# Patient Record
Sex: MALE | Race: WHITE | Employment: OTHER | URBAN - NONMETROPOLITAN AREA
[De-identification: names, ages, dates, MRNs, and addresses within clinical notes are randomized per-mention and may not be internally consistent; named-entity substitution may affect disease eponyms.]

---

## 2017-01-18 ENCOUNTER — OFFICE VISIT (OUTPATIENT)
Dept: PRIMARY CARE CLINIC | Age: 62
End: 2017-01-18
Payer: MEDICAID

## 2017-01-18 VITALS
WEIGHT: 277.8 LBS | OXYGEN SATURATION: 98 % | HEIGHT: 72 IN | TEMPERATURE: 98.1 F | HEART RATE: 82 BPM | BODY MASS INDEX: 37.63 KG/M2 | RESPIRATION RATE: 18 BRPM | DIASTOLIC BLOOD PRESSURE: 78 MMHG | SYSTOLIC BLOOD PRESSURE: 122 MMHG

## 2017-01-18 DIAGNOSIS — E11.9 ENCOUNTER FOR DIABETIC FOOT EXAM (HCC): ICD-10-CM

## 2017-01-18 DIAGNOSIS — I10 ESSENTIAL HYPERTENSION: Primary | ICD-10-CM

## 2017-01-18 DIAGNOSIS — E11.9 DIABETES MELLITUS WITHOUT COMPLICATION (HCC): ICD-10-CM

## 2017-01-18 DIAGNOSIS — Z23 NEED FOR PROPHYLACTIC VACCINATION AGAINST DIPHTHERIA-TETANUS-PERTUSSIS (DTP): ICD-10-CM

## 2017-01-18 PROCEDURE — 99214 OFFICE O/P EST MOD 30 MIN: CPT | Performed by: NURSE PRACTITIONER

## 2017-01-18 ASSESSMENT — ENCOUNTER SYMPTOMS
WHEEZING: 0
APNEA: 0
SHORTNESS OF BREATH: 0

## 2017-01-29 RX ORDER — GABAPENTIN 100 MG/1
CAPSULE ORAL
Qty: 90 CAPSULE | Refills: 0 | Status: SHIPPED | OUTPATIENT
Start: 2017-01-29 | End: 2017-05-16

## 2017-02-09 RX ORDER — LISINOPRIL 40 MG/1
40 TABLET ORAL 2 TIMES DAILY
Qty: 30 TABLET | Refills: 5 | Status: SHIPPED | OUTPATIENT
Start: 2017-02-09 | End: 2017-02-10 | Stop reason: SDUPTHER

## 2017-02-10 ENCOUNTER — OFFICE VISIT (OUTPATIENT)
Dept: URGENT CARE | Age: 62
End: 2017-02-10
Payer: COMMERCIAL

## 2017-02-10 VITALS
OXYGEN SATURATION: 93 % | WEIGHT: 275 LBS | TEMPERATURE: 98.7 F | BODY MASS INDEX: 37.3 KG/M2 | HEART RATE: 100 BPM | SYSTOLIC BLOOD PRESSURE: 120 MMHG | DIASTOLIC BLOOD PRESSURE: 80 MMHG | RESPIRATION RATE: 18 BRPM

## 2017-02-10 DIAGNOSIS — T14.8XXA MUSCLE STRAIN: ICD-10-CM

## 2017-02-10 DIAGNOSIS — J06.9 URI WITH COUGH AND CONGESTION: Primary | ICD-10-CM

## 2017-02-10 DIAGNOSIS — Z76.0 MEDICATION REFILL: Primary | ICD-10-CM

## 2017-02-10 PROCEDURE — 99213 OFFICE O/P EST LOW 20 MIN: CPT | Performed by: NURSE PRACTITIONER

## 2017-02-10 RX ORDER — METHYLPREDNISOLONE 4 MG/1
TABLET ORAL
Qty: 1 KIT | Refills: 0 | Status: SHIPPED | OUTPATIENT
Start: 2017-02-10 | End: 2017-02-16

## 2017-02-10 RX ORDER — NAPROXEN 500 MG/1
500 TABLET ORAL 2 TIMES DAILY WITH MEALS
Qty: 60 TABLET | Refills: 3 | Status: SHIPPED | OUTPATIENT
Start: 2017-02-10 | End: 2017-08-02 | Stop reason: SDUPTHER

## 2017-02-10 RX ORDER — DEXTROMETHORPHAN HYDROBROMIDE AND PROMETHAZINE HYDROCHLORIDE 15; 6.25 MG/5ML; MG/5ML
5 SYRUP ORAL NIGHTLY PRN
Qty: 118 ML | Refills: 0 | Status: SHIPPED | OUTPATIENT
Start: 2017-02-10 | End: 2017-02-17

## 2017-02-10 RX ORDER — CYCLOBENZAPRINE HCL 5 MG
5 TABLET ORAL EVERY 8 HOURS PRN
Qty: 20 TABLET | Refills: 0 | Status: SHIPPED | OUTPATIENT
Start: 2017-02-10 | End: 2017-02-20

## 2017-02-10 RX ORDER — LISINOPRIL 40 MG/1
40 TABLET ORAL 2 TIMES DAILY
Qty: 180 TABLET | Refills: 3 | Status: SHIPPED | OUTPATIENT
Start: 2017-02-10 | End: 2017-02-22 | Stop reason: ALTCHOICE

## 2017-02-10 RX ORDER — ESOMEPRAZOLE MAGNESIUM 40 MG/1
40 CAPSULE, DELAYED RELEASE ORAL DAILY
Qty: 90 CAPSULE | Refills: 3 | Status: SHIPPED | OUTPATIENT
Start: 2017-02-10 | End: 2017-05-24 | Stop reason: CLARIF

## 2017-02-10 RX ORDER — AMOXICILLIN AND CLAVULANATE POTASSIUM 875; 125 MG/1; MG/1
1 TABLET, FILM COATED ORAL 2 TIMES DAILY
Qty: 20 TABLET | Refills: 0 | Status: SHIPPED | OUTPATIENT
Start: 2017-02-10 | End: 2017-02-20

## 2017-02-10 ASSESSMENT — ENCOUNTER SYMPTOMS
SORE THROAT: 0
COUGH: 1

## 2017-02-20 ENCOUNTER — TELEPHONE (OUTPATIENT)
Dept: PRIMARY CARE CLINIC | Age: 62
End: 2017-02-20

## 2017-02-20 ENCOUNTER — HOSPITAL ENCOUNTER (EMERGENCY)
Facility: HOSPITAL | Age: 62
Discharge: HOME | End: 2017-02-20
Admitting: FAMILY MEDICINE

## 2017-02-20 ENCOUNTER — APPOINTMENT (OUTPATIENT)
Dept: GENERAL RADIOLOGY | Facility: HOSPITAL | Age: 62
End: 2017-02-20

## 2017-02-20 ENCOUNTER — APPOINTMENT (OUTPATIENT)
Dept: CT IMAGING | Facility: HOSPITAL | Age: 62
End: 2017-02-20

## 2017-02-20 VITALS
RESPIRATION RATE: 16 BRPM | OXYGEN SATURATION: 95 % | BODY MASS INDEX: 35.89 KG/M2 | HEART RATE: 71 BPM | WEIGHT: 265 LBS | DIASTOLIC BLOOD PRESSURE: 84 MMHG | SYSTOLIC BLOOD PRESSURE: 163 MMHG | TEMPERATURE: 98.7 F | HEIGHT: 72 IN

## 2017-02-20 DIAGNOSIS — R07.9 CHEST PAIN, UNSPECIFIED TYPE: Primary | ICD-10-CM

## 2017-02-20 DIAGNOSIS — J20.9 ACUTE BRONCHITIS, UNSPECIFIED ORGANISM: ICD-10-CM

## 2017-02-20 LAB
ALBUMIN SERPL-MCNC: 3.8 G/DL (ref 3.5–5)
ALBUMIN/GLOB SERPL: 1.3 G/DL (ref 1.1–2.5)
ALP SERPL-CCNC: 72 U/L (ref 24–120)
ALT SERPL W P-5'-P-CCNC: 37 U/L (ref 0–54)
ANION GAP SERPL CALCULATED.3IONS-SCNC: 11 MMOL/L (ref 4–13)
APTT PPP: 27.5 SECONDS (ref 24.1–34.8)
ARTERIAL PATENCY WRIST A: ABNORMAL
AST SERPL-CCNC: 24 U/L (ref 7–45)
ATMOSPHERIC PRESS: ABNORMAL MMHG
BASE EXCESS BLDA CALC-SCNC: 0.3 MMOL/L (ref -2–2)
BASOPHILS # BLD AUTO: 0.03 10*3/MM3 (ref 0–0.2)
BASOPHILS NFR BLD AUTO: 0.3 % (ref 0–2)
BDY SITE: ABNORMAL
BILIRUB SERPL-MCNC: 0.5 MG/DL (ref 0.1–1)
BUN BLD-MCNC: 23 MG/DL (ref 5–21)
BUN/CREAT SERPL: 16.9 (ref 7–25)
CALCIUM SPEC-SCNC: 10 MG/DL (ref 8.4–10.4)
CHLORIDE SERPL-SCNC: 101 MMOL/L (ref 98–110)
CO2 SERPL-SCNC: 28 MMOL/L (ref 24–31)
COHGB MFR BLD: 1.5 % (ref 0–5.1)
CREAT BLD-MCNC: 1.36 MG/DL (ref 0.5–1.4)
DEPRECATED RDW RBC AUTO: 39.1 FL (ref 40–54)
EOSINOPHIL # BLD AUTO: 0.18 10*3/MM3 (ref 0–0.7)
EOSINOPHIL NFR BLD AUTO: 1.8 % (ref 0–4)
ERYTHROCYTE [DISTWIDTH] IN BLOOD BY AUTOMATED COUNT: 13.1 % (ref 12–15)
FLUAV AG NPH QL: NEGATIVE
FLUBV AG NPH QL IA: NEGATIVE
GFR SERPL CREATININE-BSD FRML MDRD: 53 ML/MIN/1.73
GLOBULIN UR ELPH-MCNC: 3 GM/DL
GLUCOSE BLD-MCNC: 176 MG/DL (ref 70–100)
HCO3 BLDA-SCNC: 24.7 MMOL/L (ref 22–26)
HCT VFR BLD AUTO: 38.8 % (ref 40–52)
HGB BLD-MCNC: 12.8 G/DL (ref 14–18)
HGB BLDA-MCNC: 13 G/DL (ref 14–18)
HOLD SPECIMEN: NORMAL
HOLD SPECIMEN: NORMAL
IMM GRANULOCYTES # BLD: 0.07 10*3/MM3 (ref 0–0.03)
IMM GRANULOCYTES NFR BLD: 0.7 % (ref 0–5)
INR PPP: 0.88 (ref 0.91–1.09)
LYMPHOCYTES # BLD AUTO: 2.57 10*3/MM3 (ref 0.72–4.86)
LYMPHOCYTES NFR BLD AUTO: 25.8 % (ref 15–45)
MCH RBC QN AUTO: 27.4 PG (ref 28–32)
MCHC RBC AUTO-ENTMCNC: 33 G/DL (ref 33–36)
MCV RBC AUTO: 83.1 FL (ref 82–95)
METHGB BLD QL: 0.1 % (ref 0.4–1.5)
MODALITY: ABNORMAL
MONOCYTES # BLD AUTO: 0.83 10*3/MM3 (ref 0.19–1.3)
MONOCYTES NFR BLD AUTO: 8.3 % (ref 4–12)
NEUTROPHILS # BLD AUTO: 6.27 10*3/MM3 (ref 1.87–8.4)
NEUTROPHILS NFR BLD AUTO: 63.1 % (ref 39–78)
OXYHGB MFR BLDV: 93.1 % (ref 94–97)
PCO2 BLDA: 39 MM HG (ref 35–45)
PH BLDA: 7.42 PH UNITS (ref 7.35–7.45)
PLATELET # BLD AUTO: 206 10*3/MM3 (ref 130–400)
PMV BLD AUTO: 12.8 FL (ref 6–12)
PO2 BLDA: 71.4 MM HG (ref 80–100)
POTASSIUM BLD-SCNC: 4.6 MMOL/L (ref 3.5–5.3)
POTASSIUM BLDA-SCNC: 4.83 MMOL/L (ref 3.5–5)
PROT SERPL-MCNC: 6.8 G/DL (ref 6.3–8.7)
PROTHROMBIN TIME: 12.2 SECONDS (ref 11.9–14.6)
RBC # BLD AUTO: 4.67 10*6/MM3 (ref 4.8–5.9)
SODIUM BLD-SCNC: 140 MMOL/L (ref 135–145)
SODIUM BLDA-SCNC: 135.2 MMOL/L (ref 135–145)
TROPONIN I SERPL-MCNC: 0 NG/ML (ref 0–0.07)
TROPONIN I SERPL-MCNC: 0 NG/ML (ref 0–0.07)
WBC NRBC COR # BLD: 9.95 10*3/MM3 (ref 4.8–10.8)
WHOLE BLOOD HOLD SPECIMEN: NORMAL
WHOLE BLOOD HOLD SPECIMEN: NORMAL

## 2017-02-20 PROCEDURE — 93005 ELECTROCARDIOGRAM TRACING: CPT

## 2017-02-20 PROCEDURE — 93010 ELECTROCARDIOGRAM REPORT: CPT | Performed by: INTERNAL MEDICINE

## 2017-02-20 PROCEDURE — 82805 BLOOD GASES W/O2 SATURATION: CPT

## 2017-02-20 PROCEDURE — 83050 HGB METHEMOGLOBIN QUAN: CPT

## 2017-02-20 PROCEDURE — 85025 COMPLETE CBC W/AUTO DIFF WBC: CPT | Performed by: NURSE PRACTITIONER

## 2017-02-20 PROCEDURE — 82375 ASSAY CARBOXYHB QUANT: CPT

## 2017-02-20 PROCEDURE — 94640 AIRWAY INHALATION TREATMENT: CPT

## 2017-02-20 PROCEDURE — 84484 ASSAY OF TROPONIN QUANT: CPT

## 2017-02-20 PROCEDURE — 71275 CT ANGIOGRAPHY CHEST: CPT

## 2017-02-20 PROCEDURE — 99283 EMERGENCY DEPT VISIT LOW MDM: CPT

## 2017-02-20 PROCEDURE — 85610 PROTHROMBIN TIME: CPT | Performed by: NURSE PRACTITIONER

## 2017-02-20 PROCEDURE — 85730 THROMBOPLASTIN TIME PARTIAL: CPT | Performed by: NURSE PRACTITIONER

## 2017-02-20 PROCEDURE — 93005 ELECTROCARDIOGRAM TRACING: CPT | Performed by: NURSE PRACTITIONER

## 2017-02-20 PROCEDURE — 36600 WITHDRAWAL OF ARTERIAL BLOOD: CPT

## 2017-02-20 PROCEDURE — 96365 THER/PROPH/DIAG IV INF INIT: CPT

## 2017-02-20 PROCEDURE — 71010 HC CHEST PA OR AP: CPT

## 2017-02-20 PROCEDURE — 80053 COMPREHEN METABOLIC PANEL: CPT | Performed by: NURSE PRACTITIONER

## 2017-02-20 PROCEDURE — 87804 INFLUENZA ASSAY W/OPTIC: CPT | Performed by: NURSE PRACTITIONER

## 2017-02-20 PROCEDURE — 36415 COLL VENOUS BLD VENIPUNCTURE: CPT

## 2017-02-20 PROCEDURE — 96375 TX/PRO/DX INJ NEW DRUG ADDON: CPT

## 2017-02-20 PROCEDURE — 25010000002 CEFTRIAXONE: Performed by: NURSE PRACTITIONER

## 2017-02-20 PROCEDURE — 25010000002 KETOROLAC TROMETHAMINE PER 15 MG: Performed by: NURSE PRACTITIONER

## 2017-02-20 PROCEDURE — 0 IOPAMIDOL PER 1 ML: Performed by: NURSE PRACTITIONER

## 2017-02-20 RX ORDER — IPRATROPIUM BROMIDE AND ALBUTEROL SULFATE 2.5; .5 MG/3ML; MG/3ML
3 SOLUTION RESPIRATORY (INHALATION) ONCE
Status: COMPLETED | OUTPATIENT
Start: 2017-02-20 | End: 2017-02-20

## 2017-02-20 RX ORDER — SILDENAFIL CITRATE 20 MG/1
20 TABLET ORAL DAILY PRN
Status: ON HOLD | COMMUNITY
End: 2018-04-24 | Stop reason: ALTCHOICE

## 2017-02-20 RX ORDER — METOPROLOL TARTRATE 50 MG/1
50 TABLET, FILM COATED ORAL 2 TIMES DAILY
Qty: 30 TABLET | Refills: 0 | Status: SHIPPED | OUTPATIENT
Start: 2017-02-20

## 2017-02-20 RX ORDER — ASPIRIN 81 MG/1
81 TABLET ORAL DAILY
Status: ON HOLD | COMMUNITY
End: 2018-04-24

## 2017-02-20 RX ORDER — KETOROLAC TROMETHAMINE 15 MG/ML
15 INJECTION, SOLUTION INTRAMUSCULAR; INTRAVENOUS ONCE
Status: COMPLETED | OUTPATIENT
Start: 2017-02-20 | End: 2017-02-20

## 2017-02-20 RX ORDER — PANTOPRAZOLE SODIUM 40 MG/1
40 TABLET, DELAYED RELEASE ORAL EVERY MORNING
Status: ON HOLD | COMMUNITY
End: 2018-04-24 | Stop reason: ALTCHOICE

## 2017-02-20 RX ORDER — GABAPENTIN 100 MG/1
100 CAPSULE ORAL 3 TIMES DAILY
Status: ON HOLD | COMMUNITY
End: 2018-04-24

## 2017-02-20 RX ORDER — LEVALBUTEROL INHALATION SOLUTION 1.25 MG/3ML
1 SOLUTION RESPIRATORY (INHALATION) EVERY 4 HOURS PRN
Qty: 30 AMPULE | Refills: 0 | Status: ON HOLD | OUTPATIENT
Start: 2017-02-20 | End: 2018-04-24

## 2017-02-20 RX ORDER — FLUTICASONE PROPIONATE 50 MCG
1 SPRAY, SUSPENSION (ML) NASAL 2 TIMES DAILY PRN
COMMUNITY

## 2017-02-20 RX ORDER — MONTELUKAST SODIUM 10 MG/1
10 TABLET ORAL NIGHTLY PRN
COMMUNITY
End: 2021-01-26

## 2017-02-20 RX ORDER — RANITIDINE HCL 75 MG
75 TABLET ORAL DAILY PRN
COMMUNITY
End: 2021-02-03

## 2017-02-20 RX ORDER — HYDROCHLOROTHIAZIDE 25 MG/1
12.5 TABLET ORAL NIGHTLY
COMMUNITY
End: 2021-06-02

## 2017-02-20 RX ORDER — METHYLPREDNISOLONE 4 MG/1
TABLET ORAL
Qty: 21 TABLET | Refills: 0 | Status: ON HOLD | OUTPATIENT
Start: 2017-02-20 | End: 2018-04-24

## 2017-02-20 RX ORDER — INSULIN GLARGINE 100 [IU]/ML
50 INJECTION, SOLUTION SUBCUTANEOUS EVERY 12 HOURS
COMMUNITY
End: 2019-11-21

## 2017-02-20 RX ORDER — DOXYCYCLINE HYCLATE 100 MG/1
100 CAPSULE ORAL 2 TIMES DAILY
Status: ON HOLD | COMMUNITY
End: 2018-04-24

## 2017-02-20 RX ORDER — LISINOPRIL 40 MG/1
40 TABLET ORAL 2 TIMES DAILY
Status: ON HOLD | COMMUNITY
End: 2018-04-24 | Stop reason: ALTCHOICE

## 2017-02-20 RX ADMIN — KETOROLAC TROMETHAMINE 15 MG: 15 INJECTION, SOLUTION INTRAMUSCULAR; INTRAVENOUS at 13:32

## 2017-02-20 RX ADMIN — IOPAMIDOL 150 ML: 755 INJECTION, SOLUTION INTRAVENOUS at 17:19

## 2017-02-20 RX ADMIN — CEFTRIAXONE 1 G: 1 INJECTION, POWDER, FOR SOLUTION INTRAMUSCULAR; INTRAVENOUS at 14:16

## 2017-02-20 RX ADMIN — IPRATROPIUM BROMIDE AND ALBUTEROL SULFATE 3 ML: .5; 3 SOLUTION RESPIRATORY (INHALATION) at 14:21

## 2017-02-22 ENCOUNTER — TELEPHONE (OUTPATIENT)
Dept: PRIMARY CARE CLINIC | Age: 62
End: 2017-02-22

## 2017-02-22 RX ORDER — METOPROLOL TARTRATE 50 MG/1
50 TABLET, FILM COATED ORAL 2 TIMES DAILY
Qty: 60 TABLET | Refills: 3 | Status: SHIPPED | OUTPATIENT
Start: 2017-02-22 | End: 2017-08-02 | Stop reason: SDUPTHER

## 2017-03-02 ENCOUNTER — OFFICE VISIT (OUTPATIENT)
Dept: PRIMARY CARE CLINIC | Age: 62
End: 2017-03-02
Payer: COMMERCIAL

## 2017-03-02 VITALS
TEMPERATURE: 96.8 F | HEIGHT: 72 IN | BODY MASS INDEX: 36.41 KG/M2 | WEIGHT: 268.8 LBS | SYSTOLIC BLOOD PRESSURE: 128 MMHG | DIASTOLIC BLOOD PRESSURE: 72 MMHG | HEART RATE: 79 BPM | OXYGEN SATURATION: 98 % | RESPIRATION RATE: 18 BRPM

## 2017-03-02 DIAGNOSIS — Z76.0 MEDICATION REFILL: ICD-10-CM

## 2017-03-02 DIAGNOSIS — J40 SINOBRONCHITIS: ICD-10-CM

## 2017-03-02 DIAGNOSIS — J32.9 SINOBRONCHITIS: ICD-10-CM

## 2017-03-02 DIAGNOSIS — R05.9 COUGH: Primary | ICD-10-CM

## 2017-03-02 PROCEDURE — 99213 OFFICE O/P EST LOW 20 MIN: CPT | Performed by: NURSE PRACTITIONER

## 2017-03-02 RX ORDER — DEXTROMETHORPHAN HYDROBROMIDE AND PROMETHAZINE HYDROCHLORIDE 15; 6.25 MG/5ML; MG/5ML
5 SYRUP ORAL 4 TIMES DAILY PRN
Qty: 1 BOTTLE | Refills: 0 | Status: SHIPPED | OUTPATIENT
Start: 2017-03-02 | End: 2017-03-09

## 2017-03-15 ENCOUNTER — TELEPHONE (OUTPATIENT)
Dept: PRIMARY CARE CLINIC | Age: 62
End: 2017-03-15

## 2017-03-15 DIAGNOSIS — E11.9 TYPE 2 DIABETES MELLITUS WITHOUT COMPLICATION, WITH LONG-TERM CURRENT USE OF INSULIN (HCC): Primary | ICD-10-CM

## 2017-03-15 DIAGNOSIS — Z79.4 TYPE 2 DIABETES MELLITUS WITHOUT COMPLICATION, WITH LONG-TERM CURRENT USE OF INSULIN (HCC): Primary | ICD-10-CM

## 2017-03-16 ENCOUNTER — TELEPHONE (OUTPATIENT)
Dept: PRIMARY CARE CLINIC | Age: 62
End: 2017-03-16

## 2017-03-16 RX ORDER — ESOMEPRAZOLE MAGNESIUM 40 MG/1
40 CAPSULE, DELAYED RELEASE ORAL DAILY
Qty: 30 CAPSULE | Refills: 1 | Status: SHIPPED | OUTPATIENT
Start: 2017-03-16 | End: 2017-04-20

## 2017-03-30 RX ORDER — LANSOPRAZOLE 30 MG/1
30 CAPSULE, DELAYED RELEASE ORAL DAILY
Qty: 30 CAPSULE | Refills: 3 | Status: SHIPPED | OUTPATIENT
Start: 2017-03-30 | End: 2017-08-02 | Stop reason: SDUPTHER

## 2017-04-17 DIAGNOSIS — E11.9 DIABETES MELLITUS WITHOUT COMPLICATION (HCC): ICD-10-CM

## 2017-04-17 LAB
CREATININE URINE: 149.1 MG/DL (ref 4.2–622)
HBA1C MFR BLD: 10.1 %
MICROALBUMIN UR-MCNC: 65.2 MG/DL (ref 0–19)
MICROALBUMIN/CREAT UR-RTO: 437.3 MG/G

## 2017-04-19 ENCOUNTER — OFFICE VISIT (OUTPATIENT)
Dept: PRIMARY CARE CLINIC | Age: 62
End: 2017-04-19
Payer: COMMERCIAL

## 2017-04-19 VITALS
TEMPERATURE: 97.2 F | RESPIRATION RATE: 17 BRPM | BODY MASS INDEX: 36.44 KG/M2 | OXYGEN SATURATION: 96 % | DIASTOLIC BLOOD PRESSURE: 78 MMHG | WEIGHT: 269 LBS | HEART RATE: 70 BPM | SYSTOLIC BLOOD PRESSURE: 128 MMHG | HEIGHT: 72 IN

## 2017-04-19 DIAGNOSIS — I15.9 SECONDARY HYPERTENSION: ICD-10-CM

## 2017-04-19 DIAGNOSIS — K21.9 GASTROESOPHAGEAL REFLUX DISEASE, ESOPHAGITIS PRESENCE NOT SPECIFIED: ICD-10-CM

## 2017-04-19 DIAGNOSIS — E11.9 DIABETES MELLITUS WITH HEMOGLOBIN A1C GOAL OF 7.0%-8.0% (HCC): Primary | Chronic | ICD-10-CM

## 2017-04-19 DIAGNOSIS — Z23 NEED FOR PROPHYLACTIC VACCINATION AGAINST DIPHTHERIA-TETANUS-PERTUSSIS (DTP): ICD-10-CM

## 2017-04-19 DIAGNOSIS — Z76.0 MEDICATION REFILL: ICD-10-CM

## 2017-04-19 PROCEDURE — 99214 OFFICE O/P EST MOD 30 MIN: CPT | Performed by: NURSE PRACTITIONER

## 2017-04-19 RX ORDER — IRBESARTAN AND HYDROCHLOROTHIAZIDE 150; 12.5 MG/1; MG/1
1 TABLET, FILM COATED ORAL DAILY
Qty: 30 TABLET | Refills: 3 | Status: SHIPPED | OUTPATIENT
Start: 2017-04-19 | End: 2017-08-02 | Stop reason: SDUPTHER

## 2017-04-19 RX ORDER — FLUTICASONE PROPIONATE 50 MCG
1 SPRAY, SUSPENSION (ML) NASAL DAILY
Qty: 1 BOTTLE | Refills: 3 | Status: SHIPPED | OUTPATIENT
Start: 2017-04-19 | End: 2017-08-02 | Stop reason: SDUPTHER

## 2017-04-19 RX ORDER — CYCLOBENZAPRINE HCL 5 MG
5 TABLET ORAL 2 TIMES DAILY PRN
Qty: 60 TABLET | Refills: 5 | Status: SHIPPED | OUTPATIENT
Start: 2017-04-19 | End: 2017-05-19

## 2017-04-20 ASSESSMENT — ENCOUNTER SYMPTOMS
EYE PAIN: 0
VOICE CHANGE: 0
TROUBLE SWALLOWING: 0
EYE REDNESS: 0
PHOTOPHOBIA: 0
RHINORRHEA: 0
COUGH: 0
WHEEZING: 0
EYE ITCHING: 0
ABDOMINAL PAIN: 0
SINUS PRESSURE: 0
ABDOMINAL DISTENTION: 0
EYE DISCHARGE: 0
CHEST TIGHTNESS: 0
SHORTNESS OF BREATH: 0
DIARRHEA: 0
VOMITING: 0
CHOKING: 0
STRIDOR: 0
BACK PAIN: 0
SORE THROAT: 0
COLOR CHANGE: 0
CONSTIPATION: 0
APNEA: 0

## 2017-05-24 ENCOUNTER — OFFICE VISIT (OUTPATIENT)
Dept: PRIMARY CARE CLINIC | Age: 62
End: 2017-05-24
Payer: COMMERCIAL

## 2017-05-24 VITALS
HEART RATE: 84 BPM | OXYGEN SATURATION: 97 % | BODY MASS INDEX: 37.76 KG/M2 | TEMPERATURE: 97.2 F | HEIGHT: 72 IN | DIASTOLIC BLOOD PRESSURE: 80 MMHG | WEIGHT: 278.8 LBS | SYSTOLIC BLOOD PRESSURE: 140 MMHG | RESPIRATION RATE: 17 BRPM

## 2017-05-24 DIAGNOSIS — E11.01 DIABETES MELLITUS TYPE 2 WITH HYPEROSMOLAR COMA, UNCONTROLLED, UNSPECIFIED LONG TERM INSULIN USE STATUS: ICD-10-CM

## 2017-05-24 DIAGNOSIS — I10 UNCONTROLLED HYPERTENSION: ICD-10-CM

## 2017-05-24 DIAGNOSIS — L98.9 BACK SKIN LESION: Primary | ICD-10-CM

## 2017-05-24 PROCEDURE — 99214 OFFICE O/P EST MOD 30 MIN: CPT | Performed by: NURSE PRACTITIONER

## 2017-05-24 RX ORDER — TRIAMCINOLONE ACETONIDE 0.25 MG/G
CREAM TOPICAL
Qty: 1 TUBE | Refills: 2 | Status: SHIPPED | OUTPATIENT
Start: 2017-05-24 | End: 2017-06-06 | Stop reason: SDUPTHER

## 2017-05-24 ASSESSMENT — ENCOUNTER SYMPTOMS
WHEEZING: 0
SHORTNESS OF BREATH: 0
APNEA: 0

## 2017-06-06 ENCOUNTER — TELEPHONE (OUTPATIENT)
Dept: PRIMARY CARE CLINIC | Age: 62
End: 2017-06-06

## 2017-06-06 DIAGNOSIS — E11.01 DIABETES MELLITUS TYPE 2 WITH HYPEROSMOLAR COMA, UNCONTROLLED, UNSPECIFIED LONG TERM INSULIN USE STATUS: ICD-10-CM

## 2017-06-06 RX ORDER — TRIAMCINOLONE ACETONIDE 0.25 MG/G
CREAM TOPICAL
Qty: 1 TUBE | Refills: 2 | Status: SHIPPED | OUTPATIENT
Start: 2017-06-06 | End: 2018-03-21 | Stop reason: SDUPTHER

## 2017-07-08 DIAGNOSIS — E11.9 DIABETES MELLITUS WITH HEMOGLOBIN A1C GOAL OF 7.0%-8.0% (HCC): Chronic | ICD-10-CM

## 2017-07-10 RX ORDER — LINAGLIPTIN 5 MG/1
TABLET, FILM COATED ORAL
Qty: 30 TABLET | Refills: 0 | Status: SHIPPED | OUTPATIENT
Start: 2017-07-10 | End: 2017-08-02 | Stop reason: SDUPTHER

## 2017-07-10 RX ORDER — INSULIN GLARGINE 100 [IU]/ML
INJECTION, SOLUTION SUBCUTANEOUS
Qty: 5 PEN | Refills: 0 | Status: SHIPPED | OUTPATIENT
Start: 2017-07-10 | End: 2017-08-02 | Stop reason: SDUPTHER

## 2017-08-02 ENCOUNTER — OFFICE VISIT (OUTPATIENT)
Dept: PRIMARY CARE CLINIC | Age: 62
End: 2017-08-02
Payer: COMMERCIAL

## 2017-08-02 VITALS
BODY MASS INDEX: 37.03 KG/M2 | OXYGEN SATURATION: 96 % | DIASTOLIC BLOOD PRESSURE: 90 MMHG | HEIGHT: 72 IN | SYSTOLIC BLOOD PRESSURE: 148 MMHG | HEART RATE: 85 BPM | WEIGHT: 273.4 LBS | RESPIRATION RATE: 17 BRPM | TEMPERATURE: 97.6 F

## 2017-08-02 DIAGNOSIS — I15.9 SECONDARY HYPERTENSION: ICD-10-CM

## 2017-08-02 DIAGNOSIS — Z76.0 MEDICATION REFILL: ICD-10-CM

## 2017-08-02 DIAGNOSIS — I10 ESSENTIAL HYPERTENSION: Primary | ICD-10-CM

## 2017-08-02 DIAGNOSIS — H91.93 HEARING DEFICIT, BILATERAL: ICD-10-CM

## 2017-08-02 DIAGNOSIS — E11.9 TYPE 2 DIABETES MELLITUS NOT AT GOAL (HCC): ICD-10-CM

## 2017-08-02 DIAGNOSIS — E11.42 DIABETIC POLYNEUROPATHY ASSOCIATED WITH TYPE 2 DIABETES MELLITUS (HCC): ICD-10-CM

## 2017-08-02 DIAGNOSIS — T14.8XXA MUSCLE STRAIN: ICD-10-CM

## 2017-08-02 DIAGNOSIS — T14.8XXA ABRASION OF SKIN: ICD-10-CM

## 2017-08-02 DIAGNOSIS — E78.2 MIXED HYPERLIPIDEMIA: ICD-10-CM

## 2017-08-02 DIAGNOSIS — E11.9 DIABETES MELLITUS WITH HEMOGLOBIN A1C GOAL OF 7.0%-8.0% (HCC): Chronic | ICD-10-CM

## 2017-08-02 PROCEDURE — 99214 OFFICE O/P EST MOD 30 MIN: CPT | Performed by: NURSE PRACTITIONER

## 2017-08-02 RX ORDER — FLUTICASONE PROPIONATE 50 MCG
1 SPRAY, SUSPENSION (ML) NASAL DAILY
Qty: 1 BOTTLE | Refills: 3 | Status: SHIPPED | OUTPATIENT
Start: 2017-08-02 | End: 2017-12-27 | Stop reason: SDUPTHER

## 2017-08-02 RX ORDER — IRBESARTAN AND HYDROCHLOROTHIAZIDE 150; 12.5 MG/1; MG/1
1 TABLET, FILM COATED ORAL DAILY
Qty: 30 TABLET | Refills: 3 | Status: SHIPPED | OUTPATIENT
Start: 2017-08-02 | End: 2017-11-01 | Stop reason: SDUPTHER

## 2017-08-02 RX ORDER — GABAPENTIN 100 MG/1
100 CAPSULE ORAL 3 TIMES DAILY
Qty: 90 CAPSULE | Refills: 0 | Status: SHIPPED | OUTPATIENT
Start: 2017-08-02 | End: 2017-08-02 | Stop reason: SDUPTHER

## 2017-08-02 RX ORDER — NAPROXEN 500 MG/1
500 TABLET ORAL 2 TIMES DAILY WITH MEALS
Qty: 60 TABLET | Refills: 3 | Status: SHIPPED | OUTPATIENT
Start: 2017-08-02 | End: 2017-12-02 | Stop reason: SDUPTHER

## 2017-08-02 RX ORDER — LANSOPRAZOLE 30 MG/1
30 CAPSULE, DELAYED RELEASE ORAL DAILY
Qty: 30 CAPSULE | Refills: 3 | Status: SHIPPED | OUTPATIENT
Start: 2017-08-02 | End: 2017-12-02 | Stop reason: SDUPTHER

## 2017-08-02 RX ORDER — GABAPENTIN 100 MG/1
100 CAPSULE ORAL 3 TIMES DAILY
Qty: 90 CAPSULE | Refills: 0 | Status: SHIPPED | OUTPATIENT
Start: 2017-08-02 | End: 2017-09-07 | Stop reason: SDUPTHER

## 2017-08-02 RX ORDER — MUPIROCIN CALCIUM 20 MG/G
CREAM TOPICAL
Qty: 1 TUBE | Refills: 1 | Status: SHIPPED | OUTPATIENT
Start: 2017-08-02 | End: 2017-10-03 | Stop reason: SDUPTHER

## 2017-08-02 RX ORDER — METOPROLOL TARTRATE 50 MG/1
50 TABLET, FILM COATED ORAL 2 TIMES DAILY
Qty: 60 TABLET | Refills: 3 | Status: SHIPPED | OUTPATIENT
Start: 2017-08-02 | End: 2018-01-31 | Stop reason: SDUPTHER

## 2017-08-02 ASSESSMENT — ENCOUNTER SYMPTOMS
APNEA: 0
BACK PAIN: 0
EYE PAIN: 0
EYE REDNESS: 0
TROUBLE SWALLOWING: 0
COLOR CHANGE: 0
VOICE CHANGE: 0
WHEEZING: 0
EYE DISCHARGE: 0
STRIDOR: 0
SHORTNESS OF BREATH: 0
ABDOMINAL PAIN: 0
PHOTOPHOBIA: 0
SORE THROAT: 0
VOMITING: 0
COUGH: 0
CHEST TIGHTNESS: 0
DIARRHEA: 0
RHINORRHEA: 0
EYE ITCHING: 0
SINUS PRESSURE: 0
ABDOMINAL DISTENTION: 0
CONSTIPATION: 0
CHOKING: 0

## 2017-08-16 ENCOUNTER — PROCEDURE VISIT (OUTPATIENT)
Dept: OTOLARYNGOLOGY | Facility: CLINIC | Age: 62
End: 2017-08-16

## 2017-08-16 ENCOUNTER — OFFICE VISIT (OUTPATIENT)
Dept: PRIMARY CARE CLINIC | Age: 62
End: 2017-08-16
Payer: COMMERCIAL

## 2017-08-16 VITALS
SYSTOLIC BLOOD PRESSURE: 138 MMHG | RESPIRATION RATE: 19 BRPM | HEIGHT: 72 IN | DIASTOLIC BLOOD PRESSURE: 78 MMHG | HEART RATE: 72 BPM | TEMPERATURE: 97.5 F | WEIGHT: 278.8 LBS | OXYGEN SATURATION: 95 % | BODY MASS INDEX: 37.76 KG/M2

## 2017-08-16 DIAGNOSIS — IMO0001 ASYMMETRICAL HEARING LOSS, LEFT: Primary | ICD-10-CM

## 2017-08-16 DIAGNOSIS — Z76.0 MEDICATION REFILL: ICD-10-CM

## 2017-08-16 DIAGNOSIS — I10 RESISTANT HYPERTENSION: Primary | ICD-10-CM

## 2017-08-16 DIAGNOSIS — R60.9 DEPENDENT EDEMA: ICD-10-CM

## 2017-08-16 PROCEDURE — 99213 OFFICE O/P EST LOW 20 MIN: CPT | Performed by: NURSE PRACTITIONER

## 2017-08-16 PROCEDURE — 92550 TYMPANOMETRY & REFLEX THRESH: CPT | Performed by: AUDIOLOGIST

## 2017-08-16 PROCEDURE — 92553 AUDIOMETRY AIR & BONE: CPT | Performed by: AUDIOLOGIST

## 2017-08-16 RX ORDER — HYDROCHLOROTHIAZIDE 12.5 MG/1
12.5 CAPSULE, GELATIN COATED ORAL DAILY
Qty: 30 CAPSULE | Refills: 3 | Status: SHIPPED | OUTPATIENT
Start: 2017-08-16 | End: 2017-12-02 | Stop reason: SDUPTHER

## 2017-08-16 RX ORDER — LORATADINE 10 MG/1
10 TABLET ORAL DAILY
Qty: 30 TABLET | Refills: 5 | Status: SHIPPED | OUTPATIENT
Start: 2017-08-16 | End: 2018-01-31 | Stop reason: SDUPTHER

## 2017-08-16 ASSESSMENT — ENCOUNTER SYMPTOMS
SHORTNESS OF BREATH: 0
WHEEZING: 0
APNEA: 0

## 2017-08-16 NOTE — PROGRESS NOTES
CASE HISTORY DETAILS   Mr. Sims presented to the clinic this date for a hearing evaluation due to decreased hearing. He reported a gradual decline in hearing. He has history of noise exposure. He denied history of other ear problems.     SUMMARY   RIGHT  · Otoscopy revealed clear EAC/Unremarkable TM.  · Mild high frequency sensorineural hearing loss.  · Immitance measures are consistent with normal Type A tympanogram.    LEFT  · Otoscopy revealed clear EAC/Unremarkable TM.  · Severe high frequency sensorineural hearing loss.  · Immitance measures are consistent with normal Type A tympanogram.    Hearing aids and medical clearance were discussed. It is felt asymmetry is likely due to history of noise exposure and medical clearance is not needed to continue with aid fitting, however, it was recommend Mr. Sims follow up with ENT for further assessment.     RECOMMENDATIONS   Results of today's evaluation were discussed with Mr. Sims and the following recommendations were made:  1. ENT evaluation today as scheduled.  2. Proceed with hearing aid fitting pending insurance approval.    AUDIOGRAM AND IMMITANCE       Day Heck, CCC-A  Audiologist

## 2017-09-07 DIAGNOSIS — Z76.0 MEDICATION REFILL: Primary | ICD-10-CM

## 2017-09-07 RX ORDER — GABAPENTIN 100 MG/1
100 CAPSULE ORAL 3 TIMES DAILY
Qty: 90 CAPSULE | Refills: 0 | Status: SHIPPED | OUTPATIENT
Start: 2017-09-07 | End: 2017-11-01 | Stop reason: SDUPTHER

## 2017-10-03 ENCOUNTER — TELEPHONE (OUTPATIENT)
Dept: PRIMARY CARE CLINIC | Age: 62
End: 2017-10-03

## 2017-10-30 DIAGNOSIS — I15.9 SECONDARY HYPERTENSION: ICD-10-CM

## 2017-11-01 DIAGNOSIS — Z76.0 MEDICATION REFILL: ICD-10-CM

## 2017-11-01 DIAGNOSIS — I15.9 SECONDARY HYPERTENSION: ICD-10-CM

## 2017-11-01 RX ORDER — IRBESARTAN AND HYDROCHLOROTHIAZIDE 150; 12.5 MG/1; MG/1
TABLET, FILM COATED ORAL
Qty: 30 TABLET | Refills: 3 | Status: SHIPPED | OUTPATIENT
Start: 2017-11-01 | End: 2017-11-02 | Stop reason: SDUPTHER

## 2017-11-01 RX ORDER — GABAPENTIN 100 MG/1
100 CAPSULE ORAL 3 TIMES DAILY
Qty: 90 CAPSULE | Refills: 0 | OUTPATIENT
Start: 2017-11-01 | End: 2017-11-02 | Stop reason: SDUPTHER

## 2017-11-01 RX ORDER — IRBESARTAN AND HYDROCHLOROTHIAZIDE 150; 12.5 MG/1; MG/1
1 TABLET, FILM COATED ORAL DAILY
Qty: 30 TABLET | Refills: 3 | Status: SHIPPED | OUTPATIENT
Start: 2017-11-01 | End: 2018-01-31 | Stop reason: SDUPTHER

## 2017-11-02 ENCOUNTER — OFFICE VISIT (OUTPATIENT)
Dept: PRIMARY CARE CLINIC | Age: 62
End: 2017-11-02
Payer: COMMERCIAL

## 2017-11-02 VITALS
RESPIRATION RATE: 18 BRPM | OXYGEN SATURATION: 97 % | HEIGHT: 72 IN | TEMPERATURE: 97.2 F | WEIGHT: 286.2 LBS | HEART RATE: 71 BPM | BODY MASS INDEX: 38.76 KG/M2 | DIASTOLIC BLOOD PRESSURE: 82 MMHG | SYSTOLIC BLOOD PRESSURE: 146 MMHG

## 2017-11-02 DIAGNOSIS — Z23 NEED FOR INFLUENZA VACCINATION: ICD-10-CM

## 2017-11-02 DIAGNOSIS — E11.9 TYPE 2 DIABETES MELLITUS NOT AT GOAL (HCC): ICD-10-CM

## 2017-11-02 DIAGNOSIS — I87.2 VENOUS INSUFFICIENCY OF BOTH LOWER EXTREMITIES: ICD-10-CM

## 2017-11-02 DIAGNOSIS — E78.5 HYPERLIPIDEMIA, UNSPECIFIED HYPERLIPIDEMIA TYPE: ICD-10-CM

## 2017-11-02 DIAGNOSIS — I10 ESSENTIAL HYPERTENSION: ICD-10-CM

## 2017-11-02 DIAGNOSIS — I10 RESISTANT HYPERTENSION: ICD-10-CM

## 2017-11-02 DIAGNOSIS — E78.2 MIXED HYPERLIPIDEMIA: ICD-10-CM

## 2017-11-02 DIAGNOSIS — R60.9 DEPENDENT EDEMA: ICD-10-CM

## 2017-11-02 DIAGNOSIS — Z76.0 MEDICATION REFILL: ICD-10-CM

## 2017-11-02 DIAGNOSIS — Z79.4 TYPE 2 DIABETES MELLITUS WITH OTHER CIRCULATORY COMPLICATION, WITH LONG-TERM CURRENT USE OF INSULIN (HCC): Primary | ICD-10-CM

## 2017-11-02 DIAGNOSIS — E11.9 ENCOUNTER FOR DIABETIC FOOT EXAM (HCC): ICD-10-CM

## 2017-11-02 DIAGNOSIS — E11.59 TYPE 2 DIABETES MELLITUS WITH OTHER CIRCULATORY COMPLICATION, WITH LONG-TERM CURRENT USE OF INSULIN (HCC): Primary | ICD-10-CM

## 2017-11-02 LAB
ALBUMIN SERPL-MCNC: 4.1 G/DL (ref 3.5–5.2)
ALP BLD-CCNC: 75 U/L (ref 40–130)
ALT SERPL-CCNC: 31 U/L (ref 5–41)
ANION GAP SERPL CALCULATED.3IONS-SCNC: 16 MMOL/L (ref 7–19)
AST SERPL-CCNC: 21 U/L (ref 5–40)
BILIRUB SERPL-MCNC: 0.3 MG/DL (ref 0.2–1.2)
BUN BLDV-MCNC: 27 MG/DL (ref 8–23)
CALCIUM SERPL-MCNC: 10 MG/DL (ref 8.8–10.2)
CHLORIDE BLD-SCNC: 96 MMOL/L (ref 98–111)
CHOLESTEROL, TOTAL: 192 MG/DL (ref 160–199)
CO2: 24 MMOL/L (ref 22–29)
CREAT SERPL-MCNC: 1.5 MG/DL (ref 0.5–1.2)
GFR NON-AFRICAN AMERICAN: 47
GLUCOSE BLD-MCNC: 270 MG/DL (ref 74–109)
HBA1C MFR BLD: 10.8 %
HCT VFR BLD CALC: 41.4 % (ref 42–52)
HDLC SERPL-MCNC: 25 MG/DL (ref 55–121)
HEMOGLOBIN: 13.3 G/DL (ref 14–18)
LDL CHOLESTEROL CALCULATED: ABNORMAL MG/DL
LDL CHOLESTEROL DIRECT: 104 MG/DL
MCH RBC QN AUTO: 27.5 PG (ref 27–31)
MCHC RBC AUTO-ENTMCNC: 32.1 G/DL (ref 33–37)
MCV RBC AUTO: 85.7 FL (ref 80–94)
PDW BLD-RTO: 13.2 % (ref 11.5–14.5)
PLATELET # BLD: 197 K/UL (ref 130–400)
PMV BLD AUTO: 13 FL (ref 9.4–12.4)
POTASSIUM SERPL-SCNC: 5.4 MMOL/L (ref 3.5–5)
RBC # BLD: 4.83 M/UL (ref 4.7–6.1)
SODIUM BLD-SCNC: 136 MMOL/L (ref 136–145)
TOTAL PROTEIN: 7 G/DL (ref 6.6–8.7)
TRIGL SERPL-MCNC: 421 MG/DL (ref 0–149)
WBC # BLD: 8.3 K/UL (ref 4.8–10.8)

## 2017-11-02 PROCEDURE — 99214 OFFICE O/P EST MOD 30 MIN: CPT | Performed by: NURSE PRACTITIONER

## 2017-11-02 PROCEDURE — 90471 IMMUNIZATION ADMIN: CPT | Performed by: NURSE PRACTITIONER

## 2017-11-02 PROCEDURE — 90688 IIV4 VACCINE SPLT 0.5 ML IM: CPT | Performed by: NURSE PRACTITIONER

## 2017-11-02 RX ORDER — GABAPENTIN 100 MG/1
100 CAPSULE ORAL 3 TIMES DAILY
Qty: 90 CAPSULE | Refills: 0 | Status: SHIPPED | OUTPATIENT
Start: 2017-11-02 | End: 2018-01-02 | Stop reason: SDUPTHER

## 2017-11-02 RX ORDER — HYDROCHLOROTHIAZIDE 12.5 MG/1
25 CAPSULE, GELATIN COATED ORAL DAILY
Qty: 30 CAPSULE | Refills: 3 | Status: CANCELLED | OUTPATIENT
Start: 2017-11-02

## 2017-11-02 ASSESSMENT — ENCOUNTER SYMPTOMS
RESPIRATORY NEGATIVE: 1
GASTROINTESTINAL NEGATIVE: 1
ALLERGIC/IMMUNOLOGIC NEGATIVE: 1
EYES NEGATIVE: 1

## 2017-11-02 NOTE — PROGRESS NOTES
sensation at 2,3,6,7,8,9 Normal sensation at 2,3,6,7,8,9  Diminished sensation at 5,10  Diminished sensation at  4,5,1  No sensation at 1,4    No sensation at 0             Assessment:      1. Type 2 diabetes mellitus with other circulatory complication, with long-term current use of insulin ([facility]     2. Need for influenza vaccination  INFLUENZA, QUADV, 3 YRS AND OLDER, IM, MDV, 0.5ML (FLUZONE QUADV)   3. Resistant hypertension     4. Dependent edema     5. Essential hypertension     6. Encounter for diabetic foot exam ([facility]     7. Hyperlipidemia, unspecified hyperlipidemia type             Plan:      Dayan Hawthorne received counseling on the following healthy behaviors: nutrition, exercise and medication adherence. Follow up in 3 months  Continue Current Medications  Call with any problems or concerns  Dietary Education given  Report any sudden change in weight or soa  Routine lab work as outlined below  Monitor blood glucose daily fasting and periodically postprandial  30 minutes of low impact aerobic exercise daily  Wear compression stockings as previously prescribed  Orders as below  Dayan Hawthorne was seen today for 3 month follow-up and hypertension. Diagnoses and all orders for this visit:    Type 2 diabetes mellitus with other circulatory complication, with long-term current use of insulin (HCC)  -     metFORMIN (GLUCOPHAGE) 1000 MG tablet; TAKE ONE TABLET BY MOUTH TWICE DAILY WITH MEALS    Need for influenza vaccination  -     INFLUENZA, QUADV, 3 YRS AND OLDER, IM, MDV, 0.5ML (FLUZONE QUADV)    Resistant hypertension    Dependent edema    Essential hypertension    Encounter for diabetic foot exam ([facility]    Hyperlipidemia, unspecified hyperlipidemia type    Medication refill  -     gabapentin (NEURONTIN) 100 MG capsule;  Take 1 capsule by mouth 3 times daily    Venous insufficiency of both lower extremities  -     [facility]- MIMI López    Other orders  -     Cancel: hydrochlorothiazide (MICROZIDE) 12.5 MG capsule;  Take 2 capsules by mouth daily

## 2017-11-02 NOTE — PROGRESS NOTES
After obtaining consent, and per orders of Mayito LE, injection of Influenza given in Left deltoid by Raúl Becker. Patient instructed to remain in clinic for 20 minutes afterwards, and to report any adverse reaction to me immediately.

## 2017-11-04 LAB
CREATININE URINE: 142.1 MG/DL (ref 4.2–622)
MICROALBUMIN UR-MCNC: 18.2 MG/DL (ref 0–19)
MICROALBUMIN/CREAT UR-RTO: 128.1 MG/G

## 2017-11-05 DIAGNOSIS — R79.89 ELEVATED SERUM CREATININE: Primary | ICD-10-CM

## 2017-11-07 ENCOUNTER — TELEPHONE (OUTPATIENT)
Dept: PRIMARY CARE CLINIC | Age: 62
End: 2017-11-07

## 2017-11-07 NOTE — TELEPHONE ENCOUNTER
This [facility] called and gave the patients their lab results. Patient stated understanding.

## 2017-11-21 ENCOUNTER — OFFICE VISIT (OUTPATIENT)
Dept: VASCULAR SURGERY | Age: 62
End: 2017-11-21
Payer: COMMERCIAL

## 2017-11-21 VITALS
DIASTOLIC BLOOD PRESSURE: 77 MMHG | SYSTOLIC BLOOD PRESSURE: 141 MMHG | TEMPERATURE: 97 F | RESPIRATION RATE: 18 BRPM | HEART RATE: 67 BPM

## 2017-11-21 DIAGNOSIS — M79.89 LEG SWELLING: Primary | ICD-10-CM

## 2017-11-21 PROCEDURE — 99203 OFFICE O/P NEW LOW 30 MIN: CPT | Performed by: PHYSICIAN ASSISTANT

## 2017-12-02 DIAGNOSIS — R60.9 DEPENDENT EDEMA: ICD-10-CM

## 2017-12-02 DIAGNOSIS — I10 RESISTANT HYPERTENSION: ICD-10-CM

## 2017-12-02 DIAGNOSIS — T14.8XXA MUSCLE STRAIN: ICD-10-CM

## 2017-12-03 RX ORDER — LANSOPRAZOLE 30 MG/1
CAPSULE, DELAYED RELEASE ORAL
Qty: 30 CAPSULE | Refills: 3 | Status: SHIPPED | OUTPATIENT
Start: 2017-12-03 | End: 2018-01-31 | Stop reason: SDUPTHER

## 2017-12-03 RX ORDER — HYDROCHLOROTHIAZIDE 12.5 MG/1
CAPSULE, GELATIN COATED ORAL
Qty: 30 CAPSULE | Refills: 3 | Status: SHIPPED | OUTPATIENT
Start: 2017-12-03 | End: 2018-01-31 | Stop reason: SDUPTHER

## 2017-12-03 RX ORDER — NAPROXEN 500 MG/1
TABLET ORAL
Qty: 60 TABLET | Refills: 3 | Status: SHIPPED | OUTPATIENT
Start: 2017-12-03 | End: 2018-01-31 | Stop reason: CLARIF

## 2017-12-05 ENCOUNTER — HOSPITAL ENCOUNTER (OUTPATIENT)
Dept: ULTRASOUND IMAGING | Age: 62
Discharge: HOME | End: 2017-12-05
Payer: COMMERCIAL

## 2017-12-05 DIAGNOSIS — N18.30 CHRONIC KIDNEY DISEASE, STAGE III (MODERATE) (HCC): ICD-10-CM

## 2017-12-05 PROCEDURE — 76770 US EXAM ABDO BACK WALL COMP: CPT

## 2017-12-27 DIAGNOSIS — Z76.0 MEDICATION REFILL: ICD-10-CM

## 2017-12-28 RX ORDER — LANSOPRAZOLE 30 MG/1
CAPSULE, DELAYED RELEASE ORAL
Qty: 30 CAPSULE | Refills: 3 | Status: SHIPPED | OUTPATIENT
Start: 2017-12-28 | End: 2018-01-31 | Stop reason: SDUPTHER

## 2017-12-28 RX ORDER — FLUTICASONE PROPIONATE 50 MCG
SPRAY, SUSPENSION (ML) NASAL
Qty: 1 BOTTLE | Refills: 1 | Status: SHIPPED | OUTPATIENT
Start: 2017-12-28 | End: 2018-01-31 | Stop reason: SDUPTHER

## 2018-01-02 DIAGNOSIS — Z76.0 MEDICATION REFILL: ICD-10-CM

## 2018-01-02 RX ORDER — GABAPENTIN 100 MG/1
CAPSULE ORAL
Qty: 90 CAPSULE | Refills: 0 | Status: SHIPPED | OUTPATIENT
Start: 2018-01-02 | End: 2018-01-31 | Stop reason: SDUPTHER

## 2018-01-30 ENCOUNTER — TELEPHONE (OUTPATIENT)
Dept: PRIMARY CARE CLINIC | Age: 63
End: 2018-01-30

## 2018-01-31 ENCOUNTER — OFFICE VISIT (OUTPATIENT)
Dept: PRIMARY CARE CLINIC | Age: 63
End: 2018-01-31
Payer: COMMERCIAL

## 2018-01-31 VITALS
BODY MASS INDEX: 37.9 KG/M2 | WEIGHT: 279.8 LBS | RESPIRATION RATE: 18 BRPM | HEIGHT: 72 IN | DIASTOLIC BLOOD PRESSURE: 73 MMHG | SYSTOLIC BLOOD PRESSURE: 130 MMHG | HEART RATE: 68 BPM | OXYGEN SATURATION: 95 % | TEMPERATURE: 97 F

## 2018-01-31 DIAGNOSIS — I10 RESISTANT HYPERTENSION: ICD-10-CM

## 2018-01-31 DIAGNOSIS — I15.9 SECONDARY HYPERTENSION: ICD-10-CM

## 2018-01-31 DIAGNOSIS — Z79.4 TYPE 2 DIABETES MELLITUS WITH OTHER CIRCULATORY COMPLICATION, WITH LONG-TERM CURRENT USE OF INSULIN (HCC): ICD-10-CM

## 2018-01-31 DIAGNOSIS — E11.9 DIABETES MELLITUS WITH HEMOGLOBIN A1C GOAL OF 7.0%-8.0% (HCC): Chronic | ICD-10-CM

## 2018-01-31 DIAGNOSIS — E11.40 TYPE 2 DIABETES MELLITUS WITH DIABETIC NEUROPATHY, UNSPECIFIED LONG TERM INSULIN USE STATUS: Primary | ICD-10-CM

## 2018-01-31 DIAGNOSIS — R60.9 DEPENDENT EDEMA: ICD-10-CM

## 2018-01-31 DIAGNOSIS — E11.59 TYPE 2 DIABETES MELLITUS WITH OTHER CIRCULATORY COMPLICATION, WITH LONG-TERM CURRENT USE OF INSULIN (HCC): ICD-10-CM

## 2018-01-31 DIAGNOSIS — Z76.0 MEDICATION REFILL: ICD-10-CM

## 2018-01-31 LAB — HBA1C MFR BLD: 10.4 %

## 2018-01-31 PROCEDURE — 99214 OFFICE O/P EST MOD 30 MIN: CPT | Performed by: NURSE PRACTITIONER

## 2018-01-31 PROCEDURE — 83036 HEMOGLOBIN GLYCOSYLATED A1C: CPT | Performed by: NURSE PRACTITIONER

## 2018-01-31 RX ORDER — LANSOPRAZOLE 30 MG/1
CAPSULE, DELAYED RELEASE ORAL
Qty: 30 CAPSULE | Refills: 3 | Status: SHIPPED | OUTPATIENT
Start: 2018-01-31 | End: 2018-08-02 | Stop reason: SDUPTHER

## 2018-01-31 RX ORDER — GABAPENTIN 300 MG/1
300 CAPSULE ORAL 3 TIMES DAILY
Qty: 90 CAPSULE | Refills: 2 | Status: SHIPPED | OUTPATIENT
Start: 2018-01-31 | End: 2018-01-31 | Stop reason: SDUPTHER

## 2018-01-31 RX ORDER — IRBESARTAN AND HYDROCHLOROTHIAZIDE 150; 12.5 MG/1; MG/1
1 TABLET, FILM COATED ORAL DAILY
Qty: 30 TABLET | Refills: 3 | Status: ON HOLD | OUTPATIENT
Start: 2018-01-31 | End: 2018-05-15 | Stop reason: HOSPADM

## 2018-01-31 RX ORDER — LORATADINE 10 MG/1
10 TABLET ORAL DAILY
Qty: 30 TABLET | Refills: 5 | Status: SHIPPED | OUTPATIENT
Start: 2018-01-31 | End: 2018-08-30 | Stop reason: SDUPTHER

## 2018-01-31 RX ORDER — LANSOPRAZOLE 30 MG/1
CAPSULE, DELAYED RELEASE ORAL
Qty: 30 CAPSULE | Refills: 3 | Status: SHIPPED | OUTPATIENT
Start: 2018-01-31 | End: 2018-05-11

## 2018-01-31 RX ORDER — HYDROCHLOROTHIAZIDE 12.5 MG/1
CAPSULE, GELATIN COATED ORAL
Qty: 30 CAPSULE | Refills: 3 | Status: SHIPPED | OUTPATIENT
Start: 2018-01-31 | End: 2018-05-11

## 2018-01-31 RX ORDER — FLUTICASONE PROPIONATE 50 MCG
SPRAY, SUSPENSION (ML) NASAL
Qty: 1 BOTTLE | Refills: 1 | Status: SHIPPED | OUTPATIENT
Start: 2018-01-31 | End: 2018-05-11

## 2018-01-31 RX ORDER — METOPROLOL TARTRATE 50 MG/1
50 TABLET, FILM COATED ORAL 2 TIMES DAILY
Qty: 60 TABLET | Refills: 3 | Status: ON HOLD | OUTPATIENT
Start: 2018-01-31 | End: 2018-05-15 | Stop reason: HOSPADM

## 2018-01-31 RX ORDER — GABAPENTIN 300 MG/1
300 CAPSULE ORAL 3 TIMES DAILY
Qty: 90 CAPSULE | Refills: 2 | Status: SHIPPED | OUTPATIENT
Start: 2018-01-31 | End: 2018-07-11 | Stop reason: SDUPTHER

## 2018-01-31 RX ORDER — CHOLECALCIFEROL (VITAMIN D3) 1250 MCG
50000 CAPSULE ORAL
Status: ON HOLD | COMMUNITY
End: 2019-06-03

## 2018-01-31 NOTE — PROGRESS NOTES
agents associated with hypertension: none. Patient's triglycerides were significantly elevated last time I do believe this is due to his glycemic control.          Lab Results   Component Value Date    LABA1C 10.4 01/31/2018    LABA1C 10.8 (H) 11/02/2017    LABA1C 10.1 (H) 04/17/2017     Lab Results   Component Value Date    LABMICR 18.20 11/02/2017    CREATININE 1.5 (H) 11/02/2017     Lab Results   Component Value Date    ALT 31 11/02/2017    AST 21 11/02/2017     Lab Results   Component Value Date    CHOL 192 11/02/2017    TRIG 421 (H) 11/02/2017    HDL 25 (L) 11/02/2017    LDLCALC - (AA) 11/02/2017    LDLDIRECT 104 11/02/2017        Hemoglobin A1C (%)   Date Value   01/31/2018 10.4   11/02/2017 10.8 (H)   04/17/2017 10.1 (H)             ( goal A1C is < 7)   Microalbumin, Random Urine (mg/dL)   Date Value   11/02/2017 18.20     LDL Calculated (mg/dL)   Date Value   11/02/2017 - (AA)   09/26/2016 131       (goal LDL is <100)   AST (U/L)   Date Value   11/02/2017 21     ALT (U/L)   Date Value   11/02/2017 31     BUN (mg/dL)   Date Value   11/02/2017 27 (H)     BP Readings from Last 3 Encounters:   01/31/18 130/73   11/21/17 (!) 141/77   11/02/17 (!) 146/82          (goal 120/80)    Past Medical History:   Diagnosis Date    Bilateral leg edema     Chronic kidney disease     Stage 4    Diabetes mellitus, type 2 (HCC)     GERD (gastroesophageal reflux disease)     HTN (hypertension)     Seasonal allergies       Past Surgical History:   Procedure Laterality Date    CARPAL TUNNEL RELEASE      COLONOSCOPY  2015    COLONOSCOPY N/A 10/11/2016    Dr Lopez-diverticulosis, Sessile serrated AP (-) high grade dysplasia x 1, tubular AP (-) dysplasia x 2, HP x 2--3 yr recall    PRE-MALIGNANT / Gail [address] UPPER GASTROINTESTINAL ENDOSCOPY  9/20/2016    Dr Lopez-w/dilation over wire, [address] [place]-distal esophageal narrowing, Inna (-)    UPPER GASTROINTESTINAL ENDOSCOPY  9/20/2016     Normal rate, regular rhythm, normal heart sounds and intact distal pulses. Pulmonary/Chest: Effort normal and breath sounds normal.   Abdominal: Soft. Bowel sounds are normal.   Musculoskeletal: He exhibits tenderness. Lower legs 1 plus edema   Neurological: He is alert and oriented to person, place, and time. Skin: Skin is warm and dry. Psychiatric: He has a normal mood and affect. His behavior is normal. Judgment and thought content normal.   Nursing note and vitals reviewed. /73   Pulse 68   Temp 97 °F (36.1 °C) (Temporal)   Resp 18   Ht 6' (1.829 m)   Wt 279 lb 12.8 oz (126.9 kg)   SpO2 95%   BMI 37.95 kg/m²   Monofilament Exam Reveals:  Pulses: normal  Edema:1 plus bilateral  Skin Lesions: Callus 1,4,9  Right Foot:    Left Foot:  Normal sensation at all but 9  Normal sensation at all but9   Diminished sensation at 9             Diminished sensation at 9   No sensation at NA   No sensation at NA        Assessment:      1. Type 2 diabetes mellitus with diabetic neuropathy, unspecified long term insulin use status ([facility])  POCT glycosylated hemoglobin (Hb A1C)    Diabetic Shoe    Diabetic Shoe    CBC    Comprehensive Metabolic Panel    Lipid Panel   2. Medication refill  gabapentin (NEURONTIN) 300 MG capsule    fluticasone (FLONASE) 50 MCG/ACT nasal spray    lansoprazole (PREVACID) 30 MG delayed release capsule    loratadine (CLARITIN) 10 MG tablet    DISCONTINUED: gabapentin (NEURONTIN) 300 MG capsule   3. Resistant hypertension  hydrochlorothiazide (MICROZIDE) 12.5 MG capsule    metoprolol tartrate (LOPRESSOR) 50 MG tablet   4. Dependent edema  hydrochlorothiazide (MICROZIDE) 12.5 MG capsule   5. Type 2 diabetes mellitus with other circulatory complication, with long-term current use of insulin ([facility])  metFORMIN (GLUCOPHAGE) 1000 MG tablet   6. Secondary hypertension  irbesartan-hydrochlorothiazide (AVALIDE) 150-12.5 MG per tablet   7.  Diabetes mellitus with hemoglobin A1c goal of 7.0%-8.0% supplies to promote better adherence    lansoprazole (PREVACID) 30 MG delayed release capsule     Sig: TAKE ONE CAPSULE BY MOUTH ONCE DAILY     Dispense:  30 capsule     Refill:  3     Please consider 90 day supplies to promote better adherence    hydrochlorothiazide (MICROZIDE) 12.5 MG capsule     Sig: TAKE ONE CAPSULE BY MOUTH ONCE DAILY     Dispense:  30 capsule     Refill:  3     Please consider 90 day supplies to promote better adherence    insulin glargine (LANTUS SOLOSTAR) 100 UNIT/ML injection pen     Sig: INJECT 55 UNITS SUB-Q TWICE DAILY     Dispense:  12 pen     Refill:  5    metFORMIN (GLUCOPHAGE) 1000 MG tablet     Sig: TAKE ONE TABLET BY MOUTH TWICE DAILY WITH MEALS     Dispense:  60 tablet     Refill:  3    irbesartan-hydrochlorothiazide (AVALIDE) 150-12.5 MG per tablet     Sig: Take 1 tablet by mouth daily     Dispense:  30 tablet     Refill:  3    loratadine (CLARITIN) 10 MG tablet     Sig: Take 1 tablet by mouth daily     Dispense:  30 tablet     Refill:  5    linagliptin (TRADJENTA) 5 MG tablet     Sig: TAKE ONE TABLET BY MOUTH ONCE DAILY     Dispense:  30 tablet     Refill:  5    metoprolol tartrate (LOPRESSOR) 50 MG tablet     Sig: Take 1 tablet by mouth 2 times daily     Dispense:  60 tablet     Refill:  3       Patient given educational materials - see patient instructions. Discussed use, benefit, and side effects of prescribed medications. All patient questions answered. Pt voiced understanding. Reviewed health maintenance. Instructed to continue current medications, diet and exercise. Patient agreed with treatment plan. Follow up as directed. Heart-Healthy Diet   Sodium, Fat, and Cholesterol Controlled Diet       What Is a Heart Healthy Diet? A heart-healthy diet is one that limits sodium , certain types of fat , and cholesterol .  This type of diet is recommended for:   · People with any form of cardiovascular disease (eg, coronary heart disease , peripheral vascular disease , previous heart attack , previous stroke )   · People with risk factors for cardiovascular disease, such as high blood pressure , high cholesterol , or diabetes   · Anyone who wants to lower their risk of developing cardiovascular disease   Sodium    Sodium is a mineral found in many foods. In general, most people consume much more sodium than they need. Diets high in sodium can increase blood pressure and lead to edema (water retention). On a heart-healthy diet, you should consume no more than 2,300 mg (milligrams) of sodium per dayabout the amount in one teaspoon of table salt. The foods highest in sodium include table salt (about 50% sodium), processed foods, convenience foods, and preserved foods. Cholesterol    Cholesterol is a fat-like, waxy substance in your blood. Our bodies make some cholesterol. It is also found in animal products, with the highest amounts in fatty meat, egg yolks, whole milk, cheese, shellfish, and organ meats. On a heart-healthy diet, you should limit your cholesterol intake to less than 200 mg per day. It is normal and important to have some cholesterol in your bloodstream. But too much cholesterol can cause plaque to build up within your arteries, which can eventually lead to a heart attack or stroke. The two types of cholesterol that are most commonly referred to are:   · Low-density lipoprotein (LDL) cholesterol  Also known as bad cholesterol, this is the cholesterol that tends to build up along your arteries. Bad cholesterol levels are increased by eating fats that are saturated or hydrogenated. Optimal level of this cholesterol is less than 100. Over 130 starts to get risky for heart disease. · High-density lipoprotein (HDL) cholesterol  Also known as good cholesterol, this type of cholesterol actually carries cholesterol away from your arteries and may, therefore, help lower your risk of having a heart attack.  You want this level to be high (ideally greater than cold cuts, hot dogs, sausages, sardines, and anchovies) Poultry skins Breaded and/or fried fish or meats Canned peas, beans, and lentils Salted nuts   Fats and Oils   Olive oil and canola oil Low-sodium, low-fat salad dressings and mayonnaise   Butter, margarine, coconut and palm oils, urbina fat   Snacks, Sweets, and Condiments   Low-sodium or unsalted versions of broths, soups, soy sauce, and condiments Pepper, herbs, and spices; vinegar, lemon, or lime juice Low-fat frozen desserts (yogurt, sherbet, fruit bars) Sugar, cocoa powder, honey, syrup, jam, and preserves Low-fat, trans-fat free cookies, cakes, and pies Cain and animal crackers, fig bars, suraj snaps   High-fat desserts Broth, soups, gravies, and sauces, made from instant mixes or other high-sodium ingredients Salted snack foods Canned olives Meat tenderizers, seasoning salt, and most flavored vinegars   Beverages   Low-sodium carbonated beverages Tea and coffee in moderation Soy milk   Commercially softened water   Suggestions   · Make whole grains, fruits, and vegetables the base of your diet. · Choose heart-healthy fats such as canola, olive, and flaxseed oil, and foods high in heart-healthy fats, such as nuts, seeds, soybeans, tofu, and fish. · Eat fish at least twice per week; the fish highest in omega-3 fatty acids and lowest in mercury include salmon, herring, mackerel, sardines, and canned chunk light tuna. If you eat fish less than twice per week or have high triglycerides, talk to your doctor about taking fish oil supplements. · Read food labels. ¨ For products low in fat and cholesterol, look for fat free, low-fat, cholesterol free, saturated fat free, and trans fat freeAlso scan the Nutrition Facts Label, which lists saturated fat, trans fat, and cholesterol amounts.    ¨ For products low in sodium, look for sodium free, very low sodium, low sodium, no added salt, and unsalted   · Skip the salt when cooking or at the table; if

## 2018-02-01 ASSESSMENT — ENCOUNTER SYMPTOMS
APNEA: 0
SHORTNESS OF BREATH: 0
WHEEZING: 0
ALLERGIC/IMMUNOLOGIC NEGATIVE: 1

## 2018-03-07 ENCOUNTER — TELEPHONE (OUTPATIENT)
Dept: PRIMARY CARE CLINIC | Age: 63
End: 2018-03-07

## 2018-03-07 NOTE — TELEPHONE ENCOUNTER
Earnie Ormond from [employer] called and Ivonne Brandon is not able to sign diabetic shoe orders.  Verbal approval given to sign as

## 2018-03-21 DIAGNOSIS — E11.01 DIABETES MELLITUS TYPE 2 WITH HYPEROSMOLAR COMA, UNCONTROLLED, UNSPECIFIED LONG TERM INSULIN USE STATUS: ICD-10-CM

## 2018-03-21 RX ORDER — TRIAMCINOLONE ACETONIDE 0.25 MG/G
CREAM TOPICAL
Qty: 1 TUBE | Refills: 2 | Status: SHIPPED | OUTPATIENT
Start: 2018-03-21 | End: 2018-05-11

## 2018-04-23 ENCOUNTER — APPOINTMENT (OUTPATIENT)
Dept: GENERAL RADIOLOGY | Facility: HOSPITAL | Age: 63
End: 2018-04-23

## 2018-04-23 ENCOUNTER — APPOINTMENT (OUTPATIENT)
Dept: NUCLEAR MEDICINE | Facility: HOSPITAL | Age: 63
End: 2018-04-23

## 2018-04-23 ENCOUNTER — HOSPITAL ENCOUNTER (INPATIENT)
Facility: HOSPITAL | Age: 63
LOS: 3 days | Discharge: HOME | End: 2018-04-26
Attending: EMERGENCY MEDICINE | Admitting: INTERNAL MEDICINE

## 2018-04-23 DIAGNOSIS — N17.9 AKI (ACUTE KIDNEY INJURY) (HCC): ICD-10-CM

## 2018-04-23 DIAGNOSIS — J40 BRONCHITIS: ICD-10-CM

## 2018-04-23 DIAGNOSIS — R09.02 HYPOXIA: Primary | ICD-10-CM

## 2018-04-23 LAB
ALBUMIN SERPL-MCNC: 3.5 G/DL (ref 3.5–5)
ALBUMIN/GLOB SERPL: 1.2 G/DL (ref 1.1–2.5)
ALP SERPL-CCNC: 75 U/L (ref 24–120)
ALT SERPL W P-5'-P-CCNC: 30 U/L (ref 0–54)
ANION GAP SERPL CALCULATED.3IONS-SCNC: 11 MMOL/L (ref 4–13)
APTT PPP: 33.4 SECONDS (ref 24.1–34.8)
ARTERIAL PATENCY WRIST A: POSITIVE
AST SERPL-CCNC: 28 U/L (ref 7–45)
ATMOSPHERIC PRESS: 748 MMHG
BACTERIA UR QL AUTO: ABNORMAL /HPF
BASE EXCESS BLDA CALC-SCNC: -1.8 MMOL/L (ref 0–2)
BASOPHILS # BLD AUTO: 0.09 10*3/MM3 (ref 0–0.2)
BASOPHILS NFR BLD AUTO: 0.6 % (ref 0–2)
BDY SITE: ABNORMAL
BILIRUB SERPL-MCNC: 0.6 MG/DL (ref 0.1–1)
BILIRUB UR QL STRIP: NEGATIVE
BODY TEMPERATURE: 37 C
BUN BLD-MCNC: 38 MG/DL (ref 5–21)
BUN/CREAT SERPL: 21.5 (ref 7–25)
CALCIUM SPEC-SCNC: 9.3 MG/DL (ref 8.4–10.4)
CHLORIDE SERPL-SCNC: 98 MMOL/L (ref 98–110)
CLARITY UR: CLEAR
CO2 SERPL-SCNC: 25 MMOL/L (ref 24–31)
COLOR UR: YELLOW
CREAT BLD-MCNC: 1.77 MG/DL (ref 0.5–1.4)
D DIMER PPP FEU-MCNC: 1.22 MG/L (FEU) (ref 0–0.5)
D-LACTATE SERPL-SCNC: 1.5 MMOL/L (ref 0.5–2)
DEPRECATED RDW RBC AUTO: 38 FL (ref 40–54)
EOSINOPHIL # BLD AUTO: 0.17 10*3/MM3 (ref 0–0.7)
EOSINOPHIL NFR BLD AUTO: 1.2 % (ref 0–4)
ERYTHROCYTE [DISTWIDTH] IN BLOOD BY AUTOMATED COUNT: 13.1 % (ref 12–15)
FLUAV AG NPH QL: NEGATIVE
FLUBV AG NPH QL IA: NEGATIVE
GFR SERPL CREATININE-BSD FRML MDRD: 39 ML/MIN/1.73
GLOBULIN UR ELPH-MCNC: 3 GM/DL
GLUCOSE BLD-MCNC: 247 MG/DL (ref 70–100)
GLUCOSE UR STRIP-MCNC: ABNORMAL MG/DL
HCO3 BLDA-SCNC: 22.6 MMOL/L (ref 20–26)
HCT VFR BLD AUTO: 36.2 % (ref 40–52)
HGB BLD-MCNC: 11.9 G/DL (ref 14–18)
HGB UR QL STRIP.AUTO: NEGATIVE
HYALINE CASTS UR QL AUTO: ABNORMAL /LPF
IMM GRANULOCYTES # BLD: 0.09 10*3/MM3 (ref 0–0.03)
IMM GRANULOCYTES NFR BLD: 0.6 % (ref 0–5)
INR PPP: 0.94 (ref 0.91–1.09)
KETONES UR QL STRIP: NEGATIVE
L PNEUMO1 AG UR QL IA: NEGATIVE
LEUKOCYTE ESTERASE UR QL STRIP.AUTO: ABNORMAL
LIPASE SERPL-CCNC: 52 U/L (ref 23–203)
LYMPHOCYTES # BLD AUTO: 2.44 10*3/MM3 (ref 0.72–4.86)
LYMPHOCYTES NFR BLD AUTO: 17.1 % (ref 15–45)
Lab: ABNORMAL
MCH RBC QN AUTO: 26.5 PG (ref 28–32)
MCHC RBC AUTO-ENTMCNC: 32.9 G/DL (ref 33–36)
MCV RBC AUTO: 80.6 FL (ref 82–95)
MODALITY: ABNORMAL
MONOCYTES # BLD AUTO: 0.93 10*3/MM3 (ref 0.19–1.3)
MONOCYTES NFR BLD AUTO: 6.5 % (ref 4–12)
NEUTROPHILS # BLD AUTO: 10.54 10*3/MM3 (ref 1.87–8.4)
NEUTROPHILS NFR BLD AUTO: 74 % (ref 39–78)
NITRITE UR QL STRIP: NEGATIVE
NRBC BLD MANUAL-RTO: 0 /100 WBC (ref 0–0)
NT-PROBNP SERPL-MCNC: 585 PG/ML (ref 0–900)
PCO2 BLDA: 36.4 MM HG (ref 35–45)
PH BLDA: 7.4 PH UNITS (ref 7.35–7.45)
PH UR STRIP.AUTO: <=5 [PH] (ref 5–8)
PLATELET # BLD AUTO: 212 10*3/MM3 (ref 130–400)
PMV BLD AUTO: 12.3 FL (ref 6–12)
PO2 BLDA: 63.4 MM HG (ref 83–108)
POTASSIUM BLD-SCNC: 4 MMOL/L (ref 3.5–5.3)
PROT SERPL-MCNC: 6.5 G/DL (ref 6.3–8.7)
PROT UR QL STRIP: NEGATIVE
PROTHROMBIN TIME: 12.8 SECONDS (ref 11.9–14.6)
RBC # BLD AUTO: 4.49 10*6/MM3 (ref 4.8–5.9)
RBC # UR: ABNORMAL /HPF
REF LAB TEST METHOD: ABNORMAL
SAO2 % BLDCOA: 93.7 % (ref 94–99)
SODIUM BLD-SCNC: 134 MMOL/L (ref 135–145)
SP GR UR STRIP: 1.02 (ref 1–1.03)
SQUAMOUS #/AREA URNS HPF: ABNORMAL /HPF
TROPONIN I SERPL-MCNC: 0.02 NG/ML (ref 0–0.03)
UROBILINOGEN UR QL STRIP: ABNORMAL
VENTILATOR MODE: ABNORMAL
WBC NRBC COR # BLD: 14.26 10*3/MM3 (ref 4.8–10.8)
WBC UR QL AUTO: ABNORMAL /HPF

## 2018-04-23 PROCEDURE — 36600 WITHDRAWAL OF ARTERIAL BLOOD: CPT

## 2018-04-23 PROCEDURE — 94799 UNLISTED PULMONARY SVC/PX: CPT

## 2018-04-23 PROCEDURE — 87804 INFLUENZA ASSAY W/OPTIC: CPT | Performed by: NURSE PRACTITIONER

## 2018-04-23 PROCEDURE — 85610 PROTHROMBIN TIME: CPT | Performed by: NURSE PRACTITIONER

## 2018-04-23 PROCEDURE — 83605 ASSAY OF LACTIC ACID: CPT | Performed by: NURSE PRACTITIONER

## 2018-04-23 PROCEDURE — 0 XENON XE 133: Performed by: NURSE PRACTITIONER

## 2018-04-23 PROCEDURE — 99284 EMERGENCY DEPT VISIT MOD MDM: CPT

## 2018-04-23 PROCEDURE — 87040 BLOOD CULTURE FOR BACTERIA: CPT | Performed by: NURSE PRACTITIONER

## 2018-04-23 PROCEDURE — 85025 COMPLETE CBC W/AUTO DIFF WBC: CPT | Performed by: NURSE PRACTITIONER

## 2018-04-23 PROCEDURE — 84484 ASSAY OF TROPONIN QUANT: CPT | Performed by: NURSE PRACTITIONER

## 2018-04-23 PROCEDURE — 85730 THROMBOPLASTIN TIME PARTIAL: CPT | Performed by: NURSE PRACTITIONER

## 2018-04-23 PROCEDURE — A9540 TC99M MAA: HCPCS | Performed by: NURSE PRACTITIONER

## 2018-04-23 PROCEDURE — 94640 AIRWAY INHALATION TREATMENT: CPT

## 2018-04-23 PROCEDURE — 81001 URINALYSIS AUTO W/SCOPE: CPT | Performed by: NURSE PRACTITIONER

## 2018-04-23 PROCEDURE — A9558 XE133 XENON 10MCI: HCPCS | Performed by: NURSE PRACTITIONER

## 2018-04-23 PROCEDURE — 85379 FIBRIN DEGRADATION QUANT: CPT | Performed by: NURSE PRACTITIONER

## 2018-04-23 PROCEDURE — 80053 COMPREHEN METABOLIC PANEL: CPT | Performed by: NURSE PRACTITIONER

## 2018-04-23 PROCEDURE — 82803 BLOOD GASES ANY COMBINATION: CPT

## 2018-04-23 PROCEDURE — 78582 LUNG VENTILAT&PERFUS IMAGING: CPT

## 2018-04-23 PROCEDURE — 93010 ELECTROCARDIOGRAM REPORT: CPT | Performed by: INTERNAL MEDICINE

## 2018-04-23 PROCEDURE — 87899 AGENT NOS ASSAY W/OPTIC: CPT | Performed by: NURSE PRACTITIONER

## 2018-04-23 PROCEDURE — 83690 ASSAY OF LIPASE: CPT | Performed by: NURSE PRACTITIONER

## 2018-04-23 PROCEDURE — 0 TECHNETIUM ALBUMIN AGGREGATED: Performed by: NURSE PRACTITIONER

## 2018-04-23 PROCEDURE — 83880 ASSAY OF NATRIURETIC PEPTIDE: CPT | Performed by: NURSE PRACTITIONER

## 2018-04-23 PROCEDURE — 25010000002 CEFTRIAXONE PER 250 MG: Performed by: NURSE PRACTITIONER

## 2018-04-23 PROCEDURE — 71045 X-RAY EXAM CHEST 1 VIEW: CPT

## 2018-04-23 PROCEDURE — 93005 ELECTROCARDIOGRAM TRACING: CPT | Performed by: NURSE PRACTITIONER

## 2018-04-23 RX ORDER — IRBESARTAN AND HYDROCHLOROTHIAZIDE 150; 12.5 MG/1; MG/1
1 TABLET, FILM COATED ORAL DAILY
COMMUNITY
End: 2019-11-21

## 2018-04-23 RX ORDER — LANSOPRAZOLE 30 MG/1
30 CAPSULE, DELAYED RELEASE ORAL NIGHTLY
COMMUNITY
End: 2021-02-03

## 2018-04-23 RX ORDER — ALBUTEROL SULFATE 2.5 MG/3ML
2.5 SOLUTION RESPIRATORY (INHALATION) ONCE
Status: COMPLETED | OUTPATIENT
Start: 2018-04-23 | End: 2018-04-23

## 2018-04-23 RX ORDER — SODIUM CHLORIDE 0.9 % (FLUSH) 0.9 %
10 SYRINGE (ML) INJECTION AS NEEDED
Status: DISCONTINUED | OUTPATIENT
Start: 2018-04-23 | End: 2018-04-26 | Stop reason: HOSPADM

## 2018-04-23 RX ORDER — LORATADINE 10 MG/1
10 TABLET ORAL DAILY
Status: ON HOLD | COMMUNITY
End: 2018-04-24 | Stop reason: ALTCHOICE

## 2018-04-23 RX ADMIN — ALBUTEROL SULFATE 2.5 MG: 2.5 SOLUTION RESPIRATORY (INHALATION) at 22:17

## 2018-04-23 RX ADMIN — ALBUTEROL SULFATE 2.5 MG: 2.5 SOLUTION RESPIRATORY (INHALATION) at 17:20

## 2018-04-23 RX ADMIN — SODIUM CHLORIDE 500 ML: 9 INJECTION, SOLUTION INTRAVENOUS at 17:11

## 2018-04-23 RX ADMIN — CEFTRIAXONE SODIUM 1 G: 1 INJECTION, POWDER, FOR SOLUTION INTRAMUSCULAR; INTRAVENOUS at 20:35

## 2018-04-23 RX ADMIN — SODIUM CHLORIDE 500 ML: 0.9 INJECTION, SOLUTION INTRAVENOUS at 19:06

## 2018-04-23 RX ADMIN — XENON XE-133 13 MILLICURIE: 10 GAS RESPIRATORY (INHALATION) at 19:35

## 2018-04-23 RX ADMIN — Medication 1 DOSE: at 19:38

## 2018-04-24 ENCOUNTER — APPOINTMENT (OUTPATIENT)
Dept: GENERAL RADIOLOGY | Facility: HOSPITAL | Age: 63
End: 2018-04-24

## 2018-04-24 ENCOUNTER — APPOINTMENT (OUTPATIENT)
Dept: ULTRASOUND IMAGING | Facility: HOSPITAL | Age: 63
End: 2018-04-24

## 2018-04-24 LAB
ALBUMIN SERPL-MCNC: 3.1 G/DL (ref 3.5–5)
ALBUMIN/GLOB SERPL: 1.1 G/DL (ref 1.1–2.5)
ALP SERPL-CCNC: 68 U/L (ref 24–120)
ALT SERPL W P-5'-P-CCNC: 28 U/L (ref 0–54)
ANION GAP SERPL CALCULATED.3IONS-SCNC: 9 MMOL/L (ref 4–13)
AST SERPL-CCNC: 19 U/L (ref 7–45)
BASOPHILS # BLD AUTO: 0.06 10*3/MM3 (ref 0–0.2)
BASOPHILS NFR BLD AUTO: 0.4 % (ref 0–2)
BILIRUB SERPL-MCNC: 0.5 MG/DL (ref 0.1–1)
BILIRUB UR QL STRIP: NEGATIVE
BUN BLD-MCNC: 31 MG/DL (ref 5–21)
BUN/CREAT SERPL: 20.8 (ref 7–25)
CALCIUM SPEC-SCNC: 8.8 MG/DL (ref 8.4–10.4)
CHLORIDE SERPL-SCNC: 101 MMOL/L (ref 98–110)
CLARITY UR: CLEAR
CO2 SERPL-SCNC: 24 MMOL/L (ref 24–31)
COLOR UR: YELLOW
CREAT BLD-MCNC: 1.49 MG/DL (ref 0.5–1.4)
CREAT UR-MCNC: 88 MG/DL
D-LACTATE SERPL-SCNC: 1.2 MMOL/L (ref 0.5–2)
DEPRECATED RDW RBC AUTO: 37.8 FL (ref 40–54)
EOSINOPHIL # BLD AUTO: 0.04 10*3/MM3 (ref 0–0.7)
EOSINOPHIL NFR BLD AUTO: 0.3 % (ref 0–4)
ERYTHROCYTE [DISTWIDTH] IN BLOOD BY AUTOMATED COUNT: 13.2 % (ref 12–15)
GFR SERPL CREATININE-BSD FRML MDRD: 47 ML/MIN/1.73
GLOBULIN UR ELPH-MCNC: 2.7 GM/DL
GLUCOSE BLD-MCNC: 254 MG/DL (ref 70–100)
GLUCOSE BLDC GLUCOMTR-MCNC: 263 MG/DL (ref 70–130)
GLUCOSE BLDC GLUCOMTR-MCNC: 308 MG/DL (ref 70–130)
GLUCOSE UR STRIP-MCNC: ABNORMAL MG/DL
HCT VFR BLD AUTO: 32.3 % (ref 40–52)
HGB BLD-MCNC: 10.6 G/DL (ref 14–18)
HGB UR QL STRIP.AUTO: NEGATIVE
IMM GRANULOCYTES # BLD: 0.12 10*3/MM3 (ref 0–0.03)
IMM GRANULOCYTES NFR BLD: 0.9 % (ref 0–5)
KETONES UR QL STRIP: NEGATIVE
LEUKOCYTE ESTERASE UR QL STRIP.AUTO: NEGATIVE
LYMPHOCYTES # BLD AUTO: 1.67 10*3/MM3 (ref 0.72–4.86)
LYMPHOCYTES NFR BLD AUTO: 12.4 % (ref 15–45)
MAGNESIUM SERPL-MCNC: 1.7 MG/DL (ref 1.4–2.2)
MCH RBC QN AUTO: 26.4 PG (ref 28–32)
MCHC RBC AUTO-ENTMCNC: 32.8 G/DL (ref 33–36)
MCV RBC AUTO: 80.3 FL (ref 82–95)
MONOCYTES # BLD AUTO: 0.8 10*3/MM3 (ref 0.19–1.3)
MONOCYTES NFR BLD AUTO: 5.9 % (ref 4–12)
NEUTROPHILS # BLD AUTO: 10.81 10*3/MM3 (ref 1.87–8.4)
NEUTROPHILS NFR BLD AUTO: 80.1 % (ref 39–78)
NITRITE UR QL STRIP: NEGATIVE
NRBC BLD MANUAL-RTO: 0 /100 WBC (ref 0–0)
OSMOLALITY SERPL: 292 MOSM/KG (ref 289–308)
OSMOLALITY UR: 431 MOSM/KG (ref 601–850)
PH UR STRIP.AUTO: <=5 [PH] (ref 5–8)
PHOSPHATE SERPL-MCNC: 2.9 MG/DL (ref 2.5–4.5)
PLATELET # BLD AUTO: 206 10*3/MM3 (ref 130–400)
PMV BLD AUTO: 12.8 FL (ref 6–12)
POTASSIUM BLD-SCNC: 4.4 MMOL/L (ref 3.5–5.3)
PROT SERPL-MCNC: 5.8 G/DL (ref 6.3–8.7)
PROT UR QL STRIP: NEGATIVE
RBC # BLD AUTO: 4.02 10*6/MM3 (ref 4.8–5.9)
SODIUM BLD-SCNC: 134 MMOL/L (ref 135–145)
SODIUM UR-SCNC: 57 MMOL/L (ref 30–90)
SP GR UR STRIP: 1.01 (ref 1–1.03)
UROBILINOGEN UR QL STRIP: ABNORMAL
WBC NRBC COR # BLD: 13.5 10*3/MM3 (ref 4.8–10.8)

## 2018-04-24 PROCEDURE — 83605 ASSAY OF LACTIC ACID: CPT | Performed by: FAMILY MEDICINE

## 2018-04-24 PROCEDURE — 63710000001 INSULIN LISPRO (HUMAN) PER 5 UNITS: Performed by: INTERNAL MEDICINE

## 2018-04-24 PROCEDURE — 63710000001 INSULIN DETEMIR PER 5 UNITS: Performed by: FAMILY MEDICINE

## 2018-04-24 PROCEDURE — 76775 US EXAM ABDO BACK WALL LIM: CPT

## 2018-04-24 PROCEDURE — 85025 COMPLETE CBC W/AUTO DIFF WBC: CPT | Performed by: FAMILY MEDICINE

## 2018-04-24 PROCEDURE — 82962 GLUCOSE BLOOD TEST: CPT

## 2018-04-24 PROCEDURE — 94640 AIRWAY INHALATION TREATMENT: CPT

## 2018-04-24 PROCEDURE — 83930 ASSAY OF BLOOD OSMOLALITY: CPT | Performed by: FAMILY MEDICINE

## 2018-04-24 PROCEDURE — 84300 ASSAY OF URINE SODIUM: CPT | Performed by: FAMILY MEDICINE

## 2018-04-24 PROCEDURE — 94799 UNLISTED PULMONARY SVC/PX: CPT

## 2018-04-24 PROCEDURE — 82570 ASSAY OF URINE CREATININE: CPT | Performed by: FAMILY MEDICINE

## 2018-04-24 PROCEDURE — 84100 ASSAY OF PHOSPHORUS: CPT | Performed by: FAMILY MEDICINE

## 2018-04-24 PROCEDURE — 81003 URINALYSIS AUTO W/O SCOPE: CPT | Performed by: FAMILY MEDICINE

## 2018-04-24 PROCEDURE — 25010000002 HEPARIN (PORCINE) PER 1000 UNITS: Performed by: FAMILY MEDICINE

## 2018-04-24 PROCEDURE — 83935 ASSAY OF URINE OSMOLALITY: CPT | Performed by: FAMILY MEDICINE

## 2018-04-24 PROCEDURE — 80053 COMPREHEN METABOLIC PANEL: CPT | Performed by: FAMILY MEDICINE

## 2018-04-24 PROCEDURE — 25010000002 CEFTRIAXONE PER 250 MG: Performed by: FAMILY MEDICINE

## 2018-04-24 PROCEDURE — 71045 X-RAY EXAM CHEST 1 VIEW: CPT

## 2018-04-24 PROCEDURE — 87205 SMEAR GRAM STAIN: CPT | Performed by: FAMILY MEDICINE

## 2018-04-24 PROCEDURE — 63710000001 INSULIN DETEMIR PER 5 UNITS: Performed by: NURSE PRACTITIONER

## 2018-04-24 PROCEDURE — 83735 ASSAY OF MAGNESIUM: CPT | Performed by: FAMILY MEDICINE

## 2018-04-24 RX ORDER — GABAPENTIN 100 MG/1
100 CAPSULE ORAL EVERY 12 HOURS SCHEDULED
Status: DISCONTINUED | OUTPATIENT
Start: 2018-04-24 | End: 2018-04-24

## 2018-04-24 RX ORDER — GABAPENTIN 300 MG/1
300 CAPSULE ORAL DAILY
Status: DISCONTINUED | OUTPATIENT
Start: 2018-04-25 | End: 2018-04-24 | Stop reason: SDUPTHER

## 2018-04-24 RX ORDER — FAMOTIDINE 20 MG/1
20 TABLET, FILM COATED ORAL DAILY
Status: DISCONTINUED | OUTPATIENT
Start: 2018-04-24 | End: 2018-04-24 | Stop reason: SDUPTHER

## 2018-04-24 RX ORDER — GABAPENTIN 100 MG/1
100 CAPSULE ORAL NIGHTLY
COMMUNITY
End: 2021-01-26

## 2018-04-24 RX ORDER — HEPARIN SODIUM 5000 [USP'U]/ML
5000 INJECTION, SOLUTION INTRAVENOUS; SUBCUTANEOUS EVERY 12 HOURS SCHEDULED
Status: DISCONTINUED | OUTPATIENT
Start: 2018-04-24 | End: 2018-04-26 | Stop reason: HOSPADM

## 2018-04-24 RX ORDER — MONTELUKAST SODIUM 10 MG/1
10 TABLET ORAL NIGHTLY PRN
Status: DISCONTINUED | OUTPATIENT
Start: 2018-04-24 | End: 2018-04-25

## 2018-04-24 RX ORDER — GABAPENTIN 300 MG/1
300 CAPSULE ORAL 3 TIMES DAILY
Status: DISCONTINUED | OUTPATIENT
Start: 2018-04-24 | End: 2018-04-26 | Stop reason: HOSPADM

## 2018-04-24 RX ORDER — PROMETHAZINE HYDROCHLORIDE AND CODEINE PHOSPHATE 6.25; 1 MG/5ML; MG/5ML
5 SYRUP ORAL EVERY 6 HOURS PRN
Status: DISCONTINUED | OUTPATIENT
Start: 2018-04-24 | End: 2018-04-26 | Stop reason: HOSPADM

## 2018-04-24 RX ORDER — CODEINE PHOSPHATE AND GUAIFENESIN 10; 100 MG/5ML; MG/5ML
5 SOLUTION ORAL EVERY 6 HOURS PRN
Status: DISCONTINUED | OUTPATIENT
Start: 2018-04-24 | End: 2018-04-24 | Stop reason: SDUPTHER

## 2018-04-24 RX ORDER — PANTOPRAZOLE SODIUM 40 MG/1
40 TABLET, DELAYED RELEASE ORAL
Status: DISCONTINUED | OUTPATIENT
Start: 2018-04-24 | End: 2018-04-26 | Stop reason: HOSPADM

## 2018-04-24 RX ORDER — ONDANSETRON 2 MG/ML
4 INJECTION INTRAMUSCULAR; INTRAVENOUS EVERY 6 HOURS PRN
Status: DISCONTINUED | OUTPATIENT
Start: 2018-04-24 | End: 2018-04-26 | Stop reason: HOSPADM

## 2018-04-24 RX ORDER — GABAPENTIN 300 MG/1
300 CAPSULE ORAL NIGHTLY
Status: DISCONTINUED | OUTPATIENT
Start: 2018-04-24 | End: 2018-04-24 | Stop reason: SDUPTHER

## 2018-04-24 RX ORDER — IPRATROPIUM BROMIDE AND ALBUTEROL SULFATE 2.5; .5 MG/3ML; MG/3ML
3 SOLUTION RESPIRATORY (INHALATION)
Status: DISCONTINUED | OUTPATIENT
Start: 2018-04-24 | End: 2018-04-26 | Stop reason: HOSPADM

## 2018-04-24 RX ORDER — GUAIFENESIN 600 MG/1
1200 TABLET, EXTENDED RELEASE ORAL 2 TIMES DAILY
Status: DISCONTINUED | OUTPATIENT
Start: 2018-04-24 | End: 2018-04-26 | Stop reason: HOSPADM

## 2018-04-24 RX ORDER — SODIUM CHLORIDE 9 MG/ML
100 INJECTION, SOLUTION INTRAVENOUS CONTINUOUS
Status: DISCONTINUED | OUTPATIENT
Start: 2018-04-24 | End: 2018-04-25

## 2018-04-24 RX ORDER — GABAPENTIN 100 MG/1
100 CAPSULE ORAL NIGHTLY
Status: DISCONTINUED | OUTPATIENT
Start: 2018-04-24 | End: 2018-04-26 | Stop reason: HOSPADM

## 2018-04-24 RX ORDER — CETIRIZINE HYDROCHLORIDE 10 MG/1
10 TABLET ORAL DAILY
Status: DISCONTINUED | OUTPATIENT
Start: 2018-04-24 | End: 2018-04-24

## 2018-04-24 RX ORDER — KETOROLAC TROMETHAMINE 30 MG/ML
30 INJECTION, SOLUTION INTRAMUSCULAR; INTRAVENOUS EVERY 6 HOURS PRN
Status: DISCONTINUED | OUTPATIENT
Start: 2018-04-24 | End: 2018-04-26 | Stop reason: HOSPADM

## 2018-04-24 RX ORDER — GABAPENTIN 300 MG/1
300 CAPSULE ORAL 3 TIMES DAILY
COMMUNITY

## 2018-04-24 RX ORDER — SODIUM CHLORIDE 0.9 % (FLUSH) 0.9 %
1-10 SYRINGE (ML) INJECTION AS NEEDED
Status: DISCONTINUED | OUTPATIENT
Start: 2018-04-24 | End: 2018-04-26 | Stop reason: HOSPADM

## 2018-04-24 RX ORDER — BISACODYL 5 MG/1
10 TABLET, DELAYED RELEASE ORAL DAILY PRN
Status: DISCONTINUED | OUTPATIENT
Start: 2018-04-24 | End: 2018-04-26 | Stop reason: HOSPADM

## 2018-04-24 RX ORDER — HYDROCHLOROTHIAZIDE 25 MG/1
12.5 TABLET ORAL NIGHTLY
Status: DISCONTINUED | OUTPATIENT
Start: 2018-04-24 | End: 2018-04-24

## 2018-04-24 RX ORDER — FLUTICASONE PROPIONATE 50 MCG
1 SPRAY, SUSPENSION (ML) NASAL 2 TIMES DAILY PRN
Status: DISCONTINUED | OUTPATIENT
Start: 2018-04-24 | End: 2018-04-25

## 2018-04-24 RX ORDER — METOPROLOL TARTRATE 50 MG/1
50 TABLET, FILM COATED ORAL EVERY 12 HOURS SCHEDULED
Status: DISCONTINUED | OUTPATIENT
Start: 2018-04-24 | End: 2018-04-26 | Stop reason: HOSPADM

## 2018-04-24 RX ADMIN — METOPROLOL TARTRATE 50 MG: 50 TABLET ORAL at 02:01

## 2018-04-24 RX ADMIN — INSULIN DETEMIR 50 UNITS: 100 INJECTION, SOLUTION SUBCUTANEOUS at 02:01

## 2018-04-24 RX ADMIN — PANTOPRAZOLE SODIUM 40 MG: 40 TABLET, DELAYED RELEASE ORAL at 12:28

## 2018-04-24 RX ADMIN — SODIUM CHLORIDE 100 ML/HR: 9 INJECTION, SOLUTION INTRAVENOUS at 17:21

## 2018-04-24 RX ADMIN — CEFTRIAXONE SODIUM 1 G: 1 INJECTION, POWDER, FOR SOLUTION INTRAMUSCULAR; INTRAVENOUS at 21:11

## 2018-04-24 RX ADMIN — IPRATROPIUM BROMIDE AND ALBUTEROL SULFATE 3 ML: 2.5; .5 SOLUTION RESPIRATORY (INHALATION) at 14:18

## 2018-04-24 RX ADMIN — GABAPENTIN 300 MG: 300 CAPSULE ORAL at 21:11

## 2018-04-24 RX ADMIN — CETIRIZINE HYDROCHLORIDE 10 MG: 10 TABLET, FILM COATED ORAL at 08:59

## 2018-04-24 RX ADMIN — HEPARIN SODIUM 5000 UNITS: 5000 INJECTION INTRAVENOUS; SUBCUTANEOUS at 02:26

## 2018-04-24 RX ADMIN — HYDROCHLOROTHIAZIDE 12.5 MG: 25 TABLET ORAL at 02:26

## 2018-04-24 RX ADMIN — GABAPENTIN 100 MG: 100 CAPSULE ORAL at 08:59

## 2018-04-24 RX ADMIN — SODIUM CHLORIDE 100 ML/HR: 9 INJECTION, SOLUTION INTRAVENOUS at 02:01

## 2018-04-24 RX ADMIN — HEPARIN SODIUM 5000 UNITS: 5000 INJECTION INTRAVENOUS; SUBCUTANEOUS at 21:11

## 2018-04-24 RX ADMIN — GUAIFENESIN 1200 MG: 600 TABLET, EXTENDED RELEASE ORAL at 12:28

## 2018-04-24 RX ADMIN — IPRATROPIUM BROMIDE AND ALBUTEROL SULFATE 3 ML: 2.5; .5 SOLUTION RESPIRATORY (INHALATION) at 21:20

## 2018-04-24 RX ADMIN — METOPROLOL TARTRATE 50 MG: 50 TABLET ORAL at 08:59

## 2018-04-24 RX ADMIN — SODIUM CHLORIDE 100 ML/HR: 9 INJECTION, SOLUTION INTRAVENOUS at 12:23

## 2018-04-24 RX ADMIN — INSULIN LISPRO 5 UNITS: 100 INJECTION, SOLUTION INTRAVENOUS; SUBCUTANEOUS at 21:11

## 2018-04-24 RX ADMIN — GABAPENTIN 100 MG: 100 CAPSULE ORAL at 02:01

## 2018-04-24 RX ADMIN — GUAIFENESIN 1200 MG: 600 TABLET, EXTENDED RELEASE ORAL at 21:11

## 2018-04-24 RX ADMIN — INSULIN DETEMIR 50 UNITS: 100 INJECTION, SOLUTION SUBCUTANEOUS at 21:11

## 2018-04-24 RX ADMIN — METOPROLOL TARTRATE 50 MG: 50 TABLET ORAL at 21:11

## 2018-04-24 RX ADMIN — INSULIN LISPRO 4 UNITS: 100 INJECTION, SOLUTION INTRAVENOUS; SUBCUTANEOUS at 17:21

## 2018-04-25 LAB
ANION GAP SERPL CALCULATED.3IONS-SCNC: 9 MMOL/L (ref 4–13)
BASOPHILS # BLD AUTO: 0.07 10*3/MM3 (ref 0–0.2)
BASOPHILS NFR BLD AUTO: 0.8 % (ref 0–2)
BUN BLD-MCNC: 25 MG/DL (ref 5–21)
BUN/CREAT SERPL: 19.1 (ref 7–25)
CALCIUM SPEC-SCNC: 9 MG/DL (ref 8.4–10.4)
CHLORIDE SERPL-SCNC: 104 MMOL/L (ref 98–110)
CO2 SERPL-SCNC: 25 MMOL/L (ref 24–31)
CREAT BLD-MCNC: 1.31 MG/DL (ref 0.5–1.4)
DEPRECATED RDW RBC AUTO: 39.1 FL (ref 40–54)
EOSINOPHIL # BLD AUTO: 0.25 10*3/MM3 (ref 0–0.7)
EOSINOPHIL NFR BLD AUTO: 2.8 % (ref 0–4)
EOSINOPHIL SPEC QL WRIGHT STN: NORMAL %
ERYTHROCYTE [DISTWIDTH] IN BLOOD BY AUTOMATED COUNT: 13.2 % (ref 12–15)
GFR SERPL CREATININE-BSD FRML MDRD: 55 ML/MIN/1.73
GLUCOSE BLD-MCNC: 287 MG/DL (ref 70–100)
GLUCOSE BLDC GLUCOMTR-MCNC: 248 MG/DL (ref 70–130)
GLUCOSE BLDC GLUCOMTR-MCNC: 361 MG/DL (ref 70–130)
GLUCOSE BLDC GLUCOMTR-MCNC: 385 MG/DL (ref 70–130)
GLUCOSE BLDC GLUCOMTR-MCNC: 406 MG/DL (ref 70–130)
GLUCOSE BLDC GLUCOMTR-MCNC: 411 MG/DL (ref 70–130)
GLUCOSE BLDC GLUCOMTR-MCNC: 423 MG/DL (ref 70–130)
GLUCOSE BLDC GLUCOMTR-MCNC: 434 MG/DL (ref 70–130)
HCT VFR BLD AUTO: 31.3 % (ref 40–52)
HGB BLD-MCNC: 10.1 G/DL (ref 14–18)
IMM GRANULOCYTES # BLD: 0.1 10*3/MM3 (ref 0–0.03)
IMM GRANULOCYTES NFR BLD: 1.1 % (ref 0–5)
LYMPHOCYTES # BLD AUTO: 2.12 10*3/MM3 (ref 0.72–4.86)
LYMPHOCYTES NFR BLD AUTO: 24 % (ref 15–45)
MCH RBC QN AUTO: 26.2 PG (ref 28–32)
MCHC RBC AUTO-ENTMCNC: 32.3 G/DL (ref 33–36)
MCV RBC AUTO: 81.3 FL (ref 82–95)
MONOCYTES # BLD AUTO: 0.63 10*3/MM3 (ref 0.19–1.3)
MONOCYTES NFR BLD AUTO: 7.1 % (ref 4–12)
NEUTROPHILS # BLD AUTO: 5.67 10*3/MM3 (ref 1.87–8.4)
NEUTROPHILS NFR BLD AUTO: 64.2 % (ref 39–78)
NRBC BLD MANUAL-RTO: 0 /100 WBC (ref 0–0)
PLATELET # BLD AUTO: 194 10*3/MM3 (ref 130–400)
PMV BLD AUTO: 12.4 FL (ref 6–12)
POTASSIUM BLD-SCNC: 4.2 MMOL/L (ref 3.5–5.3)
RBC # BLD AUTO: 3.85 10*6/MM3 (ref 4.8–5.9)
SODIUM BLD-SCNC: 138 MMOL/L (ref 135–145)
WBC NRBC COR # BLD: 8.84 10*3/MM3 (ref 4.8–10.8)

## 2018-04-25 PROCEDURE — 63710000001 INSULIN REGULAR HUMAN PER 5 UNITS: Performed by: INTERNAL MEDICINE

## 2018-04-25 PROCEDURE — 25010000002 METHYLPREDNISOLONE PER 40 MG: Performed by: INTERNAL MEDICINE

## 2018-04-25 PROCEDURE — 85025 COMPLETE CBC W/AUTO DIFF WBC: CPT | Performed by: NURSE PRACTITIONER

## 2018-04-25 PROCEDURE — 63710000001 INSULIN DETEMIR PER 5 UNITS: Performed by: NURSE PRACTITIONER

## 2018-04-25 PROCEDURE — 25010000002 HEPARIN (PORCINE) PER 1000 UNITS: Performed by: FAMILY MEDICINE

## 2018-04-25 PROCEDURE — 25010000002 METHYLPREDNISOLONE PER 40 MG: Performed by: NURSE PRACTITIONER

## 2018-04-25 PROCEDURE — 82962 GLUCOSE BLOOD TEST: CPT

## 2018-04-25 PROCEDURE — 80048 BASIC METABOLIC PNL TOTAL CA: CPT | Performed by: NURSE PRACTITIONER

## 2018-04-25 PROCEDURE — 63710000001 INSULIN LISPRO (HUMAN) PER 5 UNITS: Performed by: INTERNAL MEDICINE

## 2018-04-25 PROCEDURE — 94799 UNLISTED PULMONARY SVC/PX: CPT

## 2018-04-25 PROCEDURE — 25010000002 CEFTRIAXONE PER 250 MG: Performed by: INTERNAL MEDICINE

## 2018-04-25 RX ORDER — DEXTROSE MONOHYDRATE 25 G/50ML
25 INJECTION, SOLUTION INTRAVENOUS
Status: DISCONTINUED | OUTPATIENT
Start: 2018-04-25 | End: 2018-04-26 | Stop reason: HOSPADM

## 2018-04-25 RX ORDER — METHYLPREDNISOLONE SODIUM SUCCINATE 40 MG/ML
40 INJECTION, POWDER, LYOPHILIZED, FOR SOLUTION INTRAMUSCULAR; INTRAVENOUS EVERY 12 HOURS
Status: DISCONTINUED | OUTPATIENT
Start: 2018-04-25 | End: 2018-04-26 | Stop reason: HOSPADM

## 2018-04-25 RX ORDER — METHYLPREDNISOLONE SODIUM SUCCINATE 40 MG/ML
40 INJECTION, POWDER, LYOPHILIZED, FOR SOLUTION INTRAMUSCULAR; INTRAVENOUS EVERY 8 HOURS
Status: DISCONTINUED | OUTPATIENT
Start: 2018-04-25 | End: 2018-04-25

## 2018-04-25 RX ORDER — FLUTICASONE PROPIONATE 50 MCG
1 SPRAY, SUSPENSION (ML) NASAL DAILY
Status: DISCONTINUED | OUTPATIENT
Start: 2018-04-25 | End: 2018-04-26 | Stop reason: HOSPADM

## 2018-04-25 RX ORDER — NICOTINE POLACRILEX 4 MG
15 LOZENGE BUCCAL
Status: DISCONTINUED | OUTPATIENT
Start: 2018-04-25 | End: 2018-04-26 | Stop reason: HOSPADM

## 2018-04-25 RX ORDER — MONTELUKAST SODIUM 10 MG/1
10 TABLET ORAL NIGHTLY
Status: DISCONTINUED | OUTPATIENT
Start: 2018-04-25 | End: 2018-04-26 | Stop reason: HOSPADM

## 2018-04-25 RX ORDER — PSEUDOEPHEDRINE HCL 120 MG/1
120 TABLET, FILM COATED, EXTENDED RELEASE ORAL EVERY 12 HOURS SCHEDULED
Status: DISCONTINUED | OUTPATIENT
Start: 2018-04-25 | End: 2018-04-26 | Stop reason: HOSPADM

## 2018-04-25 RX ADMIN — HEPARIN SODIUM 5000 UNITS: 5000 INJECTION INTRAVENOUS; SUBCUTANEOUS at 20:54

## 2018-04-25 RX ADMIN — GABAPENTIN 300 MG: 300 CAPSULE ORAL at 17:18

## 2018-04-25 RX ADMIN — METOPROLOL TARTRATE 50 MG: 50 TABLET ORAL at 08:54

## 2018-04-25 RX ADMIN — IPRATROPIUM BROMIDE AND ALBUTEROL SULFATE 3 ML: 2.5; .5 SOLUTION RESPIRATORY (INHALATION) at 06:13

## 2018-04-25 RX ADMIN — GABAPENTIN 300 MG: 300 CAPSULE ORAL at 13:30

## 2018-04-25 RX ADMIN — CEFTRIAXONE SODIUM 1 G: 1 INJECTION, POWDER, FOR SOLUTION INTRAMUSCULAR; INTRAVENOUS at 20:52

## 2018-04-25 RX ADMIN — INSULIN DETEMIR 50 UNITS: 100 INJECTION, SOLUTION SUBCUTANEOUS at 08:55

## 2018-04-25 RX ADMIN — INSULIN HUMAN 15 UNITS: 100 INJECTION, SOLUTION PARENTERAL at 18:13

## 2018-04-25 RX ADMIN — INSULIN LISPRO 3 UNITS: 100 INJECTION, SOLUTION INTRAVENOUS; SUBCUTANEOUS at 08:55

## 2018-04-25 RX ADMIN — INSULIN LISPRO 6 UNITS: 100 INJECTION, SOLUTION INTRAVENOUS; SUBCUTANEOUS at 13:28

## 2018-04-25 RX ADMIN — INSULIN LISPRO 7 UNITS: 100 INJECTION, SOLUTION INTRAVENOUS; SUBCUTANEOUS at 17:18

## 2018-04-25 RX ADMIN — METOPROLOL TARTRATE 50 MG: 50 TABLET ORAL at 20:53

## 2018-04-25 RX ADMIN — GUAIFENESIN 1200 MG: 600 TABLET, EXTENDED RELEASE ORAL at 08:55

## 2018-04-25 RX ADMIN — IPRATROPIUM BROMIDE AND ALBUTEROL SULFATE 3 ML: 2.5; .5 SOLUTION RESPIRATORY (INHALATION) at 14:06

## 2018-04-25 RX ADMIN — GABAPENTIN 300 MG: 300 CAPSULE ORAL at 06:21

## 2018-04-25 RX ADMIN — PANTOPRAZOLE SODIUM 40 MG: 40 TABLET, DELAYED RELEASE ORAL at 06:21

## 2018-04-25 RX ADMIN — SODIUM CHLORIDE 100 ML/HR: 9 INJECTION, SOLUTION INTRAVENOUS at 03:24

## 2018-04-25 RX ADMIN — GUAIFENESIN 1200 MG: 600 TABLET, EXTENDED RELEASE ORAL at 20:53

## 2018-04-25 RX ADMIN — MONTELUKAST SODIUM 10 MG: 10 TABLET, FILM COATED ORAL at 20:53

## 2018-04-25 RX ADMIN — LINAGLIPTIN 5 MG: 5 TABLET, FILM COATED ORAL at 08:55

## 2018-04-25 RX ADMIN — METHYLPREDNISOLONE SODIUM SUCCINATE 40 MG: 40 INJECTION, POWDER, FOR SOLUTION INTRAMUSCULAR; INTRAVENOUS at 10:21

## 2018-04-25 RX ADMIN — IPRATROPIUM BROMIDE AND ALBUTEROL SULFATE 3 ML: 2.5; .5 SOLUTION RESPIRATORY (INHALATION) at 10:09

## 2018-04-25 RX ADMIN — PSEUDOEPHEDRINE HCL 120 MG: 120 TABLET, FILM COATED, EXTENDED RELEASE ORAL at 10:21

## 2018-04-25 RX ADMIN — HEPARIN SODIUM 5000 UNITS: 5000 INJECTION INTRAVENOUS; SUBCUTANEOUS at 08:55

## 2018-04-25 RX ADMIN — INSULIN DETEMIR 55 UNITS: 100 INJECTION, SOLUTION SUBCUTANEOUS at 20:54

## 2018-04-25 RX ADMIN — IPRATROPIUM BROMIDE AND ALBUTEROL SULFATE 3 ML: 2.5; .5 SOLUTION RESPIRATORY (INHALATION) at 19:56

## 2018-04-25 RX ADMIN — METHYLPREDNISOLONE SODIUM SUCCINATE 40 MG: 40 INJECTION, POWDER, FOR SOLUTION INTRAMUSCULAR; INTRAVENOUS at 20:57

## 2018-04-25 RX ADMIN — GABAPENTIN 100 MG: 100 CAPSULE ORAL at 20:53

## 2018-04-25 RX ADMIN — PSEUDOEPHEDRINE HCL 120 MG: 120 TABLET, FILM COATED, EXTENDED RELEASE ORAL at 20:53

## 2018-04-25 RX ADMIN — INSULIN LISPRO 14 UNITS: 100 INJECTION, SOLUTION INTRAVENOUS; SUBCUTANEOUS at 20:54

## 2018-04-26 VITALS
OXYGEN SATURATION: 94 % | BODY MASS INDEX: 37.28 KG/M2 | HEIGHT: 72 IN | DIASTOLIC BLOOD PRESSURE: 74 MMHG | SYSTOLIC BLOOD PRESSURE: 154 MMHG | WEIGHT: 275.25 LBS | HEART RATE: 78 BPM | TEMPERATURE: 97.5 F | RESPIRATION RATE: 18 BRPM

## 2018-04-26 LAB
ANION GAP SERPL CALCULATED.3IONS-SCNC: 10 MMOL/L (ref 4–13)
BASOPHILS # BLD AUTO: 0.03 10*3/MM3 (ref 0–0.2)
BASOPHILS NFR BLD AUTO: 0.2 % (ref 0–2)
BUN BLD-MCNC: 23 MG/DL (ref 5–21)
BUN/CREAT SERPL: 19 (ref 7–25)
CALCIUM SPEC-SCNC: 10 MG/DL (ref 8.4–10.4)
CHLORIDE SERPL-SCNC: 102 MMOL/L (ref 98–110)
CO2 SERPL-SCNC: 26 MMOL/L (ref 24–31)
CREAT BLD-MCNC: 1.21 MG/DL (ref 0.5–1.4)
DEPRECATED RDW RBC AUTO: 37.7 FL (ref 40–54)
EOSINOPHIL # BLD AUTO: 0 10*3/MM3 (ref 0–0.7)
EOSINOPHIL NFR BLD AUTO: 0 % (ref 0–4)
ERYTHROCYTE [DISTWIDTH] IN BLOOD BY AUTOMATED COUNT: 13.1 % (ref 12–15)
GFR SERPL CREATININE-BSD FRML MDRD: 60 ML/MIN/1.73
GLUCOSE BLD-MCNC: 318 MG/DL (ref 70–100)
GLUCOSE BLDC GLUCOMTR-MCNC: 350 MG/DL (ref 70–130)
GLUCOSE BLDC GLUCOMTR-MCNC: 387 MG/DL (ref 70–130)
HBA1C MFR BLD: 10.5 %
HCT VFR BLD AUTO: 35.7 % (ref 40–52)
HGB BLD-MCNC: 11.8 G/DL (ref 14–18)
IMM GRANULOCYTES # BLD: 0.34 10*3/MM3 (ref 0–0.03)
IMM GRANULOCYTES NFR BLD: 2.5 % (ref 0–5)
LYMPHOCYTES # BLD AUTO: 1.2 10*3/MM3 (ref 0.72–4.86)
LYMPHOCYTES NFR BLD AUTO: 8.9 % (ref 15–45)
MCH RBC QN AUTO: 26.6 PG (ref 28–32)
MCHC RBC AUTO-ENTMCNC: 33.1 G/DL (ref 33–36)
MCV RBC AUTO: 80.4 FL (ref 82–95)
MONOCYTES # BLD AUTO: 0.41 10*3/MM3 (ref 0.19–1.3)
MONOCYTES NFR BLD AUTO: 3 % (ref 4–12)
NEUTROPHILS # BLD AUTO: 11.55 10*3/MM3 (ref 1.87–8.4)
NEUTROPHILS NFR BLD AUTO: 85.4 % (ref 39–78)
NRBC BLD MANUAL-RTO: 0 /100 WBC (ref 0–0)
PLATELET # BLD AUTO: 259 10*3/MM3 (ref 130–400)
PMV BLD AUTO: 12.4 FL (ref 6–12)
POTASSIUM BLD-SCNC: 4.9 MMOL/L (ref 3.5–5.3)
RBC # BLD AUTO: 4.44 10*6/MM3 (ref 4.8–5.9)
SODIUM BLD-SCNC: 138 MMOL/L (ref 135–145)
WBC NRBC COR # BLD: 13.53 10*3/MM3 (ref 4.8–10.8)

## 2018-04-26 PROCEDURE — 94799 UNLISTED PULMONARY SVC/PX: CPT

## 2018-04-26 PROCEDURE — 63710000001 INSULIN DETEMIR PER 5 UNITS: Performed by: NURSE PRACTITIONER

## 2018-04-26 PROCEDURE — 85025 COMPLETE CBC W/AUTO DIFF WBC: CPT | Performed by: NURSE PRACTITIONER

## 2018-04-26 PROCEDURE — 80048 BASIC METABOLIC PNL TOTAL CA: CPT | Performed by: NURSE PRACTITIONER

## 2018-04-26 PROCEDURE — 25010000002 METHYLPREDNISOLONE PER 40 MG: Performed by: INTERNAL MEDICINE

## 2018-04-26 PROCEDURE — 63710000001 INSULIN LISPRO (HUMAN) PER 5 UNITS: Performed by: INTERNAL MEDICINE

## 2018-04-26 PROCEDURE — 83036 HEMOGLOBIN GLYCOSYLATED A1C: CPT | Performed by: NURSE PRACTITIONER

## 2018-04-26 PROCEDURE — 25010000002 HEPARIN (PORCINE) PER 1000 UNITS: Performed by: FAMILY MEDICINE

## 2018-04-26 PROCEDURE — 82962 GLUCOSE BLOOD TEST: CPT

## 2018-04-26 RX ORDER — PREDNISONE 10 MG/1
TABLET ORAL
Qty: 10 TABLET | Refills: 0 | Status: SHIPPED | OUTPATIENT
Start: 2018-04-26 | End: 2019-11-21

## 2018-04-26 RX ORDER — CEFDINIR 300 MG/1
300 CAPSULE ORAL EVERY 12 HOURS SCHEDULED
Qty: 8 CAPSULE | Refills: 0 | Status: SHIPPED | OUTPATIENT
Start: 2018-04-26 | End: 2018-04-30

## 2018-04-26 RX ORDER — GUAIFENESIN 600 MG/1
1200 TABLET, EXTENDED RELEASE ORAL 2 TIMES DAILY
Qty: 20 TABLET | Refills: 0 | Status: SHIPPED | OUTPATIENT
Start: 2018-04-26 | End: 2021-03-11

## 2018-04-26 RX ORDER — CEFDINIR 300 MG/1
300 CAPSULE ORAL EVERY 12 HOURS SCHEDULED
Status: DISCONTINUED | OUTPATIENT
Start: 2018-04-26 | End: 2018-04-26 | Stop reason: HOSPADM

## 2018-04-26 RX ORDER — VALSARTAN AND HYDROCHLOROTHIAZIDE 160; 12.5 MG/1; MG/1
1 TABLET, FILM COATED ORAL
Status: DISCONTINUED | OUTPATIENT
Start: 2018-04-26 | End: 2018-04-26 | Stop reason: HOSPADM

## 2018-04-26 RX ADMIN — HEPARIN SODIUM 5000 UNITS: 5000 INJECTION INTRAVENOUS; SUBCUTANEOUS at 08:35

## 2018-04-26 RX ADMIN — INSULIN DETEMIR 55 UNITS: 100 INJECTION, SOLUTION SUBCUTANEOUS at 08:36

## 2018-04-26 RX ADMIN — VALSARTAN AND HYDROCHLOROTHIAZIDE 1 TABLET: 160; 12.5 TABLET, FILM COATED ORAL at 13:19

## 2018-04-26 RX ADMIN — IPRATROPIUM BROMIDE AND ALBUTEROL SULFATE 3 ML: 2.5; .5 SOLUTION RESPIRATORY (INHALATION) at 10:24

## 2018-04-26 RX ADMIN — LINAGLIPTIN 5 MG: 5 TABLET, FILM COATED ORAL at 10:15

## 2018-04-26 RX ADMIN — GABAPENTIN 300 MG: 300 CAPSULE ORAL at 05:55

## 2018-04-26 RX ADMIN — CEFDINIR 300 MG: 300 CAPSULE ORAL at 13:19

## 2018-04-26 RX ADMIN — GUAIFENESIN 1200 MG: 600 TABLET, EXTENDED RELEASE ORAL at 08:35

## 2018-04-26 RX ADMIN — GABAPENTIN 300 MG: 300 CAPSULE ORAL at 13:18

## 2018-04-26 RX ADMIN — INSULIN LISPRO 12 UNITS: 100 INJECTION, SOLUTION INTRAVENOUS; SUBCUTANEOUS at 08:36

## 2018-04-26 RX ADMIN — PANTOPRAZOLE SODIUM 40 MG: 40 TABLET, DELAYED RELEASE ORAL at 05:55

## 2018-04-26 RX ADMIN — METOPROLOL TARTRATE 50 MG: 50 TABLET ORAL at 08:35

## 2018-04-26 RX ADMIN — INSULIN LISPRO 12 UNITS: 100 INJECTION, SOLUTION INTRAVENOUS; SUBCUTANEOUS at 13:18

## 2018-04-26 RX ADMIN — FLUTICASONE PROPIONATE 1 SPRAY: 50 SPRAY, METERED NASAL at 09:33

## 2018-04-26 RX ADMIN — IPRATROPIUM BROMIDE AND ALBUTEROL SULFATE 3 ML: 2.5; .5 SOLUTION RESPIRATORY (INHALATION) at 06:50

## 2018-04-26 RX ADMIN — PSEUDOEPHEDRINE HCL 120 MG: 120 TABLET, FILM COATED, EXTENDED RELEASE ORAL at 08:36

## 2018-04-26 RX ADMIN — METHYLPREDNISOLONE SODIUM SUCCINATE 40 MG: 40 INJECTION, POWDER, FOR SOLUTION INTRAMUSCULAR; INTRAVENOUS at 08:35

## 2018-04-28 LAB
BACTERIA SPEC AEROBE CULT: NORMAL
BACTERIA SPEC AEROBE CULT: NORMAL

## 2018-04-30 ENCOUNTER — OFFICE VISIT (OUTPATIENT)
Dept: PRIMARY CARE CLINIC | Age: 63
End: 2018-04-30
Payer: COMMERCIAL

## 2018-04-30 VITALS
DIASTOLIC BLOOD PRESSURE: 70 MMHG | HEIGHT: 72 IN | WEIGHT: 280 LBS | BODY MASS INDEX: 37.93 KG/M2 | HEART RATE: 80 BPM | TEMPERATURE: 97.7 F | SYSTOLIC BLOOD PRESSURE: 134 MMHG | OXYGEN SATURATION: 95 %

## 2018-04-30 DIAGNOSIS — J40 SINOBRONCHITIS: Primary | ICD-10-CM

## 2018-04-30 DIAGNOSIS — Z09 HOSPITAL DISCHARGE FOLLOW-UP: ICD-10-CM

## 2018-04-30 DIAGNOSIS — G47.33 OSA (OBSTRUCTIVE SLEEP APNEA): ICD-10-CM

## 2018-04-30 DIAGNOSIS — R09.02 HYPOXIA: ICD-10-CM

## 2018-04-30 DIAGNOSIS — J32.9 SINOBRONCHITIS: Primary | ICD-10-CM

## 2018-04-30 PROCEDURE — 99214 OFFICE O/P EST MOD 30 MIN: CPT | Performed by: NURSE PRACTITIONER

## 2018-04-30 PROCEDURE — 1111F DSCHRG MED/CURRENT MED MERGE: CPT | Performed by: NURSE PRACTITIONER

## 2018-04-30 RX ORDER — AZITHROMYCIN 250 MG/1
TABLET, FILM COATED ORAL
Qty: 1 PACKET | Refills: 0 | Status: SHIPPED | OUTPATIENT
Start: 2018-04-30 | End: 2018-05-07 | Stop reason: ALTCHOICE

## 2018-04-30 RX ORDER — IPRATROPIUM BROMIDE AND ALBUTEROL SULFATE 2.5; .5 MG/3ML; MG/3ML
1 SOLUTION RESPIRATORY (INHALATION) EVERY 6 HOURS
Qty: 360 ML | Refills: 1 | Status: SHIPPED | OUTPATIENT
Start: 2018-04-30 | End: 2019-03-20 | Stop reason: SDUPTHER

## 2018-04-30 RX ORDER — GUAIFENESIN AND CODEINE PHOSPHATE 100; 10 MG/5ML; MG/5ML
5 SOLUTION ORAL 2 TIMES DAILY PRN
Qty: 1 BOTTLE | Refills: 1 | Status: SHIPPED | OUTPATIENT
Start: 2018-04-30 | End: 2018-05-07

## 2018-04-30 RX ORDER — ALBUTEROL SULFATE 90 UG/1
2 AEROSOL, METERED RESPIRATORY (INHALATION) EVERY 6 HOURS PRN
Qty: 1 INHALER | Refills: 3 | Status: SHIPPED | OUTPATIENT
Start: 2018-04-30 | End: 2022-04-26 | Stop reason: SDUPTHER

## 2018-04-30 ASSESSMENT — PATIENT HEALTH QUESTIONNAIRE - PHQ9
SUM OF ALL RESPONSES TO PHQ QUESTIONS 1-9: 2
SUM OF ALL RESPONSES TO PHQ9 QUESTIONS 1 & 2: 2
2. FEELING DOWN, DEPRESSED OR HOPELESS: 1
1. LITTLE INTEREST OR PLEASURE IN DOING THINGS: 1

## 2018-05-01 ASSESSMENT — ENCOUNTER SYMPTOMS
SHORTNESS OF BREATH: 0
COLOR CHANGE: 0
COUGH: 1
CONSTIPATION: 0
APNEA: 0
SINUS PRESSURE: 1
CHEST TIGHTNESS: 1
WHEEZING: 1
VOMITING: 0
STRIDOR: 0
NAUSEA: 1
ABDOMINAL DISTENTION: 0
VOICE CHANGE: 1
DIARRHEA: 0
CHOKING: 0
BACK PAIN: 0
ABDOMINAL PAIN: 0

## 2018-05-07 ENCOUNTER — HOSPITAL ENCOUNTER (OUTPATIENT)
Dept: GENERAL RADIOLOGY | Age: 63
Discharge: HOME | End: 2018-05-07
Payer: COMMERCIAL

## 2018-05-07 ENCOUNTER — OFFICE VISIT (OUTPATIENT)
Dept: PRIMARY CARE CLINIC | Age: 63
End: 2018-05-07
Payer: COMMERCIAL

## 2018-05-07 VITALS
HEIGHT: 72 IN | HEART RATE: 76 BPM | OXYGEN SATURATION: 96 % | WEIGHT: 270 LBS | SYSTOLIC BLOOD PRESSURE: 130 MMHG | BODY MASS INDEX: 36.57 KG/M2 | TEMPERATURE: 97.3 F | DIASTOLIC BLOOD PRESSURE: 74 MMHG

## 2018-05-07 DIAGNOSIS — G57.93 NEUROPATHIC PAIN OF BOTH LEGS: ICD-10-CM

## 2018-05-07 DIAGNOSIS — E11.40 TYPE 2 DIABETES MELLITUS WITH DIABETIC NEUROPATHY, UNSPECIFIED LONG TERM INSULIN USE STATUS: ICD-10-CM

## 2018-05-07 DIAGNOSIS — J32.9 CHRONIC SINUSITIS WITH RECURRENT BRONCHITIS: ICD-10-CM

## 2018-05-07 DIAGNOSIS — E11.9 ENCOUNTER FOR DIABETIC FOOT EXAM (HCC): Primary | ICD-10-CM

## 2018-05-07 DIAGNOSIS — J40 CHRONIC SINUSITIS WITH RECURRENT BRONCHITIS: ICD-10-CM

## 2018-05-07 DIAGNOSIS — R06.2 WHEEZES: ICD-10-CM

## 2018-05-07 DIAGNOSIS — E11.9 DIABETES MELLITUS WITH HEMOGLOBIN A1C GOAL OF 7.0%-8.0% (HCC): Chronic | ICD-10-CM

## 2018-05-07 LAB
ALBUMIN SERPL-MCNC: 3.7 G/DL (ref 3.5–5.2)
ALP BLD-CCNC: 82 U/L (ref 40–130)
ALT SERPL-CCNC: 24 U/L (ref 5–41)
ANION GAP SERPL CALCULATED.3IONS-SCNC: 13 MMOL/L (ref 7–19)
AST SERPL-CCNC: 13 U/L (ref 5–40)
BILIRUB SERPL-MCNC: 0.3 MG/DL (ref 0.2–1.2)
BUN BLDV-MCNC: 25 MG/DL (ref 8–23)
CALCIUM SERPL-MCNC: 10 MG/DL (ref 8.8–10.2)
CHLORIDE BLD-SCNC: 102 MMOL/L (ref 98–111)
CHOLESTEROL, TOTAL: 163 MG/DL (ref 160–199)
CO2: 25 MMOL/L (ref 22–29)
CREAT SERPL-MCNC: 1.4 MG/DL (ref 0.5–1.2)
GFR NON-AFRICAN AMERICAN: 51
GLUCOSE BLD-MCNC: 241 MG/DL (ref 74–109)
HCT VFR BLD CALC: 38.2 % (ref 42–52)
HDLC SERPL-MCNC: 25 MG/DL (ref 55–121)
HEMOGLOBIN: 12.1 G/DL (ref 14–18)
LDL CHOLESTEROL CALCULATED: 77 MG/DL
MCH RBC QN AUTO: 27.1 PG (ref 27–31)
MCHC RBC AUTO-ENTMCNC: 31.7 G/DL (ref 33–37)
MCV RBC AUTO: 85.7 FL (ref 80–94)
PDW BLD-RTO: 13.4 % (ref 11.5–14.5)
PLATELET # BLD: 228 K/UL (ref 130–400)
PMV BLD AUTO: 12.1 FL (ref 9.4–12.4)
POTASSIUM SERPL-SCNC: 5.2 MMOL/L (ref 3.5–5)
RBC # BLD: 4.46 M/UL (ref 4.7–6.1)
SODIUM BLD-SCNC: 140 MMOL/L (ref 136–145)
TOTAL PROTEIN: 6.6 G/DL (ref 6.6–8.7)
TRIGL SERPL-MCNC: 305 MG/DL (ref 0–149)
WBC # BLD: 9.5 K/UL (ref 4.8–10.8)

## 2018-05-07 PROCEDURE — 71046 X-RAY EXAM CHEST 2 VIEWS: CPT

## 2018-05-07 PROCEDURE — 99214 OFFICE O/P EST MOD 30 MIN: CPT | Performed by: NURSE PRACTITIONER

## 2018-05-08 ASSESSMENT — ENCOUNTER SYMPTOMS
COLOR CHANGE: 0
CHOKING: 0
WHEEZING: 0
APNEA: 0
RHINORRHEA: 1
STRIDOR: 0
CONSTIPATION: 0
ABDOMINAL DISTENTION: 0
BACK PAIN: 0
ABDOMINAL PAIN: 0
CHEST TIGHTNESS: 0
VOMITING: 0
COUGH: 0
SHORTNESS OF BREATH: 1
DIARRHEA: 0

## 2018-05-10 ENCOUNTER — TELEPHONE (OUTPATIENT)
Dept: PRIMARY CARE CLINIC | Age: 63
End: 2018-05-10

## 2018-05-11 ENCOUNTER — APPOINTMENT (OUTPATIENT)
Dept: GENERAL RADIOLOGY | Age: 63
End: 2018-05-11
Payer: COMMERCIAL

## 2018-05-11 ENCOUNTER — OFFICE VISIT (OUTPATIENT)
Dept: PRIMARY CARE CLINIC | Age: 63
End: 2018-05-11
Payer: COMMERCIAL

## 2018-05-11 ENCOUNTER — HOSPITAL ENCOUNTER (OUTPATIENT)
Age: 63
Setting detail: OBSERVATION
Discharge: HOME | End: 2018-05-15
Attending: EMERGENCY MEDICINE | Admitting: INTERNAL MEDICINE
Payer: COMMERCIAL

## 2018-05-11 ENCOUNTER — APPOINTMENT (OUTPATIENT)
Dept: CT IMAGING | Age: 63
End: 2018-05-11
Payer: COMMERCIAL

## 2018-05-11 VITALS
DIASTOLIC BLOOD PRESSURE: 76 MMHG | TEMPERATURE: 97.4 F | HEART RATE: 84 BPM | SYSTOLIC BLOOD PRESSURE: 128 MMHG | OXYGEN SATURATION: 93 % | HEIGHT: 72 IN | WEIGHT: 274 LBS | BODY MASS INDEX: 37.11 KG/M2

## 2018-05-11 DIAGNOSIS — R73.9 HYPERGLYCEMIA: ICD-10-CM

## 2018-05-11 DIAGNOSIS — R61 DIAPHORESIS: ICD-10-CM

## 2018-05-11 DIAGNOSIS — Z79.4 TYPE 2 DIABETES MELLITUS WITH OTHER CIRCULATORY COMPLICATION, WITH LONG-TERM CURRENT USE OF INSULIN (HCC): ICD-10-CM

## 2018-05-11 DIAGNOSIS — N18.4 ACUTE RENAL FAILURE SUPERIMPOSED ON STAGE 4 CHRONIC KIDNEY DISEASE, UNSPECIFIED ACUTE RENAL FAILURE TYPE (HCC): ICD-10-CM

## 2018-05-11 DIAGNOSIS — E87.5 HYPERKALEMIA: ICD-10-CM

## 2018-05-11 DIAGNOSIS — N18.9 CHRONIC RENAL FAILURE, UNSPECIFIED CKD STAGE: ICD-10-CM

## 2018-05-11 DIAGNOSIS — E11.59 TYPE 2 DIABETES MELLITUS WITH OTHER CIRCULATORY COMPLICATION, WITH LONG-TERM CURRENT USE OF INSULIN (HCC): ICD-10-CM

## 2018-05-11 DIAGNOSIS — R93.89 ABNORMAL CT SCAN: ICD-10-CM

## 2018-05-11 DIAGNOSIS — N17.9 ACUTE RENAL FAILURE SUPERIMPOSED ON STAGE 4 CHRONIC KIDNEY DISEASE, UNSPECIFIED ACUTE RENAL FAILURE TYPE (HCC): ICD-10-CM

## 2018-05-11 DIAGNOSIS — R06.00 DYSPNEA, UNSPECIFIED TYPE: Primary | ICD-10-CM

## 2018-05-11 DIAGNOSIS — R06.09 DYSPNEA ON EXERTION: Primary | ICD-10-CM

## 2018-05-11 DIAGNOSIS — R07.89 CHEST WALL PAIN: ICD-10-CM

## 2018-05-11 LAB
ALBUMIN SERPL-MCNC: 4 G/DL (ref 3.5–5.2)
ALP BLD-CCNC: 88 U/L (ref 40–130)
ALT SERPL-CCNC: 20 U/L (ref 5–41)
ANION GAP SERPL CALCULATED.3IONS-SCNC: 11 MMOL/L (ref 7–19)
AST SERPL-CCNC: 20 U/L (ref 5–40)
BASE EXCESS ARTERIAL: -0.4 MMOL/L (ref -2–2)
BASOPHILS ABSOLUTE: 0.1 K/UL (ref 0–0.2)
BASOPHILS RELATIVE PERCENT: 1.1 % (ref 0–1)
BILIRUB SERPL-MCNC: <0.2 MG/DL (ref 0.2–1.2)
BUN BLDV-MCNC: 19 MG/DL (ref 8–23)
CALCIUM SERPL-MCNC: 10.2 MG/DL (ref 8.8–10.2)
CARBOXYHEMOGLOBIN ARTERIAL: 2 % (ref 0–5)
CHLORIDE BLD-SCNC: 94 MMOL/L (ref 98–111)
CO2: 25 MMOL/L (ref 22–29)
CREAT SERPL-MCNC: 1.5 MG/DL (ref 0.5–1.2)
D DIMER: 1.05 UG/ML FEU (ref 0–0.48)
EOSINOPHILS ABSOLUTE: 0.2 K/UL (ref 0–0.6)
EOSINOPHILS RELATIVE PERCENT: 3 % (ref 0–5)
GFR NON-AFRICAN AMERICAN: 47
GLUCOSE BLD-MCNC: 345 MG/DL (ref 74–109)
HCO3 ARTERIAL: 24.8 MMOL/L (ref 22–26)
HCT VFR BLD CALC: 38.2 % (ref 42–52)
HEMOGLOBIN, ART, EXTENDED: 10.5 G/DL (ref 14–18)
HEMOGLOBIN: 12.2 G/DL (ref 14–18)
LACTIC ACID: 2.8 MMOL/L (ref 0.5–1.9)
LACTIC ACID: 3.1 MMOL/L (ref 0.5–1.9)
LYMPHOCYTES ABSOLUTE: 1.5 K/UL (ref 1.1–4.5)
LYMPHOCYTES RELATIVE PERCENT: 20.1 % (ref 20–40)
MCH RBC QN AUTO: 27.1 PG (ref 27–31)
MCHC RBC AUTO-ENTMCNC: 31.9 G/DL (ref 33–37)
MCV RBC AUTO: 84.9 FL (ref 80–94)
METHEMOGLOBIN ARTERIAL: 1 %
MONOCYTES ABSOLUTE: 0.7 K/UL (ref 0–0.9)
MONOCYTES RELATIVE PERCENT: 9.3 % (ref 0–10)
NEUTROPHILS ABSOLUTE: 4.9 K/UL (ref 1.5–7.5)
NEUTROPHILS RELATIVE PERCENT: 66 % (ref 50–65)
O2 CONTENT ARTERIAL: 13.6 ML/DL
O2 SAT, ARTERIAL: 92.1 %
O2 THERAPY: ABNORMAL
PCO2 ARTERIAL: 42 MMHG (ref 35–45)
PDW BLD-RTO: 13.7 % (ref 11.5–14.5)
PERFORMED ON: NORMAL
PERFORMED ON: NORMAL
PH ARTERIAL: 7.38 (ref 7.35–7.45)
PLATELET # BLD: 211 K/UL (ref 130–400)
PMV BLD AUTO: 11.8 FL (ref 9.4–12.4)
PO2 ARTERIAL: 57 MMHG (ref 80–100)
POC TROPONIN I: 0 NG/ML (ref 0–0.08)
POC TROPONIN I: 0 NG/ML (ref 0–0.08)
POTASSIUM SERPL-SCNC: 5 MMOL/L (ref 3.5–5)
POTASSIUM, WHOLE BLOOD: 4.4
PRO-BNP: 299 PG/ML (ref 0–900)
RAPID INFLUENZA  B AGN: NEGATIVE
RAPID INFLUENZA A AGN: NEGATIVE
RBC # BLD: 4.5 M/UL (ref 4.7–6.1)
SODIUM BLD-SCNC: 130 MMOL/L (ref 136–145)
TOTAL PROTEIN: 6.7 G/DL (ref 6.6–8.7)
TROPONIN: 0.01 NG/ML (ref 0–0.03)
TSH SERPL DL<=0.05 MIU/L-ACNC: 1.89 UIU/ML (ref 0.27–4.2)
WBC # BLD: 7.4 K/UL (ref 4.8–10.8)

## 2018-05-11 PROCEDURE — 6370000000 HC RX 637 (ALT 250 FOR IP): Performed by: NURSE PRACTITIONER

## 2018-05-11 PROCEDURE — 84132 ASSAY OF SERUM POTASSIUM: CPT

## 2018-05-11 PROCEDURE — 93005 ELECTROCARDIOGRAM TRACING: CPT

## 2018-05-11 PROCEDURE — 71045 X-RAY EXAM CHEST 1 VIEW: CPT

## 2018-05-11 PROCEDURE — 71275 CT ANGIOGRAPHY CHEST: CPT

## 2018-05-11 PROCEDURE — G0378 HOSPITAL OBSERVATION PER HR: HCPCS

## 2018-05-11 PROCEDURE — 6360000004 HC RX CONTRAST MEDICATION: Performed by: EMERGENCY MEDICINE

## 2018-05-11 PROCEDURE — 87804 INFLUENZA ASSAY W/OPTIC: CPT

## 2018-05-11 PROCEDURE — 83880 ASSAY OF NATRIURETIC PEPTIDE: CPT

## 2018-05-11 PROCEDURE — 36600 WITHDRAWAL OF ARTERIAL BLOOD: CPT

## 2018-05-11 PROCEDURE — 84484 ASSAY OF TROPONIN QUANT: CPT

## 2018-05-11 PROCEDURE — 87040 BLOOD CULTURE FOR BACTERIA: CPT

## 2018-05-11 PROCEDURE — 99285 EMERGENCY DEPT VISIT HI MDM: CPT

## 2018-05-11 PROCEDURE — 83605 ASSAY OF LACTIC ACID: CPT

## 2018-05-11 PROCEDURE — 36415 COLL VENOUS BLD VENIPUNCTURE: CPT

## 2018-05-11 PROCEDURE — 99285 EMERGENCY DEPT VISIT HI MDM: CPT | Performed by: EMERGENCY MEDICINE

## 2018-05-11 PROCEDURE — 99220 PR INITIAL OBSERVATION CARE/DAY 70 MINUTES: CPT | Performed by: INTERNAL MEDICINE

## 2018-05-11 PROCEDURE — 82803 BLOOD GASES ANY COMBINATION: CPT

## 2018-05-11 PROCEDURE — 94640 AIRWAY INHALATION TREATMENT: CPT

## 2018-05-11 PROCEDURE — 99214 OFFICE O/P EST MOD 30 MIN: CPT | Performed by: NURSE PRACTITIONER

## 2018-05-11 PROCEDURE — 2580000003 HC RX 258: Performed by: INTERNAL MEDICINE

## 2018-05-11 PROCEDURE — 85025 COMPLETE CBC W/AUTO DIFF WBC: CPT

## 2018-05-11 PROCEDURE — 2580000003 HC RX 258: Performed by: NURSE PRACTITIONER

## 2018-05-11 PROCEDURE — 80053 COMPREHEN METABOLIC PANEL: CPT

## 2018-05-11 PROCEDURE — 84443 ASSAY THYROID STIM HORMONE: CPT

## 2018-05-11 PROCEDURE — 6370000000 HC RX 637 (ALT 250 FOR IP): Performed by: EMERGENCY MEDICINE

## 2018-05-11 PROCEDURE — 85379 FIBRIN DEGRADATION QUANT: CPT

## 2018-05-11 RX ORDER — CETIRIZINE HYDROCHLORIDE 10 MG/1
5 TABLET ORAL DAILY
Status: DISCONTINUED | OUTPATIENT
Start: 2018-05-12 | End: 2018-05-11

## 2018-05-11 RX ORDER — ASPIRIN 325 MG
325 TABLET ORAL ONCE
Status: COMPLETED | OUTPATIENT
Start: 2018-05-11 | End: 2018-05-11

## 2018-05-11 RX ORDER — MULTIVITAMIN
1 CAPSULE ORAL DAILY
Status: ON HOLD | COMMUNITY
End: 2019-06-03

## 2018-05-11 RX ORDER — DEXTROSE MONOHYDRATE 50 MG/ML
100 INJECTION, SOLUTION INTRAVENOUS PRN
Status: DISCONTINUED | OUTPATIENT
Start: 2018-05-11 | End: 2018-05-15 | Stop reason: HOSPADM

## 2018-05-11 RX ORDER — HYDROCHLOROTHIAZIDE 12.5 MG/1
12.5 CAPSULE, GELATIN COATED ORAL NIGHTLY
COMMUNITY
End: 2018-05-23 | Stop reason: ALTCHOICE

## 2018-05-11 RX ORDER — IRBESARTAN AND HYDROCHLOROTHIAZIDE 150; 12.5 MG/1; MG/1
1 TABLET, FILM COATED ORAL DAILY
Status: DISCONTINUED | OUTPATIENT
Start: 2018-05-12 | End: 2018-05-13

## 2018-05-11 RX ORDER — GABAPENTIN 300 MG/1
300 CAPSULE ORAL 3 TIMES DAILY
Status: DISCONTINUED | OUTPATIENT
Start: 2018-05-11 | End: 2018-05-11

## 2018-05-11 RX ORDER — IPRATROPIUM BROMIDE AND ALBUTEROL SULFATE 2.5; .5 MG/3ML; MG/3ML
1 SOLUTION RESPIRATORY (INHALATION) EVERY 6 HOURS
Status: DISCONTINUED | OUTPATIENT
Start: 2018-05-12 | End: 2018-05-15 | Stop reason: HOSPADM

## 2018-05-11 RX ORDER — ONDANSETRON 2 MG/ML
4 INJECTION INTRAMUSCULAR; INTRAVENOUS EVERY 6 HOURS PRN
Status: DISCONTINUED | OUTPATIENT
Start: 2018-05-11 | End: 2018-05-14

## 2018-05-11 RX ORDER — ASPIRIN 325 MG
325 TABLET ORAL ONCE
Status: DISCONTINUED | OUTPATIENT
Start: 2018-05-11 | End: 2018-05-14

## 2018-05-11 RX ORDER — METOPROLOL TARTRATE 50 MG/1
50 TABLET, FILM COATED ORAL 2 TIMES DAILY
Status: DISCONTINUED | OUTPATIENT
Start: 2018-05-11 | End: 2018-05-11

## 2018-05-11 RX ORDER — PANTOPRAZOLE SODIUM 20 MG/1
20 TABLET, DELAYED RELEASE ORAL
Status: DISCONTINUED | OUTPATIENT
Start: 2018-05-12 | End: 2018-05-11

## 2018-05-11 RX ORDER — NICOTINE POLACRILEX 4 MG
15 LOZENGE BUCCAL PRN
Status: DISCONTINUED | OUTPATIENT
Start: 2018-05-11 | End: 2018-05-15 | Stop reason: HOSPADM

## 2018-05-11 RX ORDER — ALBUTEROL SULFATE 90 UG/1
2 AEROSOL, METERED RESPIRATORY (INHALATION) EVERY 6 HOURS PRN
Status: DISCONTINUED | OUTPATIENT
Start: 2018-05-11 | End: 2018-05-11

## 2018-05-11 RX ORDER — SODIUM CHLORIDE 0.9 % (FLUSH) 0.9 %
10 SYRINGE (ML) INJECTION EVERY 12 HOURS SCHEDULED
Status: DISCONTINUED | OUTPATIENT
Start: 2018-05-11 | End: 2018-05-15 | Stop reason: HOSPADM

## 2018-05-11 RX ORDER — IRBESARTAN AND HYDROCHLOROTHIAZIDE 150; 12.5 MG/1; MG/1
1 TABLET, FILM COATED ORAL DAILY
Status: DISCONTINUED | OUTPATIENT
Start: 2018-05-12 | End: 2018-05-11 | Stop reason: CLARIF

## 2018-05-11 RX ORDER — SODIUM CHLORIDE 0.9 % (FLUSH) 0.9 %
10 SYRINGE (ML) INJECTION PRN
Status: DISCONTINUED | OUTPATIENT
Start: 2018-05-11 | End: 2018-05-15 | Stop reason: HOSPADM

## 2018-05-11 RX ORDER — ALBUTEROL SULFATE 90 UG/1
2 AEROSOL, METERED RESPIRATORY (INHALATION) EVERY 6 HOURS PRN
Status: DISCONTINUED | OUTPATIENT
Start: 2018-05-11 | End: 2018-05-15 | Stop reason: HOSPADM

## 2018-05-11 RX ORDER — LANSOPRAZOLE 30 MG/1
30 CAPSULE, DELAYED RELEASE ORAL NIGHTLY
Status: DISCONTINUED | OUTPATIENT
Start: 2018-05-11 | End: 2018-05-15 | Stop reason: HOSPADM

## 2018-05-11 RX ORDER — DEXTROSE MONOHYDRATE 25 G/50ML
12.5 INJECTION, SOLUTION INTRAVENOUS PRN
Status: DISCONTINUED | OUTPATIENT
Start: 2018-05-11 | End: 2018-05-15 | Stop reason: HOSPADM

## 2018-05-11 RX ORDER — ASPIRIN 81 MG/1
81 TABLET, CHEWABLE ORAL DAILY
Status: DISCONTINUED | OUTPATIENT
Start: 2018-05-12 | End: 2018-05-14

## 2018-05-11 RX ORDER — IPRATROPIUM BROMIDE AND ALBUTEROL SULFATE 2.5; .5 MG/3ML; MG/3ML
1 SOLUTION RESPIRATORY (INHALATION) ONCE
Status: COMPLETED | OUTPATIENT
Start: 2018-05-11 | End: 2018-05-11

## 2018-05-11 RX ORDER — GABAPENTIN 300 MG/1
300 CAPSULE ORAL 3 TIMES DAILY
Status: DISCONTINUED | OUTPATIENT
Start: 2018-05-12 | End: 2018-05-15 | Stop reason: HOSPADM

## 2018-05-11 RX ORDER — LOSARTAN POTASSIUM 50 MG/1
50 TABLET ORAL DAILY
Status: DISCONTINUED | OUTPATIENT
Start: 2018-05-12 | End: 2018-05-11

## 2018-05-11 RX ORDER — HYDROCHLOROTHIAZIDE 12.5 MG/1
12.5 CAPSULE, GELATIN COATED ORAL DAILY
Status: DISCONTINUED | OUTPATIENT
Start: 2018-05-12 | End: 2018-05-11

## 2018-05-11 RX ORDER — ERGOCALCIFEROL 1.25 MG/1
50000 CAPSULE ORAL
Status: DISCONTINUED | OUTPATIENT
Start: 2018-05-12 | End: 2018-05-15 | Stop reason: HOSPADM

## 2018-05-11 RX ORDER — LORATADINE 10 MG/1
10 TABLET ORAL DAILY
Status: DISCONTINUED | OUTPATIENT
Start: 2018-05-12 | End: 2018-05-15 | Stop reason: HOSPADM

## 2018-05-11 RX ORDER — 0.9 % SODIUM CHLORIDE 0.9 %
500 INTRAVENOUS SOLUTION INTRAVENOUS ONCE
Status: COMPLETED | OUTPATIENT
Start: 2018-05-11 | End: 2018-05-12

## 2018-05-11 RX ORDER — 0.9 % SODIUM CHLORIDE 0.9 %
500 INTRAVENOUS SOLUTION INTRAVENOUS ONCE
Status: COMPLETED | OUTPATIENT
Start: 2018-05-11 | End: 2018-05-11

## 2018-05-11 RX ORDER — METOPROLOL TARTRATE 50 MG/1
50 TABLET, FILM COATED ORAL 2 TIMES DAILY
Status: DISCONTINUED | OUTPATIENT
Start: 2018-05-12 | End: 2018-05-15 | Stop reason: HOSPADM

## 2018-05-11 RX ADMIN — Medication 10 ML: at 23:07

## 2018-05-11 RX ADMIN — SODIUM CHLORIDE 500 ML: 9 INJECTION, SOLUTION INTRAVENOUS at 23:07

## 2018-05-11 RX ADMIN — ASPIRIN 325 MG ORAL TABLET 325 MG: 325 PILL ORAL at 20:57

## 2018-05-11 RX ADMIN — IOPAMIDOL 90 ML: 755 INJECTION, SOLUTION INTRAVENOUS at 19:00

## 2018-05-11 RX ADMIN — IPRATROPIUM BROMIDE AND ALBUTEROL SULFATE 1 AMPULE: .5; 3 SOLUTION RESPIRATORY (INHALATION) at 17:49

## 2018-05-11 RX ADMIN — SODIUM CHLORIDE 500 ML: 9 INJECTION, SOLUTION INTRAVENOUS at 18:44

## 2018-05-11 ASSESSMENT — ENCOUNTER SYMPTOMS
SPUTUM PRODUCTION: 1
DIARRHEA: 0
CHEST TIGHTNESS: 0
COUGH: 1
WHEEZING: 1
STRIDOR: 0
ABDOMINAL DISTENTION: 0
APNEA: 0
CHOKING: 0
VOMITING: 0
BACK PAIN: 0
SHORTNESS OF BREATH: 1
COLOR CHANGE: 0
CONSTIPATION: 0
ABDOMINAL PAIN: 0

## 2018-05-12 ENCOUNTER — APPOINTMENT (OUTPATIENT)
Dept: NUCLEAR MEDICINE | Age: 63
End: 2018-05-12
Payer: COMMERCIAL

## 2018-05-12 LAB
BASOPHILS ABSOLUTE: 0.1 K/UL (ref 0–0.2)
BASOPHILS RELATIVE PERCENT: 1.5 % (ref 0–1)
CHOLESTEROL, TOTAL: 142 MG/DL (ref 160–199)
EOSINOPHILS ABSOLUTE: 0.3 K/UL (ref 0–0.6)
EOSINOPHILS RELATIVE PERCENT: 4.7 % (ref 0–5)
GLUCOSE BLD-MCNC: 283 MG/DL (ref 70–99)
HCT VFR BLD CALC: 34.1 % (ref 42–52)
HDLC SERPL-MCNC: 20 MG/DL (ref 55–121)
HEMOGLOBIN: 10.8 G/DL (ref 14–18)
INR BLD: 1.03 (ref 0.88–1.18)
LDL CHOLESTEROL CALCULATED: 61 MG/DL
LV EF: 58 %
LVEF MODALITY: NORMAL
LYMPHOCYTES ABSOLUTE: 1.5 K/UL (ref 1.1–4.5)
LYMPHOCYTES RELATIVE PERCENT: 27.8 % (ref 20–40)
MCH RBC QN AUTO: 26.9 PG (ref 27–31)
MCHC RBC AUTO-ENTMCNC: 31.7 G/DL (ref 33–37)
MCV RBC AUTO: 85 FL (ref 80–94)
MONOCYTES ABSOLUTE: 0.6 K/UL (ref 0–0.9)
MONOCYTES RELATIVE PERCENT: 11.8 % (ref 0–10)
NEUTROPHILS ABSOLUTE: 2.9 K/UL (ref 1.5–7.5)
NEUTROPHILS RELATIVE PERCENT: 53.6 % (ref 50–65)
PDW BLD-RTO: 13.5 % (ref 11.5–14.5)
PERFORMED ON: ABNORMAL
PLATELET # BLD: 189 K/UL (ref 130–400)
PMV BLD AUTO: 12.1 FL (ref 9.4–12.4)
PROTHROMBIN TIME: 13.4 SEC (ref 12–14.6)
RBC # BLD: 4.01 M/UL (ref 4.7–6.1)
TRIGL SERPL-MCNC: 307 MG/DL (ref 0–149)
TROPONIN: 0.02 NG/ML (ref 0–0.03)
TROPONIN: 0.03 NG/ML (ref 0–0.03)
WBC # BLD: 5.4 K/UL (ref 4.8–10.8)

## 2018-05-12 PROCEDURE — 96372 THER/PROPH/DIAG INJ SC/IM: CPT

## 2018-05-12 PROCEDURE — 3430000000 HC RX DIAGNOSTIC RADIOPHARMACEUTICAL: Performed by: INTERNAL MEDICINE

## 2018-05-12 PROCEDURE — 85610 PROTHROMBIN TIME: CPT

## 2018-05-12 PROCEDURE — 6370000000 HC RX 637 (ALT 250 FOR IP): Performed by: INTERNAL MEDICINE

## 2018-05-12 PROCEDURE — 82948 REAGENT STRIP/BLOOD GLUCOSE: CPT

## 2018-05-12 PROCEDURE — 93017 CV STRESS TEST TRACING ONLY: CPT

## 2018-05-12 PROCEDURE — 6360000002 HC RX W HCPCS: Performed by: INTERNAL MEDICINE

## 2018-05-12 PROCEDURE — 80061 LIPID PANEL: CPT

## 2018-05-12 PROCEDURE — 93005 ELECTROCARDIOGRAM TRACING: CPT

## 2018-05-12 PROCEDURE — 2580000003 HC RX 258: Performed by: INTERNAL MEDICINE

## 2018-05-12 PROCEDURE — G0378 HOSPITAL OBSERVATION PER HR: HCPCS

## 2018-05-12 PROCEDURE — 84484 ASSAY OF TROPONIN QUANT: CPT

## 2018-05-12 PROCEDURE — 85025 COMPLETE CBC W/AUTO DIFF WBC: CPT

## 2018-05-12 PROCEDURE — A9500 TC99M SESTAMIBI: HCPCS | Performed by: INTERNAL MEDICINE

## 2018-05-12 PROCEDURE — 78452 HT MUSCLE IMAGE SPECT MULT: CPT

## 2018-05-12 PROCEDURE — 94640 AIRWAY INHALATION TREATMENT: CPT

## 2018-05-12 PROCEDURE — 99226 PR SBSQ OBSERVATION CARE/DAY 35 MINUTES: CPT | Performed by: INTERNAL MEDICINE

## 2018-05-12 PROCEDURE — 93306 TTE W/DOPPLER COMPLETE: CPT

## 2018-05-12 PROCEDURE — 36415 COLL VENOUS BLD VENIPUNCTURE: CPT

## 2018-05-12 RX ADMIN — LANSOPRAZOLE 30 MG: 30 CAPSULE, DELAYED RELEASE ORAL at 00:06

## 2018-05-12 RX ADMIN — METOPROLOL TARTRATE 50 MG: 50 TABLET, FILM COATED ORAL at 20:26

## 2018-05-12 RX ADMIN — Medication 10 ML: at 08:23

## 2018-05-12 RX ADMIN — GABAPENTIN 300 MG: 300 CAPSULE ORAL at 20:26

## 2018-05-12 RX ADMIN — INSULIN LISPRO 4 UNITS: 100 INJECTION, SOLUTION INTRAVENOUS; SUBCUTANEOUS at 15:23

## 2018-05-12 RX ADMIN — GABAPENTIN 300 MG: 300 CAPSULE ORAL at 15:22

## 2018-05-12 RX ADMIN — IRBESARTAN AND HYDROCHLOROTHIAZIDE 1 TABLET: 150; 12.5 TABLET, FILM COATED ORAL at 08:24

## 2018-05-12 RX ADMIN — IPRATROPIUM BROMIDE AND ALBUTEROL SULFATE 3 ML: .5; 3 SOLUTION RESPIRATORY (INHALATION) at 18:27

## 2018-05-12 RX ADMIN — ENOXAPARIN SODIUM 40 MG: 40 INJECTION SUBCUTANEOUS at 08:19

## 2018-05-12 RX ADMIN — INSULIN LISPRO 5 UNITS: 100 INJECTION, SOLUTION INTRAVENOUS; SUBCUTANEOUS at 20:27

## 2018-05-12 RX ADMIN — TETRAKIS(2-METHOXYISOBUTYLISOCYANIDE)COPPER(I) TETRAFLUOROBORATE 10 MILLICURIE: 1 INJECTION, POWDER, LYOPHILIZED, FOR SOLUTION INTRAVENOUS at 14:40

## 2018-05-12 RX ADMIN — LORATADINE 10 MG: 10 TABLET ORAL at 08:24

## 2018-05-12 RX ADMIN — Medication 10 ML: at 20:26

## 2018-05-12 RX ADMIN — TETRAKIS(2-METHOXYISOBUTYLISOCYANIDE)COPPER(I) TETRAFLUOROBORATE 30 MILLICURIE: 1 INJECTION, POWDER, LYOPHILIZED, FOR SOLUTION INTRAVENOUS at 14:40

## 2018-05-12 RX ADMIN — ASPIRIN 81 MG CHEWABLE TABLET 81 MG: 81 TABLET CHEWABLE at 08:18

## 2018-05-12 RX ADMIN — INSULIN LISPRO 10 UNITS: 100 INJECTION, SOLUTION INTRAVENOUS; SUBCUTANEOUS at 15:22

## 2018-05-12 RX ADMIN — LANSOPRAZOLE 30 MG: 30 CAPSULE, DELAYED RELEASE ORAL at 20:26

## 2018-05-12 RX ADMIN — REGADENOSON 0.4 MG: 0.08 INJECTION, SOLUTION INTRAVENOUS at 13:13

## 2018-05-12 RX ADMIN — GABAPENTIN 300 MG: 300 CAPSULE ORAL at 08:20

## 2018-05-12 RX ADMIN — IPRATROPIUM BROMIDE AND ALBUTEROL SULFATE 3 ML: .5; 3 SOLUTION RESPIRATORY (INHALATION) at 22:13

## 2018-05-12 RX ADMIN — IPRATROPIUM BROMIDE AND ALBUTEROL SULFATE 3 ML: .5; 3 SOLUTION RESPIRATORY (INHALATION) at 07:17

## 2018-05-12 ASSESSMENT — ENCOUNTER SYMPTOMS
WHEEZING: 1
RHINORRHEA: 1
SINUS PAIN: 1
SHORTNESS OF BREATH: 1
COUGH: 1

## 2018-05-13 LAB
ANION GAP SERPL CALCULATED.3IONS-SCNC: 14 MMOL/L (ref 7–19)
BUN BLDV-MCNC: 19 MG/DL (ref 8–23)
CALCIUM SERPL-MCNC: 9.4 MG/DL (ref 8.8–10.2)
CHLORIDE BLD-SCNC: 99 MMOL/L (ref 98–111)
CO2: 25 MMOL/L (ref 22–29)
CREAT SERPL-MCNC: 1.5 MG/DL (ref 0.5–1.2)
GFR NON-AFRICAN AMERICAN: 47
GLUCOSE BLD-MCNC: 314 MG/DL (ref 74–109)
GLUCOSE BLD-MCNC: 353 MG/DL (ref 70–99)
GLUCOSE BLD-MCNC: 359 MG/DL (ref 70–99)
GLUCOSE BLD-MCNC: 374 MG/DL (ref 70–99)
GLUCOSE BLD-MCNC: 410 MG/DL (ref 70–99)
HBA1C MFR BLD: 10.6 %
LACTIC ACID: 1.9 MMOL/L (ref 0.5–1.9)
PERFORMED ON: ABNORMAL
POTASSIUM SERPL-SCNC: 4.8 MMOL/L (ref 3.5–5)
PRO-BNP: 160 PG/ML (ref 0–900)
SODIUM BLD-SCNC: 138 MMOL/L (ref 136–145)

## 2018-05-13 PROCEDURE — 2580000003 HC RX 258: Performed by: INTERNAL MEDICINE

## 2018-05-13 PROCEDURE — 99226 PR SBSQ OBSERVATION CARE/DAY 35 MINUTES: CPT | Performed by: INTERNAL MEDICINE

## 2018-05-13 PROCEDURE — 83036 HEMOGLOBIN GLYCOSYLATED A1C: CPT

## 2018-05-13 PROCEDURE — 36415 COLL VENOUS BLD VENIPUNCTURE: CPT

## 2018-05-13 PROCEDURE — 80048 BASIC METABOLIC PNL TOTAL CA: CPT

## 2018-05-13 PROCEDURE — 6370000000 HC RX 637 (ALT 250 FOR IP): Performed by: INTERNAL MEDICINE

## 2018-05-13 PROCEDURE — 96372 THER/PROPH/DIAG INJ SC/IM: CPT

## 2018-05-13 PROCEDURE — G0378 HOSPITAL OBSERVATION PER HR: HCPCS

## 2018-05-13 PROCEDURE — 83880 ASSAY OF NATRIURETIC PEPTIDE: CPT

## 2018-05-13 PROCEDURE — 83605 ASSAY OF LACTIC ACID: CPT

## 2018-05-13 PROCEDURE — 6360000002 HC RX W HCPCS: Performed by: INTERNAL MEDICINE

## 2018-05-13 PROCEDURE — 94640 AIRWAY INHALATION TREATMENT: CPT

## 2018-05-13 PROCEDURE — 82948 REAGENT STRIP/BLOOD GLUCOSE: CPT

## 2018-05-13 RX ORDER — IRBESARTAN AND HYDROCHLOROTHIAZIDE 150; 12.5 MG/1; MG/1
1 TABLET, FILM COATED ORAL 2 TIMES DAILY
Status: DISCONTINUED | OUTPATIENT
Start: 2018-05-13 | End: 2018-05-15 | Stop reason: HOSPADM

## 2018-05-13 RX ADMIN — Medication 10 ML: at 08:57

## 2018-05-13 RX ADMIN — GABAPENTIN 300 MG: 300 CAPSULE ORAL at 08:05

## 2018-05-13 RX ADMIN — INSULIN LISPRO 10 UNITS: 100 INJECTION, SOLUTION INTRAVENOUS; SUBCUTANEOUS at 11:41

## 2018-05-13 RX ADMIN — IRBESARTAN AND HYDROCHLOROTHIAZIDE 1 TABLET: 150; 12.5 TABLET, FILM COATED ORAL at 20:16

## 2018-05-13 RX ADMIN — GABAPENTIN 300 MG: 300 CAPSULE ORAL at 20:14

## 2018-05-13 RX ADMIN — INSULIN LISPRO 5 UNITS: 100 INJECTION, SOLUTION INTRAVENOUS; SUBCUTANEOUS at 11:42

## 2018-05-13 RX ADMIN — Medication 10 ML: at 20:14

## 2018-05-13 RX ADMIN — IPRATROPIUM BROMIDE AND ALBUTEROL SULFATE 3 ML: .5; 3 SOLUTION RESPIRATORY (INHALATION) at 15:15

## 2018-05-13 RX ADMIN — ASPIRIN 81 MG CHEWABLE TABLET 81 MG: 81 TABLET CHEWABLE at 08:07

## 2018-05-13 RX ADMIN — ENOXAPARIN SODIUM 40 MG: 40 INJECTION SUBCUTANEOUS at 08:08

## 2018-05-13 RX ADMIN — ALBUTEROL SULFATE 2 PUFF: 90 AEROSOL, METERED RESPIRATORY (INHALATION) at 20:15

## 2018-05-13 RX ADMIN — LANSOPRAZOLE 30 MG: 30 CAPSULE, DELAYED RELEASE ORAL at 20:16

## 2018-05-13 RX ADMIN — INSULIN LISPRO 10 UNITS: 100 INJECTION, SOLUTION INTRAVENOUS; SUBCUTANEOUS at 08:56

## 2018-05-13 RX ADMIN — METOPROLOL TARTRATE 50 MG: 50 TABLET, FILM COATED ORAL at 20:16

## 2018-05-13 RX ADMIN — IPRATROPIUM BROMIDE AND ALBUTEROL SULFATE 3 ML: .5; 3 SOLUTION RESPIRATORY (INHALATION) at 19:14

## 2018-05-13 RX ADMIN — IRBESARTAN AND HYDROCHLOROTHIAZIDE 1 TABLET: 150; 12.5 TABLET, FILM COATED ORAL at 08:07

## 2018-05-13 RX ADMIN — INSULIN LISPRO 10 UNITS: 100 INJECTION, SOLUTION INTRAVENOUS; SUBCUTANEOUS at 08:53

## 2018-05-13 RX ADMIN — INSULIN LISPRO 10 UNITS: 100 INJECTION, SOLUTION INTRAVENOUS; SUBCUTANEOUS at 17:08

## 2018-05-13 RX ADMIN — IPRATROPIUM BROMIDE AND ALBUTEROL SULFATE 3 ML: .5; 3 SOLUTION RESPIRATORY (INHALATION) at 07:14

## 2018-05-13 RX ADMIN — LORATADINE 10 MG: 10 TABLET ORAL at 08:09

## 2018-05-13 RX ADMIN — METOPROLOL TARTRATE 50 MG: 50 TABLET, FILM COATED ORAL at 08:06

## 2018-05-13 RX ADMIN — INSULIN LISPRO 6 UNITS: 100 INJECTION, SOLUTION INTRAVENOUS; SUBCUTANEOUS at 20:24

## 2018-05-13 RX ADMIN — GABAPENTIN 300 MG: 300 CAPSULE ORAL at 15:07

## 2018-05-13 RX ADMIN — IPRATROPIUM BROMIDE AND ALBUTEROL SULFATE 3 ML: .5; 3 SOLUTION RESPIRATORY (INHALATION) at 22:33

## 2018-05-13 RX ADMIN — IPRATROPIUM BROMIDE AND ALBUTEROL SULFATE 3 ML: .5; 3 SOLUTION RESPIRATORY (INHALATION) at 11:04

## 2018-05-14 ENCOUNTER — TELEPHONE (OUTPATIENT)
Dept: PRIMARY CARE CLINIC | Age: 63
End: 2018-05-14

## 2018-05-14 LAB
BASE EXCESS ARTERIAL: -4 (ref -3–3)
BASE EXCESS VENOUS: 2
CALCIUM IONIZED: 1.13 MMOL/L (ref 1.1–1.3)
CALCIUM IONIZED: 1.36 MMOL/L (ref 1.1–1.3)
CHOLESTEROL, TOTAL: 152 MG/DL (ref 160–199)
CO2: 21 MEQ/L (ref 21–32)
CO2: 29 MEQ/L (ref 21–32)
EKG P AXIS: 23 DEGREES
EKG P AXIS: 37 DEGREES
EKG P-R INTERVAL: 164 MS
EKG P-R INTERVAL: 166 MS
EKG Q-T INTERVAL: 366 MS
EKG Q-T INTERVAL: 368 MS
EKG QRS DURATION: 96 MS
EKG QRS DURATION: 96 MS
EKG QTC CALCULATION (BAZETT): 401 MS
EKG QTC CALCULATION (BAZETT): 404 MS
EKG T AXIS: 60 DEGREES
EKG T AXIS: 66 DEGREES
GFR NON-AFRICAN AMERICAN: 51
GFR NON-AFRICAN AMERICAN: >60
GLUCOSE BLD-MCNC: 223 MG/DL (ref 70–99)
GLUCOSE BLD-MCNC: 268 MG/DL (ref 70–99)
GLUCOSE BLD-MCNC: 284 MG/DL (ref 70–99)
GLUCOSE BLD-MCNC: 359 MG/DL (ref 70–99)
GLUCOSE BLD-MCNC: 377 MG/DL (ref 70–99)
HDLC SERPL-MCNC: 21 MG/DL (ref 55–121)
HEMOGLOBIN: 12 GM/DL (ref 12–18)
HEMOGLOBIN: 9.6 GM/DL (ref 12–18)
LDL CHOLESTEROL CALCULATED: 77 MG/DL
O2 SAT, ARTERIAL: 98 % (ref 93–100)
O2 SAT, VEN: 67 %
PCO2 ARTERIAL: 37 MM HG (ref 35–48)
PCO2, VEN: 56.1 MM HG (ref 40–50)
PERFORMED ON: ABNORMAL
PH ARTERIAL: 7.36 (ref 7.3–7.5)
PH VENOUS: 7.32
PO2 ARTERIAL: 106 MM HG (ref 83–108)
PO2, VEN: 38.3 MM HG
POC ANION GAP: 12
POC ANION GAP: 8
POC CHLORIDE: 103 MEQ/L (ref 99–110)
POC CHLORIDE: 112 MEQ/L (ref 99–110)
POC CREATININE: 0.9 MG/DL (ref 0.3–1.3)
POC CREATININE: 1.4 MG/DL (ref 0.3–1.3)
POC HEMATOCRIT: 28 % (ref 37–52)
POC HEMATOCRIT: 35 % (ref 37–52)
POC POTASSIUM: 3.4 MEQ/L (ref 3.5–5.1)
POC POTASSIUM: 4.5 MEQ/L (ref 3.5–5.1)
POC SAMPLE TYPE: ABNORMAL
POC SAMPLE TYPE: ABNORMAL
POC SODIUM: 140 MEQ/L (ref 136–145)
POC SODIUM: 145 MEQ/L (ref 136–145)
TCO2 ARTERIAL: 22 MMOL/L
TCO2 CALC VENOUS: 31 MMOL/L
TRIGL SERPL-MCNC: 271 MG/DL (ref 0–149)
TROPONIN: 0.02 NG/ML (ref 0–0.03)

## 2018-05-14 PROCEDURE — 84132 ASSAY OF SERUM POTASSIUM: CPT

## 2018-05-14 PROCEDURE — 82374 ASSAY BLOOD CARBON DIOXIDE: CPT

## 2018-05-14 PROCEDURE — 96372 THER/PROPH/DIAG INJ SC/IM: CPT

## 2018-05-14 PROCEDURE — G0378 HOSPITAL OBSERVATION PER HR: HCPCS

## 2018-05-14 PROCEDURE — 6360000002 HC RX W HCPCS: Performed by: INTERNAL MEDICINE

## 2018-05-14 PROCEDURE — 80061 LIPID PANEL: CPT

## 2018-05-14 PROCEDURE — 82330 ASSAY OF CALCIUM: CPT

## 2018-05-14 PROCEDURE — 82800 BLOOD PH: CPT

## 2018-05-14 PROCEDURE — 93460 R&L HRT ART/VENTRICLE ANGIO: CPT | Performed by: INTERNAL MEDICINE

## 2018-05-14 PROCEDURE — 94729 DIFFUSING CAPACITY: CPT

## 2018-05-14 PROCEDURE — 94640 AIRWAY INHALATION TREATMENT: CPT

## 2018-05-14 PROCEDURE — 6370000000 HC RX 637 (ALT 250 FOR IP): Performed by: INTERNAL MEDICINE

## 2018-05-14 PROCEDURE — C1887 CATHETER, GUIDING: HCPCS

## 2018-05-14 PROCEDURE — 82565 ASSAY OF CREATININE: CPT

## 2018-05-14 PROCEDURE — 94060 EVALUATION OF WHEEZING: CPT

## 2018-05-14 PROCEDURE — 82435 ASSAY OF BLOOD CHLORIDE: CPT

## 2018-05-14 PROCEDURE — 6370000000 HC RX 637 (ALT 250 FOR IP)

## 2018-05-14 PROCEDURE — 99226 PR SBSQ OBSERVATION CARE/DAY 35 MINUTES: CPT | Performed by: INTERNAL MEDICINE

## 2018-05-14 PROCEDURE — C1894 INTRO/SHEATH, NON-LASER: HCPCS

## 2018-05-14 PROCEDURE — C1874 STENT, COATED/COV W/DEL SYS: HCPCS

## 2018-05-14 PROCEDURE — 82948 REAGENT STRIP/BLOOD GLUCOSE: CPT

## 2018-05-14 PROCEDURE — 2720000000 HC MISC SURG SUPPLY STERILE $0-50

## 2018-05-14 PROCEDURE — 36415 COLL VENOUS BLD VENIPUNCTURE: CPT

## 2018-05-14 PROCEDURE — 6360000002 HC RX W HCPCS

## 2018-05-14 PROCEDURE — 2580000003 HC RX 258: Performed by: INTERNAL MEDICINE

## 2018-05-14 PROCEDURE — 85014 HEMATOCRIT: CPT

## 2018-05-14 PROCEDURE — 84295 ASSAY OF SERUM SODIUM: CPT

## 2018-05-14 PROCEDURE — 92928 PRQ TCAT PLMT NTRAC ST 1 LES: CPT | Performed by: INTERNAL MEDICINE

## 2018-05-14 PROCEDURE — 2709999900 HC NON-CHARGEABLE SUPPLY

## 2018-05-14 PROCEDURE — 2720000001 HC MISC SURG SUPPLY STERILE $51-500

## 2018-05-14 PROCEDURE — 94727 GAS DIL/WSHOT DETER LNG VOL: CPT

## 2018-05-14 PROCEDURE — C1769 GUIDE WIRE: HCPCS

## 2018-05-14 PROCEDURE — 82810 BLOOD GASES O2 SAT ONLY: CPT

## 2018-05-14 PROCEDURE — 84484 ASSAY OF TROPONIN QUANT: CPT

## 2018-05-14 PROCEDURE — C1760 CLOSURE DEV, VASC: HCPCS

## 2018-05-14 RX ORDER — ACETAMINOPHEN 325 MG/1
650 TABLET ORAL EVERY 4 HOURS PRN
Status: DISCONTINUED | OUTPATIENT
Start: 2018-05-14 | End: 2018-05-15 | Stop reason: HOSPADM

## 2018-05-14 RX ORDER — SODIUM CHLORIDE 0.9 % (FLUSH) 0.9 %
10 SYRINGE (ML) INJECTION EVERY 12 HOURS SCHEDULED
Status: DISCONTINUED | OUTPATIENT
Start: 2018-05-14 | End: 2018-05-15 | Stop reason: HOSPADM

## 2018-05-14 RX ORDER — PRASUGREL 10 MG/1
10 TABLET, FILM COATED ORAL DAILY
Status: DISCONTINUED | OUTPATIENT
Start: 2018-05-15 | End: 2018-05-15 | Stop reason: HOSPADM

## 2018-05-14 RX ORDER — SODIUM CHLORIDE 0.9 % (FLUSH) 0.9 %
10 SYRINGE (ML) INJECTION PRN
Status: DISCONTINUED | OUTPATIENT
Start: 2018-05-14 | End: 2018-05-15 | Stop reason: HOSPADM

## 2018-05-14 RX ORDER — ALBUTEROL SULFATE 2.5 MG/3ML
2.5 SOLUTION RESPIRATORY (INHALATION) EVERY 6 HOURS PRN
Status: DISCONTINUED | OUTPATIENT
Start: 2018-05-14 | End: 2018-05-15 | Stop reason: HOSPADM

## 2018-05-14 RX ORDER — ONDANSETRON 2 MG/ML
4 INJECTION INTRAMUSCULAR; INTRAVENOUS EVERY 6 HOURS PRN
Status: DISCONTINUED | OUTPATIENT
Start: 2018-05-14 | End: 2018-05-15 | Stop reason: HOSPADM

## 2018-05-14 RX ORDER — ASPIRIN 81 MG/1
81 TABLET, CHEWABLE ORAL DAILY
Status: DISCONTINUED | OUTPATIENT
Start: 2018-05-15 | End: 2018-05-15 | Stop reason: HOSPADM

## 2018-05-14 RX ADMIN — IRBESARTAN AND HYDROCHLOROTHIAZIDE 1 TABLET: 150; 12.5 TABLET, FILM COATED ORAL at 08:45

## 2018-05-14 RX ADMIN — ASPIRIN 325 MG: 325 TABLET, DELAYED RELEASE ORAL at 08:44

## 2018-05-14 RX ADMIN — METOPROLOL TARTRATE 50 MG: 50 TABLET, FILM COATED ORAL at 08:47

## 2018-05-14 RX ADMIN — METOPROLOL TARTRATE 50 MG: 50 TABLET, FILM COATED ORAL at 21:25

## 2018-05-14 RX ADMIN — GABAPENTIN 300 MG: 300 CAPSULE ORAL at 21:25

## 2018-05-14 RX ADMIN — INSULIN LISPRO 10 UNITS: 100 INJECTION, SOLUTION INTRAVENOUS; SUBCUTANEOUS at 13:06

## 2018-05-14 RX ADMIN — ALBUTEROL SULFATE 2.5 MG: 2.5 SOLUTION RESPIRATORY (INHALATION) at 10:58

## 2018-05-14 RX ADMIN — INSULIN LISPRO 10 UNITS: 100 INJECTION, SOLUTION INTRAVENOUS; SUBCUTANEOUS at 08:55

## 2018-05-14 RX ADMIN — Medication 10 ML: at 08:48

## 2018-05-14 RX ADMIN — INSULIN LISPRO 3 UNITS: 100 INJECTION, SOLUTION INTRAVENOUS; SUBCUTANEOUS at 21:26

## 2018-05-14 RX ADMIN — LORATADINE 10 MG: 10 TABLET ORAL at 08:46

## 2018-05-14 RX ADMIN — IPRATROPIUM BROMIDE AND ALBUTEROL SULFATE 3 ML: .5; 3 SOLUTION RESPIRATORY (INHALATION) at 19:36

## 2018-05-14 RX ADMIN — GABAPENTIN 300 MG: 300 CAPSULE ORAL at 08:44

## 2018-05-14 RX ADMIN — IRBESARTAN AND HYDROCHLOROTHIAZIDE 1 TABLET: 150; 12.5 TABLET, FILM COATED ORAL at 21:24

## 2018-05-14 RX ADMIN — IPRATROPIUM BROMIDE AND ALBUTEROL SULFATE 3 ML: .5; 3 SOLUTION RESPIRATORY (INHALATION) at 22:36

## 2018-05-14 RX ADMIN — LANSOPRAZOLE 30 MG: 30 CAPSULE, DELAYED RELEASE ORAL at 21:25

## 2018-05-14 RX ADMIN — INSULIN LISPRO 10 UNITS: 100 INJECTION, SOLUTION INTRAVENOUS; SUBCUTANEOUS at 18:44

## 2018-05-14 RX ADMIN — INSULIN LISPRO 6 UNITS: 100 INJECTION, SOLUTION INTRAVENOUS; SUBCUTANEOUS at 18:42

## 2018-05-14 ASSESSMENT — PAIN SCALES - GENERAL
PAINLEVEL_OUTOF10: 0

## 2018-05-15 VITALS
HEIGHT: 72 IN | SYSTOLIC BLOOD PRESSURE: 140 MMHG | DIASTOLIC BLOOD PRESSURE: 71 MMHG | TEMPERATURE: 98.2 F | BODY MASS INDEX: 36.41 KG/M2 | RESPIRATION RATE: 17 BRPM | HEART RATE: 73 BPM | WEIGHT: 268.8 LBS | OXYGEN SATURATION: 95 %

## 2018-05-15 PROBLEM — I25.10 CAD (CORONARY ARTERY DISEASE): Status: ACTIVE | Noted: 2018-05-15

## 2018-05-15 LAB
GLUCOSE BLD-MCNC: 427 MG/DL (ref 70–99)
GLUCOSE BLD-MCNC: 442 MG/DL (ref 70–99)
HCT VFR BLD CALC: 35.4 % (ref 42–52)
HEMOGLOBIN: 11 G/DL (ref 14–18)
MCH RBC QN AUTO: 26.5 PG (ref 27–31)
MCHC RBC AUTO-ENTMCNC: 31.1 G/DL (ref 33–37)
MCV RBC AUTO: 85.3 FL (ref 80–94)
PDW BLD-RTO: 13.8 % (ref 11.5–14.5)
PERFORMED ON: ABNORMAL
PERFORMED ON: ABNORMAL
PLATELET # BLD: 194 K/UL (ref 130–400)
PMV BLD AUTO: 11.7 FL (ref 9.4–12.4)
RBC # BLD: 4.15 M/UL (ref 4.7–6.1)
TROPONIN: 0.03 NG/ML (ref 0–0.03)
TROPONIN: 0.03 NG/ML (ref 0–0.03)
WBC # BLD: 7.5 K/UL (ref 4.8–10.8)

## 2018-05-15 PROCEDURE — 92928 PRQ TCAT PLMT NTRAC ST 1 LES: CPT | Performed by: INTERNAL MEDICINE

## 2018-05-15 PROCEDURE — 85027 COMPLETE CBC AUTOMATED: CPT

## 2018-05-15 PROCEDURE — 96372 THER/PROPH/DIAG INJ SC/IM: CPT

## 2018-05-15 PROCEDURE — 2580000003 HC RX 258: Performed by: INTERNAL MEDICINE

## 2018-05-15 PROCEDURE — 94640 AIRWAY INHALATION TREATMENT: CPT

## 2018-05-15 PROCEDURE — G0378 HOSPITAL OBSERVATION PER HR: HCPCS

## 2018-05-15 PROCEDURE — 84484 ASSAY OF TROPONIN QUANT: CPT

## 2018-05-15 PROCEDURE — 36415 COLL VENOUS BLD VENIPUNCTURE: CPT

## 2018-05-15 PROCEDURE — 6360000002 HC RX W HCPCS: Performed by: INTERNAL MEDICINE

## 2018-05-15 PROCEDURE — 6370000000 HC RX 637 (ALT 250 FOR IP): Performed by: INTERNAL MEDICINE

## 2018-05-15 PROCEDURE — 93460 R&L HRT ART/VENTRICLE ANGIO: CPT | Performed by: INTERNAL MEDICINE

## 2018-05-15 PROCEDURE — 82948 REAGENT STRIP/BLOOD GLUCOSE: CPT

## 2018-05-15 PROCEDURE — 93005 ELECTROCARDIOGRAM TRACING: CPT

## 2018-05-15 PROCEDURE — 99024 POSTOP FOLLOW-UP VISIT: CPT | Performed by: INTERNAL MEDICINE

## 2018-05-15 RX ORDER — IRBESARTAN AND HYDROCHLOROTHIAZIDE 150; 12.5 MG/1; MG/1
1 TABLET, FILM COATED ORAL 2 TIMES DAILY
Qty: 30 TABLET | Refills: 3 | Status: SHIPPED | OUTPATIENT
Start: 2018-05-15 | End: 2018-05-23

## 2018-05-15 RX ORDER — METOPROLOL TARTRATE 50 MG/1
50 TABLET, FILM COATED ORAL 2 TIMES DAILY
Qty: 60 TABLET | Refills: 3 | Status: SHIPPED | OUTPATIENT
Start: 2018-05-15 | End: 2018-09-21 | Stop reason: SDUPTHER

## 2018-05-15 RX ORDER — ASPIRIN 81 MG/1
81 TABLET, CHEWABLE ORAL DAILY
Qty: 30 TABLET | Refills: 3 | Status: SHIPPED | OUTPATIENT
Start: 2018-05-16 | End: 2018-07-31 | Stop reason: SDUPTHER

## 2018-05-15 RX ORDER — PRASUGREL 10 MG/1
10 TABLET, FILM COATED ORAL DAILY
Qty: 30 TABLET | Refills: 3 | Status: SHIPPED | OUTPATIENT
Start: 2018-05-16 | End: 2018-08-28 | Stop reason: SDUPTHER

## 2018-05-15 RX ADMIN — PRASUGREL 10 MG: 10 TABLET, FILM COATED ORAL at 08:26

## 2018-05-15 RX ADMIN — INSULIN LISPRO 12 UNITS: 100 INJECTION, SOLUTION INTRAVENOUS; SUBCUTANEOUS at 08:33

## 2018-05-15 RX ADMIN — IRBESARTAN AND HYDROCHLOROTHIAZIDE 1 TABLET: 150; 12.5 TABLET, FILM COATED ORAL at 08:27

## 2018-05-15 RX ADMIN — IPRATROPIUM BROMIDE AND ALBUTEROL SULFATE 3 ML: .5; 3 SOLUTION RESPIRATORY (INHALATION) at 07:16

## 2018-05-15 RX ADMIN — ACETAMINOPHEN 650 MG: 325 TABLET ORAL at 08:32

## 2018-05-15 RX ADMIN — GABAPENTIN 300 MG: 300 CAPSULE ORAL at 08:26

## 2018-05-15 RX ADMIN — INSULIN LISPRO 10 UNITS: 100 INJECTION, SOLUTION INTRAVENOUS; SUBCUTANEOUS at 08:33

## 2018-05-15 RX ADMIN — ACETAMINOPHEN 650 MG: 325 TABLET ORAL at 12:05

## 2018-05-15 RX ADMIN — METOPROLOL TARTRATE 50 MG: 50 TABLET, FILM COATED ORAL at 08:27

## 2018-05-15 RX ADMIN — INSULIN LISPRO 10 UNITS: 100 INJECTION, SOLUTION INTRAVENOUS; SUBCUTANEOUS at 12:07

## 2018-05-15 RX ADMIN — ACETAMINOPHEN 650 MG: 325 TABLET ORAL at 02:56

## 2018-05-15 RX ADMIN — INSULIN LISPRO 12 UNITS: 100 INJECTION, SOLUTION INTRAVENOUS; SUBCUTANEOUS at 12:08

## 2018-05-15 RX ADMIN — Medication 10 ML: at 08:37

## 2018-05-15 RX ADMIN — GABAPENTIN 300 MG: 300 CAPSULE ORAL at 14:47

## 2018-05-15 RX ADMIN — ASPIRIN 81 MG 81 MG: 81 TABLET ORAL at 08:25

## 2018-05-15 RX ADMIN — LORATADINE 10 MG: 10 TABLET ORAL at 08:27

## 2018-05-15 RX ADMIN — ENOXAPARIN SODIUM 40 MG: 40 INJECTION SUBCUTANEOUS at 08:36

## 2018-05-15 ASSESSMENT — PAIN DESCRIPTION - PAIN TYPE: TYPE: ACUTE PAIN

## 2018-05-15 ASSESSMENT — PAIN SCALES - GENERAL
PAINLEVEL_OUTOF10: 5
PAINLEVEL_OUTOF10: 0
PAINLEVEL_OUTOF10: 4
PAINLEVEL_OUTOF10: 0
PAINLEVEL_OUTOF10: 0
PAINLEVEL_OUTOF10: 4

## 2018-05-15 ASSESSMENT — PAIN DESCRIPTION - ORIENTATION: ORIENTATION: RIGHT

## 2018-05-15 ASSESSMENT — PAIN DESCRIPTION - LOCATION: LOCATION: GROIN

## 2018-05-16 ENCOUNTER — TELEPHONE (OUTPATIENT)
Dept: CARDIOLOGY | Age: 63
End: 2018-05-16

## 2018-05-16 LAB
BLOOD CULTURE, ROUTINE: NORMAL
CULTURE, BLOOD 2: NORMAL
EKG P AXIS: 34 DEGREES
EKG P-R INTERVAL: 170 MS
EKG Q-T INTERVAL: 376 MS
EKG QRS DURATION: 96 MS
EKG QTC CALCULATION (BAZETT): 395 MS
EKG T AXIS: 62 DEGREES
LV EF: 44 %
LV EF: 55 %
LVEF MODALITY: NORMAL
LVEF MODALITY: NORMAL

## 2018-05-17 RX ORDER — PROMETHAZINE HYDROCHLORIDE 6.25 MG/5ML
6.25 SYRUP ORAL
Qty: 240 ML | Refills: 0 | Status: SHIPPED | OUTPATIENT
Start: 2018-05-17 | End: 2018-05-24

## 2018-05-23 ENCOUNTER — OFFICE VISIT (OUTPATIENT)
Dept: PRIMARY CARE CLINIC | Age: 63
End: 2018-05-23
Payer: COMMERCIAL

## 2018-05-23 ENCOUNTER — TELEPHONE (OUTPATIENT)
Dept: PRIMARY CARE CLINIC | Age: 63
End: 2018-05-23

## 2018-05-23 VITALS
HEIGHT: 72 IN | SYSTOLIC BLOOD PRESSURE: 124 MMHG | WEIGHT: 273 LBS | TEMPERATURE: 97.2 F | DIASTOLIC BLOOD PRESSURE: 70 MMHG | HEART RATE: 74 BPM | OXYGEN SATURATION: 98 % | BODY MASS INDEX: 36.98 KG/M2

## 2018-05-23 DIAGNOSIS — Z09 HOSPITAL DISCHARGE FOLLOW-UP: ICD-10-CM

## 2018-05-23 DIAGNOSIS — R05.3 CHRONIC COUGH: Primary | ICD-10-CM

## 2018-05-23 PROCEDURE — 1111F DSCHRG MED/CURRENT MED MERGE: CPT | Performed by: NURSE PRACTITIONER

## 2018-05-23 PROCEDURE — 99495 TRANSJ CARE MGMT MOD F2F 14D: CPT | Performed by: NURSE PRACTITIONER

## 2018-05-23 RX ORDER — CLARITHROMYCIN 500 MG/1
500 TABLET, COATED ORAL 2 TIMES DAILY
Qty: 20 TABLET | Refills: 0 | Status: SHIPPED | OUTPATIENT
Start: 2018-05-23 | End: 2018-06-02

## 2018-05-23 RX ORDER — VALSARTAN AND HYDROCHLOROTHIAZIDE 80; 12.5 MG/1; MG/1
1 TABLET, FILM COATED ORAL 2 TIMES DAILY
Qty: 60 TABLET | Refills: 3 | Status: SHIPPED | OUTPATIENT
Start: 2018-05-23 | End: 2018-05-31 | Stop reason: ALTCHOICE

## 2018-05-23 RX ORDER — FLUTICASONE PROPIONATE 50 MCG
2 SPRAY, SUSPENSION (ML) NASAL DAILY
Qty: 1 BOTTLE | Refills: 3 | Status: SHIPPED | OUTPATIENT
Start: 2018-05-23 | End: 2018-10-30 | Stop reason: SDUPTHER

## 2018-05-23 ASSESSMENT — ENCOUNTER SYMPTOMS
CHOKING: 0
DIARRHEA: 0
RHINORRHEA: 1
COLOR CHANGE: 0
STRIDOR: 0
COUGH: 1
SINUS PAIN: 0
SINUS PRESSURE: 1
APNEA: 0
CHEST TIGHTNESS: 0
ABDOMINAL PAIN: 0
CONSTIPATION: 0
BACK PAIN: 0
ABDOMINAL DISTENTION: 0
WHEEZING: 0
SORE THROAT: 0
VOMITING: 0
SHORTNESS OF BREATH: 1

## 2018-05-31 ENCOUNTER — OFFICE VISIT (OUTPATIENT)
Dept: CARDIOLOGY | Age: 63
End: 2018-05-31
Payer: COMMERCIAL

## 2018-05-31 VITALS
SYSTOLIC BLOOD PRESSURE: 114 MMHG | HEIGHT: 72 IN | DIASTOLIC BLOOD PRESSURE: 68 MMHG | WEIGHT: 277 LBS | BODY MASS INDEX: 37.52 KG/M2 | HEART RATE: 72 BPM

## 2018-05-31 DIAGNOSIS — Z95.5 HISTORY OF PLACEMENT OF STENT IN LAD CORONARY ARTERY: ICD-10-CM

## 2018-05-31 DIAGNOSIS — I25.10 CORONARY ARTERY DISEASE INVOLVING NATIVE CORONARY ARTERY OF NATIVE HEART WITHOUT ANGINA PECTORIS: Primary | ICD-10-CM

## 2018-05-31 DIAGNOSIS — E78.1 HYPERTRIGLYCERIDEMIA: ICD-10-CM

## 2018-05-31 DIAGNOSIS — I10 ESSENTIAL HYPERTENSION: ICD-10-CM

## 2018-05-31 DIAGNOSIS — I71.40 ABDOMINAL ANEURYSM: ICD-10-CM

## 2018-05-31 PROCEDURE — 99214 OFFICE O/P EST MOD 30 MIN: CPT | Performed by: NURSE PRACTITIONER

## 2018-05-31 RX ORDER — IRBESARTAN AND HYDROCHLOROTHIAZIDE 150; 12.5 MG/1; MG/1
1 TABLET, FILM COATED ORAL 2 TIMES DAILY
COMMUNITY
End: 2018-09-19

## 2018-06-01 ENCOUNTER — TELEPHONE (OUTPATIENT)
Dept: CARDIOLOGY | Age: 63
End: 2018-06-01

## 2018-06-04 ENCOUNTER — TELEPHONE (OUTPATIENT)
Dept: CARDIOTHORACIC SURGERY | Age: 63
End: 2018-06-04

## 2018-06-08 DIAGNOSIS — E11.01 DIABETES MELLITUS TYPE 2 WITH HYPEROSMOLAR COMA, UNCONTROLLED, UNSPECIFIED LONG TERM INSULIN USE STATUS: ICD-10-CM

## 2018-06-08 DIAGNOSIS — B36.9 DERMATITIS FUNGAL: Primary | ICD-10-CM

## 2018-06-08 RX ORDER — BLOOD PRESSURE TEST KIT
1 KIT MISCELLANEOUS
Qty: 100 EACH | Refills: 5 | Status: SHIPPED | OUTPATIENT
Start: 2018-06-08

## 2018-06-08 RX ORDER — TRIAMCINOLONE ACETONIDE 0.25 MG/G
CREAM TOPICAL
Qty: 1 TUBE | Refills: 0 | Status: SHIPPED | OUTPATIENT
Start: 2018-06-08 | End: 2018-08-30 | Stop reason: SDUPTHER

## 2018-06-14 ENCOUNTER — HOSPITAL ENCOUNTER (OUTPATIENT)
Dept: CARDIAC REHAB | Age: 63
Setting detail: THERAPIES SERIES
Discharge: HOME | End: 2018-06-14
Payer: COMMERCIAL

## 2018-06-15 ENCOUNTER — HOSPITAL ENCOUNTER (OUTPATIENT)
Dept: CARDIAC REHAB | Age: 63
Setting detail: THERAPIES SERIES
Discharge: HOME | End: 2018-06-15
Payer: COMMERCIAL

## 2018-06-15 PROCEDURE — 93798 PHYS/QHP OP CAR RHAB W/ECG: CPT

## 2018-06-18 ENCOUNTER — HOSPITAL ENCOUNTER (OUTPATIENT)
Dept: CARDIAC REHAB | Age: 63
Setting detail: THERAPIES SERIES
Discharge: HOME | End: 2018-06-18
Payer: COMMERCIAL

## 2018-06-18 ENCOUNTER — OFFICE VISIT (OUTPATIENT)
Dept: PRIMARY CARE CLINIC | Age: 63
End: 2018-06-18
Payer: COMMERCIAL

## 2018-06-18 VITALS
BODY MASS INDEX: 38.25 KG/M2 | HEIGHT: 72 IN | HEART RATE: 61 BPM | TEMPERATURE: 97.6 F | WEIGHT: 282.4 LBS | DIASTOLIC BLOOD PRESSURE: 68 MMHG | OXYGEN SATURATION: 99 % | SYSTOLIC BLOOD PRESSURE: 136 MMHG

## 2018-06-18 DIAGNOSIS — I10 ESSENTIAL HYPERTENSION: Chronic | ICD-10-CM

## 2018-06-18 DIAGNOSIS — R05.9 COUGH: Chronic | ICD-10-CM

## 2018-06-18 DIAGNOSIS — Z11.4 ENCOUNTER FOR SCREENING FOR HIV: Primary | ICD-10-CM

## 2018-06-18 PROCEDURE — 99213 OFFICE O/P EST LOW 20 MIN: CPT | Performed by: NURSE PRACTITIONER

## 2018-06-18 PROCEDURE — 93798 PHYS/QHP OP CAR RHAB W/ECG: CPT

## 2018-06-18 RX ORDER — GUAIFENESIN 400 MG/1
400 TABLET ORAL 4 TIMES DAILY PRN
Qty: 56 TABLET | Refills: 0 | Status: SHIPPED | OUTPATIENT
Start: 2018-06-18 | End: 2021-01-20

## 2018-06-19 ENCOUNTER — TELEPHONE (OUTPATIENT)
Dept: CARDIOLOGY | Age: 63
End: 2018-06-19

## 2018-06-19 ASSESSMENT — ENCOUNTER SYMPTOMS
WHEEZING: 0
CONSTIPATION: 0
CHOKING: 0
CHEST TIGHTNESS: 0
COUGH: 0
ABDOMINAL PAIN: 0
APNEA: 0
BACK PAIN: 0
STRIDOR: 0
VOMITING: 0
SHORTNESS OF BREATH: 0
DIARRHEA: 0
COLOR CHANGE: 0
ABDOMINAL DISTENTION: 0

## 2018-06-20 ENCOUNTER — HOSPITAL ENCOUNTER (OUTPATIENT)
Dept: CARDIAC REHAB | Age: 63
Setting detail: THERAPIES SERIES
Discharge: HOME | End: 2018-06-20
Payer: COMMERCIAL

## 2018-06-20 ENCOUNTER — TELEPHONE (OUTPATIENT)
Dept: CARDIOLOGY | Age: 63
End: 2018-06-20

## 2018-06-20 PROCEDURE — 93798 PHYS/QHP OP CAR RHAB W/ECG: CPT

## 2018-06-20 NOTE — TELEPHONE ENCOUNTER
Spoke w pt on moving do np demanded that he wanted to see dr Jaleesa Fraser pt has 2 month follow up and I got him in on 1- pt requested to also be put on wait list for next available appt should I move him up sooner or leave appt as is?    kb

## 2018-06-22 ENCOUNTER — HOSPITAL ENCOUNTER (OUTPATIENT)
Dept: CARDIAC REHAB | Age: 63
Setting detail: THERAPIES SERIES
Discharge: HOME | End: 2018-06-22
Payer: COMMERCIAL

## 2018-06-22 PROCEDURE — 93798 PHYS/QHP OP CAR RHAB W/ECG: CPT

## 2018-07-10 ENCOUNTER — TELEPHONE (OUTPATIENT)
Dept: CARDIOLOGY | Age: 63
End: 2018-07-10

## 2018-07-10 NOTE — TELEPHONE ENCOUNTER
Left msg on pt phone in regards to appt ramu Dhaliwal on 9- needing to be rescheduled w NP due to provider being out of [facility]  If pt does not call back today on 7- will call pt back on 7-    al

## 2018-07-11 DIAGNOSIS — G62.9 NEUROPATHY: ICD-10-CM

## 2018-07-11 RX ORDER — GABAPENTIN 300 MG/1
300 CAPSULE ORAL 3 TIMES DAILY
Qty: 90 CAPSULE | Refills: 0 | OUTPATIENT
Start: 2018-07-11 | End: 2018-08-02 | Stop reason: SDUPTHER

## 2018-07-11 NOTE — TELEPHONE ENCOUNTER
Patient is returning a call to [facility] about this appt. Patient will only see Dr Brittnee Horton.

## 2018-07-18 ENCOUNTER — HOSPITAL ENCOUNTER (OUTPATIENT)
Dept: CARDIAC REHAB | Age: 63
Setting detail: THERAPIES SERIES
End: 2018-07-18
Payer: COMMERCIAL

## 2018-07-20 ENCOUNTER — HOSPITAL ENCOUNTER (OUTPATIENT)
Dept: CARDIAC REHAB | Age: 63
Setting detail: THERAPIES SERIES
Discharge: HOME | End: 2018-07-20
Payer: COMMERCIAL

## 2018-07-20 ENCOUNTER — APPOINTMENT (OUTPATIENT)
Dept: CARDIAC REHAB | Age: 63
End: 2018-07-20
Payer: COMMERCIAL

## 2018-07-20 PROCEDURE — 93798 PHYS/QHP OP CAR RHAB W/ECG: CPT

## 2018-07-23 ENCOUNTER — HOSPITAL ENCOUNTER (OUTPATIENT)
Dept: CARDIAC REHAB | Age: 63
Setting detail: THERAPIES SERIES
Discharge: HOME | End: 2018-07-23
Payer: COMMERCIAL

## 2018-07-23 ENCOUNTER — APPOINTMENT (OUTPATIENT)
Dept: CARDIAC REHAB | Age: 63
End: 2018-07-23
Payer: COMMERCIAL

## 2018-07-23 PROCEDURE — 93798 PHYS/QHP OP CAR RHAB W/ECG: CPT

## 2018-07-25 ENCOUNTER — APPOINTMENT (OUTPATIENT)
Dept: CARDIAC REHAB | Age: 63
End: 2018-07-25
Payer: COMMERCIAL

## 2018-07-25 ENCOUNTER — HOSPITAL ENCOUNTER (OUTPATIENT)
Dept: CARDIAC REHAB | Age: 63
Setting detail: THERAPIES SERIES
Discharge: HOME | End: 2018-07-25
Payer: COMMERCIAL

## 2018-07-25 PROCEDURE — 93798 PHYS/QHP OP CAR RHAB W/ECG: CPT

## 2018-07-27 ENCOUNTER — APPOINTMENT (OUTPATIENT)
Dept: CARDIAC REHAB | Age: 63
End: 2018-07-27
Payer: COMMERCIAL

## 2018-07-27 ENCOUNTER — HOSPITAL ENCOUNTER (OUTPATIENT)
Dept: CARDIAC REHAB | Age: 63
Setting detail: THERAPIES SERIES
Discharge: HOME | End: 2018-07-27
Payer: COMMERCIAL

## 2018-07-27 PROCEDURE — 93798 PHYS/QHP OP CAR RHAB W/ECG: CPT

## 2018-07-30 ENCOUNTER — APPOINTMENT (OUTPATIENT)
Dept: CARDIAC REHAB | Age: 63
End: 2018-07-30
Payer: COMMERCIAL

## 2018-07-30 ENCOUNTER — HOSPITAL ENCOUNTER (OUTPATIENT)
Dept: CARDIAC REHAB | Age: 63
Setting detail: THERAPIES SERIES
Discharge: HOME | End: 2018-07-30
Payer: COMMERCIAL

## 2018-07-30 PROCEDURE — 93798 PHYS/QHP OP CAR RHAB W/ECG: CPT

## 2018-08-01 ENCOUNTER — HOSPITAL ENCOUNTER (OUTPATIENT)
Dept: CARDIAC REHAB | Age: 63
Setting detail: THERAPIES SERIES
Discharge: HOME | End: 2018-08-01
Payer: COMMERCIAL

## 2018-08-01 ENCOUNTER — APPOINTMENT (OUTPATIENT)
Dept: CARDIAC REHAB | Age: 63
End: 2018-08-01
Payer: COMMERCIAL

## 2018-08-01 PROCEDURE — 93798 PHYS/QHP OP CAR RHAB W/ECG: CPT

## 2018-08-01 RX ORDER — IRBESARTAN AND HYDROCHLOROTHIAZIDE 150; 12.5 MG/1; MG/1
TABLET, FILM COATED ORAL
Qty: 180 TABLET | Refills: 3 | Status: SHIPPED | OUTPATIENT
Start: 2018-08-01 | End: 2018-11-26 | Stop reason: SDUPTHER

## 2018-08-01 RX ORDER — ASPIRIN 81 MG/1
TABLET, CHEWABLE ORAL
Qty: 90 TABLET | Refills: 3 | Status: SHIPPED | OUTPATIENT
Start: 2018-08-01 | End: 2018-08-28 | Stop reason: SDUPTHER

## 2018-08-02 DIAGNOSIS — K21.9 CHRONIC GERD: Primary | ICD-10-CM

## 2018-08-02 DIAGNOSIS — R05.9 COUGH: Chronic | ICD-10-CM

## 2018-08-02 DIAGNOSIS — G62.9 NEUROPATHY: ICD-10-CM

## 2018-08-02 DIAGNOSIS — B36.9 DERMATITIS FUNGAL: ICD-10-CM

## 2018-08-02 DIAGNOSIS — Z76.0 MEDICATION REFILL: ICD-10-CM

## 2018-08-02 RX ORDER — LANSOPRAZOLE 30 MG/1
CAPSULE, DELAYED RELEASE ORAL
Qty: 30 CAPSULE | Refills: 3 | OUTPATIENT
Start: 2018-08-02 | End: 2018-08-30 | Stop reason: SDUPTHER

## 2018-08-02 RX ORDER — GABAPENTIN 300 MG/1
300 CAPSULE ORAL 3 TIMES DAILY
Qty: 90 CAPSULE | Refills: 0 | OUTPATIENT
Start: 2018-08-11 | End: 2018-09-28 | Stop reason: SDUPTHER

## 2018-08-02 RX ORDER — BENZONATATE 200 MG/1
200 CAPSULE ORAL 2 TIMES DAILY PRN
Qty: 60 CAPSULE | Refills: 0 | OUTPATIENT
Start: 2018-08-02 | End: 2018-09-01

## 2018-08-03 ENCOUNTER — APPOINTMENT (OUTPATIENT)
Dept: CARDIAC REHAB | Age: 63
End: 2018-08-03
Payer: COMMERCIAL

## 2018-08-03 ENCOUNTER — HOSPITAL ENCOUNTER (OUTPATIENT)
Dept: CARDIAC REHAB | Age: 63
Setting detail: THERAPIES SERIES
Discharge: HOME | End: 2018-08-03
Payer: COMMERCIAL

## 2018-08-03 PROCEDURE — 93798 PHYS/QHP OP CAR RHAB W/ECG: CPT

## 2018-08-06 ENCOUNTER — HOSPITAL ENCOUNTER (OUTPATIENT)
Dept: CARDIAC REHAB | Age: 63
Setting detail: THERAPIES SERIES
Discharge: HOME | End: 2018-08-06
Payer: COMMERCIAL

## 2018-08-06 ENCOUNTER — APPOINTMENT (OUTPATIENT)
Dept: CARDIAC REHAB | Age: 63
End: 2018-08-06
Payer: COMMERCIAL

## 2018-08-06 PROCEDURE — 93798 PHYS/QHP OP CAR RHAB W/ECG: CPT

## 2018-08-08 ENCOUNTER — APPOINTMENT (OUTPATIENT)
Dept: CARDIAC REHAB | Age: 63
End: 2018-08-08
Payer: COMMERCIAL

## 2018-08-08 ENCOUNTER — HOSPITAL ENCOUNTER (OUTPATIENT)
Dept: CARDIAC REHAB | Age: 63
Setting detail: THERAPIES SERIES
Discharge: HOME | End: 2018-08-08
Payer: COMMERCIAL

## 2018-08-08 PROCEDURE — 93798 PHYS/QHP OP CAR RHAB W/ECG: CPT

## 2018-08-10 ENCOUNTER — HOSPITAL ENCOUNTER (OUTPATIENT)
Dept: CARDIAC REHAB | Age: 63
Setting detail: THERAPIES SERIES
Discharge: HOME | End: 2018-08-10
Payer: COMMERCIAL

## 2018-08-10 ENCOUNTER — APPOINTMENT (OUTPATIENT)
Dept: CARDIAC REHAB | Age: 63
End: 2018-08-10
Payer: COMMERCIAL

## 2018-08-10 PROCEDURE — 93798 PHYS/QHP OP CAR RHAB W/ECG: CPT

## 2018-08-13 ENCOUNTER — APPOINTMENT (OUTPATIENT)
Dept: CARDIAC REHAB | Age: 63
End: 2018-08-13
Payer: COMMERCIAL

## 2018-08-13 ENCOUNTER — HOSPITAL ENCOUNTER (OUTPATIENT)
Dept: CARDIAC REHAB | Age: 63
Setting detail: THERAPIES SERIES
Discharge: HOME | End: 2018-08-13
Payer: COMMERCIAL

## 2018-08-13 PROCEDURE — 93798 PHYS/QHP OP CAR RHAB W/ECG: CPT

## 2018-08-15 ENCOUNTER — HOSPITAL ENCOUNTER (OUTPATIENT)
Dept: CARDIAC REHAB | Age: 63
Setting detail: THERAPIES SERIES
Discharge: HOME | End: 2018-08-15
Payer: COMMERCIAL

## 2018-08-15 ENCOUNTER — APPOINTMENT (OUTPATIENT)
Dept: CARDIAC REHAB | Age: 63
End: 2018-08-15
Payer: COMMERCIAL

## 2018-08-15 PROCEDURE — 93798 PHYS/QHP OP CAR RHAB W/ECG: CPT

## 2018-08-17 ENCOUNTER — HOSPITAL ENCOUNTER (OUTPATIENT)
Dept: CARDIAC REHAB | Age: 63
Setting detail: THERAPIES SERIES
Discharge: HOME | End: 2018-08-17
Payer: COMMERCIAL

## 2018-08-17 ENCOUNTER — APPOINTMENT (OUTPATIENT)
Dept: CARDIAC REHAB | Age: 63
End: 2018-08-17
Payer: COMMERCIAL

## 2018-08-17 PROCEDURE — 93798 PHYS/QHP OP CAR RHAB W/ECG: CPT

## 2018-08-20 ENCOUNTER — HOSPITAL ENCOUNTER (OUTPATIENT)
Dept: CARDIAC REHAB | Age: 63
Setting detail: THERAPIES SERIES
Discharge: HOME | End: 2018-08-20
Payer: COMMERCIAL

## 2018-08-20 ENCOUNTER — APPOINTMENT (OUTPATIENT)
Dept: CARDIAC REHAB | Age: 63
End: 2018-08-20
Payer: COMMERCIAL

## 2018-08-20 PROCEDURE — 93798 PHYS/QHP OP CAR RHAB W/ECG: CPT

## 2018-08-22 ENCOUNTER — APPOINTMENT (OUTPATIENT)
Dept: CARDIAC REHAB | Age: 63
End: 2018-08-22
Payer: COMMERCIAL

## 2018-08-22 ENCOUNTER — HOSPITAL ENCOUNTER (OUTPATIENT)
Dept: CARDIAC REHAB | Age: 63
Setting detail: THERAPIES SERIES
Discharge: HOME | End: 2018-08-22
Payer: COMMERCIAL

## 2018-08-22 PROCEDURE — 93798 PHYS/QHP OP CAR RHAB W/ECG: CPT

## 2018-08-24 ENCOUNTER — HOSPITAL ENCOUNTER (OUTPATIENT)
Dept: CARDIAC REHAB | Age: 63
Setting detail: THERAPIES SERIES
Discharge: HOME | End: 2018-08-24
Payer: COMMERCIAL

## 2018-08-24 ENCOUNTER — APPOINTMENT (OUTPATIENT)
Dept: CARDIAC REHAB | Age: 63
End: 2018-08-24
Payer: COMMERCIAL

## 2018-08-24 PROCEDURE — 93798 PHYS/QHP OP CAR RHAB W/ECG: CPT

## 2018-08-27 ENCOUNTER — APPOINTMENT (OUTPATIENT)
Dept: CARDIAC REHAB | Age: 63
End: 2018-08-27
Payer: COMMERCIAL

## 2018-08-27 ENCOUNTER — HOSPITAL ENCOUNTER (OUTPATIENT)
Dept: CARDIAC REHAB | Age: 63
Setting detail: THERAPIES SERIES
Discharge: HOME | End: 2018-08-27
Payer: COMMERCIAL

## 2018-08-27 PROCEDURE — 93798 PHYS/QHP OP CAR RHAB W/ECG: CPT

## 2018-08-28 RX ORDER — ASPIRIN 81 MG/1
TABLET, CHEWABLE ORAL
Qty: 90 TABLET | Refills: 3 | Status: SHIPPED | OUTPATIENT
Start: 2018-08-28 | End: 2019-09-03

## 2018-08-28 RX ORDER — PRASUGREL 10 MG/1
TABLET, FILM COATED ORAL
Qty: 90 TABLET | Refills: 3 | Status: SHIPPED | OUTPATIENT
Start: 2018-08-28 | End: 2019-01-15 | Stop reason: SDUPTHER

## 2018-08-29 ENCOUNTER — HOSPITAL ENCOUNTER (OUTPATIENT)
Dept: CARDIAC REHAB | Age: 63
Setting detail: THERAPIES SERIES
Discharge: HOME | End: 2018-08-29
Payer: COMMERCIAL

## 2018-08-29 ENCOUNTER — APPOINTMENT (OUTPATIENT)
Dept: CARDIAC REHAB | Age: 63
End: 2018-08-29
Payer: COMMERCIAL

## 2018-08-29 PROCEDURE — 93798 PHYS/QHP OP CAR RHAB W/ECG: CPT

## 2018-08-30 DIAGNOSIS — Z76.0 MEDICATION REFILL: ICD-10-CM

## 2018-08-30 DIAGNOSIS — Z79.4 TYPE 2 DIABETES MELLITUS WITH OTHER CIRCULATORY COMPLICATION, WITH LONG-TERM CURRENT USE OF INSULIN (HCC): ICD-10-CM

## 2018-08-30 DIAGNOSIS — K21.9 CHRONIC GERD: ICD-10-CM

## 2018-08-30 DIAGNOSIS — E11.59 TYPE 2 DIABETES MELLITUS WITH OTHER CIRCULATORY COMPLICATION, WITH LONG-TERM CURRENT USE OF INSULIN (HCC): ICD-10-CM

## 2018-08-30 DIAGNOSIS — R73.9 HYPERGLYCEMIA: ICD-10-CM

## 2018-08-30 DIAGNOSIS — B36.9 DERMATITIS FUNGAL: ICD-10-CM

## 2018-08-30 RX ORDER — LANSOPRAZOLE 30 MG/1
CAPSULE, DELAYED RELEASE ORAL
Qty: 30 CAPSULE | Refills: 3 | Status: SHIPPED | OUTPATIENT
Start: 2018-08-30 | End: 2019-03-20 | Stop reason: SDUPTHER

## 2018-08-30 RX ORDER — TRIAMCINOLONE ACETONIDE 0.25 MG/G
CREAM TOPICAL
Qty: 1 TUBE | Refills: 0 | Status: SHIPPED | OUTPATIENT
Start: 2018-08-30 | End: 2021-08-10

## 2018-08-30 RX ORDER — LORATADINE 10 MG/1
10 TABLET ORAL DAILY
Qty: 30 TABLET | Refills: 5 | Status: SHIPPED | OUTPATIENT
Start: 2018-08-30 | End: 2019-03-20 | Stop reason: SDUPTHER

## 2018-08-31 ENCOUNTER — HOSPITAL ENCOUNTER (OUTPATIENT)
Dept: CARDIAC REHAB | Age: 63
Setting detail: THERAPIES SERIES
Discharge: HOME | End: 2018-08-31
Payer: COMMERCIAL

## 2018-08-31 ENCOUNTER — APPOINTMENT (OUTPATIENT)
Dept: CARDIAC REHAB | Age: 63
End: 2018-08-31
Payer: COMMERCIAL

## 2018-08-31 PROCEDURE — 93798 PHYS/QHP OP CAR RHAB W/ECG: CPT

## 2018-09-03 ENCOUNTER — APPOINTMENT (OUTPATIENT)
Dept: CARDIAC REHAB | Age: 63
End: 2018-09-03
Payer: COMMERCIAL

## 2018-09-05 ENCOUNTER — APPOINTMENT (OUTPATIENT)
Dept: CARDIAC REHAB | Age: 63
End: 2018-09-05
Payer: COMMERCIAL

## 2018-09-07 ENCOUNTER — APPOINTMENT (OUTPATIENT)
Dept: CARDIAC REHAB | Age: 63
End: 2018-09-07
Payer: COMMERCIAL

## 2018-09-07 ENCOUNTER — HOSPITAL ENCOUNTER (OUTPATIENT)
Dept: CARDIAC REHAB | Age: 63
Setting detail: THERAPIES SERIES
Discharge: HOME | End: 2018-09-07
Payer: COMMERCIAL

## 2018-09-07 PROCEDURE — 93798 PHYS/QHP OP CAR RHAB W/ECG: CPT

## 2018-09-10 ENCOUNTER — APPOINTMENT (OUTPATIENT)
Dept: CARDIAC REHAB | Age: 63
End: 2018-09-10
Payer: COMMERCIAL

## 2018-09-10 ENCOUNTER — HOSPITAL ENCOUNTER (OUTPATIENT)
Dept: CARDIAC REHAB | Age: 63
Setting detail: THERAPIES SERIES
Discharge: HOME | End: 2018-09-10
Payer: COMMERCIAL

## 2018-09-10 PROCEDURE — 93798 PHYS/QHP OP CAR RHAB W/ECG: CPT

## 2018-09-12 ENCOUNTER — APPOINTMENT (OUTPATIENT)
Dept: CARDIAC REHAB | Age: 63
End: 2018-09-12
Payer: COMMERCIAL

## 2018-09-12 ENCOUNTER — HOSPITAL ENCOUNTER (OUTPATIENT)
Dept: CARDIAC REHAB | Age: 63
Setting detail: THERAPIES SERIES
Discharge: HOME | End: 2018-09-12
Payer: COMMERCIAL

## 2018-09-12 PROCEDURE — 93798 PHYS/QHP OP CAR RHAB W/ECG: CPT

## 2018-09-14 ENCOUNTER — APPOINTMENT (OUTPATIENT)
Dept: CARDIAC REHAB | Age: 63
End: 2018-09-14
Payer: COMMERCIAL

## 2018-09-14 ENCOUNTER — HOSPITAL ENCOUNTER (OUTPATIENT)
Dept: CARDIAC REHAB | Age: 63
Setting detail: THERAPIES SERIES
Discharge: HOME | End: 2018-09-14
Payer: COMMERCIAL

## 2018-09-14 PROCEDURE — 93798 PHYS/QHP OP CAR RHAB W/ECG: CPT

## 2018-09-17 ENCOUNTER — HOSPITAL ENCOUNTER (OUTPATIENT)
Dept: CARDIAC REHAB | Age: 63
Setting detail: THERAPIES SERIES
Discharge: HOME | End: 2018-09-17
Payer: COMMERCIAL

## 2018-09-17 DIAGNOSIS — E11.9 TYPE 2 DIABETES MELLITUS WITHOUT COMPLICATION, WITHOUT LONG-TERM CURRENT USE OF INSULIN (HCC): Primary | ICD-10-CM

## 2018-09-17 DIAGNOSIS — I10 ESSENTIAL HYPERTENSION: ICD-10-CM

## 2018-09-17 DIAGNOSIS — E87.5 HIGH POTASSIUM: ICD-10-CM

## 2018-09-17 DIAGNOSIS — E87.5 HIGH POTASSIUM: Primary | ICD-10-CM

## 2018-09-17 PROCEDURE — 93798 PHYS/QHP OP CAR RHAB W/ECG: CPT

## 2018-09-17 NOTE — PROGRESS NOTES
Patient called,request potassium level be drawn related to recent level was high at dialysis provider order placed

## 2018-09-18 DIAGNOSIS — I10 ESSENTIAL HYPERTENSION: ICD-10-CM

## 2018-09-18 DIAGNOSIS — E11.9 TYPE 2 DIABETES MELLITUS WITHOUT COMPLICATION, WITHOUT LONG-TERM CURRENT USE OF INSULIN (HCC): ICD-10-CM

## 2018-09-18 DIAGNOSIS — Z11.4 ENCOUNTER FOR SCREENING FOR HIV: ICD-10-CM

## 2018-09-18 LAB
ALBUMIN SERPL-MCNC: 4.1 G/DL (ref 3.5–5.2)
ALP BLD-CCNC: 80 U/L (ref 40–130)
ALT SERPL-CCNC: 17 U/L (ref 5–41)
ANION GAP SERPL CALCULATED.3IONS-SCNC: 15 MMOL/L (ref 7–19)
AST SERPL-CCNC: 16 U/L (ref 5–40)
BASOPHILS ABSOLUTE: 0.1 K/UL (ref 0–0.2)
BASOPHILS RELATIVE PERCENT: 1.1 % (ref 0–1)
BILIRUB SERPL-MCNC: 0.3 MG/DL (ref 0.2–1.2)
BUN BLDV-MCNC: 30 MG/DL (ref 8–23)
CALCIUM SERPL-MCNC: 10.8 MG/DL (ref 8.8–10.2)
CHLORIDE BLD-SCNC: 104 MMOL/L (ref 98–111)
CHOLESTEROL, FASTING: 185 MG/DL (ref 160–199)
CO2: 23 MMOL/L (ref 22–29)
CREAT SERPL-MCNC: 1.4 MG/DL (ref 0.5–1.2)
EOSINOPHILS ABSOLUTE: 0.2 K/UL (ref 0–0.6)
EOSINOPHILS RELATIVE PERCENT: 2.7 % (ref 0–5)
GFR NON-AFRICAN AMERICAN: 51
GLUCOSE FASTING: 112 MG/DL (ref 74–109)
HBA1C MFR BLD: 9.1 % (ref 4–6)
HCT VFR BLD CALC: 41.4 % (ref 42–52)
HDLC SERPL-MCNC: 24 MG/DL (ref 55–121)
HEMOGLOBIN: 13.1 G/DL (ref 14–18)
LDL CHOLESTEROL CALCULATED: 119 MG/DL
LYMPHOCYTES ABSOLUTE: 2.2 K/UL (ref 1.1–4.5)
LYMPHOCYTES RELATIVE PERCENT: 31.7 % (ref 20–40)
MCH RBC QN AUTO: 26.7 PG (ref 27–31)
MCHC RBC AUTO-ENTMCNC: 31.6 G/DL (ref 33–37)
MCV RBC AUTO: 84.5 FL (ref 80–94)
MONOCYTES ABSOLUTE: 0.6 K/UL (ref 0–0.9)
MONOCYTES RELATIVE PERCENT: 8.3 % (ref 0–10)
NEUTROPHILS ABSOLUTE: 3.9 K/UL (ref 1.5–7.5)
NEUTROPHILS RELATIVE PERCENT: 55.8 % (ref 50–65)
PDW BLD-RTO: 13.2 % (ref 11.5–14.5)
PLATELET # BLD: 216 K/UL (ref 130–400)
PMV BLD AUTO: 12.3 FL (ref 9.4–12.4)
POTASSIUM SERPL-SCNC: 5.5 MMOL/L (ref 3.5–5)
RAPID HIV 1&2: NORMAL
RBC # BLD: 4.9 M/UL (ref 4.7–6.1)
SODIUM BLD-SCNC: 142 MMOL/L (ref 136–145)
TOTAL PROTEIN: 6.7 G/DL (ref 6.6–8.7)
TRIGLYCERIDE, FASTING: 208 MG/DL (ref 0–149)
WBC # BLD: 7 K/UL (ref 4.8–10.8)

## 2018-09-19 ENCOUNTER — HOSPITAL ENCOUNTER (OUTPATIENT)
Dept: CARDIAC REHAB | Age: 63
Setting detail: THERAPIES SERIES
Discharge: HOME | End: 2018-09-19
Payer: COMMERCIAL

## 2018-09-19 ENCOUNTER — OFFICE VISIT (OUTPATIENT)
Dept: PRIMARY CARE CLINIC | Age: 63
End: 2018-09-19
Payer: COMMERCIAL

## 2018-09-19 VITALS
HEIGHT: 72 IN | SYSTOLIC BLOOD PRESSURE: 136 MMHG | TEMPERATURE: 97.4 F | DIASTOLIC BLOOD PRESSURE: 68 MMHG | WEIGHT: 270 LBS | OXYGEN SATURATION: 99 % | BODY MASS INDEX: 36.57 KG/M2 | HEART RATE: 66 BPM

## 2018-09-19 DIAGNOSIS — E11.22 CKD STAGE 3 DUE TO TYPE 2 DIABETES MELLITUS (HCC): Chronic | ICD-10-CM

## 2018-09-19 DIAGNOSIS — Z23 NEED FOR PNEUMOCOCCAL VACCINATION: ICD-10-CM

## 2018-09-19 DIAGNOSIS — N18.30 CKD STAGE 3 DUE TO TYPE 2 DIABETES MELLITUS (HCC): Chronic | ICD-10-CM

## 2018-09-19 DIAGNOSIS — I10 ESSENTIAL HYPERTENSION: Chronic | ICD-10-CM

## 2018-09-19 DIAGNOSIS — Z23 NEED FOR ZOSTER VACCINATION: ICD-10-CM

## 2018-09-19 DIAGNOSIS — Z23 NEED FOR INFLUENZA VACCINATION: ICD-10-CM

## 2018-09-19 PROBLEM — I71.40 ABDOMINAL ANEURYSM (HCC): Status: RESOLVED | Noted: 2018-05-31 | Resolved: 2018-09-19

## 2018-09-19 PROCEDURE — 90662 IIV NO PRSV INCREASED AG IM: CPT | Performed by: NURSE PRACTITIONER

## 2018-09-19 PROCEDURE — 90670 PCV13 VACCINE IM: CPT | Performed by: NURSE PRACTITIONER

## 2018-09-19 PROCEDURE — 90472 IMMUNIZATION ADMIN EACH ADD: CPT | Performed by: NURSE PRACTITIONER

## 2018-09-19 PROCEDURE — 99214 OFFICE O/P EST MOD 30 MIN: CPT | Performed by: NURSE PRACTITIONER

## 2018-09-19 PROCEDURE — 90471 IMMUNIZATION ADMIN: CPT | Performed by: NURSE PRACTITIONER

## 2018-09-19 PROCEDURE — 93798 PHYS/QHP OP CAR RHAB W/ECG: CPT

## 2018-09-19 ASSESSMENT — ENCOUNTER SYMPTOMS
CONSTIPATION: 0
SHORTNESS OF BREATH: 0
DIARRHEA: 0
VOMITING: 0
ABDOMINAL PAIN: 0
BACK PAIN: 0
APNEA: 0
ABDOMINAL DISTENTION: 0
CHOKING: 0
STRIDOR: 0
COLOR CHANGE: 0
CHEST TIGHTNESS: 0
COUGH: 0
WHEEZING: 0

## 2018-09-19 NOTE — PROGRESS NOTES
2018     Lisa Turk (:  1955) is a 57 y.o. male, here for evaluation of the following medical concerns:  Chief Complaint   Patient presents with    Diabetes     A1C 9.1 yesterday   Nishi WHITT   Mr. Odette Gaitan is a pleasant cooperative 77-year-old male patient. He has been participating in cardiac rehab and has been focusing on diet and exercise. She has chronic kidney disease stage III related to diabetes hypertension. Slightly elevated potassium GFR and creatinine are improving and he is being followed by [facility]. Patient reports that he's been doing well overall. Treatment Adherence:   Medication compliance:  compliant all of the time  Diet compliance:  compliant all of the time  Weight trend: stable  Current exercise: walks 5 time(s) per week  Barriers: none    Diabetes Mellitus Type 2: Current symptoms/problems include none. Home blood sugar records: fasting range: 160-200  Any episodes of hypoglycemia? no  Eye exam current (within one year): yes  Tobacco history: He  reports that he has never smoked. He has never used smokeless tobacco.   Daily Aspirin? Yes    Hypertension:  Home blood pressure monitoring: No.  He is adherent to a low sodium diet. Patient denies chest pain, lightheadedness, dry cough and fatigue. Antihypertensive medication side effects: no medication side effects noted. Use of agents associated with hypertension: none.      Lab Results   Component Value Date    LABA1C 9.1 (H) 2018    LABA1C 10.6 (H) 2018    LABA1C 10.4 2018     Lab Results   Component Value Date    LABMICR 18.20 2017    CREATININE 1.4 (H) 2018     Lab Results   Component Value Date    ALT 17 2018    AST 16 2018     Lab Results   Component Value Date    CHOL 152 (L) 2018    TRIG 271 (H) 2018    HDL 24 (L) 2018    LDLCALC 119 2018    LDLDIRECT 104 2017          Review of Systems   Constitutional: Negative for 19542 units CAPS Take 50,000 Units by mouth every 30 days  Historical Provider, MD        Social History   Substance Use Topics    Smoking status: Never Smoker    Smokeless tobacco: Never Used    Alcohol use No        Vitals:    09/19/18 0952   BP: 136/68   Pulse: 66   Temp: 97.4 °F (36.3 °C)   SpO2: 99%   Weight: 270 lb (122.5 kg)   Height: 6' (1.829 m)     Estimated body mass index is 36.62 kg/m² as calculated from the following:    Height as of this encounter: 6' (1.829 m). Weight as of this encounter: 270 lb (122.5 kg). Physical Exam   Constitutional: He is oriented to person, place, and time. He appears well-developed and well-nourished. HENT:   Head: Normocephalic and atraumatic. Eyes: Pupils are equal, round, and reactive to light. Conjunctivae and EOM are normal.   Neck: Normal range of motion. Neck supple. No JVD present. No thyromegaly present. Cardiovascular: Normal rate, regular rhythm, normal heart sounds and intact distal pulses. Pulmonary/Chest: Effort normal and breath sounds normal.   Abdominal: Soft. Bowel sounds are normal.   Musculoskeletal: Normal range of motion. Lymphadenopathy:     He has no cervical adenopathy. Neurological: He is alert and oriented to person, place, and time. He has normal reflexes. Skin: Skin is warm and dry. Psychiatric: He has a normal mood and affect. His behavior is normal. Thought content normal.   Nursing note and vitals reviewed. ASSESSMENT/PLAN:    Chandrika Beckford was seen today for diabetes and discuss labs. Diagnoses and all orders for this visit:    IDDM (insulin dependent diabetes mellitus) ([facility]  Comments:  A1c has improved from 10.6-9.1 patient is adhering to a diabetic diet and is exercising this. In cardiac rehab.     Essential hypertension  Comments:  Patient's blood pressure is well controlled he is adherent to a low sodium diabetic and low potassium diet    CKD stage 3 due to type 2 diabetes mellitus ([facility]  Comments:  Patient's

## 2018-09-21 ENCOUNTER — HOSPITAL ENCOUNTER (OUTPATIENT)
Dept: CARDIAC REHAB | Age: 63
Setting detail: THERAPIES SERIES
Discharge: HOME | End: 2018-09-21
Payer: COMMERCIAL

## 2018-09-21 PROCEDURE — 93798 PHYS/QHP OP CAR RHAB W/ECG: CPT

## 2018-09-21 RX ORDER — METOPROLOL TARTRATE 50 MG/1
TABLET, FILM COATED ORAL
Qty: 180 TABLET | Refills: 3 | Status: SHIPPED | OUTPATIENT
Start: 2018-09-21 | End: 2019-03-20 | Stop reason: SDUPTHER

## 2018-09-24 ENCOUNTER — HOSPITAL ENCOUNTER (OUTPATIENT)
Dept: CARDIAC REHAB | Age: 63
Setting detail: THERAPIES SERIES
Discharge: HOME | End: 2018-09-24
Payer: COMMERCIAL

## 2018-09-24 PROCEDURE — 93798 PHYS/QHP OP CAR RHAB W/ECG: CPT

## 2018-09-25 ENCOUNTER — TELEPHONE (OUTPATIENT)
Dept: PRIMARY CARE CLINIC | Age: 63
End: 2018-09-25

## 2018-09-25 NOTE — TELEPHONE ENCOUNTER
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  Mr. Evelyne Urena also needs the following scripts sent to [facility].    blood glucose test strips (ACCU-CHEK DUNIA) strip,  Lancets 30G MISC,  mupirocin (BACTROBAN) 2 % ointment  insulin glargine (LANTUS SOLOSTAR) 100 UNIT/ML injection pen  gabapentin (NEURONTIN) 300 MG capsule [[id number]]

## 2018-09-26 ENCOUNTER — TELEPHONE (OUTPATIENT)
Dept: PRIMARY CARE CLINIC | Age: 63
End: 2018-09-26

## 2018-09-26 ENCOUNTER — HOSPITAL ENCOUNTER (OUTPATIENT)
Dept: CARDIAC REHAB | Age: 63
Setting detail: THERAPIES SERIES
Discharge: HOME | End: 2018-09-26
Payer: COMMERCIAL

## 2018-09-26 PROCEDURE — 93798 PHYS/QHP OP CAR RHAB W/ECG: CPT

## 2018-09-26 NOTE — TELEPHONE ENCOUNTER
----- Message from MIMI Bull sent at 9/18/2018  9:21 PM CDT -----  Please notify patient of abnormal results. Will discuss at follow up!

## 2018-09-28 ENCOUNTER — HOSPITAL ENCOUNTER (OUTPATIENT)
Dept: CARDIAC REHAB | Age: 63
Setting detail: THERAPIES SERIES
Discharge: HOME | End: 2018-09-28
Payer: COMMERCIAL

## 2018-09-28 DIAGNOSIS — B36.9 DERMATITIS FUNGAL: ICD-10-CM

## 2018-09-28 DIAGNOSIS — G62.9 NEUROPATHY: ICD-10-CM

## 2018-09-28 PROCEDURE — 93798 PHYS/QHP OP CAR RHAB W/ECG: CPT

## 2018-09-28 RX ORDER — GABAPENTIN 300 MG/1
300 CAPSULE ORAL 3 TIMES DAILY
Qty: 90 CAPSULE | Refills: 0 | Status: SHIPPED | OUTPATIENT
Start: 2018-09-28 | End: 2018-10-30 | Stop reason: SDUPTHER

## 2018-10-01 ENCOUNTER — HOSPITAL ENCOUNTER (OUTPATIENT)
Dept: CARDIAC REHAB | Age: 63
Setting detail: THERAPIES SERIES
Discharge: HOME | End: 2018-10-01
Payer: COMMERCIAL

## 2018-10-01 PROCEDURE — 93798 PHYS/QHP OP CAR RHAB W/ECG: CPT

## 2018-10-03 ENCOUNTER — HOSPITAL ENCOUNTER (OUTPATIENT)
Dept: CARDIAC REHAB | Age: 63
Setting detail: THERAPIES SERIES
Discharge: HOME | End: 2018-10-03
Payer: COMMERCIAL

## 2018-10-03 PROCEDURE — 93798 PHYS/QHP OP CAR RHAB W/ECG: CPT

## 2018-10-05 ENCOUNTER — APPOINTMENT (OUTPATIENT)
Dept: CARDIAC REHAB | Age: 63
End: 2018-10-05
Payer: COMMERCIAL

## 2018-10-08 ENCOUNTER — APPOINTMENT (OUTPATIENT)
Dept: CARDIAC REHAB | Age: 63
End: 2018-10-08
Payer: COMMERCIAL

## 2018-10-10 ENCOUNTER — APPOINTMENT (OUTPATIENT)
Dept: CARDIAC REHAB | Age: 63
End: 2018-10-10
Payer: COMMERCIAL

## 2018-11-08 ENCOUNTER — TELEPHONE (OUTPATIENT)
Dept: CARDIOLOGY | Age: 63
End: 2018-11-08

## 2018-11-08 NOTE — TELEPHONE ENCOUNTER
Lft msg on pt phone in regards to provider being out for procedures.  Pt can be scheduled w [facility] at [facility]

## 2018-11-12 ENCOUNTER — TELEPHONE (OUTPATIENT)
Dept: CARDIOLOGY | Age: 63
End: 2018-11-12

## 2018-11-26 RX ORDER — HYDROCHLOROTHIAZIDE 12.5 MG/1
12.5 CAPSULE, GELATIN COATED ORAL EVERY MORNING
Qty: 90 CAPSULE | Refills: 3 | Status: SHIPPED | OUTPATIENT
Start: 2018-11-26 | End: 2018-11-27 | Stop reason: ALTCHOICE

## 2018-11-26 RX ORDER — IRBESARTAN 150 MG/1
150 TABLET ORAL DAILY
Qty: 90 TABLET | Refills: 3 | Status: SHIPPED | OUTPATIENT
Start: 2018-11-26 | End: 2018-11-27 | Stop reason: ALTCHOICE

## 2018-11-26 RX ORDER — IRBESARTAN AND HYDROCHLOROTHIAZIDE 150; 12.5 MG/1; MG/1
TABLET, FILM COATED ORAL
Qty: 180 TABLET | Refills: 3 | Status: SHIPPED | OUTPATIENT
Start: 2018-11-26 | End: 2018-12-19

## 2018-11-27 ENCOUNTER — OFFICE VISIT (OUTPATIENT)
Dept: CARDIOLOGY | Age: 63
End: 2018-11-27
Payer: MEDICARE

## 2018-11-27 ENCOUNTER — TELEPHONE (OUTPATIENT)
Dept: PRIMARY CARE CLINIC | Age: 63
End: 2018-11-27

## 2018-11-27 VITALS
HEIGHT: 72 IN | WEIGHT: 282 LBS | SYSTOLIC BLOOD PRESSURE: 118 MMHG | BODY MASS INDEX: 38.19 KG/M2 | DIASTOLIC BLOOD PRESSURE: 64 MMHG | HEART RATE: 72 BPM

## 2018-11-27 DIAGNOSIS — G62.9 NEUROPATHY: ICD-10-CM

## 2018-11-27 DIAGNOSIS — R10.31 GROIN PAIN, RIGHT: Primary | ICD-10-CM

## 2018-11-27 DIAGNOSIS — I10 ESSENTIAL HYPERTENSION: ICD-10-CM

## 2018-11-27 DIAGNOSIS — I25.10 CORONARY ARTERY DISEASE INVOLVING NATIVE CORONARY ARTERY OF NATIVE HEART WITHOUT ANGINA PECTORIS: ICD-10-CM

## 2018-11-27 PROCEDURE — 99213 OFFICE O/P EST LOW 20 MIN: CPT | Performed by: INTERNAL MEDICINE

## 2018-11-27 RX ORDER — GABAPENTIN 300 MG/1
CAPSULE ORAL
Qty: 270 CAPSULE | Refills: 1 | OUTPATIENT
Start: 2018-11-27 | End: 2019-06-12 | Stop reason: SDUPTHER

## 2018-11-28 ENCOUNTER — TELEPHONE (OUTPATIENT)
Dept: CARDIOLOGY | Age: 63
End: 2018-11-28

## 2018-11-29 ENCOUNTER — HOSPITAL ENCOUNTER (OUTPATIENT)
Dept: VASCULAR LAB | Age: 63
Discharge: HOME | End: 2018-11-29
Payer: MEDICARE

## 2018-11-29 DIAGNOSIS — I10 ESSENTIAL HYPERTENSION: ICD-10-CM

## 2018-11-29 DIAGNOSIS — I25.10 CORONARY ARTERY DISEASE INVOLVING NATIVE CORONARY ARTERY OF NATIVE HEART WITHOUT ANGINA PECTORIS: ICD-10-CM

## 2018-11-29 DIAGNOSIS — R10.31 GROIN PAIN, RIGHT: ICD-10-CM

## 2018-11-29 PROCEDURE — 93926 LOWER EXTREMITY STUDY: CPT

## 2018-11-29 PROCEDURE — 93922 UPR/L XTREMITY ART 2 LEVELS: CPT

## 2018-12-04 ENCOUNTER — TELEPHONE (OUTPATIENT)
Dept: PRIMARY CARE CLINIC | Age: 63
End: 2018-12-04

## 2018-12-11 ENCOUNTER — OFFICE VISIT (OUTPATIENT)
Dept: CARDIOLOGY | Age: 63
End: 2018-12-11
Payer: MEDICARE

## 2018-12-11 VITALS
BODY MASS INDEX: 38.6 KG/M2 | HEART RATE: 74 BPM | SYSTOLIC BLOOD PRESSURE: 132 MMHG | WEIGHT: 285 LBS | HEIGHT: 72 IN | DIASTOLIC BLOOD PRESSURE: 80 MMHG

## 2018-12-11 DIAGNOSIS — R10.31 GROIN PAIN, RIGHT: ICD-10-CM

## 2018-12-11 DIAGNOSIS — I10 ESSENTIAL HYPERTENSION: Primary | ICD-10-CM

## 2018-12-11 DIAGNOSIS — I25.10 CORONARY ARTERY DISEASE INVOLVING NATIVE CORONARY ARTERY OF NATIVE HEART WITHOUT ANGINA PECTORIS: ICD-10-CM

## 2018-12-11 PROCEDURE — 1101F PT FALLS ASSESS-DOCD LE1/YR: CPT | Performed by: INTERNAL MEDICINE

## 2018-12-11 PROCEDURE — 1036F TOBACCO NON-USER: CPT | Performed by: INTERNAL MEDICINE

## 2018-12-11 PROCEDURE — G8482 FLU IMMUNIZE ORDER/ADMIN: HCPCS | Performed by: INTERNAL MEDICINE

## 2018-12-11 PROCEDURE — 1123F ACP DISCUSS/DSCN MKR DOCD: CPT | Performed by: INTERNAL MEDICINE

## 2018-12-11 PROCEDURE — G8427 DOCREV CUR MEDS BY ELIG CLIN: HCPCS | Performed by: INTERNAL MEDICINE

## 2018-12-11 PROCEDURE — 99212 OFFICE O/P EST SF 10 MIN: CPT | Performed by: INTERNAL MEDICINE

## 2018-12-11 PROCEDURE — 3017F COLORECTAL CA SCREEN DOC REV: CPT | Performed by: INTERNAL MEDICINE

## 2018-12-11 PROCEDURE — G8417 CALC BMI ABV UP PARAM F/U: HCPCS | Performed by: INTERNAL MEDICINE

## 2018-12-11 PROCEDURE — G8598 ASA/ANTIPLAT THER USED: HCPCS | Performed by: INTERNAL MEDICINE

## 2018-12-11 PROCEDURE — 4040F PNEUMOC VAC/ADMIN/RCVD: CPT | Performed by: INTERNAL MEDICINE

## 2018-12-17 DIAGNOSIS — R77.9 ELEVATED BLOOD PROTEIN: ICD-10-CM

## 2018-12-17 DIAGNOSIS — E83.52 SERUM CALCIUM ELEVATED: ICD-10-CM

## 2018-12-17 DIAGNOSIS — I10 ESSENTIAL HYPERTENSION: ICD-10-CM

## 2018-12-17 DIAGNOSIS — D58.2 ABNORMAL HEMOGLOBIN (HCC): ICD-10-CM

## 2018-12-18 DIAGNOSIS — R77.9 ELEVATED BLOOD PROTEIN: ICD-10-CM

## 2018-12-18 DIAGNOSIS — E83.52 SERUM CALCIUM ELEVATED: ICD-10-CM

## 2018-12-18 DIAGNOSIS — I10 ESSENTIAL HYPERTENSION: ICD-10-CM

## 2018-12-18 LAB
ALBUMIN SERPL-MCNC: 4 G/DL (ref 3.5–5.2)
ALP BLD-CCNC: 86 U/L (ref 40–130)
ALT SERPL-CCNC: 16 U/L (ref 5–41)
ANION GAP SERPL CALCULATED.3IONS-SCNC: 14 MMOL/L (ref 7–19)
AST SERPL-CCNC: 12 U/L (ref 5–40)
BASOPHILS ABSOLUTE: 0.1 K/UL (ref 0–0.2)
BASOPHILS RELATIVE PERCENT: 1 % (ref 0–1)
BILIRUB SERPL-MCNC: <0.2 MG/DL (ref 0.2–1.2)
BUN BLDV-MCNC: 29 MG/DL (ref 8–23)
CALCIUM SERPL-MCNC: 10.5 MG/DL (ref 8.8–10.2)
CHLORIDE BLD-SCNC: 104 MMOL/L (ref 98–111)
CO2: 26 MMOL/L (ref 22–29)
CREAT SERPL-MCNC: 1.5 MG/DL (ref 0.5–1.2)
EOSINOPHILS ABSOLUTE: 0.2 K/UL (ref 0–0.6)
EOSINOPHILS RELATIVE PERCENT: 2.1 % (ref 0–5)
GFR NON-AFRICAN AMERICAN: 47
GLUCOSE FASTING: 152 MG/DL (ref 74–109)
HBA1C MFR BLD: 8.8 % (ref 4–6)
HCT VFR BLD CALC: 42 % (ref 42–52)
HEMOGLOBIN: 12.9 G/DL (ref 14–18)
LYMPHOCYTES ABSOLUTE: 3 K/UL (ref 1.1–4.5)
LYMPHOCYTES RELATIVE PERCENT: 33.3 % (ref 20–40)
MCH RBC QN AUTO: 26.8 PG (ref 27–31)
MCHC RBC AUTO-ENTMCNC: 30.7 G/DL (ref 33–37)
MCV RBC AUTO: 87.3 FL (ref 80–94)
MONOCYTES ABSOLUTE: 0.7 K/UL (ref 0–0.9)
MONOCYTES RELATIVE PERCENT: 7.9 % (ref 0–10)
NEUTROPHILS ABSOLUTE: 4.9 K/UL (ref 1.5–7.5)
NEUTROPHILS RELATIVE PERCENT: 55.4 % (ref 50–65)
PDW BLD-RTO: 13.6 % (ref 11.5–14.5)
PLATELET # BLD: 209 K/UL (ref 130–400)
PMV BLD AUTO: 12.4 FL (ref 9.4–12.4)
POTASSIUM SERPL-SCNC: 5.5 MMOL/L (ref 3.5–5)
RBC # BLD: 4.81 M/UL (ref 4.7–6.1)
SODIUM BLD-SCNC: 144 MMOL/L (ref 136–145)
TOTAL PROTEIN: 7 G/DL (ref 6.6–8.7)
WBC # BLD: 8.9 K/UL (ref 4.8–10.8)

## 2018-12-19 ENCOUNTER — OFFICE VISIT (OUTPATIENT)
Dept: PRIMARY CARE CLINIC | Age: 63
End: 2018-12-19
Payer: MEDICARE

## 2018-12-19 VITALS
BODY MASS INDEX: 38.74 KG/M2 | HEIGHT: 72 IN | DIASTOLIC BLOOD PRESSURE: 72 MMHG | OXYGEN SATURATION: 98 % | TEMPERATURE: 97.2 F | HEART RATE: 82 BPM | WEIGHT: 286 LBS | SYSTOLIC BLOOD PRESSURE: 132 MMHG

## 2018-12-19 DIAGNOSIS — E78.1 HYPERTRIGLYCERIDEMIA: ICD-10-CM

## 2018-12-19 DIAGNOSIS — E11.9 DM TYPE 2, NOT AT GOAL (HCC): ICD-10-CM

## 2018-12-19 DIAGNOSIS — I10 ESSENTIAL HYPERTENSION: ICD-10-CM

## 2018-12-19 PROCEDURE — 1036F TOBACCO NON-USER: CPT | Performed by: NURSE PRACTITIONER

## 2018-12-19 PROCEDURE — 3045F PR MOST RECENT HEMOGLOBIN A1C LEVEL 7.0-9.0%: CPT | Performed by: NURSE PRACTITIONER

## 2018-12-19 PROCEDURE — G8427 DOCREV CUR MEDS BY ELIG CLIN: HCPCS | Performed by: NURSE PRACTITIONER

## 2018-12-19 PROCEDURE — 2022F DILAT RTA XM EVC RTNOPTHY: CPT | Performed by: NURSE PRACTITIONER

## 2018-12-19 PROCEDURE — G8482 FLU IMMUNIZE ORDER/ADMIN: HCPCS | Performed by: NURSE PRACTITIONER

## 2018-12-19 PROCEDURE — G8598 ASA/ANTIPLAT THER USED: HCPCS | Performed by: NURSE PRACTITIONER

## 2018-12-19 PROCEDURE — 1101F PT FALLS ASSESS-DOCD LE1/YR: CPT | Performed by: NURSE PRACTITIONER

## 2018-12-19 PROCEDURE — 3017F COLORECTAL CA SCREEN DOC REV: CPT | Performed by: NURSE PRACTITIONER

## 2018-12-19 PROCEDURE — 99213 OFFICE O/P EST LOW 20 MIN: CPT | Performed by: NURSE PRACTITIONER

## 2018-12-19 PROCEDURE — G8417 CALC BMI ABV UP PARAM F/U: HCPCS | Performed by: NURSE PRACTITIONER

## 2018-12-19 PROCEDURE — 4040F PNEUMOC VAC/ADMIN/RCVD: CPT | Performed by: NURSE PRACTITIONER

## 2018-12-19 PROCEDURE — 1123F ACP DISCUSS/DSCN MKR DOCD: CPT | Performed by: NURSE PRACTITIONER

## 2018-12-19 RX ORDER — HYDROCHLOROTHIAZIDE 12.5 MG/1
12.5 CAPSULE, GELATIN COATED ORAL EVERY MORNING
Qty: 90 CAPSULE | Refills: 5 | Status: SHIPPED | OUTPATIENT
Start: 2018-12-19 | End: 2018-12-19 | Stop reason: SDUPTHER

## 2018-12-19 RX ORDER — IRBESARTAN 150 MG/1
150 TABLET ORAL DAILY
Qty: 90 TABLET | Refills: 1 | Status: SHIPPED | OUTPATIENT
Start: 2018-12-19 | End: 2018-12-19 | Stop reason: SDUPTHER

## 2018-12-19 RX ORDER — HYDROCHLOROTHIAZIDE 12.5 MG/1
12.5 CAPSULE, GELATIN COATED ORAL 2 TIMES DAILY
Qty: 180 CAPSULE | Refills: 5 | Status: SHIPPED | OUTPATIENT
Start: 2018-12-19 | End: 2019-01-15 | Stop reason: SDUPTHER

## 2018-12-19 RX ORDER — IRBESARTAN 150 MG/1
150 TABLET ORAL DAILY
Qty: 90 TABLET | Refills: 3 | Status: SHIPPED | OUTPATIENT
Start: 2018-12-19 | End: 2019-01-15 | Stop reason: SDUPTHER

## 2018-12-19 ASSESSMENT — ENCOUNTER SYMPTOMS
ABDOMINAL PAIN: 0
APNEA: 0
DIARRHEA: 0
STRIDOR: 0
COLOR CHANGE: 0
CHOKING: 0
ABDOMINAL DISTENTION: 0
VOMITING: 0
WHEEZING: 0
SHORTNESS OF BREATH: 0
CHEST TIGHTNESS: 0
COUGH: 0
BACK PAIN: 0
CONSTIPATION: 0

## 2018-12-19 NOTE — PROGRESS NOTES
Value Date    LABMICR 18.20 11/02/2017    CREATININE 1.5 (H) 12/18/2018     Lab Results   Component Value Date    ALT 16 12/18/2018    AST 12 12/18/2018     Lab Results   Component Value Date    CHOL 152 (L) 05/14/2018    TRIG 271 (H) 05/14/2018    HDL 24 (L) 09/18/2018    LDLCALC 119 09/18/2018    LDLDIRECT 104 11/02/2017          Review of Systems   Constitutional: Positive for fatigue. Negative for chills, fever and unexpected weight change. HENT: Positive for congestion. He has had some congestion in one antibiotics and later symptoms worsen or persist   Eyes: Negative for visual disturbance. Respiratory: Negative for apnea, cough, choking, chest tightness, shortness of breath, wheezing and stridor. Cardiovascular: Negative for chest pain, palpitations and leg swelling. Gastrointestinal: Negative for abdominal distention, abdominal pain, constipation, diarrhea and vomiting. Endocrine: Negative for cold intolerance, heat intolerance, polydipsia, polyphagia and polyuria. Genitourinary: Negative for difficulty urinating, discharge, dysuria, penile pain, penile swelling and scrotal swelling. Musculoskeletal: Negative for back pain, joint swelling, neck pain and neck stiffness. Neuropathic arthritis   Skin: Negative for color change, pallor and rash. Allergic/Immunologic: Negative for environmental allergies and food allergies. Neurological: Negative for dizziness, tremors, facial asymmetry, speech difficulty, weakness and headaches. Psychiatric/Behavioral: Negative for agitation, behavioral problems, self-injury and suicidal ideas. Prior to Visit Medications    Medication Sig Taking?  Authorizing Provider   hydrochlorothiazide (MICROZIDE) 12.5 MG capsule Take 1 capsule by mouth 2 times daily Yes MIMI Beach   irbesartan (AVAPRO) 150 MG tablet Take 1 tablet by mouth daily Yes MIMI Beach   gabapentin (NEURONTIN) 300 MG capsule TAKE 1 CAPSULE BY MOUTH THREE

## 2018-12-21 ENCOUNTER — TELEPHONE (OUTPATIENT)
Dept: PRIMARY CARE CLINIC | Age: 63
End: 2018-12-21

## 2018-12-21 LAB
ALBUMIN SERPL-MCNC: 4.6 G/DL (ref 3.75–5.01)
ALPHA-1-GLOBULIN: 0.33 G/DL (ref 0.19–0.46)
ALPHA-2-GLOBULIN: 1.08 G/DL (ref 0.48–1.05)
BETA GLOBULIN: 0.86 G/DL (ref 0.48–1.1)
GAMMA GLOBULIN: 0.93 G/DL (ref 0.62–1.51)
PROTEIN ELECTROPHORESIS, SERUM: ABNORMAL
SPE/IFE INTERPRETATION: ABNORMAL
TOTAL PROTEIN: 7.8 G/DL (ref 6–8.3)

## 2018-12-22 ENCOUNTER — APPOINTMENT (OUTPATIENT)
Dept: CT IMAGING | Age: 63
End: 2018-12-22
Payer: MEDICARE

## 2018-12-22 ENCOUNTER — HOSPITAL ENCOUNTER (EMERGENCY)
Age: 63
Discharge: HOME | End: 2018-12-22
Payer: MEDICARE

## 2018-12-22 VITALS
BODY MASS INDEX: 38.65 KG/M2 | TEMPERATURE: 97.8 F | SYSTOLIC BLOOD PRESSURE: 123 MMHG | HEART RATE: 68 BPM | RESPIRATION RATE: 16 BRPM | WEIGHT: 285 LBS | OXYGEN SATURATION: 99 % | DIASTOLIC BLOOD PRESSURE: 76 MMHG

## 2018-12-22 DIAGNOSIS — R42 DIZZINESS: Primary | ICD-10-CM

## 2018-12-22 LAB
ALBUMIN SERPL-MCNC: 4.2 G/DL (ref 3.5–5.2)
ALP BLD-CCNC: 89 U/L (ref 40–130)
ALT SERPL-CCNC: 17 U/L (ref 5–41)
ANION GAP SERPL CALCULATED.3IONS-SCNC: 12 MMOL/L (ref 7–19)
AST SERPL-CCNC: 14 U/L (ref 5–40)
BASOPHILS ABSOLUTE: 0.1 K/UL (ref 0–0.2)
BASOPHILS RELATIVE PERCENT: 1.2 % (ref 0–1)
BILIRUB SERPL-MCNC: 0.3 MG/DL (ref 0.2–1.2)
BUN BLDV-MCNC: 22 MG/DL (ref 8–23)
CALCIUM SERPL-MCNC: 9.8 MG/DL (ref 8.8–10.2)
CHLORIDE BLD-SCNC: 100 MMOL/L (ref 98–111)
CO2: 26 MMOL/L (ref 22–29)
CREAT SERPL-MCNC: 1.4 MG/DL (ref 0.5–1.2)
EOSINOPHILS ABSOLUTE: 0.2 K/UL (ref 0–0.6)
EOSINOPHILS RELATIVE PERCENT: 2.4 % (ref 0–5)
GFR NON-AFRICAN AMERICAN: 51
GLUCOSE BLD-MCNC: 165 MG/DL (ref 74–109)
HCT VFR BLD CALC: 41.9 % (ref 42–52)
HEMOGLOBIN: 13.1 G/DL (ref 14–18)
LYMPHOCYTES ABSOLUTE: 2.5 K/UL (ref 1.1–4.5)
LYMPHOCYTES RELATIVE PERCENT: 27.7 % (ref 20–40)
MCH RBC QN AUTO: 26.8 PG (ref 27–31)
MCHC RBC AUTO-ENTMCNC: 31.3 G/DL (ref 33–37)
MCV RBC AUTO: 85.7 FL (ref 80–94)
MONOCYTES ABSOLUTE: 0.7 K/UL (ref 0–0.9)
MONOCYTES RELATIVE PERCENT: 7.4 % (ref 0–10)
NEUTROPHILS ABSOLUTE: 5.4 K/UL (ref 1.5–7.5)
NEUTROPHILS RELATIVE PERCENT: 61 % (ref 50–65)
PDW BLD-RTO: 13.6 % (ref 11.5–14.5)
PLATELET # BLD: 210 K/UL (ref 130–400)
PMV BLD AUTO: 11.7 FL (ref 9.4–12.4)
POTASSIUM SERPL-SCNC: 4.9 MMOL/L (ref 3.5–5)
RBC # BLD: 4.89 M/UL (ref 4.7–6.1)
SODIUM BLD-SCNC: 138 MMOL/L (ref 136–145)
TOTAL PROTEIN: 7.3 G/DL (ref 6.6–8.7)
TROPONIN: 0.01 NG/ML (ref 0–0.03)
WBC # BLD: 8.9 K/UL (ref 4.8–10.8)

## 2018-12-22 PROCEDURE — 84484 ASSAY OF TROPONIN QUANT: CPT

## 2018-12-22 PROCEDURE — 80053 COMPREHEN METABOLIC PANEL: CPT

## 2018-12-22 PROCEDURE — 2580000003 HC RX 258: Performed by: NURSE PRACTITIONER

## 2018-12-22 PROCEDURE — 6360000002 HC RX W HCPCS: Performed by: NURSE PRACTITIONER

## 2018-12-22 PROCEDURE — 96374 THER/PROPH/DIAG INJ IV PUSH: CPT

## 2018-12-22 PROCEDURE — 70450 CT HEAD/BRAIN W/O DYE: CPT

## 2018-12-22 PROCEDURE — 85025 COMPLETE CBC W/AUTO DIFF WBC: CPT

## 2018-12-22 PROCEDURE — 93005 ELECTROCARDIOGRAM TRACING: CPT

## 2018-12-22 PROCEDURE — 36415 COLL VENOUS BLD VENIPUNCTURE: CPT

## 2018-12-22 PROCEDURE — 99285 EMERGENCY DEPT VISIT HI MDM: CPT | Performed by: NURSE PRACTITIONER

## 2018-12-22 PROCEDURE — 99284 EMERGENCY DEPT VISIT MOD MDM: CPT

## 2018-12-22 RX ORDER — 0.9 % SODIUM CHLORIDE 0.9 %
1000 INTRAVENOUS SOLUTION INTRAVENOUS ONCE
Status: COMPLETED | OUTPATIENT
Start: 2018-12-22 | End: 2018-12-22

## 2018-12-22 RX ADMIN — SODIUM CHLORIDE 1000 ML: 9 INJECTION, SOLUTION INTRAVENOUS at 11:08

## 2018-12-22 RX ADMIN — PROCHLORPERAZINE EDISYLATE 5 MG: 5 INJECTION INTRAMUSCULAR; INTRAVENOUS at 11:15

## 2018-12-22 ASSESSMENT — PAIN SCALES - GENERAL: PAINLEVEL_OUTOF10: 4

## 2018-12-22 ASSESSMENT — ENCOUNTER SYMPTOMS
VOMITING: 0
RESPIRATORY NEGATIVE: 1

## 2018-12-22 ASSESSMENT — PAIN SCALES - WONG BAKER: WONGBAKER_NUMERICALRESPONSE: 0

## 2018-12-22 ASSESSMENT — PAIN DESCRIPTION - PAIN TYPE: TYPE: ACUTE PAIN

## 2018-12-22 NOTE — ED PROVIDER NOTES
[facility]  eMERGENCY dEPARTMENT eNCOUnter      Pt Name: Estee Livingston  MRN: [id number]  [place] 1955  Date of evaluation: 12/22/2018  Provider: Sheri Fu, [address]       Chief Complaint   Patient presents with    Dizziness         HISTORY OF PRESENT ILLNESS   (Location/Symptom, Timing/Onset,Context/Setting, Quality, Duration, Modifying Factors, Severity)  Note limiting factors. Chuy Gutierrez a 70 y.o. male who presents to the emergency department for evaluation of dizziness. Pt with history of peripheral neuropathy, diabetes and CAD with dizziness yesterday improved somewhat today associated with elevated blood pressure at home. He has had no double vision, loss of vision or blind spots. He tells me that he had \"flickering\" of vision yesterday. He has had generalized headache and describes dizziness as imbalance. He does not endorse hearing change, lateralizing weakness or changes in speech. He has had no chest pain or shortness of breath. He denies recent changed to medication regimen. HPI    Nursing Notes were reviewed. REVIEW OF SYSTEMS    (2-9 systems for level 4, 10 or more for level 5)     Review of Systems   Constitutional: Negative. Respiratory: Negative. Cardiovascular: Negative. Gastrointestinal: Negative for vomiting. Neurological: Positive for dizziness, light-headedness and headaches. Negative for weakness and numbness. A complete review of systems was performed and is negative except as noted above in the HPI.        PAST MEDICAL HISTORY     Past Medical History:   Diagnosis Date    Aneurysm ([address]     abdominal    Bilateral leg edema     CAD (coronary artery disease)     Chronic kidney disease     Stage 4    Diabetes mellitus, type 2 (HCC)     Fatty liver     GERD (gastroesophageal reflux disease)     HTN (hypertension)     Seasonal allergies          SURGICAL HISTORY       Past Surgical History:   Procedure Laterality Date    CARPAL bilateral upper/lower extremities   Neurological: He is alert and oriented to person, place, and time. No cranial nerve deficit. Coordination normal.   Normal finger to nose testing bilaterally  Normal romberg test   Skin: Skin is warm and dry. Vitals reviewed. DIAGNOSTIC RESULTS     EKG: All EKG's are interpreted by the Emergency Department Physician who either signs or Co-signs this chart in the absence of acardiologist.        RADIOLOGY:   Non-plain film images such as CT, Ultrasound andMRI are read by the radiologist. Plain radiographic images are visualized and preliminarily interpreted by the emergency physician with the below findings:        Interpretation per the Radiologist below, if available at the time of this note:    CT Head WO Contrast   Final Result   Impression:    1. No CT evidence of an acute intracranial process. 2. Paranasal sinus disease. Signed by Dr Jania Velazquez on 12/22/2018 11:30 AM            ED BEDSIDE ULTRASOUND:   Performed by ED Physician - none    LABS:  Labs Reviewed   CBC WITH AUTO DIFFERENTIAL - Abnormal; Notable for the following:        Result Value    Hemoglobin 13.1 (*)     Hematocrit 41.9 (*)     MCH 26.8 (*)     MCHC 31.3 (*)     Basophils % 1.2 (*)     All other components within normal limits   COMPREHENSIVE METABOLIC PANEL - Abnormal; Notable for the following:     Glucose 165 (*)     CREATININE 1.4 (*)     GFR Non- 51 (*)     All other components within normal limits   TROPONIN       All other labs were within normal range or not returned as of this dictation. RE-ASSESSMENT     Pt is ambulatory and in no distress at discharge.        EMERGENCY DEPARTMENT COURSE and DIFFERENTIALDIAGNOSIS/MDM:   Vitals:    Vitals:    12/22/18 1109 12/22/18 1111 12/22/18 1159 12/22/18 1241   BP: (!) 146/74  126/76 123/76   Pulse: 64  65 68   Resp:   20 16   Temp:   97.8 °F (36.6 °C) 97.8 °F (36.6 °C)   TempSrc:   Oral Oral   SpO2:  97% 99% 99%

## 2018-12-25 LAB
EKG P AXIS: 27 DEGREES
EKG P-R INTERVAL: 170 MS
EKG Q-T INTERVAL: 392 MS
EKG QRS DURATION: 100 MS
EKG QTC CALCULATION (BAZETT): 400 MS
EKG T AXIS: 72 DEGREES

## 2019-01-02 ENCOUNTER — TELEPHONE (OUTPATIENT)
Dept: CARDIOLOGY | Age: 64
End: 2019-01-02

## 2019-01-03 ENCOUNTER — TELEPHONE (OUTPATIENT)
Dept: PRIMARY CARE CLINIC | Age: 64
End: 2019-01-03

## 2019-01-03 DIAGNOSIS — R73.9 HYPERGLYCEMIA: ICD-10-CM

## 2019-01-04 RX ORDER — INSULIN LISPRO 100 [IU]/ML
INJECTION, SOLUTION INTRAVENOUS; SUBCUTANEOUS
Qty: 5 PEN | Refills: 3 | Status: SHIPPED | OUTPATIENT
Start: 2019-01-04 | End: 2019-03-20 | Stop reason: SDUPTHER

## 2019-01-15 ENCOUNTER — OFFICE VISIT (OUTPATIENT)
Dept: CARDIOLOGY | Age: 64
End: 2019-01-15
Payer: MEDICARE

## 2019-01-15 VITALS
SYSTOLIC BLOOD PRESSURE: 136 MMHG | BODY MASS INDEX: 38.87 KG/M2 | HEART RATE: 68 BPM | DIASTOLIC BLOOD PRESSURE: 66 MMHG | WEIGHT: 287 LBS | HEIGHT: 72 IN

## 2019-01-15 DIAGNOSIS — I10 ESSENTIAL HYPERTENSION: ICD-10-CM

## 2019-01-15 DIAGNOSIS — I25.10 CORONARY ARTERY DISEASE INVOLVING NATIVE CORONARY ARTERY OF NATIVE HEART WITHOUT ANGINA PECTORIS: Primary | ICD-10-CM

## 2019-01-15 PROCEDURE — G8482 FLU IMMUNIZE ORDER/ADMIN: HCPCS | Performed by: INTERNAL MEDICINE

## 2019-01-15 PROCEDURE — 1036F TOBACCO NON-USER: CPT | Performed by: INTERNAL MEDICINE

## 2019-01-15 PROCEDURE — G8598 ASA/ANTIPLAT THER USED: HCPCS | Performed by: INTERNAL MEDICINE

## 2019-01-15 PROCEDURE — 1101F PT FALLS ASSESS-DOCD LE1/YR: CPT | Performed by: INTERNAL MEDICINE

## 2019-01-15 PROCEDURE — 4040F PNEUMOC VAC/ADMIN/RCVD: CPT | Performed by: INTERNAL MEDICINE

## 2019-01-15 PROCEDURE — G8417 CALC BMI ABV UP PARAM F/U: HCPCS | Performed by: INTERNAL MEDICINE

## 2019-01-15 PROCEDURE — 3017F COLORECTAL CA SCREEN DOC REV: CPT | Performed by: INTERNAL MEDICINE

## 2019-01-15 PROCEDURE — 99212 OFFICE O/P EST SF 10 MIN: CPT | Performed by: INTERNAL MEDICINE

## 2019-01-15 PROCEDURE — 1123F ACP DISCUSS/DSCN MKR DOCD: CPT | Performed by: INTERNAL MEDICINE

## 2019-01-15 PROCEDURE — G8427 DOCREV CUR MEDS BY ELIG CLIN: HCPCS | Performed by: INTERNAL MEDICINE

## 2019-01-15 RX ORDER — IRBESARTAN 150 MG/1
150 TABLET ORAL DAILY
Qty: 90 TABLET | Refills: 3 | Status: SHIPPED | OUTPATIENT
Start: 2019-01-15 | End: 2019-03-20 | Stop reason: SDUPTHER

## 2019-01-15 RX ORDER — PRASUGREL 10 MG/1
TABLET, FILM COATED ORAL
Qty: 90 TABLET | Refills: 3 | Status: SHIPPED | OUTPATIENT
Start: 2019-01-15 | End: 2019-03-20 | Stop reason: SDUPTHER

## 2019-01-15 RX ORDER — HYDROCHLOROTHIAZIDE 12.5 MG/1
12.5 CAPSULE, GELATIN COATED ORAL 2 TIMES DAILY
Qty: 180 CAPSULE | Refills: 3 | Status: SHIPPED | OUTPATIENT
Start: 2019-01-15 | End: 2019-03-20 | Stop reason: SDUPTHER

## 2019-03-20 ENCOUNTER — OFFICE VISIT (OUTPATIENT)
Dept: PRIMARY CARE CLINIC | Age: 64
End: 2019-03-20
Payer: MEDICARE

## 2019-03-20 VITALS — WEIGHT: 289 LBS | TEMPERATURE: 98 F | HEIGHT: 71 IN | BODY MASS INDEX: 40.46 KG/M2

## 2019-03-20 DIAGNOSIS — K21.9 CHRONIC GERD: ICD-10-CM

## 2019-03-20 DIAGNOSIS — Z76.0 MEDICATION REFILL: ICD-10-CM

## 2019-03-20 DIAGNOSIS — R73.9 HYPERGLYCEMIA: ICD-10-CM

## 2019-03-20 DIAGNOSIS — E11.59 TYPE 2 DIABETES MELLITUS WITH OTHER CIRCULATORY COMPLICATION, WITH LONG-TERM CURRENT USE OF INSULIN (HCC): ICD-10-CM

## 2019-03-20 DIAGNOSIS — I10 ESSENTIAL HYPERTENSION: ICD-10-CM

## 2019-03-20 DIAGNOSIS — J32.9 SINOBRONCHITIS: ICD-10-CM

## 2019-03-20 DIAGNOSIS — J40 SINOBRONCHITIS: ICD-10-CM

## 2019-03-20 DIAGNOSIS — Z79.4 TYPE 2 DIABETES MELLITUS WITH OTHER CIRCULATORY COMPLICATION, WITH LONG-TERM CURRENT USE OF INSULIN (HCC): ICD-10-CM

## 2019-03-20 LAB — HBA1C MFR BLD: 8.9 %

## 2019-03-20 PROCEDURE — 3017F COLORECTAL CA SCREEN DOC REV: CPT | Performed by: NURSE PRACTITIONER

## 2019-03-20 PROCEDURE — 99214 OFFICE O/P EST MOD 30 MIN: CPT | Performed by: NURSE PRACTITIONER

## 2019-03-20 PROCEDURE — G8482 FLU IMMUNIZE ORDER/ADMIN: HCPCS | Performed by: NURSE PRACTITIONER

## 2019-03-20 PROCEDURE — 1101F PT FALLS ASSESS-DOCD LE1/YR: CPT | Performed by: NURSE PRACTITIONER

## 2019-03-20 PROCEDURE — G8598 ASA/ANTIPLAT THER USED: HCPCS | Performed by: NURSE PRACTITIONER

## 2019-03-20 PROCEDURE — 83036 HEMOGLOBIN GLYCOSYLATED A1C: CPT | Performed by: NURSE PRACTITIONER

## 2019-03-20 PROCEDURE — 1036F TOBACCO NON-USER: CPT | Performed by: NURSE PRACTITIONER

## 2019-03-20 PROCEDURE — 1123F ACP DISCUSS/DSCN MKR DOCD: CPT | Performed by: NURSE PRACTITIONER

## 2019-03-20 PROCEDURE — G8417 CALC BMI ABV UP PARAM F/U: HCPCS | Performed by: NURSE PRACTITIONER

## 2019-03-20 PROCEDURE — 4040F PNEUMOC VAC/ADMIN/RCVD: CPT | Performed by: NURSE PRACTITIONER

## 2019-03-20 PROCEDURE — 3045F PR MOST RECENT HEMOGLOBIN A1C LEVEL 7.0-9.0%: CPT | Performed by: NURSE PRACTITIONER

## 2019-03-20 PROCEDURE — G8427 DOCREV CUR MEDS BY ELIG CLIN: HCPCS | Performed by: NURSE PRACTITIONER

## 2019-03-20 PROCEDURE — 2022F DILAT RTA XM EVC RTNOPTHY: CPT | Performed by: NURSE PRACTITIONER

## 2019-03-20 RX ORDER — IRBESARTAN 150 MG/1
150 TABLET ORAL DAILY
Qty: 90 TABLET | Refills: 3 | Status: SHIPPED | OUTPATIENT
Start: 2019-03-20 | End: 2019-06-12 | Stop reason: SDUPTHER

## 2019-03-20 RX ORDER — METOPROLOL TARTRATE 50 MG/1
TABLET, FILM COATED ORAL
Qty: 180 TABLET | Refills: 3 | Status: SHIPPED | OUTPATIENT
Start: 2019-03-20 | End: 2019-06-12 | Stop reason: SDUPTHER

## 2019-03-20 RX ORDER — HYDROCHLOROTHIAZIDE 12.5 MG/1
12.5 CAPSULE, GELATIN COATED ORAL 2 TIMES DAILY
Qty: 180 CAPSULE | Refills: 3 | Status: SHIPPED | OUTPATIENT
Start: 2019-03-20 | End: 2019-06-12 | Stop reason: SDUPTHER

## 2019-03-20 RX ORDER — LANSOPRAZOLE 30 MG/1
CAPSULE, DELAYED RELEASE ORAL
Qty: 90 CAPSULE | Refills: 3 | Status: SHIPPED | OUTPATIENT
Start: 2019-03-20 | End: 2019-06-12 | Stop reason: SDUPTHER

## 2019-03-20 RX ORDER — LORATADINE 10 MG/1
10 TABLET ORAL DAILY
Qty: 90 TABLET | Refills: 3 | Status: SHIPPED | OUTPATIENT
Start: 2019-03-20 | End: 2019-06-12 | Stop reason: SDUPTHER

## 2019-03-20 RX ORDER — DEXTROMETHORPHAN HYDROBROMIDE AND PROMETHAZINE HYDROCHLORIDE 15; 6.25 MG/5ML; MG/5ML
5 SYRUP ORAL 4 TIMES DAILY PRN
Qty: 120 ML | Refills: 2 | Status: SHIPPED | OUTPATIENT
Start: 2019-03-20 | End: 2019-03-27

## 2019-03-20 RX ORDER — PRASUGREL 10 MG/1
TABLET, FILM COATED ORAL
Qty: 90 TABLET | Refills: 3 | Status: SHIPPED | OUTPATIENT
Start: 2019-03-20 | End: 2019-06-12 | Stop reason: SDUPTHER

## 2019-03-20 RX ORDER — IPRATROPIUM BROMIDE AND ALBUTEROL SULFATE 2.5; .5 MG/3ML; MG/3ML
1 SOLUTION RESPIRATORY (INHALATION) EVERY 6 HOURS
Qty: 360 ML | Refills: 1 | Status: SHIPPED | OUTPATIENT
Start: 2019-03-20 | End: 2021-07-12

## 2019-03-20 ASSESSMENT — ENCOUNTER SYMPTOMS
CONSTIPATION: 0
STRIDOR: 0
CHEST TIGHTNESS: 0
VOMITING: 0
DIARRHEA: 0
WHEEZING: 0
RHINORRHEA: 1
SINUS PRESSURE: 1
APNEA: 0
ABDOMINAL PAIN: 0
COUGH: 1
BACK PAIN: 0
SHORTNESS OF BREATH: 0
ABDOMINAL DISTENTION: 0
SINUS PAIN: 1
CHOKING: 0
COLOR CHANGE: 0

## 2019-03-20 ASSESSMENT — PATIENT HEALTH QUESTIONNAIRE - PHQ9
2. FEELING DOWN, DEPRESSED OR HOPELESS: 0
SUM OF ALL RESPONSES TO PHQ QUESTIONS 1-9: 0
1. LITTLE INTEREST OR PLEASURE IN DOING THINGS: 0
SUM OF ALL RESPONSES TO PHQ QUESTIONS 1-9: 0
SUM OF ALL RESPONSES TO PHQ9 QUESTIONS 1 & 2: 0

## 2019-03-29 DIAGNOSIS — E11.59 TYPE 2 DIABETES MELLITUS WITH OTHER CIRCULATORY COMPLICATION, WITH LONG-TERM CURRENT USE OF INSULIN (HCC): Primary | ICD-10-CM

## 2019-03-29 DIAGNOSIS — Z79.4 TYPE 2 DIABETES MELLITUS WITH OTHER CIRCULATORY COMPLICATION, WITH LONG-TERM CURRENT USE OF INSULIN (HCC): Primary | ICD-10-CM

## 2019-04-25 DIAGNOSIS — B36.9 DERMATITIS FUNGAL: ICD-10-CM

## 2019-06-03 ENCOUNTER — APPOINTMENT (OUTPATIENT)
Dept: CT IMAGING | Age: 64
DRG: 247 | End: 2019-06-03
Payer: MEDICARE

## 2019-06-03 ENCOUNTER — HOSPITAL ENCOUNTER (INPATIENT)
Age: 64
LOS: 2 days | Discharge: HOME | DRG: 247 | End: 2019-06-07
Attending: EMERGENCY MEDICINE | Admitting: INTERNAL MEDICINE
Payer: MEDICARE

## 2019-06-03 ENCOUNTER — APPOINTMENT (OUTPATIENT)
Dept: GENERAL RADIOLOGY | Age: 64
DRG: 247 | End: 2019-06-03
Payer: MEDICARE

## 2019-06-03 ENCOUNTER — TELEPHONE (OUTPATIENT)
Dept: CARDIOLOGY | Age: 64
End: 2019-06-03

## 2019-06-03 DIAGNOSIS — R93.89 ABNORMAL CT SCAN: Primary | ICD-10-CM

## 2019-06-03 DIAGNOSIS — I21.4 NSTEMI (NON-ST ELEVATED MYOCARDIAL INFARCTION) (HCC): ICD-10-CM

## 2019-06-03 PROBLEM — I24.9 ACS (ACUTE CORONARY SYNDROME) (HCC): Status: ACTIVE | Noted: 2019-06-03

## 2019-06-03 LAB
ALBUMIN SERPL-MCNC: 4 G/DL (ref 3.5–5.2)
ALP BLD-CCNC: 77 U/L (ref 40–130)
ALT SERPL-CCNC: 19 U/L (ref 5–41)
ANION GAP SERPL CALCULATED.3IONS-SCNC: 11 MMOL/L (ref 7–19)
AST SERPL-CCNC: 13 U/L (ref 5–40)
BACTERIA: NEGATIVE /HPF
BASOPHILS ABSOLUTE: 0.1 K/UL (ref 0–0.2)
BASOPHILS RELATIVE PERCENT: 1 % (ref 0–1)
BILIRUB SERPL-MCNC: <0.2 MG/DL (ref 0.2–1.2)
BILIRUBIN URINE: NEGATIVE
BLOOD, URINE: NEGATIVE
BUN BLDV-MCNC: 25 MG/DL (ref 8–23)
CALCIUM SERPL-MCNC: 9.5 MG/DL (ref 8.8–10.2)
CHLORIDE BLD-SCNC: 101 MMOL/L (ref 98–111)
CLARITY: CLEAR
CO2: 26 MMOL/L (ref 22–29)
COLOR: YELLOW
CREAT SERPL-MCNC: 1.4 MG/DL (ref 0.5–1.2)
D DIMER: 0.87 UG/ML FEU (ref 0–0.48)
EOSINOPHILS ABSOLUTE: 0.2 K/UL (ref 0–0.6)
EOSINOPHILS RELATIVE PERCENT: 2.4 % (ref 0–5)
EPITHELIAL CELLS, UA: 0 /HPF (ref 0–5)
GFR NON-AFRICAN AMERICAN: 51
GLUCOSE BLD-MCNC: 213 MG/DL (ref 70–99)
GLUCOSE BLD-MCNC: 270 MG/DL (ref 74–109)
GLUCOSE BLD-MCNC: 285 MG/DL (ref 70–99)
GLUCOSE URINE: >=1000 MG/DL
HCT VFR BLD CALC: 37.1 % (ref 42–52)
HEMOGLOBIN: 11.5 G/DL (ref 14–18)
HYALINE CASTS: 0 /HPF (ref 0–8)
KETONES, URINE: NEGATIVE MG/DL
LEUKOCYTE ESTERASE, URINE: NEGATIVE
LYMPHOCYTES ABSOLUTE: 1.6 K/UL (ref 1.1–4.5)
LYMPHOCYTES RELATIVE PERCENT: 21.8 % (ref 20–40)
MCH RBC QN AUTO: 26.8 PG (ref 27–31)
MCHC RBC AUTO-ENTMCNC: 31 G/DL (ref 33–37)
MCV RBC AUTO: 86.5 FL (ref 80–94)
MONOCYTES ABSOLUTE: 0.5 K/UL (ref 0–0.9)
MONOCYTES RELATIVE PERCENT: 7.3 % (ref 0–10)
NEUTROPHILS ABSOLUTE: 4.9 K/UL (ref 1.5–7.5)
NEUTROPHILS RELATIVE PERCENT: 67.1 % (ref 50–65)
NITRITE, URINE: NEGATIVE
PDW BLD-RTO: 13.6 % (ref 11.5–14.5)
PERFORMED ON: ABNORMAL
PERFORMED ON: ABNORMAL
PH UA: 5.5 (ref 5–8)
PLATELET # BLD: 165 K/UL (ref 130–400)
PMV BLD AUTO: 12.7 FL (ref 9.4–12.4)
POTASSIUM SERPL-SCNC: 4.9 MMOL/L (ref 3.5–5)
PRO-BNP: 1581 PG/ML (ref 0–900)
PROTEIN UA: 30 MG/DL
RBC # BLD: 4.29 M/UL (ref 4.7–6.1)
RBC UA: 1 /HPF (ref 0–4)
SODIUM BLD-SCNC: 138 MMOL/L (ref 136–145)
SPECIFIC GRAVITY UA: 1.02 (ref 1–1.03)
TOTAL PROTEIN: 6.2 G/DL (ref 6.6–8.7)
TROPONIN: 0.1 NG/ML (ref 0–0.03)
TROPONIN: 0.11 NG/ML (ref 0–0.03)
URINE REFLEX TO CULTURE: ABNORMAL
UROBILINOGEN, URINE: 0.2 E.U./DL
WBC # BLD: 7.4 K/UL (ref 4.8–10.8)
WBC UA: 0 /HPF (ref 0–5)

## 2019-06-03 PROCEDURE — 6360000002 HC RX W HCPCS: Performed by: INTERNAL MEDICINE

## 2019-06-03 PROCEDURE — 85025 COMPLETE CBC W/AUTO DIFF WBC: CPT

## 2019-06-03 PROCEDURE — G0378 HOSPITAL OBSERVATION PER HR: HCPCS

## 2019-06-03 PROCEDURE — 6370000000 HC RX 637 (ALT 250 FOR IP): Performed by: NURSE PRACTITIONER

## 2019-06-03 PROCEDURE — 96374 THER/PROPH/DIAG INJ IV PUSH: CPT

## 2019-06-03 PROCEDURE — 84484 ASSAY OF TROPONIN QUANT: CPT

## 2019-06-03 PROCEDURE — 6370000000 HC RX 637 (ALT 250 FOR IP): Performed by: INTERNAL MEDICINE

## 2019-06-03 PROCEDURE — 83880 ASSAY OF NATRIURETIC PEPTIDE: CPT

## 2019-06-03 PROCEDURE — 96372 THER/PROPH/DIAG INJ SC/IM: CPT

## 2019-06-03 PROCEDURE — 6360000004 HC RX CONTRAST MEDICATION: Performed by: EMERGENCY MEDICINE

## 2019-06-03 PROCEDURE — 80053 COMPREHEN METABOLIC PANEL: CPT

## 2019-06-03 PROCEDURE — 99220 PR INITIAL OBSERVATION CARE/DAY 70 MINUTES: CPT | Performed by: INTERNAL MEDICINE

## 2019-06-03 PROCEDURE — 81001 URINALYSIS AUTO W/SCOPE: CPT

## 2019-06-03 PROCEDURE — 36415 COLL VENOUS BLD VENIPUNCTURE: CPT

## 2019-06-03 PROCEDURE — 93005 ELECTROCARDIOGRAM TRACING: CPT

## 2019-06-03 PROCEDURE — 99285 EMERGENCY DEPT VISIT HI MDM: CPT

## 2019-06-03 PROCEDURE — 6360000002 HC RX W HCPCS: Performed by: NURSE PRACTITIONER

## 2019-06-03 PROCEDURE — 85379 FIBRIN DEGRADATION QUANT: CPT

## 2019-06-03 PROCEDURE — 71275 CT ANGIOGRAPHY CHEST: CPT

## 2019-06-03 PROCEDURE — 94640 AIRWAY INHALATION TREATMENT: CPT

## 2019-06-03 PROCEDURE — 99285 EMERGENCY DEPT VISIT HI MDM: CPT | Performed by: EMERGENCY MEDICINE

## 2019-06-03 PROCEDURE — 82948 REAGENT STRIP/BLOOD GLUCOSE: CPT

## 2019-06-03 PROCEDURE — 71046 X-RAY EXAM CHEST 2 VIEWS: CPT

## 2019-06-03 RX ORDER — IRBESARTAN 150 MG/1
150 TABLET ORAL NIGHTLY
Status: DISCONTINUED | OUTPATIENT
Start: 2019-06-03 | End: 2019-06-07 | Stop reason: HOSPADM

## 2019-06-03 RX ORDER — METOPROLOL TARTRATE 50 MG/1
50 TABLET, FILM COATED ORAL 2 TIMES DAILY
Status: DISCONTINUED | OUTPATIENT
Start: 2019-06-03 | End: 2019-06-03 | Stop reason: SDUPTHER

## 2019-06-03 RX ORDER — BLOOD PRESSURE TEST KIT
1 KIT MISCELLANEOUS
Status: DISCONTINUED | OUTPATIENT
Start: 2019-06-03 | End: 2019-06-03 | Stop reason: RX

## 2019-06-03 RX ORDER — IPRATROPIUM BROMIDE AND ALBUTEROL SULFATE 2.5; .5 MG/3ML; MG/3ML
1 SOLUTION RESPIRATORY (INHALATION) ONCE
Status: COMPLETED | OUTPATIENT
Start: 2019-06-03 | End: 2019-06-03

## 2019-06-03 RX ORDER — HYDROCHLOROTHIAZIDE 12.5 MG/1
12.5 CAPSULE, GELATIN COATED ORAL 2 TIMES DAILY
Status: DISCONTINUED | OUTPATIENT
Start: 2019-06-03 | End: 2019-06-05

## 2019-06-03 RX ORDER — LOSARTAN POTASSIUM 50 MG/1
50 TABLET ORAL DAILY
Status: DISCONTINUED | OUTPATIENT
Start: 2019-06-04 | End: 2019-06-03 | Stop reason: SDUPTHER

## 2019-06-03 RX ORDER — IRBESARTAN 150 MG/1
150 TABLET ORAL DAILY
Status: DISCONTINUED | OUTPATIENT
Start: 2019-06-03 | End: 2019-06-03 | Stop reason: CLARIF

## 2019-06-03 RX ORDER — METOPROLOL TARTRATE 50 MG/1
50 TABLET, FILM COATED ORAL 2 TIMES DAILY
Status: DISCONTINUED | OUTPATIENT
Start: 2019-06-03 | End: 2019-06-07 | Stop reason: HOSPADM

## 2019-06-03 RX ORDER — IPRATROPIUM BROMIDE AND ALBUTEROL SULFATE 2.5; .5 MG/3ML; MG/3ML
1 SOLUTION RESPIRATORY (INHALATION) EVERY 6 HOURS
Status: DISCONTINUED | OUTPATIENT
Start: 2019-06-03 | End: 2019-06-07 | Stop reason: HOSPADM

## 2019-06-03 RX ORDER — ALBUTEROL SULFATE 90 UG/1
2 AEROSOL, METERED RESPIRATORY (INHALATION) EVERY 6 HOURS PRN
Status: DISCONTINUED | OUTPATIENT
Start: 2019-06-03 | End: 2019-06-07 | Stop reason: HOSPADM

## 2019-06-03 RX ORDER — PRASUGREL 10 MG/1
10 TABLET, FILM COATED ORAL DAILY
Status: DISCONTINUED | OUTPATIENT
Start: 2019-06-04 | End: 2019-06-03 | Stop reason: SDUPTHER

## 2019-06-03 RX ORDER — HYDROCHLOROTHIAZIDE 12.5 MG/1
12.5 CAPSULE, GELATIN COATED ORAL 2 TIMES DAILY
Status: DISCONTINUED | OUTPATIENT
Start: 2019-06-03 | End: 2019-06-03 | Stop reason: SDUPTHER

## 2019-06-03 RX ORDER — FUROSEMIDE 10 MG/ML
20 INJECTION INTRAMUSCULAR; INTRAVENOUS ONCE
Status: COMPLETED | OUTPATIENT
Start: 2019-06-03 | End: 2019-06-03

## 2019-06-03 RX ORDER — ASPIRIN 81 MG/1
81 TABLET, CHEWABLE ORAL DAILY
Status: DISCONTINUED | OUTPATIENT
Start: 2019-06-04 | End: 2019-06-03 | Stop reason: SDUPTHER

## 2019-06-03 RX ORDER — ASPIRIN 81 MG/1
81 TABLET, CHEWABLE ORAL DAILY
Status: DISCONTINUED | OUTPATIENT
Start: 2019-06-03 | End: 2019-06-03 | Stop reason: SDUPTHER

## 2019-06-03 RX ORDER — ONDANSETRON 2 MG/ML
4 INJECTION INTRAMUSCULAR; INTRAVENOUS EVERY 6 HOURS PRN
Status: DISCONTINUED | OUTPATIENT
Start: 2019-06-03 | End: 2019-06-07 | Stop reason: HOSPADM

## 2019-06-03 RX ORDER — ASPIRIN 325 MG
325 TABLET ORAL ONCE
Status: COMPLETED | OUTPATIENT
Start: 2019-06-03 | End: 2019-06-03

## 2019-06-03 RX ORDER — NITROGLYCERIN 0.4 MG/1
0.4 TABLET SUBLINGUAL EVERY 5 MIN PRN
Status: DISCONTINUED | OUTPATIENT
Start: 2019-06-03 | End: 2019-06-07 | Stop reason: HOSPADM

## 2019-06-03 RX ORDER — PRASUGREL 10 MG/1
10 TABLET, FILM COATED ORAL DAILY
Status: DISCONTINUED | OUTPATIENT
Start: 2019-06-04 | End: 2019-06-07 | Stop reason: HOSPADM

## 2019-06-03 RX ORDER — ASPIRIN 81 MG/1
81 TABLET, CHEWABLE ORAL DAILY
Status: DISCONTINUED | OUTPATIENT
Start: 2019-06-04 | End: 2019-06-07 | Stop reason: HOSPADM

## 2019-06-03 RX ORDER — MULTIVITAMIN WITH FOLIC ACID 400 MCG
1 TABLET ORAL DAILY
Status: DISCONTINUED | OUTPATIENT
Start: 2019-06-03 | End: 2019-06-07 | Stop reason: HOSPADM

## 2019-06-03 RX ORDER — ERGOCALCIFEROL 1.25 MG/1
50000 CAPSULE ORAL
Status: DISCONTINUED | OUTPATIENT
Start: 2019-06-10 | End: 2019-06-06

## 2019-06-03 RX ORDER — GUAIFENESIN 200 MG/1
400 TABLET ORAL 4 TIMES DAILY PRN
Status: DISCONTINUED | OUTPATIENT
Start: 2019-06-03 | End: 2019-06-07 | Stop reason: HOSPADM

## 2019-06-03 RX ADMIN — NITROGLYCERIN 0.4 MG: 0.4 TABLET, ORALLY DISINTEGRATING SUBLINGUAL at 10:16

## 2019-06-03 RX ADMIN — IOPAMIDOL 90 ML: 755 INJECTION, SOLUTION INTRAVENOUS at 11:59

## 2019-06-03 RX ADMIN — IPRATROPIUM BROMIDE AND ALBUTEROL SULFATE 3 ML: .5; 3 SOLUTION RESPIRATORY (INHALATION) at 18:55

## 2019-06-03 RX ADMIN — FUROSEMIDE 20 MG: 10 INJECTION, SOLUTION INTRAMUSCULAR; INTRAVENOUS at 13:45

## 2019-06-03 RX ADMIN — IPRATROPIUM BROMIDE AND ALBUTEROL SULFATE 3 ML: .5; 3 SOLUTION RESPIRATORY (INHALATION) at 22:42

## 2019-06-03 RX ADMIN — ASPIRIN 325 MG: 325 TABLET, COATED ORAL at 10:16

## 2019-06-03 RX ADMIN — IRBESARTAN 150 MG: 150 TABLET ORAL at 21:15

## 2019-06-03 RX ADMIN — IPRATROPIUM BROMIDE AND ALBUTEROL SULFATE 1 AMPULE: .5; 3 SOLUTION RESPIRATORY (INHALATION) at 10:08

## 2019-06-03 RX ADMIN — METOPROLOL TARTRATE 50 MG: 50 TABLET, FILM COATED ORAL at 21:15

## 2019-06-03 RX ADMIN — HYDROCHLOROTHIAZIDE 12.5 MG: 12.5 CAPSULE, GELATIN COATED ORAL at 21:15

## 2019-06-03 RX ADMIN — ENOXAPARIN SODIUM 40 MG: 40 INJECTION SUBCUTANEOUS at 19:49

## 2019-06-03 RX ADMIN — NITROGLYCERIN 0.4 MG: 0.4 TABLET, ORALLY DISINTEGRATING SUBLINGUAL at 21:20

## 2019-06-03 ASSESSMENT — ENCOUNTER SYMPTOMS
SHORTNESS OF BREATH: 1
COUGH: 0
WHEEZING: 0
ABDOMINAL DISTENTION: 0

## 2019-06-03 ASSESSMENT — PAIN SCALES - GENERAL
PAINLEVEL_OUTOF10: 2
PAINLEVEL_OUTOF10: 0

## 2019-06-03 NOTE — ED PROVIDER NOTES
Attending Supervisory Note/Shared Visit   I have personally performed a face to face diagnostic evaluation on this patient. I have reviewed the mid-levels findings and agree. Briefly, patient 69-year-old male presents complaining of chest discomfort and shortness of breath. Symptoms present for the past several days. Chest discomfort has been intermittent and in the right lower chest and occasionally left lower chest. Said comfort in the right chest continuous for the past few hours today. Denies any exacerbating or alleviating factors related with the discomfort. Feels like both of his legs have been a little swollen. Also short of breath when he lays flat. No history of DVT or PE. Does have a history of coronary disease. Cardiologist is Dr. Berny Toscano. On exam patient in no distress and resting comfortably. Lungs grossly clear with some scant rales in the bases. Has mild edema in bilateral lower extremities. Abdomen soft and nontender. No chest wall tenderness. Discomfort in his chest improved after aspirin and nitro. Patient's case discussed with Dr. Berny Toscano. He'll be coming to the ER to see the patient with plans for admission. Patient resting comfortably and in no distress and was updated about the plan. FINAL IMPRESSION      1. Abnormal CT scan    2.  NSTEMI (non-ST elevated myocardial infarction) ([facility] [address] [facility]          Sandra Olivas MD  Attending Emergency Physician      Sandra Olivas MD  06/03/19 4419

## 2019-06-03 NOTE — ED PROVIDER NOTES
[facility]  eMERGENCY dEPARTMENT eNCOUnter      Pt Name: Yue Pike  MRN: [id number]  [place] 1955  Date of evaluation: 6/3/2019  Provider: Deedee Blackman, [address]       Chief Complaint   Patient presents with    Shortness of Breath     PT PRESENTS TO ed WITH C/O SHORTNESS OF BREATH GETTING WORSE OVER PAST 3 NIGHTS. STATES RIGHT RIB PAIN. HISTORY OF PRESENT ILLNESS   (Location/Symptom, Timing/Onset,Context/Setting, Quality, Duration, Modifying Factors, Severity)  Note limiting factors. Sandi Rios a 70 y.o. male who presents to the emergency department for evaluation of shortness of breath. Pt tells me that he has had increased shortness of breath over past 3 days associated with intermittent episodes of chest pain. He tells me that shortness of breath was initially improved with neb treatment at home yesterday. He relates that he used neb treatment today without improvement in symptoms. He has had continuous right sided chest pain this morning over past 2-3 hours. He gives history of CAD with previous stent placement last year. He has history of Grade I diastolic dysmfunction noted on echo 5/12/18. He tells me that he continues with diuretic therapy. He relates that he has had increased generalized weakness and swelling of bilateral lower extremities over past few weeks. He has no history of pe/dvt. He does not endorse unilateral lower extremity pain or swelling. [facility]    Nursing Notes were reviewed. REVIEW OF SYSTEMS    (2-9 systems for level 4, 10 or more for level 5)     Review of Systems   Constitutional: Negative. Respiratory: Positive for shortness of breath. Negative for cough and wheezing. Cardiovascular: Positive for chest pain. Gastrointestinal: Negative for abdominal distention. All other systems reviewed and are negative. A complete review of systems was performed and is negative except as noted above in the HPI.        PAST MEDICAL HISTORY DAILY FOR 30 DAYS. GUAIFENESIN 400 MG TABLET    Take 1 tablet by mouth 4 times daily as needed for Cough    HYDROCHLOROTHIAZIDE (MICROZIDE) 12.5 MG CAPSULE    Take 1 capsule by mouth 2 times daily    INSULIN GLARGINE (LANTUS SOLOSTAR) 100 UNIT/ML INJECTION PEN    INJECT 55 UNITS SUB-Q TWICE DAILY    INSULIN LISPRO (HUMALOG KWIKPEN) 100 UNIT/ML PEN    Inject 10 Units into the skin 3 times daily (before meals) 150-199=2 units,200-249=4 units,250-299=6 units,300-349=8 units,350-400=10 units    INSULIN LISPRO (HUMALOG KWIKPEN) 100 UNIT/ML PEN    INJECT 10 UNITS SUB-Q 3 TIMES A DAY (BEFORE MEALS) 150-199=2 UNITS, 200-249=4 UNITS, 250-299=6 UNITS, 300-349=8 UNITS, 350-400=10 UNITS    INSULIN PEN NEEDLE ([employer] PEN NEEDLES) 31G X 6 MM MISC    Dx: E11.9    IPRATROPIUM-ALBUTEROL (DUONEB) 0.5-2.5 (3) MG/3ML SOLN NEBULIZER SOLUTION    Inhale 3 mLs into the lungs every 6 hours    IRBESARTAN (AVAPRO) 150 MG TABLET    Take 1 tablet by mouth daily    LANCETS 30G MISC    Use to check blood sugar 8 times daily. E11.9    LANSOPRAZOLE (PREVACID) 30 MG DELAYED RELEASE CAPSULE    TAKE ONE CAPSULE BY MOUTH ONCE DAILY    LORATADINE (CLARITIN) 10 MG TABLET    Take 1 tablet by mouth daily    METFORMIN (GLUCOPHAGE) 1000 MG TABLET    TAKE ONE TABLET BY MOUTH TWICE DAILY WITH MEALS    METOPROLOL TARTRATE (LOPRESSOR) 50 MG TABLET    TAKE 1 TABLET BY MOUTH TWICE DAILY    MULTIPLE VITAMIN (MULTIVITAMIN) CAPSULE    Take 1 capsule by mouth daily    MUPIROCIN (BACTROBAN) 2 % OINTMENT    APPLY TO AFFECTED AREA 3 TIMES A DAY    PRASUGREL (EFFIENT) 10 MG TABS    TAKE 1 TABLET BY MOUTH ONCE DAILY    TRIAMCINOLONE (KENALOG) 0.025 % CREAM    Apply topically 2 times daily.        ALLERGIES     Tape Lawerance Bur tape]    FAMILY HISTORY       Family History   Problem Relation Age of Onset    Colon Cancer Mother     Prostate Cancer Father     Other Father         HX of blood clot    Diabetes Sister     Cancer Sister     Diabetes Brother     Esophageal other components within normal limits   MICROSCOPIC URINALYSIS       All other labs were within normal range or not returned as of this dictation. RE-ASSESSMENT     Discussed with Dr. Sanchez Oseguera who will admit patient. EMERGENCY DEPARTMENT COURSE and DIFFERENTIALDIAGNOSIS/MDM:   Vitals:    Vitals:    06/03/19 1200 06/03/19 1300 06/03/19 1400 06/03/19 1600   BP: 128/74 126/74 128/76 136/74   Pulse:       Resp: 17 18 18 17   Temp:    98.2 °F (36.8 °C)   TempSrc:       SpO2:       Weight:       Height:           MDM      CONSULTS:  IP CONSULT TO CARDIOLOGY    PROCEDURES:  Unless otherwise notedbelow, none     Procedures    FINAL IMPRESSION     1. Abnormal CT scan    2.  NSTEMI (non-ST elevated myocardial infarction) [facility])          DISPOSITION/PLAN   DISPOSITION        PATIENT REFERRED TO:  Maria T Kelly, [address] [place], [place] [id number] [phone]            DISCHARGE MEDICATIONS:       Current Discharge Medication List          (Pleasenote that portions of this note were completed with a voice recognition program.  Efforts were made to edit the dictations but occasionally words are mis-transcribed.)              Katy Romero, APRN  06/03/19 7825

## 2019-06-03 NOTE — H&P
[id number] [facility]      History and Physical           I personally saw the patient and rounded with:  Demetrio Randall, on  6/3/19      The observations documented in this note, including the assessment and plan are mine              Date of Admission:  6/3/2019  9:26 AM    Date of Initially Being Seen / Consultation:  6/3/19    Cardiologist:  Bravo Mckeon MD     Cardiology Attending: [facility] Attending: MAURICE     PCP:  MIMI Montesinos    Reason for Consultation or Admission / Chief Complaint:  Chest discomfort    SUBJECTIVE AND HISTORY OF PRESENT ILLNESS:    Source of the history:  Patient, family, previous inpatient and outpatient records in [address] [place] [address] Amarilis is a 70 y.o. male who presents to [facility] ED # 6 with symptoms / signs / problem or diagnosis of chest discomfort. The symptoms began about 4 months ago. The chest discomfort began gradually and have accelerated and lasted several minutes. It was worse with activity. There was partial relief with rest.  The chest discomfort was described as pressure, fullness and tightness. It was not crushing. It was it located in the central chest with radiation to the back, throat and left shoulder. It was described as mild and now getting worse. There were  associated manifestations such as nausea, vomiting, diaphoresis, presyncope, syncope, dizzyness, weakness, cold sweats, or shortness of air. He also have swelling in his feet. When being examined this , he has had no symptoms of exertional chest discomfort, unusual or change in shortness of air, presyncope or syncope.         Family present:  Yes: wife      CARDIAC RISK PROFILE:    Risk Factor Yes / No / Unknown       Gender Male   Cigarette Use No   Family History of Cardiovascular Disease Yes: diabetes   Diabetes Mellitus yes   Hypercholesteremia no   Hypertension yes          Cardiac Specific Problems:    Specialty Problems        Cardiology Problems    Essential hypertension        CAD (coronary artery disease)                PRIOR CARDIAC PROBLEM LIST  (IF APPLICABLE):    9/07/6479  Echo  Normal LVFX  5/12/2018  lexiscan negative for myocardial ischemia, EF 44  %   5/14/2018  Hospital visit AUC indication 19, AUC score 7  5/14/2018  Cath  70% osteal LAD, 3.0 x 12 AGA, apical hypokinesis    6/3/19  ACS MARTINA RISK Score 4 (angina, + troponin, CAD>50, diabetes, hypertension, ASA), AUC indication 3, AUC score 8       Past Medical History:    Past Medical History:   Diagnosis Date    Aneurysm ([address]     abdominal    Bilateral leg edema     CAD (coronary artery disease)     Chronic kidney disease     Stage 4    Diabetes mellitus, type 2 (HCC)     Fatty liver     GERD (gastroesophageal reflux disease)     HTN (hypertension)     Seasonal allergies          Past Surgical History:    Past Surgical History:   Procedure Laterality Date    CARPAL TUNNEL RELEASE      COLONOSCOPY  2015    COLONOSCOPY N/A 10/11/2016    Dr Lopez-diverticulosis, Sessile serrated AP (-) high grade dysplasia x 1, tubular AP (-) dysplasia x 2, HP x 2--3 yr recall    EYE SURGERY      Right eye    PRE-MALIGNANT / Romayne Mings Dr. Juliaette Pyles UPPER GASTROINTESTINAL ENDOSCOPY  9/20/2016    Dr Cyr/dilation over wire, [address] [place]-distal esophageal narrowing, Inna (-)    UPPER GASTROINTESTINAL ENDOSCOPY  9/20/2016    Dr Cyr/dilation over wire, [address] [place]-distal esophageal narrowing, Inna (-)         Home Medications:   Prior to Admission medications    Medication Sig Start Date End Date Taking?  Authorizing Provider   mupirocin (BACTROBAN) 2 % ointment APPLY TO AFFECTED AREA 3 TIMES A DAY 4/25/19  Yes MIMI Beach   lansoprazole (PREVACID) 30 MG delayed release capsule TAKE ONE CAPSULE BY MOUTH ONCE DAILY 3/20/19  Yes MIMI Beach   metFORMIN (GLUCOPHAGE) 1000 MG tablet TAKE ONE TABLET BY MOUTH TWICE DAILY WITH MEALS 3/20/19  Yes MIMI Beach   metoprolol tartrate (LOPRESSOR) 50 MG tablet TAKE 1 TABLET BY MOUTH TWICE DAILY 3/20/19  Yes MIMI Beach   prasugrel (EFFIENT) 10 MG TABS TAKE 1 TABLET BY MOUTH ONCE DAILY 3/20/19  Yes MIMI Beach   ipratropium-albuterol (DUONEB) 0.5-2.5 (3) MG/3ML SOLN nebulizer solution Inhale 3 mLs into the lungs every 6 hours 3/20/19  Yes MIMI Beach   loratadine (CLARITIN) 10 MG tablet Take 1 tablet by mouth daily 3/20/19  Yes MIMI Beach   hydrochlorothiazide (MICROZIDE) 12.5 MG capsule Take 1 capsule by mouth 2 times daily 3/20/19 6/18/19 Yes MIMI Beach   irbesartan (AVAPRO) 150 MG tablet Take 1 tablet by mouth daily 3/20/19  Yes MIMI Beach   Lancets 30G MISC Use to check blood sugar 8 times daily. E11.9 3/20/19  Yes MIMI Beach   Insulin Pen Needle ([employer] PEN NEEDLES) 31G X 6 MM MISC Dx: E11.9 3/20/19  Yes MIMI Beach   insulin glargine (LANTUS SOLOSTAR) 100 UNIT/ML injection pen INJECT 55 UNITS SUB-Q TWICE DAILY 3/20/19  Yes MIMI Beach   insulin lispro (HUMALOG KWIKPEN) 100 UNIT/ML pen INJECT 10 UNITS SUB-Q 3 TIMES A DAY (BEFORE MEALS) 150-199=2 UNITS, 200-249=4 UNITS, 250-299=6 UNITS, 300-349=8 UNITS, 350-400=10 UNITS 3/20/19  Yes MIMI Beach   gabapentin (NEURONTIN) 300 MG capsule TAKE 1 CAPSULE BY MOUTH THREE TIMES DAILY FOR 30 DAYS. 11/27/18 6/3/19 Yes MMII Beach   blood glucose test strips (ACCU-CHEK DUNIA) strip Patient is to test 8 times daily. Dx: E11.9  Please provide patient with Accu check Dunia Plus test strips per his Insurance request 11/27/18  Yes MIMI Beach   insulin lispro (HUMALOG KWIKPEN) 100 UNIT/ML pen Inject 10 Units into the skin 3 times daily (before meals) 150-199=2 units,200-249=4 units,250-299=6 units,300-349=8 units,350-400=10 units 8/30/18  Yes MIMI Beach   triamcinolone (KENALOG) 0.025 % cream Apply topically 2 times daily.  8/30/18  Yes MIMI Beach   EQ ASPIRIN LOW DOSE 81 MG chewable tablet CHEW AND SWALLOW 1 TABLET BY MOUTH ONCE DAILY 8/28/18  Yes MIMI Scott   guaiFENesin 400 MG tablet Take 1 tablet by mouth 4 times daily as needed for Cough 6/18/18  Yes MIMI Beach   Alcohol Swabs PADS 1 box by Does not apply route 8 times daily 6/8/18  Yes MIMI Beach   Blood Glucose Monitoring Suppl (ACURA BLOOD GLUCOSE METER) w/Device KIT Please provide patient with Accu check Bharati Meter per his Insurance request. Dx: E11.9 5/23/18  Yes MIMI Beach   Multiple Vitamin (MULTIVITAMIN) capsule Take 1 capsule by mouth daily   Yes Historical Provider, MD   albuterol sulfate HFA (VENTOLIN HFA) 108 (90 Base) MCG/ACT inhaler Inhale 2 puffs into the lungs every 6 hours as needed for Wheezing 4/30/18  Yes Shelby Alondra, DO   Cholecalciferol (VITAMIN D3) 37193 units CAPS Take 50,000 Units by mouth every 30 days   Yes Historical Provider, MD   fluticasone (FLONASE) 50 MCG/ACT nasal spray USE 2 SPRAY(S) IN EACH NOSTRIL ONCE DAILY 10/31/18 3/20/19  MIMI Hastings        Facility Administered Medications:      Allergies:  Tape Shoaib Cronin tape]     Social History:       Social History     Socioeconomic History    Marital status:      Spouse name: Not on file    Number of children: Not on file    Years of education: Not on file    Highest education level: Not on file   Occupational History    Not on file   Social Needs    Financial resource strain: Not on file    Food insecurity:     Worry: Not on file     Inability: Not on file    Transportation needs:     Medical: Not on file     Non-medical: Not on file   Tobacco Use    Smoking status: Never Smoker    Smokeless tobacco: Never Used   Substance and Sexual Activity    Alcohol use: No    Drug use: No    Sexual activity: Not on file   Lifestyle    Physical activity:     Days per week: Not on file     Minutes per session: Not on file    Stress: Not on file   Relationships    Social connections:     Talks on phone: Not on file     Gets together: Not on file     Attends Hindu service: Not on file     Active member of club or organization: Not on file     Attends meetings of clubs or organizations: Not on file     Relationship status: Not on file    Intimate partner violence:     Fear of current or ex partner: Not on file     Emotionally abused: Not on file     Physically abused: Not on file     Forced sexual activity: Not on file   Other Topics Concern    Not on file   Social History Narrative    Not on file       Family History:     Family History   Problem Relation Age of Onset    Colon Cancer Mother     Prostate Cancer Father     Other Father         HX of blood clot    Diabetes Sister     Cancer Sister     Diabetes Brother     Esophageal Cancer Neg Hx     Liver Cancer Neg Hx     Liver Disease Neg Hx     Stomach Cancer Neg Hx     Rectal Cancer Neg Hx          REVIEW OF SYSTEMS:     Except as noted in the HPI, all other systems are negative        PHYSICAL EXAMINATION:     /76   Pulse 75   Temp 98.2 °F (36.8 °C) (Oral)   Resp 18   Ht 6' (1.829 m)   Wt 284 lb (128.8 kg)   SpO2 96%   BMI 38.52 kg/m²     GENERAL - well developed and well nourished, in mild amount of generalized distress; is an active participant in this examination  HEENT -  PERRLA, Hearing appears normal, conjunctiva and lids are normal, ears and nose appear normal  NECK - no thyromegaly, no JVD, trachea is in the midline  CARDIOVASCULAR - PMI is in the left mid line clavicular position, Normal S1 and S2 with a grade 1/6 systolic murmur. No S3 or S4    PULMONARY - No respiratory distress. scattered wheezes and rales.   Breath sounds in both  lung fields are Normal  ABDOMEN  - soft, non tender, no rebound, no hepatomegaly or splenomegaly  MUSCULOSKELETAL  - Prone/Supine, digitals and nails are without clubbing or cyanosis  EXTREMITIES - 1+ edema  NEUROLOGIC - cranial nerves, II-XII, are normal  SKIN - turgor is normal, no rash  PSYCHIATRIC - normal mood and affect, alert and orientated x 3, judgement and insight appear appropriate      LABORATORY EVALUATION & TESTING:    I have personally reviewed and interpreted the results of the following diagnostic testing      EKG and or Telemetry:  which was personally reviewed me:  Sinus rhythm,   Pulse Readings from Last 1 Encounters:   06/03/19 75    bpm,  without Acute changes    Troponin:  positive myocardial necrosis (  0.1); the creatinine is abnormal 1.4    CBC:   Recent Labs     06/03/19  1000   WBC 7.4   HGB 11.5*   HCT 37.1*   MCV 86.5        BMP:   Recent Labs     06/03/19  1000      K 4.9      CO2 26   BUN 25*   CREATININE 1.4*     Cardiac Enzymes:   Recent Labs     06/03/19  1000 06/03/19  1155   TROPONINI 0.11* 0.10*     PT/INR: No results for input(s): PROTIME, INR in the last 72 hours. APTT: No results for input(s): APTT in the last 72 hours.   Liver Profile:  Lab Results   Component Value Date    AST 13 06/03/2019    ALT 19 06/03/2019    BILITOT <0.2 06/03/2019    ALKPHOS 77 06/03/2019     Lab Results   Component Value Date    CHOL 152 05/14/2018    HDL 24 09/18/2018    TRIG 271 05/14/2018     TSH:  Lab Results   Component Value Date    TSH 1.890 05/11/2018     UA:   Lab Results   Component Value Date    COLORU YELLOW 06/03/2019    PHUR 5.5 06/03/2019    WBCUA 0 06/03/2019    RBCUA 1 06/03/2019    BACTERIA NEGATIVE 06/03/2019    CLARITYU Clear 06/03/2019    SPECGRAV 1.021 06/03/2019    LEUKOCYTESUR Negative 06/03/2019    UROBILINOGEN 0.2 06/03/2019    BILIRUBINUR Negative 06/03/2019    BLOODU Negative 06/03/2019    GLUCOSEU >=1000 06/03/2019             ALL THE CARDIOLOGY PROBLEMS ARE LISTED ABOVE; HOWEVER, THE FOLLOWING SPECIFIC CARDIAC PROBLEMS WERE ADDRESSED AND TREATED DURING THE HOSPITAL VISIT TODAY: questions were invited and answered. Patient and / or family shows understanding of admission information and agrees to follow. 2. Continue present medications except for changes as noted above    3. Continue to monitor rhythm    4. Further orders per clinical course. Date of the Proposed Procedure:  tomorrow      Proposed Procedure:  Selective left heart and coronary arteriography with possible percutaneous coronary interventon, (femoral approach), 06/04/19      :  MEDARDO Avila MD    Indications:  6/3/19  ACS MARTINA RISK Score 4 (angina, + troponin, CAD>50, diabetes, hypertension, ASA), AUC indication 3, AUC score 8     American Society of Anesthesiologists (ASA) Classification:  III    Plan of Sedation:  Moderate Sedation    Mallampati Classification:  II    I have examined this patient on 06/03/19  in ED in the presence of [address] and find no interval changes since the original History and Physical / Consult as noted written by myself, 06/03/19     With the constellation of symptoms and these findings, I recommend cardiac catheterization and possibility of percutaneous coronary intervention. I discussed with him  in detail  the risks, benefits and alternatives to this procedure. The risks mentioned to him include but are not limited to:  vascular complications in ~ 3%, stroke <1%, renal dysfunction <5%, myocardial infarction <1%, coronary dissection <1%, need for emergency open heart surgery <1, and death <1% . He appeared to understand, had no questions, and agreed to proceed with this plan. Additionally, there are risks associated with moderate sedation which includes transient drop in blood pressure and need for assisted ventilation. This procedure is scheduled for 06/04/19 .     Ari Ayala MD

## 2019-06-04 LAB
CHOLESTEROL, TOTAL: 150 MG/DL (ref 160–199)
CHOLESTEROL, TOTAL: 153 MG/DL (ref 160–199)
EKG P AXIS: 38 DEGREES
EKG P AXIS: 48 DEGREES
EKG P-R INTERVAL: 162 MS
EKG P-R INTERVAL: 164 MS
EKG Q-T INTERVAL: 386 MS
EKG Q-T INTERVAL: 422 MS
EKG QRS DURATION: 110 MS
EKG QRS DURATION: 110 MS
EKG QTC CALCULATION (BAZETT): 403 MS
EKG QTC CALCULATION (BAZETT): 434 MS
EKG T AXIS: 116 DEGREES
EKG T AXIS: 117 DEGREES
GLUCOSE BLD-MCNC: 154 MG/DL (ref 70–99)
GLUCOSE BLD-MCNC: 159 MG/DL (ref 70–99)
GLUCOSE BLD-MCNC: 292 MG/DL (ref 70–99)
GLUCOSE BLD-MCNC: 328 MG/DL (ref 70–99)
HCT VFR BLD CALC: 37.2 % (ref 42–52)
HDLC SERPL-MCNC: 20 MG/DL (ref 55–121)
HDLC SERPL-MCNC: 22 MG/DL (ref 55–121)
HEMOGLOBIN: 11.4 G/DL (ref 14–18)
LDL CHOLESTEROL CALCULATED: 65 MG/DL
LDL CHOLESTEROL CALCULATED: 81 MG/DL
LV EF: 44 %
LVEF MODALITY: NORMAL
MCH RBC QN AUTO: 27.2 PG (ref 27–31)
MCHC RBC AUTO-ENTMCNC: 30.6 G/DL (ref 33–37)
MCV RBC AUTO: 88.8 FL (ref 80–94)
PDW BLD-RTO: 13.6 % (ref 11.5–14.5)
PERFORMED ON: ABNORMAL
PLATELET # BLD: 152 K/UL (ref 130–400)
PMV BLD AUTO: 13.4 FL (ref 9.4–12.4)
RBC # BLD: 4.19 M/UL (ref 4.7–6.1)
TRIGL SERPL-MCNC: 234 MG/DL (ref 0–149)
TRIGL SERPL-MCNC: 339 MG/DL (ref 0–149)
TROPONIN: 0.11 NG/ML (ref 0–0.03)
TROPONIN: 0.13 NG/ML (ref 0–0.03)
WBC # BLD: 8.5 K/UL (ref 4.8–10.8)

## 2019-06-04 PROCEDURE — 85027 COMPLETE CBC AUTOMATED: CPT

## 2019-06-04 PROCEDURE — 36415 COLL VENOUS BLD VENIPUNCTURE: CPT

## 2019-06-04 PROCEDURE — 84484 ASSAY OF TROPONIN QUANT: CPT

## 2019-06-04 PROCEDURE — C1769 GUIDE WIRE: HCPCS

## 2019-06-04 PROCEDURE — 6360000002 HC RX W HCPCS: Performed by: INTERNAL MEDICINE

## 2019-06-04 PROCEDURE — 92928 PRQ TCAT PLMT NTRAC ST 1 LES: CPT | Performed by: INTERNAL MEDICINE

## 2019-06-04 PROCEDURE — 2709999900 HC NON-CHARGEABLE SUPPLY

## 2019-06-04 PROCEDURE — 96375 TX/PRO/DX INJ NEW DRUG ADDON: CPT

## 2019-06-04 PROCEDURE — 99024 POSTOP FOLLOW-UP VISIT: CPT | Performed by: INTERNAL MEDICINE

## 2019-06-04 PROCEDURE — 027034Z DILATION OF CORONARY ARTERY, ONE ARTERY WITH DRUG-ELUTING INTRALUMINAL DEVICE, PERCUTANEOUS APPROACH: ICD-10-PCS | Performed by: INTERNAL MEDICINE

## 2019-06-04 PROCEDURE — C1887 CATHETER, GUIDING: HCPCS

## 2019-06-04 PROCEDURE — 93458 L HRT ARTERY/VENTRICLE ANGIO: CPT | Performed by: INTERNAL MEDICINE

## 2019-06-04 PROCEDURE — 80061 LIPID PANEL: CPT

## 2019-06-04 PROCEDURE — 2580000003 HC RX 258: Performed by: INTERNAL MEDICINE

## 2019-06-04 PROCEDURE — 99152 MOD SED SAME PHYS/QHP 5/>YRS: CPT | Performed by: INTERNAL MEDICINE

## 2019-06-04 PROCEDURE — 82948 REAGENT STRIP/BLOOD GLUCOSE: CPT

## 2019-06-04 PROCEDURE — 4A023N7 MEASUREMENT OF CARDIAC SAMPLING AND PRESSURE, LEFT HEART, PERCUTANEOUS APPROACH: ICD-10-PCS | Performed by: INTERNAL MEDICINE

## 2019-06-04 PROCEDURE — C1894 INTRO/SHEATH, NON-LASER: HCPCS

## 2019-06-04 PROCEDURE — 6370000000 HC RX 637 (ALT 250 FOR IP): Performed by: NURSE PRACTITIONER

## 2019-06-04 PROCEDURE — 94640 AIRWAY INHALATION TREATMENT: CPT

## 2019-06-04 PROCEDURE — 6360000002 HC RX W HCPCS

## 2019-06-04 PROCEDURE — 99153 MOD SED SAME PHYS/QHP EA: CPT | Performed by: INTERNAL MEDICINE

## 2019-06-04 PROCEDURE — B2111ZZ FLUOROSCOPY OF MULTIPLE CORONARY ARTERIES USING LOW OSMOLAR CONTRAST: ICD-10-PCS | Performed by: INTERNAL MEDICINE

## 2019-06-04 PROCEDURE — 6370000000 HC RX 637 (ALT 250 FOR IP): Performed by: INTERNAL MEDICINE

## 2019-06-04 PROCEDURE — C1760 CLOSURE DEV, VASC: HCPCS

## 2019-06-04 PROCEDURE — 6360000004 HC RX CONTRAST MEDICATION: Performed by: INTERNAL MEDICINE

## 2019-06-04 PROCEDURE — C1874 STENT, COATED/COV W/DEL SYS: HCPCS

## 2019-06-04 PROCEDURE — G0378 HOSPITAL OBSERVATION PER HR: HCPCS

## 2019-06-04 PROCEDURE — 6370000000 HC RX 637 (ALT 250 FOR IP)

## 2019-06-04 PROCEDURE — B2151ZZ FLUOROSCOPY OF LEFT HEART USING LOW OSMOLAR CONTRAST: ICD-10-PCS | Performed by: INTERNAL MEDICINE

## 2019-06-04 PROCEDURE — C1725 CATH, TRANSLUMIN NON-LASER: HCPCS

## 2019-06-04 RX ORDER — SODIUM CHLORIDE 0.9 % (FLUSH) 0.9 %
10 SYRINGE (ML) INJECTION PRN
Status: DISCONTINUED | OUTPATIENT
Start: 2019-06-04 | End: 2019-06-05

## 2019-06-04 RX ORDER — ONDANSETRON 2 MG/ML
4 INJECTION INTRAMUSCULAR; INTRAVENOUS EVERY 6 HOURS PRN
Status: DISCONTINUED | OUTPATIENT
Start: 2019-06-04 | End: 2019-06-07 | Stop reason: HOSPADM

## 2019-06-04 RX ORDER — PRASUGREL 10 MG/1
10 TABLET, FILM COATED ORAL DAILY
Status: DISCONTINUED | OUTPATIENT
Start: 2019-06-05 | End: 2019-06-04 | Stop reason: SDUPTHER

## 2019-06-04 RX ORDER — FUROSEMIDE 40 MG/1
40 TABLET ORAL 2 TIMES DAILY
Status: DISCONTINUED | OUTPATIENT
Start: 2019-06-04 | End: 2019-06-05

## 2019-06-04 RX ORDER — SODIUM CHLORIDE 0.9 % (FLUSH) 0.9 %
10 SYRINGE (ML) INJECTION EVERY 12 HOURS SCHEDULED
Status: DISCONTINUED | OUTPATIENT
Start: 2019-06-04 | End: 2019-06-05

## 2019-06-04 RX ORDER — ACETAMINOPHEN 325 MG/1
650 TABLET ORAL EVERY 4 HOURS PRN
Status: DISCONTINUED | OUTPATIENT
Start: 2019-06-04 | End: 2019-06-07 | Stop reason: HOSPADM

## 2019-06-04 RX ORDER — ASPIRIN 81 MG/1
81 TABLET, CHEWABLE ORAL DAILY
Status: DISCONTINUED | OUTPATIENT
Start: 2019-06-05 | End: 2019-06-04 | Stop reason: SDUPTHER

## 2019-06-04 RX ADMIN — METOPROLOL TARTRATE 50 MG: 50 TABLET, FILM COATED ORAL at 08:35

## 2019-06-04 RX ADMIN — PRASUGREL 10 MG: 10 TABLET, FILM COATED ORAL at 08:35

## 2019-06-04 RX ADMIN — Medication 10 ML: at 22:10

## 2019-06-04 RX ADMIN — IPRATROPIUM BROMIDE AND ALBUTEROL SULFATE 3 ML: .5; 3 SOLUTION RESPIRATORY (INHALATION) at 22:27

## 2019-06-04 RX ADMIN — INSULIN LISPRO 10 UNITS: 100 INJECTION, SOLUTION INTRAVENOUS; SUBCUTANEOUS at 08:36

## 2019-06-04 RX ADMIN — BIVALIRUDIN 1 MG/KG/HR: 250 INJECTION, POWDER, LYOPHILIZED, FOR SOLUTION INTRAVENOUS at 20:46

## 2019-06-04 RX ADMIN — ASPIRIN 81 MG: 81 TABLET, CHEWABLE ORAL at 08:35

## 2019-06-04 RX ADMIN — METOPROLOL TARTRATE 50 MG: 50 TABLET, FILM COATED ORAL at 20:45

## 2019-06-04 RX ADMIN — HYDROCHLOROTHIAZIDE 12.5 MG: 12.5 CAPSULE, GELATIN COATED ORAL at 08:35

## 2019-06-04 RX ADMIN — ONDANSETRON 4 MG: 2 INJECTION INTRAMUSCULAR; INTRAVENOUS at 22:10

## 2019-06-04 RX ADMIN — IOPAMIDOL 186 ML: 612 INJECTION, SOLUTION INTRAVENOUS at 19:14

## 2019-06-04 RX ADMIN — IRBESARTAN 150 MG: 150 TABLET ORAL at 20:45

## 2019-06-04 RX ADMIN — IPRATROPIUM BROMIDE AND ALBUTEROL SULFATE 3 ML: .5; 3 SOLUTION RESPIRATORY (INHALATION) at 07:03

## 2019-06-04 RX ADMIN — IPRATROPIUM BROMIDE AND ALBUTEROL SULFATE 3 ML: .5; 3 SOLUTION RESPIRATORY (INHALATION) at 10:28

## 2019-06-04 RX ADMIN — NITROGLYCERIN 0.4 MG: 0.4 TABLET, ORALLY DISINTEGRATING SUBLINGUAL at 06:24

## 2019-06-04 RX ADMIN — FUROSEMIDE 40 MG: 40 TABLET ORAL at 20:45

## 2019-06-04 RX ADMIN — HYDROCHLOROTHIAZIDE 12.5 MG: 12.5 CAPSULE, GELATIN COATED ORAL at 20:45

## 2019-06-04 ASSESSMENT — PAIN SCALES - GENERAL: PAINLEVEL_OUTOF10: 0

## 2019-06-04 NOTE — PROGRESS NOTES
Zenia Castañeda arrived to room # [id number] . Presented with: ACS SOB Abd Pain  Mental Status: Patient is oriented, alert, coherent, logical, thought processes intact and able to concentrate and follow conversation. Vitals:    06/03/19 1835   BP: (!) 144/85   Pulse: 78   Resp: 16   Temp: 97.1 °F (36.2 °C)   SpO2: 96%     Patient safety contract and falls prevention contract reviewed with patient Yes. Oriented Patient to room. Call light within reach. Yes.   Needs, issues or concerns expressed at this time: no.      Electronically signed by Andrew Aquino RN on 6/3/2019 at 7:30 PM

## 2019-06-04 NOTE — PROGRESS NOTES
[address]    Daily HOSPITAL Progress Note                            Date:  6/4/19  Patient: Lisa Turk  Admission:  6/3/2019  9:26 AM  Admit DX: ACS (acute coronary syndrome) ([facility] [I24.9]  Age:  70 y. o., 1955        Date of Admission 6/3/2019  9:26 AM   Hospital Length of Stay  LOS: 0 days            I personally saw the patient and rounded with:  Sophie Glover RN on 6/4/19      The observations documented in this note, including the assessment and plan are mine         Reason for initial evaluation or the patient's initial complaint    Chest discomfort      SUBJECTIVE:      Chief Complaint / Reason for the Visit   Follow up of:  Chest discomfort and CAD and systemic arterial hypertension    Family present and in room during examination:  Yes:       Specialty Problems        Cardiology Problems    ACS (acute coronary syndrome) ([facility]        Essential hypertension        CAD (coronary artery disease)              Current Status Today According to the patient:  \"ok\"    Subjective:  Mr. Lisa Turk is generally feeling unchanged. For cath today    Mr. Lisa Turk has the following cardiac complaints / symptoms today:    1. Chest discomfort, none today    2. CAD, for cath today, + ACS, + troponin (albeit the creatinine is 1.4)    3.  Hypertension    The blood pressure for the lastr 36 hours has been:  Systolic (51OXZ), LHN:301 , Min:111 , XVL:651    Diastolic (87PMK), TOX:64, Min:49, Max:87        Lisa Turk is a 70 y.o. male with the following history as recorded in [facility]:    Patient Active Problem List    Diagnosis Date Noted    ACS (acute coronary syndrome) ([facility] 06/03/2019     Priority: High    Hypertriglyceridemia 05/31/2018     Priority: Low    Change in bowel habits 10/11/2016     Priority: Low    History of colon polyps 10/11/2016     Priority: Low    Family history of prostate cancer in father 10/05/2016     Priority: Low    Pharyngoesophageal dysphagia 09/20/2016     Priority: Low    IDDM (insulin dependent diabetes mellitus) ([facility] 06/27/2016     Priority: Low    Essential hypertension 06/27/2016     Priority: Low    Gastroesophageal reflux disease without esophagitis 06/27/2016     Priority: Low    CAD (coronary artery disease) 05/15/2018    History of placement of stent in LAD coronary artery     Dyspnea 05/11/2018     Current Facility-Administered Medications   Medication Dose Route Frequency Provider Last Rate Last Dose    nitroGLYCERIN (NITROSTAT) SL tablet 0.4 mg  0.4 mg Sublingual Q5 Min PRN MIMI Wilcox   0.4 mg at 06/04/19 0624    [START ON 6/10/2019] vitamin D (ERGOCALCIFEROL) capsule 50,000 Units  50,000 Units Oral Q30 Days Que Wilson MD        albuterol sulfate  (90 Base) MCG/ACT inhaler 2 puff  2 puff Inhalation Q6H PRN Que Wilson MD        multivitamin 1 tablet  1 tablet Oral Daily Que Wilson MD        guaiFENesin tablet 400 mg  400 mg Oral 4x Daily PRN Que Wilson MD        insulin lispro (HUMALOG) injection vial 10 Units  10 Units Subcutaneous TID AC Que Wilson MD   10 Units at 06/04/19 0836    ipratropium-albuterol (DUONEB) nebulizer solution 3 mL  1 vial Inhalation Q6H Que Wilson MD   3 mL at 06/04/19 1028    magnesium hydroxide (MILK OF MAGNESIA) 400 MG/5ML suspension 30 mL  30 mL Oral Daily PRN Que Wilson MD        ondansetron [facility]) injection 4 mg  4 mg Intravenous Q6H PRN Que Wilson MD        enoxaparin (LOVENOX) injection 40 mg  40 mg Subcutaneous Daily Que Wilson MD   40 mg at 06/03/19 1949    hydrochlorothiazide (MICROZIDE) capsule 12.5 mg  12.5 mg Oral BID Que Wilson MD   12.5 mg at 06/04/19 0835    aspirin chewable tablet 81 mg  81 mg Oral Daily Que Wilson MD   81 mg at 06/04/19 0835    metoprolol tartrate (LOPRESSOR) tablet 50 mg  50 mg Oral BID Que Wilson MD   50 mg at 06/04/19 0835    prasugrel (EFFIENT) tablet 10 mg  10 mg Oral Daily Que Wilson MD 10 mg at 06/04/19 0436    irbesartan (AVAPRO) tablet 150 mg  150 mg Oral Nightly Sung Dunn MD   150 mg at 06/03/19 2115     Allergies: Tape Helena Jolly tape]  Past Medical History:   Diagnosis Date    Aneurysm ([facility]     abdominal    Bilateral leg edema     CAD (coronary artery disease)     Chronic kidney disease     Stage 4    Diabetes mellitus, type 2 (HCC)     Fatty liver     GERD (gastroesophageal reflux disease)     HTN (hypertension)     Seasonal allergies      Past Surgical History:   Procedure Laterality Date    CARPAL TUNNEL RELEASE      COLONOSCOPY  2015    COLONOSCOPY N/A 10/11/2016    Dr Lopez-diverticulosis, Sessile serrated AP (-) high grade dysplasia x 1, tubular AP (-) dysplasia x 2, HP x 2--3 yr recall    EYE SURGERY      Right eye    PRE-MALIGNANT / Carmen Canary      Dr. Josefina Chung UPPER GASTROINTESTINAL ENDOSCOPY  9/20/2016    Dr Lopez-w/dilation over wire, 46 [place]-distal esophageal narrowing, Inna (-)    UPPER GASTROINTESTINAL ENDOSCOPY  9/20/2016    Dr Lopez-w/dilation over wire, 46 [place]-distal esophageal narrowing, Inna (-)     Family History   Problem Relation Age of Onset    Colon Cancer Mother     Prostate Cancer Father     Other Father         HX of blood clot    Diabetes Sister     Cancer Sister     Diabetes Brother     Esophageal Cancer Neg Hx     Liver Cancer Neg Hx     Liver Disease Neg Hx     Stomach Cancer Neg Hx     Rectal Cancer Neg Hx      Social History     Tobacco Use    Smoking status: Never Smoker    Smokeless tobacco: Never Used   Substance Use Topics    Alcohol use: No          Review of Systems:    General:      Complaint / Symptom Yes / No / Description if Yes       Fatigue Yes:  chronic   Weight gain NA   Insomnia NA       Respiratory:        Complaint / Symptom Yes / No / Description if Yes       Cough No   Horseness NA       Cardiovascular:    Complaint / Symptom Yes / No / Description if Yes       Chest Pain No    Nonetheless, He has the following risk factors for the presence of coronary artery disease:     Risk Factor Yes / No / Unknown         Gender Male   Cigarette Use No   Family History of Cardiovascular Disease Yes: diabetes   Diabetes Mellitus yes   Hypercholesteremia no   Hypertension yes          He also has the following cardiac history:         Specialty Problems                Cardiology Problems     Essential hypertension           CAD (coronary artery disease)              Yes: cardiac catheterization Continue current medications:      Yes:                  2. Coronary artery disease Prior evaluation    Review and summation of old records:     2018  Echo  Normal LVFX  2018  lexiscan negative for myocardial ischemia, EF 44  %   2018  Hospital visit AUC indication 19, AUC score 7  2018  Cath  70% osteal LAD, 3.0 x 12 AGA, apical hypokinesis    6/3/19  ACS MARTINA RISK Score 4 (angina, + troponin, CAD>50, diabetes, hypertension, ASA), AUC indication 3, AUC score 8        Yes: as per the cardiac cath Continue current medications:     Yes:                  3. Systemic arterial hypertension Prior evaluation The blood pressure for the lastr 36 hours has been:  Systolic (99VEU), SRT:280 , Min:111 , DF    Diastolic (34BKT), NTU:38, Min:49, Max:87       No Continue current medications:        yes            DISCUSSION AND PLAN:     1.         I had a detailed discussion with the patient and / or family regarding the historical points, physical examination findings, and any diagnostic results supporting the admission diagnosis. The patient was educated on care and need for admission. questions were invited and answered. Patient and / or family shows understanding of admission information and agrees to follow.     2. Continue present medications except for changes as noted above     3.          Continue to monitor rhythm     4.         Further orders per clinical course.              Date of the Proposed Procedure:  tomorrow       Proposed Procedure:  Selective left heart and coronary arteriography with possible percutaneous coronary interventon, (femoral approach), 06/04/19       :  MEDARDO Tanner MD     Indications:  6/3/19  ACS MARTINA RISK Score 4 (angina, + troponin, CAD>50, diabetes, hypertension, ASA), AUC indication 3, AUC score 8      American Society of Anesthesiologists (ASA) Classification:  III     Plan of Sedation:  Moderate Sedation     Mallampati Classification:  II     I have examined this patient on 06/03/19  in ED in the presence of Filipe REED and find no interval changes since the original History and Physical / Consult as noted written by myself, 06/03/19      With the constellation of symptoms and these findings, I recommend cardiac catheterization and possibility of percutaneous coronary intervention.     I discussed with him  in detail  the risks, benefits and alternatives to this procedure. The risks mentioned to him include but are not limited to:  vascular complications in ~ 3%, stroke <1%, renal dysfunction <5%, myocardial infarction <1%, coronary dissection <1%, need for emergency open heart surgery <1, and death <1% .   He appeared to understand, had no questions, and agreed to proceed with this plan.     Additionally, there are risks associated with moderate sedation which includes transient drop in blood pressure and need for assisted ventilation.     This procedure is scheduled for 06/04/19 .     Nicky Michel MD

## 2019-06-04 NOTE — PROGRESS NOTES
4 Eyes Skin Assessment    Trang Pisano is being assessed upon: Admission    I agree that I, Nick Borden, along with *** (either 2 RN's or 1 LPN and 1 RN) have performed a thorough Head to Toe Skin Assessment on the patient. ALL assessment sites listed below have been assessed. Areas assessed by both nurses:     [x]   Head, Face, and Ears   [x]   Shoulders, Back, and Chest  [x]   Arms, Elbows, and Hands   [x]   Coccyx, Sacrum, and Ischium  [x]   Legs, Feet, and Heels    Does the Patient have Skin Breakdown? No    Talha Prevention initiated: NA  Wound Care Orders initiated: NA    [facility] nurse consulted for Pressure Injury (Stage 3,4, Unstageable, DTI, NWPT, and Complex wounds) and New or Established Ostomies: NA        Primary Nurse eSignature:  Nick Borden RN on 6/3/2019 at 7:29 PM      Co-Signer eSignature: Electronically signed by Nick Borden RN on 6/3/19 at 7:29 PM

## 2019-06-05 PROBLEM — I21.4 NSTEMI (NON-ST ELEVATED MYOCARDIAL INFARCTION) (HCC): Status: ACTIVE | Noted: 2019-06-05

## 2019-06-05 LAB
ANION GAP SERPL CALCULATED.3IONS-SCNC: 9 MMOL/L (ref 7–19)
BUN BLDV-MCNC: 24 MG/DL (ref 8–23)
CALCIUM SERPL-MCNC: 10.4 MG/DL (ref 8.8–10.2)
CHLORIDE BLD-SCNC: 99 MMOL/L (ref 98–111)
CO2: 32 MMOL/L (ref 22–29)
CREAT SERPL-MCNC: 1.6 MG/DL (ref 0.5–1.2)
GFR NON-AFRICAN AMERICAN: 44
GLUCOSE BLD-MCNC: 217 MG/DL (ref 70–99)
GLUCOSE BLD-MCNC: 227 MG/DL (ref 74–109)
GLUCOSE BLD-MCNC: 242 MG/DL (ref 70–99)
GLUCOSE BLD-MCNC: 252 MG/DL (ref 70–99)
GLUCOSE BLD-MCNC: 311 MG/DL (ref 70–99)
HCT VFR BLD CALC: 35.9 % (ref 42–52)
HEMOGLOBIN: 11.3 G/DL (ref 14–18)
MCH RBC QN AUTO: 27.4 PG (ref 27–31)
MCHC RBC AUTO-ENTMCNC: 31.5 G/DL (ref 33–37)
MCV RBC AUTO: 87.1 FL (ref 80–94)
PDW BLD-RTO: 13.7 % (ref 11.5–14.5)
PERFORMED ON: ABNORMAL
PLATELET # BLD: 140 K/UL (ref 130–400)
PMV BLD AUTO: 13.5 FL (ref 9.4–12.4)
POTASSIUM REFLEX MAGNESIUM: 4.6 MMOL/L (ref 3.5–5)
RBC # BLD: 4.12 M/UL (ref 4.7–6.1)
SODIUM BLD-SCNC: 140 MMOL/L (ref 136–145)
TROPONIN: 0.17 NG/ML (ref 0–0.03)
TROPONIN: 0.2 NG/ML (ref 0–0.03)
WBC # BLD: 8.4 K/UL (ref 4.8–10.8)

## 2019-06-05 PROCEDURE — 93005 ELECTROCARDIOGRAM TRACING: CPT

## 2019-06-05 PROCEDURE — 85027 COMPLETE CBC AUTOMATED: CPT

## 2019-06-05 PROCEDURE — 80048 BASIC METABOLIC PNL TOTAL CA: CPT

## 2019-06-05 PROCEDURE — 94640 AIRWAY INHALATION TREATMENT: CPT

## 2019-06-05 PROCEDURE — 82948 REAGENT STRIP/BLOOD GLUCOSE: CPT

## 2019-06-05 PROCEDURE — 2140000000 HC CCU INTERMEDIATE R&B

## 2019-06-05 PROCEDURE — 84484 ASSAY OF TROPONIN QUANT: CPT

## 2019-06-05 PROCEDURE — 2580000003 HC RX 258: Performed by: INTERNAL MEDICINE

## 2019-06-05 PROCEDURE — 99233 SBSQ HOSP IP/OBS HIGH 50: CPT | Performed by: INTERNAL MEDICINE

## 2019-06-05 PROCEDURE — 6370000000 HC RX 637 (ALT 250 FOR IP): Performed by: INTERNAL MEDICINE

## 2019-06-05 PROCEDURE — 36415 COLL VENOUS BLD VENIPUNCTURE: CPT

## 2019-06-05 RX ORDER — HYDROCHLOROTHIAZIDE 12.5 MG/1
12.5 CAPSULE, GELATIN COATED ORAL DAILY
Status: DISCONTINUED | OUTPATIENT
Start: 2019-06-06 | End: 2019-06-07 | Stop reason: HOSPADM

## 2019-06-05 RX ORDER — LANSOPRAZOLE 30 MG/1
30 CAPSULE, DELAYED RELEASE ORAL
Status: DISCONTINUED | OUTPATIENT
Start: 2019-06-06 | End: 2019-06-07 | Stop reason: HOSPADM

## 2019-06-05 RX ORDER — LISINOPRIL 10 MG/1
10 TABLET ORAL DAILY
Status: DISCONTINUED | OUTPATIENT
Start: 2019-06-05 | End: 2019-06-05

## 2019-06-05 RX ORDER — FUROSEMIDE 40 MG/1
40 TABLET ORAL DAILY
Status: DISCONTINUED | OUTPATIENT
Start: 2019-06-06 | End: 2019-06-07 | Stop reason: HOSPADM

## 2019-06-05 RX ORDER — GABAPENTIN 300 MG/1
300 CAPSULE ORAL 3 TIMES DAILY
Status: DISCONTINUED | OUTPATIENT
Start: 2019-06-05 | End: 2019-06-07 | Stop reason: HOSPADM

## 2019-06-05 RX ORDER — SODIUM CHLORIDE 9 MG/ML
INJECTION, SOLUTION INTRAVENOUS CONTINUOUS
Status: DISCONTINUED | OUTPATIENT
Start: 2019-06-05 | End: 2019-06-07

## 2019-06-05 RX ORDER — INSULIN GLARGINE 100 [IU]/ML
55 INJECTION, SOLUTION SUBCUTANEOUS 2 TIMES DAILY
Status: DISCONTINUED | OUTPATIENT
Start: 2019-06-05 | End: 2019-06-07 | Stop reason: HOSPADM

## 2019-06-05 RX ORDER — ATORVASTATIN CALCIUM 40 MG/1
80 TABLET, FILM COATED ORAL NIGHTLY
Status: DISCONTINUED | OUTPATIENT
Start: 2019-06-05 | End: 2019-06-07 | Stop reason: HOSPADM

## 2019-06-05 RX ORDER — LORATADINE 10 MG/1
10 TABLET ORAL DAILY
Status: DISCONTINUED | OUTPATIENT
Start: 2019-06-05 | End: 2019-06-07 | Stop reason: HOSPADM

## 2019-06-05 RX ADMIN — INSULIN LISPRO 10 UNITS: 100 INJECTION, SOLUTION INTRAVENOUS; SUBCUTANEOUS at 08:41

## 2019-06-05 RX ADMIN — SODIUM CHLORIDE: 9 INJECTION, SOLUTION INTRAVENOUS at 13:52

## 2019-06-05 RX ADMIN — ATORVASTATIN CALCIUM 80 MG: 40 TABLET, FILM COATED ORAL at 20:15

## 2019-06-05 RX ADMIN — GABAPENTIN 300 MG: 300 CAPSULE ORAL at 20:20

## 2019-06-05 RX ADMIN — METOPROLOL TARTRATE 50 MG: 50 TABLET, FILM COATED ORAL at 20:20

## 2019-06-05 RX ADMIN — INSULIN LISPRO 3 UNITS: 100 INJECTION, SOLUTION INTRAVENOUS; SUBCUTANEOUS at 20:30

## 2019-06-05 RX ADMIN — METOPROLOL TARTRATE 50 MG: 50 TABLET, FILM COATED ORAL at 08:40

## 2019-06-05 RX ADMIN — Medication 55 UNITS: at 20:30

## 2019-06-05 RX ADMIN — IPRATROPIUM BROMIDE AND ALBUTEROL SULFATE 3 ML: .5; 3 SOLUTION RESPIRATORY (INHALATION) at 22:38

## 2019-06-05 RX ADMIN — LORATADINE 10 MG: 10 TABLET ORAL at 10:22

## 2019-06-05 RX ADMIN — THERA TABS 1 TABLET: TAB at 08:40

## 2019-06-05 RX ADMIN — GABAPENTIN 300 MG: 300 CAPSULE ORAL at 10:22

## 2019-06-05 RX ADMIN — LANSOPRAZOLE 30 MG: 30 CAPSULE, DELAYED RELEASE ORAL at 20:21

## 2019-06-05 RX ADMIN — FUROSEMIDE 40 MG: 40 TABLET ORAL at 08:40

## 2019-06-05 RX ADMIN — IPRATROPIUM BROMIDE AND ALBUTEROL SULFATE 3 ML: .5; 3 SOLUTION RESPIRATORY (INHALATION) at 06:47

## 2019-06-05 RX ADMIN — IRBESARTAN 150 MG: 150 TABLET ORAL at 20:19

## 2019-06-05 RX ADMIN — HYDROCHLOROTHIAZIDE 12.5 MG: 12.5 CAPSULE, GELATIN COATED ORAL at 08:40

## 2019-06-05 RX ADMIN — INSULIN LISPRO 9 UNITS: 100 INJECTION, SOLUTION INTRAVENOUS; SUBCUTANEOUS at 17:31

## 2019-06-05 RX ADMIN — PRASUGREL 10 MG: 10 TABLET, FILM COATED ORAL at 08:40

## 2019-06-05 RX ADMIN — IPRATROPIUM BROMIDE AND ALBUTEROL SULFATE 3 ML: .5; 3 SOLUTION RESPIRATORY (INHALATION) at 10:46

## 2019-06-05 RX ADMIN — Medication 55 UNITS: at 14:15

## 2019-06-05 RX ADMIN — IPRATROPIUM BROMIDE AND ALBUTEROL SULFATE 3 ML: .5; 3 SOLUTION RESPIRATORY (INHALATION) at 18:44

## 2019-06-05 RX ADMIN — INSULIN LISPRO 12 UNITS: 100 INJECTION, SOLUTION INTRAVENOUS; SUBCUTANEOUS at 13:52

## 2019-06-05 RX ADMIN — ASPIRIN 81 MG: 81 TABLET, CHEWABLE ORAL at 08:40

## 2019-06-05 ASSESSMENT — PAIN SCALES - GENERAL
PAINLEVEL_OUTOF10: 0

## 2019-06-05 NOTE — PROCEDURES
Cardiac Risk Profile -       Risk Factor Yes / No / Unknown         Gender Male   Cigarette Use No   Family History of Cardiovascular Disease Yes: diabetes   Diabetes Mellitus yes   Hypercholesteremia no   Hypertension yes       Prior Cardiac History -    5/12/2018  Echo  Normal LVFX  5/12/2018  lexiscan negative for myocardial ischemia, EF 44  %   5/14/2018  Hospital visit AUC indication 19, AUC score 7  5/14/2018  Cath  70% osteal LAD, 3.0 x 12 AGA, apical hypokinesis    6/3/19  ACS MARTINA RISK Score 4 (angina, + troponin, CAD>50, diabetes, hypertension, ASA), AUC indication 3, AUC score 8   6/4/19  Cath  Patent stent in the osteal LAD, 99% mid LCX, 3.5 x 18 AGA, 40% mid RCA, anterior lateral hypokinesis EF 45%    Indications for Cardiac Catheterization -  6/3/19  ACS MARTINA RISK Score 4 (angina, + troponin, CAD>50, diabetes, hypertension, ASA), AUC indication 3, AUC score 8 , Diagnostic Catheterization Appropriate Use Criteria Description, [facility] 2012;[id number]    Conscious Sedation Protocol Used During this Procedure -        Anesthesia: Moderate   Sedation: 2 mg Midazolam (Versed)  100 mcg Fentanyl   Start time: 18:32   Stop time: 19:02   ASA Class: III   Estimated blood loss < 5 ml            After obtaining informed written consent, the patient was brought to the catheterization laboratory where the right groin was prepared in the usual sterile fashion. The patient was monitored continuously with ECG, pulse oximetry, blood pressure monitoring and direct observation. Additionally, please review the \"Mel Hemodynamic Procedure Report\", which is generated electronically through the [employer]. This report includes additional details regarding this procedure including, but not limited to:     1. Patient Data,   2. Admission,   3. Procedure,   4. Hemodynamics,   5. Vital Signs,   6. Medications, including conscious sedation medications given during the procedure (as noted above),   7. Procedure Log,   8.  Device Usage,   9. Signature Audit [place], and,   10. Signatures. It should be noted, that I sign the \"Signatures\" line electronically through the \"HPF Def Portals\" tab on my computer. 2% lidocaine was injected above the common femoral artery. Utilizing ultrasound guidance, and the Seldinger technique, the common femoral artery was cannulated and bright red pulsatile blood returned. Using fluoroscopy guidance, the J-tip wire was placed in the common femoral artery. A 6-Fr sheath was inserted. Utilizing 6-Cam[place]onian Imani catheters, selective coronary arteriography was performed followed by left ventriculography. At this stage utilizing a 6FR FL5 with 2 SH, the mid circumflex was injected. The lesion in the mid circumflex was crossed with a 0.014\" intermediate guide wire. The lesion was then crossed with a 2.5 x 15 mm balloon and thereafter, a 3.5 x 18 mm AGA was placed. A satisfactory angiography result was obtained. At this stage, the equipment was removed. A right femoral arteriogram was performed in the event that a vascular access closure device was to be deployed. A 6-Cameroonian Mynx vascular access closure device was inserted. Hemostasis was achieved. Sterile dressing was applied. He was taken to his room in stable and satisfactory condition. The results of the procedure were explained to the family in the cardiac catheterization laboratories consult / waiting room        Complications:  none      Results:    Left Heart Pressures:      Site Pressure mmHg        Left ventricular pressure 152/22 mmHg   Left ventricular end-diastolic pressure (LVEDP) 37 mmHg   Aortic pressure 149/81 mmHg   Mean aortic pressure 111 mmHg       Coronary Arteries:    Left Main Coronary Artery:  A large vessel which arises from the left sinus of Valsalva. It divides into the left anterior descending coronary artery and the left circumflex. There is mild diffuse disease throughout the entire length of the vessel.      Left Anterior Coronary Artery:  The LAD is a moderate sized vessel with several diagonal branches. There is mild diffuse disease throughout the entire length of the vessel. The stent in the osteal LAD is patent. Left Circumflex Coronary Artery:  The LCx is a moderate sized vessel with several marginal branches. There is a 99% stenosis in the mid portion of this vessel. Right Coronary Artery:  The RCA is a moderate sized dominant vessel which arises from the right sinus of Valsalva. There is a 40% stenosis in the mid portion of this vessel. Left Ventriculogram:  The left ventriculogram is obtained in the right oblique projection. There is anterior lateral extending into the apex severe hypokinesis. All other regional wall segments move appropriately. The estimated visual ejection fraction is 40%. There is no mitral regurgitation or pull back gradient seen across the aortic valve. Percutaneous Coronary Intervention:    The initial 99% stenosis in the mid circumflex was reduced to 0%. MARTINA-3 flow was maintained throughout. Summary:      1. Successful femoral artery ultrasound  2. Successful femoral artery arterogram  3. Supervision of the administration of moderate conscious sedation  4. Single vessel coronary artery disease involving the mid circumflex  5. Left ventricular function is severely impaired in the anterior lateral segment with a visually estimate ejection of 40%. 6.  99% lesion in the mid circumflex  7. Successful percutaneous coronary intervention utilizing a single drug eluding stent to the mid circumflex. 8.  Patent stent in the osteal LAD  9.  40% lesion in the mid RCA      RECOMMENDATIONS:    1. Risk Factor Modification  2. On-going Medical Therapy  3. Dual antiplatelet therapy for 12 months.       Electronically signed by Anoop Gatica MD on 6/4/19 at 7:11 PM

## 2019-06-06 ENCOUNTER — APPOINTMENT (OUTPATIENT)
Dept: ULTRASOUND IMAGING | Age: 64
DRG: 247 | End: 2019-06-06
Payer: MEDICARE

## 2019-06-06 LAB
ANION GAP SERPL CALCULATED.3IONS-SCNC: 10 MMOL/L (ref 7–19)
BACTERIA: ABNORMAL /HPF
BILIRUBIN URINE: NEGATIVE
BLOOD, URINE: ABNORMAL
BUN BLDV-MCNC: 28 MG/DL (ref 8–23)
CALCIUM SERPL-MCNC: 9.9 MG/DL (ref 8.8–10.2)
CHLORIDE BLD-SCNC: 98 MMOL/L (ref 98–111)
CLARITY: CLEAR
CO2: 29 MMOL/L (ref 22–29)
COLOR: YELLOW
CREAT SERPL-MCNC: 1.7 MG/DL (ref 0.5–1.2)
CREATININE URINE: 47 MG/DL (ref 4.2–622)
EKG P AXIS: 40 DEGREES
EKG P-R INTERVAL: 166 MS
EKG Q-T INTERVAL: 394 MS
EKG QRS DURATION: 114 MS
EKG QTC CALCULATION (BAZETT): 423 MS
EKG T AXIS: 98 DEGREES
EOSINOPHIL,URINE: NORMAL
FERRITIN: 71.2 NG/ML (ref 30–400)
GFR NON-AFRICAN AMERICAN: 41
GLUCOSE BLD-MCNC: 223 MG/DL (ref 70–99)
GLUCOSE BLD-MCNC: 235 MG/DL (ref 74–109)
GLUCOSE BLD-MCNC: 245 MG/DL (ref 70–99)
GLUCOSE BLD-MCNC: 250 MG/DL (ref 70–99)
GLUCOSE BLD-MCNC: 358 MG/DL (ref 70–99)
GLUCOSE URINE: 100 MG/DL
IRON SATURATION: 13 % (ref 14–50)
IRON: 39 UG/DL (ref 59–158)
KETONES, URINE: NEGATIVE MG/DL
LEUKOCYTE ESTERASE, URINE: NEGATIVE
MAGNESIUM: 1.9 MG/DL (ref 1.6–2.4)
NITRITE, URINE: NEGATIVE
PARATHYROID HORMONE INTACT: 164.1 PG/ML (ref 15–65)
PERFORMED ON: ABNORMAL
PH UA: 5 (ref 5–8)
PHOSPHORUS: 3.8 MG/DL (ref 2.5–4.5)
POTASSIUM REFLEX MAGNESIUM: 4.7 MMOL/L (ref 3.5–5)
PROTEIN PROTEIN: 9 MG/DL (ref 15–45)
PROTEIN UA: NEGATIVE MG/DL
RBC UA: ABNORMAL /HPF (ref 0–2)
SODIUM BLD-SCNC: 137 MMOL/L (ref 136–145)
SODIUM URINE: 82 MMOL/L
SPECIFIC GRAVITY UA: 1.01 (ref 1–1.03)
TOTAL IRON BINDING CAPACITY: 294 UG/DL (ref 250–400)
TROPONIN: 0.17 NG/ML (ref 0–0.03)
UREA NITROGEN, UR: 284 MG/DL
URIC ACID, SERUM: 9.1 MG/DL (ref 3.4–7)
UROBILINOGEN, URINE: 0.2 E.U./DL
VITAMIN D 25-HYDROXY: 22.7 NG/ML

## 2019-06-06 PROCEDURE — 84300 ASSAY OF URINE SODIUM: CPT

## 2019-06-06 PROCEDURE — 83550 IRON BINDING TEST: CPT

## 2019-06-06 PROCEDURE — 76770 US EXAM ABDO BACK WALL COMP: CPT

## 2019-06-06 PROCEDURE — 84484 ASSAY OF TROPONIN QUANT: CPT

## 2019-06-06 PROCEDURE — 81001 URINALYSIS AUTO W/SCOPE: CPT

## 2019-06-06 PROCEDURE — 82306 VITAMIN D 25 HYDROXY: CPT

## 2019-06-06 PROCEDURE — 99233 SBSQ HOSP IP/OBS HIGH 50: CPT | Performed by: INTERNAL MEDICINE

## 2019-06-06 PROCEDURE — 82948 REAGENT STRIP/BLOOD GLUCOSE: CPT

## 2019-06-06 PROCEDURE — 82570 ASSAY OF URINE CREATININE: CPT

## 2019-06-06 PROCEDURE — 82728 ASSAY OF FERRITIN: CPT

## 2019-06-06 PROCEDURE — 83970 ASSAY OF PARATHORMONE: CPT

## 2019-06-06 PROCEDURE — 6370000000 HC RX 637 (ALT 250 FOR IP): Performed by: INTERNAL MEDICINE

## 2019-06-06 PROCEDURE — 84540 ASSAY OF URINE/UREA-N: CPT

## 2019-06-06 PROCEDURE — 2580000003 HC RX 258: Performed by: INTERNAL MEDICINE

## 2019-06-06 PROCEDURE — 87205 SMEAR GRAM STAIN: CPT

## 2019-06-06 PROCEDURE — 36415 COLL VENOUS BLD VENIPUNCTURE: CPT

## 2019-06-06 PROCEDURE — 2140000000 HC CCU INTERMEDIATE R&B

## 2019-06-06 PROCEDURE — 84100 ASSAY OF PHOSPHORUS: CPT

## 2019-06-06 PROCEDURE — 84550 ASSAY OF BLOOD/URIC ACID: CPT

## 2019-06-06 PROCEDURE — 83540 ASSAY OF IRON: CPT

## 2019-06-06 PROCEDURE — 83735 ASSAY OF MAGNESIUM: CPT

## 2019-06-06 PROCEDURE — 80048 BASIC METABOLIC PNL TOTAL CA: CPT

## 2019-06-06 PROCEDURE — 94640 AIRWAY INHALATION TREATMENT: CPT

## 2019-06-06 PROCEDURE — 84156 ASSAY OF PROTEIN URINE: CPT

## 2019-06-06 RX ORDER — ERGOCALCIFEROL 1.25 MG/1
50000 CAPSULE ORAL WEEKLY
Status: DISCONTINUED | OUTPATIENT
Start: 2019-06-06 | End: 2019-06-07 | Stop reason: HOSPADM

## 2019-06-06 RX ORDER — FERROUS SULFATE 325(65) MG
324 TABLET ORAL
Status: DISCONTINUED | OUTPATIENT
Start: 2019-06-07 | End: 2019-06-07 | Stop reason: HOSPADM

## 2019-06-06 RX ADMIN — ASPIRIN 81 MG: 81 TABLET, CHEWABLE ORAL at 08:22

## 2019-06-06 RX ADMIN — FUROSEMIDE 40 MG: 40 TABLET ORAL at 08:22

## 2019-06-06 RX ADMIN — PRASUGREL 10 MG: 10 TABLET, FILM COATED ORAL at 08:23

## 2019-06-06 RX ADMIN — INSULIN LISPRO 15 UNITS: 100 INJECTION, SOLUTION INTRAVENOUS; SUBCUTANEOUS at 12:34

## 2019-06-06 RX ADMIN — Medication 55 UNITS: at 19:23

## 2019-06-06 RX ADMIN — IPRATROPIUM BROMIDE AND ALBUTEROL SULFATE 3 ML: .5; 3 SOLUTION RESPIRATORY (INHALATION) at 22:21

## 2019-06-06 RX ADMIN — THERA TABS 1 TABLET: TAB at 08:22

## 2019-06-06 RX ADMIN — IPRATROPIUM BROMIDE AND ALBUTEROL SULFATE 3 ML: .5; 3 SOLUTION RESPIRATORY (INHALATION) at 07:02

## 2019-06-06 RX ADMIN — GABAPENTIN 300 MG: 300 CAPSULE ORAL at 08:22

## 2019-06-06 RX ADMIN — METOPROLOL TARTRATE 50 MG: 50 TABLET, FILM COATED ORAL at 19:21

## 2019-06-06 RX ADMIN — GABAPENTIN 300 MG: 300 CAPSULE ORAL at 14:03

## 2019-06-06 RX ADMIN — METOPROLOL TARTRATE 50 MG: 50 TABLET, FILM COATED ORAL at 08:22

## 2019-06-06 RX ADMIN — ERGOCALCIFEROL 50000 UNITS: 1.25 CAPSULE ORAL at 14:27

## 2019-06-06 RX ADMIN — LORATADINE 10 MG: 10 TABLET ORAL at 08:22

## 2019-06-06 RX ADMIN — IPRATROPIUM BROMIDE AND ALBUTEROL SULFATE 3 ML: .5; 3 SOLUTION RESPIRATORY (INHALATION) at 18:30

## 2019-06-06 RX ADMIN — HYDROCHLOROTHIAZIDE 12.5 MG: 12.5 CAPSULE, GELATIN COATED ORAL at 08:22

## 2019-06-06 RX ADMIN — LANSOPRAZOLE 30 MG: 30 CAPSULE, DELAYED RELEASE ORAL at 06:06

## 2019-06-06 RX ADMIN — IPRATROPIUM BROMIDE AND ALBUTEROL SULFATE 3 ML: .5; 3 SOLUTION RESPIRATORY (INHALATION) at 10:44

## 2019-06-06 RX ADMIN — GABAPENTIN 300 MG: 300 CAPSULE ORAL at 19:19

## 2019-06-06 RX ADMIN — INSULIN LISPRO 9 UNITS: 100 INJECTION, SOLUTION INTRAVENOUS; SUBCUTANEOUS at 08:25

## 2019-06-06 RX ADMIN — INSULIN LISPRO 3 UNITS: 100 INJECTION, SOLUTION INTRAVENOUS; SUBCUTANEOUS at 19:30

## 2019-06-06 RX ADMIN — ATORVASTATIN CALCIUM 80 MG: 40 TABLET, FILM COATED ORAL at 19:20

## 2019-06-06 RX ADMIN — SODIUM CHLORIDE: 9 INJECTION, SOLUTION INTRAVENOUS at 01:44

## 2019-06-06 RX ADMIN — Medication 55 UNITS: at 08:25

## 2019-06-06 RX ADMIN — INSULIN LISPRO 6 UNITS: 100 INJECTION, SOLUTION INTRAVENOUS; SUBCUTANEOUS at 17:16

## 2019-06-06 RX ADMIN — IRBESARTAN 150 MG: 150 TABLET ORAL at 19:18

## 2019-06-06 ASSESSMENT — PAIN SCALES - GENERAL
PAINLEVEL_OUTOF10: 0

## 2019-06-06 NOTE — PLAN OF CARE
Problem: Falls - Risk of:  Goal: Will remain free from falls  Description  Will remain free from falls  Outcome: Ongoing  Goal: Absence of physical injury  Description  Absence of physical injury  Outcome: Ongoing     Problem: SAFETY  Goal: Free from accidental physical injury  Outcome: Ongoing  Goal: Free from intentional harm  Outcome: Ongoing     Problem: DAILY CARE  Goal: Daily care needs are met  Outcome: Ongoing     Problem: PAIN  Goal: Patient's pain/discomfort is manageable  Outcome: Ongoing     Problem: SKIN INTEGRITY  Goal: Skin integrity is maintained or improved  Outcome: Ongoing     Problem: KNOWLEDGE DEFICIT  Goal: Patient/S.O. demonstrates understanding of disease process, treatment plan, medications, and discharge instructions. Outcome: Ongoing     Problem: DISCHARGE BARRIERS  Goal: Patient's continuum of care needs are met  Outcome: Ongoing     Problem:  Activity:  Goal: Risk for activity intolerance will decrease  Description  Risk for activity intolerance will decrease  Outcome: Ongoing  Goal: Will verbalize the importance of balancing activity with adequate rest periods  Description  Will verbalize the importance of balancing activity with adequate rest periods  Outcome: Ongoing     Problem: Cardiac:  Goal: Ability to maintain an adequate cardiac output will improve  Description  Ability to maintain an adequate cardiac output will improve  Outcome: Ongoing  Goal: Hemodynamic stability will improve  Description  Hemodynamic stability will improve  Outcome: Ongoing  Goal: Diagnostic test results will improve  Description  Diagnostic test results will improve  Outcome: Ongoing

## 2019-06-06 NOTE — PROGRESS NOTES
10/11/2016     Priority: Low    History of colon polyps 10/11/2016     Priority: Low    Family history of prostate cancer in father 10/05/2016     Priority: Low    Pharyngoesophageal dysphagia 09/20/2016     Priority: Low    IDDM (insulin dependent diabetes mellitus) ([facility] [address] 06/27/2016     Priority: Low    Essential hypertension 06/27/2016     Priority: Low    Gastroesophageal reflux disease without esophagitis 06/27/2016     Priority: Low    CAD (coronary artery disease) 05/15/2018    History of placement of stent in LAD coronary artery     Dyspnea 05/11/2018     Current Facility-Administered Medications   Medication Dose Route Frequency Provider Last Rate Last Dose    gabapentin (NEURONTIN) capsule 300 mg  300 mg Oral TID Sridhar Hinkle MD   300 mg at 06/05/19 2020    loratadine (CLARITIN) tablet 10 mg  10 mg Oral Daily Sridhar Hinkle MD   10 mg at 06/05/19 1022    [START ON 6/6/2019] lansoprazole (PREVACID) delayed release capsule 30 mg  30 mg Oral QAM AC Sridhar Hinkle MD   30 mg at 06/05/19 2021    atorvastatin (LIPITOR) tablet 80 mg  80 mg Oral Nightly Sridhar Hinkle MD   80 mg at 06/05/19 2015    [START ON 6/6/2019] furosemide (LASIX) tablet 40 mg  40 mg Oral Daily Sridhar Hinkle MD        [START ON 6/6/2019] hydrochlorothiazide (MICROZIDE) capsule 12.5 mg  12.5 mg Oral Daily Sridhar Hinkle MD        0.9 % sodium chloride infusion   Intravenous Continuous Sridhar Hinkle  mL/hr at 06/05/19 1352      insulin glargine (LANTUS) injection vial 55 Units  55 Units Subcutaneous BID Sridhar Hinkle MD   55 Units at 06/05/19 2030    insulin lispro (HUMALOG) injection vial 0-18 Units  0-18 Units Subcutaneous TID WC Sridhar Hinkle MD   9 Units at 06/05/19 1731    insulin lispro (HUMALOG) injection vial 0-9 Units  0-9 Units Subcutaneous Nightly Sridhar Hinkle MD   3 Units at 06/05/19 2030    acetaminophen (TYLENOL) tablet 650 mg  650 mg Oral Q4H PRN Sridhar Hinkle MD        magnesium hydroxide Dr Lopez-diverticulosis, Sessile serrated AP (-) high grade dysplasia x 1, tubular AP (-) dysplasia x 2, HP x 2--3 yr recall    EYE SURGERY      Right eye    PRE-MALIGNANT / Carmen Canary      Dr. Josefina Chung UPPER GASTROINTESTINAL ENDOSCOPY  9/20/2016    Dr Lopez-w/dilation over wire, 46 Maced[place]-distal esophageal narrowing, Inna (-)    UPPER GASTROINTESTINAL ENDOSCOPY  9/20/2016    Dr Lopez-w/dilation over wire, 46 Macedonian-distal esophageal narrowing, Inna (-)     Family History   Problem Relation Age of Onset    Colon Cancer Mother     Prostate Cancer Father     Other Father         HX of blood clot    Diabetes Sister     Cancer Sister     Diabetes Brother     Esophageal Cancer Neg Hx     Liver Cancer Neg Hx     Liver Disease Neg Hx     Stomach Cancer Neg Hx     Rectal Cancer Neg Hx      Social History     Tobacco Use    Smoking status: Never Smoker    Smokeless tobacco: Never Used   Substance Use Topics    Alcohol use: No          Review of Systems:    General:      Complaint / Symptom Yes / No / Description if Yes       Fatigue Yes:  chronic   Weight gain NA   Insomnia NA       Respiratory:        Complaint / Symptom Yes / No / Description if Yes       Cough No   Horseness NA       Cardiovascular:    Complaint / Symptom Yes / No / Description if Yes       Chest Pain No   Shortness of Air / Orthopnea Yes: chronic and stable   Presyncope / Syncope No   Palpitations No         Objective:    /74   Pulse 85   Temp 97.2 °F (36.2 °C) (Temporal)   Resp 16   Ht 6' (1.829 m)   Wt 285 lb (129.3 kg)   SpO2 92%   BMI 38.65 kg/m² ,     Intake/Output Summary (Last 24 hours) at 6/5/2019 2308  Last data filed at 6/5/2019 2033  Gross per 24 hour   Intake 1614.2 ml   Output 250 ml   Net 1364.2 ml       GENERAL - well developed and well nourished, is an active participant in this examination  HEENT -  PERRLA, Hearing appears normal, conjunctiva and lids are normal, ears and nose appear normal  NECK - no thyromegaly, no JVD, trachea is in the midline  CARDIOVASCULAR - PMI is in the left mid line clavicular position, Normal S1 and S2 with a grade 1/6 systolic murmur. No S3 or S4    PULMONARY - No respiratory distress. scattered wheezes and rales. Breath sounds in both  lung fields are Decreased  ABDOMEN  - soft, non tender, no rebound, no hepatomegaly or splenomegaly  MUSCULOSKELETAL  - Prone/Supine, digitals and nails are without clubbing or cyanosis  EXTREMITIES - trace edema  NEUROLOGIC - cranial nerves, II-XII, are normal  SKIN - turgor is normal, no rash  PSYCHIATRIC - normal mood and affect, alert and orientated x 3, judgement and insight appear appropriate      ASSESSMENT:    ALL THE CARDIOLOGY PROBLEMS ARE LISTED ABOVE; HOWEVER, THE FOLLOWING SPECIFIC CARDIAC PROBLEMS / CONDITIONS WERE ADDRESSED AND TREATED DURING THE OFFICE VISIT TODAY:                                                                                            MEDICAL DECISION MAKING                   Cardiac Specific Problem / Diagnosis   Discussion and Data Reviewed Diagnostic Procedures Ordered Management Options Selected                 1. Presenting problem / symptom     Chest discomfort of ? etiology  are worsening    The chest discomfort is was exertional and does  appear to represent myocardial ischemia.       Nonetheless, He has the following risk factors for the presence of coronary artery disease:     Risk Factor Yes / No / Unknown         Gender Male   Cigarette Use No   Family History of Cardiovascular Disease Yes: diabetes   Diabetes Mellitus yes   Hypercholesteremia no   Hypertension yes         He also has the following cardiac history:                    Specialty Problems                    Cardiology Problems     Essential hypertension           CAD (coronary artery disease)                  Yes: cardiac catheterization Continue current medications:      Yes:                  2.  Coronary artery disease Prior evaluation    Review and summation of old records:     5/12/2018  Echo  Normal LVFX  5/12/2018  lexiscan negative for myocardial ischemia, EF 44  %   5/14/2018  Hospital visit AUC indication 19, AUC score 7  5/14/2018  Cath  70% osteal LAD, 3.0 x 12 AGA, apical hypokinesis    6/3/19  ACS MARTINA RISK Score 4 (angina, + troponin, CAD>50, diabetes, hypertension, ASA), AUC indication 3, AUC score 8     6/4/19  Cath  Patent stent in the osteal LAD, 99% mid LCX, 3.5 x 18 AGA, 40% mid RCA, anterior lateral hypokinesis EF 45%       Yes: as per the cardiac cath Continue current medications:     Yes:                  3. Systemic arterial hypertension Prior evaluation The blood pressure for the lastr 36 hours has been:  Systolic (11QPT), ZCE:713 , Min:111 , TID:506    Diastolic (88XOL), UQA:23, Min:65, Max:94          No Continue current medications:        yes             4. IVFs and ck creatinine tomorrow      CONSIDERATIONS, THOUGHTS, AND PLANS:    1. Continue present medications except for changes as noted above  2. Continue to monitor rhythm  3. Further orders per clinical course. 4. As noted           Discussed with patient and spouse and nursing.     Electronically signed by Anoop Gatica MD on 6/5/19        [facility] [place]

## 2019-06-06 NOTE — CONSULTS
Nephrology ([facility]) Consult Note      Patient:  Trev Carbone  YOB: 1955  Date of Service: 6/6/2019  MRN: [id number]   Acct: [de-identified]   Primary Care Physician: MIMI Henderson  Advance Directive: Full Code  Admit Date: 6/3/2019       Hospital Day: 1  Referring Provider: Cari Kwan MD    Patient independently seen and examined, Chart, Consults, Notes, Operative notes, Labs, Cardiology, and Radiology studies reviewed as able. Subjective:  Trev Carbone is a 70 y.o. male  whom we were consulted for chronic kidney disease stage III. He follows with Dr. Andrea Srivastava in the office. Was admitted on this Deepa 3 with complaints of chest pain. As part of his evaluation he had a contrasted CT on Deepa 3 and underwent cardiac catheterization and stenting June 4. For his labs on June 5 and 6th his creatinine has increased slightly from previous baseline. He denies chest pain currently, shortness of air, nausea or vomiting, dysuria, hematuria, changes in urine habits, hemoptysis, rash. They have many questions and some anxiety about his renal issues.     Allergies:  Tape Soni Skill tape]    Medicines:  Current Facility-Administered Medications   Medication Dose Route Frequency Provider Last Rate Last Dose    gabapentin (NEURONTIN) capsule 300 mg  300 mg Oral TID Cari Kwan MD   300 mg at 06/06/19 0188    loratadine (CLARITIN) tablet 10 mg  10 mg Oral Daily Cari Kwan MD   10 mg at 06/06/19 9626    lansoprazole (PREVACID) delayed release capsule 30 mg  30 mg Oral QAM AC Cari Kwan MD   30 mg at 06/06/19 0606    atorvastatin (LIPITOR) tablet 80 mg  80 mg Oral Nightly Cari Kwan MD   80 mg at 06/05/19 2015    furosemide (LASIX) tablet 40 mg  40 mg Oral Daily Cari Kwan MD   40 mg at 06/06/19 8460    hydrochlorothiazide (MICROZIDE) capsule 12.5 mg  12.5 mg Oral Daily Cari Kwan MD   12.5 mg at 06/06/19 4025    0.9 % sodium chloride infusion   Intravenous Continuous Mike Cheney  mL/hr at 06/06/19 0144      insulin glargine (LANTUS) injection vial 55 Units  55 Units Subcutaneous BID Mike Cheney MD   55 Units at 06/06/19 0825    insulin lispro (HUMALOG) injection vial 0-18 Units  0-18 Units Subcutaneous TID WC Mike Cheney MD   15 Units at 06/06/19 1234    insulin lispro (HUMALOG) injection vial 0-9 Units  0-9 Units Subcutaneous Nightly Mike Cheney MD   3 Units at 06/05/19 2030    acetaminophen (TYLENOL) tablet 650 mg  650 mg Oral Q4H PRN Mike Cheney MD        magnesium hydroxide (MILK OF MAGNESIA) 400 MG/5ML suspension 30 mL  30 mL Oral Daily PRN Mike Cheney MD        ondansetron [facility]) injection 4 mg  4 mg Intravenous Q6H PRN Mike Cheney MD        nitroGLYCERIN (NITROSTAT) SL tablet 0.4 mg  0.4 mg Sublingual Q5 Min PRN MIMI Chacko   0.4 mg at 06/04/19 0624    [START ON 6/10/2019] vitamin D (ERGOCALCIFEROL) capsule 50,000 Units  50,000 Units Oral Q30 Days Mike Cheney MD        albuterol sulfate  (90 Base) MCG/ACT inhaler 2 puff  2 puff Inhalation Q6H PRN Mike Cheney MD        multivitamin 1 tablet  1 tablet Oral Daily Mike Cheney MD   1 tablet at 06/06/19 1448    guaiFENesin tablet 400 mg  400 mg Oral 4x Daily PRN Mike Cheney MD        ipratropium-albuterol (DUONEB) nebulizer solution 3 mL  1 vial Inhalation Q6H Mike Cheney MD   3 mL at 06/06/19 1044    magnesium hydroxide (MILK OF MAGNESIA) 400 MG/5ML suspension 30 mL  30 mL Oral Daily PRN Mike Cheney MD        ondansetron [facility]) injection 4 mg  4 mg Intravenous Q6H PRN Mike Cheney MD   4 mg at 06/04/19 2210    aspirin chewable tablet 81 mg  81 mg Oral Daily Mike Cheney MD   81 mg at 06/06/19 9583    metoprolol tartrate (LOPRESSOR) tablet 50 mg  50 mg Oral BID Mike Cheney MD   50 mg at 06/06/19 6087    prasugrel (EFFIENT) tablet 10 mg  10 mg Oral Daily Mike Cheney MD   10 mg at 06/06/19 0823    irbesartan (AVAPRO) tablet 150 mg  150 mg Oral Nightly Michelle Rosado MD   150 mg at 06/05/19 2019       Past Medical History:  Past Medical History:   Diagnosis Date    Aneurysm ([address]     abdominal    Bilateral leg edema     CAD (coronary artery disease)     Chronic kidney disease     Stage 4    Diabetes mellitus, type 2 (HCC)     Fatty liver     GERD (gastroesophageal reflux disease)     HTN (hypertension)     Seasonal allergies        Past Surgical History:  Past Surgical History:   Procedure Laterality Date    CARPAL TUNNEL RELEASE      COLONOSCOPY  2015    COLONOSCOPY N/A 10/11/2016    Dr Lopez-diverticulosis, Sessile serrated AP (-) high grade dysplasia x 1, tubular AP (-) dysplasia x 2, HP x 2--3 yr recall    EYE SURGERY      Right eye    PRE-MALIGNANT / Radha Dominguez UPPER GASTROINTESTINAL ENDOSCOPY  9/20/2016    Dr Lopez-w/dilation over wire, 46 Romanian-distal esophageal narrowing, Inna (-)    UPPER GASTROINTESTINAL ENDOSCOPY  9/20/2016    Dr Lopez-ramu/dilation over wire, 46 Romanian-distal esophageal narrowing, Inna (-)       Family History  Family History   Problem Relation Age of Onset    Colon Cancer Mother     Prostate Cancer Father     Other Father         HX of blood clot    Diabetes Sister     Cancer Sister     Diabetes Brother     Esophageal Cancer Neg Hx     Liver Cancer Neg Hx     Liver Disease Neg Hx     Stomach Cancer Neg Hx     Rectal Cancer Neg Hx        Social History  Social History     Socioeconomic History    Marital status:      Spouse name: Not on file    Number of children: Not on file    Years of education: Not on file    Highest education level: Not on file   Occupational History    Not on file   Social Needs    Financial resource strain: Not on file    Food insecurity:     Worry: Not on file     Inability: Not on file    Transportation needs:     Medical: Not on file     Non-medical: Not on file   Tobacco Use    Smoking status: Never Smoker    1980.4 ml     General: awake/alert   Chest:  clear to auscultation bilaterally without respiratory distress  CVS: regular rate and rhythm  Abdominal: soft, nontender, normal bowel sounds  Extremities: no cyanosis, ble edema  Skin: warm and dry without rash      Labs:  BMP:   Recent Labs     06/05/19  0821 06/06/19  0654 06/06/19  0957    137  --    K 4.6 4.7  --    CL 99 98  --    CO2 32* 29  --    PHOS  --   --  3.8   BUN 24* 28*  --    CREATININE 1.6* 1.7*  --    CALCIUM 10.4* 9.9  --      CBC:   Recent Labs     06/04/19  0527 06/05/19  0112   WBC 8.5 8.4   HGB 11.4* 11.3*   HCT 37.2* 35.9*   MCV 88.8 87.1    140     LIVER PROFILE: No results for input(s): AST, ALT, LIPASE, BILIDIR, BILITOT, ALKPHOS in the last 72 hours. Invalid input(s): AMYLASE,  ALB  PT/INR: No results for input(s): PROTIME, INR in the last 72 hours. APTT: No results for input(s): APTT in the last 72 hours. BNP:  No results for input(s): BNP in the last 72 hours. Ionized Calcium:No results for input(s): IONCA in the last 72 hours. Magnesium:  Recent Labs     06/06/19  0957   MG 1.9     Phosphorus:  Recent Labs     06/06/19  0957   PHOS 3.8     HgbA1C: No results for input(s): LABA1C in the last 72 hours. Hepatic: No results for input(s): ALKPHOS, ALT, AST, PROT, BILITOT, BILIDIR, LABALBU in the last 72 hours. Lactic Acid: No results for input(s): LACTA in the last 72 hours. Troponin: No results for input(s): CKTOTAL, CKMB, TROPONINT in the last 72 hours. ABGs: No results for input(s): PH, PCO2, PO2, HCO3, O2SAT in the last 72 hours. CRP:  No results for input(s): CRP in the last 72 hours. Sed Rate:  No results for input(s): SEDRATE in the last 72 hours. Cultures:   No results for input(s): CULTURE in the last 72 hours. No results for input(s): Edward BARRY in the last 72 hours. No results for input(s): CXSURG in the last 72 hours.     Radiology reports as per the Radiologist  Radiology: Xr Chest Standard (2 Vw)    Result Date: 6/3/2019  EXAMINATION: XR CHEST (2 VW) 6/3/2019 10:09 AM HISTORY: Shortness of breath COMPARISON: 5/11/2018 FINDINGS: The heart appears normal in size. Aorta is mildly tortuous. There is mild diffuse bronchial wall thickening. Right perihilar and infrahilar opacities are noted, new from the previous exam. There is no appreciable pneumothorax. There is trace pleural fluid suspected bilaterally. There is mild flattening of the hemidiaphragms. Diffuse bronchial wall thickening and interstitial prominence with more focal opacities in the right lung base. Findings may reflect a developing infectious process in the right lung base. Alternatively, pulmonary vascular congestion with pulmonary edema could have this appearance. Correlate clinically. Signed by Dr Rocio Gómez on 6/3/2019 10:10 AM    [facility] Pulmonary W Contrast    Result Date: 6/3/2019  EXAMINATION: CTA PULMONARY W CONTRAST 6/3/2019 1:27 PM HISTORY: Chest pain, shortness of breath Comparison: Chest x-ray 6/3/2019, CTA chest 5/11/2018 Technique: Sequential imaging was performed from the thoracic inlet through the upper abdomen after the administration of IV contrast. Sagittal and coronal reformations were made from the original source data and reviewed. Additionally, 3-D volume rendered images of the vessels were made per CTA protocol. Automated exposure control was utilized for radiation dose reduction. Radiation dose:  mGy-cm Findings: The visualized thyroid gland is grossly normal in appearance. The trachea and main bronchi appear widely patent and in normal anatomic position. The esophagus is grossly normal in appearance. There is bilateral gynecomastia. There is no appreciable axillary lymphadenopathy. Multiple mildly enlarged mediastinal lymph nodes are seen which are partially calcified, compatible with granulomatous exposure. The heart appears mildly enlarged with dilation of the left ventricle.  There is no pericardial

## 2019-06-07 VITALS
HEART RATE: 85 BPM | SYSTOLIC BLOOD PRESSURE: 128 MMHG | WEIGHT: 292.2 LBS | OXYGEN SATURATION: 97 % | DIASTOLIC BLOOD PRESSURE: 75 MMHG | BODY MASS INDEX: 39.58 KG/M2 | HEIGHT: 72 IN | TEMPERATURE: 97.3 F | RESPIRATION RATE: 20 BRPM

## 2019-06-07 LAB
ANION GAP SERPL CALCULATED.3IONS-SCNC: 9 MMOL/L (ref 7–19)
BUN BLDV-MCNC: 28 MG/DL (ref 8–23)
CALCIUM SERPL-MCNC: 9.4 MG/DL (ref 8.8–10.2)
CHLORIDE BLD-SCNC: 101 MMOL/L (ref 98–111)
CO2: 26 MMOL/L (ref 22–29)
CREAT SERPL-MCNC: 1.7 MG/DL (ref 0.5–1.2)
GFR NON-AFRICAN AMERICAN: 41
GLUCOSE BLD-MCNC: 222 MG/DL (ref 70–99)
GLUCOSE BLD-MCNC: 257 MG/DL (ref 74–109)
GLUCOSE BLD-MCNC: 258 MG/DL (ref 70–99)
PERFORMED ON: ABNORMAL
PERFORMED ON: ABNORMAL
POTASSIUM SERPL-SCNC: 4.2 MMOL/L (ref 3.5–5)
SODIUM BLD-SCNC: 136 MMOL/L (ref 136–145)

## 2019-06-07 PROCEDURE — 94640 AIRWAY INHALATION TREATMENT: CPT

## 2019-06-07 PROCEDURE — 6370000000 HC RX 637 (ALT 250 FOR IP): Performed by: INTERNAL MEDICINE

## 2019-06-07 PROCEDURE — 36415 COLL VENOUS BLD VENIPUNCTURE: CPT

## 2019-06-07 PROCEDURE — 82948 REAGENT STRIP/BLOOD GLUCOSE: CPT

## 2019-06-07 PROCEDURE — 99238 HOSP IP/OBS DSCHRG MGMT 30/<: CPT | Performed by: INTERNAL MEDICINE

## 2019-06-07 PROCEDURE — 80048 BASIC METABOLIC PNL TOTAL CA: CPT

## 2019-06-07 RX ORDER — FERROUS SULFATE 325(65) MG
324 TABLET ORAL
Qty: 30 TABLET | Refills: 3 | Status: SHIPPED | OUTPATIENT
Start: 2019-06-08 | End: 2019-06-12 | Stop reason: SDUPTHER

## 2019-06-07 RX ORDER — NITROGLYCERIN 0.4 MG/1
TABLET SUBLINGUAL
Qty: 25 TABLET | Refills: 3 | Status: SHIPPED | OUTPATIENT
Start: 2019-06-07 | End: 2021-07-12 | Stop reason: SDUPTHER

## 2019-06-07 RX ORDER — MULTIVITAMIN WITH FOLIC ACID 400 MCG
1 TABLET ORAL DAILY
Qty: 30 TABLET | Refills: 0 | Status: SHIPPED | OUTPATIENT
Start: 2019-06-08 | End: 2019-12-30

## 2019-06-07 RX ORDER — ATORVASTATIN CALCIUM 80 MG/1
80 TABLET, FILM COATED ORAL NIGHTLY
Qty: 30 TABLET | Refills: 3 | Status: SHIPPED | OUTPATIENT
Start: 2019-06-07 | End: 2019-12-30 | Stop reason: ALTCHOICE

## 2019-06-07 RX ORDER — FUROSEMIDE 40 MG/1
40 TABLET ORAL DAILY
Qty: 60 TABLET | Refills: 3 | Status: SHIPPED | OUTPATIENT
Start: 2019-06-08 | End: 2019-06-12 | Stop reason: SDUPTHER

## 2019-06-07 RX ADMIN — HYDROCHLOROTHIAZIDE 12.5 MG: 12.5 CAPSULE, GELATIN COATED ORAL at 07:44

## 2019-06-07 RX ADMIN — LANSOPRAZOLE 30 MG: 30 CAPSULE, DELAYED RELEASE ORAL at 07:45

## 2019-06-07 RX ADMIN — GABAPENTIN 300 MG: 300 CAPSULE ORAL at 07:46

## 2019-06-07 RX ADMIN — THERA TABS 1 TABLET: TAB at 07:42

## 2019-06-07 RX ADMIN — INSULIN LISPRO 9 UNITS: 100 INJECTION, SOLUTION INTRAVENOUS; SUBCUTANEOUS at 07:56

## 2019-06-07 RX ADMIN — INSULIN LISPRO 6 UNITS: 100 INJECTION, SOLUTION INTRAVENOUS; SUBCUTANEOUS at 11:59

## 2019-06-07 RX ADMIN — FERROUS SULFATE TAB 325 MG (65 MG ELEMENTAL FE) 324 MG: 325 (65 FE) TAB at 07:42

## 2019-06-07 RX ADMIN — FUROSEMIDE 40 MG: 40 TABLET ORAL at 07:42

## 2019-06-07 RX ADMIN — IPRATROPIUM BROMIDE AND ALBUTEROL SULFATE 3 ML: .5; 3 SOLUTION RESPIRATORY (INHALATION) at 06:37

## 2019-06-07 RX ADMIN — LORATADINE 10 MG: 10 TABLET ORAL at 07:45

## 2019-06-07 RX ADMIN — ASPIRIN 81 MG: 81 TABLET, CHEWABLE ORAL at 07:44

## 2019-06-07 RX ADMIN — Medication 55 UNITS: at 07:51

## 2019-06-07 RX ADMIN — METOPROLOL TARTRATE 50 MG: 50 TABLET, FILM COATED ORAL at 07:46

## 2019-06-07 RX ADMIN — PRASUGREL 10 MG: 10 TABLET, FILM COATED ORAL at 07:48

## 2019-06-07 RX ADMIN — IPRATROPIUM BROMIDE AND ALBUTEROL SULFATE 3 ML: .5; 3 SOLUTION RESPIRATORY (INHALATION) at 10:58

## 2019-06-07 NOTE — PROGRESS NOTES
Nephrology ([address] [facility]) Consult Note      Patient:  Leonardo Whyte  YOB: 1955  Date of Service: 6/7/2019  MRN: [id number]   Acct: [de-identified]   Primary Care Physician: MIMI Mendez  Advance Directive: Full Code  Admit Date: 6/3/2019       Hospital Day: 2  Referring Provider: Elmo Barnes MD    Patient independently seen and examined, Chart, Consults, Notes, Operative notes, Labs, Cardiology, and Radiology studies reviewed as able. Subjective:  Leonardo Whyte is a 70 y.o. male  whom we were consulted for chronic kidney disease stage III. He follows with Dr. Karen Lepe in the office. Was admitted on this Deepa 3 with complaints of chest pain. As part of his evaluation he had a contrasted CT on Deepa 3 and underwent cardiac catheterization and stenting June 4. For his labs on June 5 and 6th his creatinine has increased slightly from previous baseline. He denies chest pain currently, shortness of air, nausea or vomiting, dysuria, hematuria, changes in urine habits, hemoptysis, rash. They have many questions and some anxiety about his renal issues. Today, no overnight events. Denies chest pain, shortness of air, nausea or vomiting, dysuria, hematuria.     Allergies:  Tape Bernard Prime tape]    Medicines:  Current Facility-Administered Medications   Medication Dose Route Frequency Provider Last Rate Last Dose    vitamin D (ERGOCALCIFEROL) capsule 50,000 Units  50,000 Units Oral Weekly Kristina Carter MD   50,000 Units at 06/06/19 1427    ferrous sulfate tablet 324 mg  324 mg Oral Daily with breakfast Kristina Carter MD   324 mg at 06/07/19 3436    gabapentin (NEURONTIN) capsule 300 mg  300 mg Oral TID Elmo Barnes MD   300 mg at 06/07/19 0746    loratadine (CLARITIN) tablet 10 mg  10 mg Oral Daily Elmo Barnes MD   10 mg at 06/07/19 0757    lansoprazole (PREVACID) delayed release capsule 30 mg  30 mg Oral QAM AC Elmo Barnes MD   30 mg at 06/07/19 0782    atorvastatin (LIPITOR) tablet 80 mg  80 mg Oral Nightly Nicky Michel MD   80 mg at 06/06/19 1920    furosemide (LASIX) tablet 40 mg  40 mg Oral Daily Nicky Michel MD   40 mg at 06/07/19 0664    hydrochlorothiazide (MICROZIDE) capsule 12.5 mg  12.5 mg Oral Daily Nicky Michel MD   12.5 mg at 06/07/19 0744    0.9 % sodium chloride infusion   Intravenous Continuous Nicky Michel  mL/hr at 06/06/19 0144      insulin glargine (LANTUS) injection vial 55 Units  55 Units Subcutaneous BID Nicky Michel MD   55 Units at 06/07/19 0751    insulin lispro (HUMALOG) injection vial 0-18 Units  0-18 Units Subcutaneous TID WC Nicky Michel MD   6 Units at 06/07/19 1159    insulin lispro (HUMALOG) injection vial 0-9 Units  0-9 Units Subcutaneous Nightly Nicky Michel MD   3 Units at 06/06/19 1930    acetaminophen (TYLENOL) tablet 650 mg  650 mg Oral Q4H PRN Nicky Michel MD        magnesium hydroxide (MILK OF MAGNESIA) 400 MG/5ML suspension 30 mL  30 mL Oral Daily PRN [facility]) injection 4 mg  4 mg Intravenous Q6H PRN Nicky Michel MD        nitroGLYCERIN (NITROSTAT) SL tablet 0.4 mg  0.4 mg Sublingual Q5 Min PRN Era Douglass, APRN   0.4 mg at 06/04/19 0624    albuterol sulfate  (90 Base) MCG/ACT inhaler 2 puff  2 puff Inhalation Q6H PRN Nicky Michel MD        multivitamin 1 tablet  1 tablet Oral Daily Nicky Michel MD   1 tablet at 06/07/19 9923    guaiFENesin tablet 400 mg  400 mg Oral 4x Daily PRN Nicky Michel MD        ipratropium-albuterol (DUONEB) nebulizer solution 3 mL  1 vial Inhalation Q6H Nicky Michel MD   3 mL at 06/07/19 1058    magnesium hydroxide (MILK OF MAGNESIA) 400 MG/5ML suspension 30 mL  30 mL Oral Daily PRN MD Patrick Juarez [facility]) injection 4 mg  4 mg Intravenous Q6H PRN Nicky Michel MD   4 mg at 06/04/19 2210    aspirin chewable tablet 81 mg  81 mg Oral Daily Nicky Michel MD   81 mg at 06/07/19 6326    Financial resource strain: Not on file    Food insecurity:     Worry: Not on file     Inability: Not on file    Transportation needs:     Medical: Not on file     Non-medical: Not on file   Tobacco Use    Smoking status: Never Smoker    Smokeless tobacco: Never Used   Substance and Sexual Activity    Alcohol use: No    Drug use: No    Sexual activity: Not on file   Lifestyle    Physical activity:     Days per week: Not on file     Minutes per session: Not on file    Stress: Not on file   Relationships    Social connections:     Talks on phone: Not on file     Gets together: Not on file     Attends Synagogue service: Not on file     Active member of club or organization: Not on file     Attends meetings of clubs or organizations: Not on file     Relationship status: Not on file    Intimate partner violence:     Fear of current or ex partner: Not on file     Emotionally abused: Not on file     Physically abused: Not on file     Forced sexual activity: Not on file   Other Topics Concern    Not on file   Social History Narrative    Not on file         Review of Systems:  History obtained from chart review and the patient  General ROS: No fever or chills  Respiratory ROS: No cough, shortness of breath, wheezing  Cardiovascular ROS: No chest pain or palpitations  Gastrointestinal ROS: No abdominal pain or melena  Genito-Urinary ROS: No dysuria or hematuria  Musculoskeletal ROS: No joint pain or swelling   14 point ROS reviewed with the patient and negative except as noted above and in the HPI unless unable to obtain.     Objective:  Patient Vitals for the past 24 hrs:   BP Temp Temp src Pulse Resp SpO2 Weight   06/07/19 0607 128/75 97.3 °F (36.3 °C) Temporal 85 20 97 % --   06/07/19 0420 -- -- -- -- -- -- 292 lb 3.2 oz (132.5 kg)   06/07/19 0006 135/79 97.9 °F (36.6 °C) Temporal 71 16 94 % --   06/06/19 1828 (!) 149/83 98.3 °F (36.8 °C) Temporal 77 18 -- --   06/06/19 1428 130/70 97 °F (36.1 °C) Temporal 80 16 96 % --       Intake/Output Summary (Last 24 hours) at 6/7/2019 1206  Last data filed at 6/7/2019 0639  Gross per 24 hour   Intake 1040 ml   Output 1700 ml   Net -660 ml     General: awake/alert   Chest:  clear to auscultation bilaterally without respiratory distress  CVS: regular rate and rhythm  Abdominal: soft, nontender, normal bowel sounds  Extremities: no cyanosis, ble edema  Skin: warm and dry without rash      Labs:  BMP:   Recent Labs     06/05/19  0821 06/06/19  0654 06/06/19  0957 06/07/19  0151    137  --  136   K 4.6 4.7  --  4.2   CL 99 98  --  101   CO2 32* 29  --  26   PHOS  --   --  3.8  --    BUN 24* 28*  --  28*   CREATININE 1.6* 1.7*  --  1.7*   CALCIUM 10.4* 9.9  --  9.4     CBC:   Recent Labs     06/05/19  0112   WBC 8.4   HGB 11.3*   HCT 35.9*   MCV 87.1        LIVER PROFILE: No results for input(s): AST, ALT, LIPASE, BILIDIR, BILITOT, ALKPHOS in the last 72 hours. Invalid input(s): AMYLASE,  ALB  PT/INR: No results for input(s): PROTIME, INR in the last 72 hours. APTT: No results for input(s): APTT in the last 72 hours. BNP:  No results for input(s): BNP in the last 72 hours. Ionized Calcium:No results for input(s): IONCA in the last 72 hours. Magnesium:  Recent Labs     06/06/19  0957   MG 1.9     Phosphorus:  Recent Labs     06/06/19  0957   PHOS 3.8     HgbA1C: No results for input(s): LABA1C in the last 72 hours. Hepatic: No results for input(s): ALKPHOS, ALT, AST, PROT, BILITOT, BILIDIR, LABALBU in the last 72 hours. Lactic Acid: No results for input(s): LACTA in the last 72 hours. Troponin: No results for input(s): CKTOTAL, CKMB, TROPONINT in the last 72 hours. ABGs: No results for input(s): PH, PCO2, PO2, HCO3, O2SAT in the last 72 hours. CRP:  No results for input(s): CRP in the last 72 hours. Sed Rate:  No results for input(s): SEDRATE in the last 72 hours. Cultures:   No results for input(s): CULTURE in the last 72 hours.   No results for input(s): BC, Royetta Manner in the last 72 hours. No results for input(s): CXSURG in the last 72 hours. Radiology reports as per the Radiologist  Radiology: Xr Chest Standard (2 Vw)    Result Date: 6/3/2019  EXAMINATION: XR CHEST (2 VW) 6/3/2019 10:09 AM HISTORY: Shortness of breath COMPARISON: 5/11/2018 FINDINGS: The heart appears normal in size. Aorta is mildly tortuous. There is mild diffuse bronchial wall thickening. Right perihilar and infrahilar opacities are noted, new from the previous exam. There is no appreciable pneumothorax. There is trace pleural fluid suspected bilaterally. There is mild flattening of the hemidiaphragms. Diffuse bronchial wall thickening and interstitial prominence with more focal opacities in the right lung base. Findings may reflect a developing infectious process in the right lung base. Alternatively, pulmonary vascular congestion with pulmonary edema could have this appearance. Correlate clinically. Signed by Dr Juan M Garrett on 6/3/2019 10:10 AM    Cta Pulmonary W Contrast    Result Date: 6/3/2019  EXAMINATION: CTA PULMONARY W CONTRAST 6/3/2019 1:27 PM HISTORY: Chest pain, shortness of breath Comparison: Chest x-ray 6/3/2019, CTA chest 5/11/2018 Technique: Sequential imaging was performed from the thoracic inlet through the upper abdomen after the administration of IV contrast. Sagittal and coronal reformations were made from the original source data and reviewed. Additionally, 3-D volume rendered images of the vessels were made per CTA protocol. Automated exposure control was utilized for radiation dose reduction. Radiation dose:  mGy-cm Findings: The visualized thyroid gland is grossly normal in appearance. The trachea and main bronchi appear widely patent and in normal anatomic position. The esophagus is grossly normal in appearance. There is bilateral gynecomastia. There is no appreciable axillary lymphadenopathy.  Multiple mildly enlarged mediastinal lymph nodes are seen which care  Discontinue IV fluids  As creatinine stable, should be able for discharge with close outpatient follow-up next week with Dr. Yohan Koch        Thank you for the consult, we appreciate the opportunity to provide care to your patients. Feel free to contact me if I can be of any further assistance.       Gene Parry MD  06/07/19  12:06 PM

## 2019-06-07 NOTE — DISCHARGE SUMMARY
[facility]    Discharge Summary          I personally saw the patient and rounded with:  [place] Yoder, on  6/7/19      The observations documented in this note, including the assessment and plan are mine              Patient ID: Bell Maki      Patient's PCP: MIMI Akins    Admit Date: 6/3/2019     Discharge Date:  06/07/19     Admitting Physician:  Ari Ayala MD       Discharge Physician: Ari Ayala MD     Discharge Diagnoses:    1. Coronary artery disease    5/12/2018  Echo  Normal LVFX  5/12/2018  lexiscan negative for myocardial ischemia, EF 44  %   5/14/2018  Hospital visit AUC indication 19, AUC score 7  5/14/2018  Cath  70% osteal LAD, 3.0 x 12 AGA, apical hypokinesis    6/3/19  ACS MARTINA RISK Score 4 (angina, + troponin, CAD>50, diabetes, hypertension, ASA), AUC indication 3, AUC score 8   6/4/19  Cath  Patent stent in the osteal LAD, 99% mid LCX, 3.5 x 18 AGA, 40% mid RCA, anterior lateral hypokinesis EF 45%    2. Systemic arterial hypertension  3. Diabetes mellitus      Cardiac Specific Diagnoses:    Specialty Problems        Cardiology Problems    ACS (acute coronary syndrome) ([facility])        Essential hypertension        NSTEMI (non-ST elevated myocardial infarction) ([facility] [address]        CAD (coronary artery disease)                The patient was seen and examined on day of discharge and this discharge summary is in conjunction with any daily progress note from day of discharge. History of Present Illness:     Blel Maki is a 70 y.o. male who presents to [facility] ED # 11 with symptoms / signs / problem or diagnosis of chest discomfort. The symptoms began about 4 months ago. The chest discomfort began gradually and have accelerated and lasted several minutes. It was worse with activity. There was partial relief with rest.  The chest discomfort was described as pressure, fullness and tightness. It was not crushing.   It was it located in the central chest with radiation to the back, throat and left shoulder. It was described as mild and now getting worse. There were  associated manifestations such as nausea, vomiting, diaphoresis, presyncope, syncope, dizzyness, weakness, cold sweats, or shortness of air. He also have swelling in his feet. When being examined this , he has had no symptoms of exertional chest discomfort, unusual or change in shortness of air, presyncope or syncope.           Hospital Course: The patient underwent cardiac catheterization according to the following criteria:  6/3/19  ACS MARTINA RISK Score 4 (angina, + troponin, CAD>50, diabetes, hypertension, ASA), AUC indication 3, AUC score 8 , Diagnostic Catheterization Appropriate Use Criteria Description, [facility] [id number]. Selective left heart and coronary arteriography was preformed, which revealed:    1. Successful femoral artery ultrasound  2. Successful femoral artery arterogram  3. Supervision of the administration of moderate conscious sedation  4. Single vessel coronary artery disease involving the mid circumflex  5. Left ventricular function is severely impaired in the anterior lateral segment with a visually estimate ejection of 40%. 6.  99% lesion in the mid circumflex  7. Successful percutaneous coronary intervention utilizing a single drug eluding stent to the mid circumflex. 8.  Patent stent in the osteal LAD  9.  40% lesion in the mid RCA      There were no apparent complications. Post procedure, the patient's creatinine went up to 1.7. He was seen by nephrology whose efforts are greatly appreciated. The creatinine stabilized at 1.7. The rest of the patient's hospitalization was uneventful. He was discharged home on medical therapy with outpatient follow up. Consults:     IP CONSULT TO CARDIOLOGY  IP CONSULT TO CARDIAC REHAB  IP CONSULT TO NEPHROLOGY      Significant Diagnostic Studies:    1.  Selective left heart and coronary arteriography (femoral approach), 6/4/2019     Significant Therapeutic Endeavors:      1. Percutaneous coronary intervention to the circumflex, 6/4/2019  2. Intensification of medical management, 6/4/2019        Activity: activity as directed per discharge instructions. Diet:  Cardiac diet    Labs: For convenience and continuity at follow-up the following most recent labs are provided:    CBC:    Lab Results   Component Value Date    WBC 8.4 06/05/2019    HGB 11.3 06/05/2019    HCT 35.9 06/05/2019     06/05/2019       Renal:    Lab Results   Component Value Date     06/07/2019    K 4.2 06/07/2019    K 4.7 06/06/2019     06/07/2019    CO2 26 06/07/2019    BUN 28 06/07/2019    CREATININE 1.7 06/07/2019    CALCIUM 9.4 06/07/2019    PHOS 3.8 06/06/2019         Discharge Medications:     Current Discharge Medication List           Details   nitroGLYCERIN (NITROSTAT) 0.4 MG SL tablet up to max of 3 total doses. If no relief after 1 dose, call 911.   Qty: 25 tablet, Refills: 3      atorvastatin (LIPITOR) 80 MG tablet Take 1 tablet by mouth nightly  Qty: 30 tablet, Refills: 3      furosemide (LASIX) 40 MG tablet Take 1 tablet by mouth daily  Qty: 60 tablet, Refills: 3      ferrous sulfate 325 (65 Fe) MG tablet Take 1 tablet by mouth daily (with breakfast)  Qty: 30 tablet, Refills: 3      Multiple Vitamin (MULTIVITAMIN) tablet Take 1 tablet by mouth daily  Qty: 30 tablet, Refills: 0              Details   metFORMIN (GLUCOPHAGE) 1000 MG tablet TAKE ONE TABLET BY MOUTH TWICE DAILY WITH MEALS  Qty: 180 tablet, Refills: 2    Associated Diagnoses: Type 2 diabetes mellitus with other circulatory complication, with long-term current use of insulin (HCC)      metoprolol tartrate (LOPRESSOR) 50 MG tablet TAKE 1 TABLET BY MOUTH TWICE DAILY  Qty: 180 tablet, Refills: 3    Comments: Please consider 90 day supplies to promote better adherence  Associated Diagnoses: Medication refill      prasugrel (EFFIENT) 10 MG promote better adherence      albuterol sulfate HFA (VENTOLIN HFA) 108 (90 Base) MCG/ACT inhaler Inhale 2 puffs into the lungs every 6 hours as needed for Wheezing  Qty: 1 Inhaler, Refills: 3      mupirocin (BACTROBAN) 2 % ointment APPLY TO AFFECTED AREA 3 TIMES A DAY  Qty: 1 Tube, Refills: 0    Comments: Please consider 90 day supplies to promote better adherence  Associated Diagnoses: Dermatitis fungal      lansoprazole (PREVACID) 30 MG delayed release capsule TAKE ONE CAPSULE BY MOUTH ONCE DAILY  Qty: 90 capsule, Refills: 3    Comments: Please consider 90 day supplies to promote better adherence  Associated Diagnoses: Chronic GERD      Lancets 30G MISC Use to check blood sugar 8 times daily. E11.9  Qty: 300 each, Refills: 5    Associated Diagnoses: IDDM (insulin dependent diabetes mellitus) ([facility])      Insulin Pen Needle ([employer] PEN NEEDLES) 31G X 6 MM MISC Dx: E11.9  Qty: 150 each, Refills: 3    Associated Diagnoses: IDDM (insulin dependent diabetes mellitus) ([facility]      blood glucose test strips (ACCU-CHEK DUNIA) strip Patient is to test 8 times daily. Dx: E11.9  Please provide patient with Accu check Dunia Plus test strips per his Insurance request  Qty: 900 each, Refills: 1      fluticasone (FLONASE) 50 MCG/ACT nasal spray USE 2 SPRAY(S) IN EACH NOSTRIL ONCE DAILY  Qty: 1 Bottle, Refills: 3    Comments: Please consider 90 day supplies to promote better adherence      triamcinolone (KENALOG) 0.025 % cream Apply topically 2 times daily.   Qty: 1 Tube, Refills: 0      guaiFENesin 400 MG tablet Take 1 tablet by mouth 4 times daily as needed for Cough  Qty: 56 tablet, Refills: 0    Associated Diagnoses: Cough      Alcohol Swabs PADS 1 box by Does not apply route 8 times daily  Qty: 100 each, Refills: 5      Blood Glucose Monitoring Suppl (ACURA BLOOD GLUCOSE METER) w/Device KIT Please provide patient with Accu check Dunia Meter per his Insurance request. Dx: E11.9  Qty: 1 kit, Refills: 0       !! - Potential duplicate medications found. Please discuss with provider. Condition At Discharge:  Improved    Disposition:      1. Home  2. Follow up with cardiology as arranged  3. Follow up with primary care provider as arranged  4. For patients who underwent percutaneous coronary intervention during this hospitalization, they will be sent home on:  aspirin, an antiplatelet,a beta - blocker (if necessary), ACE-inhibitor or ARB (if needed), and statin if not allergic. [id number] Leanne Enriquez with me to discharge after the bedrest is complete, hemostatis is achieved, the patient is hemodynamically stable and comfortable. Please remind the patient that driving is absolutely prohibited for the next day (24 hours) after this procedure. Additionally, caution should be exercised to avoid heights, swimming, tub baths, open flames, or heavy machinery.       Electronically signed by Trenton Cortez MD on 6/7/19

## 2019-06-07 NOTE — PROGRESS NOTES
[address]    Daily HOSPITAL Progress Note                            Date:  6/6/19  Patient: Shamar Burk  Admission:  6/3/2019  9:26 AM  Admit DX: ACS (acute coronary syndrome) ([facility]) [I24.9]  NSTEMI (non-ST elevated myocardial infarction) ([facility] [I21.4]  Age:  70 y. o., 1955        Date of Admission 6/3/2019  9:26 AM   Hospital Length of Stay  LOS: 1 day            I personally saw the patient and rounded with:  Nadege Humphreys RN on 6/6/19      The observations documented in this note, including the assessment and plan are mine         Reason for initial evaluation or the patient's initial complaint    Chest discomfort      SUBJECTIVE:      Chief Complaint / Reason for the Visit   Follow up of:  Chest discomfort and CAD and systemic arterial hypertension    Family present and in room during examination:  Yes: wife      Specialty Problems        Cardiology Problems    ACS (acute coronary syndrome) ([facility]        Essential hypertension        NSTEMI (non-ST elevated myocardial infarction) ([facility]        CAD (coronary artery disease)              Current Status Today According to the patient:  \"ok\"    Subjective:  Mr. Shamar Burk is generally feeling unchanged. Cr went up to 1.7, renal seeing    Mr. Shamar Burk has the following cardiac complaints / symptoms today:    1. Chest discomfort, none today    2. CAD, s/p stent to the LCX    3.  Hypertension    The blood pressure for the lastr 36 hours has been:  Systolic (27YGP), NRN:099 , Min:102 , WWW:425    Diastolic (88ORM), LMR:67, Min:56, Max:84        Shamar Burk is a 70 y.o. male with the [facility]:    Patient Active Problem List    Diagnosis Date Noted    ACS (acute coronary syndrome) ([facility] 06/03/2019     Priority: High    NSTEMI (non-ST elevated myocardial infarction) ([facility] 06/05/2019     Priority: Low    Hypertriglyceridemia 05/31/2018     Priority: Low    Change in bowel habits 10/11/2016 Laterality Date    CARPAL TUNNEL RELEASE      COLONOSCOPY  2015    COLONOSCOPY N/A 10/11/2016    Dr Lopez-diverticulosis, Sessile serrated AP (-) high grade dysplasia x 1, tubular AP (-) dysplasia x 2, HP x 2--3 yr recall    EYE SURGERY      Right eye    PRE-MALIGNANT / [facility]      Dr. Daron Breaux UPPER GASTROINTESTINAL ENDOSCOPY  9/20/2016    Dr Lopez-w/dilation over wire, 46 Malian-distal esophageal narrowing, Inna (-)    UPPER GASTROINTESTINAL ENDOSCOPY  9/20/2016    Dr Lopez-w/dilation over wire, 46 Malian-distal esophageal narrowing, Inna (-)     Family History   Problem Relation Age of Onset    Colon Cancer Mother     Prostate Cancer Father     Other Father         HX of blood clot    Diabetes Sister     Cancer Sister     Diabetes Brother     Esophageal Cancer Neg Hx     Liver Cancer Neg Hx     Liver Disease Neg Hx     Stomach Cancer Neg Hx     Rectal Cancer Neg Hx      Social History     Tobacco Use    Smoking status: Never Smoker    Smokeless tobacco: Never Used   Substance Use Topics    Alcohol use: No          Review of Systems:    General:      Complaint / Symptom Yes / No / Description if Yes       Fatigue Yes:  chronic   Weight gain NA   Insomnia NA       Respiratory:        Complaint / Symptom Yes / No / Description if Yes       Cough No   Horseness NA       Cardiovascular:    Complaint / Symptom Yes / No / Description if Yes       Chest Pain No   Shortness of Air / Orthopnea Yes: chronic and stable   Presyncope / Syncope No   Palpitations No         Objective:    BP (!) 149/83   Pulse 77   Temp 98.3 °F (36.8 °C) (Temporal)   Resp 18   Ht 6' (1.829 m)   Wt 285 lb (129.3 kg)   SpO2 96%   BMI 38.65 kg/m² ,     Intake/Output Summary (Last 24 hours) at 6/6/2019 2133  Last data filed at 6/6/2019 2020  Gross per 24 hour   Intake 1791.2 ml   Output 945 ml   Net 846.2 ml       GENERAL - well developed and well nourished, is an active participant in this examination  HEENT -  PERRLA, Hearing appears normal, conjunctiva and lids are normal, ears and nose appear normal  NECK - no thyromegaly, no JVD, trachea is in the midline  CARDIOVASCULAR - PMI is in the left mid line clavicular position, Normal S1 and S2 with a grade 1/6 systolic murmur. No S3 or S4    PULMONARY - No respiratory distress. scattered wheezes and rales. Breath sounds in both  lung fields are Decreased  ABDOMEN  - soft, non tender, no rebound, no hepatomegaly or splenomegaly  MUSCULOSKELETAL  - Prone/Supine, digitals and nails are without clubbing or cyanosis  EXTREMITIES - trace edema  NEUROLOGIC - cranial nerves, II-XII, are normal  SKIN - turgor is normal, no rash  PSYCHIATRIC - normal mood and affect, alert and orientated x 3, judgement and insight appear appropriate      ASSESSMENT:    ALL THE CARDIOLOGY PROBLEMS ARE LISTED ABOVE; HOWEVER, THE FOLLOWING SPECIFIC CARDIAC PROBLEMS / CONDITIONS WERE ADDRESSED AND TREATED DURING THE OFFICE VISIT TODAY:                                                                                            MEDICAL DECISION MAKING                   Cardiac Specific Problem / Diagnosis   Discussion and Data Reviewed Diagnostic Procedures Ordered Management Options Selected                 1.  Presenting problem / symptom     Chest discomfort of ? etiology  are worsening    The chest discomfort is was exertional and does  appear to represent myocardial ischemia.       Nonetheless, He has the following risk factors for the presence of coronary artery disease:     Risk Factor Yes / No / Unknown         Gender Male   Cigarette Use No   Family History of Cardiovascular Disease Yes: diabetes   Diabetes Mellitus yes   Hypercholesteremia no   Hypertension yes         He also has the following cardiac history:                          Specialty Problems                    Cardiology Problems     Essential hypertension           CAD (coronary artery disease)

## 2019-06-07 NOTE — PROGRESS NOTES
Pt up watching TV. C/O loss of vision to the L eye since 6/4/19. Pt denies flashing lights or flashers or pain. Exam reveals severely diminished peripheral vision with central vision as \"through a curtain\" with only shadows noted. Unable to distinguish 1.5 inch letters 10 feet away   [facility]qu[facility] Godfrey notified. They had discussed following up with ophthalmology after DC. Jackie Beasley Electronically signed by Evan Raphael RN on 6/7/2019 at 10:46 AM

## 2019-06-10 ENCOUNTER — TELEPHONE (OUTPATIENT)
Dept: INTERNAL MEDICINE | Age: 64
End: 2019-06-10

## 2019-06-10 NOTE — TELEPHONE ENCOUNTER
[facility] Transitions Initial Follow Up Call    Outreach made within 2 business days of discharge: Yes    Patient: Magaly Gregory Patient : 1955   MRN: [id number]  Reason for Admission: Admitted 19 for    Shortness of Breath       PT PRESENTS TO ed WITH C/O SHORTNESS OF BREATH GETTING WORSE OVER PAST 3 NIGHTS. STATES RIGHT RIB PAIN. Discharge Date: 19    Discharge Diagnoses:     1. Coronary artery disease  2. Systemic arterial hypertension  3. Diabetes mellitus     Spoke with: patient     Discharge department/facility: [facility] [place]     TCM Interactive Patient Contact:  Was patient able to fill all prescriptions: Yes  Was patient instructed to bring all medications to the follow-up visit: Yes  Is patient taking all medications as directed in the discharge summary? Yes  Does patient understand their discharge instructions: Yes  Does patient have questions or concerns that need addressed prior to 7-14 day follow up office visit: no    I spoke with Mr. Hermila Ho. He states he is home and doing much better. He states they placed stents in his heart and he feels like he is breathing much better. He denies any chest pain. He states he was started on new medication at discharge and is taking them as directed. He denies any needs at this time.      Scheduled appointment with PCP within 7-14 days    Follow Up  Future Appointments   Date Time Provider Na Qureshi   2019  9:30 AM MIMI Sánchez [facility]   2019  8:15 AM MIMI Muñoz [facility]   2019  9:15 AM Que Wilson MD [facility] [address] [place] [address]

## 2019-06-11 LAB
ANION GAP SERPL CALCULATED.3IONS-SCNC: 12 MMOL/L (ref 7–19)
BUN BLDV-MCNC: 28 MG/DL (ref 8–23)
CALCIUM SERPL-MCNC: 9.9 MG/DL (ref 8.8–10.2)
CHLORIDE BLD-SCNC: 99 MMOL/L (ref 98–111)
CO2: 29 MMOL/L (ref 22–29)
CREAT SERPL-MCNC: 1.9 MG/DL (ref 0.5–1.2)
GFR NON-AFRICAN AMERICAN: 36
GLUCOSE BLD-MCNC: 218 MG/DL (ref 74–109)
POTASSIUM SERPL-SCNC: 5.1 MMOL/L (ref 3.5–5)
SODIUM BLD-SCNC: 140 MMOL/L (ref 136–145)

## 2019-06-12 ENCOUNTER — OFFICE VISIT (OUTPATIENT)
Dept: PRIMARY CARE CLINIC | Age: 64
End: 2019-06-12
Payer: MEDICARE

## 2019-06-12 VITALS
WEIGHT: 290 LBS | HEART RATE: 78 BPM | SYSTOLIC BLOOD PRESSURE: 120 MMHG | OXYGEN SATURATION: 98 % | HEIGHT: 72 IN | DIASTOLIC BLOOD PRESSURE: 70 MMHG | BODY MASS INDEX: 39.28 KG/M2 | TEMPERATURE: 97.1 F

## 2019-06-12 DIAGNOSIS — Z76.0 MEDICATION REFILL: ICD-10-CM

## 2019-06-12 DIAGNOSIS — E11.59 TYPE 2 DIABETES MELLITUS WITH OTHER CIRCULATORY COMPLICATION, WITH LONG-TERM CURRENT USE OF INSULIN (HCC): ICD-10-CM

## 2019-06-12 DIAGNOSIS — I25.10 CORONARY ARTERY DISEASE DUE TO LIPID RICH PLAQUE: ICD-10-CM

## 2019-06-12 DIAGNOSIS — K21.9 CHRONIC GERD: ICD-10-CM

## 2019-06-12 DIAGNOSIS — R73.9 HYPERGLYCEMIA: ICD-10-CM

## 2019-06-12 DIAGNOSIS — Z09 HOSPITAL DISCHARGE FOLLOW-UP: Primary | ICD-10-CM

## 2019-06-12 DIAGNOSIS — N18.30 CHRONIC KIDNEY DISEASE, STAGE III (MODERATE) (HCC): ICD-10-CM

## 2019-06-12 DIAGNOSIS — I25.83 CORONARY ARTERY DISEASE DUE TO LIPID RICH PLAQUE: ICD-10-CM

## 2019-06-12 DIAGNOSIS — I10 ESSENTIAL HYPERTENSION: ICD-10-CM

## 2019-06-12 DIAGNOSIS — G62.9 NEUROPATHY: ICD-10-CM

## 2019-06-12 DIAGNOSIS — Z79.4 TYPE 2 DIABETES MELLITUS WITH OTHER CIRCULATORY COMPLICATION, WITH LONG-TERM CURRENT USE OF INSULIN (HCC): ICD-10-CM

## 2019-06-12 PROCEDURE — 99495 TRANSJ CARE MGMT MOD F2F 14D: CPT | Performed by: NURSE PRACTITIONER

## 2019-06-12 PROCEDURE — 1111F DSCHRG MED/CURRENT MED MERGE: CPT | Performed by: NURSE PRACTITIONER

## 2019-06-12 RX ORDER — ERGOCALCIFEROL 1.25 MG/1
50000 CAPSULE ORAL WEEKLY
Qty: 9 CAPSULE | Refills: 0 | Status: SHIPPED | OUTPATIENT
Start: 2019-06-12 | End: 2019-09-02 | Stop reason: SDUPTHER

## 2019-06-12 RX ORDER — FUROSEMIDE 40 MG/1
40 TABLET ORAL DAILY
Qty: 90 TABLET | Refills: 3 | Status: SHIPPED | OUTPATIENT
Start: 2019-06-12 | End: 2021-07-12 | Stop reason: DRUGHIGH

## 2019-06-12 RX ORDER — IRBESARTAN 150 MG/1
150 TABLET ORAL DAILY
Qty: 90 TABLET | Refills: 3 | Status: SHIPPED | OUTPATIENT
Start: 2019-06-12 | End: 2020-04-14 | Stop reason: SDUPTHER

## 2019-06-12 RX ORDER — GABAPENTIN 300 MG/1
CAPSULE ORAL
Qty: 270 CAPSULE | Refills: 0 | Status: SHIPPED | OUTPATIENT
Start: 2019-06-12 | End: 2019-09-02 | Stop reason: SDUPTHER

## 2019-06-12 RX ORDER — PRASUGREL 10 MG/1
TABLET, FILM COATED ORAL
Qty: 90 TABLET | Refills: 3 | Status: SHIPPED | OUTPATIENT
Start: 2019-06-12 | End: 2020-01-28

## 2019-06-12 RX ORDER — LANSOPRAZOLE 30 MG/1
CAPSULE, DELAYED RELEASE ORAL
Qty: 90 CAPSULE | Refills: 3 | Status: SHIPPED | OUTPATIENT
Start: 2019-06-12 | End: 2020-07-13 | Stop reason: SDUPTHER

## 2019-06-12 RX ORDER — FERROUS SULFATE 325(65) MG
324 TABLET ORAL
Qty: 90 TABLET | Refills: 3 | Status: SHIPPED | OUTPATIENT
Start: 2019-06-12 | End: 2020-12-02

## 2019-06-12 RX ORDER — LORATADINE 10 MG/1
10 TABLET ORAL DAILY
Qty: 90 TABLET | Refills: 3 | Status: SHIPPED | OUTPATIENT
Start: 2019-06-12 | End: 2020-12-02

## 2019-06-12 RX ORDER — METOPROLOL TARTRATE 50 MG/1
TABLET, FILM COATED ORAL
Qty: 180 TABLET | Refills: 3 | Status: SHIPPED | OUTPATIENT
Start: 2019-06-12 | End: 2020-11-20 | Stop reason: SDUPTHER

## 2019-06-12 RX ORDER — HYDROCHLOROTHIAZIDE 12.5 MG/1
12.5 CAPSULE, GELATIN COATED ORAL 2 TIMES DAILY
Qty: 180 CAPSULE | Refills: 3 | Status: SHIPPED | OUTPATIENT
Start: 2019-06-12 | End: 2020-08-05 | Stop reason: SDUPTHER

## 2019-06-13 PROBLEM — N18.30 CHRONIC KIDNEY DISEASE, STAGE III (MODERATE) (HCC): Status: ACTIVE | Noted: 2019-06-13

## 2019-06-13 PROBLEM — Z09 HOSPITAL DISCHARGE FOLLOW-UP: Status: ACTIVE | Noted: 2019-06-13

## 2019-06-27 ENCOUNTER — OFFICE VISIT (OUTPATIENT)
Dept: CARDIOLOGY | Age: 64
End: 2019-06-27
Payer: MEDICARE

## 2019-06-27 VITALS
WEIGHT: 291 LBS | HEART RATE: 68 BPM | HEIGHT: 72 IN | DIASTOLIC BLOOD PRESSURE: 66 MMHG | BODY MASS INDEX: 39.42 KG/M2 | SYSTOLIC BLOOD PRESSURE: 120 MMHG

## 2019-06-27 DIAGNOSIS — E66.09 CLASS 2 OBESITY DUE TO EXCESS CALORIES WITHOUT SERIOUS COMORBIDITY WITH BODY MASS INDEX (BMI) OF 39.0 TO 39.9 IN ADULT: ICD-10-CM

## 2019-06-27 DIAGNOSIS — Z79.4 TYPE 2 DIABETES MELLITUS WITH DIABETIC NEPHROPATHY, WITH LONG-TERM CURRENT USE OF INSULIN (HCC): ICD-10-CM

## 2019-06-27 DIAGNOSIS — I51.9 LEFT VENTRICULAR DYSFUNCTION: ICD-10-CM

## 2019-06-27 DIAGNOSIS — I21.4 NSTEMI (NON-ST ELEVATED MYOCARDIAL INFARCTION) (HCC): ICD-10-CM

## 2019-06-27 DIAGNOSIS — I25.10 CORONARY ARTERY DISEASE INVOLVING NATIVE CORONARY ARTERY OF NATIVE HEART WITHOUT ANGINA PECTORIS: Primary | ICD-10-CM

## 2019-06-27 DIAGNOSIS — E11.21 TYPE 2 DIABETES MELLITUS WITH DIABETIC NEPHROPATHY, WITH LONG-TERM CURRENT USE OF INSULIN (HCC): ICD-10-CM

## 2019-06-27 DIAGNOSIS — I10 ESSENTIAL HYPERTENSION: ICD-10-CM

## 2019-06-27 DIAGNOSIS — E78.2 MIXED HYPERLIPIDEMIA: ICD-10-CM

## 2019-06-27 PROBLEM — E66.812 CLASS 2 OBESITY WITHOUT SERIOUS COMORBIDITY IN ADULT: Status: ACTIVE | Noted: 2019-06-27

## 2019-06-27 PROBLEM — E78.1 HYPERTRIGLYCERIDEMIA: Status: RESOLVED | Noted: 2018-05-31 | Resolved: 2019-06-27

## 2019-06-27 PROBLEM — E66.9 CLASS 2 OBESITY WITHOUT SERIOUS COMORBIDITY IN ADULT: Status: ACTIVE | Noted: 2019-06-27

## 2019-06-27 PROCEDURE — 1123F ACP DISCUSS/DSCN MKR DOCD: CPT | Performed by: CLINICAL NURSE SPECIALIST

## 2019-06-27 PROCEDURE — G8598 ASA/ANTIPLAT THER USED: HCPCS | Performed by: CLINICAL NURSE SPECIALIST

## 2019-06-27 PROCEDURE — 1111F DSCHRG MED/CURRENT MED MERGE: CPT | Performed by: CLINICAL NURSE SPECIALIST

## 2019-06-27 PROCEDURE — G8417 CALC BMI ABV UP PARAM F/U: HCPCS | Performed by: CLINICAL NURSE SPECIALIST

## 2019-06-27 PROCEDURE — 1036F TOBACCO NON-USER: CPT | Performed by: CLINICAL NURSE SPECIALIST

## 2019-06-27 PROCEDURE — 2022F DILAT RTA XM EVC RTNOPTHY: CPT | Performed by: CLINICAL NURSE SPECIALIST

## 2019-06-27 PROCEDURE — 4040F PNEUMOC VAC/ADMIN/RCVD: CPT | Performed by: CLINICAL NURSE SPECIALIST

## 2019-06-27 PROCEDURE — 99214 OFFICE O/P EST MOD 30 MIN: CPT | Performed by: CLINICAL NURSE SPECIALIST

## 2019-06-27 PROCEDURE — 3045F PR MOST RECENT HEMOGLOBIN A1C LEVEL 7.0-9.0%: CPT | Performed by: CLINICAL NURSE SPECIALIST

## 2019-06-27 PROCEDURE — 3017F COLORECTAL CA SCREEN DOC REV: CPT | Performed by: CLINICAL NURSE SPECIALIST

## 2019-06-27 PROCEDURE — G8427 DOCREV CUR MEDS BY ELIG CLIN: HCPCS | Performed by: CLINICAL NURSE SPECIALIST

## 2019-06-27 ASSESSMENT — ENCOUNTER SYMPTOMS
COUGH: 0
NAUSEA: 0
SHORTNESS OF BREATH: 1
EYE REDNESS: 0
VOMITING: 0
CHEST TIGHTNESS: 0
FACIAL SWELLING: 0
WHEEZING: 0
ABDOMINAL PAIN: 0

## 2019-06-27 NOTE — PROGRESS NOTES
[facility], [address] [place] [address], [address] [id number]  Phone: [phone]  Fax: [phone]    OFFICE VISIT:  6/27/2019 [address]: 1955    Reason For Visit:  Tawanna Erazo is a 70 y.o. male who is here for Follow-Up from Hospital (No cardiac symptoms today.); Coronary Artery Disease; and Hypertension    HPI  Patient is here for follow-up for a non-STEMI post heart cath with a single drug-eluting stent to the mid circumflex, EF 40%, hypertension, hyperlipidemia, diabetes, CKD. He denies chest pain. He has some mild dyspnea at times. He denies orthopnea, PND. He has some mild lower extremity edema. MIMI Henderson is PCP.   Trev Carbone has the following history as [facility] in [facility]:    Patient Active Problem List    Diagnosis Date Noted    ACS (acute coronary syndrome) ([facility] 06/03/2019     Priority: High    Mixed hyperlipidemia 06/27/2019    Class 2 obesity without serious comorbidity in adult 06/27/2019    Left ventricular dysfunction 06/27/2019   [facility] discharge follow-up 06/13/2019    Chronic kidney disease, stage III (moderate) ([address] 06/13/2019    NSTEMI (non-ST elevated myocardial infarction) ([address] 06/05/2019    CAD (coronary artery disease) 05/15/2018    History of placement of stent in LAD coronary artery     Dyspnea 05/11/2018    Change in bowel habits 10/11/2016    History of colon polyps 10/11/2016    Family history of prostate cancer in father 10/05/2016    Pharyngoesophageal dysphagia 09/20/2016    Diabetes mellitus ([address] 06/27/2016    Essential hypertension 06/27/2016    Gastroesophageal reflux disease without esophagitis 06/27/2016     Past Medical History:   Diagnosis Date    Aneurysm ([address]     abdominal    Bilateral leg edema     CAD (coronary artery disease)     Chronic kidney disease     Stage 4    Diabetes mellitus, type 2 (HCC)     Fatty liver     GERD (gastroesophageal reflux disease)     HTN (hypertension)     Seasonal allergies      Past Surgical History:   Procedure Laterality Date    CARPAL TUNNEL RELEASE      COLONOSCOPY  2015    COLONOSCOPY N/A 10/11/2016    Dr Lopez-diverticulosis, Sessile serrated AP (-) high grade dysplasia x 1, tubular AP (-) dysplasia x 2, HP x 2--3 yr recall    CORONARY ANGIOPLASTY WITH STENT PLACEMENT  06/04/2019    AGA to circ    CORONARY ANGIOPLASTY WITH STENT PLACEMENT  05/2018    AGA to osteal LAD    EYE SURGERY      Right eye    PRE-MALIGNANT / [address]      Dr. Huggins [facility] ENDOSCOPY  9/20/2016    Dr Lopez-w/dilation over wire, 46 [place]-distal esophageal narrowing, Inna (-)    UPPER GASTROINTESTINAL ENDOSCOPY  9/20/2016    Dr Lopez-w/dilation over wire, 46 [place]-distal esophageal narrowing, Inna (-)     Family History   Problem Relation Age of Onset    Colon Cancer Mother     Prostate Cancer Father     Other Father         HX of blood clot    Diabetes Sister     Cancer Sister     Diabetes Brother     Esophageal Cancer Neg Hx     Liver Cancer Neg Hx     Liver Disease Neg Hx     Stomach Cancer Neg Hx     Rectal Cancer Neg Hx      Social History     Tobacco Use    Smoking status: Never Smoker    Smokeless tobacco: Never Used   Substance Use Topics    Alcohol use: No      Current Outpatient Medications   Medication Sig Dispense Refill    prasugrel (EFFIENT) 10 MG TABS TAKE 1 TABLET BY MOUTH ONCE DAILY 90 tablet 3    metoprolol tartrate (LOPRESSOR) 50 MG tablet TAKE 1 TABLET BY MOUTH TWICE DAILY 180 tablet 3    metFORMIN (GLUCOPHAGE) 1000 MG tablet TAKE ONE TABLET BY MOUTH TWICE DAILY WITH MEALS 180 tablet 3    loratadine (CLARITIN) 10 MG tablet Take 1 tablet by mouth daily 90 tablet 3    lansoprazole (PREVACID) 30 MG delayed release capsule TAKE ONE CAPSULE BY MOUTH ONCE DAILY 90 capsule 3    irbesartan (AVAPRO) 150 MG tablet Take 1 tablet by mouth daily 90 tablet 3    Insulin Pen Needle (Jonothan Maclachlan PEN NEEDLES) 31G X 6 MM MISC Dx: E11.9 150 each 3    insulin lispro (HUMALOG KWIKPEN) 100 UNIT/ML pen Inject 10 Units into the skin 3 times daily (before meals) 150-199=2 units,200-249=4 units,250-299=6 units,300-349=8 units,350-400=10 units 5 pen 3    insulin glargine (LANTUS SOLOSTAR) 100 UNIT/ML injection pen INJECT 55 UNITS SUB-Q TWICE DAILY 40 pen 3    hydrochlorothiazide (MICROZIDE) 12.5 MG capsule Take 1 capsule by mouth 2 times daily 180 capsule 3    furosemide (LASIX) 40 MG tablet Take 1 tablet by mouth daily 90 tablet 3    ferrous sulfate 325 (65 Fe) MG tablet Take 1 tablet by mouth daily (with breakfast) 90 tablet 3    blood glucose test strips (ACCU-CHEK DUNIA) strip Patient is to test 8 times daily. Dx: E11.9  Please provide patient with Accu check Dunia Plus test strips per his Insurance request 900 each 3    gabapentin (NEURONTIN) 300 MG capsule TAKE 1 CAPSULE BY MOUTH THREE TIMES DAILY FOR 30 DAYS 270 capsule 0    vitamin D (ERGOCALCIFEROL) 69364 units CAPS capsule Take 1 capsule by mouth once a week 9 capsule 0    nitroGLYCERIN (NITROSTAT) 0.4 MG SL tablet up to max of 3 total doses. If no relief after 1 dose, call 911. 25 tablet 3    atorvastatin (LIPITOR) 80 MG tablet Take 1 tablet by mouth nightly 30 tablet 3    Multiple Vitamin (MULTIVITAMIN) tablet Take 1 tablet by mouth daily 30 tablet 0    mupirocin (BACTROBAN) 2 % ointment APPLY TO AFFECTED AREA 3 TIMES A DAY 1 Tube 0    ipratropium-albuterol (DUONEB) 0.5-2.5 (3) MG/3ML SOLN nebulizer solution Inhale 3 mLs into the lungs every 6 hours 360 mL 1    Lancets 30G MISC Use to check blood sugar 8 times daily.  E11.9 300 each 5    insulin lispro (HUMALOG KWIKPEN) 100 UNIT/ML pen INJECT 10 UNITS SUB-Q 3 TIMES A DAY (BEFORE MEALS) 150-199=2 UNITS, 200-249=4 UNITS, 250-299=6 UNITS, 300-349=8 UNITS, 350-400=10 UNITS 15 pen 3    fluticasone (FLONASE) 50 MCG/ACT nasal spray USE 2 SPRAY(S) IN EACH NOSTRIL ONCE DAILY 1 Bottle 3    triamcinolone (KENALOG) 0.025 % cream Apply topically 2 times daily. 1 Tube 0    EQ ASPIRIN LOW DOSE 81 MG chewable tablet CHEW AND SWALLOW 1 TABLET BY MOUTH ONCE DAILY 90 tablet 3    guaiFENesin 400 MG tablet Take 1 tablet by mouth 4 times daily as needed for Cough 56 tablet 0    Alcohol Swabs PADS 1 box by Does not apply route 8 times daily 100 each 5    Blood Glucose Monitoring Suppl (ACURA BLOOD GLUCOSE METER) w/Device KIT Please provide patient with Accu check Bharati Meter per his Insurance request. Dx: E11.9 1 kit 0    albuterol sulfate HFA (VENTOLIN HFA) 108 (90 Base) MCG/ACT inhaler Inhale 2 puffs into the lungs every 6 hours as needed for Wheezing 1 Inhaler 3     No current facility-administered medications for this visit. Allergies: Tape [adhesive tape]    Review of Systems  Review of Systems   Constitutional: Negative for activity change, diaphoresis, fatigue, fever and unexpected weight change. HENT: Negative for facial swelling and nosebleeds. Eyes: Negative for redness and visual disturbance. Respiratory: Positive for shortness of breath. Negative for cough, chest tightness and wheezing. Cardiovascular: Negative for chest pain, palpitations and leg swelling. Gastrointestinal: Negative for abdominal pain, nausea and vomiting. Endocrine: Negative for cold intolerance and heat intolerance. Genitourinary: Negative for dysuria and hematuria. Musculoskeletal: Negative for arthralgias and myalgias. Skin: Negative for pallor and rash. Neurological: Negative for dizziness, seizures, syncope, weakness and light-headedness. Hematological: Does not bruise/bleed easily. Psychiatric/Behavioral: Negative for agitation. The patient is not nervous/anxious. Objective  Vital Signs - /66   Pulse 68   Ht 6' (1.829 m)   Wt 291 lb (132 kg)   BMI 39.47 kg/m²   Physical Exam   Constitutional: He is oriented to person, place, and time. He appears well-developed and well-nourished.  No distress. obese   HENT:   Head: Normocephalic and atraumatic. Right Ear: Hearing and external ear normal.   Left Ear: Hearing and external ear normal.   Nose: Nose normal.   Eyes: Pupils are equal, round, and reactive to light. Right eye exhibits no discharge. Left eye exhibits no discharge. Neck: No JVD present. No tracheal deviation present. No thyromegaly present. Cardiovascular: Normal rate, regular rhythm, normal heart sounds and intact distal pulses. Exam reveals no gallop and no friction rub. No murmur heard. No carotid bruit   Pulmonary/Chest: Effort normal and breath sounds normal. No respiratory distress. He has no wheezes. He has no rales. Abdominal: Soft. There is no tenderness. Musculoskeletal: He exhibits no edema. abnormal gait and station   Neurological: He is alert and oriented to person, place, and time. No cranial nerve deficit. Skin: Skin is warm and dry. No rash noted. Psychiatric: He has a normal mood and affect. His behavior is normal. Judgment normal.   Nursing note and vitals reviewed. Data:  Heart cath summary 6/4/19:       1. Successful femoral artery ultrasound  2. Successful femoral artery arterogram  3. Supervision of the administration of moderate conscious sedation  4. Single vessel coronary artery disease involving the mid circumflex  5. Left ventricular function is severely impaired in the anterior lateral segment with a visually estimate ejection of 40%. 6.  99% lesion in the mid circumflex  7. Successful percutaneous coronary intervention utilizing a single drug eluding stent to the mid circumflex.   8.  Patent stent in the osteal LAD  9.  40% lesion in the mid RCA  EF 40%    Lab Results   Component Value Date    CHOL 150 (L) 06/04/2019    TRIG 234 (H) 06/04/2019    HDL 22 (L) 06/04/2019    LDLCALC 81 06/04/2019    LDLDIRECT 104 11/02/2017     Lab Results   Component Value Date    ALT 19 06/03/2019    AST 13 06/03/2019     Assessment: Diagnosis Orders   1. Coronary artery disease involving native coronary artery of native heart without angina pectoris     2. NSTEMI (non-ST elevated myocardial infarction) ([address]     3. Left ventricular dysfunction     4. Essential hypertension     5. Mixed hyperlipidemia     6. Type 2 diabetes mellitus with diabetic nephropathy, with long-term current use of insulin ([facility])     7. Class 2 obesity due to excess calories without serious comorbidity with body mass index (BMI) of 39.0 to 39.9 in adult       Hospital records reviewed    CAD/recent non-STEMI- status post drug-eluting stent to circumflex. Continue aspirin and Effient without interruption for 1 year. Start cardiac rehab    Left ventricular dysfunction-EF 40%. Patient is on guideline directed medical therapy. We discussed monitoring weight, calling for sudden weight gain, and following a low-sodium diet. Patient appears euvolemic today    Hypertension-well-controlled on current regimen    Hyperlipidemia-on statin therapy. On max dose Lipitor. Last LDL 81. We discussed making some dietary changes to get LDL to goal at less than 70    Diabetes-last A1c 9.1 in September 2018. We had a long discussion about the importance of diabetes control and its relationship to heart disease. This is an area he needs to focus on. He has an appointment with his PCP soon and will have her labs repeated at that time. A1c goal less than 7    Obesity- encourage weight loss and regular exercise. He will be starting cardiac rehab    Stable cardiovascular status. No evidence of overt heart failure,angina or dysrhythmia. Plan    Return for Dr. Elise Alvares, as scheduled. Keep diabetes under control to help prevent further heart disease. Keep Hemoglobin A1C less than 7%. Cardiac Rehab  Make some dietary changes to improved cholesterol numbers  Do not hold either your Aspirin or your Effient for any elective procedure for one year following your stent.   Work on [employer] loss and regular exercise    Call with any questionsor concerns  Follow up with Lorenza Bamberger, APRN for non cardiac problems  Report any new problems  Cardiovascular Fitness-Exercise as tolerated. Strive for 15 minutes of exercise most days of the week. Cardiac / HealthyDiet  Continue current medications as directed  Continue plan of treatment  It is always recommended that you bring your medicationsbottles with you to each visit - this is for your safety!        MIIM Coley

## 2019-06-27 NOTE — PATIENT INSTRUCTIONS
Return for Dr. Katerina Corral, as scheduled. Keep diabetes under control to help prevent further heart disease. Keep Hemoglobin A1C less than 7%. Cardiac Rehab  Make some dietary changes to improved cholesterol numbers  Do not hold either your Aspirin or your Effient for any elective procedure for one year following your stent.   Work on weight loss and regular exercise

## 2019-07-13 PROBLEM — Z09 HOSPITAL DISCHARGE FOLLOW-UP: Status: RESOLVED | Noted: 2019-06-13 | Resolved: 2019-07-13

## 2019-07-25 ENCOUNTER — TELEPHONE (OUTPATIENT)
Dept: CARDIOLOGY | Age: 64
End: 2019-07-25

## 2019-09-02 DIAGNOSIS — G62.9 NEUROPATHY: ICD-10-CM

## 2019-09-02 DIAGNOSIS — B36.9 DERMATITIS FUNGAL: ICD-10-CM

## 2019-09-03 RX ORDER — GABAPENTIN 300 MG/1
CAPSULE ORAL
Qty: 270 CAPSULE | Refills: 0 | OUTPATIENT
Start: 2019-09-03 | End: 2020-05-15 | Stop reason: SDUPTHER

## 2019-09-03 RX ORDER — ASPIRIN 81 MG/1
TABLET, CHEWABLE ORAL
Qty: 90 TABLET | Refills: 3 | Status: SHIPPED | OUTPATIENT
Start: 2019-09-03 | End: 2020-09-08

## 2019-09-03 RX ORDER — ERGOCALCIFEROL 1.25 MG/1
50000 CAPSULE ORAL WEEKLY
Qty: 9 CAPSULE | Refills: 0 | Status: SHIPPED | OUTPATIENT
Start: 2019-09-03 | End: 2020-08-11

## 2019-09-03 NOTE — TELEPHONE ENCOUNTER
Tyrese Sanchez called to request a refill on his medication.       Last office visit : 6/12/2019   Next office visit : 9/18/2019     Last UDS: No results found for: [place], LABBENZ, BUPRENUR, COCAIMETSCRU, GABAPENTIN, MDMA, METAMPU, OPIATESCREENURINE, OXTCOSU, PHENCYCLIDINESCREENURINE, PROPOXYPHENE, THCSCREENUR, TRICYUR    Last Gary: unknown  Medication Contract: [id number]   Last Fill: 6-12    Requested Prescriptions     Pending Prescriptions Disp Refills    gabapentin (NEURONTIN) 300 MG capsule [Pharmacy Med Name: GABAPENTIN 300MG    CAP] 270 capsule 0     Sig: TAKE 1 CAPSULE BY MOUTH THREE TIMES DAILY    mupirocin (BACTROBAN) 2 % ointment [Pharmacy Med Name: MUPIROCIN 2%        OIN]  0     Sig: APPLY  OINTMENT TO AFFECTED AREA THREE TIMES DAILY    vitamin D (ERGOCALCIFEROL) 22072 units CAPS capsule [Pharmacy Med Name: VITAMIN D2 (ERGO)50,000 IU CAP] 9 capsule 0     Sig: TAKE 1 CAPSULE BY MOUTH ONCE A WEEK         Rx called to pharmacy  Jose L Nelson LPN

## 2019-09-18 ENCOUNTER — OFFICE VISIT (OUTPATIENT)
Dept: PRIMARY CARE CLINIC | Age: 64
End: 2019-09-18
Payer: MEDICARE

## 2019-09-18 VITALS
DIASTOLIC BLOOD PRESSURE: 62 MMHG | HEART RATE: 70 BPM | BODY MASS INDEX: 38.11 KG/M2 | SYSTOLIC BLOOD PRESSURE: 112 MMHG | TEMPERATURE: 97.9 F | WEIGHT: 281 LBS | OXYGEN SATURATION: 99 %

## 2019-09-18 DIAGNOSIS — I10 ESSENTIAL HYPERTENSION: ICD-10-CM

## 2019-09-18 DIAGNOSIS — E78.2 MIXED HYPERLIPIDEMIA: ICD-10-CM

## 2019-09-18 DIAGNOSIS — Z76.0 MEDICATION REFILL: ICD-10-CM

## 2019-09-18 DIAGNOSIS — Z23 NEED FOR INFLUENZA VACCINATION: ICD-10-CM

## 2019-09-18 LAB — HBA1C MFR BLD: 7.9 %

## 2019-09-18 PROCEDURE — G8417 CALC BMI ABV UP PARAM F/U: HCPCS | Performed by: NURSE PRACTITIONER

## 2019-09-18 PROCEDURE — 3017F COLORECTAL CA SCREEN DOC REV: CPT | Performed by: NURSE PRACTITIONER

## 2019-09-18 PROCEDURE — 90686 IIV4 VACC NO PRSV 0.5 ML IM: CPT | Performed by: NURSE PRACTITIONER

## 2019-09-18 PROCEDURE — G8598 ASA/ANTIPLAT THER USED: HCPCS | Performed by: NURSE PRACTITIONER

## 2019-09-18 PROCEDURE — 1123F ACP DISCUSS/DSCN MKR DOCD: CPT | Performed by: NURSE PRACTITIONER

## 2019-09-18 PROCEDURE — 83036 HEMOGLOBIN GLYCOSYLATED A1C: CPT | Performed by: NURSE PRACTITIONER

## 2019-09-18 PROCEDURE — 1036F TOBACCO NON-USER: CPT | Performed by: NURSE PRACTITIONER

## 2019-09-18 PROCEDURE — G8427 DOCREV CUR MEDS BY ELIG CLIN: HCPCS | Performed by: NURSE PRACTITIONER

## 2019-09-18 PROCEDURE — 99214 OFFICE O/P EST MOD 30 MIN: CPT | Performed by: NURSE PRACTITIONER

## 2019-09-18 PROCEDURE — G0008 ADMIN INFLUENZA VIRUS VAC: HCPCS | Performed by: NURSE PRACTITIONER

## 2019-09-18 PROCEDURE — 3045F PR MOST RECENT HEMOGLOBIN A1C LEVEL 7.0-9.0%: CPT | Performed by: NURSE PRACTITIONER

## 2019-09-18 PROCEDURE — 2022F DILAT RTA XM EVC RTNOPTHY: CPT | Performed by: NURSE PRACTITIONER

## 2019-09-18 PROCEDURE — 4040F PNEUMOC VAC/ADMIN/RCVD: CPT | Performed by: NURSE PRACTITIONER

## 2019-09-18 RX ORDER — ONDANSETRON 4 MG/1
TABLET, FILM COATED ORAL
Qty: 30 TABLET | Refills: 3 | Status: SHIPPED | OUTPATIENT
Start: 2019-09-18 | End: 2019-09-23 | Stop reason: SDUPTHER

## 2019-09-18 RX ORDER — NYSTATIN 100000 [USP'U]/G
POWDER TOPICAL
Qty: 1 BOTTLE | Refills: 1 | Status: SHIPPED | OUTPATIENT
Start: 2019-09-18 | End: 2020-09-22 | Stop reason: SDUPTHER

## 2019-09-18 ASSESSMENT — ENCOUNTER SYMPTOMS
CHOKING: 0
VOMITING: 0
BACK PAIN: 0
COUGH: 0
APNEA: 0
STRIDOR: 0
COLOR CHANGE: 0
DIARRHEA: 0
SHORTNESS OF BREATH: 0
WHEEZING: 0
CHEST TIGHTNESS: 0
ABDOMINAL PAIN: 0
CONSTIPATION: 0
ABDOMINAL DISTENTION: 0

## 2019-09-18 NOTE — PROGRESS NOTES
2019     Jona Fisher (:  1955) is a 70 y.o. male, here for evaluation of the following medical concerns:  Chief Complaint   Patient presents with    Follow-up     DM,HTN,HDL,NEUROPATHY    Diarrhea     x 3 days she has some type of respite is improving. HPI  Treatment Adherence:   Medication compliance:  compliant most of the time  Diet compliance:  compliant most of the time  Weight trend: increasing  Current exercise: walks 5 time(s) per week  Barriers: none    Diabetes Mellitus Type 2: Current symptoms/problems include none. Home blood sugar records: fasting range: 100-160  Any episodes of hypoglycemia? no  Eye exam current (within one year): yes  Tobacco history: He  reports that he has never smoked. He has never used smokeless tobacco.   Daily Aspirin? Yes    Hypertension:  Home blood pressure monitoring: No.  He is adherent to a low sodium diet. Patient denies chest pain, lightheadedness, peripheral edema, dry cough and fatigue. Antihypertensive medication side effects: no medication side effects noted. Use of agents associated with hypertension: none. Lab Results   Component Value Date    LABA1C 7.9 2019    LABA1C 8.9 (A) 2019    LABA1C 8.8 (H) 2018     Lab Results   Component Value Date    LABMICR 18.20 2017    CREATININE 1.9 (H) 2019     Lab Results   Component Value Date    ALT 19 2019    AST 13 2019     Lab Results   Component Value Date    CHOL 150 (L) 2019    TRIG 234 (H) 2019    HDL 22 (L) 2019    LDLCALC 81 2019    LDLDIRECT 104 2017          Review of Systems   Constitutional: Negative for chills, fatigue, fever and unexpected weight change. Eyes: Negative for visual disturbance. Respiratory: Negative for apnea, cough, choking, chest tightness, shortness of breath, wheezing and stridor. Cardiovascular: Negative for chest pain, palpitations and leg swelling.    Gastrointestinal:

## 2019-09-23 ENCOUNTER — TELEPHONE (OUTPATIENT)
Dept: PRIMARY CARE CLINIC | Age: 64
End: 2019-09-23

## 2019-09-23 DIAGNOSIS — Z76.0 MEDICATION REFILL: ICD-10-CM

## 2019-09-23 RX ORDER — ONDANSETRON 4 MG/1
8 TABLET, FILM COATED ORAL EVERY 12 HOURS PRN
Qty: 30 TABLET | Refills: 3 | Status: SHIPPED | OUTPATIENT
Start: 2019-09-23 | End: 2020-12-02

## 2019-09-23 NOTE — TELEPHONE ENCOUNTER
Patient called and [facility] will not fill his zofran related to there are no directions on it.  Reviewed,new rx sent to pharmacy

## 2019-11-21 ENCOUNTER — OFFICE VISIT (OUTPATIENT)
Dept: ENDOCRINOLOGY | Facility: CLINIC | Age: 64
End: 2019-11-21

## 2019-11-21 VITALS
OXYGEN SATURATION: 97 % | DIASTOLIC BLOOD PRESSURE: 78 MMHG | WEIGHT: 286.3 LBS | HEART RATE: 77 BPM | HEIGHT: 72 IN | SYSTOLIC BLOOD PRESSURE: 126 MMHG | BODY MASS INDEX: 38.78 KG/M2

## 2019-11-21 DIAGNOSIS — Z79.4 TYPE 2 DIABETES MELLITUS WITH HYPERGLYCEMIA, WITH LONG-TERM CURRENT USE OF INSULIN (HCC): Primary | ICD-10-CM

## 2019-11-21 DIAGNOSIS — E55.9 VITAMIN D DEFICIENCY: ICD-10-CM

## 2019-11-21 DIAGNOSIS — E11.59 HYPERTENSION ASSOCIATED WITH DIABETES (HCC): ICD-10-CM

## 2019-11-21 DIAGNOSIS — I25.10 CORONARY ARTERY DISEASE INVOLVING NATIVE HEART WITHOUT ANGINA PECTORIS, UNSPECIFIED VESSEL OR LESION TYPE: ICD-10-CM

## 2019-11-21 DIAGNOSIS — E11.42 DIABETIC POLYNEUROPATHY ASSOCIATED WITH TYPE 2 DIABETES MELLITUS (HCC): ICD-10-CM

## 2019-11-21 DIAGNOSIS — I15.2 HYPERTENSION ASSOCIATED WITH DIABETES (HCC): ICD-10-CM

## 2019-11-21 DIAGNOSIS — E78.2 MIXED DIABETIC HYPERLIPIDEMIA ASSOCIATED WITH TYPE 2 DIABETES MELLITUS (HCC): ICD-10-CM

## 2019-11-21 DIAGNOSIS — Z79.4 TYPE 2 DIABETES MELLITUS WITH STAGE 3 CHRONIC KIDNEY DISEASE, WITH LONG-TERM CURRENT USE OF INSULIN (HCC): ICD-10-CM

## 2019-11-21 DIAGNOSIS — E11.22 TYPE 2 DIABETES MELLITUS WITH STAGE 3 CHRONIC KIDNEY DISEASE, WITH LONG-TERM CURRENT USE OF INSULIN (HCC): ICD-10-CM

## 2019-11-21 DIAGNOSIS — N18.30 TYPE 2 DIABETES MELLITUS WITH STAGE 3 CHRONIC KIDNEY DISEASE, WITH LONG-TERM CURRENT USE OF INSULIN (HCC): ICD-10-CM

## 2019-11-21 DIAGNOSIS — E11.69 MIXED DIABETIC HYPERLIPIDEMIA ASSOCIATED WITH TYPE 2 DIABETES MELLITUS (HCC): ICD-10-CM

## 2019-11-21 DIAGNOSIS — E11.65 TYPE 2 DIABETES MELLITUS WITH HYPERGLYCEMIA, WITH LONG-TERM CURRENT USE OF INSULIN (HCC): Primary | ICD-10-CM

## 2019-11-21 PROCEDURE — 99204 OFFICE O/P NEW MOD 45 MIN: CPT | Performed by: INTERNAL MEDICINE

## 2019-11-21 RX ORDER — PRASUGREL 10 MG/1
10 TABLET, FILM COATED ORAL DAILY
COMMUNITY
End: 2022-12-02

## 2019-11-21 RX ORDER — ATORVASTATIN CALCIUM 80 MG/1
80 TABLET, FILM COATED ORAL DAILY
COMMUNITY
End: 2021-02-03

## 2019-11-21 RX ORDER — FUROSEMIDE 40 MG/1
20 TABLET ORAL AS NEEDED
COMMUNITY

## 2019-11-21 RX ORDER — IRBESARTAN 150 MG/1
300 TABLET ORAL NIGHTLY
COMMUNITY

## 2019-11-21 RX ORDER — ASPIRIN 81 MG/1
81 TABLET, CHEWABLE ORAL DAILY
COMMUNITY

## 2019-11-21 RX ORDER — NITROGLYCERIN 80 MG/1
1 PATCH TRANSDERMAL DAILY
COMMUNITY
End: 2021-09-02

## 2019-11-21 RX ORDER — ERGOCALCIFEROL 1.25 MG/1
50000 CAPSULE ORAL WEEKLY
COMMUNITY
End: 2021-09-02

## 2019-11-21 NOTE — PROGRESS NOTES
" Mick Sims is a 63 y.o. male who presents for  evaluation of   Chief Complaint   Patient presents with   • Diabetes       Referring provider     Primary Care Provider    Bimal Lee MD    Duration 10 years    Timing - Diabetes is Constant    Quality -  needs improvement    Severity -  severe    Complications - nephropathy, retinopathy, peripheral neuropathy and cardiovascular disease    Current symptoms/problems  paresthesia of the feet and visual disturbances     Alleviating Factors: Compliance       Side Effects  none    Current diet  in general, a \"healthy\" diet      Current exercise none    Current monitoring regimen: home blood tests - checking 4 x daily   Home blood sugar records:     Hypoglycemia unawareness and nocturnal    Past Medical History:   Diagnosis Date   • Arthritis    • Diabetes mellitus (CMS/HCC)    • Hypertension    • Renal disorder      Family History   Problem Relation Age of Onset   • Cancer Mother    • Diabetes Sister    • Diabetes Brother    • No Known Problems Brother      Social History     Tobacco Use   • Smoking status: Never Smoker   • Smokeless tobacco: Never Used   Substance Use Topics   • Alcohol use: No   • Drug use: Not on file         Current Outpatient Medications:   •  aspirin 81 MG chewable tablet, Chew 81 mg Daily., Disp: , Rfl:   •  atorvastatin (LIPITOR) 80 MG tablet, Take 80 mg by mouth Daily., Disp: , Rfl:   •  fluticasone (FLONASE) 50 MCG/ACT nasal spray, 1 spray into each nostril 2 (Two) Times a Day As Needed for rhinitis or allergies., Disp: , Rfl:   •  furosemide (LASIX) 40 MG tablet, Take 40 mg by mouth 2 (Two) Times a Day., Disp: , Rfl:   •  gabapentin (NEURONTIN) 100 MG capsule, Take 100 mg by mouth Every Night., Disp: , Rfl:   •  gabapentin (NEURONTIN) 300 MG capsule, Take 300 mg by mouth 3 (Three) Times a Day., Disp: , Rfl:   •  guaiFENesin (MUCINEX) 600 MG 12 hr tablet, Take 2 tablets by mouth 2 (Two) Times a Day., Disp: 20 tablet, Rfl: " 0  •  hydrochlorothiazide (HYDRODIURIL) 25 MG tablet, Take 12.5 mg by mouth Every Night. In addition to avalide, Disp: , Rfl:   •  irbesartan-hydrochlorothiazide (AVALIDE) 150-12.5 MG tablet, Take 1 tablet by mouth Daily., Disp: , Rfl:   •  lansoprazole (PREVACID) 30 MG capsule, Take 30 mg by mouth Every Night., Disp: , Rfl:   •  metoprolol tartrate (LOPRESSOR) 50 MG tablet, Take 1 tablet by mouth 2 (Two) Times a Day., Disp: 30 tablet, Rfl: 0  •  montelukast (SINGULAIR) 10 MG tablet, Take 10 mg by mouth At Night As Needed (for allergies)., Disp: , Rfl:   •  Multiple Vitamins-Minerals (CENTRUM SILVER 50+MEN PO), Take 1 tablet by mouth Daily., Disp: , Rfl:   •  nitroglycerin (NITRODUR) 0.4 MG/HR patch, Place 1 patch on the skin as directed by provider Daily., Disp: , Rfl:   •  prasugrel (EFFIENT) 10 MG tablet, Take 10 mg by mouth Daily., Disp: , Rfl:   •  raNITIdine (ZANTAC) 75 MG tablet, Take 75 mg by mouth Daily As Needed for indigestion or heartburn., Disp: , Rfl:   •  Canagliflozin (INVOKANA) 100 MG tablet, 100 mg po daily, Disp: 30 tablet, Rfl: 11  •  Insulin Glargine, 2 Unit Dial, (TOUJEO MAX SOLOSTAR) 300 UNIT/ML solution pen-injector injection, Inject 80 Units under the skin into the appropriate area as directed Daily., Disp: 3 pen, Rfl: 11  •  Insulin Lispro (HUMALOG KWIKPEN) 200 UNIT/ML solution pen-injector, Inject 30 Units under the skin into the appropriate area as directed 3 (Three) Times a Day With Meals., Disp: 5 pen, Rfl: 11  •  metFORMIN (GLUCOPHAGE) 1000 MG tablet, Take 1,000 mg by mouth 2 (Two) Times a Day. Needs pm dose, Disp: , Rfl:   •  Semaglutide,0.25 or 0.5MG/DOS, (OZEMPIC, 0.25 OR 0.5 MG/DOSE,) 2 MG/1.5ML solution pen-injector, Inject 0.5 mg under the skin into the appropriate area as directed 1 (One) Time Per Week. 0.5 mg weekly, Disp: 1 pen, Rfl: 11    Review of Systems    Review of Systems   Constitutional: Negative for activity change, appetite change, chills, diaphoresis, fatigue, fever  "and unexpected weight change.   HENT: Negative for congestion, dental problem, drooling, ear discharge, ear pain, facial swelling, mouth sores, postnasal drip, rhinorrhea, sinus pressure, sore throat, tinnitus, trouble swallowing and voice change.    Eyes: Negative for photophobia, pain, discharge, redness, itching and visual disturbance.   Respiratory: Negative for apnea, cough, choking, chest tightness, shortness of breath, wheezing and stridor.    Cardiovascular: Negative for chest pain, palpitations and leg swelling.   Gastrointestinal: Negative for abdominal distention, abdominal pain, constipation, diarrhea, nausea and vomiting.   Endocrine: Negative for cold intolerance, heat intolerance, polydipsia, polyphagia and polyuria.   Genitourinary: Negative for decreased urine volume, difficulty urinating, dysuria, flank pain, frequency, hematuria and urgency.   Musculoskeletal: Negative for arthralgias, back pain, gait problem, joint swelling, myalgias, neck pain and neck stiffness.   Skin: Negative for color change, pallor, rash and wound.   Allergic/Immunologic: Negative for immunocompromised state.   Neurological: Negative for dizziness, tremors, seizures, syncope, facial asymmetry, speech difficulty, weakness, light-headedness, numbness and headaches.   Hematological: Negative for adenopathy.   Psychiatric/Behavioral: Negative for agitation, behavioral problems, confusion, decreased concentration, dysphoric mood, hallucinations, self-injury, sleep disturbance and suicidal ideas. The patient is not nervous/anxious and is not hyperactive.         Objective:   /78   Pulse 77   Ht 182.9 cm (72\")   Wt 130 kg (286 lb 4.8 oz)   SpO2 97%   BMI 38.83 kg/m²     Physical Exam   Constitutional: He is oriented to person, place, and time. He appears well-developed and well-nourished. He is cooperative.   HENT:   Head: Normocephalic and atraumatic.   Right Ear: External ear normal.   Left Ear: External ear normal. "   Nose: Nose normal.   Mouth/Throat: Oropharynx is clear and moist. No oropharyngeal exudate.   Eyes: Conjunctivae and EOM are normal. Pupils are equal, round, and reactive to light. No scleral icterus. Right eye exhibits normal extraocular motion. Left eye exhibits normal extraocular motion.   Neck: Neck supple. No JVD present. No muscular tenderness present. No tracheal deviation, no edema and no erythema present. No thyromegaly present.   Cardiovascular: Normal rate, regular rhythm, normal heart sounds and intact distal pulses. Exam reveals no gallop and no friction rub.   No murmur heard.  Pulmonary/Chest: Effort normal and breath sounds normal. No stridor. No respiratory distress. He has no decreased breath sounds. He has no wheezes. He has no rhonchi. He has no rales. He exhibits no tenderness.   Abdominal: Soft. Bowel sounds are normal. He exhibits no distension and no mass. There is no hepatomegaly. There is no tenderness. There is no rebound and no guarding. No hernia.   Musculoskeletal: Normal range of motion. He exhibits no edema, tenderness or deformity.   Lymphadenopathy:     He has no cervical adenopathy.   Neurological: He is alert and oriented to person, place, and time. He has normal reflexes. No cranial nerve deficit. He exhibits normal muscle tone. Coordination normal.   Skin: Skin is warm. No rash noted. No erythema. No pallor.   Psychiatric: He has a normal mood and affect. His behavior is normal. Judgment and thought content normal.   Nursing note and vitals reviewed.      Lab Review          Assessment/Plan       ICD-10-CM ICD-9-CM   1. Type 2 diabetes mellitus with hyperglycemia, with long-term current use of insulin (CMS/[facility]) E11.65 250.00    Z79.4 790.29     V58.67   2. Diabetic polyneuropathy associated with type 2 diabetes mellitus (CMS/[facility]) E11.42 250.60     357.2   3. Hypertension associated with diabetes (CMS/[facility]) E11.59 250.80    I10 401.9   4. Mixed diabetic hyperlipidemia associated  with type 2 diabetes mellitus (CMS/[facility]) E11.69 250.80    E78.2 272.2         I reviewed and summarized records from Bimal Lee MD from current year  and I reviewed / ordered labs.   From review of records :    Pt has type 2 diabetes w ckd and cad     Glycemic Management:   Lab Results   Component Value Date    HGBA1C 8.8 (H) 12/18/2018    HGBA1C 9.1 (H) 09/18/2018    HGBA1C 10.6 (H) 05/13/2018     Lab Results   Component Value Date    GLUCOSE 218 (H) 06/11/2019    BUN 28 (H) 06/11/2019    CREATININE 1.9 (H) 06/11/2019    EGFRIFNONA 36 (A) 06/11/2019    BCR 19.0 04/26/2018    K 5.1 (H) 06/11/2019    CO2 29 06/11/2019    CALCIUM 9.9 06/11/2019    ALBUMIN 4 06/03/2019    AST 13 06/03/2019    ALT 19 06/03/2019    ANIONGAP 12 06/11/2019     Lab Results   Component Value Date    WBC 8.4 06/05/2019    HGB 11.3 (L) 06/05/2019    HCT 35.9 (L) 06/05/2019    MCV 87.1 06/05/2019     06/05/2019   Lantus --- 50 units twice daily   Decrease to 50 units once daily     Compare bedtime and waking sugar without eating in between     If you successfully go to bed in the 100s ------   You should wake up within 40 points of the reading that you went to bed with       Example     If you go bed 150 --- you should wake up between 110 to 190     If you drop more than 40 points --- back off 5 more units    If you rise more than 40 points -- increase by 3 units every 3 days     Eventually we will change lantus to Toujeo since it is a better quality of long acting insulin       ========================================    Ozempic    Start with 0.25 mg weekly for 4 weeks then increase to 0.5 mg weekly     Main side effect = 15% nausea     On avg people lose between 10 to 15 lb     This medicine releases your own insulin, it takes about 6 weeks to fully build in your system and once it does it has the potency of about 40 units of insulin.     ============================================    Metformin 1000 mg twice a day ===      ============================================    Add Invokana 100mg  daily , this medicine helps to urinate sugar out   We will do this next appt       ============================================    Humalog     Take 3 units for every 15 grams above 60 grams as follows :     0 to 60 grams - nothing  61-75 grams - 3units  76-90 grams - 6units   grams - 9 untis  Above 105 - 12 units     The goal is to be less than 180 , 2 hours after eating           Approve for  Insulin pump and or Continuous Glucose Sensor     #1  Patient has diabetes mellitus, insulin-dependent.    #2 She performs blood glucose testing at least times daily and blood glucose log was brought to office with variability from .    #3  She is requiring  Basal insulin  and Prandial Insulin for a total of at least  4 injections per day and has been doing this for at least 6 months     #4 Patient tests blood sugars at least 4 times daily and makes frequent self-adjustments and patient is injecting insulin at least 4 times daily. She has been doing this for more than 6 months . She tests frequently due to hypoglycemia and hyperglycemia.     #5 I have personally seen patient within the past 6 months    #6 We plan on seeing her every 2-3 months for continuous adjustment of her diabetes regimen     #7 patient has hypoglycemia with episodes of unawareness.    #8 patient has day-to-day variation in her mealtime which confounds the degree of insulin dosing with multiple daily injections.    #9 patient has completed diabetes education program with us.    #10 she has demonstrated the ability to self monitor her glucose.        #11 Patient is motivated in improving  diabetes control    No orders of the defined types were placed in this encounter.        A copy of my note was sent to Bimal Lee MD    Please see my above opinion and suggestions.

## 2019-11-21 NOTE — PATIENT INSTRUCTIONS
Lantus --- 50 units twice daily   Decrease to 50 units once daily     Compare bedtime and waking sugar without eating in between     If you successfully go to bed in the 100s ------   You should wake up within 40 points of the reading that you went to bed with       Example     If you go bed 150 --- you should wake up between 110 to 190     If you drop more than 40 points --- back off 5 more units    If you rise more than 40 points -- increase by 3 units every 3 days     Eventually we will change lantus to Toujeo since it is a better quality of long acting insulin       ========================================    Ozempic    Start with 0.25 mg weekly for 4 weeks then increase to 0.5 mg weekly     Main side effect = 15% nausea     On avg people lose between 10 to 15 lb     This medicine releases your own insulin, it takes about 6 weeks to fully build in your system and once it does it has the potency of about 40 units of insulin.     ============================================    Metformin 1000 mg twice a day ===     ============================================    Add Invokana 100mg  daily , this medicine helps to urinate sugar out   We will do this next appt       ============================================    Humalog     Take 3 units for every 15 grams above 60 grams as follows :     0 to 60 grams - nothing  61-75 grams - 3units  76-90 grams - 6units   grams - 9 untis  Above 105 - 12 units     The goal is to be less than 180 , 2 hours after eating

## 2019-12-02 ENCOUNTER — TELEPHONE (OUTPATIENT)
Dept: ENDOCRINOLOGY | Facility: CLINIC | Age: 64
End: 2019-12-02

## 2019-12-04 ENCOUNTER — TELEPHONE (OUTPATIENT)
Dept: CARDIOLOGY | Age: 64
End: 2019-12-04

## 2019-12-06 ENCOUNTER — TELEPHONE (OUTPATIENT)
Dept: PRIMARY CARE CLINIC | Age: 64
End: 2019-12-06

## 2019-12-11 DIAGNOSIS — N18.30 CHRONIC KIDNEY DISEASE, STAGE III (MODERATE) (HCC): Primary | ICD-10-CM

## 2019-12-11 DIAGNOSIS — I10 ESSENTIAL HYPERTENSION: ICD-10-CM

## 2019-12-11 DIAGNOSIS — E78.2 MIXED HYPERLIPIDEMIA: ICD-10-CM

## 2019-12-13 DIAGNOSIS — I10 ESSENTIAL HYPERTENSION: ICD-10-CM

## 2019-12-13 DIAGNOSIS — E78.2 MIXED HYPERLIPIDEMIA: ICD-10-CM

## 2019-12-13 DIAGNOSIS — N18.30 CHRONIC KIDNEY DISEASE, STAGE III (MODERATE) (HCC): ICD-10-CM

## 2019-12-13 LAB
ALBUMIN SERPL-MCNC: 4 G/DL (ref 3.5–5.2)
ALP BLD-CCNC: 82 U/L (ref 40–130)
ALT SERPL-CCNC: 15 U/L (ref 5–41)
ANION GAP SERPL CALCULATED.3IONS-SCNC: 15 MMOL/L (ref 7–19)
AST SERPL-CCNC: 12 U/L (ref 5–40)
BASOPHILS ABSOLUTE: 0.1 K/UL (ref 0–0.2)
BASOPHILS RELATIVE PERCENT: 1.4 % (ref 0–1)
BILIRUB SERPL-MCNC: 0.3 MG/DL (ref 0.2–1.2)
BUN BLDV-MCNC: 31 MG/DL (ref 8–23)
CALCIUM SERPL-MCNC: 10 MG/DL (ref 8.8–10.2)
CHLORIDE BLD-SCNC: 101 MMOL/L (ref 98–111)
CHOLESTEROL, TOTAL: 177 MG/DL (ref 160–199)
CO2: 22 MMOL/L (ref 22–29)
CREAT SERPL-MCNC: 1.6 MG/DL (ref 0.5–1.2)
EOSINOPHILS ABSOLUTE: 0.3 K/UL (ref 0–0.6)
EOSINOPHILS RELATIVE PERCENT: 3.4 % (ref 0–5)
GFR NON-AFRICAN AMERICAN: 43
GLUCOSE BLD-MCNC: 199 MG/DL (ref 74–109)
HBA1C MFR BLD: 10.2 % (ref 4–6)
HCT VFR BLD CALC: 42.9 % (ref 42–52)
HDLC SERPL-MCNC: 22 MG/DL (ref 55–121)
HEMOGLOBIN: 13.3 G/DL (ref 14–18)
IMMATURE GRANULOCYTES #: 0 K/UL
LDL CHOLESTEROL CALCULATED: 97 MG/DL
LYMPHOCYTES ABSOLUTE: 2.4 K/UL (ref 1.1–4.5)
LYMPHOCYTES RELATIVE PERCENT: 31.2 % (ref 20–40)
MCH RBC QN AUTO: 27.2 PG (ref 27–31)
MCHC RBC AUTO-ENTMCNC: 31 G/DL (ref 33–37)
MCV RBC AUTO: 87.7 FL (ref 80–94)
MONOCYTES ABSOLUTE: 0.6 K/UL (ref 0–0.9)
MONOCYTES RELATIVE PERCENT: 8 % (ref 0–10)
NEUTROPHILS ABSOLUTE: 4.3 K/UL (ref 1.5–7.5)
NEUTROPHILS RELATIVE PERCENT: 55.7 % (ref 50–65)
PDW BLD-RTO: 13.4 % (ref 11.5–14.5)
PLATELET # BLD: 201 K/UL (ref 130–400)
PMV BLD AUTO: 12.8 FL (ref 9.4–12.4)
POTASSIUM SERPL-SCNC: 4.8 MMOL/L (ref 3.5–5)
RBC # BLD: 4.89 M/UL (ref 4.7–6.1)
SODIUM BLD-SCNC: 138 MMOL/L (ref 136–145)
TOTAL PROTEIN: 6.6 G/DL (ref 6.6–8.7)
TRIGL SERPL-MCNC: 289 MG/DL (ref 0–149)
WBC # BLD: 7.7 K/UL (ref 4.8–10.8)

## 2019-12-16 ENCOUNTER — TELEPHONE (OUTPATIENT)
Dept: FAMILY MEDICINE CLINIC | Facility: CLINIC | Age: 64
End: 2019-12-16

## 2019-12-16 RX ORDER — FLUTICASONE PROPIONATE 50 MCG
SPRAY, SUSPENSION (ML) NASAL
Qty: 1 BOTTLE | Refills: 3 | Status: SHIPPED | OUTPATIENT
Start: 2019-12-16 | End: 2020-05-15 | Stop reason: SDUPTHER

## 2019-12-16 NOTE — TELEPHONE ENCOUNTER
PT STATES HIS RX WENT TO THE WRONG PHARM. HE NEEDS THESE TO GO TO [facility]. THEIR PHONE -[phone] PLEASE RESEND    Canagliflozin (INVOKANA) 100 MG tablet  Insulin Lispro (HUMALOG KWIKPEN) 200 UNIT/ML solution pen-injector  Semaglutide,0.25 or 0.5MG/DOS, (OZEMPIC, 0.25 OR 0.5 MG/DOSE,) 2 MG/1.5ML solution pen-injector

## 2019-12-18 ENCOUNTER — TELEPHONE (OUTPATIENT)
Dept: PRIMARY CARE CLINIC | Age: 64
End: 2019-12-18

## 2019-12-18 ENCOUNTER — OFFICE VISIT (OUTPATIENT)
Dept: PRIMARY CARE CLINIC | Age: 64
End: 2019-12-18
Payer: MEDICARE

## 2019-12-18 VITALS
TEMPERATURE: 97.2 F | SYSTOLIC BLOOD PRESSURE: 118 MMHG | HEIGHT: 72 IN | RESPIRATION RATE: 17 BRPM | OXYGEN SATURATION: 98 % | DIASTOLIC BLOOD PRESSURE: 78 MMHG | BODY MASS INDEX: 37.52 KG/M2 | HEART RATE: 68 BPM | WEIGHT: 277 LBS

## 2019-12-18 DIAGNOSIS — Z11.59 ENCOUNTER FOR HEPATITIS C SCREENING TEST FOR LOW RISK PATIENT: ICD-10-CM

## 2019-12-18 DIAGNOSIS — Z12.11 COLON CANCER SCREENING: ICD-10-CM

## 2019-12-18 DIAGNOSIS — Z23 NEED FOR PNEUMOCOCCAL VACCINE: ICD-10-CM

## 2019-12-18 DIAGNOSIS — E78.2 MIXED HYPERLIPIDEMIA: ICD-10-CM

## 2019-12-18 DIAGNOSIS — B35.1 TOENAIL FUNGUS: ICD-10-CM

## 2019-12-18 DIAGNOSIS — I10 ESSENTIAL HYPERTENSION: ICD-10-CM

## 2019-12-18 DIAGNOSIS — N18.30 CHRONIC KIDNEY DISEASE, STAGE III (MODERATE) (HCC): ICD-10-CM

## 2019-12-18 DIAGNOSIS — E55.9 VITAMIN D DEFICIENCY: ICD-10-CM

## 2019-12-18 DIAGNOSIS — R79.89 LOW VITAMIN D LEVEL: Primary | ICD-10-CM

## 2019-12-18 LAB
HEPATITIS C ANTIBODY INTERPRETATION: NORMAL
VITAMIN D 25-HYDROXY: 21.4 NG/ML

## 2019-12-18 PROCEDURE — G8482 FLU IMMUNIZE ORDER/ADMIN: HCPCS | Performed by: NURSE PRACTITIONER

## 2019-12-18 PROCEDURE — 2022F DILAT RTA XM EVC RTNOPTHY: CPT | Performed by: NURSE PRACTITIONER

## 2019-12-18 PROCEDURE — G8417 CALC BMI ABV UP PARAM F/U: HCPCS | Performed by: NURSE PRACTITIONER

## 2019-12-18 PROCEDURE — 1123F ACP DISCUSS/DSCN MKR DOCD: CPT | Performed by: NURSE PRACTITIONER

## 2019-12-18 PROCEDURE — G8427 DOCREV CUR MEDS BY ELIG CLIN: HCPCS | Performed by: NURSE PRACTITIONER

## 2019-12-18 PROCEDURE — 3046F HEMOGLOBIN A1C LEVEL >9.0%: CPT | Performed by: NURSE PRACTITIONER

## 2019-12-18 PROCEDURE — 90732 PPSV23 VACC 2 YRS+ SUBQ/IM: CPT | Performed by: NURSE PRACTITIONER

## 2019-12-18 PROCEDURE — 1036F TOBACCO NON-USER: CPT | Performed by: NURSE PRACTITIONER

## 2019-12-18 PROCEDURE — 3017F COLORECTAL CA SCREEN DOC REV: CPT | Performed by: NURSE PRACTITIONER

## 2019-12-18 PROCEDURE — G0009 ADMIN PNEUMOCOCCAL VACCINE: HCPCS | Performed by: NURSE PRACTITIONER

## 2019-12-18 PROCEDURE — 99215 OFFICE O/P EST HI 40 MIN: CPT | Performed by: NURSE PRACTITIONER

## 2019-12-18 PROCEDURE — G8598 ASA/ANTIPLAT THER USED: HCPCS | Performed by: NURSE PRACTITIONER

## 2019-12-18 PROCEDURE — 4040F PNEUMOC VAC/ADMIN/RCVD: CPT | Performed by: NURSE PRACTITIONER

## 2019-12-18 RX ORDER — ERGOCALCIFEROL 1.25 MG/1
50000 CAPSULE ORAL WEEKLY
Qty: 12 CAPSULE | Refills: 1 | Status: SHIPPED | OUTPATIENT
Start: 2019-12-18 | End: 2019-12-30

## 2019-12-18 ASSESSMENT — ENCOUNTER SYMPTOMS
GASTROINTESTINAL NEGATIVE: 1
RESPIRATORY NEGATIVE: 1
EYES NEGATIVE: 1
SORE THROAT: 1

## 2019-12-30 ENCOUNTER — OFFICE VISIT (OUTPATIENT)
Dept: GASTROENTEROLOGY | Age: 64
End: 2019-12-30
Payer: MEDICARE

## 2019-12-30 VITALS
DIASTOLIC BLOOD PRESSURE: 60 MMHG | WEIGHT: 270 LBS | SYSTOLIC BLOOD PRESSURE: 105 MMHG | HEIGHT: 72 IN | HEART RATE: 81 BPM | OXYGEN SATURATION: 95 % | BODY MASS INDEX: 36.57 KG/M2

## 2019-12-30 DIAGNOSIS — Z86.010 HISTORY OF ADENOMATOUS POLYP OF COLON: ICD-10-CM

## 2019-12-30 DIAGNOSIS — K21.9 CHRONIC GERD: Primary | ICD-10-CM

## 2019-12-30 DIAGNOSIS — Z80.0 FAMILY HISTORY OF COLON CANCER: ICD-10-CM

## 2019-12-30 DIAGNOSIS — R14.2 BELCHING: ICD-10-CM

## 2019-12-30 PROCEDURE — 1123F ACP DISCUSS/DSCN MKR DOCD: CPT | Performed by: NURSE PRACTITIONER

## 2019-12-30 PROCEDURE — 4040F PNEUMOC VAC/ADMIN/RCVD: CPT | Performed by: NURSE PRACTITIONER

## 2019-12-30 PROCEDURE — 99214 OFFICE O/P EST MOD 30 MIN: CPT | Performed by: NURSE PRACTITIONER

## 2019-12-30 PROCEDURE — G8482 FLU IMMUNIZE ORDER/ADMIN: HCPCS | Performed by: NURSE PRACTITIONER

## 2019-12-30 PROCEDURE — G8427 DOCREV CUR MEDS BY ELIG CLIN: HCPCS | Performed by: NURSE PRACTITIONER

## 2019-12-30 PROCEDURE — 3017F COLORECTAL CA SCREEN DOC REV: CPT | Performed by: NURSE PRACTITIONER

## 2019-12-30 PROCEDURE — G8598 ASA/ANTIPLAT THER USED: HCPCS | Performed by: NURSE PRACTITIONER

## 2019-12-30 PROCEDURE — 1036F TOBACCO NON-USER: CPT | Performed by: NURSE PRACTITIONER

## 2019-12-30 PROCEDURE — G8417 CALC BMI ABV UP PARAM F/U: HCPCS | Performed by: NURSE PRACTITIONER

## 2019-12-30 ASSESSMENT — ENCOUNTER SYMPTOMS
BACK PAIN: 0
CONSTIPATION: 0
RECTAL PAIN: 0
ABDOMINAL PAIN: 0
DIARRHEA: 0
ABDOMINAL DISTENTION: 0
NAUSEA: 0
SHORTNESS OF BREATH: 0
COUGH: 0
BLOOD IN STOOL: 0
CHEST TIGHTNESS: 0
VOMITING: 0
VOICE CHANGE: 0
SORE THROAT: 0

## 2020-01-02 ENCOUNTER — OFFICE VISIT (OUTPATIENT)
Dept: ENDOCRINOLOGY | Facility: CLINIC | Age: 65
End: 2020-01-02

## 2020-01-02 VITALS
WEIGHT: 274.7 LBS | DIASTOLIC BLOOD PRESSURE: 78 MMHG | SYSTOLIC BLOOD PRESSURE: 128 MMHG | HEIGHT: 72 IN | BODY MASS INDEX: 37.21 KG/M2 | OXYGEN SATURATION: 98 % | HEART RATE: 82 BPM

## 2020-01-02 DIAGNOSIS — Z79.4 TYPE 2 DIABETES MELLITUS WITH HYPERGLYCEMIA, WITH LONG-TERM CURRENT USE OF INSULIN (HCC): Primary | ICD-10-CM

## 2020-01-02 DIAGNOSIS — E11.65 TYPE 2 DIABETES MELLITUS WITH HYPERGLYCEMIA, WITH LONG-TERM CURRENT USE OF INSULIN (HCC): Primary | ICD-10-CM

## 2020-01-02 PROCEDURE — 95250 CONT GLUC MNTR PHYS/QHP EQP: CPT | Performed by: NURSE PRACTITIONER

## 2020-01-02 PROCEDURE — 99214 OFFICE O/P EST MOD 30 MIN: CPT | Performed by: NURSE PRACTITIONER

## 2020-01-02 NOTE — PROGRESS NOTES
" Mick Sims is a 64 y.o. male who presents for follow up   Chief Complaint   Patient presents with   • Diabetes       Referring provider     Primary Care Provider    Bimal Lee MD    Duration 10 years    Timing - Diabetes is Constant    Quality -  needs improvement    Severity -  severe    Complications - nephropathy, retinopathy, peripheral neuropathy and cardiovascular disease    Current symptoms/problems  paresthesia of the feet and visual disturbances     Alleviating Factors: Compliance       Side Effects  none    Current diet  in general, a \"healthy\" diet      Current exercise none    Current monitoring regimen: home blood tests - checking 4 x daily     Home blood sugar records:     Hypoglycemia unawareness and nocturnal, none since last visit       Past Medical History:   Diagnosis Date   • Arthritis    • Diabetes mellitus (CMS/HCC)    • Hypertension    • Renal disorder      Family History   Problem Relation Age of Onset   • Cancer Mother    • Diabetes Sister    • Diabetes Brother    • No Known Problems Brother      Social History     Tobacco Use   • Smoking status: Never Smoker   • Smokeless tobacco: Never Used   Substance Use Topics   • Alcohol use: No   • Drug use: Not on file         Current Outpatient Medications:   •  aspirin 81 MG chewable tablet, Chew 81 mg Daily., Disp: , Rfl:   •  atorvastatin (LIPITOR) 80 MG tablet, Take 80 mg by mouth Daily., Disp: , Rfl:   •  Canagliflozin (INVOKANA) 100 MG tablet, 100 mg po daily, Disp: 30 tablet, Rfl: 11  •  fluticasone (FLONASE) 50 MCG/ACT nasal spray, 1 spray into each nostril 2 (Two) Times a Day As Needed for rhinitis or allergies., Disp: , Rfl:   •  furosemide (LASIX) 40 MG tablet, Take 40 mg by mouth 2 (Two) Times a Day., Disp: , Rfl:   •  gabapentin (NEURONTIN) 100 MG capsule, Take 100 mg by mouth Every Night., Disp: , Rfl:   •  gabapentin (NEURONTIN) 300 MG capsule, Take 300 mg by mouth 3 (Three) Times a Day., Disp: , Rfl:   •  " guaiFENesin (MUCINEX) 600 MG 12 hr tablet, Take 2 tablets by mouth 2 (Two) Times a Day., Disp: 20 tablet, Rfl: 0  •  hydrochlorothiazide (HYDRODIURIL) 25 MG tablet, Take 12.5 mg by mouth Every Night. In addition to avalide, Disp: , Rfl:   •  Insulin Glargine, 2 Unit Dial, (TOUJEO MAX SOLOSTAR) 300 UNIT/ML solution pen-injector injection, Inject 80 Units under the skin into the appropriate area as directed Daily., Disp: 3 pen, Rfl: 11  •  Insulin Lispro (HUMALOG KWIKPEN) 200 UNIT/ML solution pen-injector, Inject 30 Units under the skin into the appropriate area as directed 3 (Three) Times a Day With Meals., Disp: 5 pen, Rfl: 11  •  irbesartan (AVAPRO) 150 MG tablet, Take 150 mg by mouth Every Night., Disp: , Rfl:   •  lansoprazole (PREVACID) 30 MG capsule, Take 30 mg by mouth Every Night., Disp: , Rfl:   •  metFORMIN (GLUCOPHAGE) 1000 MG tablet, Take 1,000 mg by mouth 2 (Two) Times a Day. Needs pm dose, Disp: , Rfl:   •  metoprolol tartrate (LOPRESSOR) 50 MG tablet, Take 1 tablet by mouth 2 (Two) Times a Day., Disp: 30 tablet, Rfl: 0  •  montelukast (SINGULAIR) 10 MG tablet, Take 10 mg by mouth At Night As Needed (for allergies)., Disp: , Rfl:   •  Multiple Vitamins-Minerals (CENTRUM SILVER 50+MEN PO), Take 1 tablet by mouth Daily., Disp: , Rfl:   •  nitroglycerin (NITRODUR) 0.4 MG/HR patch, Place 1 patch on the skin as directed by provider Daily., Disp: , Rfl:   •  prasugrel (EFFIENT) 10 MG tablet, Take 10 mg by mouth Daily., Disp: , Rfl:   •  raNITIdine (ZANTAC) 75 MG tablet, Take 75 mg by mouth Daily As Needed for indigestion or heartburn., Disp: , Rfl:   •  Semaglutide,0.25 or 0.5MG/DOS, (OZEMPIC, 0.25 OR 0.5 MG/DOSE,) 2 MG/1.5ML solution pen-injector, Inject 0.5 mg under the skin into the appropriate area as directed 1 (One) Time Per Week. 0.5 mg weekly, Disp: 1 pen, Rfl: 11  •  vitamin D (ERGOCALCIFEROL) 1.25 MG (55531 UT) capsule capsule, Take 50,000 Units by mouth 1 (One) Time Per Week., Disp: , Rfl:  "    Review of Systems    Review of Systems   Constitutional: Negative for activity change, appetite change, chills, diaphoresis, fatigue, fever and unexpected weight change.   HENT: Negative for congestion, dental problem, drooling, ear discharge, ear pain, facial swelling, mouth sores, postnasal drip, rhinorrhea, sinus pressure, sore throat, tinnitus, trouble swallowing and voice change.    Eyes: Negative for photophobia, pain, discharge, redness, itching and visual disturbance.   Respiratory: Negative for apnea, cough, choking, chest tightness, shortness of breath, wheezing and stridor.    Cardiovascular: Negative for chest pain, palpitations and leg swelling.   Gastrointestinal: Negative for abdominal distention, abdominal pain, constipation, diarrhea, nausea and vomiting.   Endocrine: Negative for cold intolerance, heat intolerance, polydipsia, polyphagia and polyuria.   Genitourinary: Negative for decreased urine volume, difficulty urinating, dysuria, flank pain, frequency, hematuria and urgency.   Musculoskeletal: Negative for arthralgias, back pain, gait problem, joint swelling, myalgias, neck pain and neck stiffness.   Skin: Negative for color change, pallor, rash and wound.   Allergic/Immunologic: Negative for immunocompromised state.   Neurological: Negative for dizziness, tremors, seizures, syncope, facial asymmetry, speech difficulty, weakness, light-headedness, numbness and headaches.   Hematological: Negative for adenopathy.   Psychiatric/Behavioral: Negative for agitation, behavioral problems, confusion, decreased concentration, dysphoric mood, hallucinations, self-injury, sleep disturbance and suicidal ideas. The patient is not nervous/anxious and is not hyperactive.         Objective:   /78   Pulse 82   Ht 182.9 cm (72\")   Wt 125 kg (274 lb 11.2 oz)   SpO2 98%   BMI 37.26 kg/m²     Physical Exam   Constitutional: He is oriented to person, place, and time. He appears well-developed and " well-nourished. He is cooperative.   HENT:   Head: Normocephalic and atraumatic.   Right Ear: External ear normal.   Left Ear: External ear normal.   Nose: Nose normal.   Mouth/Throat: Oropharynx is clear and moist. No oropharyngeal exudate.   Eyes: Pupils are equal, round, and reactive to light. Conjunctivae and EOM are normal. No scleral icterus. Right eye exhibits normal extraocular motion. Left eye exhibits normal extraocular motion.   Neck: Neck supple. No JVD present. No muscular tenderness present. No tracheal deviation, no edema and no erythema present. No thyromegaly present.   Cardiovascular: Normal rate, regular rhythm, normal heart sounds and intact distal pulses. Exam reveals no gallop and no friction rub.   No murmur heard.  Pulmonary/Chest: Effort normal and breath sounds normal. No stridor. No respiratory distress. He has no decreased breath sounds. He has no wheezes. He has no rhonchi. He has no rales. He exhibits no tenderness.   Abdominal: Soft. Bowel sounds are normal. He exhibits no distension and no mass. There is no hepatomegaly. There is no tenderness. There is no rebound and no guarding. No hernia.   Musculoskeletal: Normal range of motion. He exhibits no edema, tenderness or deformity.   Lymphadenopathy:     He has no cervical adenopathy.   Neurological: He is alert and oriented to person, place, and time. He has normal reflexes. No cranial nerve deficit. He exhibits normal muscle tone. Coordination normal.   Skin: Skin is warm. No rash noted. No erythema. No pallor.   Psychiatric: He has a normal mood and affect. His behavior is normal. Judgment and thought content normal.   Nursing note and vitals reviewed.      Lab Review          Assessment/Plan       ICD-10-CM ICD-9-CM   1. Type 2 diabetes mellitus with hyperglycemia, with long-term current use of insulin (CMS/[facility]) E11.65 250.00    Z79.4 790.29     V58.67       Pt has type 2 diabetes w ckd and cad     Glycemic Management:   Lab Results    Component Value Date    HGBA1C 10.2 (H) 12/13/2019    HGBA1C 8.8 (H) 12/18/2018    HGBA1C 9.1 (H) 09/18/2018     Lab Results   Component Value Date    GLUCOSE 199 (H) 12/13/2019    BUN 31 (H) 12/13/2019    CREATININE 1.6 (H) 12/13/2019    EGFRIFNONA 43 (A) 12/13/2019    BCR 19.0 04/26/2018    K 4.8 12/13/2019    CO2 22 12/13/2019    CALCIUM 10.0 12/13/2019    ALBUMIN 4 12/13/2019    AST 12 12/13/2019    ALT 15 12/13/2019    ANIONGAP 15 12/13/2019     Lab Results   Component Value Date    WBC 7.7 12/13/2019    HGB 13.3 (L) 12/13/2019    HCT 42.9 12/13/2019    MCV 87.7 12/13/2019     12/13/2019   Lantus --- 50 units twice daily     Decrease to 50 units once daily     Compare bedtime and waking sugar without eating in between     If you successfully go to bed in the 100s ------   You should wake up within 40 points of the reading that you went to bed with       Example     If you go bed 150 --- you should wake up between 110 to 190     If you drop more than 40 points --- back off 5 more units    If you rise more than 40 points -- increase by 3 units every 3 days     Eventually we will change lantus to Toujeo since it is a better quality of long acting insulin     We will switch to Toujeo -45 units --      ========================================    Ozempic    Start with 0.25 mg weekly for 4 weeks then increase to 0.5 mg weekly     Now taking 0.5mg     Main side effect = 15% nausea     On avg people lose between 10 to 15 lb     This medicine releases your own insulin, it takes about 6 weeks to fully build in your system and once it does it has the potency of about 40 units of insulin.     ============================================    Metformin 1000 mg twice a day ===     ============================================    Add Invokana 100mg  daily , this medicine helps to urinate sugar out     ============================================    Humalog     Take 3 units for every 15 grams above 60 grams as follows :      0 to 60 grams - nothing  61-75 grams - 3units  76-90 grams - 6units   grams - 9 untis  Above 105 - 12 units     The goal is to be less than 180 , 2 hours after eating            Return in about 3 months (around 4/2/2020), or if symptoms worsen or fail to improve.        This document has been electronically signed by AME Rolon on January 2, 2020 2:34 PM          A copy of my note was sent to Bimal Lee MD    Please see my above opinion and suggestions.

## 2020-01-02 NOTE — PATIENT INSTRUCTIONS
Compare bedtime and waking sugar without eating in between     If you successfully go to bed in the 100s ------   You should wake up within 40 points of the reading that you went to bed with       Example     If you go bed 150 --- you should wake up between 110 to 190     If you drop more than 40 points --- back off 5 more units    If you rise more than 40 points -- increase by 3 units every 3 days     Eventually we will change lantus to Toujeo since it is a better quality of long acting insulin     We will switch to Toujeo -45 units --      ========================================    Ozempic    Start with 0.25 mg weekly for 4 weeks then increase to 0.5 mg weekly     Now taking 0.5mg     Main side effect = 15% nausea     On avg people lose between 10 to 15 lb     This medicine releases your own insulin, it takes about 6 weeks to fully build in your system     ============================================    Metformin 1000 mg twice a day ===     ============================================    Add Invokana 100mg  daily , this medicine helps to urinate sugar out     ============================================    Humalog     Take 3 units for every 15 grams above 60 grams as follows :     0 to 60 grams - nothing  61-75 grams - 3units  76-90 grams - 6units   grams - 9 untis  Above 105 - 12 units     The goal is to be less than 180 , 2 hours after eating

## 2020-01-28 RX ORDER — PRASUGREL 10 MG/1
TABLET, FILM COATED ORAL
Qty: 90 TABLET | Refills: 3 | Status: SHIPPED | OUTPATIENT
Start: 2020-01-28 | End: 2020-12-29

## 2020-02-11 ENCOUNTER — OFFICE VISIT (OUTPATIENT)
Dept: CARDIOLOGY | Age: 65
End: 2020-02-11
Payer: MEDICARE

## 2020-02-11 VITALS
WEIGHT: 264 LBS | HEIGHT: 72 IN | BODY MASS INDEX: 35.76 KG/M2 | HEART RATE: 81 BPM | DIASTOLIC BLOOD PRESSURE: 72 MMHG | SYSTOLIC BLOOD PRESSURE: 126 MMHG

## 2020-02-11 PROCEDURE — 4040F PNEUMOC VAC/ADMIN/RCVD: CPT | Performed by: INTERNAL MEDICINE

## 2020-02-11 PROCEDURE — 99212 OFFICE O/P EST SF 10 MIN: CPT | Performed by: INTERNAL MEDICINE

## 2020-02-11 PROCEDURE — G8417 CALC BMI ABV UP PARAM F/U: HCPCS | Performed by: INTERNAL MEDICINE

## 2020-02-11 PROCEDURE — 1123F ACP DISCUSS/DSCN MKR DOCD: CPT | Performed by: INTERNAL MEDICINE

## 2020-02-11 PROCEDURE — G8482 FLU IMMUNIZE ORDER/ADMIN: HCPCS | Performed by: INTERNAL MEDICINE

## 2020-02-11 PROCEDURE — G8427 DOCREV CUR MEDS BY ELIG CLIN: HCPCS | Performed by: INTERNAL MEDICINE

## 2020-02-11 PROCEDURE — 1036F TOBACCO NON-USER: CPT | Performed by: INTERNAL MEDICINE

## 2020-02-11 PROCEDURE — 3017F COLORECTAL CA SCREEN DOC REV: CPT | Performed by: INTERNAL MEDICINE

## 2020-02-11 RX ORDER — CANAGLIFLOZIN 100 MG/1
100 TABLET, FILM COATED ORAL DAILY
COMMUNITY
Start: 2019-12-17 | End: 2022-10-18 | Stop reason: SDUPTHER

## 2020-02-11 NOTE — PROGRESS NOTES
educated the patient on this problem / risk factor. I will personally continue and manage prescribed medications and monitor the course of the therapy. No Continue current medications:    {Yes           3. yperlipidemia  show no change   Managed by Hortencia Hallman No Continue current medications:       Yes         Discussed with patient and spouse. Follow Up Visit Scheduled for:  6 month(s)    I greatly appreciate the opportunity to meet Macario Vieyra and your confidence in allowing me to participate in his cardiovascular care. [facility] [address] Jacek Burrows [place] am scribing for and in the presence of MEDARDO Cristina MD,[facility]. Jacek Burrows MA    2/11/20 11:21 AM      IMEDARDO MD, [facility] [place], personally performed the services described in this documentation as scribed by Jacek Burrows in my presence, and it is both accurate and complete. Electronically signed by Thien Medrano.  Kristine Cristina MD, [facility] [place]    2/11/20 11:26 AM

## 2020-02-24 ENCOUNTER — OFFICE VISIT (OUTPATIENT)
Dept: ENDOCRINOLOGY | Facility: CLINIC | Age: 65
End: 2020-02-24

## 2020-02-24 VITALS
HEIGHT: 72 IN | OXYGEN SATURATION: 98 % | WEIGHT: 262.4 LBS | DIASTOLIC BLOOD PRESSURE: 78 MMHG | BODY MASS INDEX: 35.54 KG/M2 | HEART RATE: 84 BPM | SYSTOLIC BLOOD PRESSURE: 136 MMHG

## 2020-02-24 DIAGNOSIS — E11.65 TYPE 2 DIABETES MELLITUS WITH HYPERGLYCEMIA, WITH LONG-TERM CURRENT USE OF INSULIN (HCC): Primary | ICD-10-CM

## 2020-02-24 DIAGNOSIS — E11.69 MIXED DIABETIC HYPERLIPIDEMIA ASSOCIATED WITH TYPE 2 DIABETES MELLITUS (HCC): ICD-10-CM

## 2020-02-24 DIAGNOSIS — I25.10 CORONARY ARTERY DISEASE INVOLVING NATIVE HEART WITHOUT ANGINA PECTORIS, UNSPECIFIED VESSEL OR LESION TYPE: ICD-10-CM

## 2020-02-24 DIAGNOSIS — E78.2 MIXED DIABETIC HYPERLIPIDEMIA ASSOCIATED WITH TYPE 2 DIABETES MELLITUS (HCC): ICD-10-CM

## 2020-02-24 DIAGNOSIS — I15.2 HYPERTENSION ASSOCIATED WITH DIABETES (HCC): ICD-10-CM

## 2020-02-24 DIAGNOSIS — E11.42 DIABETIC POLYNEUROPATHY ASSOCIATED WITH TYPE 2 DIABETES MELLITUS (HCC): ICD-10-CM

## 2020-02-24 DIAGNOSIS — Z79.4 TYPE 2 DIABETES MELLITUS WITH HYPERGLYCEMIA, WITH LONG-TERM CURRENT USE OF INSULIN (HCC): Primary | ICD-10-CM

## 2020-02-24 DIAGNOSIS — E55.9 VITAMIN D DEFICIENCY: ICD-10-CM

## 2020-02-24 DIAGNOSIS — E11.59 HYPERTENSION ASSOCIATED WITH DIABETES (HCC): ICD-10-CM

## 2020-02-24 PROCEDURE — 95249 CONT GLUC MNTR PT PROV EQP: CPT | Performed by: INTERNAL MEDICINE

## 2020-02-24 PROCEDURE — 99214 OFFICE O/P EST MOD 30 MIN: CPT | Performed by: INTERNAL MEDICINE

## 2020-02-24 NOTE — PROGRESS NOTES
" Mick Sims is a 64 y.o. male who presents for follow up   Chief Complaint   Patient presents with   • Diabetes     change Toujeo to  units       Referring provider     Primary Care Provider    Bimal Lee MD    Duration 10 years    Timing - Diabetes is Constant    Quality -  needs improvement    Severity -  severe    Complications - nephropathy, retinopathy, peripheral neuropathy and cardiovascular disease    Current symptoms/problems  paresthesia of the feet and visual disturbances     Alleviating Factors: Compliance       Side Effects  none    Current diet  in general, a \"healthy\" diet      Current exercise none    Current monitoring regimen: home blood tests - checking 4 x daily     Home blood sugar records:     Fercho personally reviewed    From Feb 11 to Feb 24    avg 163    In range 64%    Lows 1%      Hypoglycemia unawareness and nocturnal, none since last visit       Past Medical History:   Diagnosis Date   • Arthritis    • Diabetes mellitus (CMS/HCC)    • Hypertension    • Renal disorder      Family History   Problem Relation Age of Onset   • Cancer Mother    • Diabetes Sister    • Diabetes Brother    • No Known Problems Brother      Social History     Tobacco Use   • Smoking status: Never Smoker   • Smokeless tobacco: Never Used   Substance Use Topics   • Alcohol use: No   • Drug use: Not on file         Current Outpatient Medications:   •  aspirin 81 MG chewable tablet, Chew 81 mg Daily., Disp: , Rfl:   •  atorvastatin (LIPITOR) 80 MG tablet, Take 80 mg by mouth Daily., Disp: , Rfl:   •  Canagliflozin (INVOKANA) 100 MG tablet, 100 mg po daily, Disp: 30 tablet, Rfl: 11  •  fluticasone (FLONASE) 50 MCG/ACT nasal spray, 1 spray into each nostril 2 (Two) Times a Day As Needed for rhinitis or allergies., Disp: , Rfl:   •  furosemide (LASIX) 40 MG tablet, Take 40 mg by mouth 2 (Two) Times a Day., Disp: , Rfl:   •  gabapentin (NEURONTIN) 100 MG capsule, Take 100 mg by mouth Every Night., " Disp: , Rfl:   •  gabapentin (NEURONTIN) 300 MG capsule, Take 300 mg by mouth 3 (Three) Times a Day., Disp: , Rfl:   •  guaiFENesin (MUCINEX) 600 MG 12 hr tablet, Take 2 tablets by mouth 2 (Two) Times a Day., Disp: 20 tablet, Rfl: 0  •  hydrochlorothiazide (HYDRODIURIL) 25 MG tablet, Take 12.5 mg by mouth Every Night. In addition to avalide, Disp: , Rfl:   •  Insulin Glargine, 2 Unit Dial, (TOUJEO MAX SOLOSTAR) 300 UNIT/ML solution pen-injector injection, Inject 80 Units under the skin into the appropriate area as directed Daily., Disp: 3 pen, Rfl: 11  •  Insulin Lispro (HUMALOG KWIKPEN) 200 UNIT/ML solution pen-injector, Inject 30 Units under the skin into the appropriate area as directed 3 (Three) Times a Day With Meals. (Patient taking differently: Inject 40 Units under the skin into the appropriate area as directed 3 (Three) Times a Day With Meals.), Disp: 5 pen, Rfl: 11  •  irbesartan (AVAPRO) 150 MG tablet, Take 150 mg by mouth Every Night., Disp: , Rfl:   •  lansoprazole (PREVACID) 30 MG capsule, Take 30 mg by mouth Every Night., Disp: , Rfl:   •  metFORMIN (GLUCOPHAGE) 1000 MG tablet, Take 1,000 mg by mouth 2 (Two) Times a Day. Needs pm dose, Disp: , Rfl:   •  metoprolol tartrate (LOPRESSOR) 50 MG tablet, Take 1 tablet by mouth 2 (Two) Times a Day., Disp: 30 tablet, Rfl: 0  •  montelukast (SINGULAIR) 10 MG tablet, Take 10 mg by mouth At Night As Needed (for allergies)., Disp: , Rfl:   •  Multiple Vitamins-Minerals (CENTRUM SILVER 50+MEN PO), Take 1 tablet by mouth Daily., Disp: , Rfl:   •  nitroglycerin (NITRODUR) 0.4 MG/HR patch, Place 1 patch on the skin as directed by provider Daily., Disp: , Rfl:   •  prasugrel (EFFIENT) 10 MG tablet, Take 10 mg by mouth Daily., Disp: , Rfl:   •  raNITIdine (ZANTAC) 75 MG tablet, Take 75 mg by mouth Daily As Needed for indigestion or heartburn., Disp: , Rfl:   •  Semaglutide,0.25 or 0.5MG/DOS, (OZEMPIC, 0.25 OR 0.5 MG/DOSE,) 2 MG/1.5ML solution pen-injector, Inject 0.5  mg under the skin into the appropriate area as directed 1 (One) Time Per Week. 0.5 mg weekly, Disp: 1 pen, Rfl: 11  •  vitamin D (ERGOCALCIFEROL) 1.25 MG (94491 UT) capsule capsule, Take 50,000 Units by mouth 1 (One) Time Per Week., Disp: , Rfl:     Review of Systems    Review of Systems   Constitutional: Negative for activity change, appetite change, chills, diaphoresis, fatigue, fever and unexpected weight change.   HENT: Negative for congestion, dental problem, drooling, ear discharge, ear pain, facial swelling, mouth sores, postnasal drip, rhinorrhea, sinus pressure, sore throat, tinnitus, trouble swallowing and voice change.    Eyes: Negative for photophobia, pain, discharge, redness, itching and visual disturbance.   Respiratory: Negative for apnea, cough, choking, chest tightness, shortness of breath, wheezing and stridor.    Cardiovascular: Negative for chest pain, palpitations and leg swelling.   Gastrointestinal: Negative for abdominal distention, abdominal pain, constipation, diarrhea, nausea and vomiting.   Endocrine: Negative for cold intolerance, heat intolerance, polydipsia, polyphagia and polyuria.   Genitourinary: Negative for decreased urine volume, difficulty urinating, dysuria, flank pain, frequency, hematuria and urgency.   Musculoskeletal: Negative for arthralgias, back pain, gait problem, joint swelling, myalgias, neck pain and neck stiffness.   Skin: Negative for color change, pallor, rash and wound.   Allergic/Immunologic: Negative for immunocompromised state.   Neurological: Negative for dizziness, tremors, seizures, syncope, facial asymmetry, speech difficulty, weakness, light-headedness, numbness and headaches.   Hematological: Negative for adenopathy.   Psychiatric/Behavioral: Negative for agitation, behavioral problems, confusion, decreased concentration, dysphoric mood, hallucinations, self-injury, sleep disturbance and suicidal ideas. The patient is not nervous/anxious and is not  "hyperactive.         Objective:   /78   Pulse 84   Ht 182.9 cm (72\")   Wt 119 kg (262 lb 6.4 oz)   SpO2 98%   BMI 35.59 kg/m²     Physical Exam   Constitutional: He is oriented to person, place, and time. He appears well-developed and well-nourished. He is cooperative.   HENT:   Head: Normocephalic and atraumatic.   Right Ear: External ear normal.   Left Ear: External ear normal.   Nose: Nose normal.   Mouth/Throat: Oropharynx is clear and moist. No oropharyngeal exudate.   Eyes: Pupils are equal, round, and reactive to light. Conjunctivae and EOM are normal. No scleral icterus. Right eye exhibits normal extraocular motion. Left eye exhibits normal extraocular motion.   Neck: Neck supple. No JVD present. No muscular tenderness present. No tracheal deviation, no edema and no erythema present. No thyromegaly present.   Cardiovascular: Normal rate, regular rhythm, normal heart sounds and intact distal pulses. Exam reveals no gallop and no friction rub.   No murmur heard.  Pulmonary/Chest: Effort normal and breath sounds normal. No stridor. No respiratory distress. He has no decreased breath sounds. He has no wheezes. He has no rhonchi. He has no rales. He exhibits no tenderness.   Abdominal: Soft. Bowel sounds are normal. He exhibits no distension and no mass. There is no hepatomegaly. There is no tenderness. There is no rebound and no guarding. No hernia.   Musculoskeletal: Normal range of motion. He exhibits no edema, tenderness or deformity.   Lymphadenopathy:     He has no cervical adenopathy.   Neurological: He is alert and oriented to person, place, and time. He has normal reflexes. No cranial nerve deficit. He exhibits normal muscle tone. Coordination normal.   Skin: Skin is warm. No rash noted. No erythema. No pallor.   Psychiatric: He has a normal mood and affect. His behavior is normal. Judgment and thought content normal.   Nursing note and vitals reviewed.      Lab Review          Assessment/Plan "       ICD-10-CM ICD-9-CM   1. Type 2 diabetes mellitus with hyperglycemia, with long-term current use of insulin (CMS/HCC) E11.65 250.00    Z79.4 790.29     V58.67   2. Hypertension associated with diabetes (CMS/HCC) E11.59 250.80    I10 401.9   3. Mixed diabetic hyperlipidemia associated with type 2 diabetes mellitus (CMS/HCC) E11.69 250.80    E78.2 272.2   4. Vitamin D deficiency E55.9 268.9   5. Coronary artery disease involving native heart without angina pectoris, unspecified vessel or lesion type I25.10 414.01   6. Diabetic polyneuropathy associated with type 2 diabetes mellitus (CMS/[facility]) E11.42 250.60     357.2       Pt has type 2 diabetes w ckd and cad     Glycemic Management:   Lab Results   Component Value Date    HGBA1C 10.2 (H) 12/13/2019    HGBA1C 8.8 (H) 12/18/2018    HGBA1C 9.1 (H) 09/18/2018     Lab Results   Component Value Date    GLUCOSE 199 (H) 12/13/2019    BUN 31 (H) 12/13/2019    CREATININE 1.6 (H) 12/13/2019    EGFRIFNONA 43 (A) 12/13/2019    BCR 19.0 04/26/2018    K 4.8 12/13/2019    CO2 22 12/13/2019    CALCIUM 10.0 12/13/2019    ALBUMIN 4 12/13/2019    AST 12 12/13/2019    ALT 15 12/13/2019    ANIONGAP 15 12/13/2019     Lab Results   Component Value Date    WBC 7.7 12/13/2019    HGB 13.3 (L) 12/13/2019    HCT 42.9 12/13/2019    MCV 87.7 12/13/2019     12/13/2019       toujeo 40 -- this seems to be working     ========================================    Ozempic      0.5 mg weekly      ============================================    Metformin 1000 mg twice a day ===     ============================================      Invokana 100mg  daily      ============================================    Humalog     4 units per 15 grams        ======================      bp controlled on irbesartan     Lipids controlled on lipitor    This document has been electronically signed by Juancho Mobley MD on February 24, 2020 11:20 AM          A copy of my note was sent to Jesus  Bimal Ohara MD    Please see my above opinion and suggestions.

## 2020-03-02 ENCOUNTER — TELEPHONE (OUTPATIENT)
Dept: ENDOCRINOLOGY | Facility: CLINIC | Age: 65
End: 2020-03-02

## 2020-03-02 NOTE — TELEPHONE ENCOUNTER
Patient says the pharmacy still has not received a script for shorter needles and is going to  this afternoon, is needing     Thank You

## 2020-03-03 ENCOUNTER — TELEPHONE (OUTPATIENT)
Dept: ENDOCRINOLOGY | Facility: CLINIC | Age: 65
End: 2020-03-03

## 2020-03-03 RX ORDER — PEN NEEDLE, DIABETIC 30 GX3/16"
1 NEEDLE, DISPOSABLE MISCELLANEOUS 4 TIMES DAILY
Qty: 400 EACH | Refills: 3 | Status: SHIPPED | OUTPATIENT
Start: 2020-03-03 | End: 2020-03-09 | Stop reason: SDUPTHER

## 2020-03-09 ENCOUNTER — TELEPHONE (OUTPATIENT)
Dept: ENDOCRINOLOGY | Facility: CLINIC | Age: 65
End: 2020-03-09

## 2020-03-09 RX ORDER — PEN NEEDLE, DIABETIC 30 GX3/16"
1 NEEDLE, DISPOSABLE MISCELLANEOUS 4 TIMES DAILY
Qty: 400 EACH | Refills: 3 | Status: SHIPPED | OUTPATIENT
Start: 2020-03-09 | End: 2020-03-10 | Stop reason: SDUPTHER

## 2020-03-09 NOTE — TELEPHONE ENCOUNTER
VM LEFT THAT PATIENT CAN NOT GET PEN NEEDLES REFILLED BECAUSE OF THE DIRECTIONS?     ALSO STATES THAT HE NEEDS A LETTER OF MEDICAL NECESSITY FOR HIS HUMALOG?     CALL PATIENT [phone]

## 2020-03-10 ENCOUNTER — TELEPHONE (OUTPATIENT)
Dept: ENDOCRINOLOGY | Facility: CLINIC | Age: 65
End: 2020-03-10

## 2020-03-10 RX ORDER — PEN NEEDLE, DIABETIC 30 GX3/16"
1 NEEDLE, DISPOSABLE MISCELLANEOUS 4 TIMES DAILY
Qty: 400 EACH | Refills: 3 | Status: SHIPPED | OUTPATIENT
Start: 2020-03-10 | End: 2020-03-10 | Stop reason: SDUPTHER

## 2020-03-10 RX ORDER — PEN NEEDLE, DIABETIC 30 GX3/16"
1 NEEDLE, DISPOSABLE MISCELLANEOUS 4 TIMES DAILY
Qty: 400 EACH | Refills: 3 | Status: SHIPPED | OUTPATIENT
Start: 2020-03-10

## 2020-03-10 NOTE — TELEPHONE ENCOUNTER
Pt called yesterday and meds were sent to wrong [facility] needs to be [address]     Needs to be humalog kwickpen and the shorter needles both with diagnosis code ( reason why needs Humalog) .    [facility] says cannot be transferred     Thank You

## 2020-03-13 ENCOUNTER — TELEPHONE (OUTPATIENT)
Dept: ENDOCRINOLOGY | Facility: CLINIC | Age: 65
End: 2020-03-13

## 2020-03-13 ENCOUNTER — TELEPHONE (OUTPATIENT)
Dept: PRIMARY CARE CLINIC | Age: 65
End: 2020-03-13

## 2020-03-13 RX ORDER — INSULIN LISPRO 100 [IU]/ML
90 INJECTION, SOLUTION INTRAVENOUS; SUBCUTANEOUS DAILY
Qty: 10 PEN | Refills: 5 | Status: SHIPPED | OUTPATIENT
Start: 2020-03-13 | End: 2020-12-02

## 2020-03-16 ENCOUNTER — TELEPHONE (OUTPATIENT)
Dept: ENDOCRINOLOGY | Facility: CLINIC | Age: 65
End: 2020-03-16

## 2020-03-17 ENCOUNTER — TELEPHONE (OUTPATIENT)
Dept: ENDOCRINOLOGY | Facility: CLINIC | Age: 65
End: 2020-03-17

## 2020-03-17 ENCOUNTER — TELEPHONE (OUTPATIENT)
Dept: PRIMARY CARE CLINIC | Age: 65
End: 2020-03-17

## 2020-03-17 RX ORDER — INSULIN ASPART 100 [IU]/ML
33 INJECTION, SOLUTION INTRAVENOUS; SUBCUTANEOUS
Qty: 5 PEN | Refills: 5 | Status: SHIPPED | OUTPATIENT
Start: 2020-03-17 | End: 2020-05-15

## 2020-03-17 RX ORDER — INSULIN LISPRO 100 [IU]/ML
40 INJECTION, SOLUTION INTRAVENOUS; SUBCUTANEOUS 3 TIMES DAILY
Qty: 42 ML | Refills: 11 | Status: SHIPPED | OUTPATIENT
Start: 2020-03-17 | End: 2021-03-02

## 2020-03-17 NOTE — TELEPHONE ENCOUNTER
Pt is having issues with insurance     Insurance says Novalog in place of humalog and pt wants to know if there is any side effects to change and was told use same as humalog     PCP will call it in if that is an acceptable change     Thank You

## 2020-03-18 ENCOUNTER — TELEPHONE (OUTPATIENT)
Dept: PRIMARY CARE CLINIC | Age: 65
End: 2020-03-18

## 2020-03-18 RX ORDER — INSULIN ASPART 100 [IU]/ML
33 INJECTION, SOLUTION INTRAVENOUS; SUBCUTANEOUS 3 TIMES DAILY
Qty: 5 PEN | Refills: 3 | Status: SHIPPED | OUTPATIENT
Start: 2020-03-18 | End: 2020-05-15 | Stop reason: SDUPTHER

## 2020-03-18 NOTE — TELEPHONE ENCOUNTER
Novolog was sent in for 5 pens monthly when he will need 10 pens monthly. I have called [facility] and gave the pharmacist a verbal to change this months Rx.

## 2020-04-14 RX ORDER — IRBESARTAN 150 MG/1
150 TABLET ORAL DAILY
Qty: 90 TABLET | Refills: 3 | Status: SHIPPED | OUTPATIENT
Start: 2020-04-14 | End: 2020-05-15 | Stop reason: SDUPTHER

## 2020-05-15 ENCOUNTER — VIRTUAL VISIT (OUTPATIENT)
Dept: PRIMARY CARE CLINIC | Age: 65
End: 2020-05-15
Payer: MEDICARE

## 2020-05-15 VITALS
BODY MASS INDEX: 34.04 KG/M2 | DIASTOLIC BLOOD PRESSURE: 70 MMHG | TEMPERATURE: 96.9 F | WEIGHT: 251 LBS | SYSTOLIC BLOOD PRESSURE: 112 MMHG

## 2020-05-15 PROCEDURE — 2022F DILAT RTA XM EVC RTNOPTHY: CPT | Performed by: NURSE PRACTITIONER

## 2020-05-15 PROCEDURE — G8428 CUR MEDS NOT DOCUMENT: HCPCS | Performed by: NURSE PRACTITIONER

## 2020-05-15 PROCEDURE — 3017F COLORECTAL CA SCREEN DOC REV: CPT | Performed by: NURSE PRACTITIONER

## 2020-05-15 PROCEDURE — 99214 OFFICE O/P EST MOD 30 MIN: CPT | Performed by: NURSE PRACTITIONER

## 2020-05-15 PROCEDURE — 4040F PNEUMOC VAC/ADMIN/RCVD: CPT | Performed by: NURSE PRACTITIONER

## 2020-05-15 PROCEDURE — 3046F HEMOGLOBIN A1C LEVEL >9.0%: CPT | Performed by: NURSE PRACTITIONER

## 2020-05-15 PROCEDURE — 1123F ACP DISCUSS/DSCN MKR DOCD: CPT | Performed by: NURSE PRACTITIONER

## 2020-05-15 RX ORDER — GABAPENTIN 300 MG/1
CAPSULE ORAL
Qty: 270 CAPSULE | Refills: 0 | Status: SHIPPED | OUTPATIENT
Start: 2020-05-15 | End: 2020-06-28

## 2020-05-15 RX ORDER — FLUTICASONE PROPIONATE 50 MCG
SPRAY, SUSPENSION (ML) NASAL
Qty: 3 BOTTLE | Refills: 1 | Status: SHIPPED | OUTPATIENT
Start: 2020-05-15 | End: 2020-11-22

## 2020-05-15 RX ORDER — INSULIN ASPART 100 [IU]/ML
33 INJECTION, SOLUTION INTRAVENOUS; SUBCUTANEOUS 3 TIMES DAILY
Qty: 10 PEN | Refills: 1 | Status: SHIPPED | OUTPATIENT
Start: 2020-05-15 | End: 2021-02-10

## 2020-05-15 RX ORDER — IRBESARTAN 150 MG/1
150 TABLET ORAL DAILY
Qty: 90 TABLET | Refills: 3 | Status: SHIPPED | OUTPATIENT
Start: 2020-05-15 | End: 2021-04-14

## 2020-05-15 ASSESSMENT — ENCOUNTER SYMPTOMS
GASTROINTESTINAL NEGATIVE: 1
EYES NEGATIVE: 1
RESPIRATORY NEGATIVE: 1

## 2020-05-18 ENCOUNTER — TELEMEDICINE (OUTPATIENT)
Dept: ENDOCRINOLOGY | Facility: CLINIC | Age: 65
End: 2020-05-18

## 2020-05-18 DIAGNOSIS — E11.59 HYPERTENSION ASSOCIATED WITH DIABETES (HCC): ICD-10-CM

## 2020-05-18 DIAGNOSIS — Z79.4 TYPE 2 DIABETES MELLITUS WITH HYPERGLYCEMIA, WITH LONG-TERM CURRENT USE OF INSULIN (HCC): Primary | ICD-10-CM

## 2020-05-18 DIAGNOSIS — E11.65 TYPE 2 DIABETES MELLITUS WITH HYPERGLYCEMIA, WITH LONG-TERM CURRENT USE OF INSULIN (HCC): Primary | ICD-10-CM

## 2020-05-18 DIAGNOSIS — I25.10 CORONARY ARTERY DISEASE INVOLVING NATIVE HEART WITHOUT ANGINA PECTORIS, UNSPECIFIED VESSEL OR LESION TYPE: ICD-10-CM

## 2020-05-18 DIAGNOSIS — I15.2 HYPERTENSION ASSOCIATED WITH DIABETES (HCC): ICD-10-CM

## 2020-05-18 DIAGNOSIS — E11.69 MIXED DIABETIC HYPERLIPIDEMIA ASSOCIATED WITH TYPE 2 DIABETES MELLITUS (HCC): ICD-10-CM

## 2020-05-18 DIAGNOSIS — E78.2 MIXED DIABETIC HYPERLIPIDEMIA ASSOCIATED WITH TYPE 2 DIABETES MELLITUS (HCC): ICD-10-CM

## 2020-05-18 DIAGNOSIS — E11.42 DIABETIC POLYNEUROPATHY ASSOCIATED WITH TYPE 2 DIABETES MELLITUS (HCC): ICD-10-CM

## 2020-05-18 DIAGNOSIS — E55.9 VITAMIN D DEFICIENCY: ICD-10-CM

## 2020-05-18 PROCEDURE — 99213 OFFICE O/P EST LOW 20 MIN: CPT | Performed by: INTERNAL MEDICINE

## 2020-05-18 NOTE — PROGRESS NOTES
" Mick Sims is a 64 y.o. male who presents for follow up   Chief Complaint   Patient presents with   • Diabetes                                       This was a Telehealth Encounter. Benefits and Disadvantages of a Telehealth Visit were discussed and accepted by patient. .  Patient agreed to receive service through Telehealth visit as patient is being compliant with social distancing recommendations imparted by CDC.     You have chosen to receive care through a telehealth visit.  Do you consent to use a video/audio connection for your medical care today? Yes        Referring provider     Primary Care Provider    Bimal Lee MD    Duration 10 years    Timing - Diabetes is Constant    Quality -  needs improvement - now at goal     Severity -  severe    Complications - nephropathy, retinopathy, peripheral neuropathy and cardiovascular disease    Current symptoms/problems  paresthesia of the feet and visual disturbances     Alleviating Factors: Compliance       Side Effects  none    Current diet  in general, a \"healthy\" diet      Current exercise none    Current monitoring regimen: home blood tests - checking 4 x daily     Home blood sugar records:     Refers avg of 140    Using brenna       Hypoglycemia unawareness and nocturnal, none since last visit       Past Medical History:   Diagnosis Date   • Arthritis    • Diabetes mellitus (CMS/[facility])    • Hypertension    • Renal disorder      Family History   Problem Relation Age of Onset   • Cancer Mother    • Diabetes Sister    • Diabetes Brother    • No Known Problems Brother      Social History     Tobacco Use   • Smoking status: Never Smoker   • Smokeless tobacco: Never Used   Substance Use Topics   • Alcohol use: No   • Drug use: Not on file         Current Outpatient Medications:   •  aspirin 81 MG chewable tablet, Chew 81 mg Daily., Disp: , Rfl:   •  atorvastatin (LIPITOR) 80 MG tablet, Take 80 mg by mouth Daily., Disp: , Rfl:   •  Canagliflozin " (INVOKANA) 100 MG tablet, 100 mg po daily, Disp: 30 tablet, Rfl: 11  •  fluticasone (FLONASE) 50 MCG/ACT nasal spray, 1 spray into each nostril 2 (Two) Times a Day As Needed for rhinitis or allergies., Disp: , Rfl:   •  furosemide (LASIX) 40 MG tablet, Take 40 mg by mouth 2 (Two) Times a Day., Disp: , Rfl:   •  gabapentin (NEURONTIN) 100 MG capsule, Take 100 mg by mouth Every Night., Disp: , Rfl:   •  gabapentin (NEURONTIN) 300 MG capsule, Take 300 mg by mouth 3 (Three) Times a Day., Disp: , Rfl:   •  guaiFENesin (MUCINEX) 600 MG 12 hr tablet, Take 2 tablets by mouth 2 (Two) Times a Day., Disp: 20 tablet, Rfl: 0  •  hydrochlorothiazide (HYDRODIURIL) 25 MG tablet, Take 12.5 mg by mouth Every Night. In addition to avalide, Disp: , Rfl:   •  Insulin Glargine, 2 Unit Dial, (TOUJEO MAX SOLOSTAR) 300 UNIT/ML solution pen-injector injection, Inject 80 Units under the skin into the appropriate area as directed Daily., Disp: 3 pen, Rfl: 11  •  Insulin Lispro, 1 Unit Dial, (HumaLOG KwikPen) 100 UNIT/ML solution pen-injector, Inject 40 Units under the skin into the appropriate area as directed 3 (Three) Times a Day., Disp: 42 mL, Rfl: 11  •  Insulin Pen Needle (PEN NEEDLES) 32G X 4 MM misc, 1 each 4 (Four) Times a Day. Use 4 times per day to inject insulin DX E11.9, Disp: 400 each, Rfl: 3  •  irbesartan (AVAPRO) 150 MG tablet, Take 150 mg by mouth Every Night., Disp: , Rfl:   •  lansoprazole (PREVACID) 30 MG capsule, Take 30 mg by mouth Every Night., Disp: , Rfl:   •  metFORMIN (GLUCOPHAGE) 1000 MG tablet, Take 1,000 mg by mouth 2 (Two) Times a Day. Needs pm dose, Disp: , Rfl:   •  metoprolol tartrate (LOPRESSOR) 50 MG tablet, Take 1 tablet by mouth 2 (Two) Times a Day., Disp: 30 tablet, Rfl: 0  •  montelukast (SINGULAIR) 10 MG tablet, Take 10 mg by mouth At Night As Needed (for allergies)., Disp: , Rfl:   •  Multiple Vitamins-Minerals (CENTRUM SILVER 50+MEN PO), Take 1 tablet by mouth Daily., Disp: , Rfl:   •  nitroglycerin  (NITRODUR) 0.4 MG/HR patch, Place 1 patch on the skin as directed by provider Daily., Disp: , Rfl:   •  prasugrel (EFFIENT) 10 MG tablet, Take 10 mg by mouth Daily., Disp: , Rfl:   •  raNITIdine (ZANTAC) 75 MG tablet, Take 75 mg by mouth Daily As Needed for indigestion or heartburn., Disp: , Rfl:   •  Semaglutide,0.25 or 0.5MG/DOS, (OZEMPIC, 0.25 OR 0.5 MG/DOSE,) 2 MG/1.5ML solution pen-injector, Inject 0.5 mg under the skin into the appropriate area as directed 1 (One) Time Per Week. 0.5 mg weekly, Disp: 1 pen, Rfl: 11  •  vitamin D (ERGOCALCIFEROL) 1.25 MG (44551 UT) capsule capsule, Take 50,000 Units by mouth 1 (One) Time Per Week., Disp: , Rfl:     Review of Systems    Review of Systems   Constitutional: Negative for activity change, appetite change, chills, diaphoresis, fatigue, fever and unexpected weight change.   HENT: Negative for congestion, dental problem, drooling, ear discharge, ear pain, facial swelling, mouth sores, postnasal drip, rhinorrhea, sinus pressure, sore throat, tinnitus, trouble swallowing and voice change.    Eyes: Negative for photophobia, pain, discharge, redness, itching and visual disturbance.   Respiratory: Negative for apnea, cough, choking, chest tightness, shortness of breath, wheezing and stridor.    Cardiovascular: Negative for chest pain, palpitations and leg swelling.   Gastrointestinal: Negative for abdominal distention, abdominal pain, constipation, diarrhea, nausea and vomiting.   Endocrine: Negative for cold intolerance, heat intolerance, polydipsia, polyphagia and polyuria.   Genitourinary: Negative for decreased urine volume, difficulty urinating, dysuria, flank pain, frequency, hematuria and urgency.   Musculoskeletal: Negative for arthralgias, back pain, gait problem, joint swelling, myalgias, neck pain and neck stiffness.   Skin: Negative for color change, pallor, rash and wound.   Allergic/Immunologic: Negative for immunocompromised state.   Neurological: Negative for  dizziness, tremors, seizures, syncope, facial asymmetry, speech difficulty, weakness, light-headedness, numbness and headaches.   Hematological: Negative for adenopathy.   Psychiatric/Behavioral: Negative for agitation, behavioral problems, confusion, decreased concentration, dysphoric mood, hallucinations, self-injury, sleep disturbance and suicidal ideas. The patient is not nervous/anxious and is not hyperactive.         Objective:   There were no vitals taken for this visit.    Physical Exam   Constitutional: He appears well-developed and well-nourished. No distress.   HENT:   Head: Normocephalic.   Right Ear: External ear normal.   Left Ear: External ear normal.   Nose: Nose normal.   Eyes: Conjunctivae and EOM are normal. Right eye exhibits no discharge. Left eye exhibits no discharge. No scleral icterus.   Neck: Neck normal appearance.No JVD present. No tracheal deviation present. No thyromegaly present.   Cardiovascular:   No conjunctival pallor noted.    Pulmonary/Chest: Effort normal. No stridor.  No respiratory distress. He no audible wheeze...He exhibits no tenderness.   Abdominal: Abdomen appears normal. Soft. He exhibits no distension and no visible mass. There is no tenderness. No visible hernia present.   Musculoskeletal: Normal range of motion.         General: No deformity, edema or no effusion.   Lymphadenopathy:     He has no cervical adenopathy.   Neurological: He is alert. No cranial nerve deficit. Coordination normal.   Skin: No rash noted. He is not diaphoretic. No nail bed cyanosis or erythema. No pallor. Nails show no clubbing.   Psychiatric: He mood appears normal. His affect is normal. His behavior is normal. Thought content is normal. He does not express abnormal judgement.       Lab Review          Assessment/Plan       ICD-10-CM ICD-9-CM   1. Type 2 diabetes mellitus with hyperglycemia, with long-term current use of insulin (CMS/[facility]) E11.65 250.00    Z79.4 790.29     V58.67   2.  Hypertension associated with diabetes (CMS/[facility]) E11.59 250.80    I10 401.9   3. Mixed diabetic hyperlipidemia associated with type 2 diabetes mellitus (CMS/[facility]) E11.69 250.80    E78.2 272.2   4. Vitamin D deficiency E55.9 268.9   5. Coronary artery disease involving native heart without angina pectoris, unspecified vessel or lesion type I25.10 414.01   6. Diabetic polyneuropathy associated with type 2 diabetes mellitus (CMS/[facility]) E11.42 250.60     357.2       Pt has type 2 diabetes w ckd and cad     Glycemic Management:   Lab Results   Component Value Date    HGBA1C 10.2 (H) 12/13/2019    HGBA1C 8.8 (H) 12/18/2018    HGBA1C 9.1 (H) 09/18/2018     Lab Results   Component Value Date    GLUCOSE 199 (H) 12/13/2019    BUN 31 (H) 12/13/2019    CREATININE 1.6 (H) 12/13/2019    EGFRIFNONA 43 (A) 12/13/2019    BCR 19.0 04/26/2018    K 4.8 12/13/2019    CO2 22 12/13/2019    CALCIUM 10.0 12/13/2019    ALBUMIN 4 12/13/2019    AST 12 12/13/2019    ALT 15 12/13/2019    ANIONGAP 15 12/13/2019     Lab Results   Component Value Date    WBC 7.7 12/13/2019    HGB 13.3 (L) 12/13/2019    HCT 42.9 12/13/2019    MCV 87.7 12/13/2019     12/13/2019       toujeo 40 -- this seems to be working = now doing 30 -- decrease to 28     ========================================    Ozempic      0.5 mg weekly      ============================================    Metformin 1000 mg twice a day ===     ============================================      Invokana 100mg  daily      ============================================    Humalog     4 units per 15 grams      About 20 w meals     Fercho read by him over the phone    In range 68%    Lows 10%, nocturnal   ======================      bp controlled on irbesartan     Lipids controlled on lipitor    This document has been electronically signed by Juancho Mobley MD on May 18, 2020 13:30          A copy of my note was sent to Bimal Lee MD    Please see my above opinion and  suggestions.     I spent 15 minutes reviewing patient electronic chart , reviewing medications , past history , active problems.   I provided advice regarding management of medical conditions, refilled prescriptions , ordered labs and arranged for future appointment.   Patient was advised to contact us if there were any unanswered questions or ongoing concerns.

## 2020-06-10 ENCOUNTER — TELEPHONE (OUTPATIENT)
Dept: ENDOCRINOLOGY | Facility: CLINIC | Age: 65
End: 2020-06-10

## 2020-06-12 RX ORDER — GABAPENTIN 300 MG/1
CAPSULE ORAL
Qty: 270 CAPSULE | Refills: 0 | OUTPATIENT
Start: 2020-06-12

## 2020-06-14 RX ORDER — GABAPENTIN 300 MG/1
CAPSULE ORAL
Qty: 270 CAPSULE | Refills: 0 | OUTPATIENT
Start: 2020-06-14

## 2020-06-28 NOTE — TELEPHONE ENCOUNTER
Henry Humphrey called to request a refill on his medication. Last office visit : 5-  Next office visit : 8/19/2020     Last UDS: No results found for: Lulu Pall, LABBENZ, BUPRENUR, COCAIMETSCRU, GABAPENTIN, MDMA, METAMPU, OPIATESCREENURINE, OXTCOSU, PHENCYCLIDINESCREENURINE, PROPOXYPHENE, THCSCREENUR, TRICYUR    Last Haylie Lard: none  Medication Contract: 2016   Last Fill: 5-15    Requested Prescriptions     Pending Prescriptions Disp Refills    gabapentin (NEURONTIN) 300 MG capsule [Pharmacy Med Name: Gabapentin 300 MG Oral Capsule] 270 capsule 0     Sig: TAKE 1 CAPSULE BY MOUTH THREE TIMES DAILY         Please approve or refuse this medication.    Charlotta Olszewski, LPN

## 2020-06-30 RX ORDER — GABAPENTIN 300 MG/1
CAPSULE ORAL
Qty: 270 CAPSULE | Refills: 1 | OUTPATIENT
Start: 2020-06-30 | End: 2021-02-09

## 2020-07-13 RX ORDER — LANSOPRAZOLE 30 MG/1
CAPSULE, DELAYED RELEASE ORAL
Qty: 90 CAPSULE | Refills: 3 | Status: SHIPPED | OUTPATIENT
Start: 2020-07-13 | End: 2021-01-20 | Stop reason: ALTCHOICE

## 2020-08-04 ENCOUNTER — TELEPHONE (OUTPATIENT)
Dept: CARDIOLOGY | Age: 65
End: 2020-08-04

## 2020-08-04 NOTE — TELEPHONE ENCOUNTER
Date: 8/12/20 & 8/19/20    Cardiologist: MAURICE    Procedure: cataract    Surgeon: Kevin Sanderson    Last Office Visit: 2/11/20  Reason for office visit and medical concerns addressed at this office visit: cad, dm, htn,hld,     Testing Performed and Date of Service:  6/4/19  Cath  Patent stent in the osteal LAD, 99% mid LCX, 3.5 x 18 KENNA, 40% mid RCA, anterior lateral hypokinesis EF 45%    RCRI = 6.6  METs 4    Current Medications: prevacid, gabapentin, flonase, metformin, irbesartan, insulin, invoakana, effient, zofran, nystatin, aspirin, metoprolol, loratadine, hctz, lasix, iron,     Is the patient currently taking an anticoagulant?  If so, what is the diagnosis the patient has been given to warrant the need for the anticoagulant? effient for kenna on 6/4/19    Additional Notes: requesting cardiac clearance prior to procedure

## 2020-08-05 ENCOUNTER — TELEPHONE (OUTPATIENT)
Dept: ENDOCRINOLOGY | Facility: CLINIC | Age: 65
End: 2020-08-05

## 2020-08-05 ENCOUNTER — TELEPHONE (OUTPATIENT)
Dept: PRIMARY CARE CLINIC | Age: 65
End: 2020-08-05

## 2020-08-05 DIAGNOSIS — E29.1 MALE HYPOGONADISM: Primary | ICD-10-CM

## 2020-08-05 RX ORDER — HYDROCHLOROTHIAZIDE 12.5 MG/1
12.5 CAPSULE, GELATIN COATED ORAL 2 TIMES DAILY
Qty: 180 CAPSULE | Refills: 3 | Status: SHIPPED | OUTPATIENT
Start: 2020-08-05 | End: 2021-08-10

## 2020-08-05 NOTE — TELEPHONE ENCOUNTER
Pt wants testosterone to be added to the list of labs, and he would like to be called, he states that he also has some other questions he would like to ask the nurse. [phone].

## 2020-08-05 NOTE — TELEPHONE ENCOUNTER
Claudetta Junker requests that someone return their call. The best time to reach him is Anytime. He is having surgery on the 19th and would like to move his in office visit to another day. Thank you.

## 2020-08-10 ENCOUNTER — TELEPHONE (OUTPATIENT)
Dept: PRIMARY CARE CLINIC | Age: 65
End: 2020-08-10

## 2020-08-10 NOTE — TELEPHONE ENCOUNTER
Could you please review the surgical clearance in media and let me know if you feel comfortable filling this out with his upcoming eye surgery?     Thank you

## 2020-08-11 ENCOUNTER — OFFICE VISIT (OUTPATIENT)
Dept: CARDIOLOGY | Age: 65
End: 2020-08-11
Payer: MEDICARE

## 2020-08-11 VITALS
BODY MASS INDEX: 35.21 KG/M2 | HEART RATE: 70 BPM | DIASTOLIC BLOOD PRESSURE: 66 MMHG | WEIGHT: 260 LBS | SYSTOLIC BLOOD PRESSURE: 120 MMHG | HEIGHT: 72 IN

## 2020-08-11 PROCEDURE — 4040F PNEUMOC VAC/ADMIN/RCVD: CPT | Performed by: CLINICAL NURSE SPECIALIST

## 2020-08-11 PROCEDURE — 93000 ELECTROCARDIOGRAM COMPLETE: CPT | Performed by: CLINICAL NURSE SPECIALIST

## 2020-08-11 PROCEDURE — 1036F TOBACCO NON-USER: CPT | Performed by: CLINICAL NURSE SPECIALIST

## 2020-08-11 PROCEDURE — 2022F DILAT RTA XM EVC RTNOPTHY: CPT | Performed by: CLINICAL NURSE SPECIALIST

## 2020-08-11 PROCEDURE — 3046F HEMOGLOBIN A1C LEVEL >9.0%: CPT | Performed by: CLINICAL NURSE SPECIALIST

## 2020-08-11 PROCEDURE — G8427 DOCREV CUR MEDS BY ELIG CLIN: HCPCS | Performed by: CLINICAL NURSE SPECIALIST

## 2020-08-11 PROCEDURE — 99214 OFFICE O/P EST MOD 30 MIN: CPT | Performed by: CLINICAL NURSE SPECIALIST

## 2020-08-11 PROCEDURE — 1123F ACP DISCUSS/DSCN MKR DOCD: CPT | Performed by: CLINICAL NURSE SPECIALIST

## 2020-08-11 PROCEDURE — G8417 CALC BMI ABV UP PARAM F/U: HCPCS | Performed by: CLINICAL NURSE SPECIALIST

## 2020-08-11 PROCEDURE — 3017F COLORECTAL CA SCREEN DOC REV: CPT | Performed by: CLINICAL NURSE SPECIALIST

## 2020-08-11 RX ORDER — ROSUVASTATIN CALCIUM 5 MG/1
5 TABLET, COATED ORAL DAILY
Qty: 30 TABLET | Refills: 5 | Status: SHIPPED | OUTPATIENT
Start: 2020-08-11 | End: 2020-12-02

## 2020-08-11 RX ORDER — INSULIN GLARGINE 300 U/ML
30 INJECTION, SOLUTION SUBCUTANEOUS DAILY
COMMUNITY
Start: 2020-03-03

## 2020-08-11 RX ORDER — PANTOPRAZOLE SODIUM 40 MG/1
40 TABLET, DELAYED RELEASE ORAL DAILY
COMMUNITY
Start: 2018-11-01 | End: 2020-12-02 | Stop reason: ALTCHOICE

## 2020-08-11 ASSESSMENT — ENCOUNTER SYMPTOMS
NAUSEA: 0
SHORTNESS OF BREATH: 0
VOMITING: 0
FACIAL SWELLING: 0
ABDOMINAL PAIN: 0
WHEEZING: 0
EYE REDNESS: 0
CHEST TIGHTNESS: 0
COUGH: 0

## 2020-08-11 NOTE — PROGRESS NOTES
[facility], [address] [place] [address], [address] [id number]  Phone: [phone]  Fax: [phone]    OFFICE VISIT:  8/11/2020 [address]: [id number]    Reason For Visit:  Ant Mckinnon is a 75 y.o. male who is here for Coronary Artery Disease (No cardiac sx today. ) and Hypertension    HPI  Patient is here for follow-up for a non-STEMI post heart cath with a single drug-eluting stent to the mid circumflex, EF 40%, hypertension, hyperlipidemia, diabetes, CKD. He denies chest pain. He has some mild dyspnea at times. He denies orthopnea, PND. He has some mild lower extremity edema. He will be having cataract surgery next week. Patient reports statins caused myalgias and he stopped them last year. He is now seeing an endocrinologist for diabetes control and improving his A1c.     MIMI Dominguez is PCP.   Júnior Deninson has the following history [facility]:    Patient Active Problem List    Diagnosis Date Noted    ACS (acute coronary syndrome) ([address] 06/03/2019     Priority: High    Mixed hyperlipidemia 06/27/2019    Class 2 obesity without serious comorbidity in adult 06/27/2019    Left ventricular dysfunction 06/27/2019    Chronic kidney disease, stage III (moderate) ([address] 06/13/2019    NSTEMI (non-ST elevated myocardial infarction) ([address] 06/05/2019    CAD (coronary artery disease) 05/15/2018    History of placement of stent in LAD coronary artery     Dyspnea 05/11/2018    Change in bowel habits 10/11/2016    History of colon polyps 10/11/2016    Family history of prostate cancer in father 10/05/2016    Pharyngoesophageal dysphagia 09/20/2016    DM type 2, uncontrolled, with neuropathy ([address] 06/27/2016    Essential hypertension 06/27/2016    Gastroesophageal reflux disease without esophagitis 06/27/2016     Past Medical History:   Diagnosis Date    Aneurysm ([address]     abdominal    Bilateral leg edema     CAD (coronary artery disease)     Chronic kidney disease     Stage 4    Diabetes mellitus, type 2 (HCC)     Fatty liver     GERD (gastroesophageal reflux disease)     HTN (hypertension)     Seasonal allergies      Past Surgical History:   Procedure Laterality Date    CARPAL TUNNEL RELEASE      COLONOSCOPY  11/04/2015    DR Capone: Apurva Cox, 5yr recall    COLONOSCOPY N/A 10/11/2016    Dr Lopez-diverticulosis, Sessile serrated AP (-) high grade dysplasia x 1, tubular AP (-) dysplasia x 2, HP x 2--3 yr recall    CORONARY ANGIOPLASTY WITH STENT PLACEMENT  06/04/2019    AGA to circ    CORONARY ANGIOPLASTY WITH STENT PLACEMENT  05/2018    AGA to osteal LAD    EYE SURGERY      Right eye    PRE-MALIGNANT / [facility] ENDOSCOPY  9/20/2016    Dr Lopez-w/dilation over wire, 46 French-distal esophageal narrowing, Inna (-)    UPPER GASTROINTESTINAL ENDOSCOPY  9/20/2016    Dr Lopez-w/dilation over wire, 46 French-distal esophageal narrowing, Inna (-)     Family History   Problem Relation Age of Onset    Colon Cancer Mother     Prostate Cancer Father     Other Father         HX of blood clot    Diabetes Sister     Cancer Sister     Diabetes Brother     Esophageal Cancer Neg Hx     Liver Cancer Neg Hx     Liver Disease Neg Hx     Stomach Cancer Neg Hx     Rectal Cancer Neg Hx      Social History     Tobacco Use    Smoking status: Never Smoker    Smokeless tobacco: Never Used   Substance Use Topics    Alcohol use: No      Current Outpatient Medications   Medication Sig Dispense Refill    Insulin Glargine, 1 Unit Dial, (TOUJEO SOLOSTAR) 300 UNIT/ML SOPN by NOT APPLICABLE route      pantoprazole (PROTONIX) 40 MG tablet Take 40 mg by mouth as needed      rosuvastatin (CRESTOR) 5 MG tablet Take 1 tablet by mouth daily 30 tablet 5    hydroCHLOROthiazide (MICROZIDE) 12.5 MG capsule Take 1 capsule by mouth 2 times daily 180 capsule 3    lansoprazole (PREVACID) 30 MG delayed release pallor and rash. Neurological: Negative for dizziness, seizures, syncope, weakness and light-headedness. Hematological: Does not bruise/bleed easily. Psychiatric/Behavioral: Negative for agitation. The patient is not nervous/anxious. Objective  Vital Signs - /66   Pulse 70   Ht 6' (1.829 m)   Wt 260 lb (117.9 kg)   BMI 35.26 kg/m²    Wt Readings from Last 3 Encounters:   08/11/20 260 lb (117.9 kg)   05/15/20 251 lb (113.9 kg)   02/11/20 264 lb (119.7 kg)      Physical Exam  Vitals signs and nursing note reviewed. Constitutional:       General: He is not in acute distress. Appearance: He is well-developed. He is obese. Comments: obese   HENT:      Head: Normocephalic and atraumatic. Right Ear: Hearing and external ear normal.      Left Ear: Hearing and external ear normal.      Nose: Nose normal.   Eyes:      General:         Right eye: No discharge. Left eye: No discharge. Pupils: Pupils are equal, round, and reactive to light. Neck:      Musculoskeletal: Neck supple. No muscular tenderness. Thyroid: No thyromegaly. Vascular: No carotid bruit or JVD. Trachea: No tracheal deviation. Cardiovascular:      Rate and Rhythm: Normal rate and regular rhythm. Heart sounds: Normal heart sounds. No murmur. No friction rub. No gallop. Comments: No carotid bruit  Pulmonary:      Effort: Pulmonary effort is normal. No respiratory distress. Breath sounds: Normal breath sounds. No wheezing or rales. Abdominal:      Palpations: Abdomen is soft. Tenderness: There is no abdominal tenderness. Musculoskeletal:         General: Swelling present. No deformity. Right lower leg: Edema (trace) present. Left lower leg: Edema (trace) present. Comments: abnormal gait and station   Skin:     General: Skin is warm and dry. Findings: No rash. Neurological:      Mental Status: He is alert and oriented to person, place, and time. Cranial Nerves: No cranial nerve deficit. Psychiatric:         Behavior: Behavior normal.         Judgment: Judgment normal.         Data:  Heart cath summary 6/4/19:       1. Successful femoral artery ultrasound  2. Successful femoral artery arterogram  3. Supervision of the administration of moderate conscious sedation  4. Single vessel coronary artery disease involving the mid circumflex  5. Left ventricular function is severely impaired in the anterior lateral segment with a visually estimate ejection of 40%. 6.  99% lesion in the mid circumflex  7. Successful percutaneous coronary intervention utilizing a single drug eluding stent to the mid circumflex. 8.  Patent stent in the osteal LAD  9.  40% lesion in the mid RCA  EF 40%    Lab Results   Component Value Date    CHOL 177 12/13/2019    TRIG 289 (H) 12/13/2019    HDL 22 (L) 12/13/2019    LDLCALC 97 12/13/2019    LDLDIRECT 104 11/02/2017     Lab Results   Component Value Date    ALT 15 12/13/2019    AST 12 12/13/2019     Lab Results   Component Value Date    LABA1C 10.2 (H) 12/13/2019       EKG shows normal sinus rhythm rate 71 with nonspecific T wave unchanged compared to EKG dated 6/3/2019   abnormality    Assessment:     Diagnosis Orders   1. Coronary artery disease involving native coronary artery of native heart without angina pectoris     2. Essential hypertension  EKG 12 lead   3. Mixed hyperlipidemia     4. Left ventricular dysfunction     5. DM type 2, uncontrolled, with neuropathy ([address]     6. Myalgia due to statin     7. Class 2 obesity due to excess calories without serious comorbidity with body mass index (BMI) of 35.0 to 35.9 in adult         CAD- stable without symptoms of angina. Due to his history of repeat CAD, recommend Prasugrel and aspirin lifelong. He may stop Prasugrel temporarily for eye surgery    Left ventricular dysfunction-EF 40% per cath. Patient is on guideline directed medical therapy.   We discussed monitoring

## 2020-08-11 NOTE — PATIENT INSTRUCTIONS
Return in about 6 months (around 2/11/2021) for APRN. Keep diabetes under control to help prevent further heart disease. Keep Hemoglobin A1C less than 7%  .   OK to hold Prasugrel 5 days prior to eye surgery, then resume thereafter  Letter to Dr. Waldemar King 5mg daily  Labs as scheduled next month    Work on weight loss and regular exercise

## 2020-09-04 ENCOUNTER — OFFICE VISIT (OUTPATIENT)
Dept: URGENT CARE | Age: 65
End: 2020-09-04
Payer: MEDICARE

## 2020-09-04 ENCOUNTER — OFFICE VISIT (OUTPATIENT)
Age: 65
End: 2020-09-04

## 2020-09-04 ENCOUNTER — NURSE TRIAGE (OUTPATIENT)
Dept: OTHER | Facility: CLINIC | Age: 65
End: 2020-09-04

## 2020-09-04 VITALS
OXYGEN SATURATION: 93 % | DIASTOLIC BLOOD PRESSURE: 78 MMHG | WEIGHT: 250 LBS | RESPIRATION RATE: 18 BRPM | BODY MASS INDEX: 35 KG/M2 | HEART RATE: 69 BPM | HEIGHT: 71 IN | TEMPERATURE: 97.8 F | SYSTOLIC BLOOD PRESSURE: 130 MMHG

## 2020-09-04 PROBLEM — N17.9 ACUTE RENAL FAILURE SUPERIMPOSED ON STAGE 4 CHRONIC KIDNEY DISEASE (HCC): Status: ACTIVE | Noted: 2020-09-04

## 2020-09-04 PROBLEM — N18.4 ACUTE RENAL FAILURE SUPERIMPOSED ON STAGE 4 CHRONIC KIDNEY DISEASE (HCC): Status: ACTIVE | Noted: 2020-09-04

## 2020-09-04 PROCEDURE — 99213 OFFICE O/P EST LOW 20 MIN: CPT | Performed by: NURSE PRACTITIONER

## 2020-09-04 ASSESSMENT — ENCOUNTER SYMPTOMS
SINUS PRESSURE: 0
EYES NEGATIVE: 1
SORE THROAT: 0
DIARRHEA: 1
VOMITING: 0
ABDOMINAL PAIN: 0
NAUSEA: 0
COUGH: 1
SHORTNESS OF BREATH: 1

## 2020-09-04 ASSESSMENT — VISUAL ACUITY: OU: 1

## 2020-09-04 NOTE — PROGRESS NOTES
[facility] PHYSICIAN SERVICES  [facility] URGENT CARE  [address]  [address] [id number]  Dept: [phone]  Dept Fax: [phone]  Loc: [phone]      Torey Ramirez is c/o of Shortness of Breath; Abdominal Pain; Fever; Fatigue; and Generalized Body Aches        HPI:     Jamie Fabry is here with complaint of productive cough with green sputum, fever up to 101, shortness of breath, muscle pain,fatigue, upset stomach and diarrhea since Wed. He says his cough is worse in the morning and he clears the phlegm out of his throat and it feels better and coffee helps get this coughed up. He says his symptoms have improved. He is taking nothing OTC and is drinking well but not eating much. He says his blood sugar has been up to 200 and his normal is around 160. He denies sick contacts but has been out with his wife for doctor's appt recently. He has use his home nebulizer once but doesn't think it helped much so he has not used it again. Relevant PMH: allergic rhinitis. Smoking history:  He  reports that he has never smoked. He has never used smokeless tobacco.     He has had no known ill contacts. Treatment to date: Acetaminophen.     Recent travel or possible COVID exposure:no    Past Medical History:   Diagnosis Date    Aneurysm ([address]     abdominal    Bilateral leg edema     CAD (coronary artery disease)     Chronic kidney disease     Stage 4    Diabetes mellitus, type 2 (HCC)     Fatty liver     GERD (gastroesophageal reflux disease)     HTN (hypertension)     Seasonal allergies       Current Outpatient Medications   Medication Sig Dispense Refill    Insulin Glargine, 1 Unit Dial, (TOUJEO SOLOSTAR) 300 UNIT/ML SOPN by NOT APPLICABLE route      pantoprazole (PROTONIX) 40 MG tablet Take 40 mg by mouth as needed      rosuvastatin (CRESTOR) 5 MG tablet Take 1 tablet by mouth daily 30 tablet 5    hydroCHLOROthiazide (MICROZIDE) 12.5 MG capsule Take 1 capsule by mouth 2 times daily 180 Out    Hib vaccine  Aged Out    Meningococcal (ACWY) vaccine  Aged Out       Subjective:      Review of Systems   Constitutional: Positive for appetite change, fatigue and fever. Negative for activity change and chills. HENT: Positive for congestion. Negative for ear discharge, sinus pressure and sore throat. Eyes: Negative. Respiratory: Positive for cough and shortness of breath. Cardiovascular: Negative. Negative for chest pain. Gastrointestinal: Positive for diarrhea. Negative for abdominal pain, nausea and vomiting. Musculoskeletal: Positive for arthralgias and myalgias. Skin: Negative for rash. Allergic/Immunologic: Positive for environmental allergies. Neurological: Negative for headaches. Objective:     Physical Exam  Vitals signs and nursing note reviewed. Constitutional:       General: He is awake. He is not in acute distress. Appearance: Normal appearance. He is well-developed and normal weight. He is not ill-appearing. HENT:      Head: Normocephalic. Right Ear: Hearing, tympanic membrane, ear canal and external ear normal.      Left Ear: Hearing, tympanic membrane, ear canal and external ear normal.      Nose: Nose normal.      Right Sinus: No maxillary sinus tenderness or frontal sinus tenderness. Left Sinus: No maxillary sinus tenderness or frontal sinus tenderness. Mouth/Throat:      Lips: Pink. Mouth: Mucous membranes are moist.      Pharynx: Oropharynx is clear. Uvula midline. Tonsils: 0 on the right. 0 on the left. Eyes:      General: Vision grossly intact. Neck:      Musculoskeletal: Neck supple. Trachea: Phonation normal.   Cardiovascular:      Rate and Rhythm: Normal rate and regular rhythm. Heart sounds: Normal heart sounds, S1 normal and S2 normal. No murmur. No friction rub. No gallop. Pulmonary:      Effort: Pulmonary effort is normal. No respiratory distress.       Breath sounds: Normal breath sounds and air entry. No wheezing, rhonchi or rales. Abdominal:      General: Abdomen is protuberant. Bowel sounds are normal.      Palpations: Abdomen is soft. Musculoskeletal:         General: No tenderness or deformity. Lymphadenopathy:      Head:      Right side of head: No tonsillar adenopathy. Left side of head: No tonsillar adenopathy. Skin:     General: Skin is warm and dry. Capillary Refill: Capillary refill takes less than 2 seconds. Neurological:      General: No focal deficit present. Mental Status: He is alert, oriented to person, place, and time and easily aroused. Mental status is at baseline. Psychiatric:         Attention and Perception: Attention normal.         Mood and Affect: Mood normal.         Speech: Speech normal.         Behavior: Behavior normal. Behavior is cooperative. /78   Pulse 69   Temp 97.8 °F (36.6 °C) (Oral)   Resp 18   Ht 5' 11\" (1.803 m)   Wt 250 lb (113.4 kg)   SpO2 93%   BMI 34.87 kg/m²   No results found for this visit on 09/04/20. Assessment/ Plan     ASSESSMENT:    Diagnosis Orders   1. Cough     2. Shortness of breath     3. Fever, unspecified fever cause     4. Myalgia     5. Diarrhea, unspecified type            PLAN:   Plenty of fluids  Rest  OTC Tylenol  as needed  Use Albuterol nebulizer treatments if needed every 4-6 hrs for wheezing or shortness of breath  Stay in quarantine until COVID test results are called to you  If any worsening symptoms, chest pain, worsening shortness of breath go to ER    Based on patient's symptoms today, it is highly suspected that they have COVID 19. Since pt is being tested for COVID pt has been instructed to quarantine from contacts until testing has been resulted. Further instructions will follow, as of now, this is 14 days unless otherwise specified when results are back. If SOB or worsening sx's develop, need to go to ED or return to clinic, pt voiced understanding.      Pt was given printed instructions today on Possible COVID-19 infection with self-quarantine and management of symptoms    Call or return to clinic prn if these symptoms worsen or fail to improve as anticipated. No follow-ups on file. Patient given educational materials - see patient instructions. Discussed use, benefit, and side effects of prescribed medications. All patient questions answered. Pt voiced understanding. Patient agreed with treatment plan.  Follow up as needed      Electronically signed by MIMI Bruno CNP on 9/4/2020 at 2:10 PM

## 2020-09-04 NOTE — PATIENT INSTRUCTIONS
to others? To help avoid spreading the virus to others:  · Cover your mouth with a tissue when you cough or sneeze. Then throw the tissue in the trash. · Use a disinfectant to clean things that you touch often. · Wear a cloth face cover if you have to go to public areas. · Stay home if you are sick or have been exposed to the virus. Don't go to school, work, or public areas. And don't use public transportation, ride-shares, or taxis unless you have no choice. · If you are sick:  ? Leave your home only if you need to get medical care. But call the doctor's office first so they know you're coming. And wear a face cover. ? Wear the face cover whenever you're around other people. It can help stop the spread of the virus when you cough or sneeze. ? Clean and disinfect your home every day. Use household  and disinfectant wipes or sprays. Take special care to clean things that you grab with your hands. These include doorknobs, remote controls, phones, and handles on your refrigerator and microwave. And don't forget countertops, tabletops, bathrooms, and computer keyboards. When to call for help  [id number] anytime you think you may need emergency care. For example, call if:  · You have severe trouble breathing. (You can't talk at all.)  · You have constant chest pain or pressure. · You are severely dizzy or lightheaded. · You are confused or can't think clearly. · Your face and lips have a blue color. · You pass out (lose consciousness) or are very hard to wake up. Call your doctor now if you develop symptoms such as:  · Shortness of breath. · Fever. · Cough. If you need to get care, call ahead to the doctor's office for instructions before you go. Make sure you wear a face cover to prevent exposing other people to the virus. Where can you get the latest information? The following health organizations are tracking and studying this virus. Their websites contain the most up-to-date information.  [place] also learn what to do if you think you may have been exposed to the virus. · U.S. Centers for Disease Control and Prevention (CDC): The CDC provides updated news about the disease and travel advice. The website also tells you how to prevent the spread of infection. www.cdc.gov  · World Health Organization [facility]): WHO offers information about the virus outbreaks. WHO also has travel advice. www.who.int  Current as of: May 8, 2020               Content Version: 12.5  © 2006-2020 [employer]. Care instructions adapted under license by [facility] ([facility]). If you have questions about a medical condition or this instruction, always ask your healthcare professional. [address] any warranty or liability for your use of this information. Patient Education        Coronavirus ([id number]): Care Instructions  Overview  The coronavirus disease (COVID-19) is caused by a virus. Symptoms may include a fever, a cough, and shortness of breath. It mainly spreads person-to-person through droplets from coughing and sneezing. The virus also can spread when people are in close contact with someone who is infected. Most people have mild symptoms and can take care of themselves at home. If their symptoms get worse, they may need care in a hospital. There is no medicine to fight the virus. It's important to not spread the virus to others. If you have COVID-19, wear a face cover anytime you are around other people. You need to isolate yourself while you are sick. Your doctor or local public health official will tell you when you no longer need to be isolated. Leave your home only if you need to get medical care. Follow-up care is a key part of your treatment and safety. Be sure to make and go to all appointments, and call your doctor if you are having problems. It's also a good idea to know your test results and keep a list of the medicines you take. How can you care for yourself at home?   · Get extra rest. It can help you feel better. · Drink plenty of fluids. This helps replace fluids lost from fever. Fluids also help ease a scratchy throat. Water, soup, fruit juice, and hot tea with lemon are good choices. · Take acetaminophen (such as Tylenol) to reduce a fever. It may also help with muscle aches. Read and follow all instructions on the label. · Sponge your body with lukewarm water to help with fever. Don't use cold water or ice. · Use petroleum jelly on sore skin. This can help if the skin around your nose and lips becomes sore from rubbing a lot with tissues. Tips for isolation  · Wear a cloth face cover when you are around other people. It can help stop the spread of the virus when you cough or sneeze. · Limit contact with people in your home. If possible, stay in a separate bedroom and use a separate bathroom. · If you have to leave home, avoid crowds and try to stay at least 6 feet away from other people. · Avoid contact with pets and other animals. · Cover your mouth and nose with a tissue when you cough or sneeze. Then throw it in the trash right away. · Wash your hands often, especially after you cough or sneeze. Use soap and water, and scrub for at least 20 seconds. If soap and water aren't available, use an alcohol-based hand . · Don't share personal household items. These include bedding, towels, cups and glasses, and eating utensils. · [address] in the warmest water allowed for the fabric type, and dry it completely. It's okay to wash other people's laundry with yours. · Clean and disinfect your home every day. Use household  and disinfectant wipes or sprays. Take special care to clean things that you grab with your hands. These include doorknobs, remote controls, phones, and handles on your refrigerator and microwave. And don't forget countertops, tabletops, bathrooms, and computer keyboards. When should you call for help?    [id number] anytime you think you may precautions during the outbreak. If you are pregnant, it's safest to consider yourself at higher risk. It's not known yet whether COVID-19 is dangerous for you or your baby. But pregnancy increases the risk for serious illness from viruses similar to COVID-19. How do you stay safe? · Stay home. ? Stay home as much as you can. This may be the easiest way to avoid exposure, as long as no one else in your household has the virus. ? If there are a lot of COVID-19 cases in your community, do not leave your home except to seek medical care. ? Limit visitors right now. It's especially important to avoid contact with anyone who is sick or who might have been exposed. Remember that people may have been exposed without knowing it or having any symptoms. ? Have enough food, medicines, and other supplies on hand so that you don't have to go out. Try some of these options if you don't have what you need. ? Use delivery and takeout services for groceries and meals. ? Have a healthy family member, friend, or neighbor shop for you. ? Ask your doctor for extra prescription medicine. ? Routinely clean and disinfect high-touch surfaces. These include countertops, faucets, door handles, doorknobs, and phones. ? Do not travel. · Wash your hands often and well. ? Wash your hands often, especially after you cough or sneeze. Use soap and water, and scrub for at least 20 seconds. If soap and water aren't available, use an alcohol-based hand . · Be extra careful if you have to go out. ? Avoid crowds and crowded places. Try to keep 6 feet of space between yourself and others. ? Don't use public transportation, ride-shares, or taxis unless you have no choice. ? Try not to touch things that many other people have touched. Door handles, elevator buttons, shopping cart handles, and handrails on escalators get a lot of touches. ? Carry tissues or paper towels with you.  If you must touch something, you'll be able to protect your hands. ? Don't shake hands with anyone. Try a friendly wave instead. ? Don't touch your face, and wash your hands often. ? Wash your hands again as soon as you get home. · Know when to call your doctor. ? Call a doctor if you have symptoms of COVID-19 (fever, cough, shortness of breath). If you are told to get testing or care and must go out, wear a cloth face cover. Current as of: May 8, 2020               Content Version: 12.5  © 2006-2020 [employer]. Care instructions adapted under license by [facility] ([facility]). If you have questions about a medical condition or this instruction, always ask your healthcare professional. [address] any warranty or liability for your use of this information.

## 2020-09-04 NOTE — TELEPHONE ENCOUNTER
Reason for Disposition   SEVERE or constant chest pain or pressure (Exception: mild central chest pain, present only when coughing)    Answer Assessment - Initial Assessment Questions  1. COVID-19 DIAGNOSIS: \"Who made your Coronavirus (COVID-19) diagnosis? \" \"Was it confirmed by a positive lab test?\" If not diagnosed by a HCP, ask \"Are there lots of cases (community spread) where you live? \" (See public health department website, if unsure)      Not diagnosed  2. ONSET: \"When did the COVID-19 symptoms start? \"       Wednesday  3. WORST SYMPTOM: \"What is your worst symptom? \" (e.g., cough, fever, shortness of breath, muscle aches)      Fevers and chills. Fever . 1 hour ago was 99. Treating fever with Tylenol-mild relief. 4. COUGH: \"Do you have a cough? \" If so, ask: \"How bad is the cough? \"        Coughing to cough up mucous-green, thick  5. FEVER: \"Do you have a fever? \" If so, ask: \"What is your temperature, how was it measured, and when did it start? \"      Yes, measuring orally. 6. RESPIRATORY STATUS: \"Describe your breathing? \" (e.g., shortness of breath, wheezing, unable to speak)       Shortness of breath-all the time. Pt resting x 2 days  7. BETTER-SAME-WORSE: Nicholette Pry you getting better, staying the same or getting worse compared to yesterday? \"  If getting worse, ask, \"In what way? \"      About the same  8. HIGH RISK DISEASE: \"Do you have any chronic medical problems? \" (e.g., asthma, heart or lung disease, weak immune system, etc.)      DM, heart stents, HTN (156/92 currently), renal issues, o2 sat 90%, HR 71  9. PREGNANCY: \"Is there any chance you are pregnant? \" \"When was your last menstrual period? \"      N/A  10. OTHER SYMPTOMS: \"Do you have any other symptoms? \"  (e.g., chills, fatigue, headache, loss of smell or taste, muscle pain, sore throat)        Joint aches, muscle pains.  Chest pains about breast height all the way across his chest.    Protocols used: CORONAVIRUS (COVID-19) DIAGNOSED OR SUSPECTED-ADULT-AH    POD 1 [place]    Caller reports symptoms as documented above. Caller informed of disposition to proceed to ED and pt agreeable. Care advice as documented. Please do not respond to the triage nurse through this encounter. Any subsequent communication should be directly with the patient.

## 2020-09-08 LAB — SARS-COV-2, NAA: DETECTED

## 2020-09-08 RX ORDER — ASPIRIN 81 MG/1
TABLET, CHEWABLE ORAL
Qty: 90 TABLET | Refills: 3 | Status: SHIPPED | OUTPATIENT
Start: 2020-09-08 | End: 2021-07-19 | Stop reason: SDUPTHER

## 2020-09-22 ENCOUNTER — OFFICE VISIT (OUTPATIENT)
Dept: PRIMARY CARE CLINIC | Age: 65
End: 2020-09-22
Payer: MEDICARE

## 2020-09-22 VITALS
DIASTOLIC BLOOD PRESSURE: 82 MMHG | HEIGHT: 71 IN | SYSTOLIC BLOOD PRESSURE: 122 MMHG | TEMPERATURE: 98.2 F | HEART RATE: 70 BPM | RESPIRATION RATE: 18 BRPM | WEIGHT: 254 LBS | OXYGEN SATURATION: 98 % | BODY MASS INDEX: 35.56 KG/M2

## 2020-09-22 LAB — HBA1C MFR BLD: 9.3 %

## 2020-09-22 PROCEDURE — 1123F ACP DISCUSS/DSCN MKR DOCD: CPT | Performed by: NURSE PRACTITIONER

## 2020-09-22 PROCEDURE — 3046F HEMOGLOBIN A1C LEVEL >9.0%: CPT | Performed by: NURSE PRACTITIONER

## 2020-09-22 PROCEDURE — 4040F PNEUMOC VAC/ADMIN/RCVD: CPT | Performed by: NURSE PRACTITIONER

## 2020-09-22 PROCEDURE — 2022F DILAT RTA XM EVC RTNOPTHY: CPT | Performed by: NURSE PRACTITIONER

## 2020-09-22 PROCEDURE — G8427 DOCREV CUR MEDS BY ELIG CLIN: HCPCS | Performed by: NURSE PRACTITIONER

## 2020-09-22 PROCEDURE — G8417 CALC BMI ABV UP PARAM F/U: HCPCS | Performed by: NURSE PRACTITIONER

## 2020-09-22 PROCEDURE — 90694 VACC AIIV4 NO PRSRV 0.5ML IM: CPT | Performed by: NURSE PRACTITIONER

## 2020-09-22 PROCEDURE — 99214 OFFICE O/P EST MOD 30 MIN: CPT | Performed by: NURSE PRACTITIONER

## 2020-09-22 PROCEDURE — G0008 ADMIN INFLUENZA VIRUS VAC: HCPCS | Performed by: NURSE PRACTITIONER

## 2020-09-22 PROCEDURE — 83036 HEMOGLOBIN GLYCOSYLATED A1C: CPT | Performed by: NURSE PRACTITIONER

## 2020-09-22 PROCEDURE — 3017F COLORECTAL CA SCREEN DOC REV: CPT | Performed by: NURSE PRACTITIONER

## 2020-09-22 PROCEDURE — 1036F TOBACCO NON-USER: CPT | Performed by: NURSE PRACTITIONER

## 2020-09-22 RX ORDER — NYSTATIN 100000 [USP'U]/G
POWDER TOPICAL
Qty: 60 G | Refills: 1 | Status: SHIPPED | OUTPATIENT
Start: 2020-09-22 | End: 2021-07-12 | Stop reason: SDUPTHER

## 2020-09-22 RX ORDER — ZOSTER VACCINE RECOMBINANT, ADJUVANTED 50 MCG/0.5
0.5 KIT INTRAMUSCULAR SEE ADMIN INSTRUCTIONS
Qty: 0.5 ML | Refills: 0 | Status: SHIPPED | OUTPATIENT
Start: 2020-09-22 | End: 2020-09-23

## 2020-09-22 ASSESSMENT — PATIENT HEALTH QUESTIONNAIRE - PHQ9
SUM OF ALL RESPONSES TO PHQ QUESTIONS 1-9: 0
1. LITTLE INTEREST OR PLEASURE IN DOING THINGS: 0
2. FEELING DOWN, DEPRESSED OR HOPELESS: 0
SUM OF ALL RESPONSES TO PHQ QUESTIONS 1-9: 0
SUM OF ALL RESPONSES TO PHQ9 QUESTIONS 1 & 2: 0

## 2020-09-22 NOTE — PROGRESS NOTES
[address] [facility] CARE  [address] [address]  [address] [address]  Dept: [phone]  Dept Fax: [phone]  Loc: [phone]    Geovani Horvath is a 75 y.o. male who presents today for his medical conditions/complaints as noted below. Geovani Horvath is c/o of Diabetes (Doing well no concerns )      Chief Complaint   Patient presents with    Diabetes     Doing well no concerns        HPI:     HPI   Patient here for follow up on DM, HTN, and GERD. He has been taking his meds. He did have COVID within the last few weeks. He reports symptoms have improved. He has been monitoring blood sugars and blood pressure as well.      Past Medical History:   Diagnosis Date    Aneurysm ([address]     abdominal    Bilateral leg edema     CAD (coronary artery disease)     Chronic kidney disease     Stage 4    Diabetes mellitus, type 2 (HCC)     Fatty liver     GERD (gastroesophageal reflux disease)     HTN (hypertension)     Seasonal allergies         Past Surgical History:   Procedure Laterality Date    CARPAL TUNNEL RELEASE      COLONOSCOPY  11/04/2015    DR Capone: polys, 5yr recall    COLONOSCOPY N/A 10/11/2016    Dr Lopez-diverticulosis, Sessile serrated AP (-) high grade dysplasia x 1, tubular AP (-) dysplasia x 2, HP x 2--3 yr recall    CORONARY ANGIOPLASTY WITH STENT PLACEMENT  06/04/2019    AGA to circ    CORONARY ANGIOPLASTY WITH STENT PLACEMENT  05/2018    AGA to osteal LAD    EYE SURGERY      Right eye    PRE-MALIGNANT / Magdalena Jointer      Dr. Rena Jauregui UPPER GASTROINTESTINAL ENDOSCOPY  9/20/2016    Dr Lopez-ramu/dilation over wire, [address]-distal esophageal narrowing, Inna (-)    UPPER GASTROINTESTINAL ENDOSCOPY  9/20/2016    Dr Lopez-ramu/dilation over wire, [address] [place]-distal esophageal narrowing, Inna (-)       Social History     Tobacco Use    Smoking status: Never Smoker    Smokeless tobacco: Never Used   Substance Use Topics    Alcohol mouth daily 90 tablet 3    Insulin Pen Needle ([employer] PEN NEEDLES) 31G X 6 MM MISC Dx: E11.9 150 each 3    furosemide (LASIX) 40 MG tablet Take 1 tablet by mouth daily 90 tablet 3    blood glucose test strips (ACCU-CHEK DUNIA) strip Patient is to test 8 times daily. Dx: E11.9  Please provide patient with Accu check Dunia Plus test strips per his Insurance request 900 each 3    nitroGLYCERIN (NITROSTAT) 0.4 MG SL tablet up to max of 3 total doses. If no relief after 1 dose, call 911. 25 tablet 3    ipratropium-albuterol (DUONEB) 0.5-2.5 (3) MG/3ML SOLN nebulizer solution Inhale 3 mLs into the lungs every 6 hours 360 mL 1    Lancets 30G MISC Use to check blood sugar 8 times daily. E11.9 300 each 5    triamcinolone (KENALOG) 0.025 % cream Apply topically 2 times daily. 1 Tube 0    guaiFENesin 400 MG tablet Take 1 tablet by mouth 4 times daily as needed for Cough 56 tablet 0    Alcohol Swabs PADS 1 box by Does not apply route 8 times daily 100 each 5    Blood Glucose Monitoring Suppl (ACURA BLOOD GLUCOSE METER) w/Device KIT Please provide patient with Accu check Dunia Meter per his Insurance request. Dx: E11.9 1 kit 0    albuterol sulfate HFA (VENTOLIN HFA) 108 (90 Base) MCG/ACT inhaler Inhale 2 puffs into the lungs every 6 hours as needed for Wheezing 1 Inhaler 3    gabapentin (NEURONTIN) 300 MG capsule TAKE 1 CAPSULE BY MOUTH THREE TIMES DAILY 270 capsule 1    insulin lispro, 1 Unit Dial, 100 UNIT/ML SOPN Inject 90 Units into the skin daily 10 pen 5    ferrous sulfate 325 (65 Fe) MG tablet Take 1 tablet by mouth daily (with breakfast) 90 tablet 3     No current facility-administered medications for this visit.         Allergies   Allergen Reactions    Lipitor [Atorvastatin Calcium]     Tape Alvena Copier Tape] Hives and Rash       Family History   Problem Relation Age of Onset    Colon Cancer Mother     Prostate Cancer Father     Other Father         HX of blood clot    Diabetes Sister     Cancer Sister     Diabetes Brother     Esophageal Cancer Neg Hx     Liver Cancer Neg Hx     Liver Disease Neg Hx     Stomach Cancer Neg Hx     Rectal Cancer Neg Hx                Subjective:      Review of Systems   Constitutional: Negative. HENT: Negative. Eyes: Negative. Respiratory: Positive for cough (improving). Cardiovascular: Negative. Gastrointestinal: Negative. Endocrine: Negative. Genitourinary: Negative. Musculoskeletal: Negative. Skin: Negative. Neurological: Negative. Hematological: Negative. Psychiatric/Behavioral: Negative. Objective:     Physical Exam  Vitals signs and nursing note reviewed. Constitutional:       Appearance: Normal appearance. HENT:      Head: Normocephalic and atraumatic. Right Ear: Hearing, tympanic membrane, ear canal and external ear normal.      Left Ear: Hearing, tympanic membrane, ear canal and external ear normal.      Nose: Nose normal.      Mouth/Throat:      Lips: Pink. Mouth: Mucous membranes are moist.      Pharynx: Oropharynx is clear. Eyes:      General: Lids are normal.      Extraocular Movements: Extraocular movements intact. Conjunctiva/sclera: Conjunctivae normal.      Pupils: Pupils are equal, round, and reactive to light. Neck:      Musculoskeletal: Full passive range of motion without pain, normal range of motion and neck supple. Thyroid: No thyromegaly. Cardiovascular:      Rate and Rhythm: Normal rate and regular rhythm. Pulses: Normal pulses. Dorsalis pedis pulses are 2+ on the right side and 2+ on the left side. Posterior tibial pulses are 2+ on the right side and 2+ on the left side. Heart sounds: Normal heart sounds. Pulmonary:      Effort: Pulmonary effort is normal.      Breath sounds: Normal breath sounds and air entry. Abdominal:      General: Bowel sounds are normal.      Palpations: Abdomen is soft.    Musculoskeletal:      Thoracic back: He exhibits normal range of motion and no tenderness. Lumbar back: He exhibits normal range of motion and no tenderness. Lymphadenopathy:      Cervical: No cervical adenopathy. Skin:     General: Skin is warm and dry. Capillary Refill: Capillary refill takes 2 to 3 seconds. Neurological:      General: No focal deficit present. Mental Status: He is alert and oriented to person, place, and time. Mental status is at baseline. Coordination: Coordination is intact. Psychiatric:         Mood and Affect: Mood normal.         Speech: Speech normal.         Behavior: Behavior normal.         Thought Content: Thought content normal.         Cognition and Memory: Cognition and memory normal.         Judgment: Judgment normal.         /82   Pulse 70   Temp 98.2 °F (36.8 °C) (Temporal)   Resp 18   Ht 5' 11\" (1.803 m)   Wt 254 lb (115.2 kg)   SpO2 98%   BMI 35.43 kg/m²     Assessment:      Diagnosis Orders   1. DM type 2, uncontrolled, with neuropathy ([facility] [address]  POCT glycosylated hemoglobin (Hb A1C)    External Referral To Podiatry   2. At high risk for falls     3. Toenail fungus  External Referral To Podiatry   4. Acute renal failure superimposed on stage 4 chronic kidney disease, unspecified acute renal failure type ([facility]     5. Medication refill  nystatin (MYCOSTATIN) 672418 UNIT/GM powder   6. Need for influenza vaccination  INFLUENZA, QUADV, ADJUVANTED, 65 YRS =, IM, PF, PREFILL SYR, 0.5ML (FLUAD)   7. History of 2019 novel coronavirus disease (COVID-19)     8. Morbidly obese ([facility] [address]         Results for orders placed or performed in visit on 09/22/20   POCT glycosylated hemoglobin (Hb A1C)   Result Value Ref Range    Hemoglobin A1C 9.3 %       Plan:     Referral to podiatry. We will continue current medication regimen. Refill on meds. Return in about 3 months (around 12/22/2020) for Office Visit, Follow up chronic conditions.     Orders Placed This Encounter   Procedures    Ashlee Vance,

## 2020-09-24 PROBLEM — E66.01 MORBIDLY OBESE (HCC): Status: ACTIVE | Noted: 2019-06-27

## 2020-09-24 ASSESSMENT — ENCOUNTER SYMPTOMS
GASTROINTESTINAL NEGATIVE: 1
EYES NEGATIVE: 1
COUGH: 1

## 2020-09-28 ENCOUNTER — TELEPHONE (OUTPATIENT)
Dept: VASCULAR SURGERY | Facility: CLINIC | Age: 65
End: 2020-09-28

## 2020-10-12 ENCOUNTER — DOCUMENTATION (OUTPATIENT)
Dept: ENDOCRINOLOGY | Facility: CLINIC | Age: 65
End: 2020-10-12

## 2020-10-16 ENCOUNTER — LAB (OUTPATIENT)
Dept: LAB | Facility: HOSPITAL | Age: 65
End: 2020-10-16

## 2020-10-16 ENCOUNTER — TRANSCRIBE ORDERS (OUTPATIENT)
Dept: LAB | Facility: HOSPITAL | Age: 65
End: 2020-10-16

## 2020-10-16 DIAGNOSIS — Z79.4 TYPE 2 DIABETES MELLITUS WITH HYPERGLYCEMIA, WITH LONG-TERM CURRENT USE OF INSULIN (HCC): ICD-10-CM

## 2020-10-16 DIAGNOSIS — E11.65 TYPE 2 DIABETES MELLITUS WITH HYPERGLYCEMIA, WITH LONG-TERM CURRENT USE OF INSULIN (HCC): Primary | ICD-10-CM

## 2020-10-16 DIAGNOSIS — E11.65 TYPE 2 DIABETES MELLITUS WITH HYPERGLYCEMIA, WITH LONG-TERM CURRENT USE OF INSULIN (HCC): ICD-10-CM

## 2020-10-16 DIAGNOSIS — Z79.4 TYPE 2 DIABETES MELLITUS WITH HYPERGLYCEMIA, WITH LONG-TERM CURRENT USE OF INSULIN (HCC): Primary | ICD-10-CM

## 2020-10-16 PROCEDURE — 84146 ASSAY OF PROLACTIN: CPT | Performed by: INTERNAL MEDICINE

## 2020-10-16 PROCEDURE — 83002 ASSAY OF GONADOTROPIN (LH): CPT | Performed by: INTERNAL MEDICINE

## 2020-10-16 PROCEDURE — 84270 ASSAY OF SEX HORMONE GLOBUL: CPT | Performed by: INTERNAL MEDICINE

## 2020-10-16 PROCEDURE — 83036 HEMOGLOBIN GLYCOSYLATED A1C: CPT | Performed by: INTERNAL MEDICINE

## 2020-10-16 PROCEDURE — 85025 COMPLETE CBC W/AUTO DIFF WBC: CPT | Performed by: INTERNAL MEDICINE

## 2020-10-16 PROCEDURE — 80053 COMPREHEN METABOLIC PANEL: CPT | Performed by: INTERNAL MEDICINE

## 2020-10-16 PROCEDURE — 84443 ASSAY THYROID STIM HORMONE: CPT

## 2020-10-16 PROCEDURE — 84439 ASSAY OF FREE THYROXINE: CPT

## 2020-10-16 PROCEDURE — 84403 ASSAY OF TOTAL TESTOSTERONE: CPT | Performed by: INTERNAL MEDICINE

## 2020-10-16 PROCEDURE — 83001 ASSAY OF GONADOTROPIN (FSH): CPT | Performed by: INTERNAL MEDICINE

## 2020-10-16 PROCEDURE — 82306 VITAMIN D 25 HYDROXY: CPT | Performed by: INTERNAL MEDICINE

## 2020-10-16 PROCEDURE — 80061 LIPID PANEL: CPT | Performed by: INTERNAL MEDICINE

## 2020-10-16 PROCEDURE — 82607 VITAMIN B-12: CPT | Performed by: INTERNAL MEDICINE

## 2020-10-16 PROCEDURE — 84402 ASSAY OF FREE TESTOSTERONE: CPT | Performed by: INTERNAL MEDICINE

## 2020-10-16 PROCEDURE — 84410 TESTOSTERONE BIOAVAILABLE: CPT | Performed by: INTERNAL MEDICINE

## 2020-10-17 LAB
25(OH)D3 SERPL-MCNC: 27.2 NG/ML (ref 30–100)
ALBUMIN SERPL-MCNC: 4.3 G/DL (ref 3.5–5.2)
ALBUMIN/GLOB SERPL: 1.4 G/DL
ALP SERPL-CCNC: 80 U/L (ref 39–117)
ALT SERPL W P-5'-P-CCNC: 18 U/L (ref 1–41)
ANION GAP SERPL CALCULATED.3IONS-SCNC: 7.8 MMOL/L (ref 5–15)
AST SERPL-CCNC: 14 U/L (ref 1–40)
BASOPHILS # BLD AUTO: 0.11 10*3/MM3 (ref 0–0.2)
BASOPHILS NFR BLD AUTO: 1.8 % (ref 0–1.5)
BILIRUB SERPL-MCNC: 0.4 MG/DL (ref 0–1.2)
BUN SERPL-MCNC: 24 MG/DL (ref 8–23)
BUN/CREAT SERPL: 15.3 (ref 7–25)
CALCIUM SPEC-SCNC: 10.8 MG/DL (ref 8.6–10.5)
CHLORIDE SERPL-SCNC: 100 MMOL/L (ref 98–107)
CHOLEST SERPL-MCNC: 188 MG/DL (ref 0–200)
CO2 SERPL-SCNC: 29.2 MMOL/L (ref 22–29)
CREAT SERPL-MCNC: 1.57 MG/DL (ref 0.76–1.27)
DEPRECATED RDW RBC AUTO: 43.2 FL (ref 37–54)
EOSINOPHIL # BLD AUTO: 0.29 10*3/MM3 (ref 0–0.4)
EOSINOPHIL NFR BLD AUTO: 4.6 % (ref 0.3–6.2)
ERYTHROCYTE [DISTWIDTH] IN BLOOD BY AUTOMATED COUNT: 14.6 % (ref 12.3–15.4)
FSH SERPL-ACNC: 4.26 MIU/ML
GFR SERPL CREATININE-BSD FRML MDRD: 44 ML/MIN/1.73
GLOBULIN UR ELPH-MCNC: 3 GM/DL
GLUCOSE SERPL-MCNC: 102 MG/DL (ref 65–99)
HBA1C MFR BLD: 9 % (ref 4.8–5.6)
HCT VFR BLD AUTO: 45.9 % (ref 37.5–51)
HDLC SERPL-MCNC: 26 MG/DL (ref 40–60)
HGB BLD-MCNC: 14.9 G/DL (ref 13–17.7)
IMM GRANULOCYTES # BLD AUTO: 0.01 10*3/MM3 (ref 0–0.05)
IMM GRANULOCYTES NFR BLD AUTO: 0.2 % (ref 0–0.5)
LDLC SERPL CALC-MCNC: 97 MG/DL (ref 0–100)
LDLC/HDLC SERPL: 3.28 {RATIO}
LH SERPL-ACNC: 5.77 MIU/ML
LYMPHOCYTES # BLD AUTO: 2.03 10*3/MM3 (ref 0.7–3.1)
LYMPHOCYTES NFR BLD AUTO: 32.4 % (ref 19.6–45.3)
MCH RBC QN AUTO: 26.7 PG (ref 26.6–33)
MCHC RBC AUTO-ENTMCNC: 32.5 G/DL (ref 31.5–35.7)
MCV RBC AUTO: 82.3 FL (ref 79–97)
MONOCYTES # BLD AUTO: 0.53 10*3/MM3 (ref 0.1–0.9)
MONOCYTES NFR BLD AUTO: 8.5 % (ref 5–12)
NEUTROPHILS NFR BLD AUTO: 3.3 10*3/MM3 (ref 1.7–7)
NEUTROPHILS NFR BLD AUTO: 52.5 % (ref 42.7–76)
NRBC BLD AUTO-RTO: 0 /100 WBC (ref 0–0.2)
PLATELET # BLD AUTO: 205 10*3/MM3 (ref 140–450)
PMV BLD AUTO: 12.5 FL (ref 6–12)
POTASSIUM SERPL-SCNC: 4.3 MMOL/L (ref 3.5–5.2)
PROLACTIN SERPL-MCNC: 8.34 NG/ML (ref 4.04–15.2)
PROT SERPL-MCNC: 7.3 G/DL (ref 6–8.5)
RBC # BLD AUTO: 5.58 10*6/MM3 (ref 4.14–5.8)
SODIUM SERPL-SCNC: 137 MMOL/L (ref 136–145)
T4 FREE SERPL-MCNC: 1.19 NG/DL (ref 0.93–1.7)
TRIGL SERPL-MCNC: 383 MG/DL (ref 0–150)
TSH SERPL DL<=0.05 MIU/L-ACNC: 2.83 UIU/ML (ref 0.27–4.2)
VIT B12 BLD-MCNC: 594 PG/ML (ref 211–946)
VLDLC SERPL-MCNC: 65 MG/DL (ref 5–40)
WBC # BLD AUTO: 6.27 10*3/MM3 (ref 3.4–10.8)

## 2020-10-18 LAB — SHBG SERPL-SCNC: 19.3 NMOL/L (ref 19.3–76.4)

## 2020-10-20 ENCOUNTER — OFFICE VISIT (OUTPATIENT)
Dept: ENDOCRINOLOGY | Facility: CLINIC | Age: 65
End: 2020-10-20

## 2020-10-20 ENCOUNTER — LAB (OUTPATIENT)
Dept: LAB | Facility: HOSPITAL | Age: 65
End: 2020-10-20

## 2020-10-20 DIAGNOSIS — E29.1 MALE HYPOGONADISM: ICD-10-CM

## 2020-10-20 DIAGNOSIS — Z79.4 TYPE 2 DIABETES MELLITUS WITH HYPERGLYCEMIA, WITH LONG-TERM CURRENT USE OF INSULIN (HCC): Primary | ICD-10-CM

## 2020-10-20 DIAGNOSIS — E78.2 MIXED DIABETIC HYPERLIPIDEMIA ASSOCIATED WITH TYPE 2 DIABETES MELLITUS (HCC): ICD-10-CM

## 2020-10-20 DIAGNOSIS — E55.9 VITAMIN D DEFICIENCY: ICD-10-CM

## 2020-10-20 DIAGNOSIS — E11.59 HYPERTENSION ASSOCIATED WITH DIABETES (HCC): ICD-10-CM

## 2020-10-20 DIAGNOSIS — E11.69 MIXED DIABETIC HYPERLIPIDEMIA ASSOCIATED WITH TYPE 2 DIABETES MELLITUS (HCC): ICD-10-CM

## 2020-10-20 DIAGNOSIS — I15.2 HYPERTENSION ASSOCIATED WITH DIABETES (HCC): ICD-10-CM

## 2020-10-20 DIAGNOSIS — E11.65 TYPE 2 DIABETES MELLITUS WITH HYPERGLYCEMIA, WITH LONG-TERM CURRENT USE OF INSULIN (HCC): Primary | ICD-10-CM

## 2020-10-20 PROCEDURE — 99214 OFFICE O/P EST MOD 30 MIN: CPT | Performed by: INTERNAL MEDICINE

## 2020-10-20 PROCEDURE — 82043 UR ALBUMIN QUANTITATIVE: CPT | Performed by: INTERNAL MEDICINE

## 2020-10-20 PROCEDURE — 82570 ASSAY OF URINE CREATININE: CPT | Performed by: INTERNAL MEDICINE

## 2020-10-20 NOTE — PROGRESS NOTES
" Mick Sims is a 64 y.o. male who presents for follow up   Type 2 diabetes w hyperglycemia       Referring provider     Primary Care Provider    Bimal Lee MD    Duration 10 years    Timing - Diabetes is Constant    Quality -  Loss of control    Severity -  severe    Complications - nephropathy, retinopathy, peripheral neuropathy and cardiovascular disease    Current symptoms/problems  paresthesia of the feet and visual disturbances     Alleviating Factors: Compliance       Side Effects  none    Current diet  in general, a \"healthy\" diet      Current exercise none    Current monitoring regimen: home blood tests - checking 4 x daily     Home blood sugar records:     Using brenna, not available     Hypoglycemia unawareness and nocturnal, none since last visit       Past Medical History:   Diagnosis Date   • Arthritis    • Diabetes mellitus (CMS/HCC)    • Hypertension    • Renal disorder      Family History   Problem Relation Age of Onset   • Cancer Mother    • Diabetes Sister    • Diabetes Brother    • No Known Problems Brother      Social History     Tobacco Use   • Smoking status: Never Smoker   • Smokeless tobacco: Never Used   Substance Use Topics   • Alcohol use: No   • Drug use: Not on file         Current Outpatient Medications:   •  aspirin 81 MG chewable tablet, Chew 81 mg Daily., Disp: , Rfl:   •  atorvastatin (LIPITOR) 80 MG tablet, Take 80 mg by mouth Daily., Disp: , Rfl:   •  Canagliflozin (INVOKANA) 100 MG tablet, 100 mg po daily, Disp: 30 tablet, Rfl: 11  •  fluticasone (FLONASE) 50 MCG/ACT nasal spray, 1 spray into each nostril 2 (Two) Times a Day As Needed for rhinitis or allergies., Disp: , Rfl:   •  furosemide (LASIX) 40 MG tablet, Take 40 mg by mouth 2 (Two) Times a Day., Disp: , Rfl:   •  gabapentin (NEURONTIN) 100 MG capsule, Take 100 mg by mouth Every Night., Disp: , Rfl:   •  gabapentin (NEURONTIN) 300 MG capsule, Take 300 mg by mouth 3 (Three) Times a Day., Disp: , Rfl:   •  " guaiFENesin (MUCINEX) 600 MG 12 hr tablet, Take 2 tablets by mouth 2 (Two) Times a Day., Disp: 20 tablet, Rfl: 0  •  hydrochlorothiazide (HYDRODIURIL) 25 MG tablet, Take 12.5 mg by mouth Every Night. In addition to avalide, Disp: , Rfl:   •  Insulin Glargine, 2 Unit Dial, (TOUJEO MAX SOLOSTAR) 300 UNIT/ML solution pen-injector injection, Inject 80 Units under the skin into the appropriate area as directed Daily., Disp: 3 pen, Rfl: 11  •  Insulin Lispro, 1 Unit Dial, (HumaLOG KwikPen) 100 UNIT/ML solution pen-injector, Inject 40 Units under the skin into the appropriate area as directed 3 (Three) Times a Day., Disp: 42 mL, Rfl: 11  •  Insulin Pen Needle (PEN NEEDLES) 32G X 4 MM misc, 1 each 4 (Four) Times a Day. Use 4 times per day to inject insulin DX E11.9, Disp: 400 each, Rfl: 3  •  irbesartan (AVAPRO) 150 MG tablet, Take 150 mg by mouth Every Night., Disp: , Rfl:   •  lansoprazole (PREVACID) 30 MG capsule, Take 30 mg by mouth Every Night., Disp: , Rfl:   •  metFORMIN (GLUCOPHAGE) 1000 MG tablet, Take 1,000 mg by mouth 2 (Two) Times a Day. Needs pm dose, Disp: , Rfl:   •  metoprolol tartrate (LOPRESSOR) 50 MG tablet, Take 1 tablet by mouth 2 (Two) Times a Day., Disp: 30 tablet, Rfl: 0  •  montelukast (SINGULAIR) 10 MG tablet, Take 10 mg by mouth At Night As Needed (for allergies)., Disp: , Rfl:   •  Multiple Vitamins-Minerals (CENTRUM SILVER 50+MEN PO), Take 1 tablet by mouth Daily., Disp: , Rfl:   •  nitroglycerin (NITRODUR) 0.4 MG/HR patch, Place 1 patch on the skin as directed by provider Daily., Disp: , Rfl:   •  prasugrel (EFFIENT) 10 MG tablet, Take 10 mg by mouth Daily., Disp: , Rfl:   •  raNITIdine (ZANTAC) 75 MG tablet, Take 75 mg by mouth Daily As Needed for indigestion or heartburn., Disp: , Rfl:   •  Semaglutide,0.25 or 0.5MG/DOS, (Ozempic, 0.25 or 0.5 MG/DOSE,) 2 MG/1.5ML solution pen-injector, Inject 0.5 mg under the skin into the appropriate area as directed 1 (One) Time Per Week. 0.5 mg weekly,  Disp: 1 pen, Rfl: 11  •  vitamin D (ERGOCALCIFEROL) 1.25 MG (60457 UT) capsule capsule, Take 50,000 Units by mouth 1 (One) Time Per Week., Disp: , Rfl:     Review of Systems    Review of Systems   Constitutional: Negative for activity change, appetite change, chills, diaphoresis, fatigue, fever and unexpected weight change.   HENT: Negative for congestion, dental problem, drooling, ear discharge, ear pain, facial swelling, mouth sores, postnasal drip, rhinorrhea, sinus pressure, sore throat, tinnitus, trouble swallowing and voice change.    Eyes: Negative for photophobia, pain, discharge, redness, itching and visual disturbance.   Respiratory: Negative for apnea, cough, choking, chest tightness, shortness of breath, wheezing and stridor.    Cardiovascular: Negative for chest pain, palpitations and leg swelling.   Gastrointestinal: Negative for abdominal distention, abdominal pain, constipation, diarrhea, nausea and vomiting.   Endocrine: Negative for cold intolerance, heat intolerance, polydipsia, polyphagia and polyuria.   Genitourinary: Negative for decreased urine volume, difficulty urinating, dysuria, flank pain, frequency, hematuria and urgency.   Musculoskeletal: Negative for arthralgias, back pain, gait problem, joint swelling, myalgias, neck pain and neck stiffness.   Skin: Negative for color change, pallor, rash and wound.   Allergic/Immunologic: Negative for immunocompromised state.   Neurological: Negative for dizziness, tremors, seizures, syncope, facial asymmetry, speech difficulty, weakness, light-headedness, numbness and headaches.   Hematological: Negative for adenopathy.   Psychiatric/Behavioral: Negative for agitation, behavioral problems, confusion, decreased concentration, dysphoric mood, hallucinations, self-injury, sleep disturbance and suicidal ideas. The patient is not nervous/anxious and is not hyperactive.         Objective:   There were no vitals taken for this visit.    Physical Exam    Constitutional: He appears well-developed and well-nourished. No distress.   HENT:   Head: Normocephalic.   Right Ear: External ear normal.   Left Ear: External ear normal.   Nose: Nose normal.   Eyes: Conjunctivae and EOM are normal. Right eye exhibits no discharge. Left eye exhibits no discharge. No scleral icterus.   Neck: Neck normal appearance.No JVD present. No tracheal deviation present. No thyromegaly present.   Cardiovascular:   No conjunctival pallor noted.    Pulmonary/Chest: Effort normal. No stridor.  No respiratory distress. He no audible wheeze...He exhibits no tenderness.   Abdominal: Abdomen appears normal. Soft. He exhibits no distension and no visible mass. There is no abdominal tenderness. No visible hernia present.   Musculoskeletal: Normal range of motion.         General: No deformity, edema or no effusion.   Lymphadenopathy:     He has no cervical adenopathy.   Neurological: He is alert. No cranial nerve deficit. Coordination normal.   Skin: No rash noted. He is not diaphoretic. No nail bed cyanosis or erythema. No pallor. Nails show no clubbing.   Psychiatric: He mood appears normal. His affect is normal. His behavior is normal. Thought content is normal. He does not express abnormal judgement.       Lab Review          Assessment/Plan       ICD-10-CM ICD-9-CM   1. Type 2 diabetes mellitus with hyperglycemia, with long-term current use of insulin (CMS/formerly [facility])  E11.65 250.00    Z79.4 790.29     V58.67   2. Hypertension associated with diabetes (CMS/formerly [facility])  E11.59 250.80    I10 401.9   3. Mixed diabetic hyperlipidemia associated with type 2 diabetes mellitus (CMS/formerly [facility])  E11.69 250.80    E78.2 272.2   4. Vitamin D deficiency  E55.9 268.9   5. Male hypogonadism  E29.1 257.2       Pt has type 2 diabetes w ckd and cad     Glycemic Management:   Lab Results   Component Value Date    HGBA1C 9.00 (H) 10/16/2020    HGBA1C 10.2 (H) 12/13/2019    HGBA1C 8.8 (H) 12/18/2018     Lab Results   Component Value  Date    GLUCOSE 102 (H) 10/16/2020    BUN 24 (H) 10/16/2020    CREATININE 1.57 (H) 10/16/2020    EGFRIFNONA 44 (L) 10/16/2020    BCR 15.3 10/16/2020    K 4.3 10/16/2020    CO2 29.2 (H) 10/16/2020    CALCIUM 10.8 (H) 10/16/2020    ALBUMIN 4.30 10/16/2020    AST 14 10/16/2020    ALT 18 10/16/2020    ANIONGAP 7.8 10/16/2020     Lab Results   Component Value Date    WBC 6.27 10/16/2020    HGB 14.9 10/16/2020    HCT 45.9 10/16/2020    MCV 82.3 10/16/2020     10/16/2020       toujeo 40 -- this seems to be working = now doing 30 -- decrease to 28     ========================================    Ozempic      0.5 mg weekly      ============================================    Metformin 1000 mg twice a day ===     ============================================      Invokana 100mg  daily      ============================================    Humalog     4 units per 15 grams      Fercho available but not downloaded just read over device    No doing mealtime insulin , wife is sick, needs mealtime insulin   ======================      bp controlled on irbesartan     Lipids controlled on lipitor    ckd stage III , steady     Vit D low, start 1000 units daily     This document has been electronically signed by Juancho Mobley MD on October 20, 2020 16:16 CDT          A copy of my note was sent to Bimal Lee MD    Please see my above opinion and suggestions.

## 2020-10-21 LAB
ALBUMIN UR-MCNC: 3.3 MG/DL
CREAT UR-MCNC: 82.7 MG/DL
MICROALBUMIN/CREAT UR: 39.9 MG/G

## 2020-10-23 LAB
TESTOST FREE MFR SERPL: 2.4 %
TESTOST FREE SERPL-MCNC: 61 PG/ML
TESTOST SERPL-MCNC: 256 NG/DL
TESTOST SERPL-MCNC: 256 NG/DL
TESTOSTERONE.FREE+WB MFR SERPL: 47.9 %
TESTOSTERONE.FREE+WB SERPL-MCNC: 123 NG/DL

## 2020-10-26 NOTE — PROGRESS NOTES
[facility] - PODIATRY    Today's Date: 10/27/20    Patient Name: Mick Sims  MRN: [id number]  CSN: [id number]  PCP: Ginny Collazo APRN   Referring Provider: No ref. provider found    SUBJECTIVE     Chief Complaint   Patient presents with   • Follow-up     pt is here for diabetic foot/nail care, pt c/o long, thickened toenail,  - pt admits to Neuropathy - pcp 09/22/2020   • Diabetes     last blood sugar reading is 165mg/dl      HPI: Mick Sims, a 64 y.o.male, comes to clinic as a(n) new patient presenting for diabetic foot exam and complaining of thick fungal toenails. Patient has h/o arthritis, DM2, HTN, Renal Disorder. Patient is IDDM with last stated BG level of 165mg/dl. Admits to mild numbness and tingling in feet. Denies open wounds or sores. Admits to mild swelling in legs that he wears mild compression stockings for. States that his toenails are long, thick and discolored. He has trouble caring for them himself. Denies pain. Denies previous treatment. Denies any constitutional symptoms. No other pedal complaints at this time.    Past Medical History:   Diagnosis Date   • Arthritis    • Diabetes mellitus (CMS/HCC)    • Hypertension    • Renal disorder      Past Surgical History:   Procedure Laterality Date   • CARPAL TUNNEL RELEASE       Family History   Problem Relation Age of Onset   • Cancer Mother    • Diabetes Sister    • Diabetes Brother    • No Known Problems Brother      Social History     Socioeconomic History   • Marital status:      Spouse name: Not on file   • Number of children: Not on file   • Years of education: Not on file   • Highest education level: Not on file   Tobacco Use   • Smoking status: Never Smoker   • Smokeless tobacco: Never Used   Substance and Sexual Activity   • Alcohol use: No   • Drug use: Never   • Sexual activity: Defer     Allergies   Allergen Reactions   • Atorvastatin Calcium Unknown - Low Severity     Current Outpatient Medications      Medication Sig Dispense Refill   • aspirin 81 MG chewable tablet Chew 81 mg Daily.     • Canagliflozin (INVOKANA) 100 MG tablet 100 mg po daily 30 tablet 11   • fluticasone (FLONASE) 50 MCG/ACT nasal spray 1 spray into each nostril 2 (Two) Times a Day As Needed for rhinitis or allergies.     • furosemide (LASIX) 40 MG tablet Take 40 mg by mouth 2 (Two) Times a Day.     • gabapentin (NEURONTIN) 100 MG capsule Take 100 mg by mouth Every Night.     • gabapentin (NEURONTIN) 300 MG capsule Take 300 mg by mouth 3 (Three) Times a Day.     • guaiFENesin (MUCINEX) 600 MG 12 hr tablet Take 2 tablets by mouth 2 (Two) Times a Day. 20 tablet 0   • hydrochlorothiazide (HYDRODIURIL) 25 MG tablet Take 12.5 mg by mouth Every Night. In addition to avalide     • Insulin Glargine, 2 Unit Dial, (TOUJEO MAX SOLOSTAR) 300 UNIT/ML solution pen-injector injection Inject 80 Units under the skin into the appropriate area as directed Daily. 3 pen 11   • Insulin Lispro, 1 Unit Dial, (HumaLOG KwikPen) 100 UNIT/ML solution pen-injector Inject 40 Units under the skin into the appropriate area as directed 3 (Three) Times a Day. 42 mL 11   • Insulin Pen Needle (PEN NEEDLES) 32G X 4 MM misc 1 each 4 (Four) Times a Day. Use 4 times per day to inject insulin DX E11.9 400 each 3   • irbesartan (AVAPRO) 150 MG tablet Take 150 mg by mouth Every Night.     • lansoprazole (PREVACID) 30 MG capsule Take 30 mg by mouth Every Night.     • metFORMIN (GLUCOPHAGE) 1000 MG tablet Take 1,000 mg by mouth 2 (Two) Times a Day. Needs pm dose     • metoprolol tartrate (LOPRESSOR) 50 MG tablet Take 1 tablet by mouth 2 (Two) Times a Day. 30 tablet 0   • montelukast (SINGULAIR) 10 MG tablet Take 10 mg by mouth At Night As Needed (for allergies).     • Multiple Vitamins-Minerals (CENTRUM SILVER 50+MEN PO) Take 1 tablet by mouth Daily.     • nitroglycerin (NITRODUR) 0.4 MG/HR patch Place 1 patch on the skin as directed by provider Daily.     • prasugrel (EFFIENT) 10 MG  tablet Take 10 mg by mouth Daily.     • raNITIdine (ZANTAC) 75 MG tablet Take 75 mg by mouth Daily As Needed for indigestion or heartburn.     • Semaglutide,0.25 or 0.5MG/DOS, (Ozempic, 0.25 or 0.5 MG/DOSE,) 2 MG/1.5ML solution pen-injector Inject 0.5 mg under the skin into the appropriate area as directed 1 (One) Time Per Week. 0.5 mg weekly 1 pen 11   • vitamin D (ERGOCALCIFEROL) 1.25 MG (09323 UT) capsule capsule Take 50,000 Units by mouth 1 (One) Time Per Week.     • atorvastatin (LIPITOR) 80 MG tablet Take 80 mg by mouth Daily.       No current facility-administered medications for this visit.      Review of Systems   Constitutional: Negative for chills and fever.   HENT: Negative for congestion.    Respiratory: Negative for shortness of breath.    Cardiovascular: Positive for leg swelling. Negative for chest pain.   Gastrointestinal: Negative for constipation, diarrhea, nausea and vomiting.   Musculoskeletal:        Foot pain   Skin: Negative for wound.   Neurological: Positive for numbness.       OBJECTIVE     Vitals:    10/27/20 1127   BP: 124/70   Pulse: 74   SpO2: 95%       PHYSICAL EXAM  GEN:   Accompanied by wife.     Foot/Ankle Exam:       General:   Diabetic Foot Exam Performed    Appearance: appears stated age and healthy    Orientation: AAOx3    Affect: appropriate    Gait: unimpaired    Assistance: independent    Shoe Gear:  Casual shoes    VASCULAR      Right Foot Vascularity   Dorsalis pedis:  2+  Posterior tibial:  2+  Skin Temperature: warm    Edema Grading:  Trace  CFT:  3  Pedal Hair Growth:  Present  Varicosities: mild varicosities       Left Foot Vascularity   Dorsalis pedis:  2+  Posterior tibial:  2+  Skin Temperature: warm    Edema Grading:  Trace  CFT:  3  Pedal Hair Growth:  Present  Varicosities: mild varicosities        NEUROLOGIC     Right Foot Neurologic   Light touch sensation:  Diminished  Vibratory sensation:  Diminished  Hot/Cold sensation: diminished    Protective Sensation  using [place] Monofilament:  8     Left Foot Neurologic   Light touch sensation:  Diminished  Vibratory sensation:  Diminished  Hot/cold sensation: diminished    Protective Sensation using [place] Monofilament:  7     MUSCULOSKELETAL      Right Foot Musculoskeletal   Ecchymosis:  None  Tenderness: none    Arch:  Normal     Left Foot Musculoskeletal   Ecchymosis:  None  Tenderness: none    Arch:  Normal     MUSCLE STRENGTH     Right Foot Muscle Strength   Foot dorsiflexion:  5  Foot plantar flexion:  5  Foot inversion:  5  Foot eversion:  5     Left Foot Muscle Strength   Foot dorsiflexion:  5  Foot plantar flexion:  5  Foot inversion:  5  Foot eversion:  5     RANGE OF MOTION      Right Foot Range of Motion   Foot and ankle ROM within normal limits       Left Foot Range of Motion   Foot and ankle ROM within normal limits       DERMATOLOGIC     Right Foot Dermatologic   Skin: skin intact    Nails: onychomycosis, abnormally thick, subungual debris and dystrophic nails       Left Foot Dermatologic   Skin: skin intact    Nails: onychomycosis, abnormally thick, subungual debris and dystrophic nails        RADIOLOGY/NUCLEAR:  No results found.    LABORATORY/CULTURE RESULTS:      PATHOLOGY RESULTS:       ASSESSMENT/PLAN     Diagnoses and all orders for this visit:    1. Onychomycosis (Primary)    2. Type 2 diabetes mellitus with diabetic neuropathy, with long-term current use of insulin (CMS/[facility])    3. Peripheral edema      Comprehensive lower extremity examination and evaluation was performed.  Discussed findings and treatment plan including risks, benefits, and treatment options with patient in detail. Patient agreed with treatment plan.  After verbal consent obtained, nail(s) x10 debrided of length and thickness with nail nipper without incidence  Patient may maintain nails and calluses at home utilizing emery board or pumice stone between visits as needed  Reviewed at home diabetic foot care including  daily foot checks   Continue daily use of compression stockings.  An After Visit Summary was printed and given to the patient at discharge, including (if requested) any available informative/educational handouts regarding diagnosis, treatment, or medications. All questions were answered to patient/family satisfaction. Should symptoms fail to improve or worsen they agree to call or return to clinic or to go to the Emergency Department. Discussed the importance of following up with any needed screening tests/labs/specialist appointments and any requested follow-up recommended by me today. Importance of maintaining follow-up discussed and patient accepts that missed appointments can delay diagnosis and potentially lead to worsening of conditions.  Return in about 3 months (around 1/27/2021)., or sooner if acute issues arise.        This document has been electronically signed by Mehdi Valencia DPM on October 27, 2020 11:51 CDT

## 2020-10-27 ENCOUNTER — OFFICE VISIT (OUTPATIENT)
Dept: PODIATRY | Facility: CLINIC | Age: 65
End: 2020-10-27

## 2020-10-27 VITALS
DIASTOLIC BLOOD PRESSURE: 70 MMHG | WEIGHT: 265 LBS | HEART RATE: 74 BPM | BODY MASS INDEX: 35.89 KG/M2 | SYSTOLIC BLOOD PRESSURE: 124 MMHG | HEIGHT: 72 IN | OXYGEN SATURATION: 95 %

## 2020-10-27 DIAGNOSIS — R60.9 PERIPHERAL EDEMA: ICD-10-CM

## 2020-10-27 DIAGNOSIS — Z79.4 TYPE 2 DIABETES MELLITUS WITH DIABETIC NEUROPATHY, WITH LONG-TERM CURRENT USE OF INSULIN (HCC): ICD-10-CM

## 2020-10-27 DIAGNOSIS — B35.1 ONYCHOMYCOSIS: Primary | ICD-10-CM

## 2020-10-27 DIAGNOSIS — E11.40 TYPE 2 DIABETES MELLITUS WITH DIABETIC NEUROPATHY, WITH LONG-TERM CURRENT USE OF INSULIN (HCC): ICD-10-CM

## 2020-10-27 PROCEDURE — 99203 OFFICE O/P NEW LOW 30 MIN: CPT | Performed by: PODIATRIST

## 2020-10-27 PROCEDURE — 11721 DEBRIDE NAIL 6 OR MORE: CPT | Performed by: PODIATRIST

## 2020-11-06 ENCOUNTER — DOCUMENTATION (OUTPATIENT)
Dept: ENDOCRINOLOGY | Facility: CLINIC | Age: 65
End: 2020-11-06

## 2020-11-20 RX ORDER — METOPROLOL TARTRATE 50 MG/1
TABLET, FILM COATED ORAL
Qty: 180 TABLET | Refills: 3 | Status: SHIPPED | OUTPATIENT
Start: 2020-11-20 | End: 2021-12-07

## 2020-12-02 ENCOUNTER — OFFICE VISIT (OUTPATIENT)
Dept: GASTROENTEROLOGY | Age: 65
End: 2020-12-02
Payer: MEDICARE

## 2020-12-02 VITALS
BODY MASS INDEX: 37.66 KG/M2 | DIASTOLIC BLOOD PRESSURE: 70 MMHG | HEIGHT: 71 IN | SYSTOLIC BLOOD PRESSURE: 130 MMHG | HEART RATE: 71 BPM | OXYGEN SATURATION: 100 % | WEIGHT: 269 LBS

## 2020-12-02 PROCEDURE — 99214 OFFICE O/P EST MOD 30 MIN: CPT | Performed by: NURSE PRACTITIONER

## 2020-12-02 PROCEDURE — G8427 DOCREV CUR MEDS BY ELIG CLIN: HCPCS | Performed by: NURSE PRACTITIONER

## 2020-12-02 PROCEDURE — 4040F PNEUMOC VAC/ADMIN/RCVD: CPT | Performed by: NURSE PRACTITIONER

## 2020-12-02 PROCEDURE — 3017F COLORECTAL CA SCREEN DOC REV: CPT | Performed by: NURSE PRACTITIONER

## 2020-12-02 PROCEDURE — 1036F TOBACCO NON-USER: CPT | Performed by: NURSE PRACTITIONER

## 2020-12-02 PROCEDURE — 1123F ACP DISCUSS/DSCN MKR DOCD: CPT | Performed by: NURSE PRACTITIONER

## 2020-12-02 PROCEDURE — G8417 CALC BMI ABV UP PARAM F/U: HCPCS | Performed by: NURSE PRACTITIONER

## 2020-12-02 PROCEDURE — G8484 FLU IMMUNIZE NO ADMIN: HCPCS | Performed by: NURSE PRACTITIONER

## 2020-12-02 RX ORDER — MONTELUKAST SODIUM 10 MG/1
10 TABLET ORAL NIGHTLY
COMMUNITY
End: 2021-02-10 | Stop reason: ALTCHOICE

## 2020-12-02 RX ORDER — VITAMIN B COMPLEX
1 TABLET ORAL DAILY
COMMUNITY
End: 2021-08-10

## 2020-12-02 ASSESSMENT — ENCOUNTER SYMPTOMS
NAUSEA: 0
CHOKING: 0
ABDOMINAL DISTENTION: 0
SHORTNESS OF BREATH: 0
ABDOMINAL PAIN: 1
VOMITING: 0
BLOOD IN STOOL: 0
CONSTIPATION: 1
TROUBLE SWALLOWING: 1
DIARRHEA: 1
RECTAL PAIN: 0
ANAL BLEEDING: 0
COUGH: 0

## 2020-12-02 NOTE — PROGRESS NOTES
Subjective:     Patient ID: Nichole Akers is a 77 y.o. male  PCP: MIMI Stark  Referring Provider: No ref. provider found    HPI  Patient presents to the office today with the following complaints: Colonoscopy      Pt here to schedule colonoscopy. Today, pt reports stools alternate between loose stools and hard stools. This began in the last year. He reports hard stools are associated with blood in stool. Lower abdominal discomfort. He reports chronic GERD. He is currently taking Prevacid. This has helped. He reports trouble swallowing in the last couple years. Solids only. Last Colonoscopy 2016 - polyps, 3 year recall  Positive family history of colon cancer - Mother     Hx CAD with stent 2019  Daily use of Effient    This was my first time assessing Mr. Alejandro Hairston    Assessment:     1. Change in bowel habits    2. Gastroesophageal reflux disease, unspecified whether esophagitis present    3. Dysphagia, unspecified type    4. Current use of anticoagulant therapy            Plan:   - Continue Prevacid  - Schedule colonoscopy and endoscopy  - Obtain clearance to hold Effient from [address] [place] [address] on bowel prep. Nothing to eat or drink after midnight the day of the exam.  Unable to drive for 24 hours after the procedure. No aspirin or nonsteroidal anti-inflammatories for 5 days before procedure. I have discussed the benefits, alternatives, and risks (including bleeding, perforation and death)  for pursuing Endoscopy (EGD/Colonscopy/EUS/ERCP) with the patient and they are willing to continue. We also discussed the need for anesthesia, IV access, proper dietary changes, medication changes if necessary, and need for bowel prep (if ordered) prior to their Endoscopic procedure. They are aware they must have someone accompany them to their scheduled procedure to drive them home - they agree to the above and are willing to continue.         Orders  No orders of the defined types were placed in this encounter. Medications  No orders of the defined types were placed in this encounter.         Patient History:     Past Medical History:   Diagnosis Date    Aneurysm ([address]     abdominal    Bilateral leg edema     CAD (coronary artery disease)     Chronic kidney disease     Stage 4    Diabetes mellitus, type 2 (HCC)     Fatty liver     GERD (gastroesophageal reflux disease)     HTN (hypertension)     Seasonal allergies        Past Surgical History:   Procedure Laterality Date    CARPAL TUNNEL RELEASE      COLONOSCOPY  11/04/2015    DR Capone: polys, 5yr recall    COLONOSCOPY N/A 10/11/2016    Dr Lopez-diverticulosis, Sessile serrated AP (-) high grade dysplasia x 1, tubular AP (-) dysplasia x 2, HP x 2--3 yr recall    CORONARY ANGIOPLASTY WITH STENT PLACEMENT  06/04/2019    AGA to circ    CORONARY ANGIOPLASTY WITH STENT PLACEMENT  05/2018    AGA to osteal LAD    EYE SURGERY      Right eye    PRE-MALIGNANT / [address]      Dr. Kat Felton ENDOSCOPY  9/20/2016    Dr Lopez-w/dilation over wire, [address]-distal esophageal narrowing, Inna (-)    UPPER GASTROINTESTINAL ENDOSCOPY  9/20/2016    Dr Cyr/dilation over wire, [address]-distal esophageal narrowing, Inna (-)       Family History   Problem Relation Age of Onset    Colon Cancer Mother     Prostate Cancer Father     Other Father         HX of blood clot    Diabetes Sister     Cancer Sister     Diabetes Brother     Colon Polyps Son     Esophageal Cancer Neg Hx     Liver Cancer Neg Hx     Liver Disease Neg Hx     Stomach Cancer Neg Hx     Rectal Cancer Neg Hx        Social History     Socioeconomic History    Marital status:      Spouse name: None    Number of children: None    Years of education: None    Highest education level: None   Occupational History    None   Social Needs    Financial resource strain: None    Food insecurity     Worry: None     Inability: None    Transportation needs     Medical: None     Non-medical: None   Tobacco Use    Smoking status: Never Smoker    Smokeless tobacco: Never Used   Substance and Sexual Activity    Alcohol use: No    Drug use: No    Sexual activity: None   Lifestyle    Physical activity     Days per week: None     Minutes per session: None    Stress: None   Relationships    Social connections     Talks on phone: None     Gets together: None     Attends Holiness service: None     Active member of club or organization: None     Attends meetings of clubs or organizations: None     Relationship status: None    Intimate partner violence     Fear of current or ex partner: None     Emotionally abused: None     Physically abused: None     Forced sexual activity: None   Other Topics Concern    None   Social History Narrative    None       Current Outpatient Medications   Medication Sig Dispense Refill    montelukast (SINGULAIR) 10 MG tablet Take 10 mg by mouth nightly      Vitamin D (CHOLECALCIFEROL) 25 MCG (1000 UT) TABS tablet Take 1 capsule by mouth daily      fluticasone (FLONASE) 50 MCG/ACT nasal spray Use 2 spray(s) in each nostril once daily 48 g 5    metoprolol tartrate (LOPRESSOR) 50 MG tablet TAKE 1 TABLET BY MOUTH TWICE DAILY 180 tablet 3    ciclopirox (PENLAC) 8 % solution Apply topically greater than 8 hours before showering. Clean nails with alcohol weekly. 1 Bottle 3    nystatin (MYCOSTATIN) 352981 UNIT/GM powder Apply 3 times daily.  60 g 1    aspirin 81 MG chewable tablet CHEW AND SWALLOW 1 TABLET BY MOUTH ONCE DAILY 90 tablet 3    Insulin Glargine, 1 Unit Dial, (TOUJEO SOLOSTAR) 300 UNIT/ML SOPN by NOT APPLICABLE route      hydroCHLOROthiazide (MICROZIDE) 12.5 MG capsule Take 1 capsule by mouth 2 times daily 180 capsule 3    lansoprazole (PREVACID) 30 MG delayed release capsule TAKE ONE CAPSULE BY MOUTH ONCE DAILY 90 capsule 3    gabapentin (NEURONTIN) 300 MG capsule TAKE 1 CAPSULE BY MOUTH THREE TIMES DAILY 270 capsule 1    metFORMIN (GLUCOPHAGE) 1000 MG tablet TAKE ONE TABLET BY MOUTH TWICE DAILY WITH MEALS 180 tablet 2    irbesartan (AVAPRO) 150 MG tablet Take 1 tablet by mouth daily 90 tablet 3    insulin aspart (NOVOLOG FLEXPEN) 100 UNIT/ML injection pen Inject 33 Units into the skin 3 times daily 10 pen 1    INVOKANA 100 MG TABS tablet Take 100 mg by mouth daily      prasugrel (EFFIENT) 10 MG TABS TAKE 1 TABLET BY MOUTH ONCE DAILY 90 tablet 3    Diabetic Shoe MISC by Does not apply route 1 each 0    Semaglutide,0.25 or 0.5MG/DOS, (OZEMPIC, 0.25 OR 0.5 MG/DOSE,) 2 MG/1.5ML SOPN Inject 0.25 mg into the skin      mupirocin (BACTROBAN) 2 % ointment APPLY  OINTMENT TO AFFECTED AREA THREE TIMES DAILY 1 Tube 1    Insulin Pen Needle ([employer] PEN NEEDLES) 31G X 6 MM MISC Dx: E11.9 150 each 3    furosemide (LASIX) 40 MG tablet Take 1 tablet by mouth daily 90 tablet 3    blood glucose test strips (ACCU-CHEK DUNIA) strip Patient is to test 8 times daily. Dx: E11.9  Please provide patient with Accu check Dunia Plus test strips per his Insurance request 900 each 3    nitroGLYCERIN (NITROSTAT) 0.4 MG SL tablet up to max of 3 total doses. If no relief after 1 dose, call 911. 25 tablet 3    ipratropium-albuterol (DUONEB) 0.5-2.5 (3) MG/3ML SOLN nebulizer solution Inhale 3 mLs into the lungs every 6 hours 360 mL 1    Lancets 30G MISC Use to check blood sugar 8 times daily. E11.9 300 each 5    triamcinolone (KENALOG) 0.025 % cream Apply topically 2 times daily.  1 Tube 0    guaiFENesin 400 MG tablet Take 1 tablet by mouth 4 times daily as needed for Cough 56 tablet 0    Alcohol Swabs PADS 1 box by Does not apply route 8 times daily 100 each 5    Blood Glucose Monitoring Suppl (ACURA BLOOD GLUCOSE METER) w/Device KIT Please provide patient with Accu check Dunia Meter per his Insurance request. Dx: E11.9 1 kit 0    albuterol sulfate HFA (VENTOLIN HFA) 108 (90 Base) MCG/ACT inhaler Inhale 2 puffs into the lungs every 6 hours as needed for Wheezing 1 Inhaler 3     No current facility-administered medications for this visit. Allergies   Allergen Reactions    Lipitor [Atorvastatin Calcium]     Tape [place] Tape] Hives and Rash       Review of Systems   Constitutional: Negative for activity change, appetite change, fatigue, fever and unexpected weight change. HENT: Positive for trouble swallowing. Respiratory: Negative for cough, choking and shortness of breath. Cardiovascular: Negative for chest pain. Gastrointestinal: Positive for abdominal pain, constipation and diarrhea. Negative for abdominal distention, anal bleeding, blood in stool, nausea, rectal pain and vomiting. Allergic/Immunologic: Negative for food allergies. All other systems reviewed and are negative. Objective:     /70   Pulse 71   Ht 5' 11\" (1.803 m)   Wt 269 lb (122 kg)   SpO2 100%   BMI 37.52 kg/m²     Physical Exam  Vitals signs reviewed. Constitutional:       General: He is not in acute distress. Appearance: He is well-developed. HENT:      Head: Normocephalic and atraumatic. Right Ear: External ear normal.      Left Ear: External ear normal.      Nose: Nose normal.      Comments: Mask on     Mouth/Throat:      Comments: Mask on  Eyes:      General: No scleral icterus. Right eye: No discharge. Left eye: No discharge. Conjunctiva/sclera: Conjunctivae normal.      Pupils: Pupils are equal, round, and reactive to light. Neck:      Musculoskeletal: Normal range of motion and neck supple. Cardiovascular:      Rate and Rhythm: Normal rate and regular rhythm. Heart sounds: Normal heart sounds. No murmur. Pulmonary:      Effort: Pulmonary effort is normal. No respiratory distress. Breath sounds: Normal breath sounds. No wheezing or rales. Abdominal:      General: Bowel sounds are normal. There is no distension. Palpations: Abdomen is soft. There is no mass. Tenderness: There is no abdominal tenderness. There is no guarding or rebound. Musculoskeletal: Normal range of motion. Skin:     General: Skin is warm and dry. Coloration: Skin is not pale. Neurological:      Mental Status: He is alert and oriented to person, place, and time.    Psychiatric:         Behavior: Behavior normal.

## 2020-12-02 NOTE — PATIENT INSTRUCTIONS
specific directions regarding restrictions to diet and bowel prep instructions including laxatives. Please read these instructions one week prior to your scheduled procedure to ensure that you are prepared. If you have any questions regarding these instructions please call our office Mon through Fri from 8:00 am to 4:00 pm.     Follow prep instructions provided for bowel prep. Take all of the bowel prep as directed. If you are having problems with nausea, stop your prep for 30-45 min to allow the nausea to subside before resuming your prep. It is important to drink plenty of fluids throughout the day before taking your laxatives. This will help to protect your kidneys, prevent dehydration and maximize the effect of the bowel prep. Your diet before a colonoscopy bowel preparation is very important to ensure a successful colon exam. It is recommended to consider certain changes to your diet three to four days prior to the procedure. Remember that your bowels need to be completely empty for the exam.    What foods are good to eat? Cut down on heavy solid foods three to four days before the procedure and start introducing lighter meals to your diet. The following food suggestions are a good part of your diet before a colonoscopy bowel preparation.  Light meat that is easily digestible such as chicken (without the skin)    Potatoes without skin    Cheese    Eggs    A light meal of steamed white fish    Light clear soups    Foods and drinks to avoid  Avoid foods that contain too much fiber. Stay clear of dark colored beverages. They can stick to the walls of the digestive tract and make it difficult to differentiate from blood.  Some of these foods are:   Red meat, rice, nuts and vegetables    Milk, other milk based fluids and cream    Most fruit and puddings    Whole grain pasta    Cereals, bran and seeds    Colored beverages, especially those that are red or purple in color    Red colored Jell-O   On the day before the colonoscopy, continue to drink plenty of clear fluids. It is important   to keep yourself hydrated before the exam.     Please follow all instructions as provided for cleansing the bowel. Failure to have an adequately prepped colon may cause you to have incomplete exam with further testing required.      [email or web address]

## 2020-12-11 ENCOUNTER — OFFICE VISIT (OUTPATIENT)
Age: 65
End: 2020-12-11

## 2020-12-11 VITALS — TEMPERATURE: 97.8 F | HEART RATE: 67 BPM | OXYGEN SATURATION: 96 %

## 2020-12-11 DIAGNOSIS — E55.9 VITAMIN D DEFICIENCY: ICD-10-CM

## 2020-12-11 DIAGNOSIS — I10 ESSENTIAL HYPERTENSION: ICD-10-CM

## 2020-12-11 DIAGNOSIS — Z79.4 TYPE 2 DIABETES MELLITUS WITH OTHER CIRCULATORY COMPLICATION, WITH LONG-TERM CURRENT USE OF INSULIN (HCC): ICD-10-CM

## 2020-12-11 DIAGNOSIS — E11.59 TYPE 2 DIABETES MELLITUS WITH OTHER CIRCULATORY COMPLICATION, WITH LONG-TERM CURRENT USE OF INSULIN (HCC): ICD-10-CM

## 2020-12-11 LAB
ALBUMIN SERPL-MCNC: 4.3 G/DL (ref 3.5–5.2)
ALP BLD-CCNC: 82 U/L (ref 40–130)
ALT SERPL-CCNC: 15 U/L (ref 5–41)
ANION GAP SERPL CALCULATED.3IONS-SCNC: 13 MMOL/L (ref 7–19)
AST SERPL-CCNC: 14 U/L (ref 5–40)
BASOPHILS ABSOLUTE: 0.1 K/UL (ref 0–0.2)
BASOPHILS RELATIVE PERCENT: 1.2 % (ref 0–1)
BILIRUB SERPL-MCNC: 0.3 MG/DL (ref 0.2–1.2)
BUN BLDV-MCNC: 30 MG/DL (ref 8–23)
CALCIUM SERPL-MCNC: 10.4 MG/DL (ref 8.8–10.2)
CHLORIDE BLD-SCNC: 103 MMOL/L (ref 98–111)
CHOLESTEROL, FASTING: 215 MG/DL (ref 160–199)
CO2: 25 MMOL/L (ref 22–29)
CREAT SERPL-MCNC: 1.4 MG/DL (ref 0.5–1.2)
EOSINOPHILS ABSOLUTE: 0.2 K/UL (ref 0–0.6)
EOSINOPHILS RELATIVE PERCENT: 2.6 % (ref 0–5)
GFR AFRICAN AMERICAN: >59
GFR NON-AFRICAN AMERICAN: 51
GLUCOSE BLD-MCNC: 169 MG/DL (ref 74–109)
HBA1C MFR BLD: 9.9 % (ref 4–6)
HCT VFR BLD CALC: 49 % (ref 42–52)
HDLC SERPL-MCNC: 29 MG/DL (ref 55–121)
HEMOGLOBIN: 15.4 G/DL (ref 14–18)
IMMATURE GRANULOCYTES #: 0 K/UL
LDL CHOLESTEROL CALCULATED: 134 MG/DL
LYMPHOCYTES ABSOLUTE: 2.2 K/UL (ref 1.1–4.5)
LYMPHOCYTES RELATIVE PERCENT: 30.6 % (ref 20–40)
MCH RBC QN AUTO: 27.2 PG (ref 27–31)
MCHC RBC AUTO-ENTMCNC: 31.4 G/DL (ref 33–37)
MCV RBC AUTO: 86.4 FL (ref 80–94)
MONOCYTES ABSOLUTE: 0.6 K/UL (ref 0–0.9)
MONOCYTES RELATIVE PERCENT: 7.7 % (ref 0–10)
NEUTROPHILS ABSOLUTE: 4.2 K/UL (ref 1.5–7.5)
NEUTROPHILS RELATIVE PERCENT: 57.5 % (ref 50–65)
PDW BLD-RTO: 14.1 % (ref 11.5–14.5)
PLATELET # BLD: 207 K/UL (ref 130–400)
PMV BLD AUTO: 12.9 FL (ref 9.4–12.4)
POTASSIUM SERPL-SCNC: 4.9 MMOL/L (ref 3.5–5)
RBC # BLD: 5.67 M/UL (ref 4.7–6.1)
SARS-COV-2, PCR: NOT DETECTED
SODIUM BLD-SCNC: 141 MMOL/L (ref 136–145)
TOTAL PROTEIN: 7.2 G/DL (ref 6.6–8.7)
TRIGLYCERIDE, FASTING: 261 MG/DL (ref 0–149)
TSH REFLEX FT4: 2.07 UIU/ML (ref 0.35–5.5)
VITAMIN D 25-HYDROXY: 28.2 NG/ML
WBC # BLD: 7.3 K/UL (ref 4.8–10.8)

## 2020-12-11 PROCEDURE — 99999 PR OFFICE/OUTPT VISIT,PROCEDURE ONLY: CPT | Performed by: NURSE PRACTITIONER

## 2020-12-16 ENCOUNTER — ANESTHESIA EVENT (OUTPATIENT)
Dept: OPERATING ROOM | Age: 65
End: 2020-12-16

## 2020-12-16 ENCOUNTER — APPOINTMENT (OUTPATIENT)
Dept: OPERATING ROOM | Age: 65
End: 2020-12-16

## 2020-12-16 ENCOUNTER — HOSPITAL ENCOUNTER (OUTPATIENT)
Age: 65
Setting detail: SPECIMEN
Discharge: HOME | End: 2020-12-16
Payer: MEDICARE

## 2020-12-16 ENCOUNTER — ANESTHESIA (OUTPATIENT)
Dept: OPERATING ROOM | Age: 65
End: 2020-12-16

## 2020-12-16 ENCOUNTER — HOSPITAL ENCOUNTER (OUTPATIENT)
Age: 65
Setting detail: OUTPATIENT SURGERY
Discharge: HOME | End: 2020-12-16
Attending: INTERNAL MEDICINE | Admitting: INTERNAL MEDICINE
Payer: MEDICARE

## 2020-12-16 VITALS — DIASTOLIC BLOOD PRESSURE: 74 MMHG | OXYGEN SATURATION: 96 % | SYSTOLIC BLOOD PRESSURE: 124 MMHG

## 2020-12-16 VITALS
DIASTOLIC BLOOD PRESSURE: 74 MMHG | HEIGHT: 71 IN | HEART RATE: 68 BPM | WEIGHT: 261 LBS | BODY MASS INDEX: 36.54 KG/M2 | RESPIRATION RATE: 16 BRPM | OXYGEN SATURATION: 97 % | TEMPERATURE: 97 F | SYSTOLIC BLOOD PRESSURE: 121 MMHG

## 2020-12-16 PROCEDURE — 45378 DIAGNOSTIC COLONOSCOPY: CPT

## 2020-12-16 PROCEDURE — G8907 PT DOC NO EVENTS ON DISCHARG: HCPCS

## 2020-12-16 PROCEDURE — 43450 DILATE ESOPHAGUS 1/MULT PASS: CPT

## 2020-12-16 PROCEDURE — 43450 DILATE ESOPHAGUS 1/MULT PASS: CPT | Performed by: INTERNAL MEDICINE

## 2020-12-16 PROCEDURE — 43239 EGD BIOPSY SINGLE/MULTIPLE: CPT | Performed by: INTERNAL MEDICINE

## 2020-12-16 PROCEDURE — 43239 EGD BIOPSY SINGLE/MULTIPLE: CPT

## 2020-12-16 PROCEDURE — G8918 PT W/O PREOP ORDER IV AB PRO: HCPCS

## 2020-12-16 PROCEDURE — 88305 TISSUE EXAM BY PATHOLOGIST: CPT

## 2020-12-16 PROCEDURE — 45378 DIAGNOSTIC COLONOSCOPY: CPT | Performed by: INTERNAL MEDICINE

## 2020-12-16 RX ORDER — LIDOCAINE HYDROCHLORIDE 10 MG/ML
INJECTION, SOLUTION INFILTRATION; PERINEURAL PRN
Status: DISCONTINUED | OUTPATIENT
Start: 2020-12-16 | End: 2020-12-16 | Stop reason: SDUPTHER

## 2020-12-16 RX ORDER — PROPOFOL 10 MG/ML
INJECTION, EMULSION INTRAVENOUS PRN
Status: DISCONTINUED | OUTPATIENT
Start: 2020-12-16 | End: 2020-12-16 | Stop reason: SDUPTHER

## 2020-12-16 RX ORDER — PROMETHAZINE HYDROCHLORIDE 25 MG/ML
6.25 INJECTION, SOLUTION INTRAMUSCULAR; INTRAVENOUS
Status: DISCONTINUED | OUTPATIENT
Start: 2020-12-16 | End: 2020-12-16 | Stop reason: HOSPADM

## 2020-12-16 RX ORDER — DIPHENHYDRAMINE HYDROCHLORIDE 50 MG/ML
12.5 INJECTION INTRAMUSCULAR; INTRAVENOUS
Status: DISCONTINUED | OUTPATIENT
Start: 2020-12-16 | End: 2020-12-16 | Stop reason: HOSPADM

## 2020-12-16 RX ORDER — SODIUM CHLORIDE 9 MG/ML
INJECTION, SOLUTION INTRAVENOUS CONTINUOUS
Status: DISCONTINUED | OUTPATIENT
Start: 2020-12-16 | End: 2020-12-16 | Stop reason: HOSPADM

## 2020-12-16 RX ORDER — ONDANSETRON 2 MG/ML
4 INJECTION INTRAMUSCULAR; INTRAVENOUS
Status: DISCONTINUED | OUTPATIENT
Start: 2020-12-16 | End: 2020-12-16 | Stop reason: HOSPADM

## 2020-12-16 RX ADMIN — PROPOFOL 50 MG: 10 INJECTION, EMULSION INTRAVENOUS at 09:48

## 2020-12-16 RX ADMIN — PROPOFOL 50 MG: 10 INJECTION, EMULSION INTRAVENOUS at 09:37

## 2020-12-16 RX ADMIN — PROPOFOL 50 MG: 10 INJECTION, EMULSION INTRAVENOUS at 09:40

## 2020-12-16 RX ADMIN — PROPOFOL 100 MG: 10 INJECTION, EMULSION INTRAVENOUS at 09:25

## 2020-12-16 RX ADMIN — PROPOFOL 50 MG: 10 INJECTION, EMULSION INTRAVENOUS at 09:44

## 2020-12-16 RX ADMIN — PROPOFOL 50 MG: 10 INJECTION, EMULSION INTRAVENOUS at 09:28

## 2020-12-16 RX ADMIN — LIDOCAINE HYDROCHLORIDE 5 ML: 10 INJECTION, SOLUTION INFILTRATION; PERINEURAL at 09:25

## 2020-12-16 RX ADMIN — PROPOFOL 50 MG: 10 INJECTION, EMULSION INTRAVENOUS at 09:54

## 2020-12-16 RX ADMIN — PROPOFOL 50 MG: 10 INJECTION, EMULSION INTRAVENOUS at 09:32

## 2020-12-16 RX ADMIN — SODIUM CHLORIDE: 9 INJECTION, SOLUTION INTRAVENOUS at 08:50

## 2020-12-16 NOTE — OP NOTE
Patient: Jamaal Anedrson : 1955  Med Rec#: [id number] Acc#: [id number]   Primary Care Provider MIMI Palm    Date of Procedure:  2020    Endoscopist: Amara Valdez MD    Referring Provider: MIMI Palm,     Operation Performed: Colonoscopy up to the Cecum    Indications: for both EGD and Colonoscopy today:  1. Change in bowel habits    2. Gastroesophageal reflux disease, unspecified whether esophagitis present    3. Dysphagia, unspecified type    4. Mother had colon cancer  Anesthesia:  Sedation was administered by anesthesia who monitored the patient during the procedure. I met with Jamaal Anderson prior to procedure. We discussed the procedure itself, and I have discussed the risks of endoscopy (including-- but not limited to-- pain, discomfort, bleeding potentially requiring second endoscopic procedure and/or blood transfusion, organ perforation requiring operative repair, damage to organs near the colon, infection, aspiration, cardiopulmonary/allergic reaction), benefits, indications to endoscopy. Additionally, we discussed options other than colonoscopy. The patient expressed understanding. All questions answered. The patient decided to proceed with the procedure. Signed informed consent was placed on the chart. Blood Loss: minimal    Withdrawal time: >6 mins  Bowel Prep: adequate     Complications: no immediate complications    DESCRIPTION OF PROCEDURE:     A time out was performed. After written informed consent was obtained, the patient was placed in the left lateral position. The perianal area was inspected, and a digital rectal exam was performed. A rectal exam was performed: normal tone, no palpable lesions. At this point, a forward viewing Olympus colonoscope was inserted into the anus and carefully advanced to the Cecum. The cecum was identified by the ileocecal valve and the appendiceal orifice. The colonoscope was then slowly withdrawn with careful inspection of the mucosa in a linear and circumferential fashion. The scope was retroflexed in the rectum. Suction was utilized during the procedure to remove as much air as possible from the bowel. The colonoscope was removed from the patient, and the procedure was terminated. Findings are listed below. Findings: The mucosa appeared normal throughout the entire examined colon. NO large polyps or masses or strictures or colitis. Moderate Diverticulosis in the left colon  Internal hemorrhoids-Grade 2 and external hemorrhoids. Where it was clearly visible, the mucosa appeared normal throughout the entire examined colon  Retroflexion in the rectum was otherwise normal and revealed no further abnormalities. Recommendations:  1. Repeat colonoscopy: due to his personal hx of polyps and family hx of colon cancer-in 3 years. 2.- Resume previous meds and diet  - Keep scheduled f/u appts with other MDs     Findings and recommendations were discussed w/ the patient. A copy of the images was provided.     Bo Lopez MD  12/16/2020  9:26 AM

## 2020-12-16 NOTE — H&P
Vitamin D (CHOLECALCIFEROL) 25 MCG (1000 UT) TABS tablet Take 1 capsule by mouth daily    Historical Provider, MD   fluticasone (FLONASE) 50 MCG/ACT nasal spray Use 2 spray(s) in each nostril once daily 11/22/20   MIMI Roberts   metoprolol tartrate (LOPRESSOR) 50 MG tablet TAKE 1 TABLET BY MOUTH TWICE DAILY 11/20/20   MIMI Roberts   ciclopirox (PENLAC) 8 % solution Apply topically greater than 8 hours before showering. Clean nails with alcohol weekly. 9/22/20   MIMI Roberts   nystatin (MYCOSTATIN) 375945 UNIT/GM powder Apply 3 times daily.  9/22/20   MIMI Roberts   aspirin 81 MG chewable tablet CHEW AND SWALLOW 1 TABLET BY MOUTH ONCE DAILY 9/8/20   Jenniffer Aschoff, APRN   Insulin Glargine, 1 Unit Dial, (TOUJEO SOLOSTAR) 300 UNIT/ML SOPN by NOT APPLICABLE route 3/8/30   Historical Provider, MD   hydroCHLOROthiazide (MICROZIDE) 12.5 MG capsule Take 1 capsule by mouth 2 times daily 8/5/20 12/2/20  MIMI Roberts   lansoprazole (PREVACID) 30 MG delayed release capsule TAKE ONE CAPSULE BY MOUTH ONCE DAILY 7/13/20   MIMI Roberts   gabapentin (NEURONTIN) 300 MG capsule TAKE 1 CAPSULE BY MOUTH THREE TIMES DAILY 6/30/20 12/2/20  MIMI Roberts   metFORMIN (GLUCOPHAGE) 1000 MG tablet TAKE ONE TABLET BY MOUTH TWICE DAILY WITH MEALS 5/15/20   MIMI Roberts   irbesartan (AVAPRO) 150 MG tablet Take 1 tablet by mouth daily 5/15/20   MIMI Roberts   INVOKANA 100 MG TABS tablet Take 100 mg by mouth daily 12/17/19   Historical Provider, MD   prasugrel (EFFIENT) 10 MG TABS TAKE 1 TABLET BY MOUTH ONCE DAILY 1/28/20   Jenniffer Aschoff, APRN   Diabetic Shoe MISC by Does not apply route 1/22/20   Marya Medrano MD   Semaglutide,0.25 or 0.5MG/DOS, (OZEMPIC, 0.25 OR 0.5 MG/DOSE,) 2 MG/1.5ML SOPN Inject 0.25 mg into the skin    Historical Provider, MD   mupirocin (BACTROBAN) 2 % ointment APPLY  OINTMENT TO AFFECTED AREA THREE TIMES DAILY 9/3/19   MIMI Paez Insulin Pen Needle ([employer] PEN NEEDLES) 31G X 6 MM MISC Dx: E11.9 6/12/19   MIMI Beach   furosemide (LASIX) 40 MG tablet Take 1 tablet by mouth daily 6/12/19   MIMI Beach   blood glucose test strips (ACCU-CHEK BHARATI) strip Patient is to test 8 times daily. Dx: E11.9  Please provide patient with Accu check Bharati Plus test strips per his Insurance request 6/12/19   MIMI Arriola   nitroGLYCERIN (NITROSTAT) 0.4 MG SL tablet up to max of 3 total doses. If no relief after 1 dose, call 911. 6/7/19   Buck Heard MD   ipratropium-albuterol (DUONEB) 0.5-2.5 (3) MG/3ML SOLN nebulizer solution Inhale 3 mLs into the lungs every 6 hours 3/20/19   MIMI Beach   Lancets 30G MISC Use to check blood sugar 8 times daily. E11.9 3/20/19   MIMI Beach   triamcinolone (KENALOG) 0.025 % cream Apply topically 2 times daily.  8/30/18   MIMI Calderon   guaiFENesin 400 MG tablet Take 1 tablet by mouth 4 times daily as needed for Cough 6/18/18   MIMI Arriola   Alcohol Swabs PADS 1 box by Does not apply route 8 times daily 6/8/18   MIMI Beach   Blood Glucose Monitoring Suppl [facility] BLOOD GLUCOSE METER) w/Device KIT Please provide patient with Accu check Bharati Meter per his Insurance request. Dx: E11.9 5/23/18   MIMI Beach   albuterol sulfate HFA (VENTOLIN HFA) 108 (90 Base) MCG/ACT inhaler Inhale 2 puffs into the lungs every 6 hours as needed for Wheezing 4/30/18   Selena Valadez DO       Past Medical History:  Past Medical History:   Diagnosis Date    Aneurysm ([address]     abdominal    Bilateral leg edema     CAD (coronary artery disease)     Chronic kidney disease     Stage 4    Diabetes mellitus, type 2 ([address]     Fatty liver     GERD (gastroesophageal reflux disease)     HTN (hypertension)     Prolonged emergence from general anesthesia     Seasonal allergies        Past Surgical History:  Past Surgical History:   Procedure Laterality Date  CARPAL TUNNEL RELEASE      COLONOSCOPY  11/04/2015    DR Capone: polys, 5yr recall    COLONOSCOPY N/A 10/11/2016    Dr Lopez-diverticulosis, Sessile serrated AP (-) high grade dysplasia x 1, tubular AP (-) dysplasia x 2, HP x 2--3 yr recall    CORONARY ANGIOPLASTY WITH STENT PLACEMENT  06/04/2019    AGA to circ    CORONARY ANGIOPLASTY WITH STENT PLACEMENT  05/2018    AGA to osteal LAD    EYE SURGERY      Right eye    PRE-MALIGNANT / [facility]      Dr. Gonzalez Pellet ENDOSCOPY  9/20/2016    Dr Lopez-w/dilation over wire, 46 [address]-distal esophageal narrowing, Inna (-)    UPPER GASTROINTESTINAL ENDOSCOPY  9/20/2016    Dr Lopez-w/dilation over wire, [address]-distal esophageal narrowing, Inna (-)       Social History:  Social History     Tobacco Use    Smoking status: Never Smoker    Smokeless tobacco: Never Used   Substance Use Topics    Alcohol use: No    Drug use: No       Vital Signs:   Vitals:    12/16/20 0845   BP: (!) 164/81   Pulse: 76   Resp: 18   Temp: 97 °F (36.1 °C)   SpO2: 98%        Physical Exam:  Cardiac:  [x]WNL  []Comments:  Pulmonary:  [x]WNL   []Comments:  Neuro/Mental Status:  [x]WNL  []Comments:  Abdominal:  [x]WNL    []Comments:  Other:   []WNL  []Comments:    Informed Consent:  The risks and benefits of the procedure have been discussed with either the patient or if they cannot consent, their representative. Assessment:  Patient examined and appropriate for planned sedation and procedure. Plan:  Proceed with planned sedation and procedure as above.          Andres Gamboa MD

## 2020-12-16 NOTE — ANESTHESIA POSTPROCEDURE EVALUATION
Department of Anesthesiology  Postprocedure Note    Patient: Silva Callaway  MRN: [id number]  YOB: 1955  Date of evaluation: 12/16/2020  Time:  9:59 AM     Procedure Summary     Date: 12/16/20 Room / Location: [facility] ENDO 02 / [address] [place] [address]    Anesthesia Start: 9962 Anesthesia Stop:     Procedures:       ESOPHAGEAL DILATION [address],[place] (N/A Esophagus)      COLORECTAL CANCER SCREENING, NOT HIGH RISK (N/A Abdomen) Diagnosis: (CHRONIC GERD, DYSPHAGIA, CHANGE IN BH, HX POLYPS, FH CLN CANCER)    Surgeons: Erin Chahal MD Responsible Provider: MIMI Pacheco CRNA    Anesthesia Type: general ASA Status: 3          Anesthesia Type: No value filed. Ben Phase I:      Ben Phase II:      Last vitals: Reviewed and per EMR flowsheets.        Anesthesia Post Evaluation    Patient location during evaluation: PACU  Patient participation: waiting for patient participation  Level of consciousness: awake and lethargic  Pain score: 0  Airway patency: patent  Nausea & Vomiting: no nausea and no vomiting  Complications: no  Cardiovascular status: blood pressure returned to baseline  Respiratory status: acceptable  Hydration status: euvolemic  Comments: Report to RN

## 2020-12-16 NOTE — ANESTHESIA PRE PROCEDURE
irbesartan (AVAPRO) 150 MG tablet Take 1 tablet by mouth daily 5/15/20   [facility]   insulin aspart (NOVOLOG FLEXPEN) 100 UNIT/ML injection pen Inject 33 Units into the skin 3 times daily 5/15/20   [facility]   INVOKANA 100 MG TABS tablet Take 100 mg by mouth daily 12/17/19   Historical Provider, MD   prasugrel (EFFIENT) 10 MG TABS TAKE 1 TABLET BY MOUTH ONCE DAILY 1/28/20   MIMI Haynes   Diabetic Shoe MISC by Does not apply route 1/22/20   Yang Barnett MD   Semaglutide,0.25 or 0.5MG/DOS, (OZEMPIC, 0.25 OR 0.5 MG/DOSE,) 2 MG/1.5ML SOPN Inject 0.25 mg into the skin    Historical Provider, MD   mupirocin (BACTROBAN) 2 % ointment APPLY  OINTMENT TO AFFECTED AREA THREE TIMES DAILY 9/3/19   Griselda Maxon New, APRN   Insulin Pen Needle ([employer] PEN NEEDLES) 31G X 6 MM MISC Dx: E11.9 6/12/19   Griselda Maxon New, APRN   furosemide (LASIX) 40 MG tablet Take 1 tablet by mouth daily 6/12/19   MIMI Beach   blood glucose test strips (ACCU-CHEK BHARATI) strip Patient is to test 8 times daily. Dx: E11.9  Please provide patient with Accu check Bharati Plus test strips per his Insurance request 6/12/19   Griselda Maxon New, APRN   nitroGLYCERIN (NITROSTAT) 0.4 MG SL tablet up to max of 3 total doses. If no relief after 1 dose, call 911. 6/7/19   Vishal Gannon MD   ipratropium-albuterol (DUONEB) 0.5-2.5 (3) MG/3ML SOLN nebulizer solution Inhale 3 mLs into the lungs every 6 hours 3/20/19   MIMI Beach   Lancets 30G MISC Use to check blood sugar 8 times daily. E11.9 3/20/19   MIMI Beach   triamcinolone (KENALOG) 0.025 % cream Apply topically 2 times daily.  8/30/18   MIMI Menjivar   guaiFENesin 400 MG tablet Take 1 tablet by mouth 4 times daily as needed for Cough 6/18/18   Griselda Maxon New, APRN   Alcohol Swabs PADS 1 box by Does not apply route 8 times daily 6/8/18   MIMI Menjivar Blood Glucose Monitoring Suppl (ACURA BLOOD GLUCOSE METER) w/Device KIT Please provide patient with Accu check Bharati Meter per his Insurance request. Dx: E11.9 5/23/18   MIMI Beach   albuterol sulfate HFA (VENTOLIN HFA) 108 (90 Base) MCG/ACT inhaler Inhale 2 puffs into the lungs every 6 hours as needed for Wheezing 4/30/18   Gale Mendoza DO       Current medications:    No current facility-administered medications for this encounter. Allergies:     Allergies   Allergen Reactions    Lipitor [Atorvastatin Calcium]     Tape Misty Khan Tape] Hives and Rash       Problem List:    Patient Active Problem List   Diagnosis Code    DM type 2, uncontrolled, with neuropathy ([facility] E11.40, E11.65    Essential hypertension I10    Gastroesophageal reflux disease without esophagitis K21.9    Pharyngoesophageal dysphagia R13.14    Family history of prostate cancer in father Z80.41    Change in bowel habits R19.4    History of colon polyps Z86.010    Dyspnea R06.00    CAD (coronary artery disease) I25.10    History of placement of stent in LAD coronary artery Z95.5    ACS (acute coronary syndrome) (HCC) I24.9    NSTEMI (non-ST elevated myocardial infarction) ([facility] I21.4    Chronic kidney disease, stage III (moderate) N18.30    Mixed hyperlipidemia E78.2    Morbidly obese (HCC) E66.01    Left ventricular dysfunction I51.9    Acute renal failure superimposed on stage 4 chronic kidney disease (HCC) N17.9, N18.4       Past Medical History:        Diagnosis Date    Aneurysm ([facility]     abdominal    Bilateral leg edema     CAD (coronary artery disease)     Chronic kidney disease     Stage 4    Diabetes mellitus, type 2 (HCC)     Fatty liver     GERD (gastroesophageal reflux disease)     HTN (hypertension)     Seasonal allergies        Past Surgical History:        Procedure Laterality Date    CARPAL TUNNEL RELEASE      COLONOSCOPY  11/04/2015    DR Capone: polys, 5yr recall  COLONOSCOPY N/A 10/11/2016    Dr Lopez-diverticulosis, Sessile serrated AP (-) high grade dysplasia x 1, tubular AP (-) dysplasia x 2, HP x 2--3 yr recall    CORONARY ANGIOPLASTY WITH STENT PLACEMENT  06/04/2019    AGA to circ    CORONARY ANGIOPLASTY WITH STENT PLACEMENT  05/2018    AGA to osteal LAD    EYE SURGERY      Right eye    PRE-MALIGNANT / [address]      Dr. Silvio Mcrae UPPER GASTROINTESTINAL ENDOSCOPY  9/20/2016    Dr Lopez-w/dilation over wire, [address] [place]-distal esophageal narrowing, Inna (-)    UPPER GASTROINTESTINAL ENDOSCOPY  9/20/2016    Dr Lopez-w/dilation over wire, [address] [place]-distal esophageal narrowing, Inna (-)       Social History:    Social History     Tobacco Use    Smoking status: Never Smoker    Smokeless tobacco: Never Used   Substance Use Topics    Alcohol use: No                                Counseling given: Not Answered      Vital Signs (Current): There were no vitals filed for this visit.                                            BP Readings from Last 3 Encounters:   12/02/20 130/70   09/22/20 122/82   09/04/20 130/78       NPO Status:                                                                                 BMI:   Wt Readings from Last 3 Encounters:   12/02/20 269 lb (122 kg)   09/22/20 254 lb (115.2 kg)   09/04/20 250 lb (113.4 kg)     There is no height or weight on file to calculate BMI.    CBC:   Lab Results   Component Value Date    WBC 7.3 12/11/2020    RBC 5.67 12/11/2020    HGB 15.4 12/11/2020    HCT 49.0 12/11/2020    MCV 86.4 12/11/2020    RDW 14.1 12/11/2020     12/11/2020       CMP:   Lab Results   Component Value Date     12/11/2020    K 4.9 12/11/2020    K 4.7 06/06/2019     12/11/2020    CO2 25 12/11/2020    BUN 30 12/11/2020    CREATININE 1.4 12/11/2020    GFRAA >59 12/11/2020    LABGLOM 51 12/11/2020    GLUCOSE 169 12/11/2020    PROT 7.2 12/11/2020    CALCIUM 10.4 12/11/2020    BILITOT 0.3 12/11/2020 ALKPHOS 82 12/11/2020    AST 14 12/11/2020    ALT 15 12/11/2020       POC Tests: No results for input(s): POCGLU, POCNA, POCK, POCCL, POCBUN, POCHEMO, POCHCT in the last 72 hours. Coags:   Lab Results   Component Value Date    PROTIME 13.4 05/12/2018    INR 1.03 05/12/2018       HCG (If Applicable): No results found for: PREGTESTUR, PREGSERUM, HCG, HCGQUANT     ABGs:   Lab Results   Component Value Date    PHART 7.358 05/14/2018    PO2ART 106 05/14/2018    IMS9PHK 37 05/14/2018    KNX4NCA 24.8 05/11/2018    BEART -4 05/14/2018    S4WCDLKZ 98 05/14/2018        Type & Screen (If Applicable):  No results found for: LABABO, LABRH    Drug/Infectious Status (If Applicable):  No results found for: HIV, HEPCAB    COVID-19 Screening (If Applicable):   Lab Results   Component Value Date    COVID19 Not Detected 12/11/2020         Anesthesia Evaluation  Patient summary reviewed and Nursing notes reviewed no history of anesthetic complications:   Airway: Mallampati: II  TM distance: >3 FB   Neck ROM: full  Mouth opening: > = 3 FB Dental: normal exam         Pulmonary:Negative Pulmonary ROS and normal exam                               Cardiovascular:    (+) hypertension: moderate, past MI: > 6 months, CAD:, CABG/stent:, hyperlipidemia        Rhythm: regular             Beta Blocker:  Dose within 24 Hrs         Neuro/Psych:   Negative Neuro/Psych ROS              GI/Hepatic/Renal:   (+) GERD:, liver disease (fatty liver):, renal disease: CRI, morbid obesity          Endo/Other:    (+) DiabetesType II DM, , .          Pt had PAT visit. Abdominal:       Abdomen: soft. Vascular: negative vascular ROS. Anesthesia Plan      general     ASA 3       Induction: intravenous. Anesthetic plan and risks discussed with patient.                       MIMI Rubin CRNA   12/16/2020

## 2020-12-17 ENCOUNTER — TELEPHONE (OUTPATIENT)
Dept: GASTROENTEROLOGY | Age: 65
End: 2020-12-17

## 2020-12-22 ENCOUNTER — OFFICE VISIT (OUTPATIENT)
Dept: PRIMARY CARE CLINIC | Age: 65
End: 2020-12-22
Payer: MEDICARE

## 2020-12-22 VITALS
OXYGEN SATURATION: 98 % | TEMPERATURE: 97.8 F | BODY MASS INDEX: 37.8 KG/M2 | RESPIRATION RATE: 18 BRPM | SYSTOLIC BLOOD PRESSURE: 110 MMHG | HEART RATE: 67 BPM | DIASTOLIC BLOOD PRESSURE: 78 MMHG | WEIGHT: 270 LBS | HEIGHT: 71 IN

## 2020-12-22 PROCEDURE — 4040F PNEUMOC VAC/ADMIN/RCVD: CPT | Performed by: NURSE PRACTITIONER

## 2020-12-22 PROCEDURE — 99497 ADVNCD CARE PLAN 30 MIN: CPT | Performed by: NURSE PRACTITIONER

## 2020-12-22 PROCEDURE — 3017F COLORECTAL CA SCREEN DOC REV: CPT | Performed by: NURSE PRACTITIONER

## 2020-12-22 PROCEDURE — G8484 FLU IMMUNIZE NO ADMIN: HCPCS | Performed by: NURSE PRACTITIONER

## 2020-12-22 PROCEDURE — 1123F ACP DISCUSS/DSCN MKR DOCD: CPT | Performed by: NURSE PRACTITIONER

## 2020-12-22 PROCEDURE — G0447 BEHAVIOR COUNSEL OBESITY 15M: HCPCS | Performed by: NURSE PRACTITIONER

## 2020-12-22 PROCEDURE — G0438 PPPS, INITIAL VISIT: HCPCS | Performed by: NURSE PRACTITIONER

## 2020-12-22 RX ORDER — ERGOCALCIFEROL (VITAMIN D2) 1250 MCG
50000 CAPSULE ORAL WEEKLY
Qty: 4 CAPSULE | Refills: 3 | Status: SHIPPED | OUTPATIENT
Start: 2020-12-22 | End: 2021-07-12

## 2020-12-22 ASSESSMENT — PATIENT HEALTH QUESTIONNAIRE - PHQ9
SUM OF ALL RESPONSES TO PHQ9 QUESTIONS 1 & 2: 2
2. FEELING DOWN, DEPRESSED OR HOPELESS: 1
SUM OF ALL RESPONSES TO PHQ QUESTIONS 1-9: 2
1. LITTLE INTEREST OR PLEASURE IN DOING THINGS: 1
SUM OF ALL RESPONSES TO PHQ QUESTIONS 1-9: 2
SUM OF ALL RESPONSES TO PHQ QUESTIONS 1-9: 2

## 2020-12-22 ASSESSMENT — LIFESTYLE VARIABLES: HOW OFTEN DO YOU HAVE A DRINK CONTAINING ALCOHOL: 0

## 2020-12-22 NOTE — PROGRESS NOTES
Medicare Annual Wellness Visit  Name: Fidencio Irizarry Date: 2020   MRN: [id number] Sex: Male   Age: 77 y.o. Ethnicity: Non-/Non    : 1955 Race: Eliza De is here for Medicare AWV (would like to have PSA and discuss colonoscopy states he has not heard anything about his results. )    Screenings for behavioral, psychosocial and functional/safety risks, and cognitive dysfunction are all negative except as indicated below. These results, as well as other patient data from the [address] form, are documented in Flowsheets linked to this Encounter. Allergies   Allergen Reactions    Lipitor [Atorvastatin Calcium]     Tape Meri Rice Tape] Hives and Rash         Prior to Visit Medications    Medication Sig Taking? Authorizing Provider   ergocalciferol (ERGOCALCIFEROL) 1.25 MG (58666 UT) capsule Take 1 capsule by mouth once a week Yes MIMI Hummel   montelukast (SINGULAIR) 10 MG tablet Take 10 mg by mouth nightly Yes Historical Provider, MD   Vitamin D (CHOLECALCIFEROL) 25 MCG (1000 UT) TABS tablet Take 1 capsule by mouth daily Yes Historical Provider, MD   fluticasone (FLONASE) 50 MCG/ACT nasal spray Use 2 spray(s) in each nostril once daily Yes MIMI Hummel   metoprolol tartrate (LOPRESSOR) 50 MG tablet TAKE 1 TABLET BY MOUTH TWICE DAILY Yes MIMI Hummel   ciclopirox (PENLAC) 8 % solution Apply topically greater than 8 hours before showering. Clean nails with alcohol weekly. Yes MIMI Hummel   nystatin (MYCOSTATIN) 202844 UNIT/GM powder Apply 3 times daily.  Yes MIMI Hummel   aspirin 81 MG chewable tablet CHEW AND SWALLOW 1 TABLET BY MOUTH ONCE DAILY Yes MIMI Salinas   Insulin Glargine, 1 Unit Dial, (TOUJEO SOLOSTAR) 300 UNIT/ML SOPN by NOT APPLICABLE route Yes Historical Provider, MD   lansoprazole (PREVACID) 30 MG delayed release capsule TAKE ONE CAPSULE BY MOUTH ONCE DAILY Yes MIMI Hummel metFORMIN (GLUCOPHAGE) 1000 MG tablet TAKE ONE TABLET BY MOUTH TWICE DAILY WITH MEALS Yes Richardson Shone, APRN   irbesartan (AVAPRO) 150 MG tablet Take 1 tablet by mouth daily Yes Richardson Shone, APRN   insulin aspart (NOVOLOG FLEXPEN) 100 UNIT/ML injection pen Inject 33 Units into the skin 3 times daily  Patient taking differently: Inject 25 Units into the skin 3 times daily  Yes Richardson Shone, APRN   INVOKANA 100 MG TABS tablet Take 100 mg by mouth daily Yes Historical Provider, MD   prasugrel (EFFIENT) 10 MG TABS TAKE 1 TABLET BY MOUTH ONCE DAILY Yes MIMI Sun   Diabetic Shoe MISC by Does not apply route Yes Travis Manuel MD   Semaglutide,0.25 or 0.5MG/DOS, (OZEMPIC, 0.25 OR 0.5 MG/DOSE,) 2 MG/1.5ML SOPN Inject 0.25 mg into the skin Yes Historical Provider, MD   mupirocin (BACTROBAN) 2 % ointment APPLY  OINTMENT TO AFFECTED AREA THREE TIMES DAILY Yes MIMI Beach   Insulin Pen Needle ([employer] PEN NEEDLES) 31G X 6 MM MISC Dx: E11.9 Yes MIMI Beach   furosemide (LASIX) 40 MG tablet Take 1 tablet by mouth daily Yes MIMI Beach   blood glucose test strips (ACCU-CHEK BHARATI) strip Patient is to test 8 times daily. Dx: E11.9  Please provide patient with Accu check Bharati Plus test strips per his Insurance request Yes MIMI Beach   nitroGLYCERIN (NITROSTAT) 0.4 MG SL tablet up to max of 3 total doses. If no relief after 1 dose, call 911. Yes Araseli Almeida MD   ipratropium-albuterol (DUONEB) 0.5-2.5 (3) MG/3ML SOLN nebulizer solution Inhale 3 mLs into the lungs every 6 hours Yes MIMI Beach   Lancets 30G MISC Use to check blood sugar 8 times daily. E11.9 Yes MIMI Beach   triamcinolone (KENALOG) 0.025 % cream Apply topically 2 times daily.  Yes MIMI Beach   guaiFENesin 400 MG tablet Take 1 tablet by mouth 4 times daily as needed for Cough Yes MIMI Beach   Alcohol Swabs PADS 1 box by Does not apply route 8 times daily Yes MIMI Mejia Blood Glucose Monitoring Suppl (ACURA BLOOD GLUCOSE METER) w/Device KIT Please provide patient with Accu check Bharati Meter per his Insurance request. Dx: E11.9 Yes MIMI Beach   albuterol sulfate HFA (VENTOLIN HFA) 108 (90 Base) MCG/ACT inhaler Inhale 2 puffs into the lungs every 6 hours as needed for Wheezing Yes Rc López,    hydroCHLOROthiazide (MICROZIDE) 12.5 MG capsule Take 1 capsule by mouth 2 times daily  MIMI Scott   gabapentin (NEURONTIN) 300 MG capsule TAKE 1 CAPSULE BY MOUTH THREE TIMES DAILY  MIMI Scott         Past Medical History:   Diagnosis Date    Aneurysm ([facility] [address]     abdominal    Bilateral leg edema     CAD (coronary artery disease)     Chronic kidney disease     Stage 4    Diabetes mellitus, type 2 ([facility] [address]     Fatty liver     GERD (gastroesophageal reflux disease)     HTN (hypertension)     Prolonged emergence from general anesthesia     Seasonal allergies        Past Surgical History:   Procedure Laterality Date    CARPAL TUNNEL RELEASE      COLONOSCOPY  11/04/2015    DR Capone: polys, 5yr recall    COLONOSCOPY N/A 10/11/2016    Dr Lopez-diverticulosis, Sessile serrated AP (-) high grade dysplasia x 1, tubular AP (-) dysplasia x 2, HP x 2--3 yr recall    COLONOSCOPY N/A 12/16/2020    Dr Sabiha Light, Mod. Diverticulosis, Internal hemorrhoids-Grade 2 & ext hemorrhoids, 3 year recall    CORONARY ANGIOPLASTY WITH STENT PLACEMENT  06/04/2019    AGA to circ    CORONARY ANGIOPLASTY WITH STENT PLACEMENT  05/2018    AGA to osteal LAD    ESOPHAGEAL DILATATION N/A 12/16/2020    Dr Sabiha Light, empirical Malik 52 fr bougie dilation    EYE SURGERY      Right eye    PRE-MALIGNANT / Shala Lorenzana UPPER GASTROINTESTINAL ENDOSCOPY  09/20/2016    Dr Lopez-w/dilation over wire, 46 French-distal esophageal narrowing, Inna (-)    UPPER GASTROINTESTINAL ENDOSCOPY  09/20/2016 Dr Lopez-w/dilation over wire, 46 Turkmen-distal esophageal narrowing, Inna (-)         Family History   Problem Relation Age of Onset    Colon Cancer Mother     Prostate Cancer Father     Other Father         HX of blood clot    Diabetes Sister     Cancer Sister     Diabetes Brother     Colon Polyps Son     Esophageal Cancer Neg Hx     Liver Cancer Neg Hx     Liver Disease Neg Hx     Stomach Cancer Neg Hx     Rectal Cancer Neg Hx        CareTeam (Including outside providers/suppliers regularly involved in providing care):   Patient Care Team:  MIMI Banuelos as PCP - General (Nurse Practitioner Family)  MIMI Banuelos as PCP - [facility] Empaneled Provider  Cyndy Landry MD as Consulting Physician (Nephrology)  MIMI Guevara as Nurse Practitioner (Certified Clinical Nurse Specialist)    Wt Readings from Last 3 Encounters:   12/22/20 270 lb (122.5 kg)   12/16/20 261 lb (118.4 kg)   12/02/20 269 lb (122 kg)     Vitals:    12/22/20 0710   BP: 110/78   Pulse: 67   Resp: 18   Temp: 97.8 °F (36.6 °C)   TempSrc: Temporal   SpO2: 98%   Weight: 270 lb (122.5 kg)   Height: 5' 11\" (1.803 m)     Body mass index is 37.66 kg/m². Based upon direct observation of the patient, evaluation of cognition reveals recent and remote memory intact.     General Appearance: alert and oriented to person, place and time, well developed and well- nourished, in no acute distress  Skin: warm and dry, no rash or erythema  Head: normocephalic and atraumatic  Eyes: pupils equal, round, and reactive to light, extraocular eye movements intact, conjunctivae normal  ENT: tympanic membrane, external ear and ear canal normal bilaterally, nose without deformity, nasal mucosa and turbinates normal without polyps  Neck: supple and non-tender without mass, no thyromegaly or thyroid nodules, no cervical lymphadenopathy Pulmonary/Chest: clear to auscultation bilaterally- no wheezes, rales or rhonchi, normal air movement, no respiratory distress  Cardiovascular: normal rate, regular rhythm, normal S1 and S2, no murmurs, rubs, clicks, or gallops, distal pulses intact, no carotid bruits  Abdomen: soft, non-tender, non-distended, normal bowel sounds, no masses or organomegaly  Extremities: no cyanosis, clubbing or edema  Musculoskeletal: normal range of motion, no joint swelling, deformity or tenderness  Neurologic: reflexes normal and symmetric, no cranial nerve deficit, gait, coordination and speech normal    Patient's complete Health Risk Assessment and screening values have been reviewed and are found in Flowsheets. The following problems were reviewed today and where indicated follow up appointments were made and/or referrals ordered. Positive Risk Factor Screenings with Interventions:     Fall Risk:  2 or more falls in past year?: (!) yes  Fall with injury in past year?: no  Fall Risk Interventions:    · Home safety tips provided          General Health and ACP:  General  In general, how would you say your health is?: Good  In the past 7 days, have you experienced any of the following?  New or Increased Pain, New or Increased Fatigue, Loneliness, Social Isolation, Stress or Anger?: None of These  Do you get the social and emotional support that you need?: Yes  Do you have a Living Will?: (!) No  Advance Directives     Power of [address] [place] [address] Will ACP-Advance Directive ACP-Power of     Not on File Filed on 10/11/16 Filed Not on File      General Health Risk Interventions:  · No Living Will: Advance Care Planning addressed with patient today    Health Habits/Nutrition:  Health Habits/Nutrition  Do you exercise for at least 20 minutes 2-3 times per week?: (!) No  Have you lost any weight without trying in the past 3 months?: No  Do you eat fewer than 2 meals per day?: No Have you seen a dentist within the past year?: Yes  Body mass index: (!) 37.65  Health Habits/Nutrition Interventions:  · Inadequate physical activity:  educational materials provided to promote increased physical activity    Hearing/Vision:  No exam data present  Hearing/Vision  Do you or your family notice any trouble with your hearing?: No  Do you have difficulty driving, watching TV, or doing any of your daily activities because of your eyesight?: (!) Yes(up close)  Have you had an eye exam within the past year?: Yes  Hearing/Vision Interventions:  · Vision concerns:  patient encouraged to make appointment with his/her eye specialist    Safety:  Safety  Do you have working smoke detectors?: Yes  Have all throw rugs been removed or fastened?: (!) No  Do you have non-slip mats or surfaces in all bathtubs/showers?: (!) No  Do all of your stairways have a railing or banister?: Yes  Are your doorways, halls and stairs free of clutter?: Yes  Do you always fasten your seatbelt when you are in a car?: Yes  Safety Interventions:  · Home safety tips provided     Personalized Preventive Plan   Current Health Maintenance Status  Immunization History   Administered Date(s) Administered    Influenza, High Dose (Fluzone 65 yrs and older) 09/19/2018    Influenza, Azalee Fees, IM, (6 mo and older Fluzone, Flulaval, Fluarix and 3 yrs and older Afluria) 11/02/2017    Influenza, Quadv, IM, PF (6 mo and older Fluzone, Flulaval, Fluarix, and 3 yrs and older Afluria) 10/05/2016, 09/18/2019    Influenza, Quadv, adjuvanted, 65 yrs +, IM, PF (Fluad) 09/22/2020    Pneumococcal Conjugate 13-valent (Lodema Bekah) 10/05/2016, 09/19/2018    Pneumococcal Polysaccharide (Kfpfaptiw80) 09/15/2012, 09/01/2018, 12/18/2019    Tdap (Boostrix, Adacel) 04/19/2017    Zoster Recombinant (Shingrix) 09/19/2018, 04/30/2019, 12/30/2019, 11/04/2020        Health Maintenance   Topic Date Due    Annual Wellness Visit (AWV)  05/29/2019  Diabetic foot exam  12/18/2020    A1C test (Diabetic or Prediabetic)  03/11/2021    Diabetic retinal exam  06/02/2021    Lipid screen  12/11/2021    Potassium monitoring  12/11/2021    Creatinine monitoring  12/11/2021    Colon cancer screen colonoscopy  12/16/2023    DTaP/Tdap/Td vaccine (2 - Td) 04/19/2027    Flu vaccine  Completed    Shingles Vaccine  Completed    Pneumococcal 65+ years Vaccine  Completed    Hepatitis C screen  Completed    Hepatitis A vaccine  Aged Out    Hib vaccine  Aged Out    Meningococcal (ACWY) vaccine  Aged Out     Recommendations for [facility] Due: see orders and patient instructions/AVS.  . Recommended screening schedule for the next 5-10 years is provided to the patient in written form: see Patient Instructions/AVS.    Hamzah Jackam was seen today for medicare aw. Diagnoses and all orders for this visit:    Routine general medical examination at a health care facility    Advance care planning  -     [address], [address] [[id number]]    BMI 37.0-37.9, adult  -     [place] Behavior  obesity 15m []    Encounter for special screening examination for neoplasm of prostate  -     PSA Screening; Future    Other orders  -     ergocalciferol (ERGOCALCIFEROL) 1.25 MG (88745 UT) capsule;  Take 1 capsule by mouth once a week                 Advance Care Planning Advanced Care Planning: Discussed the patients choices for care and treatment in case of a health event that adversely affects decision-making abilities. Also discussed the patients long-term treatment options. Reviewed the process of designating a Health Care Surrogate as defined by the [address]. Reviewed the [facility] Will Directive process and the kinds of life-sustaining treatments the patient would like to receive should they become terminally ill or permanently unconscious. Explained the state requirement to complete the forms in the presence of two eligible witnesses OR in the presence of a . Patient was asked to provide a copy of the signed forms to the practice office. Time spent (minutes): 30     Obesity Counseling: Assessed behavioral health risks and factors affecting choice of behavior. Suggested weight control approaches, including dietary changes behavioral modification and follow up plan. Provided educational and support documentation.   Time spent (minutes): 15        Increase Toujeo up to 35 units daily

## 2020-12-22 NOTE — PATIENT INSTRUCTIONS
Advance Directives: Care Instructions  Overview  An advance directive is a legal way to state your wishes at the end of your life. It tells your family and your doctor what to do if you can't say what you want. There are two main types of advance directives. You can change them any time your wishes change. Living will. This form tells your family and your doctor your wishes about life support and other treatment. The form is also called a declaration. Medical power of . This form lets you name a person to make treatment decisions for you when you can't speak for yourself. This person is called a health care agent (health care proxy, health care surrogate). The form is also called a durable power of  for health care. If you do not have an advance directive, decisions about your medical care may be made by a family member, or by a doctor or a  who doesn't know you. It may help to think of an advance directive as a gift to the people who care for you. If you have one, they won't have to make tough decisions by themselves. Follow-up care is a key part of your treatment and safety. Be sure to make and go to all appointments, and call your doctor if you are having problems. It's also a good idea to know your test results and keep a list of the medicines you take. What should you include in an advance directive? Many states have a unique advance directive form. (It may ask you to address specific issues.) Or you might use a universal form that's approved by many states. If your form doesn't tell you what to address, it may be hard to know what to include in your advance directive. Use the questions below to help you get started. · Who do you want to make decisions about your medical care if you are not able to? · What life-support measures do you want if you have a serious illness that gets worse over time or can't be cured? · What are you most afraid of that might happen? (Maybe you're afraid of having pain, losing your independence, or being kept alive by machines.)  · Where would you prefer to die? (Your home? A hospital? A nursing home?)  · Do you want to donate your organs when you die? · Do you want certain Christianity practices performed before you die? When should you call for help? Be sure to contact your doctor if you have any questions. Where can you learn more? Go to https://CryptoSealpeandreeb.[employer]. org and sign in to your ThoughtSpot account. Enter R264 in the Ubitexx box to learn more about \"Advance Directives: Care Instructions. \"     If you do not have an account, please click on the \"Sign Up Now\" link. Current as of: December 9, 2019               Content Version: 12.6  © 6621-2116 [employer]. Care instructions adapted under license by [facility] ([facility]). If you have questions about a medical condition or this instruction, always ask your healthcare professional. [address] any warranty or liability for your use of this information. Learning About Living Oli Arnett  What is a living will? A living will, also called a declaration, is a legal form. It tells your family and your doctor your wishes when you can't speak for yourself. It's used by the health professionals who will treat you as you near the end of your life or if you get seriously hurt or ill. If you put your wishes in writing, your loved ones and others will know what kind of care you want. They won't need to guess. This can ease your mind and be helpful to others. And you can change or cancel your living will at any time. A living will is not the same as an estate or property will. An estate will explains what you want to happen with your money and property after you die. How do you use it? A living will is used to describe the kinds of treatment or life support you want as you near the end of your life or if you get seriously hurt or ill. Keep these facts in mind about living [place]. · Your living will is used only if you can't speak or make decisions for yourself. Most often, one or more doctors must certify that you can't speak or decide for yourself before your living will takes effect. · If you get better and can speak for yourself again, you can accept or refuse any treatment. It doesn't matter what you said in your living will. · Some states may limit your right to refuse treatment in certain cases. For example, you may need to clearly state in your living will that you don't want artificial hydration and nutrition, such as being fed through a tube. Is a living will a legal document? A living will is a legal document. Each state has its own laws about living [place]. And a living will may be called something else in your state. Here are some things to know about living [place]. · You don't need an  to complete a living will. But legal advice can be helpful if your state's laws are unclear. It can also help if your health history is complicated or your family can't agree on what should be in your living will. · You can change your living will at any time. Some people find that their wishes about end-of-life care change as their health changes. If you make big changes to your living will, complete a new form. · If you move to another state, make sure that your living will is legal in the state where you now live. In most cases, doctors will respect your wishes even if you have a form from a different state. · You might use a universal form that has been approved by many states. This kind of form can sometimes be filled out and stored online. Your digital copy will then be available wherever you have a connection to the internet. The doctors and nurses who need to treat you can find it right away. · Your state may offer an online registry. This is another place where you can store your living will online. · It's a good idea to get your living will notarized. This means using a person called a  to watch two people sign, or witness, your living will. What should you know when you create a living will? Here are some questions to ask yourself as you make your living will:  · Do you know enough about life support methods that might be used? If not, talk to your doctor so you know what might be done if you can't breathe on your own, your heart stops, or you can't swallow. · What things would you still want to be able to do after you receive life-support methods? Would you want to be able to walk? To speak? To eat on your own? To live without the help of machines? · Do you want certain Mosque practices performed if you become very ill? · If you have a choice, where do you want to be cared for? In your home? At a hospital or nursing home? · If you have a choice at the end of your life, where would you prefer to die? At home? In a hospital or nursing home? Somewhere else? · Would you prefer to be buried or cremated? · Do you want your organs to be donated after you die? What should you do with your living will? · Make sure that your family members and your health care agent have copies of your living will (also called a declaration). · Give your doctor a copy of your living will. Ask him or her to keep it as part of your medical record. If you have more than one doctor, make sure that each one has a copy. · Put a copy of your living will where it can be easily found. For example, some people may put a copy on their refrigerator door. If you are using a digital copy, be sure your doctor, family members, and health care agent know how to find and access it. Where can you learn more? Go to https://chpepiceweb.Sc[employer]crow [employer]. org and sign in to your Libox account. Enter K581 in the Playspace box to learn more about \"Learning About Living Joseph Ariza. \"     If you do not have an account, please click on the \"Sign Up Now\" link. Current as of: December 9, 2019               Content Version: 12.6  © 5648-1909 [employer]. Care instructions adapted under license by [facility]. If you have questions about a medical condition or this instruction, always ask your healthcare professional. [address] any warranty or liability for your use of this information. Body Mass Index: Care Instructions  Your Care Instructions     Body mass index (BMI) can help you see if your weight is raising your risk for health problems. It uses a formula to compare how much you weigh with how tall you are. · A BMI lower than 18.5 is considered underweight. · A BMI between 18.5 and 24.9 is considered healthy. · A BMI between 25 and 29.9 is considered overweight. A BMI of 30 or higher is considered obese. If your BMI is in the normal range, it means that you have a lower risk for weight-related health problems. If your BMI is in the overweight or obese range, you may be at increased risk for weight-related health problems, such as high blood pressure, heart disease, stroke, arthritis or joint pain, and diabetes. If your BMI is in the underweight range, you may be at increased risk for health problems such as fatigue, lower protection (immunity) against illness, muscle loss, bone loss, hair loss, and hormone problems. BMI is just one measure of your risk for weight-related health problems. You may be at higher risk for health problems if you are not active, you eat an unhealthy diet, or you drink too much alcohol or use tobacco products. Follow-up care is a key part of your treatment and safety. Be sure to make and go to all appointments, and call your doctor if you are having problems. It's also a good idea to know your test results and keep a list of the medicines you take. How can you care for yourself at home? · Practice healthy eating habits. This includes eating plenty of fruits, vegetables, whole grains, lean protein, and low-fat dairy. · If your doctor recommends it, get more exercise. Walking is a good choice. Bit by bit, increase the amount you walk every day. Try for at least 30 minutes on most days of the week. · Do not smoke. Smoking can increase your risk for health problems. If you need help quitting, talk to your doctor about stop-smoking programs and medicines. These can increase your chances of quitting for good. · Limit alcohol to 2 drinks a day for men and 1 drink a day for women. Too much alcohol can cause health problems. If you have a BMI higher than 25  · Your doctor may do other tests to check your risk for weight-related health problems. This may include measuring the distance around your waist. A waist measurement of more than 40 inches in men or 35 inches in women can increase the risk of weight-related health problems. · Talk with your doctor about steps you can take to stay healthy or improve your health. You may need to make lifestyle changes to lose weight and stay healthy, such as changing your diet and getting regular exercise. If you have a BMI lower than 18.5  · Your doctor may do other tests to check your risk for health problems. · Talk with your doctor about steps you can take to stay healthy or improve your health. You may need to make lifestyle changes to gain or maintain weight and stay healthy, such as getting more healthy foods in your diet and doing exercises to build muscle. Where can you learn more? Go to https://chmarcella.healthNew Planet Technologies. org and sign in to your [employer] account. Enter S176 in the Innov-X Systems box to learn more about \"Body Mass Index: Care Instructions. \"     If you do not have an account, please click on the \"Sign Up Now\" link. Current as of: December 11, 2019               Content Version: 12.6  © 6140-2930 [employer]ad, [employer]. Care instructions adapted under license by [facility] ([facility]). If you have questions about a medical condition or this instruction, always ask your healthcare professional. [address] any warranty or liability for your use of this information. Eating Healthy Foods: Care Instructions  Your Care Instructions     Eating healthy foods can help lower your risk for disease. Healthy food gives you energy and keeps your heart strong, your brain active, your muscles working, and your bones strong. A healthy diet includes a variety of foods from the basic food groups: grains, vegetables, fruits, milk and milk products, and meat and beans. Some people may eat more of their favorite foods from only one food group and, as a result, miss getting the nutrients they need. So, it is important to pay attention not only to what you eat but also to what you are missing from your diet. You can eat a healthy, balanced diet by making a few small changes. Follow-up care is a key part of your treatment and safety. Be sure to make and go to all appointments, and call your doctor if you are having problems. It's also a good idea to know your test results and keep a list of the medicines you take. How can you care for yourself at home?   Look at what you eat ? Eat brown rice instead of white rice, and eat whole wheat pasta instead of white-flour pasta. ? Try low-fat cheeses and low-fat yogurt. ? Add more fruits and vegetables to meals and have them for snacks. ? Add lettuce, tomato, cucumber, and onion to sandwiches. ? Add fruit to yogurt and cereal.  Enjoy food  · You can still eat your favorite foods. You just may need to eat less of them. If your favorite foods are high in fat, salt, and sugar, limit how often you eat them, but do not cut them out entirely. · Eat a wide variety of foods. Make healthy choices when eating out  · The type of restaurant you choose can help you make healthy choices. Even fast-food chains are now offering more low-fat or healthier choices on the menu. · Choose smaller portions, or take half of your meal home. · When eating out, try:  ? A veggie pizza with a whole wheat crust or grilled chicken (instead of sausage or pepperoni). ? Pasta with roasted vegetables, grilled chicken, or marinara sauce instead of cream sauce. ? A vegetable wrap or grilled chicken wrap. ? Broiled or poached food instead of fried or breaded items. Make healthy choices easy  · Buy packaged, prewashed, ready-to-eat fresh vegetables and fruits, such as baby carrots, salad mixes, and chopped or shredded broccoli and cauliflower. · Buy packaged, presliced fruits, such as melon or pineapple. · Choose 100% fruit or vegetable juice instead of soda. Limit juice intake to 4 to 6 oz (½ to ¾ cup) a day. · Blend low-fat yogurt, fruit juice, and canned or frozen fruit to make a smoothie for breakfast or a snack. Where can you learn more? Go to https://santino.[employer]. org and sign in to your [employer] account. Enter M761 in the Cellfire box to learn more about \"Eating Healthy Foods: Care Instructions. \"     If you do not have an account, please click on the \"Sign Up Now\" link. Current as of: August 22, 2019               Content Version: 12.6  © 9426-4621 [employer]orporated. Care instructions adapted under license by [facility] ([facility]). If you have questions about a medical condition or this instruction, always ask your healthcare professional. [address] any warranty or liability for your use of this information. Personalized Preventive Plan for Silva Callaway - 12/22/2020  Medicare offers a range of preventive health benefits. Some of the tests and screenings are paid in full while other may be subject to a deductible, co-insurance, and/or copay. Some of these benefits include a comprehensive review of your medical history including lifestyle, illnesses that may run in your family, and various assessments and screenings as appropriate. After reviewing your medical record and screening and assessments performed today your provider may have ordered immunizations, labs, imaging, and/or referrals for you. A list of these orders (if applicable) as well as your Preventive Care list are included within your After Visit Summary for your review. Other Preventive Recommendations:    · A preventive eye exam performed by an eye specialist is recommended every 1-2 years to screen for glaucoma; cataracts, macular degeneration, and other eye disorders. · A preventive dental visit is recommended every 6 months. · Try to get at least 150 minutes of exercise per week or 10,000 steps per day on a pedometer . · Order or download the FREE \"Exercise & Physical Activity: Your Everyday Guide\" from [employer]. Call 1-136.441.9080 or search [employer] online. · You need 0692-0127 mg of calcium and 3867-7773 IU of vitamin D per day.  It is possible to meet your calcium requirement with diet alone, but a vitamin D supplement is usually necessary to meet this goal.

## 2020-12-23 ENCOUNTER — TELEPHONE (OUTPATIENT)
Dept: ENDOCRINOLOGY | Facility: CLINIC | Age: 65
End: 2020-12-23

## 2020-12-23 NOTE — TELEPHONE ENCOUNTER
[facility] left a vm needing the paperwork for the CGM to be refaxed to them, at [phone].

## 2020-12-28 ENCOUNTER — TELEPHONE (OUTPATIENT)
Dept: PRIMARY CARE CLINIC | Age: 65
End: 2020-12-28

## 2020-12-28 NOTE — TELEPHONE ENCOUNTER
Medicare Part D does not cover vitamins except in cases in which it is prenatals for pregnancy. Pt requested we submit a PA for Vit D which has been denied. How would you like to proceed?

## 2020-12-29 RX ORDER — PRASUGREL 10 MG/1
TABLET, FILM COATED ORAL
Qty: 90 TABLET | Refills: 0 | Status: SHIPPED | OUTPATIENT
Start: 2020-12-29 | End: 2020-12-30 | Stop reason: SDUPTHER

## 2020-12-30 ENCOUNTER — TELEPHONE (OUTPATIENT)
Dept: PRIMARY CARE CLINIC | Age: 65
End: 2020-12-30

## 2020-12-30 RX ORDER — PRASUGREL 10 MG/1
10 TABLET, FILM COATED ORAL ONCE
Qty: 90 TABLET | Refills: 0 | Status: SHIPPED | OUTPATIENT
Start: 2020-12-30 | End: 2021-09-09

## 2021-01-04 ENCOUNTER — TELEPHONE (OUTPATIENT)
Dept: ENDOCRINOLOGY | Facility: CLINIC | Age: 66
End: 2021-01-04

## 2021-01-05 ENCOUNTER — DOCUMENTATION (OUTPATIENT)
Dept: ENDOCRINOLOGY | Facility: CLINIC | Age: 66
End: 2021-01-05

## 2021-01-11 ENCOUNTER — TELEPHONE (OUTPATIENT)
Dept: ENDOCRINOLOGY | Facility: CLINIC | Age: 66
End: 2021-01-11

## 2021-01-12 DIAGNOSIS — Z12.5 ENCOUNTER FOR SPECIAL SCREENING EXAMINATION FOR NEOPLASM OF PROSTATE: ICD-10-CM

## 2021-01-12 LAB — PROSTATE SPECIFIC ANTIGEN: 0.94 NG/ML (ref 0–4)

## 2021-01-13 ENCOUNTER — DOCUMENTATION (OUTPATIENT)
Dept: ENDOCRINOLOGY | Facility: CLINIC | Age: 66
End: 2021-01-13

## 2021-01-19 NOTE — PROGRESS NOTES
[facility] - PODIATRY    Today's Date: 01/26/21    Patient Name: Mick Sims  MRN: [id number]  CSN: [id number]  PCP: Ginny Collazo APRN   Referring Provider: No ref. provider found    SUBJECTIVE     Chief Complaint   Patient presents with   • Follow-up     pt is here for 3mo f/u on diabetic foot/nail care - pt c/o long, thickened toenail - pt admits to Neuropathy - pcp 09/22/2020   • Diabetes     last blood sugar reading is 179mg/dl      HPI: Mick Sims, a 65 y.o.male, comes to clinic as a(n) established patient presenting for diabetic foot exam and complaining of thick fungal toenails. Patient has h/o arthritis, DM2, HTN, Renal Disorder. Patient is IDDM with last stated BG level of 179mg/dl. Admits to mild numbness and tingling in feet. Denies open wounds or sores. Admits to mild swelling in legs that he wears mild compression stockings for. States that his toenails are long, thick and discolored. He has trouble caring for them himself. Denies pain due to neuropathy. Denies previous treatment. Denies any constitutional symptoms. No other pedal complaints at this time.    Past Medical History:   Diagnosis Date   • Arthritis    • Diabetes mellitus (CMS/HCC)    • Hypertension    • Renal disorder      Past Surgical History:   Procedure Laterality Date   • CARPAL TUNNEL RELEASE       Family History   Problem Relation Age of Onset   • Cancer Mother    • Diabetes Sister    • Diabetes Brother    • No Known Problems Brother      Social History     Socioeconomic History   • Marital status:      Spouse name: Not on file   • Number of children: Not on file   • Years of education: Not on file   • Highest education level: Not on file   Tobacco Use   • Smoking status: Never Smoker   • Smokeless tobacco: Never Used   Substance and Sexual Activity   • Alcohol use: No   • Drug use: Never   • Sexual activity: Defer     Allergies   Allergen Reactions   • Atorvastatin Calcium Unknown - Low Severity          Current Outpatient Medications   Medication Sig Dispense Refill   • aspirin 81 MG chewable tablet Chew 81 mg Daily.     • atorvastatin (LIPITOR) 80 MG tablet Take 80 mg by mouth Daily.     • Canagliflozin (INVOKANA) 100 MG tablet 100 mg po daily 30 tablet 11   • fluticasone (FLONASE) 50 MCG/ACT nasal spray 1 spray into each nostril 2 (Two) Times a Day As Needed for rhinitis or allergies.     • furosemide (LASIX) 40 MG tablet Take 40 mg by mouth 2 (Two) Times a Day.     • gabapentin (NEURONTIN) 300 MG capsule Take 300 mg by mouth 3 (Three) Times a Day.     • guaiFENesin (MUCINEX) 600 MG 12 hr tablet Take 2 tablets by mouth 2 (Two) Times a Day. 20 tablet 0   • hydrochlorothiazide (HYDRODIURIL) 25 MG tablet Take 12.5 mg by mouth Every Night. In addition to avalide     • Insulin Glargine, 2 Unit Dial, (TOUJEO MAX SOLOSTAR) 300 UNIT/ML solution pen-injector injection Inject 80 Units under the skin into the appropriate area as directed Daily. 3 pen 11   • Insulin Lispro, 1 Unit Dial, (HumaLOG KwikPen) 100 UNIT/ML solution pen-injector Inject 40 Units under the skin into the appropriate area as directed 3 (Three) Times a Day. 42 mL 11   • Insulin Pen Needle (PEN NEEDLES) 32G X 4 MM misc 1 each 4 (Four) Times a Day. Use 4 times per day to inject insulin DX E11.9 400 each 3   • irbesartan (AVAPRO) 150 MG tablet Take 150 mg by mouth Every Night.     • lansoprazole (PREVACID) 30 MG capsule Take 30 mg by mouth Every Night.     • metFORMIN (GLUCOPHAGE) 1000 MG tablet Take 1,000 mg by mouth 2 (Two) Times a Day. Needs pm dose     • metoprolol tartrate (LOPRESSOR) 50 MG tablet Take 1 tablet by mouth 2 (Two) Times a Day. 30 tablet 0   • Multiple Vitamins-Minerals (CENTRUM SILVER 50+MEN PO) Take 1 tablet by mouth Daily.     • nitroglycerin (NITRODUR) 0.4 MG/HR patch Place 1 patch on the skin as directed by provider Daily.     • pantoprazole (PROTONIX) 20 MG EC tablet Take 20 mg by mouth Daily.     • prasugrel (EFFIENT) 10 MG  tablet Take 10 mg by mouth Daily.     • raNITIdine (ZANTAC) 75 MG tablet Take 75 mg by mouth Daily As Needed for indigestion or heartburn.     • Semaglutide,0.25 or 0.5MG/DOS, (Ozempic, 0.25 or 0.5 MG/DOSE,) 2 MG/1.5ML solution pen-injector Inject 0.5 mg under the skin into the appropriate area as directed 1 (One) Time Per Week. 0.5 mg weekly 1 pen 11   • vitamin D (ERGOCALCIFEROL) 1.25 MG (85513 UT) capsule capsule Take 50,000 Units by mouth 1 (One) Time Per Week.       No current facility-administered medications for this visit.      Review of Systems   Constitutional: Negative for chills and fever.   HENT: Negative for congestion.    Respiratory: Negative for shortness of breath.    Cardiovascular: Positive for leg swelling. Negative for chest pain.   Gastrointestinal: Negative for constipation, diarrhea, nausea and vomiting.   Musculoskeletal:        Foot pain   Skin: Negative for wound.   Neurological: Positive for numbness.       OBJECTIVE     Vitals:    01/26/21 0850   BP: 123/81   Pulse: 79   SpO2: 98%       PHYSICAL EXAM  GEN:   Accompanied by none.     Foot/Ankle Exam:       General:   Diabetic Foot Exam Performed    Appearance: appears stated age and healthy    Orientation: AAOx3    Affect: appropriate    Gait: unimpaired    Assistance: independent    Shoe Gear:  Casual shoes    VASCULAR      Right Foot Vascularity   Dorsalis pedis:  2+  Posterior tibial:  2+  Skin Temperature: warm    Edema Grading:  Trace  CFT:  3  Pedal Hair Growth:  Present  Varicosities: mild varicosities       Left Foot Vascularity   Dorsalis pedis:  2+  Posterior tibial:  2+  Skin Temperature: warm    Edema Grading:  Trace  CFT:  3  Pedal Hair Growth:  Present  Varicosities: mild varicosities        NEUROLOGIC     Right Foot Neurologic   Light touch sensation:  Diminished  Vibratory sensation:  Diminished  Hot/Cold sensation: diminished    Protective Sensation using Preston-Azar Monofilament:  8     Left Foot Neurologic   Light  touch sensation:  Diminished  Vibratory sensation:  Diminished  Hot/cold sensation: diminished    Protective Sensation using Kennedy-Azar Monofilament:  7     MUSCULOSKELETAL      Right Foot Musculoskeletal   Ecchymosis:  None  Tenderness: none    Arch:  Normal     Left Foot Musculoskeletal   Ecchymosis:  None  Tenderness: none    Arch:  Normal     MUSCLE STRENGTH     Right Foot Muscle Strength   Foot dorsiflexion:  5  Foot plantar flexion:  5  Foot inversion:  5  Foot eversion:  5     Left Foot Muscle Strength   Foot dorsiflexion:  5  Foot plantar flexion:  5  Foot inversion:  5  Foot eversion:  5     RANGE OF MOTION      Right Foot Range of Motion   Foot and ankle ROM within normal limits       Left Foot Range of Motion   Foot and ankle ROM within normal limits       DERMATOLOGIC     Right Foot Dermatologic   Skin: skin intact    Nails: onychomycosis, abnormally thick, subungual debris and dystrophic nails       Left Foot Dermatologic   Skin: skin intact    Nails: onychomycosis, abnormally thick, subungual debris and dystrophic nails        RADIOLOGY/NUCLEAR:  No results found.    LABORATORY/CULTURE RESULTS:      PATHOLOGY RESULTS:       ASSESSMENT/PLAN     Diagnoses and all orders for this visit:    1. Onychomycosis (Primary)    2. Type 2 diabetes mellitus with diabetic neuropathy, with long-term current use of insulin (CMS/[facility])      Comprehensive lower extremity examination and evaluation was performed.  Discussed findings and treatment plan including risks, benefits, and treatment options with patient in detail. Patient agreed with treatment plan.  After verbal consent obtained, nail(s) x10 debrided of length and thickness with nail nipper without incidence  Patient may maintain nails and calluses at home utilizing emery board or pumice stone between visits as needed  Reviewed at home diabetic foot care including daily foot checks   Continue daily use of compression stockings.  An After Visit Summary was  printed and given to the patient at discharge, including (if requested) any available informative/educational handouts regarding diagnosis, treatment, or medications. All questions were answered to patient/family satisfaction. Should symptoms fail to improve or worsen they agree to call or return to clinic or to go to the Emergency Department. Discussed the importance of following up with any needed screening tests/labs/specialist appointments and any requested follow-up recommended by me today. Importance of maintaining follow-up discussed and patient accepts that missed appointments can delay diagnosis and potentially lead to worsening of conditions.  Return in about 3 months (around 4/26/2021)., or sooner if acute issues arise.        This document has been electronically signed by Mehdi Valencia DPM on January 26, 2021 09:16 CST

## 2021-01-20 ENCOUNTER — OFFICE VISIT (OUTPATIENT)
Dept: GASTROENTEROLOGY | Age: 66
End: 2021-01-20
Payer: MEDICARE

## 2021-01-20 VITALS
WEIGHT: 266 LBS | HEART RATE: 74 BPM | HEIGHT: 72 IN | DIASTOLIC BLOOD PRESSURE: 70 MMHG | SYSTOLIC BLOOD PRESSURE: 130 MMHG | BODY MASS INDEX: 36.03 KG/M2 | OXYGEN SATURATION: 98 %

## 2021-01-20 DIAGNOSIS — K21.00 GASTROESOPHAGEAL REFLUX DISEASE WITH ESOPHAGITIS WITHOUT HEMORRHAGE: Primary | ICD-10-CM

## 2021-01-20 DIAGNOSIS — K57.90 DIVERTICULOSIS: ICD-10-CM

## 2021-01-20 PROCEDURE — 99213 OFFICE O/P EST LOW 20 MIN: CPT | Performed by: NURSE PRACTITIONER

## 2021-01-20 PROCEDURE — 3017F COLORECTAL CA SCREEN DOC REV: CPT | Performed by: NURSE PRACTITIONER

## 2021-01-20 PROCEDURE — 1123F ACP DISCUSS/DSCN MKR DOCD: CPT | Performed by: NURSE PRACTITIONER

## 2021-01-20 PROCEDURE — 4040F PNEUMOC VAC/ADMIN/RCVD: CPT | Performed by: NURSE PRACTITIONER

## 2021-01-20 PROCEDURE — 1036F TOBACCO NON-USER: CPT | Performed by: NURSE PRACTITIONER

## 2021-01-20 PROCEDURE — G8484 FLU IMMUNIZE NO ADMIN: HCPCS | Performed by: NURSE PRACTITIONER

## 2021-01-20 PROCEDURE — G8417 CALC BMI ABV UP PARAM F/U: HCPCS | Performed by: NURSE PRACTITIONER

## 2021-01-20 PROCEDURE — G8427 DOCREV CUR MEDS BY ELIG CLIN: HCPCS | Performed by: NURSE PRACTITIONER

## 2021-01-20 RX ORDER — PANTOPRAZOLE SODIUM 40 MG/1
40 TABLET, DELAYED RELEASE ORAL
Qty: 90 TABLET | Refills: 3 | Status: SHIPPED | OUTPATIENT
Start: 2021-01-20 | End: 2022-08-30 | Stop reason: SDUPTHER

## 2021-01-20 ASSESSMENT — ENCOUNTER SYMPTOMS
ANAL BLEEDING: 0
TROUBLE SWALLOWING: 1
NAUSEA: 0
BLOOD IN STOOL: 0
RECTAL PAIN: 0
VOMITING: 0
ABDOMINAL DISTENTION: 0
COUGH: 0
ABDOMINAL PAIN: 1
CHOKING: 0
SHORTNESS OF BREATH: 0
CONSTIPATION: 0
DIARRHEA: 0

## 2021-01-20 NOTE — PROGRESS NOTES
will consider an Esophageal manometry later if the patient's dysphagia persists.     Colonoscopy Findings 12/16/2020: The mucosa appeared normal throughout the entire examined colon. NO large polyps or masses or strictures or colitis. Moderate Diverticulosis in the left colon  Internal hemorrhoids-Grade 2 and external hemorrhoids. Where it was clearly visible, the mucosa appeared normal throughout the entire examined colon  Retroflexion in the rectum was otherwise normal and revealed no further abnormalities. Recommendations:  1. Repeat colonoscopy: due to his personal hx of polyps and family hx of colon cancer-in 3 years. 2.- Resume previous meds and diet  - Keep scheduled f/u appts with other MDs     FINAL DIAGNOSIS:   Esophagus, biopsy: Benign esophageal type mucosa with focal changes   consistent with reflux esophagitis, negative for evidence of eosinophilic   esophagitis. All scope and pathology reports were reviewed and discussed with patient. All questions answered, pt verbalized understanding. Assessment:     1. Gastroesophageal reflux disease with esophagitis without hemorrhage    2. Diverticulosis            Plan:   - Stop Prevacid  - Begin Protonix 40 mg po daily, refills provided  - Follow up in one year for medication management or sooner if needed  - Call with any questions, concerns, or worsening of symptoms  - Colonoscopy due in 2023    Orders  No orders of the defined types were placed in this encounter.     Medications  Orders Placed This Encounter   Medications    pantoprazole (PROTONIX) 40 MG tablet     Sig: Take 1 tablet by mouth every morning (before breakfast)     Dispense:  90 tablet     Refill:  3         Patient History:     Past Medical History:   Diagnosis Date    Aneurysm ([facility] [address]     abdominal    Bilateral leg edema     CAD (coronary artery disease)     Chronic kidney disease     Stage 4    Diabetes mellitus, type 2 (HCC)     Fatty liver     GERD (gastroesophageal Smoking status: Never Smoker    Smokeless tobacco: Never Used   Substance and Sexual Activity    Alcohol use: No    Drug use: No    Sexual activity: None   Lifestyle    Physical activity     Days per week: None     Minutes per session: None    Stress: None   Relationships    Social connections     Talks on phone: None     Gets together: None     Attends [employer] service: None     Active member of club or organization: None     Attends meetings of clubs or organizations: None     Relationship status: None    Intimate partner violence     Fear of current or ex partner: None     Emotionally abused: None     Physically abused: None     Forced sexual activity: None   Other Topics Concern    None   Social History Narrative    None       Current Outpatient Medications   Medication Sig Dispense Refill    pantoprazole (PROTONIX) 40 MG tablet Take 1 tablet by mouth every morning (before breakfast) 90 tablet 3    prasugrel (EFFIENT) 10 MG TABS Take 1 tablet by mouth once for 1 dose Take 1 tablet by mouth once daily 90 tablet 0    ergocalciferol (ERGOCALCIFEROL) 1.25 MG (86181 UT) capsule Take 1 capsule by mouth once a week (Patient taking differently: Take 50,000 Units by mouth once a week Vit   d) 4 capsule 3    montelukast (SINGULAIR) 10 MG tablet Take 10 mg by mouth nightly      Vitamin D (CHOLECALCIFEROL) 25 MCG (1000 UT) TABS tablet Take 1 capsule by mouth daily      fluticasone (FLONASE) 50 MCG/ACT nasal spray Use 2 spray(s) in each nostril once daily 48 g 5    metoprolol tartrate (LOPRESSOR) 50 MG tablet TAKE 1 TABLET BY MOUTH TWICE DAILY 180 tablet 3    ciclopirox (PENLAC) 8 % solution Apply topically greater than 8 hours before showering. Clean nails with alcohol weekly. 1 Bottle 3    nystatin (MYCOSTATIN) 712345 UNIT/GM powder Apply 3 times daily.  60 g 1    aspirin 81 MG chewable tablet CHEW AND SWALLOW 1 TABLET BY MOUTH ONCE DAILY 90 tablet 3    Insulin Glargine, 1 Unit Dial, (TOUJEO SOLOSTAR) 300 UNIT/ML SOPN by NOT APPLICABLE route      hydroCHLOROthiazide (MICROZIDE) 12.5 MG capsule Take 1 capsule by mouth 2 times daily 180 capsule 3    gabapentin (NEURONTIN) 300 MG capsule TAKE 1 CAPSULE BY MOUTH THREE TIMES DAILY 270 capsule 1    metFORMIN (GLUCOPHAGE) 1000 MG tablet TAKE ONE TABLET BY MOUTH TWICE DAILY WITH MEALS 180 tablet 2    irbesartan (AVAPRO) 150 MG tablet Take 1 tablet by mouth daily 90 tablet 3    insulin aspart (NOVOLOG FLEXPEN) 100 UNIT/ML injection pen Inject 33 Units into the skin 3 times daily (Patient taking differently: Inject 25 Units into the skin 3 times daily ) 10 pen 1    INVOKANA 100 MG TABS tablet Take 100 mg by mouth daily      Diabetic Shoe MISC by Does not apply route 1 each 0    Semaglutide,0.25 or 0.5MG/DOS, (OZEMPIC, 0.25 OR 0.5 MG/DOSE,) 2 MG/1.5ML SOPN Inject 0.25 mg into the skin      mupirocin (BACTROBAN) 2 % ointment APPLY  OINTMENT TO AFFECTED AREA THREE TIMES DAILY 1 Tube 1    Insulin Pen Needle ([employer] PEN NEEDLES) 31G X 6 MM MISC Dx: E11.9 150 each 3    furosemide (LASIX) 40 MG tablet Take 1 tablet by mouth daily 90 tablet 3    blood glucose test strips (ACCU-CHEK DUNIA) strip Patient is to test 8 times daily. Dx: E11.9  Please provide patient with Accu check Dunia Plus test strips per his Insurance request 900 each 3    nitroGLYCERIN (NITROSTAT) 0.4 MG SL tablet up to max of 3 total doses. If no relief after 1 dose, call 911. 25 tablet 3    ipratropium-albuterol (DUONEB) 0.5-2.5 (3) MG/3ML SOLN nebulizer solution Inhale 3 mLs into the lungs every 6 hours 360 mL 1    Lancets 30G MISC Use to check blood sugar 8 times daily. E11.9 300 each 5    triamcinolone (KENALOG) 0.025 % cream Apply topically 2 times daily.  1 Tube 0    Alcohol Swabs PADS 1 box by Does not apply route 8 times daily 100 each 5    Blood Glucose Monitoring Suppl (ACURA BLOOD GLUCOSE METER) w/Device KIT Please provide patient with Accu check Dunia Meter per his Insurance request. Dx: E11.9 1 kit 0    albuterol sulfate HFA (VENTOLIN HFA) 108 (90 Base) MCG/ACT inhaler Inhale 2 puffs into the lungs every 6 hours as needed for Wheezing 1 Inhaler 3     No current facility-administered medications for this visit. Allergies   Allergen Reactions    Lipitor [Atorvastatin Calcium] Diarrhea and Other (See Comments)     Dizzy    Tape Evalene [place] Tape] Hives and Rash       Review of Systems   Constitutional: Negative for activity change, appetite change, fatigue, fever and unexpected weight change. HENT: Positive for trouble swallowing (improved). Respiratory: Negative for cough, choking and shortness of breath. Cardiovascular: Negative for chest pain. Gastrointestinal: Positive for abdominal pain. Negative for abdominal distention, anal bleeding, blood in stool, constipation, diarrhea, nausea, rectal pain and vomiting. Heartburn     Allergic/Immunologic: Negative for food allergies. All other systems reviewed and are negative. Objective:     /70   Pulse 74   Ht 6' (1.829 m)   Wt 266 lb (120.7 kg)   SpO2 98%   BMI 36.08 kg/m²     Physical Exam  Vitals signs reviewed. Constitutional:       General: He is not in acute distress. Appearance: He is well-developed. HENT:      Head: Normocephalic and atraumatic. Right Ear: External ear normal.      Left Ear: External ear normal.      Nose: Nose normal.      Comments: Mask on     Mouth/Throat:      Comments: Mask on  Eyes:      General: No scleral icterus. Right eye: No discharge. Left eye: No discharge. Conjunctiva/sclera: Conjunctivae normal.      Pupils: Pupils are equal, round, and reactive to light. Neck:      Musculoskeletal: Normal range of motion and neck supple. Cardiovascular:      Rate and Rhythm: Normal rate and regular rhythm. Heart sounds: Normal heart sounds. No murmur. Pulmonary:      Effort: Pulmonary effort is normal. No respiratory distress.       Breath sounds: Normal breath sounds. No wheezing or rales. Abdominal:      General: Bowel sounds are normal. There is no distension. Palpations: Abdomen is soft. There is no mass. Tenderness: There is no abdominal tenderness. There is no guarding or rebound. Musculoskeletal: Normal range of motion. Skin:     General: Skin is warm and dry. Coloration: Skin is not pale. Neurological:      Mental Status: He is alert and oriented to person, place, and time.    Psychiatric:         Behavior: Behavior normal.

## 2021-01-20 NOTE — PATIENT INSTRUCTIONS
Patient Education        Gastroesophageal Reflux Disease (GERD): Care Instructions  Overview     Gastroesophageal reflux disease (GERD) is the backward flow of stomach acid into the esophagus. The esophagus is the tube that leads from your throat to your stomach. A one-way valve prevents the stomach acid from backing up into this tube. But when you have GERD, this valve does not close tightly enough. This can also cause pain and swelling in your esophagus. (This is called esophagitis.)  If you have mild GERD symptoms including heartburn, you may be able to control the problem with antacids or over-the-counter medicine. You can also make lifestyle changes to help reduce your symptoms. These include changing your diet and eating habits, such as not eating late at night and losing weight. Follow-up care is a key part of your treatment and safety. Be sure to make and go to all appointments, and call your doctor if you are having problems. It's also a good idea to know your test results and keep a list of the medicines you take. How can you care for yourself at home? · Take your medicines exactly as prescribed. Call your doctor if you think you are having a problem with your medicine. · Your doctor may recommend over-the-counter medicine. For mild or occasional indigestion, antacids, such as Tums, Gaviscon, Mylanta, or Maalox, may help. Your doctor also may recommend over-the-counter acid reducers, such as famotidine (Pepcid AC), cimetidine (Tagamet HB), or omeprazole (Prilosec). Read and follow all instructions on the label. If you use these medicines often, talk with your doctor. · Change your eating habits. ? It's best to eat several small meals instead of two or three large meals. ? After you eat, wait 2 to 3 hours before you lie down. ? Chocolate, mint, and alcohol can make GERD worse. ?  Spicy foods, foods that have a lot of acid (like tomatoes and oranges), and coffee can make GERD symptoms worse in some people. If your symptoms are worse after you eat a certain food, you may want to stop eating that food to see if your symptoms get better. · Do not smoke or chew tobacco. Smoking can make GERD worse. If you need help quitting, talk to your doctor about stop-smoking programs and medicines. These can increase your chances of quitting for good. · If you have GERD symptoms at night, raise the head of your bed 6 to 8 inches by putting the frame on blocks or placing a foam wedge under the head of your mattress. (Adding extra pillows does not work.)  · Do not wear tight clothing around your middle. · Lose weight if you need to. Losing just 5 to 10 pounds can help. When should you call for help? Call your doctor now or seek immediate medical care if:    · You have new or different belly pain.     · Your stools are black and tarlike or have streaks of blood. Watch closely for changes in your health, and be sure to contact your doctor if:    · Your symptoms have not improved after 2 days.     · Food seems to catch in your throat or chest.   Where can you learn more? Go to https://Nite[employer]. org and sign in to your [employer] account. Enter S391 in the [facility] box to learn more about \"Gastroesophageal Reflux Disease (GERD): Care Instructions. \"     If you do not have an account, please click on the \"Sign Up Now\" link. Current as of: April 15, 2020               Content Version: 12.6  © 9336-9773 [employer]. Care instructions adapted under license by [facility] [place] ([facility]). If you have questions about a medical condition or this instruction, always ask your healthcare professional. [place] [address] any warranty or liability for your use of this information. Patient Education        pantoprazole (oral/injection)  Pronunciation:  pan TOE pra zole  Brand:  Protonix  What is the most important information I should know about pantoprazole?   Pantoprazole can cause kidney problems. Tell your doctor if you are urinating less than usual, or if you have blood in your urine. Diarrhea may be a sign of a new infection. Call your doctor if you have diarrhea that is watery or has blood in it. Pantoprazole may cause new or worsening symptoms of lupus. Tell your doctor if you have joint pain and a skin rash on your cheeks or arms that worsens in sunlight. You may be more likely to have a broken bone while taking this medicine long term or more than once per day. What is pantoprazole? Pantoprazole is a proton pump inhibitor that decreases the amount of acid produced in the stomach. Pantoprazole is used to treat erosive esophagitis (damage to the esophagus from stomach acid caused by gastroesophageal reflux disease, or GERD) in adults and children who are at least 11years old. Pantoprazole is usually given for up to 8 weeks at a time while your esophagus heals. Pantoprazole is also used to treat Zollinger-Brito syndrome and other conditions involving excess stomach acid. Pantoprazole is not for immediate relief of heartburn. Pantoprazole may also be used for purposes not listed in this medication guide. What should I discuss with my healthcare provider before using pantoprazole? Heartburn can mimic early symptoms of a heart attack. Get emergency medical help if you have chest pain that spreads to your jaw or shoulder and you feel anxious or light-headed. You should not use this medicine if:  · you also take medicine that contains rilpivirine (Edurant, Tevin, Charlet Rotunda); or  · you are allergic to pantoprazole or similar medicines (lansoprazole, omeprazole, Nexium, Prevacid, Prilosec, and others). Tell your doctor if you have ever had:  · low levels of magnesium in your blood;  · lupus; or  · osteoporosis or low bone mineral density.   You may be more likely to have a broken bone in your hip, wrist, or spine while taking a proton pump inhibitor long-term or more than once per day. Talk with your doctor about ways to keep your bones healthy. It is not known whether this medicine will harm an unborn baby. Tell your doctor if you are pregnant or plan to become pregnant. You should not breast-feed while using this medicine. Pantoprazole is not approved for use by anyone younger than 11years old. How should I use pantoprazole? Follow all directions on your prescription label and read all medication guides or instruction sheets. Use the medicine exactly as directed. Use the lowest dose for the shortest amount of time needed to treat your condition. Pantoprazole is taken by mouth (oral) or given as an infusion into a vein (injection). A healthcare provider may teach you how to properly use pantoprazole injection by yourself. Pantoprazole tablets are taken by mouth, with or without food. Pantoprazole oral granules should be taken 30 minutes before a meal.  Do not crush, chew, or break the tablet. Swallow it whole. The oral granules should be mixed with applesauce or apple juice and given either by mouth or through a nasogastric (NG) tube. Read and carefully follow any Instructions for Use provided with your medicine. Ask your doctor or pharmacist if you do not understand these instructions. Use this medicine for the full prescribed length of time, even if your symptoms quickly improve. Call your doctor if your symptoms do not improve or if they get worse while you are using this medicine. This medicine can affect the results of certain medical tests. Tell any doctor who treats you that you are using pantoprazole. Pantoprazole may also affect a drug-screening urine test and you may have false results. Tell the laboratory staff that you use this medicine. Store this medicine at room temperature away from moisture, heat, and light. What happens if I miss a dose?   Use the medicine as soon as you can, but skip the missed dose if it is almost time for your next dose. Do not use two doses at one time. What happens if I overdose? Seek emergency medical attention or call the Poison Help line at 1-944.655.5440. What should I avoid while using pantoprazole? This medicine can cause diarrhea, which may be a sign of a new infection. If you have diarrhea that is watery or bloody, call your doctor. Do not use anti-diarrhea medicine unless your doctor tells you to. What are the possible side effects of pantoprazole? Get emergency medical help if you have signs of an allergic reaction: hives; difficulty breathing; swelling of your face, lips, tongue, or throat. Call your doctor at once if you have:  · severe stomach pain, diarrhea that is watery or bloody;  · sudden pain or trouble moving your hip, wrist, or back;  · bruising or swelling where intravenous pantoprazole was injected;  · kidney problems --urinating less than usual, blood in your urine, swelling, rapid weight gain;  · low magnesium --dizziness, fast or irregular heart rate, tremors (shaking) or jerking muscle movements, feeling jittery, muscle cramps, muscle spasms in your hands and feet, cough or choking feeling; or  · new or worsening symptoms of lupus --joint pain, and a skin rash on your cheeks or arms that worsens in sunlight. Taking pantoprazole long-term may cause you to develop stomach growths called fundic gland polyps. Talk with your doctor about this risk. If you use pantoprazole for longer than 3 years, you could develop a vitamin B-12 deficiency. Talk to your doctor about how to manage this condition if you develop it. Common side effects may include:  · headache, dizziness;  · stomach pain, gas, nausea, vomiting, diarrhea;  · joint pain; or  · fever, rash, or cold symptoms (most common in children). This is not a complete list of side effects and others may occur. Call your doctor for medical advice about side effects. You may report side effects to FDA at 8-785-FDA-4696.   What other drugs will

## 2021-01-22 ENCOUNTER — DOCUMENTATION (OUTPATIENT)
Dept: ENDOCRINOLOGY | Facility: CLINIC | Age: 66
End: 2021-01-22

## 2021-01-22 ENCOUNTER — VIRTUAL VISIT (OUTPATIENT)
Dept: PRIMARY CARE CLINIC | Age: 66
End: 2021-01-22
Payer: MEDICARE

## 2021-01-22 DIAGNOSIS — N18.4 ACUTE RENAL FAILURE SUPERIMPOSED ON STAGE 4 CHRONIC KIDNEY DISEASE, UNSPECIFIED ACUTE RENAL FAILURE TYPE (HCC): ICD-10-CM

## 2021-01-22 DIAGNOSIS — E11.65 TYPE 2 DIABETES MELLITUS WITH HYPERGLYCEMIA, WITH LONG-TERM CURRENT USE OF INSULIN (HCC): ICD-10-CM

## 2021-01-22 DIAGNOSIS — N17.9 ACUTE RENAL FAILURE SUPERIMPOSED ON STAGE 4 CHRONIC KIDNEY DISEASE, UNSPECIFIED ACUTE RENAL FAILURE TYPE (HCC): ICD-10-CM

## 2021-01-22 DIAGNOSIS — E66.01 MORBIDLY OBESE (HCC): ICD-10-CM

## 2021-01-22 DIAGNOSIS — Z79.4 TYPE 2 DIABETES MELLITUS WITH HYPERGLYCEMIA, WITH LONG-TERM CURRENT USE OF INSULIN (HCC): ICD-10-CM

## 2021-01-22 PROBLEM — H43.10 VITREOUS HEMORRHAGE (HCC): Status: ACTIVE | Noted: 2021-01-22

## 2021-01-22 PROBLEM — H43.10 VITREOUS HEMORRHAGE (HCC): Status: RESOLVED | Noted: 2021-01-22 | Resolved: 2021-01-22

## 2021-01-22 PROCEDURE — 99443 PR PHYS/QHP TELEPHONE EVALUATION 21-30 MIN: CPT | Performed by: NURSE PRACTITIONER

## 2021-01-22 ASSESSMENT — ENCOUNTER SYMPTOMS
GASTROINTESTINAL NEGATIVE: 1
EYES NEGATIVE: 1
RESPIRATORY NEGATIVE: 1

## 2021-01-22 NOTE — PROGRESS NOTES
[address] [address]            Phone:  [phone]  Fax:  [phone]    Maranda Dorantes is a 77 y.o. male evaluated via telephone on 1/22/2021. Consent:    He and/or health care decision maker is aware that that he may receive a bill for this telephone service, depending on his insurance coverage, and has provided verbal consent to proceed: Yes      HPI  Chief Complaint   Patient presents with    Diabetes    Hypertension       I communicated with the patient and/or health care decision maker about follow up on DM and adjustment of insulin. During last visit we did increase his Toujeo up to 35 units daily. He has been monitoring his blood sugars and they have improved. He has had readings of 180 or less most of the time. Review of Systems   Constitutional: Negative. HENT: Negative. Eyes: Negative. Respiratory: Negative. Cardiovascular: Negative. Gastrointestinal: Negative. Endocrine: Negative. Genitourinary: Negative. Musculoskeletal: Negative. Skin: Negative. Neurological: Negative. Hematological: Negative. Psychiatric/Behavioral: Negative. PLAN    Details of this discussion including any medical advice provided:     1. Type 2 diabetes mellitus with hyperglycemia, with long-term current use of insulin ([address]      2. Acute renal failure superimposed on stage 4 chronic kidney disease, unspecified acute renal failure type ([address]      3. Morbidly obese ([address]       Increase toujeo up to 40 units. Patient is to continue to monitor blood sugars as discussed. If he continues to have lows throughout the night may need to adjust time of Toujeo. I affirm this is a Patient Initiated Episode with an Established Patient who has not had a related appointment within my department in the past 7 days or scheduled within the next 24 hours.       Total Time: minutes: 21-30 minutes Note: not billable if this call serves to triage the patient into an appointment for the relevant concern      Lex العلي         Pursuant to the emergency declaration under the [facility], [facility] waiver authority and the [employer] Act, this Virtual  Visit was conducted, with patient's consent, to reduce the patient's risk of exposure to COVID-19 and provide continuity of care for an established patient. Services were provided through a telephone discussion  to substitute for in-person clinic visit. Parties involved during telephone call include myself, MIMI Medrano and patient listed.

## 2021-01-25 ENCOUNTER — TELEPHONE (OUTPATIENT)
Dept: PODIATRY | Facility: CLINIC | Age: 66
End: 2021-01-25

## 2021-01-26 ENCOUNTER — OFFICE VISIT (OUTPATIENT)
Dept: PODIATRY | Facility: CLINIC | Age: 66
End: 2021-01-26

## 2021-01-26 VITALS
WEIGHT: 268 LBS | SYSTOLIC BLOOD PRESSURE: 123 MMHG | BODY MASS INDEX: 36.3 KG/M2 | HEIGHT: 72 IN | HEART RATE: 79 BPM | OXYGEN SATURATION: 98 % | DIASTOLIC BLOOD PRESSURE: 81 MMHG

## 2021-01-26 DIAGNOSIS — B35.1 ONYCHOMYCOSIS: Primary | ICD-10-CM

## 2021-01-26 DIAGNOSIS — E11.40 TYPE 2 DIABETES MELLITUS WITH DIABETIC NEUROPATHY, WITH LONG-TERM CURRENT USE OF INSULIN (HCC): ICD-10-CM

## 2021-01-26 DIAGNOSIS — Z79.4 TYPE 2 DIABETES MELLITUS WITH DIABETIC NEUROPATHY, WITH LONG-TERM CURRENT USE OF INSULIN (HCC): ICD-10-CM

## 2021-01-26 PROCEDURE — 99213 OFFICE O/P EST LOW 20 MIN: CPT | Performed by: PODIATRIST

## 2021-01-26 PROCEDURE — 11721 DEBRIDE NAIL 6 OR MORE: CPT | Performed by: PODIATRIST

## 2021-01-27 ENCOUNTER — TELEPHONE (OUTPATIENT)
Dept: ENDOCRINOLOGY | Facility: CLINIC | Age: 66
End: 2021-01-27

## 2021-02-02 ENCOUNTER — LAB (OUTPATIENT)
Dept: LAB | Facility: HOSPITAL | Age: 66
End: 2021-02-02

## 2021-02-02 LAB
25(OH)D3 SERPL-MCNC: 42.3 NG/ML (ref 30–100)
ALBUMIN SERPL-MCNC: 4.2 G/DL (ref 3.5–5.2)
ALBUMIN UR-MCNC: 9.5 MG/DL
ALBUMIN/GLOB SERPL: 1.9 G/DL
ALP SERPL-CCNC: 74 U/L (ref 39–117)
ALT SERPL W P-5'-P-CCNC: 13 U/L (ref 1–41)
ANION GAP SERPL CALCULATED.3IONS-SCNC: 8.9 MMOL/L (ref 5–15)
AST SERPL-CCNC: 17 U/L (ref 1–40)
BASOPHILS # BLD AUTO: 0.09 10*3/MM3 (ref 0–0.2)
BASOPHILS NFR BLD AUTO: 1.3 % (ref 0–1.5)
BILIRUB SERPL-MCNC: 0.4 MG/DL (ref 0–1.2)
BUN SERPL-MCNC: 34 MG/DL (ref 8–23)
BUN/CREAT SERPL: 21.3 (ref 7–25)
CALCIUM SPEC-SCNC: 10.6 MG/DL (ref 8.6–10.5)
CHLORIDE SERPL-SCNC: 100 MMOL/L (ref 98–107)
CHOLEST SERPL-MCNC: 210 MG/DL (ref 0–200)
CO2 SERPL-SCNC: 28.1 MMOL/L (ref 22–29)
CREAT SERPL-MCNC: 1.6 MG/DL (ref 0.76–1.27)
CREAT UR-MCNC: 109 MG/DL
DEPRECATED RDW RBC AUTO: 39.6 FL (ref 37–54)
EOSINOPHIL # BLD AUTO: 0.19 10*3/MM3 (ref 0–0.4)
EOSINOPHIL NFR BLD AUTO: 2.7 % (ref 0.3–6.2)
ERYTHROCYTE [DISTWIDTH] IN BLOOD BY AUTOMATED COUNT: 13.4 % (ref 12.3–15.4)
GFR SERPL CREATININE-BSD FRML MDRD: 43 ML/MIN/1.73
GLOBULIN UR ELPH-MCNC: 2.2 GM/DL
GLUCOSE SERPL-MCNC: 204 MG/DL (ref 65–99)
HBA1C MFR BLD: 9.1 % (ref 4.8–5.6)
HCT VFR BLD AUTO: 45.8 % (ref 37.5–51)
HDLC SERPL-MCNC: 29 MG/DL (ref 40–60)
HGB BLD-MCNC: 15.1 G/DL (ref 13–17.7)
IMM GRANULOCYTES # BLD AUTO: 0.02 10*3/MM3 (ref 0–0.05)
IMM GRANULOCYTES NFR BLD AUTO: 0.3 % (ref 0–0.5)
LDLC SERPL CALC-MCNC: 139 MG/DL (ref 0–100)
LDLC/HDLC SERPL: 4.66 {RATIO}
LYMPHOCYTES # BLD AUTO: 1.95 10*3/MM3 (ref 0.7–3.1)
LYMPHOCYTES NFR BLD AUTO: 27.4 % (ref 19.6–45.3)
MCH RBC QN AUTO: 27.3 PG (ref 26.6–33)
MCHC RBC AUTO-ENTMCNC: 33 G/DL (ref 31.5–35.7)
MCV RBC AUTO: 82.8 FL (ref 79–97)
MICROALBUMIN/CREAT UR: 87.2 MG/G
MONOCYTES # BLD AUTO: 0.62 10*3/MM3 (ref 0.1–0.9)
MONOCYTES NFR BLD AUTO: 8.7 % (ref 5–12)
NEUTROPHILS NFR BLD AUTO: 4.24 10*3/MM3 (ref 1.7–7)
NEUTROPHILS NFR BLD AUTO: 59.6 % (ref 42.7–76)
NRBC BLD AUTO-RTO: 0.1 /100 WBC (ref 0–0.2)
PLATELET # BLD AUTO: 194 10*3/MM3 (ref 140–450)
PMV BLD AUTO: 12.9 FL (ref 6–12)
POTASSIUM SERPL-SCNC: 4.4 MMOL/L (ref 3.5–5.2)
PROT SERPL-MCNC: 6.4 G/DL (ref 6–8.5)
RBC # BLD AUTO: 5.53 10*6/MM3 (ref 4.14–5.8)
SODIUM SERPL-SCNC: 137 MMOL/L (ref 136–145)
TRIGL SERPL-MCNC: 230 MG/DL (ref 0–150)
TSH SERPL DL<=0.05 MIU/L-ACNC: 2.28 UIU/ML (ref 0.27–4.2)
VIT B12 BLD-MCNC: 444 PG/ML (ref 211–946)
VLDLC SERPL-MCNC: 42 MG/DL (ref 5–40)
WBC # BLD AUTO: 7.11 10*3/MM3 (ref 3.4–10.8)

## 2021-02-02 PROCEDURE — 82306 VITAMIN D 25 HYDROXY: CPT | Performed by: INTERNAL MEDICINE

## 2021-02-02 PROCEDURE — 82043 UR ALBUMIN QUANTITATIVE: CPT | Performed by: INTERNAL MEDICINE

## 2021-02-02 PROCEDURE — 83036 HEMOGLOBIN GLYCOSYLATED A1C: CPT | Performed by: INTERNAL MEDICINE

## 2021-02-02 PROCEDURE — 82607 VITAMIN B-12: CPT | Performed by: INTERNAL MEDICINE

## 2021-02-02 PROCEDURE — 80061 LIPID PANEL: CPT | Performed by: INTERNAL MEDICINE

## 2021-02-02 PROCEDURE — 36415 COLL VENOUS BLD VENIPUNCTURE: CPT | Performed by: INTERNAL MEDICINE

## 2021-02-02 PROCEDURE — 84443 ASSAY THYROID STIM HORMONE: CPT | Performed by: INTERNAL MEDICINE

## 2021-02-02 PROCEDURE — 82570 ASSAY OF URINE CREATININE: CPT | Performed by: INTERNAL MEDICINE

## 2021-02-02 PROCEDURE — 85025 COMPLETE CBC W/AUTO DIFF WBC: CPT | Performed by: INTERNAL MEDICINE

## 2021-02-02 PROCEDURE — 80053 COMPREHEN METABOLIC PANEL: CPT | Performed by: INTERNAL MEDICINE

## 2021-02-03 ENCOUNTER — OFFICE VISIT (OUTPATIENT)
Dept: ENDOCRINOLOGY | Facility: CLINIC | Age: 66
End: 2021-02-03

## 2021-02-03 VITALS
HEART RATE: 71 BPM | HEIGHT: 72 IN | WEIGHT: 270.1 LBS | SYSTOLIC BLOOD PRESSURE: 128 MMHG | DIASTOLIC BLOOD PRESSURE: 84 MMHG | OXYGEN SATURATION: 98 % | BODY MASS INDEX: 36.59 KG/M2

## 2021-02-03 DIAGNOSIS — E11.59 HYPERTENSION ASSOCIATED WITH DIABETES (HCC): ICD-10-CM

## 2021-02-03 DIAGNOSIS — E11.65 TYPE 2 DIABETES MELLITUS WITH HYPERGLYCEMIA, WITH LONG-TERM CURRENT USE OF INSULIN (HCC): Primary | ICD-10-CM

## 2021-02-03 DIAGNOSIS — E11.69 MIXED DIABETIC HYPERLIPIDEMIA ASSOCIATED WITH TYPE 2 DIABETES MELLITUS (HCC): ICD-10-CM

## 2021-02-03 DIAGNOSIS — I15.2 HYPERTENSION ASSOCIATED WITH DIABETES (HCC): ICD-10-CM

## 2021-02-03 DIAGNOSIS — E55.9 VITAMIN D DEFICIENCY: ICD-10-CM

## 2021-02-03 DIAGNOSIS — E78.2 MIXED DIABETIC HYPERLIPIDEMIA ASSOCIATED WITH TYPE 2 DIABETES MELLITUS (HCC): ICD-10-CM

## 2021-02-03 DIAGNOSIS — E29.1 MALE HYPOGONADISM: ICD-10-CM

## 2021-02-03 DIAGNOSIS — Z79.4 TYPE 2 DIABETES MELLITUS WITH HYPERGLYCEMIA, WITH LONG-TERM CURRENT USE OF INSULIN (HCC): Primary | ICD-10-CM

## 2021-02-03 PROCEDURE — 99214 OFFICE O/P EST MOD 30 MIN: CPT | Performed by: INTERNAL MEDICINE

## 2021-02-03 RX ORDER — INSULIN LISPRO 100 [IU]/ML
40 INJECTION, SOLUTION INTRAVENOUS; SUBCUTANEOUS 3 TIMES DAILY
Qty: 42 ML | Refills: 11 | Status: CANCELLED | OUTPATIENT
Start: 2021-02-03

## 2021-02-03 RX ORDER — EZETIMIBE 10 MG/1
10 TABLET ORAL DAILY
Qty: 30 TABLET | Refills: 11 | Status: SHIPPED | OUTPATIENT
Start: 2021-02-03 | End: 2022-11-17 | Stop reason: SDUPTHER

## 2021-02-03 RX ORDER — INSULIN GLARGINE 300 U/ML
60 INJECTION, SOLUTION SUBCUTANEOUS DAILY
Qty: 6 PEN | Refills: 11 | Status: SHIPPED | OUTPATIENT
Start: 2021-02-03 | End: 2021-06-02

## 2021-02-03 RX ORDER — SILDENAFIL 100 MG/1
100 TABLET, FILM COATED ORAL AS NEEDED
Qty: 30 TABLET | Refills: 11 | Status: SHIPPED | OUTPATIENT
Start: 2021-02-03 | End: 2022-11-17 | Stop reason: SDUPTHER

## 2021-02-03 RX ORDER — IBUPROFEN 600 MG/1
1 TABLET ORAL ONCE AS NEEDED
Qty: 1 EACH | Refills: 11 | Status: SHIPPED | OUTPATIENT
Start: 2021-02-03

## 2021-02-03 RX ORDER — SEMAGLUTIDE 1.34 MG/ML
1 INJECTION, SOLUTION SUBCUTANEOUS WEEKLY
Qty: 2 PEN | Refills: 11 | Status: SHIPPED | OUTPATIENT
Start: 2021-02-03 | End: 2021-03-23

## 2021-02-03 NOTE — PROGRESS NOTES
"Mick Sims is a 65 y.o. male who presents for follow up   Type 2 diabetes w hyperglycemia       Referring provider     Primary Care Provider    Ginny Collazo APRN    Duration 10 years    Complications - nephropathy, retinopathy, peripheral neuropathy and cardiovascular disease    Current symptoms/problems  paresthesia of the feet and visual disturbances , ED     Alleviating Factors: Compliance       Side Effects  none    Current diet  in general, a \"healthy\" diet      Current exercise none    Current monitoring regimen: home blood tests - checking 4 x daily     Home blood sugar records:     Using brenna     Hypoglycemia unawareness and nocturnal, none since last visit          Objective:   /84 (BP Location: Right arm, Patient Position: Sitting, Cuff Size: Large Adult)   Pulse 71   Ht 182.9 cm (72\")   Wt 123 kg (270 lb 1.6 oz)   SpO2 98%   BMI 36.63 kg/m²     Physical Exam   Constitutional: He appears well-developed and well-nourished. No distress.   HENT:   Head: Normocephalic.   Right Ear: External ear normal.   Left Ear: External ear normal.   Nose: Nose normal.   Eyes: Conjunctivae and EOM are normal. Right eye exhibits no discharge. Left eye exhibits no discharge. No scleral icterus.   Neck: Neck normal appearance.No JVD present. No tracheal deviation present. No thyromegaly present.   Cardiovascular:   No conjunctival pallor noted.    Pulmonary/Chest: Effort normal. No stridor.  No respiratory distress. He no audible wheeze...He exhibits no tenderness.   Abdominal: Abdomen appears normal. Soft. He exhibits no distension and no visible mass. There is no abdominal tenderness. No visible hernia present.   Musculoskeletal: Normal range of motion.         General: No deformity, edema or no effusion.   Lymphadenopathy:     He has no cervical adenopathy.   Neurological: He is alert. No cranial nerve deficit. Coordination normal.   Skin: No rash noted. He is not diaphoretic. No nail bed cyanosis or " erythema. No pallor. Nails show no clubbing.   Psychiatric: He mood appears normal. His affect is normal. His behavior is normal. Thought content is normal. He does not express abnormal judgement.       Lab Review          Assessment/Plan       ICD-10-CM ICD-9-CM   1. Type 2 diabetes mellitus with hyperglycemia, with long-term current use of insulin (CMS/[facility])  E11.65 250.00    Z79.4 790.29     V58.67   2. Hypertension associated with diabetes (CMS/[facility])  E11.59 250.80    I15.2 401.9   3. Mixed diabetic hyperlipidemia associated with type 2 diabetes mellitus (CMS/[facility])  E11.69 250.80    E78.2 272.2   4. Vitamin D deficiency  E55.9 268.9   5. Male hypogonadism  E29.1 257.2       Pt has type 2 diabetes w ckd and cad     Glycemic Management:   Lab Results   Component Value Date    HGBA1C 9.10 (H) 02/02/2021    HGBA1C 9.9 (H) 12/11/2020    HGBA1C 9.00 (H) 10/16/2020     Lab Results   Component Value Date    GLUCOSE 204 (H) 02/02/2021    BUN 34 (H) 02/02/2021    CREATININE 1.60 (H) 02/02/2021    EGFRIFNONA 43 (L) 02/02/2021    EGFRIFAFRI >59 12/11/2020    BCR 21.3 02/02/2021    K 4.4 02/02/2021    CO2 28.1 02/02/2021    CALCIUM 10.6 (H) 02/02/2021    ALBUMIN 4.20 02/02/2021    AST 17 02/02/2021    ALT 13 02/02/2021    ANIONGAP 8.9 02/02/2021     Lab Results   Component Value Date    WBC 7.11 02/02/2021    HGB 15.1 02/02/2021    HCT 45.8 02/02/2021    MCV 82.8 02/02/2021     02/02/2021     Fercho 2 weeks reviewed    In range 33%    Lows 1 %  Very high 33%        toujeo 40 -- this seems to be working = now doing 30 -- decrease to 28     ========================================    Ozempic      0.5 mg weekly ---- increase to 1 mg weekly      ============================================    Metformin 1000 mg twice a day ===     ============================================      Invokana 100mg  daily      ============================================    Humalog     25 units for 60 grams    Eating frequent meals    apprvoe for  pump     rediscuss 180 threshold     ======================      bp controlled on irbesartan     Lipids controlled on lipitor but allergic, change to zetia  ckd stage III , steady     Vit D low, start 1000 units daily     This document has been electronically signed by Juancho Mobley MD on February 3, 2021 11:36 CST          A copy of my note was sent to Ginny Collazo APRN    Please see my above opinion and suggestions.

## 2021-02-08 DIAGNOSIS — Z79.4 TYPE 2 DIABETES MELLITUS WITH HYPERGLYCEMIA, WITH LONG-TERM CURRENT USE OF INSULIN (HCC): ICD-10-CM

## 2021-02-08 DIAGNOSIS — G62.9 NEUROPATHY: ICD-10-CM

## 2021-02-08 DIAGNOSIS — E11.65 TYPE 2 DIABETES MELLITUS WITH HYPERGLYCEMIA, WITH LONG-TERM CURRENT USE OF INSULIN (HCC): ICD-10-CM

## 2021-02-08 NOTE — TELEPHONE ENCOUNTER
Vic Chrispretty called to request a refill on his medication. Last office visit : 1/22/2021   Next office visit : 3/9/2021     Last UDS: No results found for: Martina Boss, [facility], [address], COCAIMETSCRU, GABAPENTIN, MDMA, METAMPU, OPIATESCREENURINE, OXTCOSU, PHENCYCLIDINESCREENURINE, PROPOXYPHENE, THCSCREENUR, TRICYUR    Last Allyssa Cuellar: today  Medication Contract: needs update at next visit   Last Fill: 9/6/20    Requested Prescriptions     Pending Prescriptions Disp Refills    gabapentin (NEURONTIN) 300 MG capsule [Pharmacy Med Name: Gabapentin 300 MG Oral Capsule] 270 capsule 0     Sig: TAKE 1 CAPSULE BY MOUTH THREE TIMES DAILY         Please approve or refuse this medication.    Chen Tyler

## 2021-02-09 RX ORDER — CANAGLIFLOZIN 100 MG/1
TABLET, FILM COATED ORAL
Qty: 60 TABLET | Refills: 0 | Status: SHIPPED | OUTPATIENT
Start: 2021-02-09 | End: 2021-02-12 | Stop reason: SDUPTHER

## 2021-02-09 RX ORDER — GABAPENTIN 300 MG/1
CAPSULE ORAL
Qty: 270 CAPSULE | Refills: 0 | Status: SHIPPED | OUTPATIENT
Start: 2021-02-09 | End: 2021-05-09

## 2021-02-10 ENCOUNTER — OFFICE VISIT (OUTPATIENT)
Dept: CARDIOLOGY CLINIC | Age: 66
End: 2021-02-10
Payer: MEDICARE

## 2021-02-10 VITALS
SYSTOLIC BLOOD PRESSURE: 130 MMHG | HEART RATE: 76 BPM | OXYGEN SATURATION: 97 % | HEIGHT: 72 IN | DIASTOLIC BLOOD PRESSURE: 80 MMHG | WEIGHT: 271 LBS | BODY MASS INDEX: 36.7 KG/M2

## 2021-02-10 DIAGNOSIS — I51.9 LEFT VENTRICULAR DYSFUNCTION: ICD-10-CM

## 2021-02-10 DIAGNOSIS — T46.6X5A MYALGIA DUE TO STATIN: ICD-10-CM

## 2021-02-10 DIAGNOSIS — E78.2 MIXED HYPERLIPIDEMIA: ICD-10-CM

## 2021-02-10 DIAGNOSIS — M79.10 MYALGIA DUE TO STATIN: ICD-10-CM

## 2021-02-10 DIAGNOSIS — E66.09 CLASS 2 OBESITY DUE TO EXCESS CALORIES WITHOUT SERIOUS COMORBIDITY WITH BODY MASS INDEX (BMI) OF 36.0 TO 36.9 IN ADULT: ICD-10-CM

## 2021-02-10 DIAGNOSIS — I10 ESSENTIAL HYPERTENSION: ICD-10-CM

## 2021-02-10 DIAGNOSIS — I25.10 CORONARY ARTERY DISEASE INVOLVING NATIVE CORONARY ARTERY OF NATIVE HEART WITHOUT ANGINA PECTORIS: Primary | ICD-10-CM

## 2021-02-10 PROBLEM — E66.01 MORBIDLY OBESE (HCC): Status: RESOLVED | Noted: 2019-06-27 | Resolved: 2021-02-10

## 2021-02-10 PROBLEM — R06.00 DYSPNEA: Status: RESOLVED | Noted: 2018-05-11 | Resolved: 2021-02-10

## 2021-02-10 PROCEDURE — G8484 FLU IMMUNIZE NO ADMIN: HCPCS | Performed by: CLINICAL NURSE SPECIALIST

## 2021-02-10 PROCEDURE — 1123F ACP DISCUSS/DSCN MKR DOCD: CPT | Performed by: CLINICAL NURSE SPECIALIST

## 2021-02-10 PROCEDURE — 4040F PNEUMOC VAC/ADMIN/RCVD: CPT | Performed by: CLINICAL NURSE SPECIALIST

## 2021-02-10 PROCEDURE — G8427 DOCREV CUR MEDS BY ELIG CLIN: HCPCS | Performed by: CLINICAL NURSE SPECIALIST

## 2021-02-10 PROCEDURE — 99214 OFFICE O/P EST MOD 30 MIN: CPT | Performed by: CLINICAL NURSE SPECIALIST

## 2021-02-10 PROCEDURE — G8417 CALC BMI ABV UP PARAM F/U: HCPCS | Performed by: CLINICAL NURSE SPECIALIST

## 2021-02-10 PROCEDURE — 3046F HEMOGLOBIN A1C LEVEL >9.0%: CPT | Performed by: CLINICAL NURSE SPECIALIST

## 2021-02-10 PROCEDURE — 1036F TOBACCO NON-USER: CPT | Performed by: CLINICAL NURSE SPECIALIST

## 2021-02-10 PROCEDURE — 2022F DILAT RTA XM EVC RTNOPTHY: CPT | Performed by: CLINICAL NURSE SPECIALIST

## 2021-02-10 PROCEDURE — 3017F COLORECTAL CA SCREEN DOC REV: CPT | Performed by: CLINICAL NURSE SPECIALIST

## 2021-02-10 RX ORDER — EZETIMIBE 10 MG/1
10 TABLET ORAL DAILY
Qty: 30 TABLET | Refills: 5 | Status: SHIPPED | OUTPATIENT
Start: 2021-02-10 | End: 2021-07-19 | Stop reason: SDUPTHER

## 2021-02-10 RX ORDER — CANAGLIFLOZIN 100 MG/1
TABLET, FILM COATED ORAL
COMMUNITY
Start: 2020-08-25 | End: 2021-02-10

## 2021-02-10 ASSESSMENT — ENCOUNTER SYMPTOMS
SHORTNESS OF BREATH: 0
VOMITING: 0
EYE REDNESS: 0
WHEEZING: 0
COUGH: 0
NAUSEA: 0
ABDOMINAL PAIN: 0
CHEST TIGHTNESS: 0
FACIAL SWELLING: 0

## 2021-02-10 NOTE — PROGRESS NOTES
[facility], [address] [place] [address], [address] [email or web address] [id number]  Phone: [phone]  Fax: [phone]    OFFICE VISIT:  2/10/2021    [id number]: [id number]    Reason For Visit:  Liam Glass is a 77 y.o. male who is here for 6 Month Follow-Up (No Cardiac Sx ) and Coronary Artery Disease    HPI  Patient is here for follow-up for a non-STEMI post heart cath with a single drug-eluting stent to the mid circumflex, EF 40%, hypertension, hyperlipidemia with statin intolerance, diabetes, CKD. He denies chest pain. He has some mild dyspnea at times. He denies orthopnea, PND, sudden weight gain or edema. He was unable to tolerate low-dose rosuvastatin and has previously had issues with Lipitor both causing myalgias. His endocrinologist suggested Mickiel Zoe at a recent visit, however he has not received at his pharmacy yet     MIMI Max is PCP.   Darylene Riggs has the following [facility]:    Patient Active Problem List    Diagnosis Date Noted    ACS (acute coronary syndrome) ([facility] [address] 06/03/2019     Priority: High    Acute renal failure superimposed on stage 4 chronic kidney disease ([address] 09/04/2020    Mixed hyperlipidemia 06/27/2019    Left ventricular dysfunction 06/27/2019    Chronic kidney disease, stage III (moderate) 06/13/2019    NSTEMI (non-ST elevated myocardial infarction) ([address] 06/05/2019    CAD (coronary artery disease) 05/15/2018    History of placement of stent in LAD coronary artery     Change in bowel habits 10/11/2016    History of colon polyps 10/11/2016    Family history of prostate cancer in father 10/05/2016    Pharyngoesophageal dysphagia 09/20/2016    DM type 2, uncontrolled, with neuropathy ([address] 06/27/2016    Essential hypertension 06/27/2016    Gastroesophageal reflux disease without esophagitis 06/27/2016     Past Medical History:   Diagnosis Date    Aneurysm ([address]     abdominal    Bilateral leg edema     CAD (coronary artery disease)     Chronic kidney disease     Stage 4    Diabetes mellitus, type 2 (HCC)     Fatty liver     GERD (gastroesophageal reflux disease)     HTN (hypertension)     Prolonged emergence from general anesthesia     Seasonal allergies     Vitreous hemorrhage ([facility] [address] 1/22/2021     Past Surgical History:   Procedure Laterality Date    CARPAL TUNNEL RELEASE      COLONOSCOPY  11/04/2015    DR Capone: polys, 5yr recall    COLONOSCOPY N/A 10/11/2016    Dr Lopez-diverticulosis, Sessile serrated AP (-) high grade dysplasia x 1, tubular AP (-) dysplasia x 2, HP x 2--3 yr recall    COLONOSCOPY N/A 12/16/2020    Dr Xenia Lopez, Mod. Diverticulosis, Internal hemorrhoids-Grade 2 & ext hemorrhoids, 3 year recall    CORONARY ANGIOPLASTY WITH STENT PLACEMENT  06/04/2019    AGA to circ    CORONARY ANGIOPLASTY WITH STENT PLACEMENT  05/2018    AGA to osteal LAD    ESOPHAGEAL DILATATION N/A 12/16/2020    Dr Xenia Lopez, empirical Malik [address] fr bougie dilation, (+) GERD    EYE SURGERY      Right eye    PRE-MALIGNANT / [address]      Dr. Martinez Proper ENDOSCOPY  09/20/2016    Dr Lopez-w/dilation over wire, [address]-distal esophageal narrowing, Inna (-)    UPPER GASTROINTESTINAL ENDOSCOPY  09/20/2016    Dr Cyr/dilation over wire, [address]-distal esophageal narrowing, Inna (-)     Family History   Problem Relation Age of Onset    Colon Cancer Mother     Prostate Cancer Father     Other Father         HX of blood clot    Diabetes Sister     Cancer Sister     Diabetes Brother     Colon Polyps Son     Esophageal Cancer Neg Hx     Liver Cancer Neg Hx     Liver Disease Neg Hx     Stomach Cancer Neg Hx     Rectal Cancer Neg Hx      Social History     Tobacco Use    Smoking status: Never Smoker    Smokeless tobacco: Never Used   Substance Use Topics    Alcohol use: No      Current Outpatient Medications   Medication Sig Dispense Refill    ezetimibe (ZETIA) 10 MG tablet Take 1 tablet by mouth daily 30 tablet 5    gabapentin (NEURONTIN) 300 MG capsule TAKE 1 CAPSULE BY MOUTH THREE TIMES DAILY 270 capsule 0    pantoprazole (PROTONIX) 40 MG tablet Take 1 tablet by mouth every morning (before breakfast) 90 tablet 3    prasugrel (EFFIENT) 10 MG TABS Take 1 tablet by mouth once for 1 dose Take 1 tablet by mouth once daily 90 tablet 0    ergocalciferol (ERGOCALCIFEROL) 1.25 MG (44406 UT) capsule Take 1 capsule by mouth once a week (Patient taking differently: Take 50,000 Units by mouth once a week Vit   d) 4 capsule 3    fluticasone (FLONASE) 50 MCG/ACT nasal spray Use 2 spray(s) in each nostril once daily 48 g 5    metoprolol tartrate (LOPRESSOR) 50 MG tablet TAKE 1 TABLET BY MOUTH TWICE DAILY 180 tablet 3    ciclopirox (PENLAC) 8 % solution Apply topically greater than 8 hours before showering. Clean nails with alcohol weekly. 1 Bottle 3    nystatin (MYCOSTATIN) 613805 UNIT/GM powder Apply 3 times daily.  60 g 1    aspirin 81 MG chewable tablet CHEW AND SWALLOW 1 TABLET BY MOUTH ONCE DAILY 90 tablet 3    Insulin Glargine, 1 Unit Dial, (TOUJEO SOLOSTAR) 300 UNIT/ML SOPN by NOT APPLICABLE route daily       hydroCHLOROthiazide (MICROZIDE) 12.5 MG capsule Take 1 capsule by mouth 2 times daily 180 capsule 3    metFORMIN (GLUCOPHAGE) 1000 MG tablet TAKE ONE TABLET BY MOUTH TWICE DAILY WITH MEALS 180 tablet 2    irbesartan (AVAPRO) 150 MG tablet Take 1 tablet by mouth daily 90 tablet 3    INVOKANA 100 MG TABS tablet Take 100 mg by mouth daily      Diabetic Shoe MISC by Does not apply route 1 each 0    Semaglutide,0.25 or 0.5MG/DOS, (OZEMPIC, 0.25 OR 0.5 MG/DOSE,) 2 MG/1.5ML SOPN Inject 0.25 mg into the skin once a week       mupirocin (BACTROBAN) 2 % ointment APPLY  OINTMENT TO AFFECTED AREA THREE TIMES DAILY 1 Tube 1    Insulin Pen Needle ([employer] PEN NEEDLES) 31G X 6 MM MISC Dx: E11.9 150 each 3    furosemide (LASIX) 40 MG tablet Take 1 tablet by mouth daily 90 tablet 3    blood glucose test strips (ACCU-CHEK DUNIA) strip Patient is to test 8 times daily. Dx: E11.9  Please provide patient with Accu check Dunia Plus test strips per his Insurance request 900 each 3    nitroGLYCERIN (NITROSTAT) 0.4 MG SL tablet up to max of 3 total doses. If no relief after 1 dose, call 911. 25 tablet 3    ipratropium-albuterol (DUONEB) 0.5-2.5 (3) MG/3ML SOLN nebulizer solution Inhale 3 mLs into the lungs every 6 hours (Patient taking differently: Inhale 1 vial into the lungs every 6 hours as needed for Shortness of Breath ) 360 mL 1    Lancets 30G MISC Use to check blood sugar 8 times daily. E11.9 300 each 5    triamcinolone (KENALOG) 0.025 % cream Apply topically 2 times daily. 1 Tube 0    Alcohol Swabs PADS 1 box by Does not apply route 8 times daily 100 each 5    Blood Glucose Monitoring Suppl (ACURA BLOOD GLUCOSE METER) w/Device KIT Please provide patient with Accu check Dunia Meter per his Insurance request. Dx: E11.9 1 kit 0    albuterol sulfate HFA (VENTOLIN HFA) 108 (90 Base) MCG/ACT inhaler Inhale 2 puffs into the lungs every 6 hours as needed for Wheezing 1 Inhaler 3    Vitamin D (CHOLECALCIFEROL) 25 MCG (1000 UT) TABS tablet Take 1 capsule by mouth daily       No current facility-administered medications for this visit. Allergies: Lipitor [atorvastatin calcium] and Tape [adhesive tape]    Review of Systems  Review of Systems   Constitutional: Positive for fatigue. Negative for activity change, diaphoresis, fever and unexpected weight change. HENT: Negative for facial swelling and nosebleeds. Eyes: Negative for redness and visual disturbance. Respiratory: Negative for cough, chest tightness, shortness of breath and wheezing. Cardiovascular: Negative for chest pain, palpitations and leg swelling. Gastrointestinal: Negative for abdominal pain, nausea and vomiting. Endocrine: Negative for cold intolerance and heat intolerance.    Genitourinary: Negative for dysuria and hematuria. Musculoskeletal: Negative for arthralgias and myalgias. Skin: Negative for pallor and rash. Neurological: Negative for dizziness, seizures, syncope, weakness and light-headedness. Hematological: Does not bruise/bleed easily. Psychiatric/Behavioral: Negative for agitation. The patient is not nervous/anxious. Objective  Vital Signs - /80   Pulse 76   Ht 6' (1.829 m)   Wt 271 lb (122.9 kg)   SpO2 97%   BMI 36.75 kg/m²    Wt Readings from Last 3 Encounters:   02/10/21 271 lb (122.9 kg)   01/20/21 266 lb (120.7 kg)   12/22/20 270 lb (122.5 kg)      Physical Exam  Vitals signs and nursing note reviewed. Constitutional:       General: He is not in acute distress. Appearance: He is well-developed. He is obese. Comments: obese   HENT:      Head: Normocephalic and atraumatic. Right Ear: Hearing and external ear normal.      Left Ear: Hearing and external ear normal.      Nose: Nose normal.   Eyes:      General:         Right eye: No discharge. Left eye: No discharge. Pupils: Pupils are equal, round, and reactive to light. Neck:      Musculoskeletal: Neck supple. No muscular tenderness. Thyroid: No thyromegaly. Vascular: No carotid bruit or JVD. Trachea: No tracheal deviation. Cardiovascular:      Rate and Rhythm: Normal rate and regular rhythm. Heart sounds: Normal heart sounds. No murmur. No friction rub. No gallop. Comments: No carotid bruit  Pulmonary:      Effort: Pulmonary effort is normal. No respiratory distress. Breath sounds: Normal breath sounds. No wheezing or rales. Abdominal:      Palpations: Abdomen is soft. Tenderness: There is no abdominal tenderness. Musculoskeletal:         General: No swelling or deformity. Right lower leg: No edema. Left lower leg: No edema. Comments: abnormal gait and station   Skin:     General: Skin is warm and dry.       Findings: No rash.   Neurological:      Mental Status: He is alert and oriented to person, place, and time. Cranial Nerves: No cranial nerve deficit. Psychiatric:         Behavior: Behavior normal.         Judgment: Judgment normal.         Data:  Heart cath summary 6/4/19:       1. Successful femoral artery ultrasound  2. Successful femoral artery arterogram  3. Supervision of the administration of moderate conscious sedation  4. Single vessel coronary artery disease involving the mid circumflex  5. Left ventricular function is severely impaired in the anterior lateral segment with a visually estimate ejection of 40%. 6.  99% lesion in the mid circumflex  7. Successful percutaneous coronary intervention utilizing a single drug eluding stent to the mid circumflex. 8.  Patent stent in the osteal LAD  9.  40% lesion in the mid RCA  EF 40%    Lab Results   Component Value Date    CHOL 177 12/13/2019    TRIG 289 (H) 12/13/2019    HDL 29 (L) 12/11/2020    LDLCALC 134 12/11/2020    LDLDIRECT 104 11/02/2017     Lab Results   Component Value Date    ALT 15 12/11/2020    AST 14 12/11/2020     Lab Results   Component Value Date    LABA1C 9.9 (H) 12/11/2020       EKG shows normal sinus rhythm rate 71 with nonspecific T wave unchanged compared to EKG dated 6/3/2019   abnormality    Assessment:     Diagnosis Orders   1. Coronary artery disease involving native coronary artery of native heart without angina pectoris     2. Mixed hyperlipidemia     3. Myalgia due to statin     4. Essential hypertension     5. Left ventricular dysfunction     6. DM type 2, uncontrolled, with neuropathy (HCC)     7. Class 2 obesity due to excess calories without serious comorbidity with body mass index (BMI) of 36.0 to 36.9 in adult         CAD stable without symptoms of angina. Due to his history of repeat CAD, recommend Prasugrel and aspirin lifelong. Left ventricular dysfunctionEF 40% per cath 2019.   Patient is on guideline directed medical therapy. We discussed monitoring weight, calling for sudden weight gain, and following a low-sodium diet. Patient appears euvolemic today    Hypertensioncontrolled on current regimen    Hyperlipidemia/myalgia due to statin myalgia  related to Lipitor and Crestor. Endocrinologist suggested Zetia, however he has not received it. We discussed the mechanism of action for Zetia and potential side effects. This was sent to his pharmacy    Diabeteslast A1c 9.1 at endocrinologist.  We had a long discussion about the importance of diabetes control and its relationship to heart disease. Encourage A1c less than 7    Obesity encourage weight loss and regular exercise. Stable cardiovascular status. No evidence of overt heart failure,angina or dysrhythmia. Plan    Return in about 6 months (around 8/10/2021) for MIMI. Keep diabetes under control to help prevent further heart disease. Keep Hemoglobin A1C less than 7%    Work on weight loss and regular exercise    Call with any questionsor concerns  Follow up with MIMI Muñoz for non cardiac problems  Report any new problems  Cardiovascular Fitness-Exercise as tolerated. Strive for 15 minutes of exercise most days of the week. Cardiac / HealthyDiet  Continue current medications as directed  Continue plan of treatment  It is always recommended that you bring your medicationsbottles with you to each visit - this is for your safety!        MIMI Blackwell

## 2021-02-10 NOTE — PATIENT INSTRUCTIONS
Return in about 6 months (around 8/10/2021) for APRN. Keep diabetes under control to help prevent further heart disease. Keep Hemoglobin A1C less than 7%    Work on weight loss and regular exercise    Call with any questionsor concerns  Follow up with MIMI Ballesteros for non cardiac problems  Report any new problems  Cardiovascular Fitness-Exercise as tolerated. Strive for 15 minutes of exercise most days of the week. Cardiac / HealthyDiet  Continue current medications as directed  Continue plan of treatment  It is always recommended that you bring your medicationsbottles with you to each visit - this is for your safety!

## 2021-02-12 ENCOUNTER — TELEPHONE (OUTPATIENT)
Dept: ENDOCRINOLOGY | Facility: CLINIC | Age: 66
End: 2021-02-12

## 2021-02-12 DIAGNOSIS — E11.65 TYPE 2 DIABETES MELLITUS WITH HYPERGLYCEMIA, WITH LONG-TERM CURRENT USE OF INSULIN (HCC): ICD-10-CM

## 2021-02-12 DIAGNOSIS — Z79.4 TYPE 2 DIABETES MELLITUS WITH HYPERGLYCEMIA, WITH LONG-TERM CURRENT USE OF INSULIN (HCC): ICD-10-CM

## 2021-02-12 RX ORDER — CANAGLIFLOZIN 100 MG/1
TABLET, FILM COATED ORAL
Qty: 90 TABLET | Refills: 3 | Status: SHIPPED | OUTPATIENT
Start: 2021-02-12 | End: 2021-04-15

## 2021-02-12 NOTE — TELEPHONE ENCOUNTER
Needs a prescription sent to [facility] for the freestyle brenna 2 with sensors and reader. Says that they will also need a letter of medical necessity for it to be covered.

## 2021-02-15 NOTE — TELEPHONE ENCOUNTER
I spoke with pt and let him know I called [facility] and they said that they had just sent him out a shipment and that the next shipment they would place the order for the brenna 2

## 2021-02-16 ENCOUNTER — DOCUMENTATION (OUTPATIENT)
Dept: ENDOCRINOLOGY | Facility: CLINIC | Age: 66
End: 2021-02-16

## 2021-02-23 ENCOUNTER — DOCUMENTATION (OUTPATIENT)
Dept: ENDOCRINOLOGY | Facility: CLINIC | Age: 66
End: 2021-02-23

## 2021-02-24 ENCOUNTER — DOCUMENTATION (OUTPATIENT)
Dept: ENDOCRINOLOGY | Facility: CLINIC | Age: 66
End: 2021-02-24

## 2021-02-26 ENCOUNTER — TELEPHONE (OUTPATIENT)
Dept: ENDOCRINOLOGY | Facility: CLINIC | Age: 66
End: 2021-02-26

## 2021-03-11 ENCOUNTER — TELEPHONE (OUTPATIENT)
Dept: ENDOCRINOLOGY | Facility: CLINIC | Age: 66
End: 2021-03-11

## 2021-03-11 NOTE — TELEPHONE ENCOUNTER
Pt left a vm stating that he thought Feb 22nd we were sending in a new script for Lyumjev, says that the pharmacy has never received this, and we need to look into getting this new script resent because evidently it was lost.     [address] [facility] [phone] [facility] [phone]

## 2021-03-15 ENCOUNTER — DOCUMENTATION (OUTPATIENT)
Dept: ENDOCRINOLOGY | Facility: CLINIC | Age: 66
End: 2021-03-15

## 2021-03-15 NOTE — TELEPHONE ENCOUNTER
Dr Cheng:     Pt called asking for a new prescription Lyumjev, 30 day, to be sent in.       Zuleika:   He got a call from the pharmacy, stating that they need to speak to someone in our office regarding a PA for Ozempic, because the old one has . The phone number for his insurance is [phone].     He and his wife seemed to be a bit confused about this, do you think you could clarify what is going on with them afterwards? Thank you

## 2021-03-19 ENCOUNTER — TELEPHONE (OUTPATIENT)
Dept: ENDOCRINOLOGY | Facility: CLINIC | Age: 66
End: 2021-03-19

## 2021-03-19 NOTE — TELEPHONE ENCOUNTER
[facility] left a vm asking for a tiering exception on one of his medications, they need a call at [phone] reference [id number].

## 2021-04-14 DIAGNOSIS — I10 ESSENTIAL HYPERTENSION: ICD-10-CM

## 2021-04-14 DIAGNOSIS — Z79.4 TYPE 2 DIABETES MELLITUS WITH HYPERGLYCEMIA, WITH LONG-TERM CURRENT USE OF INSULIN (HCC): ICD-10-CM

## 2021-04-14 DIAGNOSIS — E11.65 TYPE 2 DIABETES MELLITUS WITH HYPERGLYCEMIA, WITH LONG-TERM CURRENT USE OF INSULIN (HCC): ICD-10-CM

## 2021-04-14 RX ORDER — IRBESARTAN 150 MG/1
TABLET ORAL
Qty: 90 TABLET | Refills: 0 | Status: SHIPPED | OUTPATIENT
Start: 2021-04-14 | End: 2022-08-30 | Stop reason: SDUPTHER

## 2021-04-15 RX ORDER — CANAGLIFLOZIN 100 MG/1
TABLET, FILM COATED ORAL
Qty: 60 TABLET | Refills: 0 | Status: SHIPPED | OUTPATIENT
Start: 2021-04-15 | End: 2021-06-02 | Stop reason: SDUPTHER

## 2021-04-21 NOTE — PROGRESS NOTES
[facility] - PODIATRY    Today's Date: 04/27/21    Patient Name: Mick Sims  MRN: [id number]  CSN: [id number]  PCP: Ginny Collazo APRN   Referring Provider: No ref. provider found    SUBJECTIVE     Chief Complaint   Patient presents with   • Follow-up     pcp12/22/2020 3 MONTH FU diabetic foot care- pt states nueropathy is getting worse, pt states some cramping sometimes- pt denies pain at this time- pt presents with long toe nails, and a sore in between 2nd and 3rd toes. no bruising or redness, or other visual abnormalities    • Diabetes     190mg/dl last BG thismorning      HPI: Mick Sims, a 65 y.o.male, comes to clinic as a(n) established patient presenting for diabetic foot exam and complaining of thick fungal toenails. Patient has h/o arthritis, DM2, HTN, Renal Disorder. Patient is IDDM with last stated BG level of 190mg/dl. Admits to mild numbness and tingling in feet. Relates some worsening tingling in his feet.  Denies open wounds or sores. Admits to mild swelling in legs that he wears mild compression stockings for. States that his toenails are long, thick and discolored. He has trouble caring for them himself. Denies pain due to neuropathy. Relates previous treatment(s) including foot care by podiatry. Denies any constitutional symptoms. No other pedal complaints at this time.    Past Medical History:   Diagnosis Date   • Arthritis    • Diabetes mellitus (CMS/HCC)    • Hypertension    • Renal disorder      Past Surgical History:   Procedure Laterality Date   • CARPAL TUNNEL RELEASE       Family History   Problem Relation Age of Onset   • Cancer Mother    • Diabetes Sister    • Diabetes Brother    • No Known Problems Brother      Social History     Socioeconomic History   • Marital status:      Spouse name: Not on file   • Number of children: Not on file   • Years of education: Not on file   • Highest education level: Not on file   Tobacco Use   • Smoking status: Never  Smoker   • Smokeless tobacco: Never Used   Vaping Use   • Vaping Use: Never used   Substance and Sexual Activity   • Alcohol use: No   • Drug use: Never   • Sexual activity: Defer     Allergies   Allergen Reactions   • Atorvastatin Calcium Unknown - Low Severity     Current Outpatient Medications   Medication Sig Dispense Refill   • aspirin 81 MG chewable tablet Chew 81 mg Daily.     • Continuous Blood Gluc  (FreeStyle Fercho 2 [employer]) device 1 each Continuous. Use as indicated for glucose monitoring 1 each 1   • Continuous Blood Gluc Sensor (FreeStyle Fercho 2 Sensor) misc 1 each Every 14 (Fourteen) Days. 2 each 11   • Dulaglutide 0.75 MG/0.5ML solution pen-injector Inject 0.75 mg under the skin into the appropriate area as directed 1 (One) Time Per Week. 4 pen 11   • ezetimibe (Zetia) 10 MG tablet Take 1 tablet by mouth Daily. 30 tablet 11   • fluticasone (FLONASE) 50 MCG/ACT nasal spray 1 spray into each nostril 2 (Two) Times a Day As Needed for rhinitis or allergies.     • furosemide (LASIX) 40 MG tablet Take 40 mg by mouth 2 (Two) Times a Day.     • gabapentin (NEURONTIN) 300 MG capsule Take 300 mg by mouth 3 (Three) Times a Day.     • Glucagon, rDNA, (Glucagon Emergency) 1 MG kit Inject 1 mg under the skin into the appropriate area as directed 1 (One) Time As Needed (hypoglycemia) for up to 1 dose. 1 each 11   • hydrochlorothiazide (HYDRODIURIL) 25 MG tablet Take 12.5 mg by mouth Every Night. In addition to avalide     • Insulin Glargine, 2 Unit Dial, (Toujeo Max SoloStar) 300 UNIT/ML solution pen-injector injection Inject 60 Units under the skin into the appropriate area as directed Daily. 6 pen 11   • Insulin Lispro-aabc 200 UNIT/ML solution pen-injector Inject 70 Units under the skin into the appropriate area as directed 3 (Three) Times a Day With Meals. 12 pen 11   • Insulin Pen Needle (PEN NEEDLES) 32G X 4 MM misc 1 each 4 (Four) Times a Day. Use 4 times per day to inject insulin DX E11.9 400 each 3    • Invokana 100 MG tablet tablet Take 1 tablet by mouth once daily 60 tablet 0   • irbesartan (AVAPRO) 150 MG tablet Take 150 mg by mouth Every Night.     • metFORMIN (GLUCOPHAGE) 1000 MG tablet Take 1,000 mg by mouth 2 (Two) Times a Day. Needs pm dose     • metoprolol tartrate (LOPRESSOR) 50 MG tablet Take 1 tablet by mouth 2 (Two) Times a Day. 30 tablet 0   • nitroglycerin (NITRODUR) 0.4 MG/HR patch Place 1 patch on the skin as directed by provider Daily.     • PANTOPRAZOLE SODIUM PO Take 40 mg by mouth Daily.     • prasugrel (EFFIENT) 10 MG tablet Take 10 mg by mouth Daily.     • sildenafil (Viagra) 100 MG tablet Take 1 tablet by mouth As Needed for Erectile Dysfunction. 30 tablet 11   • vitamin D (ERGOCALCIFEROL) 1.25 MG (21017 UT) capsule capsule Take 50,000 Units by mouth 1 (One) Time Per Week.     • vitamin D3 125 MCG (5000 UT) capsule capsule Take 5,000 Units by mouth Daily.       No current facility-administered medications for this visit.     Review of Systems   Constitutional: Negative for chills and fever.   HENT: Negative for congestion.    Respiratory: Negative for shortness of breath.    Cardiovascular: Positive for leg swelling. Negative for chest pain.   Gastrointestinal: Negative for constipation, diarrhea, nausea and vomiting.   Musculoskeletal:        Foot pain   Skin: Negative for wound.   Neurological: Positive for numbness.       OBJECTIVE     Vitals:    04/27/21 0857   BP: 172/80   Pulse: 79   SpO2: 93%       PHYSICAL EXAM  GEN:   Accompanied by none.     Foot/Ankle Exam:       General:   Diabetic Foot Exam Performed    Appearance: appears stated age and healthy    Orientation: AAOx3    Affect: appropriate    Gait: unimpaired    Assistance: independent    Shoe Gear:  Casual shoes    VASCULAR      Right Foot Vascularity   Dorsalis pedis:  2+  Posterior tibial:  2+  Skin Temperature: warm    Edema Grading:  Trace  CFT:  3  Pedal Hair Growth:  Present  Varicosities: mild varicosities       Left  Foot Vascularity   Dorsalis pedis:  2+  Posterior tibial:  2+  Skin Temperature: warm    Edema Grading:  Trace  CFT:  3  Pedal Hair Growth:  Present  Varicosities: mild varicosities        NEUROLOGIC     Right Foot Neurologic   Light touch sensation:  Diminished  Vibratory sensation:  Diminished  Hot/Cold sensation: diminished    Protective Sensation using [place] Monofilament:  8     Left Foot Neurologic   Light touch sensation:  Diminished  Vibratory sensation:  Diminished  Hot/cold sensation: diminished    Protective Sensation using [place] Monofilament:  7     MUSCULOSKELETAL      Right Foot Musculoskeletal   Ecchymosis:  None  Tenderness: none    Arch:  Normal     Left Foot Musculoskeletal   Ecchymosis:  None  Tenderness: none    Arch:  Normal     MUSCLE STRENGTH     Right Foot Muscle Strength   Foot dorsiflexion:  5  Foot plantar flexion:  5  Foot inversion:  5  Foot eversion:  5     Left Foot Muscle Strength   Foot dorsiflexion:  5  Foot plantar flexion:  5  Foot inversion:  5  Foot eversion:  5     RANGE OF MOTION      Right Foot Range of Motion   Foot and ankle ROM within normal limits       Left Foot Range of Motion   Foot and ankle ROM within normal limits       DERMATOLOGIC     Right Foot Dermatologic   Skin: skin intact    Nails: onychomycosis, abnormally thick, subungual debris and dystrophic nails       Left Foot Dermatologic   Skin: skin intact    Nails: onychomycosis, abnormally thick, subungual debris and dystrophic nails        RADIOLOGY/NUCLEAR:  No results found.    LABORATORY/CULTURE RESULTS:      PATHOLOGY RESULTS:       ASSESSMENT/PLAN     Diagnoses and all orders for this visit:    1. Onychomycosis (Primary)    2. Type 2 diabetes mellitus with diabetic neuropathy, with long-term current use of insulin (CMS/[facility])    3. Peripheral edema      Comprehensive lower extremity examination and evaluation was performed.  Discussed findings and treatment plan including risks, benefits,  and treatment options with patient in detail. Patient agreed with treatment plan.  After verbal consent obtained, nail(s) x10 debrided of length and thickness with nail nipper without incidence  Patient may maintain nails and calluses at home utilizing emery board or pumice stone between visits as needed  Reviewed at home diabetic foot care including daily foot checks   Continue daily use of compression stockings.  Advised to work on improving A1C under 8%.  An After Visit Summary was printed and given to the patient at discharge, including (if requested) any available informative/educational handouts regarding diagnosis, treatment, or medications. All questions were answered to patient/family satisfaction. Should symptoms fail to improve or worsen they agree to call or return to clinic or to go to the Emergency Department. Discussed the importance of following up with any needed screening tests/labs/specialist appointments and any requested follow-up recommended by me today. Importance of maintaining follow-up discussed and patient accepts that missed appointments can delay diagnosis and potentially lead to worsening of conditions.  Return in about 3 months (around 7/27/2021)., or sooner if acute issues arise.        This document has been electronically signed by Mehdi Valencia DPM on April 27, 2021 09:14 CDT

## 2021-04-26 ENCOUNTER — TELEPHONE (OUTPATIENT)
Dept: PODIATRY | Facility: CLINIC | Age: 66
End: 2021-04-26

## 2021-04-26 NOTE — TELEPHONE ENCOUNTER
Called pt regarding appt on 04/27. LM for pt to return call if any questions or concerns arise.

## 2021-04-27 ENCOUNTER — OFFICE VISIT (OUTPATIENT)
Dept: PODIATRY | Facility: CLINIC | Age: 66
End: 2021-04-27

## 2021-04-27 VITALS
HEART RATE: 79 BPM | DIASTOLIC BLOOD PRESSURE: 80 MMHG | BODY MASS INDEX: 36.87 KG/M2 | WEIGHT: 272.2 LBS | HEIGHT: 72 IN | OXYGEN SATURATION: 93 % | SYSTOLIC BLOOD PRESSURE: 172 MMHG

## 2021-04-27 DIAGNOSIS — E11.40 TYPE 2 DIABETES MELLITUS WITH DIABETIC NEUROPATHY, WITH LONG-TERM CURRENT USE OF INSULIN (HCC): ICD-10-CM

## 2021-04-27 DIAGNOSIS — R60.9 PERIPHERAL EDEMA: ICD-10-CM

## 2021-04-27 DIAGNOSIS — Z79.4 TYPE 2 DIABETES MELLITUS WITH DIABETIC NEUROPATHY, WITH LONG-TERM CURRENT USE OF INSULIN (HCC): ICD-10-CM

## 2021-04-27 DIAGNOSIS — B35.1 ONYCHOMYCOSIS: Primary | ICD-10-CM

## 2021-04-27 PROCEDURE — 11721 DEBRIDE NAIL 6 OR MORE: CPT | Performed by: PODIATRIST

## 2021-04-29 RX ORDER — EXENATIDE 2 MG/.85ML
2 INJECTION, SUSPENSION, EXTENDED RELEASE SUBCUTANEOUS WEEKLY
Qty: 4 PEN | Refills: 11 | Status: SHIPPED | OUTPATIENT
Start: 2021-04-29 | End: 2021-06-02

## 2021-05-06 ENCOUNTER — DOCUMENTATION (OUTPATIENT)
Dept: ENDOCRINOLOGY | Facility: CLINIC | Age: 66
End: 2021-05-06

## 2021-05-07 DIAGNOSIS — G62.9 NEUROPATHY: ICD-10-CM

## 2021-05-09 RX ORDER — GABAPENTIN 300 MG/1
CAPSULE ORAL
Qty: 270 CAPSULE | Refills: 0 | Status: SHIPPED | OUTPATIENT
Start: 2021-05-09 | End: 2021-09-09

## 2021-05-19 ENCOUNTER — TELEPHONE (OUTPATIENT)
Dept: PRIMARY CARE CLINIC | Age: 66
End: 2021-05-19

## 2021-05-26 ENCOUNTER — TELEPHONE (OUTPATIENT)
Dept: PRIMARY CARE CLINIC | Age: 66
End: 2021-05-26

## 2021-05-26 NOTE — TELEPHONE ENCOUNTER
I went to  and informed pt last time he had a foot exam was greater than 1 year ago that he would need an appointment for DM foot exam in order to get an up to date Rx. Pt ARTEMIO and said when he got home to his schedule he would call us back to set that up.

## 2021-05-26 NOTE — TELEPHONE ENCOUNTER
Pt thinks he should have a prescription for diabetic shoes, but has lost it if he does. Cherynoni De La Cruz said she'd give him one, but he nor wife can find it. He would like a replacement.

## 2021-05-28 ENCOUNTER — LAB (OUTPATIENT)
Dept: LAB | Facility: HOSPITAL | Age: 66
End: 2021-05-28

## 2021-05-28 LAB
25(OH)D3 SERPL-MCNC: 34.8 NG/ML
ALBUMIN SERPL-MCNC: 4.2 G/DL (ref 3.5–5.2)
ALBUMIN UR-MCNC: 7 MG/DL
ALBUMIN/GLOB SERPL: 1.6 G/DL
ALP SERPL-CCNC: 70 U/L (ref 39–117)
ALT SERPL W P-5'-P-CCNC: 14 U/L (ref 1–41)
ANION GAP SERPL CALCULATED.3IONS-SCNC: 9.5 MMOL/L (ref 5–15)
AST SERPL-CCNC: 15 U/L (ref 1–40)
BASOPHILS # BLD AUTO: 0.09 10*3/MM3 (ref 0–0.2)
BASOPHILS NFR BLD AUTO: 1.3 % (ref 0–1.5)
BILIRUB SERPL-MCNC: 0.3 MG/DL (ref 0–1.2)
BUN SERPL-MCNC: 23 MG/DL (ref 8–23)
BUN/CREAT SERPL: 13.9 (ref 7–25)
CALCIUM SPEC-SCNC: 10.4 MG/DL (ref 8.6–10.5)
CHLORIDE SERPL-SCNC: 106 MMOL/L (ref 98–107)
CHOLEST SERPL-MCNC: 166 MG/DL (ref 0–200)
CO2 SERPL-SCNC: 23.5 MMOL/L (ref 22–29)
CREAT SERPL-MCNC: 1.66 MG/DL (ref 0.76–1.27)
CREAT UR-MCNC: 76.2 MG/DL
DEPRECATED RDW RBC AUTO: 44.1 FL (ref 37–54)
EOSINOPHIL # BLD AUTO: 0.15 10*3/MM3 (ref 0–0.4)
EOSINOPHIL NFR BLD AUTO: 2.2 % (ref 0.3–6.2)
ERYTHROCYTE [DISTWIDTH] IN BLOOD BY AUTOMATED COUNT: 14.8 % (ref 12.3–15.4)
GFR SERPL CREATININE-BSD FRML MDRD: 42 ML/MIN/1.73
GLOBULIN UR ELPH-MCNC: 2.6 GM/DL
GLUCOSE SERPL-MCNC: 147 MG/DL (ref 65–99)
HBA1C MFR BLD: 8.67 % (ref 4.8–5.6)
HCT VFR BLD AUTO: 46.3 % (ref 37.5–51)
HDLC SERPL-MCNC: 26 MG/DL (ref 40–60)
HGB BLD-MCNC: 15 G/DL (ref 13–17.7)
IMM GRANULOCYTES # BLD AUTO: 0.02 10*3/MM3 (ref 0–0.05)
IMM GRANULOCYTES NFR BLD AUTO: 0.3 % (ref 0–0.5)
LDLC SERPL CALC-MCNC: 93 MG/DL (ref 0–100)
LDLC/HDLC SERPL: 3.23 {RATIO}
LYMPHOCYTES # BLD AUTO: 2.03 10*3/MM3 (ref 0.7–3.1)
LYMPHOCYTES NFR BLD AUTO: 30.4 % (ref 19.6–45.3)
MCH RBC QN AUTO: 26.7 PG (ref 26.6–33)
MCHC RBC AUTO-ENTMCNC: 32.4 G/DL (ref 31.5–35.7)
MCV RBC AUTO: 82.5 FL (ref 79–97)
MICROALBUMIN/CREAT UR: 91.9 MG/G
MONOCYTES # BLD AUTO: 0.57 10*3/MM3 (ref 0.1–0.9)
MONOCYTES NFR BLD AUTO: 8.5 % (ref 5–12)
NEUTROPHILS NFR BLD AUTO: 3.82 10*3/MM3 (ref 1.7–7)
NEUTROPHILS NFR BLD AUTO: 57.3 % (ref 42.7–76)
NRBC BLD AUTO-RTO: 0 /100 WBC (ref 0–0.2)
PLATELET # BLD AUTO: 200 10*3/MM3 (ref 140–450)
PMV BLD AUTO: 12.3 FL (ref 6–12)
POTASSIUM SERPL-SCNC: 4.5 MMOL/L (ref 3.5–5.2)
PROT SERPL-MCNC: 6.8 G/DL (ref 6–8.5)
RBC # BLD AUTO: 5.61 10*6/MM3 (ref 4.14–5.8)
SODIUM SERPL-SCNC: 139 MMOL/L (ref 136–145)
TRIGL SERPL-MCNC: 280 MG/DL (ref 0–150)
TSH SERPL DL<=0.05 MIU/L-ACNC: 2.84 UIU/ML (ref 0.27–4.2)
VIT B12 BLD-MCNC: 491 PG/ML (ref 211–946)
VLDLC SERPL-MCNC: 47 MG/DL (ref 5–40)
WBC # BLD AUTO: 6.68 10*3/MM3 (ref 3.4–10.8)

## 2021-05-28 PROCEDURE — 80053 COMPREHEN METABOLIC PANEL: CPT | Performed by: INTERNAL MEDICINE

## 2021-05-28 PROCEDURE — 85025 COMPLETE CBC W/AUTO DIFF WBC: CPT | Performed by: INTERNAL MEDICINE

## 2021-05-28 PROCEDURE — 36415 COLL VENOUS BLD VENIPUNCTURE: CPT | Performed by: INTERNAL MEDICINE

## 2021-05-28 PROCEDURE — 82306 VITAMIN D 25 HYDROXY: CPT | Performed by: INTERNAL MEDICINE

## 2021-05-28 PROCEDURE — 82607 VITAMIN B-12: CPT | Performed by: INTERNAL MEDICINE

## 2021-05-28 PROCEDURE — 82043 UR ALBUMIN QUANTITATIVE: CPT | Performed by: INTERNAL MEDICINE

## 2021-05-28 PROCEDURE — 82570 ASSAY OF URINE CREATININE: CPT | Performed by: INTERNAL MEDICINE

## 2021-05-28 PROCEDURE — 80061 LIPID PANEL: CPT | Performed by: INTERNAL MEDICINE

## 2021-05-28 PROCEDURE — 83036 HEMOGLOBIN GLYCOSYLATED A1C: CPT | Performed by: INTERNAL MEDICINE

## 2021-05-28 PROCEDURE — 84443 ASSAY THYROID STIM HORMONE: CPT | Performed by: INTERNAL MEDICINE

## 2021-06-02 ENCOUNTER — OFFICE VISIT (OUTPATIENT)
Dept: ENDOCRINOLOGY | Facility: CLINIC | Age: 66
End: 2021-06-02

## 2021-06-02 VITALS
DIASTOLIC BLOOD PRESSURE: 78 MMHG | HEIGHT: 72 IN | OXYGEN SATURATION: 96 % | BODY MASS INDEX: 37.18 KG/M2 | HEART RATE: 78 BPM | WEIGHT: 274.5 LBS | SYSTOLIC BLOOD PRESSURE: 126 MMHG

## 2021-06-02 DIAGNOSIS — E11.69 MIXED DIABETIC HYPERLIPIDEMIA ASSOCIATED WITH TYPE 2 DIABETES MELLITUS (HCC): ICD-10-CM

## 2021-06-02 DIAGNOSIS — E11.59 HYPERTENSION ASSOCIATED WITH DIABETES (HCC): ICD-10-CM

## 2021-06-02 DIAGNOSIS — E83.52 HYPERCALCEMIA: ICD-10-CM

## 2021-06-02 DIAGNOSIS — E78.2 MIXED DIABETIC HYPERLIPIDEMIA ASSOCIATED WITH TYPE 2 DIABETES MELLITUS (HCC): ICD-10-CM

## 2021-06-02 DIAGNOSIS — E11.65 TYPE 2 DIABETES MELLITUS WITH HYPERGLYCEMIA, WITH LONG-TERM CURRENT USE OF INSULIN (HCC): Primary | ICD-10-CM

## 2021-06-02 DIAGNOSIS — E55.9 VITAMIN D DEFICIENCY: ICD-10-CM

## 2021-06-02 DIAGNOSIS — I15.2 HYPERTENSION ASSOCIATED WITH DIABETES (HCC): ICD-10-CM

## 2021-06-02 DIAGNOSIS — Z79.4 TYPE 2 DIABETES MELLITUS WITH HYPERGLYCEMIA, WITH LONG-TERM CURRENT USE OF INSULIN (HCC): Primary | ICD-10-CM

## 2021-06-02 PROCEDURE — 95251 CONT GLUC MNTR ANALYSIS I&R: CPT | Performed by: INTERNAL MEDICINE

## 2021-06-02 PROCEDURE — 99214 OFFICE O/P EST MOD 30 MIN: CPT | Performed by: INTERNAL MEDICINE

## 2021-06-02 RX ORDER — CANAGLIFLOZIN 100 MG/1
1 TABLET, FILM COATED ORAL DAILY
Qty: 30 TABLET | Refills: 11 | Status: SHIPPED | OUTPATIENT
Start: 2021-06-02 | End: 2021-06-02

## 2021-06-02 RX ORDER — SEMAGLUTIDE 1.34 MG/ML
0.5 INJECTION, SOLUTION SUBCUTANEOUS WEEKLY
Qty: 1 PEN | Refills: 11 | Status: SHIPPED | OUTPATIENT
Start: 2021-06-02 | End: 2021-12-07

## 2021-06-02 NOTE — PATIENT INSTRUCTIONS
Food Calcium, milligrams   Milk (skim, 2 percent, or whole, 8 oz [240 mL]) 300   Yogurt (6 oz [168 g]) 250   Orange juice (with calcium, 8 oz [240 mL]) 300   Tofu with calcium (1/2 cup [113 g]) 435   Cheese (1 oz [28 g]) 195 to 335 (hard cheese = higher calcium)   Cottage cheese (1/2 cup [113 g]) 130   Ice cream or frozen yogurt (1/2 cup [113 g]) 100   Soy milk (8 oz [240 mL]) 300   Beans (1/2 cup cooked [113 g]) 60 to 80   Dark, leafy green vegetables (1/2 cup cooked [113 g]) 50 to 135   Almonds (24 whole) 70   Orange (1 medium) 60

## 2021-06-02 NOTE — PROGRESS NOTES
" Mick Sims is a 65 y.o. male who presents for follow up Type 2 diabetes      HPI    T2DM    Duration 10 years    Complications - nephropathy, retinopathy, peripheral neuropathy and cardiovascular disease    Current symptoms/problems  paresthesia of the feet and visual disturbances , ED     Alleviating Factors: Compliance       Current monitoring regimen: home blood tests - using fercho       PE    /78   Pulse 78   Ht 182.9 cm (72\")   Wt 125 kg (274 lb 8 oz)   SpO2 96%   BMI 37.23 kg/m²   AOx3  No visible goiter  RRR  CTA  No Edema    Labs    Lab Results   Component Value Date    WBC 6.68 05/28/2021    HGB 15.0 05/28/2021    HCT 46.3 05/28/2021    MCV 82.5 05/28/2021     05/28/2021     Lab Results   Component Value Date    GLUCOSE 147 (H) 05/28/2021    BUN 23 05/28/2021    CREATININE 1.66 (H) 05/28/2021    EGFRIFNONA 42 (L) 05/28/2021    EGFRIFAFRI >59 12/11/2020    BCR 13.9 05/28/2021    K 4.5 05/28/2021    CO2 23.5 05/28/2021    CALCIUM 10.4 05/28/2021    ALBUMIN 4.20 05/28/2021    AST 15 05/28/2021    ALT 14 05/28/2021               Assessment/Plan       ICD-10-CM ICD-9-CM   1. Type 2 diabetes mellitus with hyperglycemia, with long-term current use of insulin (CMS/[facility])  E11.65 250.00    Z79.4 790.29     V58.67   2. Hypertension associated with diabetes (CMS/[facility])  E11.59 250.80    I15.2 401.9   3. Mixed diabetic hyperlipidemia associated with type 2 diabetes mellitus (CMS/[facility])  E11.69 250.80    E78.2 272.2   4. Vitamin D deficiency  E55.9 268.9       Pt has type 2 diabetes w ckd and cad     Glycemic Management:   Lab Results   Component Value Date    HGBA1C 8.67 (H) 05/28/2021    HGBA1C 9.10 (H) 02/02/2021    HGBA1C 9.9 (H) 12/11/2020       Fercho 2 weeks reviewed  t2dm w hyperglycemia          toujeo 40 -- now lantus 30     ========================================    Ozempic      0.5 mg weekly ---- increase to 1 mg weekly , having trouble affording but willing to pay    "   ============================================    Metformin 1000 mg twice a day ===     ============================================      Invokana 100mg  daily  - gave farxiga samples     ============================================    Humalog     25 units for 60 grams is what I told him last time so he was capping at this dose    This appt discussed that he can have more to accommodate for carb intake         ======================      bp controlled on irbesartan now 300  hctz stopped due to hypercalcemia now corrected  He was also not taking any calcium supplements and avoiding dietary - he can resume  Obtain PTH  And calcium    ===========  Lipids controlled on lipitor but allergic, change to zetia and this worked  Lab Results   Component Value Date    CHOL 166 05/28/2021    CHLPL 177 12/13/2019    TRIG 280 (H) 05/28/2021    HDL 26 (L) 05/28/2021    LDL 93 05/28/2021        ckd stage III , steady     Vit D low, start 1000 units daily     This document has been electronically signed by Juancho Mobley MD on June 2, 2021 11:18 CDT          A copy of my note was sent to Ginny Collazo APRN    Please see my above opinion and suggestions.

## 2021-07-08 ENCOUNTER — TELEPHONE (OUTPATIENT)
Dept: PRIMARY CARE CLINIC | Age: 66
End: 2021-07-08

## 2021-07-08 ENCOUNTER — TELEPHONE (OUTPATIENT)
Dept: ENDOCRINOLOGY | Facility: CLINIC | Age: 66
End: 2021-07-08

## 2021-07-08 NOTE — TELEPHONE ENCOUNTER
Received call from Geovanni Viera with [facility] inquiring on recent diabetic foot exam. Advised he has not had one since 12/18/2019. She verbalized understanding.

## 2021-07-09 ENCOUNTER — NURSE TRIAGE (OUTPATIENT)
Dept: OTHER | Facility: CLINIC | Age: 66
End: 2021-07-09

## 2021-07-09 NOTE — TELEPHONE ENCOUNTER
Reason for Disposition   MILD swelling of both ankles (i.e., pedal edema) AND new onset or worsening    Answer Assessment - Initial Assessment Questions  1. ONSET: \"When did the swelling start? \" (e.g., minutes, hours, days)      Onset was about 2 weeks ago    2. LOCATION: \"What part of the leg is swollen? \"  \"Are both legs swollen or just one leg? \"      Bilateral feet and ankles and has gone up into his calves just a little bit    3. SEVERITY: \"How bad is the swelling? \" (e.g., localized; mild, moderate, severe)   - Localized - small area of swelling localized to one leg   - MILD pedal edema - swelling limited to foot and ankle, pitting edema < 1/4 inch (6 mm) deep, rest and elevation eliminate most or all swelling   - MODERATE edema - swelling of lower leg to knee, pitting edema > 1/4 inch (6 mm) deep, rest and elevation only partially reduce swelling   - SEVERE edema - swelling extends above knee, facial or hand swelling present       Mild (states swelling in feet and to just above the ankles) - pitting    4. REDNESS: \"Does the swelling look red or infected? \"      Somewhat red (more in the evening - if he takes his fluid pill - it is not as bad    5. PAIN: \"Is the swelling painful to touch? \" If so, ask: \"How painful is it? \"   (Scale 1-10; mild, moderate or severe)      Painful to bend toes - painful to walk on    6. FEVER: \"Do you have a fever? \" If so, ask: \"What is it, how was it measured, and when did it start? \"       No    7. CAUSE: \"What do you think is causing the leg swelling? \"      This is a chronic problem    8. MEDICAL HISTORY: \"Do you have a history of heart failure, kidney disease, liver failure, or cancer? \"      Has had a heart attack, diabetic with kidney problems    9. RECURRENT SYMPTOM: \"Have you had leg swelling before? \" If so, ask: \"When was the last time? \" \"What happened that time? \"      Yes - this is chronic but has lasted longer that usual - he is waking with edema (ususally it goes down overnight)    10. OTHER SYMPTOMS: \"Do you have any other symptoms? \" (e.g., chest pain, difficulty breathing)        No other symptoms    11. PREGNANCY: \"Is there any chance you are pregnant? \" \"When was your last menstrual period? \"        No    Protocols used: LEG SWELLING AND EDEMA-ADULT-OH    Received call from Landen at [facility] with Red Flag Complaint. Brief description of triage: see triage above:  Patient denies fever, shortness of breath or chest pain. Wife is concerned because these symptoms have lasted longer than usual and he is waking with swollen feet. Triage indicates for patient to be seen in the next 3 days. Care advice provided: elevation and cold compresses. Patient verbalizes understanding; denies any other questions or concerns; instructed to call back for any new or worsening symptoms. Writer provided warm transfer to Cuba Andrade at [facility] for appointment scheduling. Attention Provider: Thank you for allowing me to participate in the care of your patient. The patient was connected to triage in response to information provided to the [facility]. Please do not respond through this encounter as the response is not directed to a shared pool.

## 2021-07-12 ENCOUNTER — TELEPHONE (OUTPATIENT)
Dept: PRIMARY CARE CLINIC | Age: 66
End: 2021-07-12

## 2021-07-12 ENCOUNTER — HOSPITAL ENCOUNTER (OUTPATIENT)
Dept: GENERAL RADIOLOGY | Age: 66
Discharge: HOME | End: 2021-07-12
Payer: MEDICARE

## 2021-07-12 ENCOUNTER — OFFICE VISIT (OUTPATIENT)
Dept: PRIMARY CARE CLINIC | Age: 66
End: 2021-07-12
Payer: MEDICARE

## 2021-07-12 VITALS
SYSTOLIC BLOOD PRESSURE: 102 MMHG | WEIGHT: 278 LBS | RESPIRATION RATE: 18 BRPM | HEIGHT: 72 IN | DIASTOLIC BLOOD PRESSURE: 52 MMHG | TEMPERATURE: 97.2 F | OXYGEN SATURATION: 96 % | HEART RATE: 78 BPM | BODY MASS INDEX: 37.65 KG/M2

## 2021-07-12 DIAGNOSIS — R60.0 LEG EDEMA: ICD-10-CM

## 2021-07-12 DIAGNOSIS — R60.0 LEG EDEMA: Primary | ICD-10-CM

## 2021-07-12 DIAGNOSIS — Z76.0 MEDICATION REFILL: ICD-10-CM

## 2021-07-12 DIAGNOSIS — R06.02 SHORTNESS OF BREATH: ICD-10-CM

## 2021-07-12 DIAGNOSIS — I95.9 HYPOTENSIVE EPISODE: ICD-10-CM

## 2021-07-12 DIAGNOSIS — I25.10 CORONARY ARTERY DISEASE INVOLVING NATIVE HEART WITHOUT ANGINA PECTORIS, UNSPECIFIED VESSEL OR LESION TYPE: ICD-10-CM

## 2021-07-12 LAB
ALBUMIN SERPL-MCNC: 4 G/DL (ref 3.5–5.2)
ALP BLD-CCNC: 91 U/L (ref 40–130)
ALT SERPL-CCNC: 14 U/L (ref 5–41)
ANION GAP SERPL CALCULATED.3IONS-SCNC: 13 MMOL/L (ref 7–19)
AST SERPL-CCNC: 11 U/L (ref 5–40)
BILIRUB SERPL-MCNC: <0.2 MG/DL (ref 0.2–1.2)
BUN BLDV-MCNC: 24 MG/DL (ref 8–23)
CALCIUM SERPL-MCNC: 10.1 MG/DL (ref 8.8–10.2)
CHLORIDE BLD-SCNC: 102 MMOL/L (ref 98–111)
CO2: 23 MMOL/L (ref 22–29)
CREAT SERPL-MCNC: 1.6 MG/DL (ref 0.5–1.2)
GFR AFRICAN AMERICAN: 52
GFR NON-AFRICAN AMERICAN: 43
GLUCOSE BLD-MCNC: 267 MG/DL (ref 74–109)
POTASSIUM SERPL-SCNC: 4.1 MMOL/L (ref 3.5–5)
PRO-BNP: 148 PG/ML (ref 0–900)
SODIUM BLD-SCNC: 138 MMOL/L (ref 136–145)
TOTAL PROTEIN: 6.8 G/DL (ref 6.6–8.7)

## 2021-07-12 PROCEDURE — 99214 OFFICE O/P EST MOD 30 MIN: CPT | Performed by: FAMILY MEDICINE

## 2021-07-12 PROCEDURE — 71046 X-RAY EXAM CHEST 2 VIEWS: CPT

## 2021-07-12 RX ORDER — FUROSEMIDE 40 MG/1
40 TABLET ORAL 2 TIMES DAILY
Qty: 14 TABLET | Refills: 0 | Status: SHIPPED | OUTPATIENT
Start: 2021-07-12 | End: 2021-10-28 | Stop reason: SDUPTHER

## 2021-07-12 RX ORDER — NYSTATIN 100000 [USP'U]/G
POWDER TOPICAL
Qty: 60 G | Refills: 1 | Status: SHIPPED | OUTPATIENT
Start: 2021-07-12 | End: 2022-08-30 | Stop reason: SDUPTHER

## 2021-07-12 RX ORDER — NITROGLYCERIN 0.4 MG/1
TABLET SUBLINGUAL
Qty: 25 TABLET | Refills: 3 | Status: CANCELLED | OUTPATIENT
Start: 2021-07-12

## 2021-07-12 RX ORDER — IPRATROPIUM BROMIDE AND ALBUTEROL SULFATE 2.5; .5 MG/3ML; MG/3ML
1 SOLUTION RESPIRATORY (INHALATION) EVERY 6 HOURS
Qty: 360 ML | Refills: 1 | Status: CANCELLED | OUTPATIENT
Start: 2021-07-12

## 2021-07-12 RX ORDER — NITROGLYCERIN 0.4 MG/1
TABLET SUBLINGUAL
Qty: 25 TABLET | Refills: 3 | Status: SHIPPED | OUTPATIENT
Start: 2021-07-12 | End: 2022-06-01 | Stop reason: SDUPTHER

## 2021-07-12 ASSESSMENT — ENCOUNTER SYMPTOMS
CONSTIPATION: 0
SINUS PAIN: 0
RHINORRHEA: 1
ABDOMINAL PAIN: 0
EYE PAIN: 0
DIARRHEA: 0
SORE THROAT: 0
BACK PAIN: 0
CHEST TIGHTNESS: 0
COUGH: 1
TROUBLE SWALLOWING: 0
WHEEZING: 0
NAUSEA: 0
VOMITING: 0
SHORTNESS OF BREATH: 1

## 2021-07-12 ASSESSMENT — PATIENT HEALTH QUESTIONNAIRE - PHQ9
SUM OF ALL RESPONSES TO PHQ9 QUESTIONS 1 & 2: 0
SUM OF ALL RESPONSES TO PHQ QUESTIONS 1-9: 0
2. FEELING DOWN, DEPRESSED OR HOPELESS: 0
SUM OF ALL RESPONSES TO PHQ QUESTIONS 1-9: 0
1. LITTLE INTEREST OR PLEASURE IN DOING THINGS: 0
SUM OF ALL RESPONSES TO PHQ QUESTIONS 1-9: 0

## 2021-07-12 NOTE — TELEPHONE ENCOUNTER
Arianna Novoa forgot to renew rx for [facility], as Dr. Sissy Hamm told him to keep them on hand.

## 2021-07-12 NOTE — PROGRESS NOTES
[address] [facility]  [address] [facility] [address] [id number]  Dept: [phone]  Dept Fax: [phone]  Loc: [phone]      Subjective:     Chief Complaint   Patient presents with    Other     Ankle and foot swelling        HPI:  Brittny Jeronimo is a 77 y.o. male presents today, accompanied by his wife with c/o bilateral LE edema x 3-4 weeks. He states that he has had pedal edema in the past. He has taken his usual dose of Lasix but the edema never completely got better. When asked why he waited 4 weeks to come in  And get this checked, he states he and his wife were at [place] on vacation. He admits that he was not very compliant with his diet then. About the same time the swelling started, the patient also reported that he was exposed to saw dust. He was doing some remodeling at his home. He c/o cough, some nasal and sinus congestion as well as chest congestion. In the morning, he states his nasal drainage is somewhat discolored but it clears up towards the end of the day. He also states that his cough is productive of thick white  phlegm. He also admits that he does not smell as good as he used to. His wife let him use the nebulizer a few times and it gave him temporary relief. He is not a cigarette smoker but he did smoke cigar in the past.   He has a hx CAD,  s/p stent placement but has not seen a cardiologist since Dr Raghavendra Bowman moved to [facility] several years ago. Last EKG was 3 yrs ago. He does not remember when his last stress test was. He denies CP. He has mild exertional dyspnea. He denies orthopnea. He has not had to take any NTG in a while and his Rx now has . However, he takes baby ASA daily. He is also a diabetic. Apparently his BS is better controlled now. Pt has been Covid vaccinated. ROS:   Review of Systems   Constitutional: Negative for appetite change, chills, fatigue and fever. HENT: Positive for congestion and rhinorrhea. Negative for ear pain, hearing loss, nosebleeds, sinus pain, sore throat and trouble swallowing. Eyes: Negative for pain and visual disturbance. Respiratory: Positive for cough and shortness of breath. Negative for chest tightness and wheezing. Cardiovascular: Positive for leg swelling. Negative for chest pain and palpitations. Gastrointestinal: Negative for abdominal pain, constipation, diarrhea, nausea and vomiting. Endocrine: Negative for cold intolerance, heat intolerance, polydipsia, polyphagia and polyuria. Genitourinary: Negative for difficulty urinating, dysuria, frequency, hematuria and urgency. Musculoskeletal: Negative for arthralgias, back pain, gait problem, joint swelling, myalgias, neck pain and neck stiffness. Skin: Negative for pallor and rash. Allergic/Immunologic: Positive for environmental allergies. Negative for food allergies. Neurological: Negative for dizziness, weakness and numbness. Hematological: Negative for adenopathy. Does not bruise/bleed easily. Psychiatric/Behavioral: Negative for agitation, behavioral problems, decreased concentration and sleep disturbance. The patient is not nervous/anxious.         PMHx:  Past Medical History:   Diagnosis Date    Aneurysm ([address]     abdominal    Bilateral leg edema     CAD (coronary artery disease)     Chronic kidney disease     Stage 4    Diabetes mellitus, type 2 (HCC)     Fatty liver     GERD (gastroesophageal reflux disease)     HTN (hypertension)     Prolonged emergence from general anesthesia     Seasonal allergies     Vitreous hemorrhage ([address] 1/22/2021     Patient Active Problem List   Diagnosis    DM type 2, uncontrolled, with neuropathy ([address]    Essential hypertension    Gastroesophageal reflux disease without esophagitis    Pharyngoesophageal dysphagia    Family history of prostate cancer in father    Change in bowel habits    History of colon polyps    CAD (coronary artery disease)    History of placement of stent in LAD coronary artery    ACS (acute coronary syndrome) (HCC)    NSTEMI (non-ST elevated myocardial infarction) [facility]    Chronic kidney disease, stage III (moderate) (HCC)    Mixed hyperlipidemia    Left ventricular dysfunction    Acute renal failure superimposed on stage 4 chronic kidney disease (HCC)       PSHx:  Past Surgical History:   Procedure Laterality Date    CARPAL TUNNEL RELEASE      COLONOSCOPY  11/04/2015    DR Capone: polys, 5yr recall    COLONOSCOPY N/A 10/11/2016    Dr Lopez-diverticulosis, Sessile serrated AP (-) high grade dysplasia x 1, tubular AP (-) dysplasia x 2, HP x 2--3 yr recall    COLONOSCOPY N/A 12/16/2020    Dr Gilford Comment, Mod. Diverticulosis, Internal hemorrhoids-Grade 2 & ext hemorrhoids, 3 year recall    CORONARY ANGIOPLASTY WITH STENT PLACEMENT  06/04/2019    AGA to circ    CORONARY ANGIOPLASTY WITH STENT PLACEMENT  05/2018    AGA to osteal LAD    ESOPHAGEAL DILATATION N/A 12/16/2020    Dr Gilford Comment, empirical Malik 47 fr bougie dilation, (+) GERD    EYE SURGERY      Right eye    PRE-MALIGNANT / [facility]      Dr. Loralie Landau UPPER GASTROINTESTINAL ENDOSCOPY  09/20/2016    Dr Lopez-w/dilation over wire, [address]-distal esophageal narrowing, Inna (-)    UPPER GASTROINTESTINAL ENDOSCOPY  09/20/2016    Dr Cyr/dilation over wire, [address]-distal esophageal narrowing, Inna (-)       PFHx:  Family History   Problem Relation Age of Onset    Colon Cancer Mother     Prostate Cancer Father     Other Father         HX of blood clot    Diabetes Sister     Cancer Sister     Diabetes Brother     Colon Polyps Son     Esophageal Cancer Neg Hx     Liver Cancer Neg Hx     Liver Disease Neg Hx     Stomach Cancer Neg Hx     Rectal Cancer Neg Hx        SocialHx:  Social History     Tobacco Use    Smoking status: Never Smoker    Smokeless tobacco: Never Used   Substance Use Topics    Alcohol use:  No Allergies: Allergies   Allergen Reactions    Lipitor [Atorvastatin Calcium] Diarrhea and Other (See Comments)     Dizzy    Tape Reginald Kiara Tape] Hives and Rash       Medications:  Current Outpatient Medications   Medication Sig Dispense Refill    nystatin (MYCOSTATIN) 023840 UNIT/GM powder Apply 3 times daily. 60 g 1    albuterol (PROVENTIL) (5 MG/ML) 0.5% nebulizer solution Take 1 mL by nebulization every 6 hours as needed for Wheezing 60 mL 0    nitroGLYCERIN (NITROSTAT) 0.4 MG SL tablet up to max of 3 total doses. If no relief after 1 dose, call 911. 25 tablet 3    furosemide (LASIX) 40 MG tablet Take 1 tablet by mouth 2 times daily for 7 days 14 tablet 0    gabapentin (NEURONTIN) 300 MG capsule TAKE 1 CAPSULE BY MOUTH THREE TIMES DAILY 270 capsule 0    irbesartan (AVAPRO) 150 MG tablet Take 1 tablet by mouth once daily 90 tablet 0    ezetimibe (ZETIA) 10 MG tablet Take 1 tablet by mouth daily 30 tablet 5    pantoprazole (PROTONIX) 40 MG tablet Take 1 tablet by mouth every morning (before breakfast) 90 tablet 3    prasugrel (EFFIENT) 10 MG TABS Take 1 tablet by mouth once for 1 dose Take 1 tablet by mouth once daily 90 tablet 0    fluticasone (FLONASE) 50 MCG/ACT nasal spray Use 2 spray(s) in each nostril once daily 48 g 5    metoprolol tartrate (LOPRESSOR) 50 MG tablet TAKE 1 TABLET BY MOUTH TWICE DAILY 180 tablet 3    ciclopirox (PENLAC) 8 % solution Apply topically greater than 8 hours before showering. Clean nails with alcohol weekly.  1 Bottle 3    aspirin 81 MG chewable tablet CHEW AND SWALLOW 1 TABLET BY MOUTH ONCE DAILY 90 tablet 3    Insulin Glargine, 1 Unit Dial, (TOUJEO SOLOSTAR) 300 UNIT/ML SOPN by NOT APPLICABLE route daily       metFORMIN (GLUCOPHAGE) 1000 MG tablet TAKE ONE TABLET BY MOUTH TWICE DAILY WITH MEALS 180 tablet 2    INVOKANA 100 MG TABS tablet Take 100 mg by mouth daily      Semaglutide,0.25 or 0.5MG/DOS, (OZEMPIC, 0.25 OR 0.5 MG/DOSE,) 2 MG/1.5ML SOPN Inject 0.25 mg into the skin once a week       mupirocin (BACTROBAN) 2 % ointment APPLY  OINTMENT TO AFFECTED AREA THREE TIMES DAILY 1 Tube 1    Insulin Pen Needle ([employer] PEN NEEDLES) 31G X 6 MM MISC Dx: E11.9 150 each 3    blood glucose test strips (ACCU-CHEK DUNIA) strip Patient is to test 8 times daily. Dx: E11.9  Please provide patient with Accu check Dunia Plus test strips per his Insurance request 900 each 3    ipratropium-albuterol (DUONEB) 0.5-2.5 (3) MG/3ML SOLN nebulizer solution Inhale 3 mLs into the lungs every 6 hours (Patient taking differently: Inhale 1 vial into the lungs every 6 hours as needed for Shortness of Breath ) 360 mL 1    Lancets 30G MISC Use to check blood sugar 8 times daily. E11.9 300 each 5    triamcinolone (KENALOG) 0.025 % cream Apply topically 2 times daily. 1 Tube 0    Alcohol Swabs PADS 1 box by Does not apply route 8 times daily 100 each 5    Blood Glucose Monitoring Suppl (ACURA BLOOD GLUCOSE METER) w/Device KIT Please provide patient with Accu check Dunia Meter per his Insurance request. Dx: E11.9 1 kit 0    albuterol sulfate HFA (VENTOLIN HFA) 108 (90 Base) MCG/ACT inhaler Inhale 2 puffs into the lungs every 6 hours as needed for Wheezing 1 Inhaler 3    ergocalciferol (ERGOCALCIFEROL) 1.25 MG (95800 UT) capsule Take 1 capsule by mouth once a week (Patient taking differently: Take 50,000 Units by mouth once a week Vit   d) 4 capsule 3    Vitamin D (CHOLECALCIFEROL) 25 MCG (1000 UT) TABS tablet Take 1 capsule by mouth daily      hydroCHLOROthiazide (MICROZIDE) 12.5 MG capsule Take 1 capsule by mouth 2 times daily (Patient not taking: Reported on 7/12/2021) 180 capsule 3    Diabetic Shoe MISC by Does not apply route (Patient not taking: Reported on 7/12/2021) 1 each 0     No current facility-administered medications for this visit.        Objective:   PE:  BP (!) 102/52 (Site: Left Upper Arm, Position: Sitting)   Pulse 78   Temp 97.2 °F (36.2 °C) (Temporal)   Resp 18 Ht 6' (1.829 m)   Wt 278 lb (126.1 kg)   SpO2 96%   BMI 37.70 kg/m²   Physical Exam  Vitals and nursing note reviewed. Exam conducted with a chaperone present (wife). Constitutional:       Appearance: Normal appearance. He is not ill-appearing or toxic-appearing. HENT:      Head: Normocephalic and atraumatic. Nose: Nose normal.      Mouth/Throat:      Mouth: Mucous membranes are moist.   Eyes:      Extraocular Movements: Extraocular movements intact. Pupils: Pupils are equal, round, and reactive to light. Cardiovascular:      Rate and Rhythm: Normal rate and regular rhythm. Pulses: Normal pulses. Heart sounds: Heart sounds are distant. No murmur heard. Pulmonary:      Effort: Pulmonary effort is normal.      Breath sounds: Decreased breath sounds present. No wheezing, rhonchi or rales. Abdominal:      General: Bowel sounds are normal.      Palpations: Abdomen is soft. Musculoskeletal:         General: Normal range of motion. Cervical back: Normal range of motion and neck supple. Right lower le+ Pitting Edema present. Left lower le+ Pitting Edema present. Skin:     General: Skin is warm and dry. Neurological:      General: No focal deficit present. Mental Status: He is alert and oriented to person, place, and time. Psychiatric:         Attention and Perception: Attention normal.         Mood and Affect: Mood and affect normal.         Speech: Speech normal.         Behavior: Behavior is cooperative. Thought Content: Thought content normal.         Cognition and Memory: Cognition normal.            Assessment & Plan   Paul Butt was seen today for other. Diagnoses and all orders for this visit:    Leg edema  -     Comprehensive Metabolic Panel; Future    Shortness of breath  -     XR CHEST STANDARD (2 VW); Future  -     Brain Natriuretic Peptide; Future  -     Comprehensive Metabolic Panel;  Future  -     Alexandra Banks MD, Cardiology, [place]    Hypotensive episode    DM type 2, uncontrolled, with neuropathy ([address]    Medication refill  -     nystatin (MYCOSTATIN) 080782 UNIT/GM powder; Apply 3 times daily. Coronary artery disease involving native heart without angina pectoris, unspecified vessel or lesion type  -     Jose Schmidt MD, Cardiology, [place]    Other orders  -     albuterol (PROVENTIL) (5 MG/ML) 0.5% nebulizer solution; Take 1 mL by nebulization every 6 hours as needed for Wheezing  -     nitroGLYCERIN (NITROSTAT) 0.4 MG SL tablet; up to max of 3 total doses. If no relief after 1 dose, call 911.  -     furosemide (LASIX) 40 MG tablet; Take 1 tablet by mouth 2 times daily for 7 days    Continue present care and management  Continue all maintenance medications  Encouraged to continue healthy lifestyle change  Monitor BP at home, keep a record and bring at next visit   Okay to take Mucinex DM prn for cough  Steam bath   Avoid known exposure to allergens (saw dust). Wear appropriate masks  Engage in regular exercise daily -30 minutes a day as tolerated  Stay well and hydrated - drink at least 64 oz of fluid a day  Eat 6 servings of fruit and vegetables daily  Keep scheduled follow-up appt with me  Call with new concerns    Return in about 1 week (around 7/19/2021) for routine follow-up, med check and refill. All questions were answered. Medications, including possible adverse effects, and instructions were reviewed and  understanding was confirmed. Follow-up recommendations, including when to contact or return to office (ie; if symptoms worsen or fail to improve), were discussed and acknowledged.     Electronically signed by Shawna Braga MD on 7/12/21 at 3:39 PM CDT

## 2021-07-16 ENCOUNTER — TELEPHONE (OUTPATIENT)
Dept: PRIMARY CARE CLINIC | Age: 66
End: 2021-07-16

## 2021-07-16 NOTE — PROGRESS NOTES
[facility] - PODIATRY    Today's Date: 07/27/21    Patient Name: Mick Sims  MRN: [id number]  CSN: [id number]  PCP: Ginny Collazo APRN   Referring Provider: No ref. provider found    SUBJECTIVE     Chief Complaint   Patient presents with   • Follow-up     3 month f/u diabetic foot/nail care.  Pt states he is beginning to lose feeling in the bottom of both feet.  Last saw his PCP on 12/20/20.   • Diabetes     Pt's last blood sugar reading was 134 mg/dl.     HPI: Mick Sims, a 65 y.o.male, comes to clinic as a(n) established patient presenting for diabetic foot exam and complaining of thick fungal toenails. Patient has h/o arthritis, DM2, HTN, Renal Disorder. Patient is IDDM with last stated BG level of 134mg/dl. States A1c has been around 7.4%.  Admits to mild numbness and tingling in feet and notes that it feels like balls of feet have increased in numbness.  Denies open wounds or sores. Notes mild swelling in legs that he wears light compression stockings for. States that his toenails are long, thick and discolored. He has trouble caring for them himself. Denies pain but admits neuropathy. Relates previous treatment(s) including foot care by podiatry. Denies any constitutional symptoms. No other pedal complaints at this time.    Past Medical History:   Diagnosis Date   • Arthritis    • Diabetes mellitus (CMS/HCC)    • Hypertension    • Renal disorder      Past Surgical History:   Procedure Laterality Date   • CARPAL TUNNEL RELEASE       Family History   Problem Relation Age of Onset   • Cancer Mother    • Diabetes Sister    • Diabetes Brother    • No Known Problems Brother      Social History     Socioeconomic History   • Marital status:      Spouse name: Not on file   • Number of children: Not on file   • Years of education: Not on file   • Highest education level: Not on file   Tobacco Use   • Smoking status: Never Smoker   • Smokeless tobacco: Never Used   Vaping Use   • Vaping  Use: Never used   Substance and Sexual Activity   • Alcohol use: No   • Drug use: Never   • Sexual activity: Defer     Allergies   Allergen Reactions   • Atorvastatin Calcium Unknown - Low Severity   • Latex Hives     Current Outpatient Medications   Medication Sig Dispense Refill   • aspirin 81 MG chewable tablet Chew 81 mg Daily.     • Continuous Blood Gluc  (FreeStyle Fercho 2 [place]) device 1 each Continuous. Use as indicated for glucose monitoring 1 each 1   • Continuous Blood Gluc Sensor (FreeStyle Fercho 2 Sensor) misc 1 each Every 14 (Fourteen) Days. 2 each 11   • Dapagliflozin Propanediol 10 MG tablet Half a tab daily 30 tablet 11   • ezetimibe (Zetia) 10 MG tablet Take 1 tablet by mouth Daily. 30 tablet 11   • fluticasone (FLONASE) 50 MCG/ACT nasal spray 1 spray into each nostril 2 (Two) Times a Day As Needed for rhinitis or allergies.     • furosemide (LASIX) 40 MG tablet Take 40 mg by mouth 2 (Two) Times a Day.     • gabapentin (NEURONTIN) 300 MG capsule Take 300 mg by mouth 3 (Three) Times a Day.     • Glucagon, rDNA, (Glucagon Emergency) 1 MG kit Inject 1 mg under the skin into the appropriate area as directed 1 (One) Time As Needed (hypoglycemia) for up to 1 dose. 1 each 11   • Insulin Glargine (LANTUS SOLOSTAR) 100 UNIT/ML injection pen 30 units qhs 3 pen 11   • Insulin Lispro-aabc 200 UNIT/ML solution pen-injector Inject 70 Units under the skin into the appropriate area as directed 3 (Three) Times a Day With Meals. 12 pen 11   • Insulin Pen Needle (PEN NEEDLES) 32G X 4 MM misc 1 each 4 (Four) Times a Day. Use 4 times per day to inject insulin DX E11.9 400 each 3   • irbesartan (AVAPRO) 150 MG tablet Take 300 mg by mouth Every Night.     • metFORMIN (GLUCOPHAGE) 1000 MG tablet Take 1,000 mg by mouth 2 (Two) Times a Day. Needs pm dose     • metoprolol tartrate (LOPRESSOR) 50 MG tablet Take 1 tablet by mouth 2 (Two) Times a Day. 30 tablet 0   • PANTOPRAZOLE SODIUM PO Take 40 mg by mouth Daily.  Twice daily     • prasugrel (EFFIENT) 10 MG tablet Take 10 mg by mouth Daily.     • Semaglutide,0.25 or 0.5MG/DOS, (Ozempic, 0.25 or 0.5 MG/DOSE,) 2 MG/1.5ML solution pen-injector Inject 0.5 mg under the skin into the appropriate area as directed 1 (One) Time Per Week. 0.5 mg weekly 1 pen 11   • sildenafil (Viagra) 100 MG tablet Take 1 tablet by mouth As Needed for Erectile Dysfunction. 30 tablet 11   • vitamin D (ERGOCALCIFEROL) 1.25 MG (29599 UT) capsule capsule Take 50,000 Units by mouth 1 (One) Time Per Week.     • vitamin D3 125 MCG (5000 UT) capsule capsule Take 5,000 Units by mouth Daily.     • nitroglycerin (NITRODUR) 0.4 MG/HR patch Place 1 patch on the skin as directed by provider Daily.       No current facility-administered medications for this visit.     Review of Systems   Constitutional: Negative for chills and fever.   HENT: Negative for congestion.    Respiratory: Negative for shortness of breath.    Cardiovascular: Positive for leg swelling. Negative for chest pain.   Gastrointestinal: Negative for constipation, diarrhea, nausea and vomiting.   Musculoskeletal:        Foot pain   Skin: Negative for wound.   Neurological: Positive for numbness.       OBJECTIVE     Vitals:    07/27/21 0933   BP: 126/68   Pulse: 75   SpO2: 99%       PHYSICAL EXAM  GEN:   Accompanied by none.     Foot/Ankle Exam:       General:   Diabetic Foot Exam Performed    Appearance: appears stated age and healthy    Orientation: AAOx3    Affect: appropriate    Gait: unimpaired    Assistance: independent    Shoe Gear:  Casual shoes    VASCULAR      Right Foot Vascularity   Dorsalis pedis:  2+  Posterior tibial:  2+  Skin Temperature: warm    Edema Grading:  Trace  CFT:  3  Pedal Hair Growth:  Present  Varicosities: mild varicosities       Left Foot Vascularity   Dorsalis pedis:  2+  Posterior tibial:  2+  Skin Temperature: warm    Edema Grading:  Trace  CFT:  3  Pedal Hair Growth:  Present  Varicosities: mild varicosities         NEUROLOGIC     Right Foot Neurologic   Light touch sensation:  Diminished  Vibratory sensation:  Diminished  Hot/Cold sensation: diminished    Protective Sensation using [place]-Azar Monofilament:  5     Left Foot Neurologic   Light touch sensation:  Diminished  Vibratory sensation:  Diminished  Hot/cold sensation: diminished    Protective Sensation using [place]-Azar Monofilament:  4     MUSCULOSKELETAL      Right Foot Musculoskeletal   Ecchymosis:  None  Tenderness: none    Arch:  Normal     Left Foot Musculoskeletal   Ecchymosis:  None  Tenderness: none    Arch:  Normal     MUSCLE STRENGTH     Right Foot Muscle Strength   Foot dorsiflexion:  5  Foot plantar flexion:  5  Foot inversion:  5  Foot eversion:  5     Left Foot Muscle Strength   Foot dorsiflexion:  5  Foot plantar flexion:  5  Foot inversion:  5  Foot eversion:  5     RANGE OF MOTION      Right Foot Range of Motion   Foot and ankle ROM within normal limits       Left Foot Range of Motion   Foot and ankle ROM within normal limits       DERMATOLOGIC     Right Foot Dermatologic   Skin: skin intact    Nails: onychomycosis, abnormally thick, subungual debris and dystrophic nails       Left Foot Dermatologic   Skin: skin intact    Nails: onychomycosis, abnormally thick, subungual debris and dystrophic nails        RADIOLOGY/NUCLEAR:  No results found.    LABORATORY/CULTURE RESULTS:      PATHOLOGY RESULTS:       ASSESSMENT/PLAN     Diagnoses and all orders for this visit:    1. Onychomycosis (Primary)    2. Type 2 diabetes mellitus with diabetic neuropathy, with long-term current use of insulin (CMS/[facility])    3. Peripheral edema      Comprehensive lower extremity examination and evaluation was performed.  Discussed findings and treatment plan including risks, benefits, and treatment options with patient in detail. Patient agreed with treatment plan.  After verbal consent obtained, nail(s) x10 debrided of length and thickness with nail nipper without  incidence  Patient may maintain nails and calluses at home utilizing emery board or pumice stone between visits as needed  Reviewed at home diabetic foot care including daily foot checks   Continue daily use of compression stockings and elevate extremities at rest.  Continue diabetic monitoring and control under direction of PCP.   An After Visit Summary was printed and given to the patient at discharge, including (if requested) any available informative/educational handouts regarding diagnosis, treatment, or medications. All questions were answered to patient/family satisfaction. Should symptoms fail to improve or worsen they agree to call or return to clinic or to go to the Emergency Department. Discussed the importance of following up with any needed screening tests/labs/specialist appointments and any requested follow-up recommended by me today. Importance of maintaining follow-up discussed and patient accepts that missed appointments can delay diagnosis and potentially lead to worsening of conditions.  Return in about 3 months (around 10/27/2021)., or sooner if acute issues arise.        This document has been electronically signed by Mehdi Valencia DPM on July 27, 2021 10:05 CDT

## 2021-07-19 ENCOUNTER — OFFICE VISIT (OUTPATIENT)
Dept: PRIMARY CARE CLINIC | Age: 66
End: 2021-07-19
Payer: MEDICARE

## 2021-07-19 VITALS
TEMPERATURE: 96.3 F | DIASTOLIC BLOOD PRESSURE: 60 MMHG | HEIGHT: 72 IN | WEIGHT: 273.4 LBS | OXYGEN SATURATION: 98 % | SYSTOLIC BLOOD PRESSURE: 120 MMHG | HEART RATE: 82 BPM | BODY MASS INDEX: 37.03 KG/M2

## 2021-07-19 DIAGNOSIS — I25.10 CORONARY ARTERY DISEASE INVOLVING NATIVE HEART WITHOUT ANGINA PECTORIS, UNSPECIFIED VESSEL OR LESION TYPE: ICD-10-CM

## 2021-07-19 DIAGNOSIS — E11.59 TYPE 2 DIABETES MELLITUS WITH OTHER CIRCULATORY COMPLICATION, WITH LONG-TERM CURRENT USE OF INSULIN (HCC): ICD-10-CM

## 2021-07-19 DIAGNOSIS — E78.5 HYPERLIPIDEMIA, UNSPECIFIED HYPERLIPIDEMIA TYPE: ICD-10-CM

## 2021-07-19 DIAGNOSIS — Z79.4 TYPE 2 DIABETES MELLITUS WITH OTHER CIRCULATORY COMPLICATION, WITH LONG-TERM CURRENT USE OF INSULIN (HCC): ICD-10-CM

## 2021-07-19 DIAGNOSIS — J30.2 SEASONAL ALLERGIES: ICD-10-CM

## 2021-07-19 DIAGNOSIS — I10 ESSENTIAL HYPERTENSION: ICD-10-CM

## 2021-07-19 PROCEDURE — 99214 OFFICE O/P EST MOD 30 MIN: CPT | Performed by: FAMILY MEDICINE

## 2021-07-19 RX ORDER — EZETIMIBE 10 MG/1
10 TABLET ORAL DAILY
Qty: 90 TABLET | Refills: 0 | Status: SHIPPED | OUTPATIENT
Start: 2021-07-19 | End: 2021-09-09

## 2021-07-19 RX ORDER — ASPIRIN 81 MG/1
TABLET, CHEWABLE ORAL
Qty: 90 TABLET | Refills: 3 | Status: SHIPPED | OUTPATIENT
Start: 2021-07-19 | End: 2022-08-30 | Stop reason: SDUPTHER

## 2021-07-19 RX ORDER — FLUTICASONE PROPIONATE 50 MCG
SPRAY, SUSPENSION (ML) NASAL
Qty: 48 G | Refills: 5 | Status: SHIPPED | OUTPATIENT
Start: 2021-07-19 | End: 2021-12-07

## 2021-07-19 SDOH — ECONOMIC STABILITY: FOOD INSECURITY: WITHIN THE PAST 12 MONTHS, YOU WORRIED THAT YOUR FOOD WOULD RUN OUT BEFORE YOU GOT MONEY TO BUY MORE.: NEVER TRUE

## 2021-07-19 SDOH — ECONOMIC STABILITY: FOOD INSECURITY: WITHIN THE PAST 12 MONTHS, THE FOOD YOU BOUGHT JUST DIDN'T LAST AND YOU DIDN'T HAVE MONEY TO GET MORE.: NEVER TRUE

## 2021-07-19 ASSESSMENT — ENCOUNTER SYMPTOMS
CHEST TIGHTNESS: 0
NAUSEA: 0
VOMITING: 0
DIARRHEA: 0
SHORTNESS OF BREATH: 0
EYE PAIN: 0
WHEEZING: 0
RHINORRHEA: 0
BACK PAIN: 0
ABDOMINAL PAIN: 0
CONSTIPATION: 0
SINUS PAIN: 0
SORE THROAT: 0
TROUBLE SWALLOWING: 0
COUGH: 0

## 2021-07-19 ASSESSMENT — SOCIAL DETERMINANTS OF HEALTH (SDOH): HOW HARD IS IT FOR YOU TO PAY FOR THE VERY BASICS LIKE FOOD, HOUSING, MEDICAL CARE, AND HEATING?: NOT HARD AT ALL

## 2021-07-19 NOTE — PROGRESS NOTES
[address]  [address] [address] [place] [address]  Dept: [phone]  Dept Fax: [phone]  Loc: [phone]      Subjective:     Chief Complaint   Patient presents with    Follow-up     F/U for foot and ankle swelling    Foot Swelling     bilaterla feet and leg swelling is much better today- only a little swelling in left foot       HPI:  Chema Camp is a 77 y.o. male presents today for diabetic foot exam. He has DPN. His wife brought BP and BS readings from home. His swelling has come down significantly. No new chest pains or sob reported. He does have a hx of CAD and has not seen a cardiologist in a long time. Pt request to see and establish with a  cardiologist.  Recent blood work reviewed with pt and his wife. His CXR was normal. BNP also normal CMP showed poor kidney function but stable when compared to previous studies. His BP is also well controlled now. Pt is requesting an order for a new diabetic shoe         ROS:   Review of Systems   Constitutional: Negative for appetite change, chills, fatigue and fever. HENT: Negative for congestion, ear pain, hearing loss, nosebleeds, rhinorrhea, sinus pain, sore throat and trouble swallowing. Eyes: Negative for pain and visual disturbance. Respiratory: Negative for cough, chest tightness, shortness of breath and wheezing. Cardiovascular: Negative for chest pain, palpitations and leg swelling. Gastrointestinal: Negative for abdominal pain, constipation, diarrhea, nausea and vomiting. Endocrine: Negative for cold intolerance, heat intolerance, polydipsia, polyphagia and polyuria. Genitourinary: Negative for difficulty urinating, dysuria, frequency, hematuria and urgency. Musculoskeletal: Negative for arthralgias, back pain, gait problem, joint swelling, myalgias, neck pain and neck stiffness. Skin: Negative for pallor and rash.    Allergic/Immunologic: Positive for environmental allergies. Negative for food allergies. Neurological: Negative for dizziness, weakness and numbness. Hematological: Negative for adenopathy. Does not bruise/bleed easily. Psychiatric/Behavioral: Negative for agitation, behavioral problems, decreased concentration and sleep disturbance. The patient is not nervous/anxious.         PMHx:  Past Medical History:   Diagnosis Date    Aneurysm ([address]     abdominal    Bilateral leg edema     CAD (coronary artery disease)     Chronic kidney disease     Stage 4    Diabetes mellitus, type 2 (HCC)     Fatty liver     GERD (gastroesophageal reflux disease)     HTN (hypertension)     Prolonged emergence from general anesthesia     Seasonal allergies     Vitreous hemorrhage ([address] 1/22/2021     Patient Active Problem List   Diagnosis    DM type 2, uncontrolled, with neuropathy ([address]    Essential hypertension    Gastroesophageal reflux disease without esophagitis    Pharyngoesophageal dysphagia    Family history of prostate cancer in father    Change in bowel habits    History of colon polyps    CAD (coronary artery disease)    History of placement of stent in LAD coronary artery    ACS (acute coronary syndrome) ([address]    NSTEMI (non-ST elevated myocardial infarction) ([address]    Chronic kidney disease, stage III (moderate) (HCC)    Mixed hyperlipidemia    Left ventricular dysfunction    Acute renal failure superimposed on stage 4 chronic kidney disease (HCC)       PSHx:  Past Surgical History:   Procedure Laterality Date    CARPAL TUNNEL RELEASE      COLONOSCOPY  11/04/2015    DR Capone: polys, 5yr recall    COLONOSCOPY N/A 10/11/2016    Dr Lopez-diverticulosis, Sessile serrated AP (-) high grade dysplasia x 1, tubular AP (-) dysplasia x 2, HP x 2--3 yr recall    COLONOSCOPY N/A 12/16/2020    Dr Gregory William, Mod. Diverticulosis, Internal hemorrhoids-Grade 2 & ext hemorrhoids, 3 year recall    CORONARY ANGIOPLASTY WITH STENT PLACEMENT  06/04/2019 AGA to circ    CORONARY ANGIOPLASTY WITH STENT PLACEMENT  05/2018    AGA to osteal LAD    ESOPHAGEAL DILATATION N/A 12/16/2020    Dr Gian Bailon, empirical Malik [address] fr bougie dilation, (+) GERD    EYE SURGERY      Right eye    PRE-MALIGNANT / [place]      Dr. Loralie Landau UPPER GASTROINTESTINAL ENDOSCOPY  09/20/2016    Dr Lopez-w/dilation over wire, 46 French-distal esophageal narrowing, Inna (-)    UPPER GASTROINTESTINAL ENDOSCOPY  09/20/2016    Dr Lopez-w/dilation over wire, 46 French-distal esophageal narrowing, Inna (-)       PFHx:  Family History   Problem Relation Age of Onset    Colon Cancer Mother     Prostate Cancer Father     Other Father         HX of blood clot    Diabetes Sister     Cancer Sister     Diabetes Brother     Colon Polyps Son     Esophageal Cancer Neg Hx     Liver Cancer Neg Hx     Liver Disease Neg Hx     Stomach Cancer Neg Hx     Rectal Cancer Neg Hx        SocialHx:  Social History     Tobacco Use    Smoking status: Never Smoker    Smokeless tobacco: Never Used   Substance Use Topics    Alcohol use: No       Allergies: Allergies   Allergen Reactions    Lipitor [Atorvastatin Calcium] Diarrhea and Other (See Comments)     Dizzy    Tape [employer] Tape] Hives and Rash       Medications:  Current Outpatient Medications   Medication Sig Dispense Refill    ezetimibe (ZETIA) 10 MG tablet Take 1 tablet by mouth daily 90 tablet 0    aspirin 81 MG chewable tablet CHEW AND SWALLOW 1 TABLET BY MOUTH ONCE DAILY 90 tablet 3    metFORMIN (GLUCOPHAGE) 1000 MG tablet TAKE ONE TABLET BY MOUTH TWICE DAILY WITH MEALS 180 tablet 2    fluticasone (FLONASE) 50 MCG/ACT nasal spray Use 2 spray(s) in each nostril once daily 48 g 5    nystatin (MYCOSTATIN) 965638 UNIT/GM powder Apply 3 times daily.  60 g 1    albuterol (PROVENTIL) (5 MG/ML) 0.5% nebulizer solution Take 1 mL by nebulization every 6 hours as needed for Wheezing 60 mL 0    nitroGLYCERIN (NITROSTAT) 0.4 MG SL tablet up to max of 3 total doses. If no relief after 1 dose, call 911. 25 tablet 3    furosemide (LASIX) 40 MG tablet Take 1 tablet by mouth 2 times daily for 7 days 14 tablet 0    irbesartan (AVAPRO) 150 MG tablet Take 1 tablet by mouth once daily 90 tablet 0    pantoprazole (PROTONIX) 40 MG tablet Take 1 tablet by mouth every morning (before breakfast) 90 tablet 3    Vitamin D (CHOLECALCIFEROL) 25 MCG (1000 UT) TABS tablet Take 1 capsule by mouth daily      metoprolol tartrate (LOPRESSOR) 50 MG tablet TAKE 1 TABLET BY MOUTH TWICE DAILY 180 tablet 3    ciclopirox (PENLAC) 8 % solution Apply topically greater than 8 hours before showering. Clean nails with alcohol weekly. 1 Bottle 3    Insulin Glargine, 1 Unit Dial, (TOUJEO SOLOSTAR) 300 UNIT/ML SOPN by NOT APPLICABLE route daily       INVOKANA 100 MG TABS tablet Take 100 mg by mouth daily      Semaglutide,0.25 or 0.5MG/DOS, (OZEMPIC, 0.25 OR 0.5 MG/DOSE,) 2 MG/1.5ML SOPN Inject 0.25 mg into the skin once a week       mupirocin (BACTROBAN) 2 % ointment APPLY  OINTMENT TO AFFECTED AREA THREE TIMES DAILY 1 Tube 1    Insulin Pen Needle ([employer] PEN NEEDLES) 31G X 6 MM MISC Dx: E11.9 150 each 3    blood glucose test strips (ACCU-CHEK DUNIA) strip Patient is to test 8 times daily. Dx: E11.9  Please provide patient with Accu check Dunia Plus test strips per his Insurance request 900 each 3    Lancets 30G MISC Use to check blood sugar 8 times daily. E11.9 300 each 5    triamcinolone (KENALOG) 0.025 % cream Apply topically 2 times daily.  1 Tube 0    Alcohol Swabs PADS 1 box by Does not apply route 8 times daily 100 each 5    Blood Glucose Monitoring Suppl (ACURA BLOOD GLUCOSE METER) w/Device KIT Please provide patient with Accu check Dunia Meter per his Insurance request. Dx: E11.9 1 kit 0    albuterol sulfate HFA (VENTOLIN HFA) 108 (90 Base) MCG/ACT inhaler Inhale 2 puffs into the lungs every 6 hours as needed for Wheezing 1 Inhaler 3    gabapentin (NEURONTIN) 300 MG capsule TAKE 1 CAPSULE BY MOUTH THREE TIMES DAILY 270 capsule 0    prasugrel (EFFIENT) 10 MG TABS Take 1 tablet by mouth once for 1 dose Take 1 tablet by mouth once daily 90 tablet 0    hydroCHLOROthiazide (MICROZIDE) 12.5 MG capsule Take 1 capsule by mouth 2 times daily (Patient not taking: Reported on 7/12/2021) 180 capsule 3     No current facility-administered medications for this visit. Objective:   PE:  /60   Pulse 82   Temp 96.3 °F (35.7 °C)   Ht 6' (1.829 m)   Wt 273 lb 6.4 oz (124 kg)   SpO2 98%   BMI 37.08 kg/m²   Physical Exam  Vitals and nursing note reviewed. Exam conducted with a chaperone present (wife). HENT:      Head: Normocephalic. Nose: Nose normal. No congestion. Mouth/Throat:      Mouth: Mucous membranes are moist.   Eyes:      Conjunctiva/sclera: Conjunctivae normal.      Pupils: Pupils are equal, round, and reactive to light. Cardiovascular:      Rate and Rhythm: Normal rate and regular rhythm. Heart sounds: Normal heart sounds. Abdominal:      General: Abdomen is protuberant. Tenderness: There is no abdominal tenderness. Feet:      Comments: Normal ROM. 2+ pedal pulses bilaterally, normal skin integrity, no ulcer / blister / skin breakdown / erythema / corn / callus. +dry skin and minimal fissure , normal toenail  Neurological:      Mental Status: He is alert. Motor: Motor function is intact. Coordination: Coordination is intact. Comments: +abnormal microfilament test, absent vibratory sense            Assessment & Plan   Wilson Arrington was seen today for follow-up and foot swelling.     Diagnoses and all orders for this visit:    DM type 2, uncontrolled, with neuropathy ([facility] [address]  -     Diabetic Foot Exam  -     Diabetic Shoe    Type 2 diabetes mellitus with other circulatory complication, with long-term current use of insulin (HCC)  -     metFORMIN (GLUCOPHAGE) 1000 MG tablet; TAKE ONE TABLET BY MOUTH TWICE DAILY WITH

## 2021-07-26 ENCOUNTER — TELEPHONE (OUTPATIENT)
Dept: PODIATRY | Facility: CLINIC | Age: 66
End: 2021-07-26

## 2021-07-26 NOTE — TELEPHONE ENCOUNTER
Left message advising patient of appointment tomorrow morning with Dr. Valencia.  Advised of location and time.

## 2021-07-27 ENCOUNTER — OFFICE VISIT (OUTPATIENT)
Dept: PODIATRY | Facility: CLINIC | Age: 66
End: 2021-07-27

## 2021-07-27 VITALS
OXYGEN SATURATION: 99 % | HEART RATE: 75 BPM | BODY MASS INDEX: 36.7 KG/M2 | SYSTOLIC BLOOD PRESSURE: 126 MMHG | WEIGHT: 271 LBS | HEIGHT: 72 IN | DIASTOLIC BLOOD PRESSURE: 68 MMHG

## 2021-07-27 DIAGNOSIS — E11.40 TYPE 2 DIABETES MELLITUS WITH DIABETIC NEUROPATHY, WITH LONG-TERM CURRENT USE OF INSULIN (HCC): ICD-10-CM

## 2021-07-27 DIAGNOSIS — R60.9 PERIPHERAL EDEMA: ICD-10-CM

## 2021-07-27 DIAGNOSIS — Z79.4 TYPE 2 DIABETES MELLITUS WITH DIABETIC NEUROPATHY, WITH LONG-TERM CURRENT USE OF INSULIN (HCC): ICD-10-CM

## 2021-07-27 DIAGNOSIS — B35.1 ONYCHOMYCOSIS: Primary | ICD-10-CM

## 2021-07-27 PROCEDURE — 11721 DEBRIDE NAIL 6 OR MORE: CPT | Performed by: PODIATRIST

## 2021-07-27 PROCEDURE — 99213 OFFICE O/P EST LOW 20 MIN: CPT | Performed by: PODIATRIST

## 2021-08-05 DIAGNOSIS — Z79.4 TYPE 2 DIABETES MELLITUS WITH OTHER CIRCULATORY COMPLICATION, WITH LONG-TERM CURRENT USE OF INSULIN (HCC): Primary | ICD-10-CM

## 2021-08-05 DIAGNOSIS — E11.59 TYPE 2 DIABETES MELLITUS WITH OTHER CIRCULATORY COMPLICATION, WITH LONG-TERM CURRENT USE OF INSULIN (HCC): Primary | ICD-10-CM

## 2021-08-10 ENCOUNTER — OFFICE VISIT (OUTPATIENT)
Dept: CARDIOLOGY CLINIC | Age: 66
End: 2021-08-10
Payer: MEDICARE

## 2021-08-10 VITALS
SYSTOLIC BLOOD PRESSURE: 112 MMHG | HEART RATE: 73 BPM | WEIGHT: 277 LBS | OXYGEN SATURATION: 96 % | HEIGHT: 72 IN | DIASTOLIC BLOOD PRESSURE: 60 MMHG | BODY MASS INDEX: 37.52 KG/M2

## 2021-08-10 DIAGNOSIS — I25.118 CORONARY ARTERY DISEASE OF NATIVE ARTERY OF NATIVE HEART WITH STABLE ANGINA PECTORIS (HCC): ICD-10-CM

## 2021-08-10 DIAGNOSIS — R06.02 SHORTNESS OF BREATH: Primary | ICD-10-CM

## 2021-08-10 DIAGNOSIS — T46.6X5A MYALGIA DUE TO STATIN: ICD-10-CM

## 2021-08-10 DIAGNOSIS — I51.9 LEFT VENTRICULAR DYSFUNCTION: ICD-10-CM

## 2021-08-10 DIAGNOSIS — M79.10 MYALGIA DUE TO STATIN: ICD-10-CM

## 2021-08-10 DIAGNOSIS — R60.0 EDEMA OF BOTH LOWER EXTREMITIES: ICD-10-CM

## 2021-08-10 DIAGNOSIS — I10 ESSENTIAL HYPERTENSION: ICD-10-CM

## 2021-08-10 DIAGNOSIS — E78.2 MIXED HYPERLIPIDEMIA: ICD-10-CM

## 2021-08-10 PROCEDURE — 1123F ACP DISCUSS/DSCN MKR DOCD: CPT | Performed by: CLINICAL NURSE SPECIALIST

## 2021-08-10 PROCEDURE — 2022F DILAT RTA XM EVC RTNOPTHY: CPT | Performed by: CLINICAL NURSE SPECIALIST

## 2021-08-10 PROCEDURE — 1036F TOBACCO NON-USER: CPT | Performed by: CLINICAL NURSE SPECIALIST

## 2021-08-10 PROCEDURE — G8417 CALC BMI ABV UP PARAM F/U: HCPCS | Performed by: CLINICAL NURSE SPECIALIST

## 2021-08-10 PROCEDURE — 3046F HEMOGLOBIN A1C LEVEL >9.0%: CPT | Performed by: CLINICAL NURSE SPECIALIST

## 2021-08-10 PROCEDURE — 4040F PNEUMOC VAC/ADMIN/RCVD: CPT | Performed by: CLINICAL NURSE SPECIALIST

## 2021-08-10 PROCEDURE — G8427 DOCREV CUR MEDS BY ELIG CLIN: HCPCS | Performed by: CLINICAL NURSE SPECIALIST

## 2021-08-10 PROCEDURE — 3017F COLORECTAL CA SCREEN DOC REV: CPT | Performed by: CLINICAL NURSE SPECIALIST

## 2021-08-10 PROCEDURE — 99214 OFFICE O/P EST MOD 30 MIN: CPT | Performed by: CLINICAL NURSE SPECIALIST

## 2021-08-10 RX ORDER — INSULIN LISPRO-AABC 200 [IU]/ML
25-30 INJECTION, SOLUTION SUBCUTANEOUS 3 TIMES DAILY PRN
COMMUNITY

## 2021-08-10 RX ORDER — FUROSEMIDE 20 MG/1
20 TABLET ORAL DAILY
Qty: 30 TABLET | Refills: 5 | Status: SHIPPED | OUTPATIENT
Start: 2021-08-10

## 2021-08-10 ASSESSMENT — ENCOUNTER SYMPTOMS
EYE REDNESS: 0
COUGH: 0
NAUSEA: 0
VOMITING: 0
CHEST TIGHTNESS: 0
FACIAL SWELLING: 0
WHEEZING: 0
ABDOMINAL PAIN: 0
SHORTNESS OF BREATH: 1

## 2021-08-10 NOTE — PATIENT INSTRUCTIONS
Return for Dr Liam Perry, as scheduled. Lasix 20mg daily    - Weigh daily and report weight gain of 3lbs or more in 24hrs or 5lbs in one week. - Call for increasing shortness of breath or increasing swelling in feet and legs. (This could mean you are retaining too much fluid)  - 2000mg low sodium diet  - Fluid restriction of 1500ml per day (about 6 cups of fluid per day)    [place] at the [facility] and [address] [facility] located on the first floor of [place] [address] through [facility] entrance and turn immediately to your left. Date/Time:     Patient's contact number:  [phone] (home)     Echocardiogram -  No prep. Takes approximately 30 min. An echocardiogram uses sound waves to produce images of your heart. This commonly used test allows your doctor to see how your heart is beating and pumping blood. Your doctor can use the images from an echocardiogram to identify various abnormalities in the heart muscle and valves. This test has 2 parts:   Ø You will be asked to disrobe from the waist up and given a gown to wear. The technologist will then hook up an EKG monitor to you for the entire exam.   Ø You will then have an ultrasound of your heart (echocardiogram) to assess the heart muscle, heart valves and heart function. You may eat and take any medicines before the exam.     If you need to change your appointment, please call outpatient scheduling at [phone]. [place] at the [address] [facility] and [address] [facility] located on the first floor of [place] [address] through [facility] entrance and turn immediately to your left.     Patient's contact number:  [phone] (home)      Lexiscan Stress Test      Lexiscan (regadenoson injection) is a prescription drug given through an IV line that increases blood flow through the arteries of the heart during a cardiac nuclear stress test.     There are two parts to a Lexiscan stress test: the rest portion and the exercise portion. For the rest portion, a radioactive tracer is injected into your arm through the IV. After 30 to 60 minutes, the process of imaging will begin. A nuclear camera will be placed on your chest area and images are taken for the next 15 to 20 minutes. For the exercise portion, a nurse will attach EKG electrodes to your chest to monitor your heart rate. The drug Inetta Clayman is administered to simulate stress on the heart. Your heart rhythm will then be monitored for the next few minutes. Your blood pressure will also be monitored throughout the exercise portion. [place] through the exercise portion, a second round of radioactive tracer is injected into your body. Your heart rate and EKG will be monitored for another few minutes after administering the drug. Test Preparation:     Bring a list of your current medications. Do not take any of your medications the morning of the test, but bring all morning medications with you as you will take them after the stress portion of the test is completed.  Do not eat Bananas 24 hours prior to test.     No caffeine 24 hours prior to the testing. This includes: coffee, pop/soda, chocolate, cold medications, etc.  Any product that might contain caffeine.  No nicotine or alcohol 12 hours prior to your test.    Nothing to eat or drink 6-8 hours prior to appointment time. It is okay to drink small amounts of water during the four hours prior to the test.   Nitroglycerin patches must be taken off 1 hour before testing.  Wear comfortable clothing.  Please refrain from any strenuous exercise or activities the day before your test, or the day of your test.   The Nuclear Lexiscan Stress test takes about 2 ½ to 3 hours to complete. If for any reason you are unable to keep this appointment, please contact Outpatient Scheduling, [phone], as soon as possible to reschedule.

## 2021-08-10 NOTE — PROGRESS NOTES
[facility], [address] [place] [address], [address] [address]  Phone: [phone]  Fax: [phone]    OFFICE VISIT:  8/10/2021    [id number]: [id number]    Reason For Visit:  Arden Robles is a 77 y.o. male who is here for 6 Month Follow-Up (patient c/o edema in feet, SOB and fatigue), Coronary Artery Disease, and Hypertension    HPI  Patient is here for follow-up for a non-STEMI post heart cath with a single drug-eluting stent to the mid circumflex, EF 40%, hypertension, hyperlipidemia with statin intolerance, diabetes, CKD. He is here today due to complaints of increasing dyspnea and lower extremity edema. His PCP recently placed him on a short course of Lasix which was helpful, but he feels once he stopped it he says he started swelling again. He denies orthopnea, PND. His wife states she notices he is definitely more short of breath. BNP and chest x-ray done by PCP were normal        Ayaz Thompson MD is PCP.   Suhas Sosa [facility] the [facility]:    Patient Active Problem List    Diagnosis Date Noted    ACS (acute coronary syndrome) ([address] 06/03/2019    Acute renal failure superimposed on stage 4 chronic kidney disease ([address] 09/04/2020    Mixed hyperlipidemia 06/27/2019    Left ventricular dysfunction 06/27/2019    Chronic kidney disease, stage III (moderate) ([address] 06/13/2019    NSTEMI (non-ST elevated myocardial infarction) ([address] 06/05/2019    CAD (coronary artery disease) 05/15/2018    History of placement of stent in LAD coronary artery     Change in bowel habits 10/11/2016    History of colon polyps 10/11/2016    Family history of prostate cancer in father 10/05/2016    Pharyngoesophageal dysphagia 09/20/2016    DM type 2, uncontrolled, with neuropathy ([address] 06/27/2016    Essential hypertension 06/27/2016    Gastroesophageal reflux disease without esophagitis 06/27/2016     Past Medical History:   Diagnosis Date    Medications   Medication Sig Dispense Refill    Insulin Lispro-aabc (LYUMJEV OLGAIKPEN) 200 UNIT/ML SOPN Inject into the skin      furosemide (LASIX) 20 MG tablet Take 1 tablet by mouth daily 30 tablet 5    ezetimibe (ZETIA) 10 MG tablet Take 1 tablet by mouth daily 90 tablet 0    aspirin 81 MG chewable tablet CHEW AND SWALLOW 1 TABLET BY MOUTH ONCE DAILY 90 tablet 3    metFORMIN (GLUCOPHAGE) 1000 MG tablet TAKE ONE TABLET BY MOUTH TWICE DAILY WITH MEALS 180 tablet 2    fluticasone (FLONASE) 50 MCG/ACT nasal spray Use 2 spray(s) in each nostril once daily 48 g 5    nystatin (MYCOSTATIN) 166294 UNIT/GM powder Apply 3 times daily. 60 g 1    albuterol (PROVENTIL) (5 MG/ML) 0.5% nebulizer solution Take 1 mL by nebulization every 6 hours as needed for Wheezing 60 mL 0    nitroGLYCERIN (NITROSTAT) 0.4 MG SL tablet up to max of 3 total doses. If no relief after 1 dose, call 911. 25 tablet 3    gabapentin (NEURONTIN) 300 MG capsule TAKE 1 CAPSULE BY MOUTH THREE TIMES DAILY 270 capsule 0    irbesartan (AVAPRO) 150 MG tablet Take 1 tablet by mouth once daily 90 tablet 0    pantoprazole (PROTONIX) 40 MG tablet Take 1 tablet by mouth every morning (before breakfast) (Patient taking differently: Take 40 mg by mouth 2 times daily ) 90 tablet 3    prasugrel (EFFIENT) 10 MG TABS Take 1 tablet by mouth once for 1 dose Take 1 tablet by mouth once daily 90 tablet 0    metoprolol tartrate (LOPRESSOR) 50 MG tablet TAKE 1 TABLET BY MOUTH TWICE DAILY 180 tablet 3    ciclopirox (PENLAC) 8 % solution Apply topically greater than 8 hours before showering. Clean nails with alcohol weekly.  1 Bottle 3    Insulin Glargine, 1 Unit Dial, (TOUJEO SOLOSTAR) 300 UNIT/ML SOPN by NOT APPLICABLE route daily       INVOKANA 100 MG TABS tablet Take 100 mg by mouth daily      Semaglutide,0.25 or 0.5MG/DOS, (OZEMPIC, 0.25 OR 0.5 MG/DOSE,) 2 MG/1.5ML SOPN Inject 0.25 mg into the skin once a week       mupirocin (BACTROBAN) 2 % ointment APPLY bruise/bleed easily. Psychiatric/Behavioral: Negative for agitation. The patient is not nervous/anxious. Objective  Vital Signs - /60   Pulse 73   Ht 6' (1.829 m)   Wt 277 lb (125.6 kg)   SpO2 96%   BMI 37.57 kg/m²    Wt Readings from Last 3 Encounters:   08/10/21 277 lb (125.6 kg)   07/19/21 273 lb 6.4 oz (124 kg)   07/12/21 278 lb (126.1 kg)      Physical Exam  Vitals and nursing note reviewed. Constitutional:       General: He is not in acute distress. Appearance: He is well-developed. He is obese. Comments: obese   HENT:      Head: Normocephalic and atraumatic. Right Ear: Hearing and external ear normal.      Left Ear: Hearing and external ear normal.      Nose: Nose normal.   Eyes:      General:         Right eye: No discharge. Left eye: No discharge. Pupils: Pupils are equal, round, and reactive to light. Neck:      Thyroid: No thyromegaly. Vascular: No carotid bruit or JVD. Trachea: No tracheal deviation. Cardiovascular:      Rate and Rhythm: Normal rate and regular rhythm. Heart sounds: Normal heart sounds. No murmur heard. No friction rub. No gallop. Comments: No carotid bruit  Pulmonary:      Effort: Pulmonary effort is normal. No respiratory distress. Breath sounds: Normal breath sounds. No wheezing or rales. Abdominal:      Palpations: Abdomen is soft. Tenderness: There is no abdominal tenderness. Musculoskeletal:         General: No swelling or deformity. Cervical back: Neck supple. No muscular tenderness. Right lower leg: Edema (trace) present. Left lower leg: Edema (trace) present. Comments: abnormal gait and station   Skin:     General: Skin is warm and dry. Findings: No rash. Neurological:      Mental Status: He is alert and oriented to person, place, and time. Cranial Nerves: No cranial nerve deficit.    Psychiatric:         Behavior: Behavior normal.         Judgment: Judgment normal.         Data:  Heart cath summary 6/4/19:       1. Successful femoral artery ultrasound  2. Successful femoral artery arterogram  3. Supervision of the administration of moderate conscious sedation  4. Single vessel coronary artery disease involving the mid circumflex  5. Left ventricular function is severely impaired in the anterior lateral segment with a visually estimate ejection of 40%. 6.  99% lesion in the mid circumflex  7. Successful percutaneous coronary intervention utilizing a single drug eluding stent to the mid circumflex. 8.  Patent stent in the osteal LAD  9.  40% lesion in the mid RCA  EF 40%    Lab Results   Component Value Date    CHOL 177 12/13/2019    TRIG 289 (H) 12/13/2019    HDL 29 (L) 12/11/2020    LDLCALC 134 12/11/2020    LDLDIRECT 104 11/02/2017     Lab Results   Component Value Date    ALT 14 07/12/2021    AST 11 07/12/2021     Lab Results   Component Value Date    LABA1C 9.9 (H) 12/11/2020 7/12/21 BNP = 148    Assessment:     Diagnosis Orders   1. Shortness of breath  NM MYOCARDIAL SPECT REST EXERCISE OR RX    ECHO Complete 2D W Doppler W Color   2. Edema of both lower extremities     3. Left ventricular dysfunction     4. Coronary artery disease of native artery of native heart with stable angina pectoris ([address]     5. Essential hypertension     6. Mixed hyperlipidemia     7. Myalgia due to statin     8. DM type 2, uncontrolled, with neuropathy ([address]         Shortness of breath/lower extremity edema/history of left ventricular systolic dysfunction-EF was 40% during heart cath in 2019. He has not had a repeat study to reevaluate. Currently on metoprolol and irbesartan. BNP was normal, but patient is also obese which can skew result. He is concerned about his kidney function and Lasix. Will restart Lasix at a decreased dose of 20 mg once daily and follow labs closely.   Check a 2D echocardiogram and a Lexiscan nuclear stress test to rule out myocardial ischemia    CAD-having increasing exertional dyspnea which could be an anginal equivalent. Checking a Lexiscan nuclear stress test.    Hypertension-controlled on current regimen with metoprolol and irbesartan    Hyperlipidemia/myalgia due to statin- myalgia  related to Lipitor and Crestor. Currently on Zetia. Labs have not yet been repeated since this medication was started    Diabetes-last A1c 8.6 at endocrinologist.  We had a long discussion about the importance of diabetes control and its relationship to heart disease. Encourage A1c less than 7    Plan    Return for Dr Scotty Rock, as scheduled. Lasix 20mg daily  Lexiscan  2D echocardiogram     - Weigh daily and report weight gain of 3lbs or more in 24hrs or 5lbs in one week. - Call for increasing shortness of breath or increasing swelling in feet and legs. (This could mean you are retaining too much fluid)  - 2000mg low sodium diet  - Fluid restriction of 1500ml per day (about 6 cups of fluid per day)      Call with any questionsor concerns  Follow up with Dmitry Sarmiento MD for non cardiac problems  Report any new problems  Cardiovascular Fitness-Exercise as tolerated. Strive for 15 minutes of exercise most days of the week. Cardiac / HealthyDiet  Continue current medications as directed  Continue plan of treatment  It is always recommended that you bring your medicationsbottles with you to each visit - this is for your safety!        MIMI Ramos

## 2021-08-18 ENCOUNTER — HOSPITAL ENCOUNTER (OUTPATIENT)
Dept: NON INVASIVE DIAGNOSTICS | Age: 66
Discharge: HOME | End: 2021-08-18
Payer: MEDICARE

## 2021-08-18 ENCOUNTER — HOSPITAL ENCOUNTER (OUTPATIENT)
Dept: NUCLEAR MEDICINE | Age: 66
Discharge: HOME | End: 2021-08-20
Payer: MEDICARE

## 2021-08-18 DIAGNOSIS — R06.02 SHORTNESS OF BREATH: ICD-10-CM

## 2021-08-18 LAB
LV EF: 54 %
LV EF: 54 %
LVEF MODALITY: NORMAL
LVEF MODALITY: NORMAL

## 2021-08-18 PROCEDURE — 6360000004 HC RX CONTRAST MEDICATION: Performed by: INTERNAL MEDICINE

## 2021-08-18 PROCEDURE — 6360000002 HC RX W HCPCS: Performed by: CLINICAL NURSE SPECIALIST

## 2021-08-18 PROCEDURE — C8929 TTE W OR WO FOL WCON,DOPPLER: HCPCS

## 2021-08-18 PROCEDURE — G1010 CDSM STANSON: HCPCS

## 2021-08-18 PROCEDURE — A9500 TC99M SESTAMIBI: HCPCS | Performed by: CLINICAL NURSE SPECIALIST

## 2021-08-18 PROCEDURE — 3430000000 HC RX DIAGNOSTIC RADIOPHARMACEUTICAL: Performed by: CLINICAL NURSE SPECIALIST

## 2021-08-18 RX ADMIN — TETRAKIS(2-METHOXYISOBUTYLISOCYANIDE)COPPER(I) TETRAFLUOROBORATE 10 MILLICURIE: 1 INJECTION, POWDER, LYOPHILIZED, FOR SOLUTION INTRAVENOUS at 14:33

## 2021-08-18 RX ADMIN — PERFLUTREN 1.65 MG: 6.52 INJECTION, SUSPENSION INTRAVENOUS at 10:30

## 2021-08-18 RX ADMIN — TETRAKIS(2-METHOXYISOBUTYLISOCYANIDE)COPPER(I) TETRAFLUOROBORATE 30 MILLICURIE: 1 INJECTION, POWDER, LYOPHILIZED, FOR SOLUTION INTRAVENOUS at 14:33

## 2021-08-18 RX ADMIN — REGADENOSON 0.4 MG: 0.08 INJECTION, SOLUTION INTRAVENOUS at 13:37

## 2021-08-20 ENCOUNTER — TELEPHONE (OUTPATIENT)
Dept: CARDIOLOGY CLINIC | Age: 66
End: 2021-08-20

## 2021-08-20 DIAGNOSIS — R60.0 EDEMA OF BOTH LOWER EXTREMITIES: ICD-10-CM

## 2021-08-20 DIAGNOSIS — I51.9 LEFT VENTRICULAR DYSFUNCTION: ICD-10-CM

## 2021-08-20 DIAGNOSIS — I10 ESSENTIAL HYPERTENSION: Primary | ICD-10-CM

## 2021-08-20 NOTE — TELEPHONE ENCOUNTER
Patient called stating someone told him he needed blood work and an appt next week. Found documentation on stress test note. Advised he just needs to have labs next week and to keep follow up with Dr. Ross Daily in October. I placed lab order since one had not been placed yet. NM MYOCARDIAL SPECT REST EXERCISE OR RX: Result Notes   Sherine Vegas   8/20/2021  9:11 AM CDT       Spoke with patient regarding results. Patient voiced understanding of results.      MIMI Llanos   8/20/2021  8:28 AM CDT       Please let patient know nuclear stress test shows no evidence of blockage.  Patient also had a 2D echo.  Keep follow-up appointment as scheduled with Dr. Poornima Rueda have him come in and do a BMP next week since we have recently started Lasix

## 2021-08-20 NOTE — TELEPHONE ENCOUNTER
Zeyad Perry requests that [facility] nurse return their call. Pt. Needs clarification on lab test he is needing and followup with Conrad Hernandez. The best time to reach him is Anytime. Thank you.

## 2021-08-31 ENCOUNTER — TELEPHONE (OUTPATIENT)
Dept: ENDOCRINOLOGY | Facility: CLINIC | Age: 66
End: 2021-08-31

## 2021-09-01 ENCOUNTER — LAB (OUTPATIENT)
Dept: LAB | Facility: HOSPITAL | Age: 66
End: 2021-09-01

## 2021-09-01 LAB
25(OH)D3 SERPL-MCNC: 28.5 NG/ML
ALBUMIN SERPL-MCNC: 4.3 G/DL (ref 3.5–5.2)
ALBUMIN UR-MCNC: 5 MG/DL
ALBUMIN/GLOB SERPL: 1.7 G/DL
ALP SERPL-CCNC: 97 U/L (ref 39–117)
ALT SERPL W P-5'-P-CCNC: 17 U/L (ref 1–41)
ANION GAP SERPL CALCULATED.3IONS-SCNC: 16 MMOL/L (ref 5–15)
AST SERPL-CCNC: 14 U/L (ref 1–40)
BASOPHILS # BLD AUTO: 0.11 10*3/MM3 (ref 0–0.2)
BASOPHILS NFR BLD AUTO: 1.5 % (ref 0–1.5)
BILIRUB SERPL-MCNC: 0.4 MG/DL (ref 0–1.2)
BUN SERPL-MCNC: 22 MG/DL (ref 8–23)
BUN/CREAT SERPL: 14.4 (ref 7–25)
CALCIUM SPEC-SCNC: 9.7 MG/DL (ref 8.6–10.5)
CALCIUM SPEC-SCNC: 9.9 MG/DL (ref 8.6–10.5)
CHLORIDE SERPL-SCNC: 100 MMOL/L (ref 98–107)
CHOLEST SERPL-MCNC: 181 MG/DL (ref 0–200)
CO2 SERPL-SCNC: 25 MMOL/L (ref 22–29)
CREAT SERPL-MCNC: 1.53 MG/DL (ref 0.76–1.27)
CREAT UR-MCNC: 51.7 MG/DL
DEPRECATED RDW RBC AUTO: 43 FL (ref 37–54)
EOSINOPHIL # BLD AUTO: 0.33 10*3/MM3 (ref 0–0.4)
EOSINOPHIL NFR BLD AUTO: 4.5 % (ref 0.3–6.2)
ERYTHROCYTE [DISTWIDTH] IN BLOOD BY AUTOMATED COUNT: 13.7 % (ref 12.3–15.4)
GFR SERPL CREATININE-BSD FRML MDRD: 46 ML/MIN/1.73
GLOBULIN UR ELPH-MCNC: 2.6 GM/DL
GLUCOSE SERPL-MCNC: 168 MG/DL (ref 65–99)
HBA1C MFR BLD: 8.8 % (ref 4.8–5.6)
HCT VFR BLD AUTO: 47.4 % (ref 37.5–51)
HDLC SERPL-MCNC: 28 MG/DL (ref 40–60)
HGB BLD-MCNC: 15.1 G/DL (ref 13–17.7)
IMM GRANULOCYTES # BLD AUTO: 0.01 10*3/MM3 (ref 0–0.05)
IMM GRANULOCYTES NFR BLD AUTO: 0.1 % (ref 0–0.5)
LDLC SERPL CALC-MCNC: 106 MG/DL (ref 0–100)
LDLC/HDLC SERPL: 3.5 {RATIO}
LYMPHOCYTES # BLD AUTO: 2.45 10*3/MM3 (ref 0.7–3.1)
LYMPHOCYTES NFR BLD AUTO: 33.5 % (ref 19.6–45.3)
MCH RBC QN AUTO: 27.4 PG (ref 26.6–33)
MCHC RBC AUTO-ENTMCNC: 31.9 G/DL (ref 31.5–35.7)
MCV RBC AUTO: 86 FL (ref 79–97)
MICROALBUMIN/CREAT UR: 96.7 MG/G
MONOCYTES # BLD AUTO: 0.62 10*3/MM3 (ref 0.1–0.9)
MONOCYTES NFR BLD AUTO: 8.5 % (ref 5–12)
NEUTROPHILS NFR BLD AUTO: 3.79 10*3/MM3 (ref 1.7–7)
NEUTROPHILS NFR BLD AUTO: 51.9 % (ref 42.7–76)
NRBC BLD AUTO-RTO: 0 /100 WBC (ref 0–0.2)
PLATELET # BLD AUTO: 186 10*3/MM3 (ref 140–450)
PMV BLD AUTO: 12.2 FL (ref 6–12)
POTASSIUM SERPL-SCNC: 4.7 MMOL/L (ref 3.5–5.2)
PROT SERPL-MCNC: 6.9 G/DL (ref 6–8.5)
PTH-INTACT SERPL-MCNC: 90.6 PG/ML (ref 15–65)
RBC # BLD AUTO: 5.51 10*6/MM3 (ref 4.14–5.8)
SODIUM SERPL-SCNC: 141 MMOL/L (ref 136–145)
TRIGL SERPL-MCNC: 275 MG/DL (ref 0–150)
TSH SERPL DL<=0.05 MIU/L-ACNC: 2.01 UIU/ML (ref 0.27–4.2)
VIT B12 BLD-MCNC: 502 PG/ML (ref 211–946)
VLDLC SERPL-MCNC: 47 MG/DL (ref 5–40)
WBC # BLD AUTO: 7.31 10*3/MM3 (ref 3.4–10.8)

## 2021-09-01 PROCEDURE — 80061 LIPID PANEL: CPT | Performed by: INTERNAL MEDICINE

## 2021-09-01 PROCEDURE — 82570 ASSAY OF URINE CREATININE: CPT | Performed by: INTERNAL MEDICINE

## 2021-09-01 PROCEDURE — 82607 VITAMIN B-12: CPT | Performed by: INTERNAL MEDICINE

## 2021-09-01 PROCEDURE — 83036 HEMOGLOBIN GLYCOSYLATED A1C: CPT | Performed by: INTERNAL MEDICINE

## 2021-09-01 PROCEDURE — 80053 COMPREHEN METABOLIC PANEL: CPT | Performed by: INTERNAL MEDICINE

## 2021-09-01 PROCEDURE — 85025 COMPLETE CBC W/AUTO DIFF WBC: CPT | Performed by: INTERNAL MEDICINE

## 2021-09-01 PROCEDURE — 83970 ASSAY OF PARATHORMONE: CPT | Performed by: INTERNAL MEDICINE

## 2021-09-01 PROCEDURE — 82043 UR ALBUMIN QUANTITATIVE: CPT | Performed by: INTERNAL MEDICINE

## 2021-09-01 PROCEDURE — 84443 ASSAY THYROID STIM HORMONE: CPT | Performed by: INTERNAL MEDICINE

## 2021-09-01 PROCEDURE — 82306 VITAMIN D 25 HYDROXY: CPT | Performed by: INTERNAL MEDICINE

## 2021-09-01 PROCEDURE — 36415 COLL VENOUS BLD VENIPUNCTURE: CPT | Performed by: INTERNAL MEDICINE

## 2021-09-02 ENCOUNTER — OFFICE VISIT (OUTPATIENT)
Dept: ENDOCRINOLOGY | Facility: CLINIC | Age: 66
End: 2021-09-02

## 2021-09-02 VITALS
WEIGHT: 277 LBS | SYSTOLIC BLOOD PRESSURE: 126 MMHG | BODY MASS INDEX: 37.52 KG/M2 | OXYGEN SATURATION: 98 % | HEIGHT: 72 IN | HEART RATE: 79 BPM | DIASTOLIC BLOOD PRESSURE: 70 MMHG

## 2021-09-02 DIAGNOSIS — E83.52 HYPERCALCEMIA: ICD-10-CM

## 2021-09-02 DIAGNOSIS — I15.2 HYPERTENSION ASSOCIATED WITH DIABETES (HCC): ICD-10-CM

## 2021-09-02 DIAGNOSIS — E11.59 HYPERTENSION ASSOCIATED WITH DIABETES (HCC): ICD-10-CM

## 2021-09-02 DIAGNOSIS — Z79.4 TYPE 2 DIABETES MELLITUS WITH HYPERGLYCEMIA, WITH LONG-TERM CURRENT USE OF INSULIN (HCC): Primary | ICD-10-CM

## 2021-09-02 DIAGNOSIS — N18.31 CHRONIC KIDNEY DISEASE (CKD) STAGE G3A/A2, MODERATELY DECREASED GLOMERULAR FILTRATION RATE (GFR) BETWEEN 45-59 ML/MIN/1.73 SQUARE METER AND ALBUMINURIA CREATININE RATIO BETWEEN 30-299 MG/G (CMS* (HCC): ICD-10-CM

## 2021-09-02 DIAGNOSIS — E11.65 TYPE 2 DIABETES MELLITUS WITH HYPERGLYCEMIA, WITH LONG-TERM CURRENT USE OF INSULIN (HCC): Primary | ICD-10-CM

## 2021-09-02 DIAGNOSIS — E55.9 VITAMIN D DEFICIENCY: ICD-10-CM

## 2021-09-02 DIAGNOSIS — E11.69 MIXED DIABETIC HYPERLIPIDEMIA ASSOCIATED WITH TYPE 2 DIABETES MELLITUS (HCC): ICD-10-CM

## 2021-09-02 DIAGNOSIS — E78.2 MIXED DIABETIC HYPERLIPIDEMIA ASSOCIATED WITH TYPE 2 DIABETES MELLITUS (HCC): ICD-10-CM

## 2021-09-02 PROCEDURE — 99214 OFFICE O/P EST MOD 30 MIN: CPT | Performed by: INTERNAL MEDICINE

## 2021-09-02 PROCEDURE — 95251 CONT GLUC MNTR ANALYSIS I&R: CPT | Performed by: INTERNAL MEDICINE

## 2021-09-02 RX ORDER — NITROGLYCERIN 0.4 MG/1
0.4 TABLET SUBLINGUAL
COMMUNITY

## 2021-09-02 RX ORDER — FINERENONE 10 MG/1
1 TABLET, FILM COATED ORAL DAILY
Qty: 30 TABLET | Refills: 11 | Status: SHIPPED | OUTPATIENT
Start: 2021-09-02 | End: 2023-02-07

## 2021-09-02 RX ORDER — INSULIN GLARGINE 300 U/ML
INJECTION, SOLUTION SUBCUTANEOUS
Qty: 8 PEN | Refills: 3 | Status: SHIPPED | OUTPATIENT
Start: 2021-09-02 | End: 2022-08-09 | Stop reason: SDUPTHER

## 2021-09-02 NOTE — PROGRESS NOTES
" Mick Sims is a 65 y.o. male who presents for follow up Type 2 diabetes      HPI    T2DM    Duration 10 years    Complications - nephropathy, retinopathy, peripheral neuropathy and cardiovascular disease    Current symptoms/problems  paresthesia of the feet and visual disturbances , ED     Alleviating Factors: Compliance       Current monitoring regimen: home blood tests - using fercho       PE    /70   Pulse 79   Ht 182.9 cm (72\")   Wt 126 kg (277 lb)   SpO2 98%   BMI 37.57 kg/m²   AOx3  No visible goiter  RRR  CTA  No Edema    Labs    Lab Results   Component Value Date    WBC 7.31 09/01/2021    HGB 15.1 09/01/2021    HCT 47.4 09/01/2021    MCV 86.0 09/01/2021     09/01/2021     Lab Results   Component Value Date    GLUCOSE 168 (H) 09/01/2021    BUN 22 09/01/2021    CREATININE 1.53 (H) 09/01/2021    EGFRIFNONA 46 (L) 09/01/2021    EGFRIFAFRI 52 (L) 07/12/2021    BCR 14.4 09/01/2021    K 4.7 09/01/2021    CO2 25.0 09/01/2021    CALCIUM 9.9 09/01/2021    CALCIUM 9.7 09/01/2021    ALBUMIN 4.30 09/01/2021    AST 14 09/01/2021    ALT 17 09/01/2021               Assessment/Plan       ICD-10-CM ICD-9-CM   1. Type 2 diabetes mellitus with hyperglycemia, with long-term current use of insulin (CMS/[facility])  E11.65 250.00    Z79.4 790.29     V58.67   2. Hypertension associated with diabetes (CMS/[facility])  E11.59 250.80    I15.2 401.9   3. Mixed diabetic hyperlipidemia associated with type 2 diabetes mellitus (CMS/[facility])  E11.69 250.80    E78.2 272.2   4. Vitamin D deficiency  E55.9 268.9   5. Hypercalcemia  E83.52 275.42       Pt has type 2 diabetes w ckd and cad     Glycemic Management:   Lab Results   Component Value Date    HGBA1C 8.80 (H) 09/01/2021    HGBA1C 8.67 (H) 05/28/2021    HGBA1C 9.10 (H) 02/02/2021       Fercho 2 weeks reviewed  t2dm w hyperglycemia          toujeo 40 -- now lantus 30 qam - toujeo 40     ========================================    Ozempic      0.5 mg weekly ---- increase to 1 mg " "weekly , having trouble affording but willing to pay doing only half      ============================================    Metformin 1000 mg twice a day ===     ============================================      Invokana 100mg  daily  - gave farxiga samples   This time jardiance samples     ============================================    Humalog     5 units per 15 grams , he understands well     May need more     Still eating too much         ======================  HTN  /70   Pulse 79   Ht 182.9 cm (72\")   Wt 126 kg (277 lb)   SpO2 98%   BMI 37.57 kg/m²       bp controlled on irbesartan now 300  hctz stopped due to hypercalcemia now corrected  He was also not taking any calcium supplements and avoiding dietary - he can resume  Obtain PTH  And calcium    ===========  Lipids controlled on lipitor but allergic, change to zetia and this worked  Lab Results   Component Value Date    CHOL 181 09/01/2021    CHLPL 177 12/13/2019    TRIG 275 (H) 09/01/2021    HDL 28 (L) 09/01/2021     (H) 09/01/2021      Microvascular complications     Renal     CKD stage 3a/A2  Add kerendia 10 mg daily ( already on sglt2 and ARB )    Eyes  Yes retinopathy     Neuropathy   Yes on gabapentin       Vit D Def  Vit D low, start 1000 units daily     This document has been electronically signed by Juancho Mobley MD on September 2, 2021 09:23 CDT          A copy of my note was sent to Ginny Collazo APRN    Please see my above opinion and suggestions.      "

## 2021-09-03 ENCOUNTER — DOCUMENTATION (OUTPATIENT)
Dept: ENDOCRINOLOGY | Facility: CLINIC | Age: 66
End: 2021-09-03

## 2021-09-03 ENCOUNTER — TELEPHONE (OUTPATIENT)
Dept: PRIMARY CARE CLINIC | Age: 66
End: 2021-09-03

## 2021-09-07 ENCOUNTER — OFFICE VISIT (OUTPATIENT)
Dept: PRIMARY CARE CLINIC | Age: 66
End: 2021-09-07
Payer: MEDICARE

## 2021-09-07 ENCOUNTER — TELEPHONE (OUTPATIENT)
Dept: ENDOCRINOLOGY | Facility: CLINIC | Age: 66
End: 2021-09-07

## 2021-09-07 VITALS
WEIGHT: 279.4 LBS | DIASTOLIC BLOOD PRESSURE: 82 MMHG | HEART RATE: 68 BPM | OXYGEN SATURATION: 98 % | BODY MASS INDEX: 37.84 KG/M2 | TEMPERATURE: 96.5 F | SYSTOLIC BLOOD PRESSURE: 134 MMHG | HEIGHT: 72 IN

## 2021-09-07 DIAGNOSIS — S91.301A OPEN WOUND OF RIGHT FOOT, INITIAL ENCOUNTER: Primary | ICD-10-CM

## 2021-09-07 DIAGNOSIS — Z23 NEED FOR INFLUENZA VACCINATION: ICD-10-CM

## 2021-09-07 DIAGNOSIS — Z79.899 MEDICATION MANAGEMENT: ICD-10-CM

## 2021-09-07 PROCEDURE — 99214 OFFICE O/P EST MOD 30 MIN: CPT | Performed by: FAMILY MEDICINE

## 2021-09-07 PROCEDURE — G0008 ADMIN INFLUENZA VIRUS VAC: HCPCS | Performed by: FAMILY MEDICINE

## 2021-09-07 PROCEDURE — 90694 VACC AIIV4 NO PRSRV 0.5ML IM: CPT | Performed by: FAMILY MEDICINE

## 2021-09-07 PROCEDURE — 80305 DRUG TEST PRSMV DIR OPT OBS: CPT | Performed by: FAMILY MEDICINE

## 2021-09-07 RX ORDER — CEPHALEXIN 500 MG/1
500 CAPSULE ORAL 2 TIMES DAILY
Qty: 20 CAPSULE | Refills: 0 | Status: SHIPPED | OUTPATIENT
Start: 2021-09-07 | End: 2021-09-17

## 2021-09-07 ASSESSMENT — ENCOUNTER SYMPTOMS
SORE THROAT: 0
CHEST TIGHTNESS: 0
DIARRHEA: 0
SHORTNESS OF BREATH: 0
VOMITING: 0
CONSTIPATION: 0
COUGH: 0
WHEEZING: 0
RHINORRHEA: 0
ABDOMINAL PAIN: 0
EYE PAIN: 0
TROUBLE SWALLOWING: 0
BACK PAIN: 0
SINUS PAIN: 0
NAUSEA: 0

## 2021-09-07 NOTE — PROGRESS NOTES
Vaccine Information Sheet, \"Influenza - Inactivated\" OR \"Live - Intranasal\"  given to Arna Drafts, or parent/legal guardian of  Arna Kyle and verbalized understanding. Patient responses:    Have you ever had a reaction to a flu vaccine? No  Are you able to eat eggs without adverse effects? Yes  Do you have any current illness? No  Have you ever had Guillian [place] Syndrome? No    Flu vaccine given per order. Please see immunization tab.

## 2021-09-07 NOTE — PROGRESS NOTES
[address] [facility]  [address] [address] [place] [address]  [address] [address]  Dept: [phone]  Dept Fax: [phone]  Loc: [phone]      Subjective:     Chief Complaint   Patient presents with    Follow-up     UDS, med contract, wants to discuss cardiology testing     Toe Injury     patient dropped bottle of lemon juice on his right little toe and it cut his toe. HPI:  Shahab Goode is a 77 y.o. male presents today with an open wound in his R pinky toe. He states he accidentally dropped a lemon juice jar on his foot. Pt is diabetic. He states that his DM is fairly well controlled. He was recently started on Jardiance. His BP is well controlled. ROS:   Review of Systems   Constitutional: Negative for appetite change, chills, fatigue and fever. HENT: Negative for congestion, ear pain, hearing loss, nosebleeds, rhinorrhea, sinus pain, sore throat and trouble swallowing. Eyes: Negative for pain and visual disturbance. Respiratory: Negative for cough, chest tightness, shortness of breath and wheezing. Cardiovascular: Positive for leg swelling (improved some). Negative for chest pain and palpitations. Gastrointestinal: Negative for abdominal pain, constipation, diarrhea, nausea and vomiting. Endocrine: Negative for cold intolerance, heat intolerance, polydipsia, polyphagia and polyuria. Genitourinary: Negative for difficulty urinating, dysuria, frequency, hematuria and urgency. Musculoskeletal: Negative for arthralgias, back pain, gait problem, joint swelling, myalgias, neck pain and neck stiffness. Skin: Negative for pallor and rash. Allergic/Immunologic: Positive for environmental allergies. Negative for food allergies. Neurological: Negative for dizziness, weakness and numbness. Hematological: Negative for adenopathy. Does not bruise/bleed easily.    Psychiatric/Behavioral: Negative for agitation, behavioral problems, decreased concentration and sleep disturbance. The patient is not nervous/anxious.         PMHx:  Past Medical History:   Diagnosis Date    Aneurysm ([address]     abdominal    Bilateral leg edema     CAD (coronary artery disease)     Chronic kidney disease     Stage 4    Diabetes mellitus, type 2 (HCC)     Fatty liver     GERD (gastroesophageal reflux disease)     HTN (hypertension)     Prolonged emergence from general anesthesia     Seasonal allergies     Vitreous hemorrhage ([address] 1/22/2021     Patient Active Problem List   Diagnosis    DM type 2, uncontrolled, with neuropathy ([address]    Essential hypertension    Gastroesophageal reflux disease without esophagitis    Pharyngoesophageal dysphagia    Family history of prostate cancer in father    Change in bowel habits    History of colon polyps    CAD (coronary artery disease)    History of placement of stent in LAD coronary artery    ACS (acute coronary syndrome) ([address]    NSTEMI (non-ST elevated myocardial infarction) ([address]    Chronic kidney disease, stage III (moderate) (HCC)    Mixed hyperlipidemia    Left ventricular dysfunction    Acute renal failure superimposed on stage 4 chronic kidney disease (HCC)       PSHx:  Past Surgical History:   Procedure Laterality Date    CARPAL TUNNEL RELEASE      COLONOSCOPY  11/04/2015    DR Capone: polys, 5yr recall    COLONOSCOPY N/A 10/11/2016    Dr Lopez-diverticulosis, Sessile serrated AP (-) high grade dysplasia x 1, tubular AP (-) dysplasia x 2, HP x 2--3 yr recall    COLONOSCOPY N/A 12/16/2020    Dr Luigi Lozano, Mod. Diverticulosis, Internal hemorrhoids-Grade 2 & ext hemorrhoids, 3 year recall    CORONARY ANGIOPLASTY WITH STENT PLACEMENT  06/04/2019    AGA to circ    CORONARY ANGIOPLASTY WITH STENT PLACEMENT  05/2018    AGA to osteal LAD    ESOPHAGEAL DILATATION N/A 12/16/2020    Dr Luigi Lozano, empirical [address] fr bougie dilation, (+) GERD    EYE SURGERY      Right eye    PRE-MALIGNANT / [facility]      Dr. Sylvester Lorenzana UPPER GASTROINTESTINAL ENDOSCOPY  09/20/2016    Dr Lopez-w/dilation over wire, [address] [place]-distal esophageal narrowing, Inna (-)    UPPER GASTROINTESTINAL ENDOSCOPY  09/20/2016    Dr Lopez-w/dilation over wire, [address] [place]-distal esophageal narrowing, Inna (-)       PFHx:  Family History   Problem Relation Age of Onset    Colon Cancer Mother     Prostate Cancer Father     Other Father         HX of blood clot    Diabetes Sister     Cancer Sister     Diabetes Brother     Colon Polyps Son     Esophageal Cancer Neg Hx     Liver Cancer Neg Hx     Liver Disease Neg Hx     Stomach Cancer Neg Hx     Rectal Cancer Neg Hx        SocialHx:  Social History     Tobacco Use    Smoking status: Never Smoker    Smokeless tobacco: Never Used   Substance Use Topics    Alcohol use: No       Allergies: Allergies   Allergen Reactions    Lipitor [Atorvastatin Calcium] Diarrhea and Other (See Comments)     Dizzy    Tape Timoteo Cheryl Tape] Hives and Rash       Medications:  Current Outpatient Medications   Medication Sig Dispense Refill    cephALEXin (KEFLEX) 500 MG capsule Take 1 capsule by mouth 2 times daily for 10 days 20 capsule 0    Insulin Lispro-aabc (LYUMJEV KWIKPEN) 200 UNIT/ML SOPN Inject into the skin      furosemide (LASIX) 20 MG tablet Take 1 tablet by mouth daily 30 tablet 5    ezetimibe (ZETIA) 10 MG tablet Take 1 tablet by mouth daily 90 tablet 0    aspirin 81 MG chewable tablet CHEW AND SWALLOW 1 TABLET BY MOUTH ONCE DAILY 90 tablet 3    metFORMIN (GLUCOPHAGE) 1000 MG tablet TAKE ONE TABLET BY MOUTH TWICE DAILY WITH MEALS 180 tablet 2    fluticasone (FLONASE) 50 MCG/ACT nasal spray Use 2 spray(s) in each nostril once daily 48 g 5    nystatin (MYCOSTATIN) 334474 UNIT/GM powder Apply 3 times daily. 60 g 1    nitroGLYCERIN (NITROSTAT) 0.4 MG SL tablet up to max of 3 total doses.  If no relief after 1 dose, call 911. 25 tablet 3    irbesartan (AVAPRO) 150 MG tablet Take 1 tablet by mouth once daily 90 tablet 0    pantoprazole (PROTONIX) 40 MG tablet Take 1 tablet by mouth every morning (before breakfast) (Patient taking differently: Take 40 mg by mouth 2 times daily ) 90 tablet 3    metoprolol tartrate (LOPRESSOR) 50 MG tablet TAKE 1 TABLET BY MOUTH TWICE DAILY 180 tablet 3    ciclopirox (PENLAC) 8 % solution Apply topically greater than 8 hours before showering. Clean nails with alcohol weekly. 1 Bottle 3    Insulin Glargine, 1 Unit Dial, (TOUJEO SOLOSTAR) 300 UNIT/ML SOPN by NOT APPLICABLE route daily       INVOKANA 100 MG TABS tablet Take 100 mg by mouth daily      Semaglutide,0.25 or 0.5MG/DOS, (OZEMPIC, 0.25 OR 0.5 MG/DOSE,) 2 MG/1.5ML SOPN Inject 0.25 mg into the skin once a week       mupirocin (BACTROBAN) 2 % ointment APPLY  OINTMENT TO AFFECTED AREA THREE TIMES DAILY 1 Tube 1    Insulin Pen Needle ([employer] PEN NEEDLES) 31G X 6 MM MISC Dx: E11.9 150 each 3    blood glucose test strips (ACCU-CHEK DUNIA) strip Patient is to test 8 times daily. Dx: E11.9  Please provide patient with Accu check Dunia Plus test strips per his Insurance request 900 each 3    Lancets 30G MISC Use to check blood sugar 8 times daily.  E11.9 300 each 5    Alcohol Swabs PADS 1 box by Does not apply route 8 times daily 100 each 5    Blood Glucose Monitoring Suppl (ACURA BLOOD GLUCOSE METER) w/Device KIT Please provide patient with Accu check Dunia Meter per his Insurance request. Dx: E11.9 1 kit 0    albuterol sulfate HFA (VENTOLIN HFA) 108 (90 Base) MCG/ACT inhaler Inhale 2 puffs into the lungs every 6 hours as needed for Wheezing 1 Inhaler 3    albuterol (PROVENTIL) (5 MG/ML) 0.5% nebulizer solution Take 1 mL by nebulization every 6 hours as needed for Wheezing 60 mL 0    furosemide (LASIX) 40 MG tablet Take 1 tablet by mouth 2 times daily for 7 days (Patient not taking: Reported on 8/10/2021) 14 tablet 0    gabapentin (NEURONTIN) 300 MG capsule TAKE 1 CAPSULE BY MOUTH THREE TIMES DAILY 270 capsule 0    prasugrel (EFFIENT) 10 MG TABS Take 1 tablet by mouth once for 1 dose Take 1 tablet by mouth once daily 90 tablet 0     No current facility-administered medications for this visit. Objective:   PE:  /82   Pulse 68   Temp 96.5 °F (35.8 °C)   Ht 6' (1.829 m)   Wt 279 lb 6.4 oz (126.7 kg)   SpO2 98%   BMI 37.89 kg/m²   Physical Exam  Vitals and nursing note reviewed. Exam conducted with a chaperone present (wife). HENT:      Head: Normocephalic. Nose: Nose normal. No congestion. Mouth/Throat:      Mouth: Mucous membranes are moist.   Eyes:      Conjunctiva/sclera: Conjunctivae normal.      Pupils: Pupils are equal, round, and reactive to light. Cardiovascular:      Rate and Rhythm: Normal rate and regular rhythm. Heart sounds: Normal heart sounds. Abdominal:      General: Abdomen is protuberant. Tenderness: There is no abdominal tenderness. Musculoskeletal:         General: Swelling (mild pedal edema) present. Skin:     Findings: Lesion (skin avulsion R fifth digit with surrounding erythema, +bruising) present. Neurological:      Mental Status: He is alert and oriented to person, place, and time. Motor: Motor function is intact. Coordination: Coordination is intact. Comments: +abnormal microfilament test, absent vibratory sense   Psychiatric:         Mood and Affect: Mood normal.            Assessment & Plan   Baljinder Carolina was seen today for follow-up and toe injury. Diagnoses and all orders for this visit:    Open wound of right foot, initial encounter  -     cephALEXin (KEFLEX) 500 MG capsule;  Take 1 capsule by mouth 2 times daily for 10 days    DM type 2, uncontrolled, with neuropathy ([facility]    Need for influenza vaccination  -     INFLUENZA, QUADV, ADJUVANTED, 65 YRS =, IM, PF, PREFILL SYR, 0.5ML (FLUAD)    Medication management  -     POCT Rapid Drug Screen        Return in about 2 weeks (around 9/21/2021) for routine follow-up. All questions were answered. Medications, including possible adverse effects, and instructions were reviewed and  understanding was confirmed. Follow-up recommendations, including when to contact or return to office (ie; if symptoms worsen or fail to improve), were discussed and acknowledged.     Electronically signed by Becky Lynn MD on 9/7/21 at 9:24 AM CDT

## 2021-09-08 ENCOUNTER — TELEPHONE (OUTPATIENT)
Dept: ENDOCRINOLOGY | Facility: CLINIC | Age: 66
End: 2021-09-08

## 2021-09-09 DIAGNOSIS — E78.5 HYPERLIPIDEMIA, UNSPECIFIED HYPERLIPIDEMIA TYPE: ICD-10-CM

## 2021-09-09 DIAGNOSIS — Z76.0 MEDICATION REFILL: ICD-10-CM

## 2021-09-09 DIAGNOSIS — G62.9 NEUROPATHY: ICD-10-CM

## 2021-09-09 RX ORDER — EZETIMIBE 10 MG/1
TABLET ORAL
Qty: 90 TABLET | Refills: 0 | Status: SHIPPED | OUTPATIENT
Start: 2021-09-09 | End: 2021-11-01

## 2021-09-09 RX ORDER — GABAPENTIN 300 MG/1
CAPSULE ORAL
Qty: 270 CAPSULE | Refills: 0 | Status: SHIPPED | OUTPATIENT
Start: 2021-09-09 | End: 2022-01-13

## 2021-09-09 RX ORDER — PRASUGREL 10 MG/1
TABLET, FILM COATED ORAL
Qty: 90 TABLET | Refills: 0 | Status: SHIPPED | OUTPATIENT
Start: 2021-09-09 | End: 2021-12-14

## 2021-09-09 NOTE — TELEPHONE ENCOUNTER
[facility] called to request a refill on his medication. Last office visit : 9/7/2021   Next office visit : 9/9/2021     Last UDS:   Amphetamine Screen, Urine   Date Value Ref Range Status   09/07/2021 -  Final     Barbiturate Screen, Urine   Date Value Ref Range Status   09/07/2021 -  Final     Benzodiazepine Screen, Urine   Date Value Ref Range Status   09/07/2021 -  Final     Buprenorphine Urine   Date Value Ref Range Status   09/07/2021 -  Final     Cocaine Metabolite Screen, Urine   Date Value Ref Range Status   09/07/2021 -  Final     Gabapentin Screen, Urine   Date Value Ref Range Status   09/07/2021 +  Final     MDMA, Urine   Date Value Ref Range Status   09/07/2021 -  Final     Methamphetamine, Urine   Date Value Ref Range Status   09/07/2021 -  Final     Opiate Scrn, Ur   Date Value Ref Range Status   09/07/2021 -  Final     Oxycodone Screen, Ur   Date Value Ref Range Status   09/07/2021 -  Final     PCP Screen, Urine   Date Value Ref Range Status   09/07/2021 -  Final     Propoxyphene Screen, Urine   Date Value Ref Range Status   09/07/2021 -  Final     THC Screen, Urine   Date Value Ref Range Status   09/07/2021 -  Final     Tricyclic Antidepressants, Urine   Date Value Ref Range Status   09/07/2021 -  Final       Last Nafisa Paz: 2-8-21  Medication Contract: 9-7-21   Last Fill: 5-9-2021    Requested Prescriptions     Pending Prescriptions Disp Refills    prasugrel (EFFIENT) 10 MG TABS [Pharmacy Med Name: Prasugrel HCl 10 MG Oral Tablet] 90 tablet 0     Sig: Take 1 tablet by mouth once daily    gabapentin (NEURONTIN) 300 MG capsule [Pharmacy Med Name: GABAPENTIN 300MG    CAP] 270 capsule 0     Sig: TAKE 1 CAPSULE BY MOUTH THREE TIMES DAILY         Please approve or refuse this medication.    Sarita Tee LPN

## 2021-09-16 ENCOUNTER — TELEPHONE (OUTPATIENT)
Dept: ENDOCRINOLOGY | Facility: CLINIC | Age: 66
End: 2021-09-16

## 2021-09-16 NOTE — TELEPHONE ENCOUNTER
Pt says was told Cheng was going to try to get pt on a program to get the medicine Kerendia for 0 cost and wants to know what we found out [phone]     Ozempic is supposed to have been 1.0 and needs to know why the change if needs to be the 1.0 which is whatr the pt prefers     Pharmacy [facility] [address] [place], [place]

## 2021-10-25 ENCOUNTER — TELEPHONE (OUTPATIENT)
Dept: PODIATRY | Facility: CLINIC | Age: 66
End: 2021-10-25

## 2021-10-25 NOTE — PROGRESS NOTES
"    [facility] - PODIATRY    Today's Date: 10/26/21    Patient Name: Mick Sims  MRN: [id number]  CSN: [id number]  PCP: Ginny Collazo APRN   Referring Provider: No ref. provider found    SUBJECTIVE     Chief Complaint   Patient presents with   • Follow-up     pcp01/12/2021 3 month fu diabetic nail care- pt states neuropathy makes it feel like his feet are \"wrinkling up\" Pt states prominent feeling loss in feet. Pt states right foot gives him issues- pt pain 1/10- pt presents with long toenails   • Diabetes     185mg/dl last BG      HPI: Mick Sims, a 65 y.o.male, comes to clinic as a(n) established patient presenting for diabetic foot exam and complaining of thick fungal toenails. Patient has h/o arthritis, DM2, HTN, Renal Disorder. Patient is IDDM with last stated BG level of 185mg/dl. States A1c has been around 8.8%.  Admits to mild numbness and tingling in feet.  Denies open wounds or sores. Notes mild swelling in legs that he wears light compression stockings for. States that his toenails are long, thick and discolored. He has trouble caring for them himself. Admits pain at 1/10 level and described as aching, numbness and tingling. Relates previous treatment(s) including foot care by podiatry. Denies any constitutional symptoms. No other pedal complaints at this time.    Past Medical History:   Diagnosis Date   • Arthritis    • Diabetes mellitus (HCC)    • Hypertension    • Renal disorder      Past Surgical History:   Procedure Laterality Date   • CARPAL TUNNEL RELEASE       Family History   Problem Relation Age of Onset   • Cancer Mother    • Diabetes Sister    • Diabetes Brother    • No Known Problems Brother      Social History     Socioeconomic History   • Marital status:    Tobacco Use   • Smoking status: Never Smoker   • Smokeless tobacco: Never Used   Vaping Use   • Vaping Use: Never used   Substance and Sexual Activity   • Alcohol use: No   • Drug use: Never   • Sexual " activity: Defer     Allergies   Allergen Reactions   • Atorvastatin Calcium Unknown - Low Severity   • Latex Hives     Current Outpatient Medications   Medication Sig Dispense Refill   • aspirin 81 MG chewable tablet Chew 81 mg Daily.     • Continuous Blood Gluc  (FreeStyle Fercho 2 Farrell) device 1 each Continuous. Use as indicated for glucose monitoring 1 each 1   • Continuous Blood Gluc Sensor (FreeStyle Fercho 2 Sensor) misc 1 each Every 14 (Fourteen) Days. 2 each 11   • empagliflozin (Jardiance) 25 MG tablet tablet Take 1 tablet by mouth Daily. One tablet daily before breakfast 30 tablet 11   • ezetimibe (Zetia) 10 MG tablet Take 1 tablet by mouth Daily. 30 tablet 11   • Finerenone (Kerendia) 10 MG tablet Take 1 tablet by mouth Daily. 30 tablet 11   • fluticasone (FLONASE) 50 MCG/ACT nasal spray 1 spray into each nostril 2 (Two) Times a Day As Needed for rhinitis or allergies.     • furosemide (LASIX) 40 MG tablet Take 20 mg by mouth Every Other Day. prn     • gabapentin (NEURONTIN) 300 MG capsule Take 300 mg by mouth 3 (Three) Times a Day.     • Glucagon, rDNA, (Glucagon Emergency) 1 MG kit Inject 1 mg under the skin into the appropriate area as directed 1 (One) Time As Needed (hypoglycemia) for up to 1 dose. 1 each 11   • Insulin Glargine, 2 Unit Dial, (Toujeo Max SoloStar) 300 UNIT/ML solution pen-injector injection 80 units qhs 8 pen 3   • Insulin Lispro-aabc 200 UNIT/ML solution pen-injector Inject 80 Units under the skin into the appropriate area as directed 3 (Three) Times a Day With Meals. 24 pen 3   • Insulin Pen Needle (PEN NEEDLES) 32G X 4 MM misc 1 each 4 (Four) Times a Day. Use 4 times per day to inject insulin DX E11.9 400 each 3   • irbesartan (AVAPRO) 150 MG tablet Take 300 mg by mouth Every Night.     • metFORMIN (GLUCOPHAGE) 1000 MG tablet Take 1,000 mg by mouth 2 (Two) Times a Day. Needs pm dose     • metoprolol tartrate (LOPRESSOR) 50 MG tablet Take 1 tablet by mouth 2 (Two) Times a  Day. 30 tablet 0   • nitroglycerin (NITROSTAT) 0.4 MG SL tablet Place 0.4 mg under the tongue Every 5 (Five) Minutes As Needed for Chest Pain. Take no more than 3 doses in 15 minutes.     • PANTOPRAZOLE SODIUM PO Take 40 mg by mouth Daily. Twice daily     • prasugrel (EFFIENT) 10 MG tablet Take 10 mg by mouth Daily.     • Semaglutide,0.25 or 0.5MG/DOS, (Ozempic, 0.25 or 0.5 MG/DOSE,) 2 MG/1.5ML solution pen-injector Inject 0.5 mg under the skin into the appropriate area as directed 1 (One) Time Per Week. 0.5 mg weekly 1 pen 11   • sildenafil (Viagra) 100 MG tablet Take 1 tablet by mouth As Needed for Erectile Dysfunction. 30 tablet 11     No current facility-administered medications for this visit.     Review of Systems   Constitutional: Negative for chills and fever.   HENT: Negative for congestion.    Respiratory: Negative for shortness of breath.    Cardiovascular: Positive for leg swelling. Negative for chest pain.   Gastrointestinal: Negative for constipation, diarrhea, nausea and vomiting.   Musculoskeletal:        Foot pain   Skin: Negative for wound.   Neurological: Positive for numbness.       OBJECTIVE     Vitals:    10/26/21 0855   BP: 152/80   Pulse: 72   SpO2: 97%       PHYSICAL EXAM  GEN:   Accompanied by none.     Foot/Ankle Exam:       General:   Diabetic Foot Exam Performed    Appearance: appears stated age and healthy    Orientation: AAOx3    Affect: appropriate    Gait: unimpaired    Assistance: independent    Shoe Gear:  Casual shoes    VASCULAR      Right Foot Vascularity   Dorsalis pedis:  2+  Posterior tibial:  2+  Skin Temperature: warm    Edema Gradin+ and pitting  CFT:  3  Pedal Hair Growth:  Present  Varicosities: mild varicosities       Left Foot Vascularity   Dorsalis pedis:  2+  Posterior tibial:  2+  Skin Temperature: warm    Edema Gradin+ and pitting  CFT:  3  Pedal Hair Growth:  Present  Varicosities: mild varicosities        NEUROLOGIC     Right Foot Neurologic   Light  touch sensation:  Diminished  Vibratory sensation:  Diminished  Hot/Cold sensation: diminished    Protective Sensation using [place] Monofilament:  5     Left Foot Neurologic   Light touch sensation:  Diminished  Vibratory sensation:  Diminished  Hot/cold sensation: diminished    Protective Sensation using [place] Monofilament:  4     MUSCULOSKELETAL      Right Foot Musculoskeletal   Ecchymosis:  None  Tenderness: neuroma    Tenderness comment:  Sub 2nd interspace  Arch:  Normal     Left Foot Musculoskeletal   Ecchymosis:  None  Tenderness: none    Arch:  Normal     MUSCLE STRENGTH     Right Foot Muscle Strength   Foot dorsiflexion:  5  Foot plantar flexion:  5  Foot inversion:  5  Foot eversion:  5     Left Foot Muscle Strength   Foot dorsiflexion:  5  Foot plantar flexion:  5  Foot inversion:  5  Foot eversion:  5     RANGE OF MOTION      Right Foot Range of Motion   Foot and ankle ROM within normal limits       Left Foot Range of Motion   Foot and ankle ROM within normal limits       DERMATOLOGIC     Right Foot Dermatologic   Skin: skin intact    Nails: onychomycosis, abnormally thick, subungual debris and dystrophic nails       Left Foot Dermatologic   Skin: skin intact    Nails: onychomycosis, abnormally thick, subungual debris and dystrophic nails        RADIOLOGY/NUCLEAR:  No results found.    LABORATORY/CULTURE RESULTS:      PATHOLOGY RESULTS:       ASSESSMENT/PLAN     Diagnoses and all orders for this visit:    1. Onychomycosis (Primary)    2. Type 2 diabetes mellitus with diabetic neuropathy, with long-term current use of insulin (HCC)    3. Peripheral edema      Comprehensive lower extremity examination and evaluation was performed.  Discussed findings and treatment plan including risks, benefits, and treatment options with patient in detail. Patient agreed with treatment plan.  After verbal consent obtained, nail(s) x10 debrided of length and thickness with nail nipper without  incidence  Patient may maintain nails and calluses at home utilizing emery board or pumice stone between visits as needed  Reviewed at home diabetic foot care including daily foot checks   Continue daily use of compression stockings and elevate extremities at rest.  Continue diabetic monitoring and control under direction of PCP.   An After Visit Summary was printed and given to the patient at discharge, including (if requested) any available informative/educational handouts regarding diagnosis, treatment, or medications. All questions were answered to patient/family satisfaction. Should symptoms fail to improve or worsen they agree to call or return to clinic or to go to the Emergency Department. Discussed the importance of following up with any needed screening tests/labs/specialist appointments and any requested follow-up recommended by me today. Importance of maintaining follow-up discussed and patient accepts that missed appointments can delay diagnosis and potentially lead to worsening of conditions.  Return in about 3 months (around 1/26/2022) for Diabetic Foot Exam, Follow-up with Podiatry Provider., or sooner if acute issues arise.        This document has been electronically signed by Mehdi Valencia DPM on October 26, 2021 09:10 CDT

## 2021-10-26 ENCOUNTER — OFFICE VISIT (OUTPATIENT)
Dept: PODIATRY | Facility: CLINIC | Age: 66
End: 2021-10-26

## 2021-10-26 VITALS
HEART RATE: 72 BPM | BODY MASS INDEX: 38.06 KG/M2 | WEIGHT: 281 LBS | HEIGHT: 72 IN | DIASTOLIC BLOOD PRESSURE: 80 MMHG | OXYGEN SATURATION: 97 % | SYSTOLIC BLOOD PRESSURE: 152 MMHG

## 2021-10-26 DIAGNOSIS — E11.40 TYPE 2 DIABETES MELLITUS WITH DIABETIC NEUROPATHY, WITH LONG-TERM CURRENT USE OF INSULIN (HCC): ICD-10-CM

## 2021-10-26 DIAGNOSIS — Z79.4 TYPE 2 DIABETES MELLITUS WITH DIABETIC NEUROPATHY, WITH LONG-TERM CURRENT USE OF INSULIN (HCC): ICD-10-CM

## 2021-10-26 DIAGNOSIS — R60.9 PERIPHERAL EDEMA: ICD-10-CM

## 2021-10-26 DIAGNOSIS — B35.1 ONYCHOMYCOSIS: Primary | ICD-10-CM

## 2021-10-26 PROCEDURE — 11721 DEBRIDE NAIL 6 OR MORE: CPT | Performed by: PODIATRIST

## 2021-10-28 ENCOUNTER — OFFICE VISIT (OUTPATIENT)
Dept: CARDIOLOGY CLINIC | Age: 66
End: 2021-10-28
Payer: MEDICARE

## 2021-10-28 VITALS
WEIGHT: 276 LBS | DIASTOLIC BLOOD PRESSURE: 74 MMHG | BODY MASS INDEX: 37.38 KG/M2 | SYSTOLIC BLOOD PRESSURE: 124 MMHG | OXYGEN SATURATION: 98 % | HEART RATE: 78 BPM | HEIGHT: 72 IN

## 2021-10-28 DIAGNOSIS — E78.2 MIXED HYPERLIPIDEMIA: ICD-10-CM

## 2021-10-28 DIAGNOSIS — I10 ESSENTIAL HYPERTENSION: ICD-10-CM

## 2021-10-28 DIAGNOSIS — E66.01 MORBID OBESITY (HCC): ICD-10-CM

## 2021-10-28 DIAGNOSIS — I25.10 DOUBLE VESSEL CORONARY ARTERY DISEASE: Primary | ICD-10-CM

## 2021-10-28 PROCEDURE — G8484 FLU IMMUNIZE NO ADMIN: HCPCS | Performed by: INTERNAL MEDICINE

## 2021-10-28 PROCEDURE — G8417 CALC BMI ABV UP PARAM F/U: HCPCS | Performed by: INTERNAL MEDICINE

## 2021-10-28 PROCEDURE — 99214 OFFICE O/P EST MOD 30 MIN: CPT | Performed by: INTERNAL MEDICINE

## 2021-10-28 PROCEDURE — G8427 DOCREV CUR MEDS BY ELIG CLIN: HCPCS | Performed by: INTERNAL MEDICINE

## 2021-10-28 PROCEDURE — 4040F PNEUMOC VAC/ADMIN/RCVD: CPT | Performed by: INTERNAL MEDICINE

## 2021-10-28 PROCEDURE — 1036F TOBACCO NON-USER: CPT | Performed by: INTERNAL MEDICINE

## 2021-10-28 PROCEDURE — 3046F HEMOGLOBIN A1C LEVEL >9.0%: CPT | Performed by: INTERNAL MEDICINE

## 2021-10-28 PROCEDURE — 3017F COLORECTAL CA SCREEN DOC REV: CPT | Performed by: INTERNAL MEDICINE

## 2021-10-28 PROCEDURE — 2022F DILAT RTA XM EVC RTNOPTHY: CPT | Performed by: INTERNAL MEDICINE

## 2021-10-28 PROCEDURE — 1123F ACP DISCUSS/DSCN MKR DOCD: CPT | Performed by: INTERNAL MEDICINE

## 2021-10-28 RX ORDER — FINERENONE 10 MG/1
10 TABLET, FILM COATED ORAL DAILY
COMMUNITY
Start: 2021-10-19

## 2021-10-28 ASSESSMENT — ENCOUNTER SYMPTOMS
SHORTNESS OF BREATH: 1
COUGH: 0
BLOOD IN STOOL: 0
ANAL BLEEDING: 0

## 2021-10-28 NOTE — PROGRESS NOTES
Office Visit  Dulce Whitaker is a 77 y.o. male; who present today for Establish Cardiologist and Coronary Artery Disease      HPI  I am seeing this 78-year-old white male in routine follow-up but is the first time I am seeing him as a new cardiologist.  He had complaints of chest discomfort and was evaluated at [facility] and they felt everything was okay but then he was seen here and had a nuclear stress test in May 2018 that was reported as being normal.  For unclear reasons he then underwent cardiac catheterization 2 days later was noted to have a 70% ostial LAD stenosis. It was decided to place a stent. He then had a stent placed to his severe circumflex stenosis in 2019. It was reported that he had anterolateral wall hypokinesis and ejection fraction 40%. He had some issues with edema several months ago for which she had a proBNP that was totally normal and then he had a nuclear stress test and echocardiogram ordered. The nuclear stress test was normal with no evidence for scar or ischemia and normal LV wall motion and systolic function. The echocardiogram demonstrated normal LV systolic function, indeterminate diastolic function, severe LVH and otherwise was normal.  He actually does reasonably well, semiactive and does not experience any chest discomfort. He has some exertional dyspnea that has been stable, denies any palpitations no presyncope or syncope. He does not check his blood pressure regularly at home but he sees providers on about a monthly basis and his pressures have been running normal.  He did state that he gets fatigued after taking showers at times and I suggest that he check his blood pressure after hot shower this if his blood pressure is dropping.   Current Outpatient Medications   Medication Sig Dispense Refill    KERENDIA 10 MG TABS Take 10 mg by mouth daily       prasugrel (EFFIENT) 10 MG TABS Take 1 tablet by mouth once daily 90 tablet 0    gabapentin (NEURONTIN) 300 MG capsule TAKE 1 CAPSULE BY MOUTH THREE TIMES DAILY 270 capsule 0    ezetimibe (ZETIA) 10 MG tablet Take 1 tablet by mouth once daily 90 tablet 0    Insulin Lispro-aabc (LYUMJEV KWIKPEN) 200 UNIT/ML SOPN Inject into the skin Sliding scale      furosemide (LASIX) 20 MG tablet Take 1 tablet by mouth daily (Patient taking differently: Take 20 mg by mouth daily as needed (swelling) ) 30 tablet 5    aspirin 81 MG chewable tablet CHEW AND SWALLOW 1 TABLET BY MOUTH ONCE DAILY 90 tablet 3    metFORMIN (GLUCOPHAGE) 1000 MG tablet TAKE ONE TABLET BY MOUTH TWICE DAILY WITH MEALS 180 tablet 2    fluticasone (FLONASE) 50 MCG/ACT nasal spray Use 2 spray(s) in each nostril once daily 48 g 5    nystatin (MYCOSTATIN) 901953 UNIT/GM powder Apply 3 times daily. 60 g 1    nitroGLYCERIN (NITROSTAT) 0.4 MG SL tablet up to max of 3 total doses. If no relief after 1 dose, call 911. 25 tablet 3    irbesartan (AVAPRO) 150 MG tablet Take 1 tablet by mouth once daily 90 tablet 0    pantoprazole (PROTONIX) 40 MG tablet Take 1 tablet by mouth every morning (before breakfast) (Patient taking differently: Take 40 mg by mouth 2 times daily ) 90 tablet 3    metoprolol tartrate (LOPRESSOR) 50 MG tablet TAKE 1 TABLET BY MOUTH TWICE DAILY 180 tablet 3    ciclopirox (PENLAC) 8 % solution Apply topically greater than 8 hours before showering. Clean nails with alcohol weekly.  1 Bottle 3    Insulin Glargine, 1 Unit Dial, (TOUJEO SOLOSTAR) 300 UNIT/ML SOPN 30 Units by NOT APPLICABLE route daily       INVOKANA 100 MG TABS tablet Take 100 mg by mouth daily      Semaglutide,0.25 or 0.5MG/DOS, (OZEMPIC, 0.25 OR 0.5 MG/DOSE,) 2 MG/1.5ML SOPN Inject 0.25 mg into the skin once a week       mupirocin (BACTROBAN) 2 % ointment APPLY  OINTMENT TO AFFECTED AREA THREE TIMES DAILY 1 Tube 1    Insulin Pen Needle ([employer] PEN NEEDLES) 31G X 6 MM MISC Dx: E11.9 150 each 3    blood glucose test strips (ACCU-CHEK DUNIA) strip Patient is to test 8 times daily. Dx: E11.9  Please provide patient with Accu check Bharati Plus test strips per his Insurance request 900 each 3    Lancets 30G MISC Use to check blood sugar 8 times daily. E11.9 300 each 5    Alcohol Swabs PADS 1 box by Does not apply route 8 times daily 100 each 5    Blood Glucose Monitoring Suppl (ACURA BLOOD GLUCOSE METER) w/Device KIT Please provide patient with Accu check Bharati Meter per his Insurance request. Dx: E11.9 1 kit 0    albuterol sulfate HFA (VENTOLIN HFA) 108 (90 Base) MCG/ACT inhaler Inhale 2 puffs into the lungs every 6 hours as needed for Wheezing 1 Inhaler 3    albuterol (PROVENTIL) (5 MG/ML) 0.5% nebulizer solution Take 1 mL by nebulization every 6 hours as needed for Wheezing 60 mL 0     No current facility-administered medications for this visit.       Medications Discontinued During This Encounter   Medication Reason    furosemide (LASIX) 40 MG tablet DUPLICATE        Allergies   Allergen Reactions    Lipitor [Atorvastatin Calcium] Diarrhea and Other (See Comments)     Dizzy    Tape Miryam Payer Tape] Hives and Rash       Past Medical History:   Diagnosis Date    Aneurysm ([address]     abdominal    Bilateral leg edema     CAD (coronary artery disease)     Chronic kidney disease     Stage 4    Diabetes mellitus, type 2 (HCC)     Fatty liver     GERD (gastroesophageal reflux disease)     HTN (hypertension)     Prolonged emergence from general anesthesia     Seasonal allergies     Vitreous hemorrhage ([facility] 1/22/2021     Negative - Past Medical History for  Past Medical History Pertinent Negatives:   Diagnosis Date Noted    PONV (postoperative nausea and vomiting) 12/16/2020       Past Surgical History:   Procedure Laterality Date    CARPAL TUNNEL RELEASE      COLONOSCOPY  11/04/2015    DR Capone: polys, 5yr recall    COLONOSCOPY N/A 10/11/2016    Dr Lopez-diverticulosis, Sessile serrated AP (-) high grade dysplasia x 1, tubular AP (-) dysplasia x 2, HP x 2--3 yr recall    COLONOSCOPY N/A 12/16/2020    Dr Kraig Hernandez, Mod. Diverticulosis, Internal hemorrhoids-Grade 2 & ext hemorrhoids, 3 year recall    CORONARY ANGIOPLASTY WITH STENT PLACEMENT  06/04/2019    AGA to circ    CORONARY ANGIOPLASTY WITH STENT PLACEMENT  05/2018    AGA to osteal LAD    ESOPHAGEAL DILATATION N/A 12/16/2020    Dr Kraig Hernandez, empirical [address] fr bougie dilation, (+) GERD    EYE SURGERY      Right eye    PRE-MALIGNANT / [address]      Dr. Sung Son ENDOSCOPY  09/20/2016    Dr Lopez-w/dilation over wire, [address]-distal esophageal narrowing, Inna (-)    UPPER GASTROINTESTINAL ENDOSCOPY  09/20/2016    Dr Lopez-w/dilation over wire, [address]-distal esophageal narrowing, Inan (-)     Social History     Occupational History    Not on file   Tobacco Use    Smoking status: Never Smoker    Smokeless tobacco: Never Used   Vaping Use    Vaping Use: Never used   Substance and Sexual Activity    Alcohol use: No    Drug use: No    Sexual activity: Not on file        Family History   Problem Relation Age of Onset    Colon Cancer Mother     Prostate Cancer Father     Other Father         HX of blood clot    Diabetes Sister     Cancer Sister     Diabetes Brother     Colon Polyps Son     Esophageal Cancer Neg Hx     Liver Cancer Neg Hx     Liver Disease Neg Hx     Stomach Cancer Neg Hx     Rectal Cancer Neg Hx        Review of Systems  Review of Systems   Constitutional: Negative for fatigue and fever. Respiratory: Positive for shortness of breath. Negative for cough. Cardiovascular: Positive for leg swelling. Negative for chest pain and palpitations. Gastrointestinal: Negative for anal bleeding and blood in stool. Neurological: Negative for syncope, facial asymmetry and speech difficulty.          Physical Exam  /74   Pulse 78   Ht 6' (1.829 m)   Wt 276 lb (125.2 kg)   SpO2 98%   BMI 37.43 kg/m²    Physical Exam  Constitutional:       Appearance: Normal appearance. He is morbidly obese. Cardiovascular:      Rate and Rhythm: Normal rate and regular rhythm. Pulses:           Dorsalis pedis pulses are 2+ on the right side and 2+ on the left side. Posterior tibial pulses are 2+ on the right side and 2+ on the left side. Heart sounds: Normal heart sounds. No gallop. Pulmonary:      Effort: Pulmonary effort is normal.      Breath sounds: Normal breath sounds. Abdominal:      General: Abdomen is flat. Bowel sounds are normal.      Palpations: Abdomen is soft. Musculoskeletal:         General: No swelling. Right lower le+ Pitting Edema present. Left lower le+ Pitting Edema present. Skin:     General: Skin is warm and dry. Neurological:      Mental Status: He is alert. Assessment/Plan    EKG Findings:  Not performed today    Problem List Items Addressed This Visit        Cardiology Problems    Essential hypertension    Double vessel coronary artery disease - Primary    Mixed hyperlipidemia       Other    DM type 2, uncontrolled, with neuropathy ([address]    Morbid obesity ([address]           Diagnosis Orders   1. Double vessel coronary artery disease      Stent, ostial LAD, 2018, stent, circumflex, 2019. Negative pharmacologic nuclear stress test, 2021. Normal EF by echo, 2021   2. DM type 2, uncontrolled, with neuropathy ([address]     3. Essential hypertension     4. Mixed hyperlipidemia     5. Morbid obesity ([address]         Recommendations:   Diet: ADA, low-sodium, low-fat, calorie reduced diet to lose weight  Activity: Normal activities  Medication Changes: Discontinue Effient, no need for, continue aspirin and continue other medications as prescribed. This patient has never had stroke or TIA, no symptoms to suggest cerebral embolic episodes, normal pedal pulses with no peripheral arterial disease. He does not need dual antiplatelet therapy.   I did encourage him to check his blood pressure regularly. No orders of the defined types were placed in this encounter. No orders of the defined types were placed in this encounter. Return in about 6 months (around 4/28/2022) for APRN, routine.

## 2021-11-01 DIAGNOSIS — E78.5 HYPERLIPIDEMIA, UNSPECIFIED HYPERLIPIDEMIA TYPE: ICD-10-CM

## 2021-11-01 RX ORDER — EZETIMIBE 10 MG/1
TABLET ORAL
Qty: 90 TABLET | Refills: 0 | Status: SHIPPED | OUTPATIENT
Start: 2021-11-01 | End: 2022-06-23

## 2021-11-01 NOTE — TELEPHONE ENCOUNTER
Netta Second called to request a refill on his medication.       Last office visit : 9/7/2021   Next office visit : 12/22/2021     Requested Prescriptions     Signed Prescriptions Disp Refills    ezetimibe (ZETIA) 10 MG tablet 90 tablet 0     Sig: Take 1 tablet by mouth once daily     Authorizing Provider: Tien Hawkins     Ordering User: Lina Dyer

## 2021-12-07 ENCOUNTER — LAB (OUTPATIENT)
Dept: LAB | Facility: HOSPITAL | Age: 66
End: 2021-12-07

## 2021-12-07 ENCOUNTER — OFFICE VISIT (OUTPATIENT)
Dept: ENDOCRINOLOGY | Facility: CLINIC | Age: 66
End: 2021-12-07

## 2021-12-07 VITALS
SYSTOLIC BLOOD PRESSURE: 130 MMHG | WEIGHT: 273 LBS | DIASTOLIC BLOOD PRESSURE: 80 MMHG | BODY MASS INDEX: 36.98 KG/M2 | HEART RATE: 72 BPM | OXYGEN SATURATION: 97 % | HEIGHT: 72 IN

## 2021-12-07 DIAGNOSIS — E55.9 VITAMIN D DEFICIENCY: ICD-10-CM

## 2021-12-07 DIAGNOSIS — E11.69 MIXED DIABETIC HYPERLIPIDEMIA ASSOCIATED WITH TYPE 2 DIABETES MELLITUS (HCC): ICD-10-CM

## 2021-12-07 DIAGNOSIS — E11.65 TYPE 2 DIABETES MELLITUS WITH HYPERGLYCEMIA, WITH LONG-TERM CURRENT USE OF INSULIN (HCC): ICD-10-CM

## 2021-12-07 DIAGNOSIS — E11.59 HYPERTENSION ASSOCIATED WITH DIABETES (HCC): ICD-10-CM

## 2021-12-07 DIAGNOSIS — E78.2 MIXED DIABETIC HYPERLIPIDEMIA ASSOCIATED WITH TYPE 2 DIABETES MELLITUS (HCC): ICD-10-CM

## 2021-12-07 DIAGNOSIS — Z76.0 MEDICATION REFILL: ICD-10-CM

## 2021-12-07 DIAGNOSIS — N18.31 CHRONIC KIDNEY DISEASE (CKD) STAGE G3A/A2, MODERATELY DECREASED GLOMERULAR FILTRATION RATE (GFR) BETWEEN 45-59 ML/MIN/1.73 SQUARE METER AND ALBUMINURIA CREATININE RATIO BETWEEN 30-299 MG/G (CMS* (HCC): ICD-10-CM

## 2021-12-07 DIAGNOSIS — I15.2 HYPERTENSION ASSOCIATED WITH DIABETES (HCC): ICD-10-CM

## 2021-12-07 DIAGNOSIS — E29.1 MALE HYPOGONADISM: ICD-10-CM

## 2021-12-07 DIAGNOSIS — E83.52 HYPERCALCEMIA: ICD-10-CM

## 2021-12-07 DIAGNOSIS — Z79.4 TYPE 2 DIABETES MELLITUS WITH HYPERGLYCEMIA, WITH LONG-TERM CURRENT USE OF INSULIN (HCC): Primary | ICD-10-CM

## 2021-12-07 DIAGNOSIS — E11.65 TYPE 2 DIABETES MELLITUS WITH HYPERGLYCEMIA, WITH LONG-TERM CURRENT USE OF INSULIN (HCC): Primary | ICD-10-CM

## 2021-12-07 DIAGNOSIS — J30.2 SEASONAL ALLERGIES: ICD-10-CM

## 2021-12-07 DIAGNOSIS — Z79.4 TYPE 2 DIABETES MELLITUS WITH HYPERGLYCEMIA, WITH LONG-TERM CURRENT USE OF INSULIN (HCC): ICD-10-CM

## 2021-12-07 LAB
25(OH)D3 SERPL-MCNC: 26.2 NG/ML
ALBUMIN SERPL-MCNC: 4.4 G/DL (ref 3.5–5.2)
ALBUMIN UR-MCNC: 2.8 MG/DL
ALBUMIN/GLOB SERPL: 1.4 G/DL
ALP SERPL-CCNC: 91 U/L (ref 39–117)
ALT SERPL W P-5'-P-CCNC: 14 U/L (ref 1–41)
ANION GAP SERPL CALCULATED.3IONS-SCNC: 10 MMOL/L (ref 5–15)
AST SERPL-CCNC: 12 U/L (ref 1–40)
BASOPHILS # BLD AUTO: 0.1 10*3/MM3 (ref 0–0.2)
BASOPHILS NFR BLD AUTO: 1.1 % (ref 0–1.5)
BILIRUB SERPL-MCNC: 0.3 MG/DL (ref 0–1.2)
BUN SERPL-MCNC: 35 MG/DL (ref 8–23)
BUN/CREAT SERPL: 19 (ref 7–25)
CALCIUM SPEC-SCNC: 10.5 MG/DL (ref 8.6–10.5)
CHLORIDE SERPL-SCNC: 105 MMOL/L (ref 98–107)
CHOLEST SERPL-MCNC: 175 MG/DL (ref 0–200)
CO2 SERPL-SCNC: 25 MMOL/L (ref 22–29)
CREAT SERPL-MCNC: 1.84 MG/DL (ref 0.76–1.27)
CREAT UR-MCNC: 40.7 MG/DL
DEPRECATED RDW RBC AUTO: 41.9 FL (ref 37–54)
EOSINOPHIL # BLD AUTO: 0.27 10*3/MM3 (ref 0–0.4)
EOSINOPHIL NFR BLD AUTO: 3.1 % (ref 0.3–6.2)
ERYTHROCYTE [DISTWIDTH] IN BLOOD BY AUTOMATED COUNT: 13.7 % (ref 12.3–15.4)
GFR SERPL CREATININE-BSD FRML MDRD: 37 ML/MIN/1.73
GLOBULIN UR ELPH-MCNC: 3.2 GM/DL
GLUCOSE SERPL-MCNC: 141 MG/DL (ref 65–99)
HBA1C MFR BLD: 8.82 % (ref 4.8–5.6)
HCT VFR BLD AUTO: 48.6 % (ref 37.5–51)
HDLC SERPL-MCNC: 27 MG/DL (ref 40–60)
HGB BLD-MCNC: 15.3 G/DL (ref 13–17.7)
IMM GRANULOCYTES # BLD AUTO: 0.02 10*3/MM3 (ref 0–0.05)
IMM GRANULOCYTES NFR BLD AUTO: 0.2 % (ref 0–0.5)
LDLC SERPL CALC-MCNC: 96 MG/DL (ref 0–100)
LDLC/HDLC SERPL: 3.18 {RATIO}
LYMPHOCYTES # BLD AUTO: 2.2 10*3/MM3 (ref 0.7–3.1)
LYMPHOCYTES NFR BLD AUTO: 25.2 % (ref 19.6–45.3)
MCH RBC QN AUTO: 26.7 PG (ref 26.6–33)
MCHC RBC AUTO-ENTMCNC: 31.5 G/DL (ref 31.5–35.7)
MCV RBC AUTO: 84.7 FL (ref 79–97)
MICROALBUMIN/CREAT UR: 68.8 MG/G
MONOCYTES # BLD AUTO: 0.57 10*3/MM3 (ref 0.1–0.9)
MONOCYTES NFR BLD AUTO: 6.5 % (ref 5–12)
NEUTROPHILS NFR BLD AUTO: 5.56 10*3/MM3 (ref 1.7–7)
NEUTROPHILS NFR BLD AUTO: 63.9 % (ref 42.7–76)
NRBC BLD AUTO-RTO: 0 /100 WBC (ref 0–0.2)
PLATELET # BLD AUTO: 250 10*3/MM3 (ref 140–450)
PMV BLD AUTO: 11.9 FL (ref 6–12)
POTASSIUM SERPL-SCNC: 4.6 MMOL/L (ref 3.5–5.2)
PROT SERPL-MCNC: 7.6 G/DL (ref 6–8.5)
RBC # BLD AUTO: 5.74 10*6/MM3 (ref 4.14–5.8)
SODIUM SERPL-SCNC: 140 MMOL/L (ref 136–145)
TRIGL SERPL-MCNC: 311 MG/DL (ref 0–150)
TSH SERPL DL<=0.05 MIU/L-ACNC: 2.83 UIU/ML (ref 0.27–4.2)
VIT B12 BLD-MCNC: 505 PG/ML (ref 211–946)
VLDLC SERPL-MCNC: 52 MG/DL (ref 5–40)
WBC NRBC COR # BLD: 8.72 10*3/MM3 (ref 3.4–10.8)

## 2021-12-07 PROCEDURE — 82607 VITAMIN B-12: CPT

## 2021-12-07 PROCEDURE — 80053 COMPREHEN METABOLIC PANEL: CPT

## 2021-12-07 PROCEDURE — 36415 COLL VENOUS BLD VENIPUNCTURE: CPT

## 2021-12-07 PROCEDURE — 80061 LIPID PANEL: CPT

## 2021-12-07 PROCEDURE — 82043 UR ALBUMIN QUANTITATIVE: CPT

## 2021-12-07 PROCEDURE — 84443 ASSAY THYROID STIM HORMONE: CPT

## 2021-12-07 PROCEDURE — 82306 VITAMIN D 25 HYDROXY: CPT

## 2021-12-07 PROCEDURE — 85025 COMPLETE CBC W/AUTO DIFF WBC: CPT

## 2021-12-07 PROCEDURE — 99214 OFFICE O/P EST MOD 30 MIN: CPT | Performed by: INTERNAL MEDICINE

## 2021-12-07 PROCEDURE — 82570 ASSAY OF URINE CREATININE: CPT

## 2021-12-07 PROCEDURE — 83036 HEMOGLOBIN GLYCOSYLATED A1C: CPT

## 2021-12-07 PROCEDURE — 95251 CONT GLUC MNTR ANALYSIS I&R: CPT | Performed by: INTERNAL MEDICINE

## 2021-12-07 RX ORDER — INSULIN LISPRO-AABC 100 [IU]/ML
70 INJECTION, SOLUTION SUBCUTANEOUS
COMMUNITY
End: 2022-08-09 | Stop reason: SDUPTHER

## 2021-12-07 RX ORDER — METOPROLOL TARTRATE 50 MG/1
TABLET, FILM COATED ORAL
Qty: 180 TABLET | Refills: 0 | Status: SHIPPED | OUTPATIENT
Start: 2021-12-07 | End: 2022-03-08

## 2021-12-07 RX ORDER — METFORMIN HYDROCHLORIDE 500 MG/1
TABLET, EXTENDED RELEASE ORAL
Qty: 120 TABLET | Refills: 11 | Status: SHIPPED | OUTPATIENT
Start: 2021-12-07 | End: 2022-11-17 | Stop reason: SDUPTHER

## 2021-12-07 RX ORDER — FLUTICASONE PROPIONATE 50 MCG
SPRAY, SUSPENSION (ML) NASAL
Qty: 48 G | Refills: 0 | Status: SHIPPED | OUTPATIENT
Start: 2021-12-07 | End: 2022-02-03

## 2021-12-07 RX ORDER — SEMAGLUTIDE 1.34 MG/ML
1 INJECTION, SOLUTION SUBCUTANEOUS WEEKLY
Qty: 9 ML | Refills: 3 | Status: SHIPPED | OUTPATIENT
Start: 2021-12-07 | End: 2022-05-10 | Stop reason: SDUPTHER

## 2021-12-07 NOTE — PROGRESS NOTES
"Mick Sims is a 66 y.o. male who presents for follow up Type 2 diabetes      HPI    T2DM    Duration 10 years    Complications - nephropathy, retinopathy, peripheral neuropathy and cardiovascular disease    Current symptoms/problems  paresthesia of the feet and visual disturbances , ED     Alleviating Factors: Compliance       Current monitoring regimen: home blood tests - using brenna       PE    /80   Pulse 72   Ht 182.9 cm (72\")   Wt 124 kg (273 lb)   SpO2 97%   BMI 37.03 kg/m²   AOx3  No visible goiter  RRR  CTA  No Edema    Labs    Lab Results   Component Value Date    WBC 7.31 09/01/2021    HGB 15.1 09/01/2021    HCT 47.4 09/01/2021    MCV 86.0 09/01/2021     09/01/2021     Lab Results   Component Value Date    GLUCOSE 168 (H) 09/01/2021    BUN 22 09/01/2021    CREATININE 1.53 (H) 09/01/2021    EGFRIFNONA 46 (L) 09/01/2021    EGFRIFAFRI 52 (L) 07/12/2021    BCR 14.4 09/01/2021    K 4.7 09/01/2021    CO2 25.0 09/01/2021    CALCIUM 9.9 09/01/2021    CALCIUM 9.7 09/01/2021    ALBUMIN 4.30 09/01/2021    AST 14 09/01/2021    ALT 17 09/01/2021               Assessment/Plan       ICD-10-CM ICD-9-CM   1. Type 2 diabetes mellitus with hyperglycemia, with long-term current use of insulin ([facility])  E11.65 250.00    Z79.4 790.29     V58.67   2. Hypertension associated with diabetes ([facility])  E11.59 250.80    I15.2 401.9   3. Mixed diabetic hyperlipidemia associated with type 2 diabetes mellitus ([facility])  E11.69 250.80    E78.2 272.2   4. Vitamin D deficiency  E55.9 268.9   5. Hypercalcemia  E83.52 275.42   6. Chronic kidney disease (CKD) stage G3a/A2, moderately decreased glomerular filtration rate (GFR) between 45-59 mL/min/1.73 square meter and albuminuria creatinine ratio between  mg/g (CMS* ([facility])  N18.31 585.3   7. Male hypogonadism  E29.1 257.2       Pt has type 2 diabetes w ckd and cad     Glycemic Management:   Lab Results   Component Value Date    HGBA1C 8.80 (H) 09/01/2021    HGBA1C 8.67 (H) " "05/28/2021    HGBA1C 9.10 (H) 02/02/2021       Fercho 2 weeks reviewed  t2dm w hyperglycemia     2 week review    In range 32%  No lows   Best control is overnight      toujeo 40 -- now lantus 30 qam - toujeo 40   Now doing 30     ========================================    Ozempic      0.5 mg weekly ---- increase to 1 mg weekly , having trouble affording but willing to pay doing only half   Now increase to 1 mg      ============================================    Metformin 1000 mg twice a day ===   Change to xr   ============================================      Invokana 100mg  daily  - gave farxiga samples   This time jardiance samples     ============================================    Humalog     5 units per 15 grams , he understands well - INCREASE TO 7 UNITS PER CARB     May need more     Still eating too much         ======================  HTN  /80   Pulse 72   Ht 182.9 cm (72\")   Wt 124 kg (273 lb)   SpO2 97%   BMI 37.03 kg/m²       bp controlled on irbesartan now 300  hctz stopped due to hypercalcemia now corrected  He was also not taking any calcium supplements and avoiding dietary - he can resume  Obtain PTH  And calcium    ===========  Lipids controlled on lipitor but allergic, change to zetia and this worked  Lab Results   Component Value Date    CHOL 181 09/01/2021    CHLPL 177 12/13/2019    TRIG 275 (H) 09/01/2021    HDL 28 (L) 09/01/2021     (H) 09/01/2021      Microvascular complications     Renal     CKD stage 3a/A2  Add kerendia 10 mg daily ( already on sglt2 and ARB )    Eyes  Yes retinopathy     Neuropathy   Yes on gabapentin       Vit D Def  Vit D low, start 1000 units daily     This document has been electronically signed by Juancho Mobley MD on December 7, 2021 09:28 CST          A copy of my note was sent to Ginny Collazo APRN    Please see my above opinion and suggestions.     I ENTERED LABS FOR THIS AND NEXT TIME  12/07/21       "

## 2021-12-07 NOTE — TELEPHONE ENCOUNTER
Lacey Oconnor called to request a refill on his medication.       Last office visit : 9/7/2021   Next office visit : 12/22/2021     Requested Prescriptions     Pending Prescriptions Disp Refills    metoprolol tartrate (LOPRESSOR) 50 MG tablet [Pharmacy Med Name: Metoprolol Tartrate 50 MG Oral Tablet] 180 tablet 0     Sig: Take 1 tablet by mouth twice daily    fluticasone (FLONASE) 50 MCG/ACT nasal spray [Pharmacy Med Name: Fluticasone Propionate 50 MCG/ACT Nasal Suspension] 48 g 0     Sig: Use 2 spray(s) in each nostril once daily            Prasanth

## 2021-12-11 DIAGNOSIS — E55.9 VITAMIN D DEFICIENCY: ICD-10-CM

## 2021-12-11 DIAGNOSIS — E11.65 TYPE 2 DIABETES MELLITUS WITH HYPERGLYCEMIA, WITH LONG-TERM CURRENT USE OF INSULIN (HCC): Primary | ICD-10-CM

## 2021-12-11 DIAGNOSIS — N18.31 CHRONIC KIDNEY DISEASE (CKD) STAGE G3A/A2, MODERATELY DECREASED GLOMERULAR FILTRATION RATE (GFR) BETWEEN 45-59 ML/MIN/1.73 SQUARE METER AND ALBUMINURIA CREATININE RATIO BETWEEN 30-299 MG/G (CMS* (HCC): ICD-10-CM

## 2021-12-11 DIAGNOSIS — Z79.4 TYPE 2 DIABETES MELLITUS WITH HYPERGLYCEMIA, WITH LONG-TERM CURRENT USE OF INSULIN (HCC): Primary | ICD-10-CM

## 2021-12-14 DIAGNOSIS — Z76.0 MEDICATION REFILL: ICD-10-CM

## 2021-12-14 RX ORDER — PRASUGREL 10 MG/1
TABLET, FILM COATED ORAL
Qty: 90 TABLET | Refills: 0 | Status: SHIPPED | OUTPATIENT
Start: 2021-12-14 | End: 2022-03-15

## 2021-12-22 ENCOUNTER — OFFICE VISIT (OUTPATIENT)
Dept: PRIMARY CARE CLINIC | Age: 66
End: 2021-12-22
Payer: MEDICARE

## 2021-12-22 VITALS
SYSTOLIC BLOOD PRESSURE: 116 MMHG | DIASTOLIC BLOOD PRESSURE: 72 MMHG | HEART RATE: 78 BPM | WEIGHT: 275 LBS | OXYGEN SATURATION: 97 % | BODY MASS INDEX: 37.25 KG/M2 | HEIGHT: 72 IN | TEMPERATURE: 96.4 F

## 2021-12-22 DIAGNOSIS — Z00.00 ROUTINE GENERAL MEDICAL EXAMINATION AT A HEALTH CARE FACILITY: Primary | ICD-10-CM

## 2021-12-22 DIAGNOSIS — I25.10 CORONARY ARTERY DISEASE DUE TO LIPID RICH PLAQUE: ICD-10-CM

## 2021-12-22 DIAGNOSIS — I10 ESSENTIAL HYPERTENSION: ICD-10-CM

## 2021-12-22 DIAGNOSIS — E78.2 MIXED HYPERLIPIDEMIA: ICD-10-CM

## 2021-12-22 DIAGNOSIS — I25.83 CORONARY ARTERY DISEASE DUE TO LIPID RICH PLAQUE: ICD-10-CM

## 2021-12-22 DIAGNOSIS — E66.01 MORBID OBESITY (HCC): ICD-10-CM

## 2021-12-22 PROCEDURE — G8484 FLU IMMUNIZE NO ADMIN: HCPCS | Performed by: FAMILY MEDICINE

## 2021-12-22 PROCEDURE — 2022F DILAT RTA XM EVC RTNOPTHY: CPT | Performed by: FAMILY MEDICINE

## 2021-12-22 PROCEDURE — 3017F COLORECTAL CA SCREEN DOC REV: CPT | Performed by: FAMILY MEDICINE

## 2021-12-22 PROCEDURE — 1036F TOBACCO NON-USER: CPT | Performed by: FAMILY MEDICINE

## 2021-12-22 PROCEDURE — 99213 OFFICE O/P EST LOW 20 MIN: CPT | Performed by: FAMILY MEDICINE

## 2021-12-22 PROCEDURE — G0439 PPPS, SUBSEQ VISIT: HCPCS | Performed by: FAMILY MEDICINE

## 2021-12-22 PROCEDURE — G8417 CALC BMI ABV UP PARAM F/U: HCPCS | Performed by: FAMILY MEDICINE

## 2021-12-22 PROCEDURE — G8427 DOCREV CUR MEDS BY ELIG CLIN: HCPCS | Performed by: FAMILY MEDICINE

## 2021-12-22 PROCEDURE — 4040F PNEUMOC VAC/ADMIN/RCVD: CPT | Performed by: FAMILY MEDICINE

## 2021-12-22 PROCEDURE — 1123F ACP DISCUSS/DSCN MKR DOCD: CPT | Performed by: FAMILY MEDICINE

## 2021-12-22 ASSESSMENT — PATIENT HEALTH QUESTIONNAIRE - PHQ9
1. LITTLE INTEREST OR PLEASURE IN DOING THINGS: 0
SUM OF ALL RESPONSES TO PHQ QUESTIONS 1-9: 0
SUM OF ALL RESPONSES TO PHQ9 QUESTIONS 1 & 2: 0
2. FEELING DOWN, DEPRESSED OR HOPELESS: 0

## 2021-12-22 ASSESSMENT — LIFESTYLE VARIABLES: HOW OFTEN DO YOU HAVE A DRINK CONTAINING ALCOHOL: 0

## 2021-12-22 NOTE — PATIENT INSTRUCTIONS
We are committed to providing you with the best care possible. In order to help us achieve these goals please remember to bring all medications, herbal products, and over the counter supplements with you to each visit. If your provider has ordered testing for you, please be sure to follow up with our office if you have not received results within 7 days after the testing took place. *If you receive a survey after visiting one of our offices, please take time to share your experience concerning your physician office visit. These surveys are confidential and no health information about you is shared. We are eager to improve for you and we are counting on your feedback to help make that happen. Learning About Cutting Calories  How do calories affect your weight? Food gives your body energy. Energy from the food you eat is measured in calories. This energy keeps your heart beating, your brain active, and your muscles working. Your body needs a certain number of calories each day. After your body uses the calories it needs, it stores extra calories as fat. To lose weight safely, you have to eat fewer calories while eating in a healthy way. How many calories do you need each day? The more active you are, the more calories you need. When you are less active, you need fewer calories. How many calories you need each day also depends on several things, including your age and whether you are male or female. Here are some general guidelines for adults:  · Less active women and older adults need 1,600 to 2,000 calories each day. · Active women and less active men need 2,000 to 2,400 calories each day. · Active men need 2,400 to 3,000 calories each day. How can you cut calories and eat healthy meals? Whole grains, vegetables and fruits, and dried beans are good lower-calorie foods. They give you lots of nutrients and fiber. And they fill you up.   Sweets, energy drinks, and soda pop are high in calories. They give you few nutrients and no fiber. Try to limit soda pop, fruit juice, and energy drinks. Drink water instead. Some fats can be part of a healthy diet. But cutting back on fats from highly processed foods like fast foods and many snack foods is a good way to lower the calories in your diet. Also, use smaller amounts of fats like butter, margarine, salad dressing, and mayonnaise. Add fresh garlic, lemon, or herbs to your meals to add flavor without adding fat. Meats and dairy products can be a big source of hidden fats. Try to choose lean or low-fat versions of these products. Fat-free cookies, candies, chips, and frozen treats can still be high in sugar and calories. Some fat-free foods have more calories than regular ones. Eat fat-free treats in moderation, as you would other foods. If your favorite foods are high in fat, salt, sugar, or calories, limit how often you eat them. Eat smaller servings, or look for healthy substitutes. Fill up on fruits, vegetables, and whole grains. Eating at home  · Use meat as a side dish instead of as the main part of your meal.  · Try main dishes that use whole wheat pasta, brown rice, dried beans, or vegetables. · Find ways to cook with little or no fat, such as broiling, steaming, or grilling. · Use cooking spray instead of oil. If you use oil, use a monounsaturated oil, such as canola or olive oil. · Trim fat from meats before you cook them. · Drain off fat after you brown the meat or while you roast it. · Chill soups and stews after you cook them. Then skim the fat off the top after it hardens. Eating out  · Order foods that are broiled or poached rather than fried or breaded. · Cut back on the amount of butter or margarine that you use on bread. · Order sauces, gravies, and salad dressings on the side, and use only a little. · When you order pasta, choose tomato sauce rather than cream sauce.   · Ask for salsa with your baked potato instead of sour cream, butter, cheese, or urbina. · Order meals in a small size instead of upgrading to a large. · Share an entree, or take part of your food home to eat as another meal.  · Share appetizers and desserts. Where can you learn more? Go to https://chpezhangeweb.[email or web address] org and sign in to your My[employer]App account. Enter V934 in the [employer] box to learn more about \"Learning About Cutting Calories. \"     If you do not have an account, please click on the \"Sign Up Now\" link. Current as of: September 8, 2021               Content Version: 13.1  © 2006-2021 Healthwise, [employer]. Care instructions adapted under license by [facility] ([facility]). If you have questions about a medical condition or this instruction, always ask your healthcare professional. [place] [id number] any warranty or liability for your use of this information. Learning About Healthy Weight  What is a healthy weight? A healthy weight is the weight at which you feel good about yourself and have energy for work and play. It's also one that lowers your risk for health problems. What can you do to stay at a healthy weight? It can be hard to stay at a healthy weight, especially when fast food, vending-machine snacks, and processed foods are so easy to find. And with your busy lifestyle, activity may be low on your list of things to do. But staying at a healthy weight may be easier than you think. Here are some dos and don'ts for staying at a healthy weight. Do eat healthy foods  The kinds of foods you eat have a big impact on both your weight and your health. Reaching and staying at a healthy weight is not about going on a diet. It's about making healthier food choices every day and changing your diet for good. Healthy eating means eating a variety of foods so that you get all the nutrients you need. Your body needs protein, carbohydrate, and fats for energy.  They keep your heart beating, your brain active, and your muscles working. On most days, try to eat from each food group. This means eating a variety of:  · Whole grains, such as whole wheat breads and pastas. · Fruits and vegetables. · Dairy products, such as low-fat milk, yogurt, and cheese. · Lean proteins, such as all types of fish, chicken without the skin, and beans. Don't have too much or too little of one thing. All foods, if eaten in moderation, can be part of healthy eating. Even sweets can be okay. If your favorite foods are high in fat, salt, sugar, or calories, limit how often you eat them. Eat smaller servings, or look for healthy substitutes. Do watch what you eat  Many people eat more than their bodies need. Part of staying at a healthy weight means learning how much food you really need from day to day and not eating more than that. Even with healthy foods, eating too much can make you gain weight. Having a well-balanced diet means that you eat enough, but not too much, and that your food gives you the nutrients you need to stay healthy. So listen to your body. Eat when you're hungry. Stop when you feel satisfied. It's a good idea to have healthy snacks ready for when you get hungry. Keep healthy snacks with you at work, in your car, and at home. If you have a healthy snack easily available, you'll be less likely to pick a candy bar or bag of chips from a vending machine instead. Some healthy snacks you might want to keep on hand are fruit, low-fat yogurt, string cheese, low-fat microwave popcorn, raisins and other dried fruit, nuts, whole wheat crackers, pretzels, carrots, celery sticks, and broccoli. Do some physical activity  A big part of reaching and staying at a healthy weight is being active. When you're active, you burn calories. This makes it easier to reach and stay at a healthy weight.  When you're active on a regular basis, your body burns more calories, even when you're at rest. Being active helps you lose fat and build lean muscle. Try to be active for at least 1 hour every day. This may sound like a lot, but it's okay to be active in smaller blocks of time that add up to 1 hour a day. Any activity that makes your heart beat faster and keeps it there for a while counts. A brisk walk, run, or swim will get your heart beating faster. So will climbing stairs, shooting baskets, or cycling. Even some household chores like vacuuming and mowing the lawn will get your heart rate up. Pick activities that you enjoyones that make your heart beat faster, your muscles stronger, and your muscles and joints more flexible. If you find more than one thing you like doing, do them all. You don't have to do the same thing every day. Don't diet  Diets don't work. Diets are temporary. Because you give up so much when you diet, you may be hungry and think about food all the time. And after you stop dieting, you also may overeat to make up for what you missed. Most people who diet end up gaining back the pounds they lostand more. Remember that healthy bodies come in lots of shapes and sizes. Everyone can get healthier by eating better and being more active. Where can you learn more? Go to https://TuckerNuck.[employer]. org and sign in to your [employer]u account. Enter 937 1106 in the [employer] box to learn more about \"Learning About Healthy Weight. \"     If you do not have an account, please click on the \"Sign Up Now\" link. Current as of: March 17, 2021               Content Version: 13.1  © 2652-2321 Healthwise, Inc[employer]. Care instructions adapted under license by [facility] ([facility]). If you have questions about a medical condition or this instruction, always ask your healthcare professional. [place] [address] any warranty or liability for your use of this information. Eating Healthy Foods: Care Instructions  Your Care Instructions     Eating healthy foods can help lower your risk for disease. Healthy food gives you energy and keeps your heart strong, your brain active, your muscles working, and your bones strong. A healthy diet includes a variety of foods from the basic food groups: grains, vegetables, fruits, milk and milk products, and meat and beans. Some people may eat more of their favorite foods from only one food group and, as a result, miss getting the nutrients they need. So, it is important to pay attention not only to what you eat but also to what you are missing from your diet. You can eat a healthy, balanced diet by making a few small changes. Follow-up care is a key part of your treatment and safety. Be sure to make and go to all appointments, and call your doctor if you are having problems. It's also a good idea to know your test results and keep a list of the medicines you take. How can you care for yourself at home? Look at what you eat  · Keep a food diary for a week or two and record everything you eat or drink. Track the number of servings you eat from each food group. · For a balanced diet every day, eat a variety of:  ? 6 or more ounce-equivalents of grains, such as cereals, breads, crackers, rice, or pasta, every day. An ounce-equivalent is 1 slice of bread, 1 cup of ready-to-eat cereal, or ½ cup of cooked rice, cooked pasta, or cooked cereal.  ? 2½ cups of vegetables, especially:  § Dark-green vegetables such as broccoli and spinach. § Orange vegetables such as carrots and sweet potatoes. § Dry beans (such as samuel and kidney beans) and peas (such as lentils). ? 2 cups of fresh, frozen, or canned fruit. A small apple or 1 banana or orange equals 1 cup. ? 3 cups of nonfat or low-fat milk, yogurt, or other milk products. ? 5½ ounces of meat and beans, such as chicken, fish, lean meat, beans, nuts, and seeds. One egg, 1 tablespoon of peanut butter, ½ ounce nuts or seeds, or ¼ cup of cooked beans equals 1 ounce of meat.   · Learn how to read food labels for serving sizes and ingredients. Fast-food and convenience-food meals often contain few or no fruits or vegetables. Make sure you eat some fruits and vegetables to make the meal more nutritious. · Look at your food diary. For each food group, add up what you have eaten and then divide the total by the number of days. This will give you an idea of how much you are eating from each food group. See if you can find some ways to change your diet to make it more healthy. Start small  · Do not try to make dramatic changes to your diet all at once. You might feel that you are missing out on your favorite foods and then be more likely to fail. · Start slowly, and gradually change your habits. Try some of the following:  ? Use whole wheat bread instead of white bread. ? Use nonfat or low-fat milk instead of whole milk. ? Eat brown rice instead of white rice, and eat whole wheat pasta instead of white-flour pasta. ? Try low-fat cheeses and low-fat yogurt. ? Add more fruits and vegetables to meals and have them for snacks. ? Add lettuce, tomato, cucumber, and onion to sandwiches. ? Add fruit to yogurt and cereal.  Enjoy food  · You can still eat your favorite foods. You just may need to eat less of them. If your favorite foods are high in fat, salt, and sugar, limit how often you eat them, but do not cut them out entirely. · Eat a wide variety of foods. Make healthy choices when eating out  · The type of restaurant you choose can help you make healthy choices. Even fast-food chains are now offering more low-fat or healthier choices on the menu. · Choose smaller portions, or take half of your meal home. · When eating out, try:  ? A veggie pizza with a whole wheat crust or grilled chicken (instead of sausage or pepperoni). ? Pasta with roasted vegetables, grilled chicken, or marinara sauce instead of cream sauce. ? A vegetable wrap or grilled chicken wrap. ? Broiled or poached food instead of fried or breaded items.   Make healthy choices easy  · Buy packaged, prewashed, ready-to-eat fresh vegetables and fruits, such as baby carrots, salad mixes, and chopped or shredded broccoli and cauliflower. · Buy packaged, presliced fruits, such as melon or pineapple. · Choose 100% fruit or vegetable juice instead of soda. Limit juice intake to 4 to 6 oz (½ to ¾ cup) a day. · Blend low-fat yogurt, fruit juice, and canned or frozen fruit to make a smoothie for breakfast or a snack. Where can you learn more? Go to https://Rixtypepiceweb.[employer]. org and sign in to your SHARK[employer] account. Enter B255 in the [employer] box to learn more about \"Eating Healthy Foods: Care Instructions. \"     If you do not have an account, please click on the \"Sign Up Now\" link. Current as of: September 8, 2021               Content Version: 13.1  © 9238-1641 [employer]. Care instructions adapted under license by [facility] ([facility]). If you have questions about a medical condition or this instruction, always ask your healthcare professional. [place] [address] any warranty or liability for your use of this information. Personalized Preventive Plan for Josias Llamas - 12/22/2021  Medicare offers a range of preventive health benefits. Some of the tests and screenings are paid in full while other may be subject to a deductible, co-insurance, and/or copay. Some of these benefits include a comprehensive review of your medical history including lifestyle, illnesses that may run in your family, and various assessments and screenings as appropriate. After reviewing your medical record and screening and assessments performed today your provider may have ordered immunizations, labs, imaging, and/or referrals for you. A list of these orders (if applicable) as well as your Preventive Care list are included within your After Visit Summary for your review.     Other Preventive Recommendations:    · A preventive eye exam performed by an eye specialist is recommended every 1-2 years to screen for glaucoma; cataracts, macular degeneration, and other eye disorders. · A preventive dental visit is recommended every 6 months. · Try to get at least 150 minutes of exercise per week or 10,000 steps per day on a pedometer . · Order or download the FREE \"Exercise & Physical Activity: Your Everyday Guide\" from [employer]. Call 7-915.426.7292 or search [employer] online. · You need 6879-4081 mg of calcium and 2043-1485 IU of vitamin D per day. It is possible to meet your calcium requirement with diet alone, but a vitamin D supplement is usually necessary to meet this goal.  · When exposed to the sun, use a sunscreen that protects against both UVA and UVB radiation with an SPF of 30 or greater. Reapply every 2 to 3 hours or after sweating, drying off with a towel, or swimming. · Always wear a seat belt when traveling in a car. Always wear a helmet when riding a bicycle or motorcycle.

## 2021-12-22 NOTE — PROGRESS NOTES
Medicare Annual Wellness Visit  Name: Suzi Licona Date: 2021   MRN: [id number] Sex: Male   Age: 74 y.o. Ethnicity: Non- / Non    : 1955 Race: White (non-)      Lacey Oconnor is here for Medicare AWV    PMHs reviewed and updated. No significant change since his last visit. No recent surgeries. No recent hospitalization, ER or Urgent care visits. Social Hx reviewed and updated. Pt is a non smoker. He does not drink ETOH. He denies use of recreational substances. Pt denies any problems with his ADLs and IDLs. Pt still drives. Chronic medications reviewed, updated and reconciled. No adverse side effects reported  Immunizations updated. Covid and Flu vaccine completed  Preventative screening tests completed  Pt has the following co-morbid conditions that are well controlled and managed by the specialists. Pt states that routine follow-ups with them are already scheduled  Advance directives discussed today. Pt wishes to be a Full code. He does not have paper work done yet    HYPERTENSION:   Stable on current medication. CAD:  Pt is s/p stent placement x 2. No recent chest pain reported. HYPERLIPIDEMIA:  Recent blood work ordered by his kidney doctor shows cholesterol is not quite at goal yet. His Trig is still elevated at >300. Pt is a diabetic    TYPE 2 DIABETES:  Pt admits that it has been a struggle for him to stay on diabetic diet. His most recent A1c is 8.6%  He continues to stay active though. BMI 37.3  He did lose a few lbs since is last visit with me. Pt is motivated to work on losing some more. Screenings for behavioral, psychosocial and functional/safety risks, and cognitive dysfunction are all negative except as indicated below. These results, as well as other patient data from the [employer] [address] form, are documented in Flowsheets linked to this Encounter.     Allergies   Allergen Reactions    Lipitor [Atorvastatin Calcium] Diarrhea and Other (See Comments)     Dizzy    Tape [facility] Tape] Hives and Rash         Prior to Visit Medications    Medication Sig Taking? Authorizing Provider   prasugrel (EFFIENT) 10 MG TABS Take 1 tablet by mouth once daily Yes MIMI Goodrich   metoprolol tartrate (LOPRESSOR) 50 MG tablet Take 1 tablet by mouth twice daily Yes MIMI Goodrich   fluticasone (FLONASE) 50 MCG/ACT nasal spray Use 2 spray(s) in each nostril once daily Yes MIMI Goodrich   ezetimibe (ZETIA) 10 MG tablet Take 1 tablet by mouth once daily Yes Marimar Richmond MD   KERENDIA 10 MG TABS Take 10 mg by mouth daily  Yes Historical Provider, MD   Insulin Lispro-aabc (LYUMJEV KWIKPEN) 200 UNIT/ML SOPN Inject into the skin Sliding scale Yes Historical Provider, MD   furosemide (LASIX) 20 MG tablet Take 1 tablet by mouth daily  Patient taking differently: Take 20 mg by mouth daily as needed (swelling)  Yes MIMI Dow   aspirin 81 MG chewable tablet CHEW AND SWALLOW 1 TABLET BY MOUTH ONCE DAILY Yes Marimar Richmond MD   metFORMIN (GLUCOPHAGE) 1000 MG tablet TAKE ONE TABLET BY MOUTH TWICE DAILY WITH MEALS Yes Marimar Richmond MD   nystatin (MYCOSTATIN) 897339 UNIT/GM powder Apply 3 times daily. Yes Marimar Richmond MD   nitroGLYCERIN (NITROSTAT) 0.4 MG SL tablet up to max of 3 total doses. If no relief after 1 dose, call 911. Yes Marimar Richmond MD   irbesartan (AVAPRO) 150 MG tablet Take 1 tablet by mouth once daily Yes MIMI Goodrich   pantoprazole (PROTONIX) 40 MG tablet Take 1 tablet by mouth every morning (before breakfast)  Patient taking differently: Take 40 mg by mouth 2 times daily  Yes MIMI Stephens - NP   ciclopirox (PENLAC) 8 % solution Apply topically greater than 8 hours before showering. Clean nails with alcohol weekly.  Yes MIMI Goodrich   Insulin Glargine, 1 Unit Dial, (TOUJEO SOLOSTAR) 300 UNIT/ML SOPN 30 Units by NOT APPLICABLE route daily  Yes Historical Provider, MD   INVOKANA 100 MG TABS tablet Take 100 mg by mouth daily Yes Historical Provider, MD   Semaglutide,0.25 or 0.5MG/DOS, (OZEMPIC, 0.25 OR 0.5 MG/DOSE,) 2 MG/1.5ML SOPN Inject 0.25 mg into the skin once a week  Yes Historical Provider, MD   mupirocin (BACTROBAN) 2 % ointment APPLY  OINTMENT TO AFFECTED AREA THREE TIMES DAILY Yes MIMI Beach   Insulin Pen Needle ([employer] PEN NEEDLES) 31G X 6 MM MISC Dx: E11.9 Yes MIMI Beach   blood glucose test strips (ACCU-CHEK BHARATI) strip Patient is to test 8 times daily. Dx: E11.9  Please provide patient with Accu check Bharati Plus test strips per his Insurance request Yes MIMI Beach   Lancets 30G MISC Use to check blood sugar 8 times daily.  E11.9 Yes MIMI Beach   Alcohol Swabs PADS 1 box by Does not apply route 8 times daily Yes MIMI Beach   Blood Glucose Monitoring Suppl (ACURA BLOOD GLUCOSE METER) w/Device KIT Please provide patient with Accu check Bharati Meter per his Insurance request. Dx: E11.9 Yes MIMI Beach   albuterol sulfate HFA (VENTOLIN HFA) 108 (90 Base) MCG/ACT inhaler Inhale 2 puffs into the lungs every 6 hours as needed for Wheezing Yes Fuentes Sick, DO   gabapentin (NEURONTIN) 300 MG capsule TAKE 1 CAPSULE BY MOUTH THREE TIMES DAILY  MIMI Patiño   albuterol (PROVENTIL) (5 MG/ML) 0.5% nebulizer solution Take 1 mL by nebulization every 6 hours as needed for Preston Ham MD         Past Medical History:   Diagnosis Date    Aneurysm ([address]     abdominal    Bilateral leg edema     CAD (coronary artery disease)     Chronic kidney disease     Stage 4    Diabetes mellitus, type 2 ([address]     Fatty liver     GERD (gastroesophageal reflux disease)     HTN (hypertension)     Prolonged emergence from general anesthesia     Seasonal allergies     Vitreous hemorrhage ([address] 1/22/2021       Past Surgical History:   Procedure Laterality Date    CARPAL TUNNEL RELEASE      COLONOSCOPY  11/04/2015    DR Manning Gitelman: polys, 37.3 kg/m². Based upon direct observation of the patient, evaluation of cognition reveals recent and remote memory intact. General Appearance: alert and oriented to person, place and time, well developed and well- nourished, in no acute distress  Skin: warm and dry, no rash or erythema  Head: normocephalic and atraumatic  Eyes: pupils equal, round, and reactive to light, extraocular eye movements intact, conjunctivae normal  ENT: tympanic membrane, external ear and ear canal normal bilaterally, nose without deformity, nasal mucosa and turbinates normal without polyps  Neck: supple and non-tender without mass, no thyromegaly or thyroid nodules, no cervical lymphadenopathy  Pulmonary/Chest: clear to auscultation bilaterally- no wheezes, rales or rhonchi, normal air movement, no respiratory distress  Cardiovascular: normal rate, regular rhythm, normal S1 and S2, no murmurs, rubs, clicks, or gallops, distal pulses intact, no carotid bruits  Abdomen: soft, non-tender, non-distended, normal bowel sounds, no masses or organomegaly  Extremities: no cyanosis, clubbing or edema  Musculoskeletal: normal range of motion, no joint swelling, deformity or tenderness  Neurologic: reflexes normal and symmetric, no cranial nerve deficit, gait, coordination and speech normal    Patient's complete Health Risk Assessment and screening values have been reviewed and are found in Flowsheets. The following problems were reviewed today and where indicated follow up appointments were made and/or referrals ordered. Positive Risk Factor Screenings with Interventions:     Fall Risk:  Timed Up and Go Test > 12 seconds?  (Complete if either Fall Risk answers are Yes): no  2 or more falls in past year?: (!) yes  Fall with injury in past year?: no  Fall Risk Interventions:    · Home safety tips provided  · usually trips while walking in the woods to go to his barn            General Health and ACP:  General  In general, how would you say your health is?: Fair  In the past 7 days, have you experienced any of the following?  New or Increased Pain, New or Increased Fatigue, Loneliness, Social Isolation, Stress or Anger?: (!) Anger,New or Increased Fatigue,Stress  Do you get the social and emotional support that you need?: Yes  Do you have a Living Will?: (!) No  Advance Directives     Power of  Living Will ACP-Advance Directive ACP-Power of     Not on File Filed on 10/11/16 Filed Not on File      General Health Risk Interventions:  · No Living Will: Advance Care Planning addressed with patient today Pt considering Full code, wife Baltazar Hand and son Brenda Dutta  will be POA  · Pt admits to being stressed due to wife's health  · Pt able to perform ALL activities of daily living as well as [facility] [facility] [address] Habits/Nutrition:  Health Habits/Nutrition  Do you exercise for at least 20 minutes 2-3 times per week?: Yes  Have you lost any weight without trying in the past 3 months?: No  Do you eat only one meal per day?: No  Have you seen the dentist within the past year?: (!) No  Body mass index: (!) 37.29  Health Habits/Nutrition Interventions:  · Nutritional issues:  educational materials to promote weight loss provided     Safety:  Safety  Do you have working smoke detectors?: Yes  Have all throw rugs been removed or fastened?: (!) No  Do you have non-slip mats or surfaces in all bathtubs/showers?: (!) No  Do all of your stairways have a railing or banister?: Yes  Are your doorways, halls and stairs free of clutter?: Yes  Do you always fasten your seatbelt when you are in a car?: Yes  Safety Interventions:  · Home safety tips provided - advised to get rid of floating rugs       Personalized Preventive Plan   Current Health Maintenance Status  Immunization History   Administered Date(s) Administered    COVID-19, Tatum Bran, Primary or Immunocompromised, PF, 100mcg/0.5mL 03/25/2021, 04/22/2021, 12/08/2021    Influenza, High Dose (Fluzone 65 yrs and older) 09/19/2018    Influenza, Star Staple, IM, (6 mo and older Fluzone, Flulaval, Fluarix and 3 yrs and older Afluria) 11/02/2017    Influenza, Quadv, IM, PF (6 mo and older Fluzone, Flulaval, Fluarix, and 3 yrs and older Afluria) 10/05/2016, 09/18/2019    Influenza, Quadv, adjuvanted, 65 yrs +, IM, PF (Fluad) 09/22/2020, 09/07/2021    Pneumococcal Conjugate 13-valent (Lella Hutching) 10/05/2016, 09/19/2018    Pneumococcal Polysaccharide (Lelfnjoch28) 09/15/2012, 09/01/2018, 12/18/2019    Tdap (Boostrix, Adacel) 04/19/2017    Zoster Recombinant (Shingrix) 09/19/2018, 04/30/2019, 12/30/2019, 11/04/2020        Health Maintenance   Topic Date Due    Lipid screen  12/11/2021    Annual Wellness Visit (AWV)  12/23/2021    Diabetic retinal exam  04/09/2022    Potassium monitoring  07/12/2022    Creatinine monitoring  07/12/2022    Diabetic foot exam  07/19/2022    A1C test (Diabetic or Prediabetic)  12/07/2022    Colon cancer screen colonoscopy  12/16/2023    DTaP/Tdap/Td vaccine (2 - Td or Tdap) 04/19/2027    Flu vaccine  Completed    Shingles Vaccine  Completed    Pneumococcal 65+ yrs at Risk Vaccine  Completed    COVID-19 Vaccine  Completed    Hepatitis C screen  Completed    Hepatitis A vaccine  Aged Out    Hib vaccine  Aged Out    Meningococcal (ACWY) vaccine  Aged Out     Recommendations for [place] Due: see orders and patient instructions/AVS.  . Recommended screening schedule for the next 5-10 years is provided to the patient in written form: see Patient Instructions/AVS.    Patrick Brewer was seen today for medicare awv.     Diagnoses and all orders for this visit:    Routine general medical examination at a health care facility    Mixed hyperlipidemia    Essential hypertension    DM type 2, uncontrolled, with neuropathy ([facility]    Coronary artery disease due to lipid rich plaque           Continue all maintenance medications as prescribed  Continue diabetic diet  Continue attempts at weight loss.  Engaged in regular exercise as tolerated - at least 30 minutes/day  Low fat/low calorie diet  Daily foot care  Recommend annual diabetic eye exam  Stay well hydrated - 64 oz of water a day  Keep scheduled follow-up visit with me and with other specialist  Call with new concerns        Obesity Counseling: Assessed behavioral health risks and factors affecting choice of behavior. Suggested weight control approaches, including dietary changes behavioral modification and follow up plan. Provided educational and support documentation. Time spent (minutes): 5 minutes    Cardiovascular Disease Risk Counseling: Assessed the patient's risk to develop cardiovascular disease and reviewed main risk factors. Reviewed steps to reduce disease risk including:   · Quitting tobacco use, reducing amount smoked, or not starting the habit  · Making healthy food choices  · Being physically active and gradualy increasing activity levels   · Reduce weight and determine a healthy BMI goal  · Monitor blood pressure and treat if higher than 140/90 mmHg  · Maintain blood total cholesterol levels under 5 mmol/l or 190 mg/dl  · Maintain LDL cholesterol levels under 3.0 mmol/l or 115 mg/dl   · Control blood glucose levels  · Consider taking aspirin (75 mg daily), once blood pressure is controlled   Provided a follow up plan.   Time spent (minutes): 5 minutes

## 2021-12-30 ENCOUNTER — DOCUMENTATION (OUTPATIENT)
Dept: ENDOCRINOLOGY | Facility: CLINIC | Age: 66
End: 2021-12-30

## 2022-01-10 DIAGNOSIS — G62.9 NEUROPATHY: ICD-10-CM

## 2022-01-13 RX ORDER — GABAPENTIN 300 MG/1
CAPSULE ORAL
Qty: 270 CAPSULE | Refills: 0 | Status: SHIPPED | OUTPATIENT
Start: 2022-01-13 | End: 2022-03-08

## 2022-01-13 NOTE — TELEPHONE ENCOUNTER
Anand Salvador called to request a refill on his medication. Last office visit : 12/22/2021   Next office visit : 4/26/2022     Last Salena Stager: 01-  Medication Contract: 09-  Last Fill: 09-    Last UDS:   Amphetamine Screen, Urine   Date Value Ref Range Status   09/07/2021 -  Final     Barbiturate Screen, Urine   Date Value Ref Range Status   09/07/2021 -  Final     Benzodiazepine Screen, Urine   Date Value Ref Range Status   09/07/2021 -  Final     Buprenorphine Urine   Date Value Ref Range Status   09/07/2021 -  Final     Cocaine Metabolite Screen, Urine   Date Value Ref Range Status   09/07/2021 -  Final     Gabapentin Screen, Urine   Date Value Ref Range Status   09/07/2021 +  Final     MDMA, Urine   Date Value Ref Range Status   09/07/2021 -  Final     Methamphetamine, Urine   Date Value Ref Range Status   09/07/2021 -  Final     Opiate Scrn, Ur   Date Value Ref Range Status   09/07/2021 -  Final     Oxycodone Screen, Ur   Date Value Ref Range Status   09/07/2021 -  Final     PCP Screen, Urine   Date Value Ref Range Status   09/07/2021 -  Final     Propoxyphene Screen, Urine   Date Value Ref Range Status   09/07/2021 -  Final     THC Screen, Urine   Date Value Ref Range Status   09/07/2021 -  Final     Tricyclic Antidepressants, Urine   Date Value Ref Range Status   09/07/2021 -  Final                 Requested Prescriptions     Pending Prescriptions Disp Refills    gabapentin (NEURONTIN) 300 MG capsule [Pharmacy Med Name: Gabapentin 300 MG Oral Capsule] 270 capsule 0     Sig: TAKE 1 CAPSULE BY MOUTH THREE TIMES DAILY         Please approve or refuse this medication.    Stephy Tripathi MA

## 2022-01-19 ENCOUNTER — TELEPHONE (OUTPATIENT)
Dept: PRIMARY CARE CLINIC | Age: 67
End: 2022-01-19

## 2022-01-19 NOTE — TELEPHONE ENCOUNTER
Patient called today requesting an order for a nebulizer machine as his has stopped working.  Please advise if it is ok to order

## 2022-01-20 DIAGNOSIS — R06.2 WHEEZING: Primary | ICD-10-CM

## 2022-01-20 NOTE — PROGRESS NOTES
[facility] - PODIATRY    Today's Date: 01/25/22    Patient Name: Mick Sims  MRN: [id number]  CSN: [id number]  PCP: Ginny Collazo APRN   Referring Provider: No ref. provider found    SUBJECTIVE     Chief Complaint   Patient presents with   • Follow-up     pcp01/12/2021 3 MO DIABETIC FOOT EXAM- pt states feet doing good- pt denies pain- pt presents with long thick nails, some dry skin   • Diabetes     155mg/dl BG this am     HPI: Mick Sims, a 66 y.o.male, comes to clinic as a(n) established patient presenting for diabetic foot exam and complaining of thick fungal toenails. Patient has h/o arthritis, DM2, HTN, Renal Disorder. Patient is IDDM with last stated BG level of 155mg/dl. Notes that he is dealing with the death of his daughter a couple weeks ago.  Admits to mild numbness and tingling in feet.  Denies open wounds or sores. Notes mild swelling in legs that he wears light compression stockings for. States that his toenails are long, thick and discolored. He has trouble caring for them himself. Denies pain. Relates previous treatment(s) including foot care by podiatry. Denies any constitutional symptoms. No other pedal complaints at this time.    Past Medical History:   Diagnosis Date   • Arthritis    • Diabetes mellitus (HCC)    • Hypertension    • Renal disorder      Past Surgical History:   Procedure Laterality Date   • CARPAL TUNNEL RELEASE       Family History   Problem Relation Age of Onset   • Cancer Mother    • Diabetes Sister    • Diabetes Brother    • No Known Problems Brother      Social History     Socioeconomic History   • Marital status:    Tobacco Use   • Smoking status: Never Smoker   • Smokeless tobacco: Never Used   Vaping Use   • Vaping Use: Never used   Substance and Sexual Activity   • Alcohol use: No   • Drug use: Never   • Sexual activity: Defer     Allergies   Allergen Reactions   • Atorvastatin Calcium Unknown - Low Severity   • Latex Hives     Current  Outpatient Medications   Medication Sig Dispense Refill   • aspirin 81 MG chewable tablet Chew 81 mg Daily.     • Continuous Blood Gluc  (FreeStyle Fercho 2 Reading) device 1 each Continuous. Use as indicated for glucose monitoring 1 each 1   • Continuous Blood Gluc Sensor (FreeStyle Fercho 2 Sensor) misc 1 each Every 14 (Fourteen) Days. 2 each 11   • empagliflozin (Jardiance) 25 MG tablet tablet Take 1 tablet by mouth Daily. One tablet daily before breakfast 30 tablet 11   • ezetimibe (Zetia) 10 MG tablet Take 1 tablet by mouth Daily. 30 tablet 11   • Finerenone (Kerendia) 10 MG tablet Take 1 tablet by mouth Daily. 30 tablet 11   • fluticasone (FLONASE) 50 MCG/ACT nasal spray 1 spray into each nostril 2 (Two) Times a Day As Needed for rhinitis or allergies.     • furosemide (LASIX) 40 MG tablet Take 20 mg by mouth Every Other Day. prn     • gabapentin (NEURONTIN) 300 MG capsule Take 300 mg by mouth 3 (Three) Times a Day.     • Glucagon, rDNA, (Glucagon Emergency) 1 MG kit Inject 1 mg under the skin into the appropriate area as directed 1 (One) Time As Needed (hypoglycemia) for up to 1 dose. 1 each 11   • Insulin Glargine, 2 Unit Dial, (Toujeo Max SoloStar) 300 UNIT/ML solution pen-injector injection 80 units qhs 8 pen 3   • Insulin Lispro-aabc, 1 U Dial, (Lyumjev KwikPen) 100 UNIT/ML solution pen-injector Inject 70 Units under the skin into the appropriate area as directed. Up to 70u with meals     • Insulin Pen Needle (PEN NEEDLES) 32G X 4 MM misc 1 each 4 (Four) Times a Day. Use 4 times per day to inject insulin DX E11.9 400 each 3   • irbesartan (AVAPRO) 150 MG tablet Take 300 mg by mouth Every Night.     • metFORMIN ER (Glucophage XR) 500 MG 24 hr tablet 2 tabs twice daily with meals , generic ok 120 tablet 11   • metoprolol tartrate (LOPRESSOR) 50 MG tablet Take 1 tablet by mouth 2 (Two) Times a Day. 30 tablet 0   • nitroglycerin (NITROSTAT) 0.4 MG SL tablet Place 0.4 mg under the tongue Every 5 (Five)  Minutes As Needed for Chest Pain. Take no more than 3 doses in 15 minutes.     • PANTOPRAZOLE SODIUM PO Take 40 mg by mouth Daily. Twice daily     • prasugrel (EFFIENT) 10 MG tablet Take 10 mg by mouth Daily.     • Semaglutide, 1 MG/DOSE, (Ozempic, 1 MG/DOSE,) 4 MG/3ML solution pen-injector Inject 1 mg under the skin into the appropriate area as directed 1 (One) Time Per Week. 9 mL 3   • sildenafil (Viagra) 100 MG tablet Take 1 tablet by mouth As Needed for Erectile Dysfunction. 30 tablet 11     No current facility-administered medications for this visit.     Review of Systems   Constitutional: Negative for chills and fever.   HENT: Negative for congestion.    Respiratory: Negative for shortness of breath.    Cardiovascular: Positive for leg swelling. Negative for chest pain.   Gastrointestinal: Negative for constipation, diarrhea, nausea and vomiting.   Musculoskeletal:        Foot pain   Skin: Negative for wound.   Neurological: Positive for numbness.       OBJECTIVE     Vitals:    22 0834   BP: 132/64   Pulse: 79   SpO2: 98%       PHYSICAL EXAM  GEN:   Accompanied by none.     Foot/Ankle Exam:       General:   Diabetic Foot Exam Performed    Appearance: appears stated age and healthy    Orientation: AAOx3    Affect: appropriate    Gait: unimpaired    Assistance: independent    Shoe Gear:  Casual shoes    VASCULAR      Right Foot Vascularity   Dorsalis pedis:  2+  Posterior tibial:  2+  Skin Temperature: warm    Edema Gradin+ and pitting  CFT:  3  Pedal Hair Growth:  Present  Varicosities: mild varicosities       Left Foot Vascularity   Dorsalis pedis:  2+  Posterior tibial:  2+  Skin Temperature: warm    Edema Gradin+ and pitting  CFT:  3  Pedal Hair Growth:  Present  Varicosities: mild varicosities        NEUROLOGIC     Right Foot Neurologic   Light touch sensation:  Diminished  Vibratory sensation:  Diminished  Hot/Cold sensation: diminished    Protective Sensation using Carlock-Azar  Monofilament:  4     Left Foot Neurologic   Light touch sensation:  Diminished  Vibratory sensation:  Diminished  Hot/cold sensation: diminished    Protective Sensation using [place]-Azar Monofilament:  4     MUSCULOSKELETAL      Right Foot Musculoskeletal   Ecchymosis:  None  Tenderness: neuroma    Tenderness comment:  Sub 2nd interspace  Arch:  Normal     Left Foot Musculoskeletal   Ecchymosis:  None  Tenderness: none    Arch:  Normal     MUSCLE STRENGTH     Right Foot Muscle Strength   Foot dorsiflexion:  5  Foot plantar flexion:  5  Foot inversion:  5  Foot eversion:  5     Left Foot Muscle Strength   Foot dorsiflexion:  5  Foot plantar flexion:  5  Foot inversion:  5  Foot eversion:  5     RANGE OF MOTION      Right Foot Range of Motion   Foot and ankle ROM within normal limits       Left Foot Range of Motion   Foot and ankle ROM within normal limits       DERMATOLOGIC     Right Foot Dermatologic   Skin: skin intact    Nails: onychomycosis, abnormally thick, subungual debris and dystrophic nails       Left Foot Dermatologic   Skin: skin intact    Nails: onychomycosis, abnormally thick, subungual debris and dystrophic nails        RADIOLOGY/NUCLEAR:  No results found.    LABORATORY/CULTURE RESULTS:      PATHOLOGY RESULTS:       ASSESSMENT/PLAN     Diagnoses and all orders for this visit:    1. Onychomycosis (Primary)    2. Type 2 diabetes mellitus with diabetic neuropathy, with long-term current use of insulin (HCC)    3. Peripheral edema      Comprehensive lower extremity examination and evaluation was performed.  Discussed findings and treatment plan including risks, benefits, and treatment options with patient in detail. Patient agreed with treatment plan.  After verbal consent obtained, nail(s) x10 debrided of length and thickness with nail nipper without incidence  Patient may maintain nails and calluses at home utilizing emery board or pumice stone between visits as needed  Reviewed at home diabetic  foot care including daily foot checks   Continue daily use of compression stockings and elevate extremities at rest.  Continue diabetic monitoring and control under direction of PCP.   An After Visit Summary was printed and given to the patient at discharge, including (if requested) any available informative/educational handouts regarding diagnosis, treatment, or medications. All questions were answered to patient/family satisfaction. Should symptoms fail to improve or worsen they agree to call or return to clinic or to go to the Emergency Department. Discussed the importance of following up with any needed screening tests/labs/specialist appointments and any requested follow-up recommended by me today. Importance of maintaining follow-up discussed and patient accepts that missed appointments can delay diagnosis and potentially lead to worsening of conditions.  Return in about 3 months (around 4/25/2022) for Follow-up with Podiatry Provider., or sooner if acute issues arise.        This document has been electronically signed by Mehdi Valencia DPM on January 25, 2022 08:47 CST

## 2022-01-24 ENCOUNTER — TELEPHONE (OUTPATIENT)
Dept: PODIATRY | Facility: CLINIC | Age: 67
End: 2022-01-24

## 2022-01-24 NOTE — TELEPHONE ENCOUNTER
Called patient regarding appt on 01/25/2022. Left message for patient to return call if any questions or concerns arise.

## 2022-01-25 ENCOUNTER — OFFICE VISIT (OUTPATIENT)
Dept: PODIATRY | Facility: CLINIC | Age: 67
End: 2022-01-25

## 2022-01-25 VITALS
DIASTOLIC BLOOD PRESSURE: 64 MMHG | OXYGEN SATURATION: 98 % | WEIGHT: 271.6 LBS | BODY MASS INDEX: 36.79 KG/M2 | HEART RATE: 79 BPM | SYSTOLIC BLOOD PRESSURE: 132 MMHG | HEIGHT: 72 IN

## 2022-01-25 DIAGNOSIS — E11.40 TYPE 2 DIABETES MELLITUS WITH DIABETIC NEUROPATHY, WITH LONG-TERM CURRENT USE OF INSULIN: ICD-10-CM

## 2022-01-25 DIAGNOSIS — R60.9 PERIPHERAL EDEMA: ICD-10-CM

## 2022-01-25 DIAGNOSIS — Z79.4 TYPE 2 DIABETES MELLITUS WITH DIABETIC NEUROPATHY, WITH LONG-TERM CURRENT USE OF INSULIN: ICD-10-CM

## 2022-01-25 DIAGNOSIS — B35.1 ONYCHOMYCOSIS: Primary | ICD-10-CM

## 2022-01-25 PROCEDURE — 99213 OFFICE O/P EST LOW 20 MIN: CPT | Performed by: PODIATRIST

## 2022-01-25 PROCEDURE — 11721 DEBRIDE NAIL 6 OR MORE: CPT | Performed by: PODIATRIST

## 2022-01-29 NOTE — OP NOTE
Endoscopic Procedure Note    Patient: Iván Pel: 1955  Med Rec#: [id number] Acc#: [id number]     Primary Care Provider MIMI Martin    Endoscopist: Sara Irene MD    Date of Procedure:  12/16/2020    Procedure:   1. EGD with a Malik bougie dilation of Esophagus and cold biopsies    Indications:    for both EGD and Colonoscopy today:  1. Change in bowel habits    2. Gastroesophageal reflux disease, unspecified whether esophagitis present    3. Dysphagia, unspecified type      Anesthesia:  Sedation was administered by anesthesia who monitored the patient during the procedure. Estimated Blood Loss: minimal    Procedure:   After reviewing the patient's chart and obtaining informed consent, the patient was placed in the left lateral decubitus position. A forward-viewing Olympus endoscope was lubricated and inserted through the mouth into the oropharynx. Under direct visualization, the upper esophagus was intubated. The scope was advanced to the level of the third portion of duodenum. Scope was slowly withdrawn with careful inspection of the mucosal surfaces. The scope was retroflexed for inspection of the gastric fundus and incisura. Findings and maneuvers are listed in impression below. The patient tolerated the procedure well. The scope was removed. There were no immediate complications. Findings/IMPRESSION:  Esophagus: normal; EG junction at 42 cm was normal as well. NO erosions or ulcers or nodules or strictures or webs or rings or mass lesions or extrinsic compression or diverticula noted. An empirical Malik 54 fr bougie dilation was performed and random cold biopsies were taken to check for EoE and NERD. There is NO obvious hiatal hernia present.       Stomach:  Normal. Lab Results   Component Value Date    EGFR 46 01/29/2022    EGFR 46 01/28/2022    EGFR 47 01/27/2022    CREATININE 1 46 (H) 01/29/2022    CREATININE 1 46 (H) 01/28/2022    CREATININE 1 43 (H) 01/27/2022   POA with creatinine 2 10, baseline around 1 4 to 1 6 (due to DM and HTN,)  · Due to UTI/bacteremia causing ATN  · Renal US - no hydronephrosis on the left, right with mild prominence of the renal pelvis with no calyceal dilation similar to June 18, 2018  · Diuretics had been held then received 1 dose of Torsemide 10 mg on 1/26 and 10 mg today   2 doses of Diamox 250 mg ordered for elevated bicarb 1/28  · Creatinine 1 46 today  · Treatment per nephrology - input appreciated  · Avoid nephrotoxic medications and hypotension

## 2022-02-02 DIAGNOSIS — J30.2 SEASONAL ALLERGIES: ICD-10-CM

## 2022-02-03 RX ORDER — FLUTICASONE PROPIONATE 50 MCG
SPRAY, SUSPENSION (ML) NASAL
Qty: 48 G | Refills: 0 | Status: SHIPPED | OUTPATIENT
Start: 2022-02-03 | End: 2022-04-26 | Stop reason: SDUPTHER

## 2022-03-07 DIAGNOSIS — G62.9 NEUROPATHY: ICD-10-CM

## 2022-03-07 DIAGNOSIS — Z76.0 MEDICATION REFILL: ICD-10-CM

## 2022-03-08 RX ORDER — GABAPENTIN 300 MG/1
CAPSULE ORAL
Qty: 270 CAPSULE | Refills: 0 | Status: ON HOLD | OUTPATIENT
Start: 2022-03-13 | End: 2022-04-01 | Stop reason: SDUPTHER

## 2022-03-08 RX ORDER — METOPROLOL TARTRATE 50 MG/1
TABLET, FILM COATED ORAL
Qty: 180 TABLET | Refills: 0 | Status: SHIPPED | OUTPATIENT
Start: 2022-03-08 | End: 2022-04-26 | Stop reason: SDUPTHER

## 2022-03-08 NOTE — TELEPHONE ENCOUNTER
Riddhi Jose called to request a refill on his medication. Last office visit : 12/22/2021   Next office visit : 4/26/2022     Last UDS:   Amphetamine Screen, Urine   Date Value Ref Range Status   09/07/2021 -  Final     Barbiturate Screen, Urine   Date Value Ref Range Status   09/07/2021 -  Final     Benzodiazepine Screen, Urine   Date Value Ref Range Status   09/07/2021 -  Final     Buprenorphine Urine   Date Value Ref Range Status   09/07/2021 -  Final     Cocaine Metabolite Screen, Urine   Date Value Ref Range Status   09/07/2021 -  Final     Gabapentin Screen, Urine   Date Value Ref Range Status   09/07/2021 +  Final     MDMA, Urine   Date Value Ref Range Status   09/07/2021 -  Final     Methamphetamine, Urine   Date Value Ref Range Status   09/07/2021 -  Final     Opiate Scrn, Ur   Date Value Ref Range Status   09/07/2021 -  Final     Oxycodone Screen, Ur   Date Value Ref Range Status   09/07/2021 -  Final     PCP Screen, Urine   Date Value Ref Range Status   09/07/2021 -  Final     Propoxyphene Screen, Urine   Date Value Ref Range Status   09/07/2021 -  Final     THC Screen, Urine   Date Value Ref Range Status   09/07/2021 -  Final     Tricyclic Antidepressants, Urine   Date Value Ref Range Status   09/07/2021 -  Final       Last Owen Ocasio: 1/13/22  Medication Contract: 9/7/21   Last Fill: 1/13/22    Requested Prescriptions     Pending Prescriptions Disp Refills    gabapentin (NEURONTIN) 300 MG capsule [Pharmacy Med Name: Gabapentin 300 MG Oral Capsule] 270 capsule 0     Sig: TAKE 1 CAPSULE BY MOUTH THREE TIMES DAILY         Please approve or refuse this medication.    Tierra Conklin

## 2022-03-14 ENCOUNTER — TELEPHONE (OUTPATIENT)
Dept: CARDIOLOGY CLINIC | Age: 67
End: 2022-03-14

## 2022-03-14 NOTE — TELEPHONE ENCOUNTER
Date: 3-28-22    Cardiologist: Dr. Ned Haley    Procedure: Right total knee arthroplasty    Surgeon: Dr. Gabe Guerra    Last Office Visit: 10-28-21    Reason for office visit and medical concerns addressed at this office visit: CAD, DM, HTN, hyperlipidemia, CKD    Testing Performed and Date of Service:  EKG 10-28-21  Echo 8-18-21    RCRI = 3 pts, elevated, 11%   METs 4    Current Medications: lopressor, gabapentin, flonase, effient, zetia, kerendia, insulin, lasix, ASA, metformin, nystatin, albuterol, nitro, avapro, invokana    Is the patient currently taking an anticoagulant? If so, what is the diagnosis the patient has been given to warrant the need for the anticoagulant?  ASA    Additional Notes: Cardiac Risk Request and med hold on ASA and Effient

## 2022-03-15 DIAGNOSIS — Z76.0 MEDICATION REFILL: ICD-10-CM

## 2022-03-15 RX ORDER — PRASUGREL 10 MG/1
TABLET, FILM COATED ORAL
Qty: 90 TABLET | Refills: 0 | Status: SHIPPED | OUTPATIENT
Start: 2022-03-15 | End: 2022-08-04

## 2022-03-22 ENCOUNTER — OFFICE VISIT (OUTPATIENT)
Dept: CARDIOLOGY CLINIC | Age: 67
End: 2022-03-22
Payer: MEDICARE

## 2022-03-22 VITALS
HEART RATE: 82 BPM | WEIGHT: 268 LBS | HEIGHT: 72 IN | SYSTOLIC BLOOD PRESSURE: 114 MMHG | OXYGEN SATURATION: 97 % | BODY MASS INDEX: 36.3 KG/M2 | DIASTOLIC BLOOD PRESSURE: 68 MMHG

## 2022-03-22 DIAGNOSIS — I25.10 DOUBLE VESSEL CORONARY ARTERY DISEASE: ICD-10-CM

## 2022-03-22 DIAGNOSIS — Z01.810 PREOPERATIVE CARDIOVASCULAR EXAMINATION: Primary | ICD-10-CM

## 2022-03-22 DIAGNOSIS — E78.2 MIXED HYPERLIPIDEMIA: ICD-10-CM

## 2022-03-22 DIAGNOSIS — I10 ESSENTIAL HYPERTENSION: ICD-10-CM

## 2022-03-22 DIAGNOSIS — E66.01 MORBID OBESITY (HCC): ICD-10-CM

## 2022-03-22 PROCEDURE — 2022F DILAT RTA XM EVC RTNOPTHY: CPT | Performed by: INTERNAL MEDICINE

## 2022-03-22 PROCEDURE — 3046F HEMOGLOBIN A1C LEVEL >9.0%: CPT | Performed by: INTERNAL MEDICINE

## 2022-03-22 PROCEDURE — G8484 FLU IMMUNIZE NO ADMIN: HCPCS | Performed by: INTERNAL MEDICINE

## 2022-03-22 PROCEDURE — 3017F COLORECTAL CA SCREEN DOC REV: CPT | Performed by: INTERNAL MEDICINE

## 2022-03-22 PROCEDURE — G8417 CALC BMI ABV UP PARAM F/U: HCPCS | Performed by: INTERNAL MEDICINE

## 2022-03-22 PROCEDURE — 4040F PNEUMOC VAC/ADMIN/RCVD: CPT | Performed by: INTERNAL MEDICINE

## 2022-03-22 PROCEDURE — 1036F TOBACCO NON-USER: CPT | Performed by: INTERNAL MEDICINE

## 2022-03-22 PROCEDURE — 99214 OFFICE O/P EST MOD 30 MIN: CPT | Performed by: INTERNAL MEDICINE

## 2022-03-22 PROCEDURE — 93000 ELECTROCARDIOGRAM COMPLETE: CPT | Performed by: INTERNAL MEDICINE

## 2022-03-22 PROCEDURE — 1123F ACP DISCUSS/DSCN MKR DOCD: CPT | Performed by: INTERNAL MEDICINE

## 2022-03-22 PROCEDURE — G8427 DOCREV CUR MEDS BY ELIG CLIN: HCPCS | Performed by: INTERNAL MEDICINE

## 2022-03-22 ASSESSMENT — ENCOUNTER SYMPTOMS
BACK PAIN: 0
ANAL BLEEDING: 0
SHORTNESS OF BREATH: 0
BLOOD IN STOOL: 0
COUGH: 0

## 2022-03-22 NOTE — PROGRESS NOTES
Office Visit  Alley De La Cruz is a 74 y.o. male; who present today for 6 Month Follow-Up (No Cardiac Sx) and Cardiac Clearance      HPI  I am seeing this 53-year-old white male in office evaluation today ahead of scheduled visit in order to provide cardiac clearance for general anesthesia and right knee surgery. This is a patient who normally is followed for coronary artery disease and hypertension. He has remained somewhat active with limitations because of his day but he has not been experiencing any chest, throat, jaw or arm pain. His breathing has been comfortable, denies palpitations no presyncope or syncope. He has not ever had stroke or TIA, no focal neurologic episode such as loss of vision, motor weakness or slurred speech. Current Outpatient Medications   Medication Sig Dispense Refill    prasugrel (EFFIENT) 10 MG TABS Take 1 tablet by mouth once daily 90 tablet 0    metoprolol tartrate (LOPRESSOR) 50 MG tablet Take 1 tablet by mouth twice daily 180 tablet 0    gabapentin (NEURONTIN) 300 MG capsule TAKE 1 CAPSULE BY MOUTH THREE TIMES DAILY 270 capsule 0    fluticasone (FLONASE) 50 MCG/ACT nasal spray Use 2 spray(s) in each nostril once daily 48 g 0    ezetimibe (ZETIA) 10 MG tablet Take 1 tablet by mouth once daily 90 tablet 0    KERENDIA 10 MG TABS Take 10 mg by mouth daily       Insulin Lispro-aabc (LYUMJEV KWIKPEN) 200 UNIT/ML SOPN Inject into the skin Sliding scale      furosemide (LASIX) 20 MG tablet Take 1 tablet by mouth daily (Patient taking differently: Take 20 mg by mouth daily as needed (swelling) ) 30 tablet 5    aspirin 81 MG chewable tablet CHEW AND SWALLOW 1 TABLET BY MOUTH ONCE DAILY 90 tablet 3    metFORMIN (GLUCOPHAGE) 1000 MG tablet TAKE ONE TABLET BY MOUTH TWICE DAILY WITH MEALS 180 tablet 2    nystatin (MYCOSTATIN) 304462 UNIT/GM powder Apply 3 times daily.  60 g 1    albuterol (PROVENTIL) (5 MG/ML) 0.5% nebulizer solution Take 1 mL by nebulization every 6 hours as needed for Wheezing 60 mL 0    nitroGLYCERIN (NITROSTAT) 0.4 MG SL tablet up to max of 3 total doses. If no relief after 1 dose, call 911. 25 tablet 3    irbesartan (AVAPRO) 150 MG tablet Take 1 tablet by mouth once daily 90 tablet 0    pantoprazole (PROTONIX) 40 MG tablet Take 1 tablet by mouth every morning (before breakfast) (Patient taking differently: Take 40 mg by mouth 2 times daily ) 90 tablet 3    ciclopirox (PENLAC) 8 % solution Apply topically greater than 8 hours before showering. Clean nails with alcohol weekly. 1 Bottle 3    Insulin Glargine, 1 Unit Dial, (TOUJEO SOLOSTAR) 300 UNIT/ML SOPN 30 Units by NOT APPLICABLE route daily       INVOKANA 100 MG TABS tablet Take 100 mg by mouth daily      Semaglutide,0.25 or 0.5MG/DOS, (OZEMPIC, 0.25 OR 0.5 MG/DOSE,) 2 MG/1.5ML SOPN Inject 0.25 mg into the skin once a week       mupirocin (BACTROBAN) 2 % ointment APPLY  OINTMENT TO AFFECTED AREA THREE TIMES DAILY 1 Tube 1    Insulin Pen Needle ([employer] PEN NEEDLES) 31G X 6 MM MISC Dx: E11.9 150 each 3    blood glucose test strips (ACCU-CHEK DUNIA) strip Patient is to test 8 times daily. Dx: E11.9  Please provide patient with Accu check Dunia Plus test strips per his Insurance request 900 each 3    Lancets 30G MISC Use to check blood sugar 8 times daily. E11.9 300 each 5    Alcohol Swabs PADS 1 box by Does not apply route 8 times daily 100 each 5    Blood Glucose Monitoring Suppl (ACURA BLOOD GLUCOSE METER) w/Device KIT Please provide patient with Accu check Dunia Meter per his Insurance request. Dx: E11.9 1 kit 0    albuterol sulfate HFA (VENTOLIN HFA) 108 (90 Base) MCG/ACT inhaler Inhale 2 puffs into the lungs every 6 hours as needed for Wheezing 1 Inhaler 3     No current facility-administered medications for this visit. There are no discontinued medications.      Allergies   Allergen Reactions    Lipitor [Atorvastatin Calcium] Diarrhea and Other (See Comments)     Dizzy    Tape Reji Lund Hives and Rash       Past Medical History:   Diagnosis Date    Aneurysm ([facility]     abdominal    Bilateral leg edema     CAD (coronary artery disease)     Chronic kidney disease     Stage 4    Diabetes mellitus, type 2 (HCC)     Fatty liver     GERD (gastroesophageal reflux disease)     HTN (hypertension)     Prolonged emergence from general anesthesia     Seasonal allergies     Vitreous hemorrhage ([address] 1/22/2021     Negative - Past Medical History for  Past Medical History Pertinent Negatives:   Diagnosis Date Noted    PONV (postoperative nausea and vomiting) 12/16/2020       Past Surgical History:   Procedure Laterality Date    CARPAL TUNNEL RELEASE      COLONOSCOPY  11/04/2015    DR Capone: polys, 5yr recall    COLONOSCOPY N/A 10/11/2016    Dr Lopez-diverticulosis, Sessile serrated AP (-) high grade dysplasia x 1, tubular AP (-) dysplasia x 2, HP x 2--3 yr recall    COLONOSCOPY N/A 12/16/2020    Dr Shree Arnold, Mod. Diverticulosis, Internal hemorrhoids-Grade 2 & ext hemorrhoids, 3 year recall    CORONARY ANGIOPLASTY WITH STENT PLACEMENT  06/04/2019    AGA to circ    CORONARY ANGIOPLASTY WITH STENT PLACEMENT  05/2018    AGA to osteal LAD    ESOPHAGEAL DILATATION N/A 12/16/2020    Dr Shree Arnold, empirical Malik 47 fr bougie dilation, (+) GERD    EYE SURGERY      Right eye    PRE-MALIGNANT / [employer] [employer]      Dr. Robert Underwood UPPER GASTROINTESTINAL ENDOSCOPY  09/20/2016    Dr Lopez-ramu/dilation over wire, 46 Haitian-distal esophageal narrowing, Inna (-)    UPPER GASTROINTESTINAL ENDOSCOPY  09/20/2016    Dr Lopez-ramu/dilation over wire, 46 Haitian-distal esophageal narrowing, Inna (-)     Social History     Occupational History    Not on file   Tobacco Use    Smoking status: Never Smoker    Smokeless tobacco: Never Used   Vaping Use    Vaping Use: Never used   Substance and Sexual Activity    Alcohol use: No    Drug use: No    Sexual activity: Not on file        Family History Problem Relation Age of Onset    Colon Cancer Mother     Prostate Cancer Father     Other Father         HX of blood clot    Diabetes Sister     Cancer Sister     Diabetes Brother     Colon Polyps Son     Esophageal Cancer Neg Hx     Liver Cancer Neg Hx     Liver Disease Neg Hx     Stomach Cancer Neg Hx     Rectal Cancer Neg Hx        Review of Systems  Review of Systems   Constitutional: Negative for fatigue and fever. Respiratory: Negative for cough and shortness of breath. Cardiovascular: Negative for chest pain, palpitations and leg swelling. Gastrointestinal: Negative for anal bleeding and blood in stool. Musculoskeletal: Positive for arthralgias. Negative for back pain. Neurological: Negative for facial asymmetry and speech difficulty. Physical Exam  /68   Pulse 82   Ht 6' (1.829 m)   Wt 268 lb (121.6 kg)   SpO2 97%   BMI 36.35 kg/m²    Physical Exam  Constitutional:       Appearance: Normal appearance. He is morbidly obese. Cardiovascular:      Rate and Rhythm: Normal rate and regular rhythm. Heart sounds: Normal heart sounds. No gallop. Pulmonary:      Effort: Pulmonary effort is normal.      Breath sounds: Normal breath sounds. Abdominal:      General: Abdomen is flat. Bowel sounds are normal.      Palpations: Abdomen is soft. Musculoskeletal:         General: No swelling. Skin:     General: Skin is warm and dry. Neurological:      Mental Status: He is alert. Assessment/Plan    EKG Findings:  Sinus rhythm, 82 bpm, within normal limits    Problem List Items Addressed This Visit        Cardiology Problems    Essential hypertension    Double vessel coronary artery disease    Mixed hyperlipidemia       Other    DM type 2, uncontrolled, with neuropathy ([address]    Morbid obesity ([address]    Preoperative cardiovascular examination - Primary           Diagnosis Orders   1. Preoperative cardiovascular examination     2.  Double vessel coronary artery [History reviewed] : History reviewed. [Medications and Allergies reviewed] : Medications and allergies reviewed.

## 2022-03-24 ENCOUNTER — HOSPITAL ENCOUNTER (OUTPATIENT)
Dept: PREADMISSION TESTING | Age: 67
Discharge: HOME | DRG: 470 | End: 2022-03-28
Payer: MEDICARE

## 2022-03-24 ENCOUNTER — TELEPHONE (OUTPATIENT)
Dept: PRIMARY CARE CLINIC | Age: 67
End: 2022-03-24

## 2022-03-24 VITALS — HEIGHT: 72 IN | BODY MASS INDEX: 35.76 KG/M2 | WEIGHT: 264 LBS

## 2022-03-24 LAB
ALBUMIN SERPL-MCNC: 3.7 G/DL (ref 3.5–5.2)
ALP BLD-CCNC: 84 U/L (ref 40–130)
ALT SERPL-CCNC: 12 U/L (ref 5–41)
ANION GAP SERPL CALCULATED.3IONS-SCNC: 13 MMOL/L (ref 7–19)
APTT: 30.4 SEC (ref 26–36.2)
AST SERPL-CCNC: 12 U/L (ref 5–40)
BASOPHILS ABSOLUTE: 0.1 K/UL (ref 0–0.2)
BASOPHILS RELATIVE PERCENT: 1.2 % (ref 0–1)
BILIRUB SERPL-MCNC: 0.3 MG/DL (ref 0.2–1.2)
BUN BLDV-MCNC: 27 MG/DL (ref 8–23)
CALCIUM SERPL-MCNC: 10 MG/DL (ref 8.8–10.2)
CHLORIDE BLD-SCNC: 102 MMOL/L (ref 98–111)
CO2: 23 MMOL/L (ref 22–29)
CREAT SERPL-MCNC: 1.5 MG/DL (ref 0.5–1.2)
EOSINOPHILS ABSOLUTE: 0.2 K/UL (ref 0–0.6)
EOSINOPHILS RELATIVE PERCENT: 2.3 % (ref 0–5)
GFR AFRICAN AMERICAN: 56
GFR NON-AFRICAN AMERICAN: 46
GLUCOSE BLD-MCNC: 177 MG/DL (ref 74–109)
HBA1C MFR BLD: 9 % (ref 4–6)
HCT VFR BLD CALC: 47 % (ref 42–52)
HEMOGLOBIN: 14.4 G/DL (ref 14–18)
IMMATURE GRANULOCYTES #: 0 K/UL
INR BLD: 0.93 (ref 0.88–1.18)
LYMPHOCYTES ABSOLUTE: 1.8 K/UL (ref 1.1–4.5)
LYMPHOCYTES RELATIVE PERCENT: 22.2 % (ref 20–40)
MCH RBC QN AUTO: 26.1 PG (ref 27–31)
MCHC RBC AUTO-ENTMCNC: 30.6 G/DL (ref 33–37)
MCV RBC AUTO: 85.3 FL (ref 80–94)
MONOCYTES ABSOLUTE: 0.6 K/UL (ref 0–0.9)
MONOCYTES RELATIVE PERCENT: 7.3 % (ref 0–10)
MRSA SCREEN RT-PCR: NOT DETECTED
NEUTROPHILS ABSOLUTE: 5.4 K/UL (ref 1.5–7.5)
NEUTROPHILS RELATIVE PERCENT: 66.6 % (ref 50–65)
PDW BLD-RTO: 13.6 % (ref 11.5–14.5)
PLATELET # BLD: 208 K/UL (ref 130–400)
PMV BLD AUTO: 12.1 FL (ref 9.4–12.4)
POTASSIUM SERPL-SCNC: 4.4 MMOL/L (ref 3.5–5)
PROTHROMBIN TIME: 12.7 SEC (ref 12–14.6)
RBC # BLD: 5.51 M/UL (ref 4.7–6.1)
SARS-COV-2, PCR: NOT DETECTED
SODIUM BLD-SCNC: 138 MMOL/L (ref 136–145)
TOTAL PROTEIN: 6.5 G/DL (ref 6.6–8.7)
WBC # BLD: 8.1 K/UL (ref 4.8–10.8)

## 2022-03-24 PROCEDURE — 80053 COMPREHEN METABOLIC PANEL: CPT

## 2022-03-24 PROCEDURE — U0003 INFECTIOUS AGENT DETECTION BY NUCLEIC ACID (DNA OR RNA); SEVERE ACUTE RESPIRATORY SYNDROME CORONAVIRUS 2 (SARS-COV-2) (CORONAVIRUS DISEASE [COVID-19]), AMPLIFIED PROBE TECHNIQUE, MAKING USE OF HIGH THROUGHPUT TECHNOLOGIES AS DESCRIBED BY CMS-2020-01-R: HCPCS

## 2022-03-24 PROCEDURE — U0005 INFEC AGEN DETEC AMPLI PROBE: HCPCS

## 2022-03-24 PROCEDURE — 87641 MR-STAPH DNA AMP PROBE: CPT

## 2022-03-24 PROCEDURE — 85610 PROTHROMBIN TIME: CPT

## 2022-03-24 PROCEDURE — 83036 HEMOGLOBIN GLYCOSYLATED A1C: CPT

## 2022-03-24 PROCEDURE — 85025 COMPLETE CBC W/AUTO DIFF WBC: CPT

## 2022-03-24 PROCEDURE — 85730 THROMBOPLASTIN TIME PARTIAL: CPT

## 2022-03-24 NOTE — TELEPHONE ENCOUNTER
Patient called stating that the nebulizer we sent in for him in January is not covered by medicare. The diagnosis is not covered. I called [facility] to see what we can do about this. I spoke with [facility] and she told me that the patient signed a ABN stating that if insurance did not cover it that the responsibility to pay for it is his. Due to this there may not be anything we can do to fix this for the patient. [facility] is checking with her supervisor and will let me know what she finds out.

## 2022-03-24 NOTE — TELEPHONE ENCOUNTER
Patient left a VM on my line for me to call him back about a prescription. I have attempted to call him back at the number he left for me on [phone] and he did not answer. I left him a VM to call me back.

## 2022-03-25 ENCOUNTER — TELEPHONE (OUTPATIENT)
Dept: PRIMARY CARE CLINIC | Age: 67
End: 2022-03-25

## 2022-03-25 NOTE — TELEPHONE ENCOUNTER
I received a call back from Daniel at [facility] and she told me that since the patient has signed an ABN that there is nothing I can do to resubmit an order with a new diagnosis code. She said the cheapest way to take care of this would be for him to pay cash for the nebulizer which would be 45 dollars. She said this way he would not be charged a monthly rental fee and they would not bill his insurance every month. I called the patient and gave him this information. There is some confusion as he reports that he talked to Vj Nina at [facility] who told him we could resubmit the order with a new diagnosis code and the would re run it through insurance. I advised the patient to call [facility] to see if he can get clarification on this issue and told him to let me know what he finds out. Patient ARTEMIO and thanked me.

## 2022-03-28 ENCOUNTER — ANESTHESIA (OUTPATIENT)
Dept: OPERATING ROOM | Age: 67
DRG: 470 | End: 2022-03-28
Payer: MEDICARE

## 2022-03-28 ENCOUNTER — HOSPITAL ENCOUNTER (INPATIENT)
Age: 67
LOS: 3 days | Discharge: NURSING, REHAB OR LONG-TERM CARE | DRG: 470 | End: 2022-04-01
Attending: ORTHOPAEDIC SURGERY | Admitting: ORTHOPAEDIC SURGERY
Payer: MEDICARE

## 2022-03-28 ENCOUNTER — ANESTHESIA EVENT (OUTPATIENT)
Dept: OPERATING ROOM | Age: 67
DRG: 470 | End: 2022-03-28
Payer: MEDICARE

## 2022-03-28 VITALS — OXYGEN SATURATION: 100 % | TEMPERATURE: 97.5 F | SYSTOLIC BLOOD PRESSURE: 88 MMHG | DIASTOLIC BLOOD PRESSURE: 50 MMHG

## 2022-03-28 DIAGNOSIS — Z96.651 TOTAL KNEE REPLACEMENT STATUS, RIGHT: Primary | ICD-10-CM

## 2022-03-28 DIAGNOSIS — G62.9 NEUROPATHY: ICD-10-CM

## 2022-03-28 PROBLEM — M17.9 OSTEOARTHRITIS OF KNEE: Status: ACTIVE | Noted: 2022-03-28

## 2022-03-28 LAB
GLUCOSE BLD-MCNC: 158 MG/DL (ref 70–99)
GLUCOSE BLD-MCNC: 162 MG/DL (ref 70–99)
GLUCOSE BLD-MCNC: 175 MG/DL (ref 70–99)
PERFORMED ON: ABNORMAL

## 2022-03-28 PROCEDURE — 2580000003 HC RX 258: Performed by: NURSE ANESTHETIST, CERTIFIED REGISTERED

## 2022-03-28 PROCEDURE — 64447 NJX AA&/STRD FEMORAL NRV IMG: CPT | Performed by: NURSE ANESTHETIST, CERTIFIED REGISTERED

## 2022-03-28 PROCEDURE — 6360000002 HC RX W HCPCS: Performed by: ANESTHESIOLOGY

## 2022-03-28 PROCEDURE — 2500000003 HC RX 250 WO HCPCS: Performed by: NURSE ANESTHETIST, CERTIFIED REGISTERED

## 2022-03-28 PROCEDURE — C1776 JOINT DEVICE (IMPLANTABLE): HCPCS | Performed by: ORTHOPAEDIC SURGERY

## 2022-03-28 PROCEDURE — 3700000000 HC ANESTHESIA ATTENDED CARE: Performed by: ORTHOPAEDIC SURGERY

## 2022-03-28 PROCEDURE — 2580000003 HC RX 258: Performed by: ORTHOPAEDIC SURGERY

## 2022-03-28 PROCEDURE — 2500000003 HC RX 250 WO HCPCS: Performed by: ORTHOPAEDIC SURGERY

## 2022-03-28 PROCEDURE — 3700000001 HC ADD 15 MINUTES (ANESTHESIA): Performed by: ORTHOPAEDIC SURGERY

## 2022-03-28 PROCEDURE — 6360000002 HC RX W HCPCS: Performed by: ORTHOPAEDIC SURGERY

## 2022-03-28 PROCEDURE — 2580000003 HC RX 258: Performed by: ANESTHESIOLOGY

## 2022-03-28 PROCEDURE — 7100000000 HC PACU RECOVERY - FIRST 15 MIN: Performed by: ORTHOPAEDIC SURGERY

## 2022-03-28 PROCEDURE — C1713 ANCHOR/SCREW BN/BN,TIS/BN: HCPCS | Performed by: ORTHOPAEDIC SURGERY

## 2022-03-28 PROCEDURE — G0378 HOSPITAL OBSERVATION PER HR: HCPCS

## 2022-03-28 PROCEDURE — 2720000010 HC SURG SUPPLY STERILE: Performed by: ORTHOPAEDIC SURGERY

## 2022-03-28 PROCEDURE — 3600000015 HC SURGERY LEVEL 5 ADDTL 15MIN: Performed by: ORTHOPAEDIC SURGERY

## 2022-03-28 PROCEDURE — 96376 TX/PRO/DX INJ SAME DRUG ADON: CPT

## 2022-03-28 PROCEDURE — C9290 INJ, BUPIVACAINE LIPOSOME: HCPCS | Performed by: ORTHOPAEDIC SURGERY

## 2022-03-28 PROCEDURE — 6370000000 HC RX 637 (ALT 250 FOR IP): Performed by: ORTHOPAEDIC SURGERY

## 2022-03-28 PROCEDURE — 6370000000 HC RX 637 (ALT 250 FOR IP): Performed by: ANESTHESIOLOGY

## 2022-03-28 PROCEDURE — 3600000005 HC SURGERY LEVEL 5 BASE: Performed by: ORTHOPAEDIC SURGERY

## 2022-03-28 PROCEDURE — 6360000002 HC RX W HCPCS: Performed by: NURSE ANESTHETIST, CERTIFIED REGISTERED

## 2022-03-28 PROCEDURE — 7100000001 HC PACU RECOVERY - ADDTL 15 MIN: Performed by: ORTHOPAEDIC SURGERY

## 2022-03-28 PROCEDURE — 0SRC0J9 REPLACEMENT OF RIGHT KNEE JOINT WITH SYNTHETIC SUBSTITUTE, CEMENTED, OPEN APPROACH: ICD-10-PCS | Performed by: ORTHOPAEDIC SURGERY

## 2022-03-28 PROCEDURE — 96374 THER/PROPH/DIAG INJ IV PUSH: CPT

## 2022-03-28 PROCEDURE — 2709999900 HC NON-CHARGEABLE SUPPLY: Performed by: ORTHOPAEDIC SURGERY

## 2022-03-28 PROCEDURE — 82947 ASSAY GLUCOSE BLOOD QUANT: CPT

## 2022-03-28 DEVICE — PSN MC VE ASF R 10MM 8-11 EF: Type: IMPLANTABLE DEVICE | Site: KNEE | Status: FUNCTIONAL

## 2022-03-28 DEVICE — CEMENT BNE 40GM HI VISC RADPQ FOR REV SURG: Type: IMPLANTABLE DEVICE | Site: KNEE | Status: FUNCTIONAL

## 2022-03-28 DEVICE — PSN FEM CR POR CCR STD SZ10 R: Type: IMPLANTABLE DEVICE | Site: KNEE | Status: FUNCTIONAL

## 2022-03-28 DEVICE — PSN TIB STM 5 DEG SZ F R: Type: IMPLANTABLE DEVICE | Site: KNEE | Status: FUNCTIONAL

## 2022-03-28 DEVICE — COMPONENT PAT DIA41MM THK10MM ALL POLYETH CEM CONVENTIONAL: Type: IMPLANTABLE DEVICE | Site: KNEE | Status: FUNCTIONAL

## 2022-03-28 RX ORDER — ONDANSETRON 4 MG/1
4 TABLET, FILM COATED ORAL EVERY 8 HOURS PRN
Qty: 20 TABLET | Refills: 0 | Status: SHIPPED | OUTPATIENT
Start: 2022-03-28

## 2022-03-28 RX ORDER — SODIUM CHLORIDE 9 MG/ML
INJECTION, SOLUTION INTRAVENOUS CONTINUOUS
Status: DISCONTINUED | OUTPATIENT
Start: 2022-03-28 | End: 2022-03-28

## 2022-03-28 RX ORDER — ONDANSETRON 2 MG/ML
INJECTION INTRAMUSCULAR; INTRAVENOUS PRN
Status: DISCONTINUED | OUTPATIENT
Start: 2022-03-28 | End: 2022-03-28 | Stop reason: SDUPTHER

## 2022-03-28 RX ORDER — DEXTROSE MONOHYDRATE 50 MG/ML
100 INJECTION, SOLUTION INTRAVENOUS PRN
Status: DISCONTINUED | OUTPATIENT
Start: 2022-03-28 | End: 2022-04-01 | Stop reason: HOSPADM

## 2022-03-28 RX ORDER — APREPITANT 40 MG/1
40 CAPSULE ORAL ONCE
Status: COMPLETED | OUTPATIENT
Start: 2022-03-28 | End: 2022-03-28

## 2022-03-28 RX ORDER — SODIUM CHLORIDE 9 MG/ML
25 INJECTION, SOLUTION INTRAVENOUS PRN
Status: DISCONTINUED | OUTPATIENT
Start: 2022-03-28 | End: 2022-03-28 | Stop reason: HOSPADM

## 2022-03-28 RX ORDER — ONDANSETRON 4 MG/1
4 TABLET, FILM COATED ORAL DAILY PRN
Qty: 20 TABLET | Refills: 0 | Status: SHIPPED | OUTPATIENT
Start: 2022-03-28 | End: 2022-03-28 | Stop reason: SDUPTHER

## 2022-03-28 RX ORDER — SODIUM CHLORIDE 0.9 % (FLUSH) 0.9 %
5-40 SYRINGE (ML) INJECTION PRN
Status: DISCONTINUED | OUTPATIENT
Start: 2022-03-28 | End: 2022-04-01 | Stop reason: HOSPADM

## 2022-03-28 RX ORDER — TRANEXAMIC ACID 100 MG/ML
INJECTION, SOLUTION INTRAVENOUS PRN
Status: DISCONTINUED | OUTPATIENT
Start: 2022-03-28 | End: 2022-03-28 | Stop reason: SDUPTHER

## 2022-03-28 RX ORDER — SCOLOPAMINE TRANSDERMAL SYSTEM 1 MG/1
1 PATCH, EXTENDED RELEASE TRANSDERMAL
Status: DISCONTINUED | OUTPATIENT
Start: 2022-03-28 | End: 2022-03-28

## 2022-03-28 RX ORDER — MIDAZOLAM HYDROCHLORIDE 1 MG/ML
INJECTION INTRAMUSCULAR; INTRAVENOUS PRN
Status: DISCONTINUED | OUTPATIENT
Start: 2022-03-28 | End: 2022-03-28 | Stop reason: SDUPTHER

## 2022-03-28 RX ORDER — ONDANSETRON 4 MG/1
4 TABLET, ORALLY DISINTEGRATING ORAL EVERY 8 HOURS PRN
Status: DISCONTINUED | OUTPATIENT
Start: 2022-03-28 | End: 2022-04-01 | Stop reason: HOSPADM

## 2022-03-28 RX ORDER — FENTANYL CITRATE 50 UG/ML
INJECTION, SOLUTION INTRAMUSCULAR; INTRAVENOUS PRN
Status: DISCONTINUED | OUTPATIENT
Start: 2022-03-28 | End: 2022-03-28 | Stop reason: SDUPTHER

## 2022-03-28 RX ORDER — SODIUM CHLORIDE, SODIUM LACTATE, POTASSIUM CHLORIDE, CALCIUM CHLORIDE 600; 310; 30; 20 MG/100ML; MG/100ML; MG/100ML; MG/100ML
INJECTION, SOLUTION INTRAVENOUS CONTINUOUS PRN
Status: DISCONTINUED | OUTPATIENT
Start: 2022-03-28 | End: 2022-03-28 | Stop reason: SDUPTHER

## 2022-03-28 RX ORDER — SODIUM CHLORIDE 0.9 % (FLUSH) 0.9 %
5-40 SYRINGE (ML) INJECTION PRN
Status: DISCONTINUED | OUTPATIENT
Start: 2022-03-28 | End: 2022-03-28 | Stop reason: HOSPADM

## 2022-03-28 RX ORDER — SODIUM CHLORIDE 9 MG/ML
25 INJECTION, SOLUTION INTRAVENOUS PRN
Status: DISCONTINUED | OUTPATIENT
Start: 2022-03-28 | End: 2022-04-01 | Stop reason: HOSPADM

## 2022-03-28 RX ORDER — OXYCODONE HYDROCHLORIDE 5 MG/1
5 TABLET ORAL EVERY 4 HOURS PRN
Status: DISCONTINUED | OUTPATIENT
Start: 2022-03-28 | End: 2022-04-01 | Stop reason: HOSPADM

## 2022-03-28 RX ORDER — MEPERIDINE HYDROCHLORIDE 25 MG/ML
12.5 INJECTION INTRAMUSCULAR; INTRAVENOUS; SUBCUTANEOUS EVERY 5 MIN PRN
Status: DISCONTINUED | OUTPATIENT
Start: 2022-03-28 | End: 2022-03-28 | Stop reason: HOSPADM

## 2022-03-28 RX ORDER — PRASUGREL 10 MG/1
10 TABLET, FILM COATED ORAL DAILY
Status: DISCONTINUED | OUTPATIENT
Start: 2022-03-28 | End: 2022-04-01 | Stop reason: HOSPADM

## 2022-03-28 RX ORDER — SULFAMETHOXAZOLE AND TRIMETHOPRIM 400; 80 MG/1; MG/1
1 TABLET ORAL 2 TIMES DAILY
Qty: 10 TABLET | Refills: 0 | Status: SHIPPED | OUTPATIENT
Start: 2022-03-28 | End: 2022-04-02

## 2022-03-28 RX ORDER — METOPROLOL TARTRATE 50 MG/1
50 TABLET, FILM COATED ORAL 2 TIMES DAILY
Status: DISCONTINUED | OUTPATIENT
Start: 2022-03-28 | End: 2022-04-01 | Stop reason: HOSPADM

## 2022-03-28 RX ORDER — ROPIVACAINE HYDROCHLORIDE 5 MG/ML
INJECTION, SOLUTION EPIDURAL; INFILTRATION; PERINEURAL
Status: COMPLETED | OUTPATIENT
Start: 2022-03-28 | End: 2022-03-28

## 2022-03-28 RX ORDER — IRBESARTAN 150 MG/1
150 TABLET ORAL DAILY
Status: DISCONTINUED | OUTPATIENT
Start: 2022-03-28 | End: 2022-03-28 | Stop reason: CLARIF

## 2022-03-28 RX ORDER — LIDOCAINE HYDROCHLORIDE 10 MG/ML
INJECTION, SOLUTION EPIDURAL; INFILTRATION; INTRACAUDAL; PERINEURAL PRN
Status: DISCONTINUED | OUTPATIENT
Start: 2022-03-28 | End: 2022-03-28 | Stop reason: SDUPTHER

## 2022-03-28 RX ORDER — SODIUM CHLORIDE 0.9 % (FLUSH) 0.9 %
5-40 SYRINGE (ML) INJECTION EVERY 12 HOURS SCHEDULED
Status: DISCONTINUED | OUTPATIENT
Start: 2022-03-28 | End: 2022-03-28 | Stop reason: HOSPADM

## 2022-03-28 RX ORDER — GABAPENTIN 300 MG/1
300 CAPSULE ORAL 3 TIMES DAILY
Status: DISCONTINUED | OUTPATIENT
Start: 2022-03-28 | End: 2022-04-01 | Stop reason: HOSPADM

## 2022-03-28 RX ORDER — HYDROMORPHONE HYDROCHLORIDE 1 MG/ML
0.5 INJECTION, SOLUTION INTRAMUSCULAR; INTRAVENOUS; SUBCUTANEOUS EVERY 5 MIN PRN
Status: DISCONTINUED | OUTPATIENT
Start: 2022-03-28 | End: 2022-03-28 | Stop reason: HOSPADM

## 2022-03-28 RX ORDER — EZETIMIBE 10 MG/1
10 TABLET ORAL NIGHTLY
Status: DISCONTINUED | OUTPATIENT
Start: 2022-03-28 | End: 2022-04-01 | Stop reason: HOSPADM

## 2022-03-28 RX ORDER — DIPHENHYDRAMINE HYDROCHLORIDE 50 MG/ML
12.5 INJECTION INTRAMUSCULAR; INTRAVENOUS
Status: DISCONTINUED | OUTPATIENT
Start: 2022-03-28 | End: 2022-03-28 | Stop reason: HOSPADM

## 2022-03-28 RX ORDER — FAMOTIDINE 20 MG/1
20 TABLET, FILM COATED ORAL ONCE
Status: COMPLETED | OUTPATIENT
Start: 2022-03-28 | End: 2022-03-28

## 2022-03-28 RX ORDER — HYDROMORPHONE HYDROCHLORIDE 1 MG/ML
0.5 INJECTION, SOLUTION INTRAMUSCULAR; INTRAVENOUS; SUBCUTANEOUS
Status: DISCONTINUED | OUTPATIENT
Start: 2022-03-28 | End: 2022-04-01 | Stop reason: HOSPADM

## 2022-03-28 RX ORDER — ONDANSETRON 2 MG/ML
4 INJECTION INTRAMUSCULAR; INTRAVENOUS EVERY 6 HOURS PRN
Status: DISCONTINUED | OUTPATIENT
Start: 2022-03-28 | End: 2022-04-01 | Stop reason: HOSPADM

## 2022-03-28 RX ORDER — ROCURONIUM BROMIDE 10 MG/ML
INJECTION, SOLUTION INTRAVENOUS PRN
Status: DISCONTINUED | OUTPATIENT
Start: 2022-03-28 | End: 2022-03-28 | Stop reason: SDUPTHER

## 2022-03-28 RX ORDER — ROPIVACAINE HYDROCHLORIDE 5 MG/ML
INJECTION, SOLUTION EPIDURAL; INFILTRATION; PERINEURAL
Status: COMPLETED
Start: 2022-03-28 | End: 2022-03-28

## 2022-03-28 RX ORDER — FUROSEMIDE 20 MG/1
20 TABLET ORAL DAILY PRN
Status: DISCONTINUED | OUTPATIENT
Start: 2022-03-28 | End: 2022-04-01 | Stop reason: HOSPADM

## 2022-03-28 RX ORDER — FENTANYL CITRATE 50 UG/ML
25 INJECTION, SOLUTION INTRAMUSCULAR; INTRAVENOUS EVERY 5 MIN PRN
Status: DISCONTINUED | OUTPATIENT
Start: 2022-03-28 | End: 2022-03-28 | Stop reason: HOSPADM

## 2022-03-28 RX ORDER — FENTANYL CITRATE 50 UG/ML
50 INJECTION, SOLUTION INTRAMUSCULAR; INTRAVENOUS EVERY 5 MIN PRN
Status: DISCONTINUED | OUTPATIENT
Start: 2022-03-28 | End: 2022-03-28 | Stop reason: HOSPADM

## 2022-03-28 RX ORDER — OXYCODONE HYDROCHLORIDE 5 MG/1
10 TABLET ORAL EVERY 4 HOURS PRN
Status: DISCONTINUED | OUTPATIENT
Start: 2022-03-28 | End: 2022-04-01 | Stop reason: HOSPADM

## 2022-03-28 RX ORDER — OXYCODONE HYDROCHLORIDE AND ACETAMINOPHEN 5; 325 MG/1; MG/1
1 TABLET ORAL
Status: ACTIVE | OUTPATIENT
Start: 2022-03-28 | End: 2022-03-28

## 2022-03-28 RX ORDER — NITROGLYCERIN 0.4 MG/1
0.4 TABLET SUBLINGUAL EVERY 5 MIN PRN
Status: DISCONTINUED | OUTPATIENT
Start: 2022-03-28 | End: 2022-04-01 | Stop reason: HOSPADM

## 2022-03-28 RX ORDER — OXYCODONE AND ACETAMINOPHEN 10; 325 MG/1; MG/1
1 TABLET ORAL EVERY 6 HOURS PRN
Qty: 40 TABLET | Refills: 0 | Status: SHIPPED | OUTPATIENT
Start: 2022-03-28 | End: 2022-04-01 | Stop reason: SDUPTHER

## 2022-03-28 RX ORDER — NICOTINE POLACRILEX 4 MG
15 LOZENGE BUCCAL PRN
Status: DISCONTINUED | OUTPATIENT
Start: 2022-03-28 | End: 2022-03-28 | Stop reason: CLARIF

## 2022-03-28 RX ORDER — LIDOCAINE HYDROCHLORIDE 10 MG/ML
1 INJECTION, SOLUTION EPIDURAL; INFILTRATION; INTRACAUDAL; PERINEURAL
Status: DISCONTINUED | OUTPATIENT
Start: 2022-03-28 | End: 2022-03-28 | Stop reason: HOSPADM

## 2022-03-28 RX ORDER — SODIUM CHLORIDE, SODIUM LACTATE, POTASSIUM CHLORIDE, CALCIUM CHLORIDE 600; 310; 30; 20 MG/100ML; MG/100ML; MG/100ML; MG/100ML
INJECTION, SOLUTION INTRAVENOUS CONTINUOUS
Status: DISCONTINUED | OUTPATIENT
Start: 2022-03-28 | End: 2022-03-28

## 2022-03-28 RX ORDER — INSULIN GLARGINE 100 [IU]/ML
30 INJECTION, SOLUTION SUBCUTANEOUS DAILY
Status: DISCONTINUED | OUTPATIENT
Start: 2022-03-28 | End: 2022-04-01 | Stop reason: HOSPADM

## 2022-03-28 RX ORDER — FENTANYL CITRATE 50 UG/ML
50 INJECTION, SOLUTION INTRAMUSCULAR; INTRAVENOUS
Status: DISCONTINUED | OUTPATIENT
Start: 2022-03-28 | End: 2022-03-28 | Stop reason: HOSPADM

## 2022-03-28 RX ORDER — ASPIRIN 81 MG/1
81 TABLET, CHEWABLE ORAL DAILY
Status: DISCONTINUED | OUTPATIENT
Start: 2022-03-28 | End: 2022-04-01 | Stop reason: HOSPADM

## 2022-03-28 RX ORDER — SODIUM CHLORIDE 9 MG/ML
INJECTION, SOLUTION INTRAVENOUS CONTINUOUS
Status: DISCONTINUED | OUTPATIENT
Start: 2022-03-28 | End: 2022-04-01 | Stop reason: HOSPADM

## 2022-03-28 RX ORDER — PANTOPRAZOLE SODIUM 40 MG/1
40 TABLET, DELAYED RELEASE ORAL 2 TIMES DAILY
Status: DISCONTINUED | OUTPATIENT
Start: 2022-03-28 | End: 2022-04-01 | Stop reason: HOSPADM

## 2022-03-28 RX ORDER — FLUTICASONE PROPIONATE 50 MCG
1 SPRAY, SUSPENSION (ML) NASAL DAILY
Status: DISCONTINUED | OUTPATIENT
Start: 2022-03-28 | End: 2022-04-01 | Stop reason: HOSPADM

## 2022-03-28 RX ORDER — PROPOFOL 10 MG/ML
INJECTION, EMULSION INTRAVENOUS PRN
Status: DISCONTINUED | OUTPATIENT
Start: 2022-03-28 | End: 2022-03-28 | Stop reason: SDUPTHER

## 2022-03-28 RX ORDER — SULFAMETHOXAZOLE AND TRIMETHOPRIM 400; 80 MG/1; MG/1
1 TABLET ORAL DAILY
Qty: 10 TABLET | Refills: 0 | Status: SHIPPED | OUTPATIENT
Start: 2022-03-28 | End: 2022-03-28 | Stop reason: SDUPTHER

## 2022-03-28 RX ORDER — ALBUTEROL SULFATE 90 UG/1
2 AEROSOL, METERED RESPIRATORY (INHALATION) EVERY 6 HOURS PRN
Status: DISCONTINUED | OUTPATIENT
Start: 2022-03-28 | End: 2022-03-28 | Stop reason: SDUPTHER

## 2022-03-28 RX ORDER — ONDANSETRON 2 MG/ML
4 INJECTION INTRAMUSCULAR; INTRAVENOUS
Status: DISCONTINUED | OUTPATIENT
Start: 2022-03-28 | End: 2022-03-28 | Stop reason: HOSPADM

## 2022-03-28 RX ORDER — LOSARTAN POTASSIUM 50 MG/1
50 TABLET ORAL DAILY
Status: DISCONTINUED | OUTPATIENT
Start: 2022-03-28 | End: 2022-04-01 | Stop reason: HOSPADM

## 2022-03-28 RX ORDER — DEXTROSE MONOHYDRATE 25 G/50ML
12.5 INJECTION, SOLUTION INTRAVENOUS PRN
Status: DISCONTINUED | OUTPATIENT
Start: 2022-03-28 | End: 2022-03-28 | Stop reason: CLARIF

## 2022-03-28 RX ORDER — SODIUM CHLORIDE 0.9 % (FLUSH) 0.9 %
5-40 SYRINGE (ML) INJECTION EVERY 12 HOURS SCHEDULED
Status: DISCONTINUED | OUTPATIENT
Start: 2022-03-28 | End: 2022-04-01 | Stop reason: HOSPADM

## 2022-03-28 RX ORDER — FENTANYL CITRATE 50 UG/ML
25 INJECTION, SOLUTION INTRAMUSCULAR; INTRAVENOUS
Status: DISCONTINUED | OUTPATIENT
Start: 2022-03-28 | End: 2022-03-28 | Stop reason: HOSPADM

## 2022-03-28 RX ORDER — MIDAZOLAM HYDROCHLORIDE 1 MG/ML
2 INJECTION INTRAMUSCULAR; INTRAVENOUS
Status: COMPLETED | OUTPATIENT
Start: 2022-03-28 | End: 2022-03-28

## 2022-03-28 RX ADMIN — HYDROMORPHONE HYDROCHLORIDE 0.5 MG: 1 INJECTION, SOLUTION INTRAMUSCULAR; INTRAVENOUS; SUBCUTANEOUS at 14:45

## 2022-03-28 RX ADMIN — FENTANYL CITRATE 100 MCG: 50 INJECTION, SOLUTION INTRAMUSCULAR; INTRAVENOUS at 10:23

## 2022-03-28 RX ADMIN — ROPIVACAINE HYDROCHLORIDE 20 ML: 5 INJECTION, SOLUTION EPIDURAL; INFILTRATION; PERINEURAL at 08:09

## 2022-03-28 RX ADMIN — SODIUM CHLORIDE, SODIUM LACTATE, POTASSIUM CHLORIDE, AND CALCIUM CHLORIDE: 600; 310; 30; 20 INJECTION, SOLUTION INTRAVENOUS at 07:22

## 2022-03-28 RX ADMIN — OXYCODONE 10 MG: 5 TABLET ORAL at 15:59

## 2022-03-28 RX ADMIN — ROCURONIUM BROMIDE 20 MG: 10 INJECTION, SOLUTION INTRAVENOUS at 10:45

## 2022-03-28 RX ADMIN — FAMOTIDINE 20 MG: 20 TABLET ORAL at 07:59

## 2022-03-28 RX ADMIN — TRANEXAMIC ACID 1000 MG: 1 INJECTION, SOLUTION INTRAVENOUS at 10:25

## 2022-03-28 RX ADMIN — Medication 2000 MG: at 10:25

## 2022-03-28 RX ADMIN — HYDROMORPHONE HYDROCHLORIDE 0.5 MG: 1 INJECTION, SOLUTION INTRAMUSCULAR; INTRAVENOUS; SUBCUTANEOUS at 18:41

## 2022-03-28 RX ADMIN — Medication 2000 MG: at 18:41

## 2022-03-28 RX ADMIN — ASPIRIN 81 MG 81 MG: 81 TABLET ORAL at 15:59

## 2022-03-28 RX ADMIN — PANTOPRAZOLE SODIUM 40 MG: 40 TABLET, DELAYED RELEASE ORAL at 20:30

## 2022-03-28 RX ADMIN — HYDROMORPHONE HYDROCHLORIDE 0.5 MG: 1 INJECTION, SOLUTION INTRAMUSCULAR; INTRAVENOUS; SUBCUTANEOUS at 13:25

## 2022-03-28 RX ADMIN — OXYCODONE 5 MG: 5 TABLET ORAL at 20:30

## 2022-03-28 RX ADMIN — PROPOFOL 150 MG: 10 INJECTION, EMULSION INTRAVENOUS at 10:23

## 2022-03-28 RX ADMIN — EZETIMIBE 10 MG: 10 TABLET ORAL at 20:30

## 2022-03-28 RX ADMIN — LOSARTAN POTASSIUM 50 MG: 50 TABLET, FILM COATED ORAL at 16:00

## 2022-03-28 RX ADMIN — LIDOCAINE HYDROCHLORIDE 50 MG: 10 INJECTION, SOLUTION EPIDURAL; INFILTRATION; INTRACAUDAL; PERINEURAL at 10:23

## 2022-03-28 RX ADMIN — METOPROLOL TARTRATE 50 MG: 50 TABLET, FILM COATED ORAL at 20:30

## 2022-03-28 RX ADMIN — TRANEXAMIC ACID 1000 MG: 1 INJECTION, SOLUTION INTRAVENOUS at 11:45

## 2022-03-28 RX ADMIN — METFORMIN HYDROCHLORIDE 1000 MG: 500 TABLET ORAL at 16:00

## 2022-03-28 RX ADMIN — SODIUM CHLORIDE, SODIUM LACTATE, POTASSIUM CHLORIDE, AND CALCIUM CHLORIDE: 600; 310; 30; 20 INJECTION, SOLUTION INTRAVENOUS at 10:23

## 2022-03-28 RX ADMIN — HYDROMORPHONE HYDROCHLORIDE 0.5 MG: 1 INJECTION, SOLUTION INTRAMUSCULAR; INTRAVENOUS; SUBCUTANEOUS at 23:00

## 2022-03-28 RX ADMIN — SODIUM CHLORIDE, SODIUM LACTATE, POTASSIUM CHLORIDE, AND CALCIUM CHLORIDE: 600; 310; 30; 20 INJECTION, SOLUTION INTRAVENOUS at 11:51

## 2022-03-28 RX ADMIN — MIDAZOLAM 2 MG: 1 INJECTION, SOLUTION INTRAMUSCULAR; INTRAVENOUS at 08:04

## 2022-03-28 RX ADMIN — HYDROMORPHONE HYDROCHLORIDE 0.5 MG: 1 INJECTION, SOLUTION INTRAMUSCULAR; INTRAVENOUS; SUBCUTANEOUS at 13:00

## 2022-03-28 RX ADMIN — PRASUGREL 10 MG: 10 TABLET, FILM COATED ORAL at 16:00

## 2022-03-28 RX ADMIN — MIDAZOLAM 2 MG: 1 INJECTION INTRAMUSCULAR; INTRAVENOUS at 10:00

## 2022-03-28 RX ADMIN — GABAPENTIN 300 MG: 300 CAPSULE ORAL at 20:30

## 2022-03-28 RX ADMIN — SUGAMMADEX 250 MG: 100 INJECTION, SOLUTION INTRAVENOUS at 11:55

## 2022-03-28 RX ADMIN — GABAPENTIN 300 MG: 300 CAPSULE ORAL at 15:59

## 2022-03-28 RX ADMIN — FENTANYL CITRATE 50 MCG: 50 INJECTION, SOLUTION INTRAMUSCULAR; INTRAVENOUS at 12:27

## 2022-03-28 RX ADMIN — INSULIN LISPRO 2 UNITS: 100 INJECTION, SOLUTION INTRAVENOUS; SUBCUTANEOUS at 23:15

## 2022-03-28 RX ADMIN — ONDANSETRON 4 MG: 2 INJECTION INTRAMUSCULAR; INTRAVENOUS at 11:45

## 2022-03-28 RX ADMIN — FENTANYL CITRATE 50 MCG: 50 INJECTION, SOLUTION INTRAMUSCULAR; INTRAVENOUS at 12:38

## 2022-03-28 RX ADMIN — ROCURONIUM BROMIDE 50 MG: 10 INJECTION, SOLUTION INTRAVENOUS at 10:23

## 2022-03-28 RX ADMIN — SODIUM CHLORIDE, PRESERVATIVE FREE 10 ML: 5 INJECTION INTRAVENOUS at 21:15

## 2022-03-28 RX ADMIN — SODIUM CHLORIDE: 9 INJECTION, SOLUTION INTRAVENOUS at 15:22

## 2022-03-28 RX ADMIN — APREPITANT 40 MG: 40 CAPSULE ORAL at 07:58

## 2022-03-28 ASSESSMENT — PAIN SCALES - GENERAL
PAINLEVEL_OUTOF10: 10
PAINLEVEL_OUTOF10: 2
PAINLEVEL_OUTOF10: 10
PAINLEVEL_OUTOF10: 8
PAINLEVEL_OUTOF10: 7
PAINLEVEL_OUTOF10: 6
PAINLEVEL_OUTOF10: 4
PAINLEVEL_OUTOF10: 2
PAINLEVEL_OUTOF10: 8
PAINLEVEL_OUTOF10: 5
PAINLEVEL_OUTOF10: 8
PAINLEVEL_OUTOF10: 8

## 2022-03-28 ASSESSMENT — PAIN DESCRIPTION - DESCRIPTORS
DESCRIPTORS: ACHING

## 2022-03-28 ASSESSMENT — PAIN DESCRIPTION - PAIN TYPE
TYPE: SURGICAL PAIN

## 2022-03-28 ASSESSMENT — LIFESTYLE VARIABLES: SMOKING_STATUS: 0

## 2022-03-28 ASSESSMENT — ENCOUNTER SYMPTOMS: SHORTNESS OF BREATH: 0

## 2022-03-28 NOTE — ANESTHESIA PROCEDURE NOTES
Peripheral Block    Patient location during procedure: holding area  Start time: 3/28/2022 8:09 AM  End time: 3/28/2022 8:09 AM  Staffing  Performed: anesthesiologist   Anesthesiologist: Jasper Liu DO  Preanesthetic Checklist  Completed: patient identified, IV checked, site marked, risks and benefits discussed, surgical consent, monitors and equipment checked, pre-op evaluation, timeout performed, anesthesia consent given, oxygen available and patient being monitored  Peripheral Block  Patient position: supine  Prep: ChloraPrep  Patient monitoring: cardiac monitor, continuous pulse ox, frequent blood pressure checks and IV access  Block type: Femoral  Laterality: right  Injection technique: single-shot  Guidance: ultrasound guided  Adductor canal  Provider prep: mask and sterile gloves  Needle  Needle type: short-bevel   Needle gauge: 20 G  Needle length: 10 cm  Needle localization: ultrasound guidance  Assessment  Injection assessment: negative aspiration for heme, no paresthesia on injection and local visualized surrounding nerve on ultrasound  Paresthesia pain: none  Slow fractionated injection: yes  Hemodynamics: stable  Additional Notes  Needle was introduced in proximal third of thigh in an  td-medial to posterior direction. The needle was visualized \" in- plane\" under ultrasound guidance to access the adductor canal in a sub-sartorial fashion. The femoral artery was avoided and 20 cc of 0.5% ropivacaine  was injected and surrounded the nerve plexus. The plexus appeared anatomically normal, no obvious pathology was noted.  A permanent image was obtained   Medications Administered  Ropivacaine (NAROPIN) injection 0.5%, 20 mL  Reason for block: post-op pain management

## 2022-03-28 NOTE — DISCHARGE INSTR - DIET
Good nutrition is important when healing from an illness, injury, or surgery. Follow any nutrition recommendations given to you during your hospital stay. If you were given an oral nutrition supplement while in the hospital, continue to take this supplement at home. You can take it with meals, in-between meals, and/or before bedtime. These supplements can be purchased at most local grocery stores, pharmacies, and chain [employer]-stores. If you have any questions about your diet or nutrition, call the hospital and ask for the dietitian.             Low carb / High protein

## 2022-03-28 NOTE — CARE COORDINATION
Jeanie Carcamo RN Ortho Navigator  [phone]    Patient would like dme items to be delivered to [address] [place] [address] [address]. Patient is scheduled to discharge home today at 9:30 am.  Please call if you have any questions.   Bonilla Zepeda  3/28/2022  7:00 AM   Admission  Description: 74year old male Department: [facility] OR       Patient Demographics    Name Patient ID SSN Gender Identity Birth Date   Lamberto Yates [id number] Male [birth year] (66 yrs)     Address Phone Email     [address]   [address] [phone] (G)   [phone] (M) --       Reg Status PCP Date Last Verified Next Review Date    Verified Ailyn Correa MD  [phone] 03/11/22 04/10/22        Insurance Payors as of 3/28/2022      MEDICARE    Plan: MEDICARE PART A AND B Member: [id number] Effective from: 1/1/2021   Subscriber: Nelson MOSER Subscriber ID: [id number] Guarantor: Roby Charles     BCBS    Plan: [facility] MEDICARE SUPP Group: [id number] Member: [id number]   Effective from: 1/1/2021 Subscriber: Roby Charles Subscriber ID: [id number]   Guarantor: Roby Charles       Emergency Contact(s)    Name Relation Home Work Mobile   Rain Solitario Spouse [phone]   Jason Yousif Child [phone]   Collette Mow [phone]     Electronically signed by Tanya Carrasquillo RN on 3/28/2022 at 7:52 AM

## 2022-03-28 NOTE — ANESTHESIA PRE PROCEDURE
Department of Anesthesiology  Preprocedure Note       Name:  Jana Ramos   Age:  74 y.o.  :  1955                                          MRN:  [id number]         Date:  3/28/2022      Surgeon: Ramakrishna Maldonado):  Lavern Voss MD    Procedure: Procedure(s):  RIGHT KNEE TOTAL ARTHROPLASTY    Medications prior to admission:   Prior to Admission medications    Medication Sig Start Date End Date Taking? Authorizing Provider   prasugrel (EFFIENT) 10 MG TABS Take 1 tablet by mouth once daily 3/15/22   Adelphi Jaydenf APRN   metoprolol tartrate (LOPRESSOR) 50 MG tablet Take 1 tablet by mouth twice daily  Patient taking differently: Take 50 mg by mouth 2 times daily TAKE 1 TABLET BY MOUTH TWICE DAILY 3/8/22   Emily Jewell APRN   gabapentin (NEURONTIN) 300 MG capsule TAKE 1 CAPSULE BY MOUTH THREE TIMES DAILY  Patient taking differently: Take 300 mg by mouth 3 times daily.  TAKE 1 CAPSULE BY MOUTH THREE TIMES DAILY 3/13/22 4/14/22  Ricardo Moody MD   fluticasone (FLONASE) 50 MCG/ACT nasal spray Use 2 spray(s) in each nostril once daily  Patient taking differently: 1 spray by Nasal route daily Use 2 spray(s) in each nostril once daily 2/3/22   Emily Jewell APRN   ezetimibe (ZETIA) 10 MG tablet Take 1 tablet by mouth once daily 21   Zeny Santos MD   KERENDIA 10 MG TABS Take 10 mg by mouth daily  10/19/21   Historical Provider, MD   Insulin Lispro-aabc (ISAURA FERRER) 200 UNIT/ML SOPN Inject 25-30 Units into the skin 3 times daily as needed (\"5 cc per 15 carbs\") Sliding scale    Historical Provider, MD   furosemide (LASIX) 20 MG tablet Take 1 tablet by mouth daily  Patient taking differently: Take 20 mg by mouth daily as needed (swelling)  8/10/21   MIMI Albrecht   aspirin 81 MG chewable tablet CHEW AND SWALLOW 1 TABLET BY MOUTH ONCE DAILY  Patient taking differently: Take 81 mg by mouth daily CHEW AND SWALLOW 1 TABLET BY MOUTH ONCE DAILY 21   Ricardo Moody MD   metFORMIN (GLUCOPHAGE) 1000 MG tablet TAKE ONE TABLET BY MOUTH TWICE DAILY WITH MEALS  Patient taking differently: Take 1,000 mg by mouth 2 times daily (with meals) TAKE ONE TABLET BY MOUTH TWICE DAILY WITH MEALS 7/19/21   Zeny Santos MD   nystatin (MYCOSTATIN) 531013 UNIT/GM powder Apply 3 times daily. Patient taking differently: Apply topically 3 times daily as needed  7/12/21   Zeny Santos MD   albuterol (PROVENTIL) (5 MG/ML) 0.5% nebulizer solution Take 1 mL by nebulization every 6 hours as needed for Wheezing 7/12/21 3/22/22  Patricia Hsieh MD   nitroGLYCERIN (NITROSTAT) 0.4 MG SL tablet up to max of 3 total doses. If no relief after 1 dose, call 911. 7/12/21   Zeny Santos MD   irbesartan (AVAPRO) 150 MG tablet Take 1 tablet by mouth once daily  Patient taking differently: Take 150 mg by mouth daily  4/14/21   MIMI Haro   pantoprazole (PROTONIX) 40 MG tablet Take 1 tablet by mouth every morning (before breakfast)  Patient taking differently: Take 40 mg by mouth 2 times daily  1/20/21   MIMI De La Garza - NP   ciclopirox (PENLAC) 8 % solution Apply topically greater than 8 hours before showering. Clean nails with alcohol weekly.  9/22/20   MIMI Haro   Insulin Glargine, 1 Unit Dial, (TOUJEO SOLOSTAR) 300 UNIT/ML SOPN Inject 30 Units into the skin daily  3/3/20   Historical Provider, MD   INVOKANA 100 MG TABS tablet Take 100 mg by mouth daily 12/17/19   Historical Provider, MD   Semaglutide,0.25 or 0.5MG/DOS, (OZEMPIC, 0.25 OR 0.5 MG/DOSE,) 2 MG/1.5ML SOPN Inject 0.25 mg into the skin once a week Mondays    Historical Provider, MD   mupirocin (BACTROBAN) 2 % ointment APPLY  OINTMENT TO AFFECTED AREA THREE TIMES DAILY  Patient taking differently: Apply topically 3 times daily as needed APPLY  OINTMENT TO AFFECTED AREA THREE TIMES DAILY 9/3/19   MIMI Hawkins   Insulin Pen Needle ([place] PEN NEEDLES) 31G X 6 MM MISC Dx: E11.9 6/12/19   MIMI Beach   blood glucose test strips (ACCU-CHEK BHARATI) strip Patient is to test 8 times daily. Dx: E11.9  Please provide patient with Accu check Bharati Plus test strips per his Insurance request 6/12/19   MIMI Cruz   Lancets 30G MISC Use to check blood sugar 8 times daily. E11.9 3/20/19   MIMI Cruz   Alcohol Swabs PADS 1 box by Does not apply route 8 times daily 6/8/18   MIMI Beach   Blood Glucose Monitoring Suppl [facility] BLOOD GLUCOSE METER) w/Device KIT Please provide patient with Accu check Bharati Meter per his Insurance request. Dx: E11.9 5/23/18   MIMI Cruz   albuterol sulfate HFA (VENTOLIN HFA) 108 (90 Base) MCG/ACT inhaler Inhale 2 puffs into the lungs every 6 hours as needed for Wheezing 4/30/18   Castro Davis DO       Current medications:    Current Facility-Administered Medications   Medication Dose Route Frequency Provider Last Rate Last Admin    ceFAZolin (ANCEF) 2000 mg in 0.9% sodium chloride 50 mL IVPB  2,000 mg IntraVENous Once Pk Rodriguez MD        lactated ringers infusion   IntraVENous Continuous Geovanna Ruiz  mL/hr at 03/28/22 0722 New Bag at 03/28/22 3245       Allergies:     Allergies   Allergen Reactions    Lipitor [Atorvastatin Calcium] Diarrhea and Other (See Comments)     Dizzy    Tape Meng Star Tape] Hives and Rash       Problem List:    Patient Active Problem List   Diagnosis Code    DM type 2, uncontrolled, with neuropathy [facility] E11.40, E11.65    Essential hypertension I10    Gastroesophageal reflux disease without esophagitis K21.9    Pharyngoesophageal dysphagia R13.14    Family history of prostate cancer in father Z80.41    Change in bowel habits R19.4    History of colon polyps Z86.010    Double vessel coronary artery disease I25.10    History of placement of stent in LAD coronary artery Z95.5    ACS (acute coronary syndrome) ([facility] I24.9    NSTEMI (non-ST elevated myocardial infarction) ([facility] I21.4    Chronic kidney disease, stage III (moderate) ([facility] N18.30    Mixed hyperlipidemia E78.2    Morbid obesity (HCC) E66.01    Left ventricular dysfunction I51.9    Acute renal failure superimposed on stage 4 chronic kidney disease (HCC) N17.9, N18.4    Preoperative cardiovascular examination Z01.810       Past Medical History:        Diagnosis Date    Aneurysm ([facility]     abdominal (pt states NOT)    Arthritis     Bilateral leg edema     CAD (coronary artery disease)     sees [facility]/dr. russo.     Chronic kidney disease     Stage 4; sees dr. Lillis Dubin Diabetes mellitus, type 2 ([facility]     Fatty liver     GERD (gastroesophageal reflux disease)     HTN (hypertension)     Knee pain     Prolonged emergence from general anesthesia     Seasonal allergies     Vitreous hemorrhage ([facility] 1/22/2021       Past Surgical History:        Procedure Laterality Date    CARPAL TUNNEL RELEASE      COLONOSCOPY  11/04/2015    DR Capone: polys, 5yr recall    COLONOSCOPY N/A 10/11/2016    Dr Lopez-diverticulosis, Sessile serrated AP (-) high grade dysplasia x 1, tubular AP (-) dysplasia x 2, HP x 2--3 yr recall    COLONOSCOPY N/A 12/16/2020    Dr Kaylah Xiong, Mod. Diverticulosis, Internal hemorrhoids-Grade 2 & ext hemorrhoids, 3 year recall    CORONARY ANGIOPLASTY WITH STENT PLACEMENT  06/04/2019    AGA to circ    CORONARY ANGIOPLASTY WITH STENT PLACEMENT  05/2018    AGA to osteal LAD    ESOPHAGEAL DILATATION N/A 12/16/2020    Dr Kaylah Xiong, empirical Malik 47 fr bougie dilation, (+) GERD    EYE SURGERY      Right eye    PRE-MALIGNANT / [address]      Dr. Cain Singh UPPER GASTROINTESTINAL ENDOSCOPY  09/20/2016    Dr Lopez-ramu/dilation over wire, 46 [place]-distal esophageal narrowing, Inna (-)    UPPER GASTROINTESTINAL ENDOSCOPY  09/20/2016    Dr Lopez-ramu/dilation over wire, 46 [place]-distal esophageal narrowing, Inna (-)       Social History:    Social History     Tobacco Use    Smoking status: Never Smoker    Smokeless tobacco: Never Used Substance Use Topics    Alcohol use: No                                Counseling given: Not Answered      Vital Signs (Current):   Vitals:    03/28/22 0718   BP: (!) 170/94   Pulse: 74   Resp: 18   Temp: 97 °F (36.1 °C)   TempSrc: Temporal   SpO2: 96%   Weight: 264 lb (119.7 kg)   Height: 6' (1.829 m)                                              BP Readings from Last 3 Encounters:   03/28/22 (!) 170/94   03/22/22 114/68   12/22/21 116/72       NPO Status: Time of last liquid consumption: 0000                        Time of last solid consumption: 0000                        Date of last liquid consumption: 03/27/22                        Date of last solid food consumption: 03/27/22    BMI:   Wt Readings from Last 3 Encounters:   03/28/22 264 lb (119.7 kg)   03/24/22 264 lb (119.7 kg)   03/22/22 268 lb (121.6 kg)     Body mass index is 35.8 kg/m².     CBC:   Lab Results   Component Value Date    WBC 8.1 03/24/2022    RBC 5.51 03/24/2022    HGB 14.4 03/24/2022    HCT 47.0 03/24/2022    MCV 85.3 03/24/2022    RDW 13.6 03/24/2022     03/24/2022       CMP:   Lab Results   Component Value Date     03/24/2022    K 4.4 03/24/2022    K 4.7 06/06/2019     03/24/2022    CO2 23 03/24/2022    BUN 27 03/24/2022    CREATININE 1.5 03/24/2022    GFRAA 56 03/24/2022    LABGLOM 46 03/24/2022    GLUCOSE 177 03/24/2022    PROT 6.5 03/24/2022    CALCIUM 10.0 03/24/2022    BILITOT 0.3 03/24/2022    ALKPHOS 84 03/24/2022    AST 12 03/24/2022    ALT 12 03/24/2022       POC Tests:   Recent Labs     03/28/22  0721   POCGLU 158*       Coags:   Lab Results   Component Value Date    PROTIME 12.7 03/24/2022    INR 0.93 03/24/2022    APTT 30.4 03/24/2022       HCG (If Applicable): No results found for: PREGTESTUR, PREGSERUM, HCG, HCGQUANT     ABGs:   Lab Results   Component Value Date    PHART 7.358 05/14/2018    PO2ART 106 05/14/2018    INZ2WIT 37 05/14/2018    QKC9AHG 24.8 05/11/2018    BEART -4 05/14/2018    H6WBOJVF 98 05/14/2018        Type & Screen (If Applicable):  No results found for: LABABO, LABRH    Drug/Infectious Status (If Applicable):  No results found for: HIV, HEPCAB    COVID-19 Screening (If Applicable):   Lab Results   Component Value Date    COVID19 Not Detected 03/24/2022           Anesthesia Evaluation  Patient summary reviewed and Nursing notes reviewed no history of anesthetic complications:   Airway: Mallampati: III  TM distance: >3 FB   Neck ROM: full  Mouth opening: < 3 FB Dental:          Pulmonary:       (-) asthma, shortness of breath, sleep apnea and not a current smoker                           Cardiovascular:  Exercise tolerance: no interval change,   (+) hypertension:, past MI:, CAD:, CABG/stent (stenting in 2019/ 2020 x 1 each): no interval change, hyperlipidemia    (-) pacemaker and dysrhythmias    ECG reviewed      Echocardiogram reviewed  Stress test reviewed       Beta Blocker:  Dose within 24 Hrs      ROS comment: Stress test 8/2021: This is a normal pharmacologic nuclear stress test, negative for scar  or ischemia with normal LV wall motion and systolic function. Signed by Dr Rosalia Guevara    Echo:  Conclusions      Summary   Normal left ventricular chamber size and systolic function. Severe concentric left ventricular hypertrophy. Neuro/Psych:      (-) seizures and CVA           GI/Hepatic/Renal:   (+) GERD: well controlled, renal disease: CRI,           Endo/Other:    (+) DiabetesType II DM, well controlled, using insulin, blood dyscrasia (effient stopped 1 week ago)::., no malignancy/cancer. (-) no malignancy/cancer               Abdominal:             Vascular:     - DVT and PE. Other Findings:             Anesthesia Plan      general and regional     ASA 3     (Adductor canal block.  )  Induction: intravenous. MIPS: Postoperative opioids intended and Prophylactic antiemetics administered. Anesthetic plan and risks discussed with patient.     Use of blood products discussed with patient whom.                    Jose Pagan DO   3/28/2022

## 2022-03-28 NOTE — PROGRESS NOTES
Patient discharged home today with outpatient therapy scheduled. Went over all discharge instructions and new medications with patient's wife and provided a copy of all new medications to take home. Patient's wife verbalized understanding. Patient's stability will be assessed by PT and Op Care RN before discharging home.   Electronically signed by Aurora Byrne RN on 3/28/2022 at 11:33 AM

## 2022-03-28 NOTE — PROGRESS NOTES
Spoke with pt about his home antidiabetic regimen in relation to the medications that we do not have in our pharmacy that are ordered for him. Asked patient to bring the medications into the hospital so we could verify with pharmacy and get them to him as ordered. Pt stated he would not worry about them today and would continue taking when he goes home tomorrow.

## 2022-03-28 NOTE — H&P
Pt Name: Buford Mortimer  MRN: [id number]  YOB: 1955  Date: 3/28/2022      HPI: 58-year-old male presents today for a right total knee arthroplasty for primary osteoarthritis. Past Medical/Surgical History:   Past Medical History:   Diagnosis Date    Aneurysm ([facility]     abdominal    Arthritis     Bilateral leg edema     CAD (coronary artery disease)     sees [facility]/dr. russo.     Chronic kidney disease     Stage 4; sees dr. Elie Campoverde Diabetes mellitus, type 2 ([facility]     Fatty liver     GERD (gastroesophageal reflux disease)     HTN (hypertension)     Knee pain     Prolonged emergence from general anesthesia     Seasonal allergies     Vitreous hemorrhage ([facility] 1/22/2021     Past Surgical History:   Procedure Laterality Date    CARPAL TUNNEL RELEASE      COLONOSCOPY  11/04/2015    DR Capone: polys, 5yr recall    COLONOSCOPY N/A 10/11/2016    Dr Lopez-diverticulosis, Sessile serrated AP (-) high grade dysplasia x 1, tubular AP (-) dysplasia x 2, HP x 2--3 yr recall    COLONOSCOPY N/A 12/16/2020    Dr Ria Cruz, Mod. Diverticulosis, Internal hemorrhoids-Grade 2 & ext hemorrhoids, 3 year recall    CORONARY ANGIOPLASTY WITH STENT PLACEMENT  06/04/2019    AGA to circ    CORONARY ANGIOPLASTY WITH STENT PLACEMENT  05/2018    AGA to osteal LAD    ESOPHAGEAL DILATATION N/A 12/16/2020    Dr Ria Cruz, empirical Malik 47 fr bougie dilation, (+) GERD    EYE SURGERY      Right eye    PRE-MALIGNANT / [address]      Dr. Meghan Young UPPER GASTROINTESTINAL ENDOSCOPY  09/20/2016    Dr Lopez-ramu/dilation over wire, 46 [place]-distal esophageal narrowing, Inna (-)    UPPER GASTROINTESTINAL ENDOSCOPY  09/20/2016    Dr Cyr/dilation over wire, 46 [place]-distal esophageal narrowing, Inna (-)         Social History:    Smoking:  No Smoking History = 0   Alcohol:complete abstinence from alcohol    Medications:   Prior to Admission medications    Medication Sig Start Date End Date Taking? Authorizing Provider   prasugrel (EFFIENT) 10 MG TABS Take 1 tablet by mouth once daily 3/15/22   MIMI Hutton   metoprolol tartrate (LOPRESSOR) 50 MG tablet Take 1 tablet by mouth twice daily 3/8/22   MIMI Hutton   gabapentin (NEURONTIN) 300 MG capsule TAKE 1 CAPSULE BY MOUTH THREE TIMES DAILY  Patient taking differently: Take 300 mg by mouth 3 times daily. TAKE 1 CAPSULE BY MOUTH THREE TIMES DAILY 3/13/22 4/14/22  Ned Poole MD   fluticasone (FLONASE) 50 MCG/ACT nasal spray Use 2 spray(s) in each nostril once daily 2/3/22   MIMI Hutton   ezetimibe (ZETIA) 10 MG tablet Take 1 tablet by mouth once daily 11/1/21   Ned Poole MD   KERENDIA 10 MG TABS Take 10 mg by mouth daily  10/19/21   Historical Provider, MD   Insulin Lispro-aabc (ISAURA FERRER) 200 UNIT/ML SOPN Inject into the skin 3 times daily as needed Sliding scale    Historical Provider, MD   furosemide (LASIX) 20 MG tablet Take 1 tablet by mouth daily  Patient taking differently: Take 20 mg by mouth daily as needed (swelling)  8/10/21   Green Stage, APRN   aspirin 81 MG chewable tablet CHEW AND SWALLOW 1 TABLET BY MOUTH ONCE DAILY  Patient taking differently: Take 81 mg by mouth daily CHEW AND SWALLOW 1 TABLET BY MOUTH ONCE DAILY 7/19/21   Ned Poole MD   metFORMIN (GLUCOPHAGE) 1000 MG tablet TAKE ONE TABLET BY MOUTH TWICE DAILY WITH MEALS  Patient taking differently: Take 1,000 mg by mouth 2 times daily (with meals) TAKE ONE TABLET BY MOUTH TWICE DAILY WITH MEALS 7/19/21   Ned Poole MD   nystatin (MYCOSTATIN) 923030 UNIT/GM powder Apply 3 times daily. Patient taking differently: Apply topically 3 times daily as needed  7/12/21   Zeny Santos MD   albuterol (PROVENTIL) (5 MG/ML) 0.5% nebulizer solution Take 1 mL by nebulization every 6 hours as needed for Wheezing 7/12/21 3/22/22  Ned Poole MD   nitroGLYCERIN (NITROSTAT) 0.4 MG SL tablet up to max of 3 total doses.  If no relief after 1 dose, call 911. 7/12/21   Graciela Mcgowan MD   irbesartan (AVAPRO) 150 MG tablet Take 1 tablet by mouth once daily 4/14/21   MIMI Brooks   pantoprazole (PROTONIX) 40 MG tablet Take 1 tablet by mouth every morning (before breakfast)  Patient taking differently: Take 40 mg by mouth 2 times daily  1/20/21   MIMI Grigsby - NP   ciclopirox (PENLAC) 8 % solution Apply topically greater than 8 hours before showering. Clean nails with alcohol weekly. 9/22/20   MIMI Brooks   Insulin Glargine, 1 Unit Dial, (TOUJEO SOLOSTAR) 300 UNIT/ML SOPN 30 Units by NOT APPLICABLE route daily  3/3/20   Historical Provider, MD   INVOKANA 100 MG TABS tablet Take 100 mg by mouth daily 12/17/19   Historical Provider, MD   Semaglutide,0.25 or 0.5MG/DOS, (OZEMPIC, 0.25 OR 0.5 MG/DOSE,) 2 MG/1.5ML SOPN Inject 0.25 mg into the skin once a week mondays    Historical Provider, MD   mupirocin (BACTROBAN) 2 % ointment APPLY  OINTMENT TO AFFECTED AREA THREE TIMES DAILY  Patient taking differently: Apply topically 3 times daily as needed APPLY  OINTMENT TO AFFECTED AREA THREE TIMES DAILY 9/3/19   MIMI Beach   Insulin Pen Needle ([employer] PEN NEEDLES) 31G X 6 MM MISC Dx: E11.9 6/12/19   MIMI Beach   blood glucose test strips (ACCU-CHEK BHARATI) strip Patient is to test 8 times daily. Dx: E11.9  Please provide patient with Accu check Bharati Plus test strips per his Insurance request 6/12/19   MIMI Ball   Lancets 30G MISC Use to check blood sugar 8 times daily.  E11.9 3/20/19   OwenolMIMI Hernandez   Alcohol Swabs PADS 1 box by Does not apply route 8 times daily 6/8/18   MIMI Beach   Blood Glucose Monitoring Suppl [facility] BLOOD GLUCOSE METER) w/Device KIT Please provide patient with Accu check Bharati Meter per his Insurance request. Dx: E11.9 5/23/18   MIMI Beach   albuterol sulfate HFA (VENTOLIN HFA) 108 (90 Base) MCG/ACT inhaler Inhale 2 puffs into the lungs every 6 hours as needed for Wheezing 4/30/18   Lissa Aggarwal, DO       Allergies: Allergies   Allergen Reactions    Lipitor [Atorvastatin Calcium] Diarrhea and Other (See Comments)     Dizzy    Tape [Adhesive Tape] Hives and Rash       Review of systems:     * Constitutional: negative     * HEENT: negative     * Skin: negative     * Cardiac: negative     * Respiratory: negative     * GI: negative     * : negative     * Neuro: negative     * CNS: negative     * Extremities: negative     * Endcrine: negative     Physical Exam:   There were no vitals taken for this visit. - General: normal development and appearance     - HEENT: normocephalic, SHAHRZAD     - Skin: pink, warm, normal tone     - Neck: supple, no masses,     - Chest: no rales or wheezes, normal expansion     - Heart: RRR, no murmurs/gallops     - Abdomen: soft, nondistended, nontender, normal sounds     - Vascular: normal pulses, color, tone     - Pysch/Neuro: normal affect, mood, alert and oriented     - Musculoskeletal: Right knee tenderness palpation decreased range of motion swelling. Impression: Right knee primary osteoarthritis    Surgical Plan: Right total knee arthroplasty.       Electronically signed by Gokul Saunders MD on 3/28/2022 at 7:04 AM

## 2022-03-28 NOTE — OP NOTE
Operative Note      Patient: Amanda London  YOB: 1955  MRN: [id number]    Date of Procedure: 3/28/2022    OPERATIVE NOTE    PREOPERATIVE DIAGNOSIS: Right knee primary osteoarthritis     POSTOPERATIVE DIAGNOSIS:  Right knee primary osteoarthritis     PROCEDURE:  Right Total Knee Arthroplasty     SURGEON:  Matthew Landry MD    ASSISTANT:  First Assistant: Dami Tracey  Scrub Person First: Jesús Mack  Scrub Person Second: Konstantin Fischer    The assistant aided in limb position as well as retractor placement.  The assistant was also critical in implantation of the prosthesis.  In addition to this, the assistant was responsible for wound closure.  It was necessary to have the assistant present in order to complete the procedure. ANESTHESIA:  General    ESTIMATED BLOOD LOSS:  250cc. COMPLICATIONS:  None. CONDITION:  Stable. IMPLANTS: Anshu persona size 10 press-fit cruciate retaining femur, 10 medial congruent polyethylene spacer, F cemented tibia and a 41 mm patella. HISTORY: 65 yo male presents for a right TKA for end stage osteoarthritis. Patient has failed conservative management including: time, activity modifications, PT, NSAIDs, corticosteriods and viscosupplementation.     PROCEDURE:  The patient was interviewed in the pre-anesthesia area.  The operative limb was then marked with a marking pen.  The patient wasthen administered a regional block per the anesthesia team.  The patient was then taken to the operative suite where General anesthesia was performed by the anesthesia team.  A time out was then called to confirm the administration of 2 gm Ancef as well as the administration of tranexamic acid prior to incision.  The patient was then prepped and draped in standard sterile fashion using ChloraPrep.      Once fully prepped and draped, the limb was reprepped with ChloraPrep.  Sterile marking pen was then used to shyam out the tibia distally as well as the first web space.  A tourniquet was not used the leg was placed in a Kim leg beebe and a standard midline incision was carried out followed by a medial parapatellar arthrotomy.  The knee did demonstrate a large knee joint effusion with tricompartmental osteoarthrosis.      Attention was then turned to the distal femoral resection.  A drill was used to enter the intramedullary canal just anterior to the PCL insertion.  The extramedullary guide was then placed.  The guide was set at 5 degrees of valgus.  We then made a standard distal femoral cut.  We then sized the femur to a size 10 using an anterior referencing guide and penned the femoral block into position.  Care was taken to make sure that the block was perpendicular to the epicondylar axis and parallel to the [employer], thus in external rotation.  Our anterior, posterior as well as chamfer cuts were then made.      Attention was then turned to the tibia.  The PCL retractor was placed posteriorly as the ACL and PCL were resected.  The infrapatellar fat pad was excised and Homans retractor was placed medially and laterally.  An extramedullary tibial guide was then set parallel to the tibia on the AP and lateral views in line with the first web space and second metatarsal, just slightly medial to the medial center of the ankle.  The guide was set at 7 degrees posterior slope.  A 2/10 guide was then used to measure for approximately a 2 mm resection medially and 10 mm resection laterally.  The proximal tibia was then removed en block.  The knee was then brought into extension where the medial and lateral menisci were then excised with a radiofrequency device.  We also performed a medial soft tissue release around the posteromedial corner of the knee.  Curved osteotome was then used to remove any posterior osteophytes off the medial and lateral femoral condyles as well as strip the posterior capsule.  A size F tibial tray was then placed with the central portion of the tibial tray in line with the medial third of the tibial tubercle, thus in external rotation, and pinned into position.     We then placed the femoral component into position, taking care to make sure that the femoral component was flush with the lateral cortex.  I then placed a size F tibia with a 10 medial congruent trial spacer. The knee achieved full extension and was stable in flexion the patella tracked well. The trial tibia and spacer were then removed and the lug holes were finished.     Attention was then turned to the patella.  The patella was everted and resected down to 16 mm.  We then prepped for a size 41 patella implant  to sit superomedially for tracking purposes.     At this point in time, all trial components were removed.  The knee was copiously irrigated with pulsatile lavage.  We then cemented in our size F tibia component with a press-fit size 10 cruciate retaining femoral component as well as our 41 mm patellar component.  A 10 medial congruent mm trial spacer was placed with the knee in full extension as we waited on the cement to cure.  Meticulous cement clean up was carried out.  Once the cement was allowed to cure, we then trialed with our trial spacers.  We elected to go with a size 10 mm medial congruent polyethylene spacer as with this in position the knee achieved full extension and was stable in flexion, as well as having 2 mm of opening with varus/valgus stress testing.  Thus, the trial component was removed.  The knee was once again copiously irrigated with pulsatile lavage.  We then injected the posterior capsule with a local cocktail solution, both posteromedially and posterolaterally.  Care was taken to make sure there was no remaining cement.  A size 10 mm needle congruent polyethylene spacer was then placed.  The knee was then brought into 30 degrees of flexion.  The tourniquet was deflated and meticulous hemostasis was maintained with electrocautery.  The extensor mechanism was then closed with a #1 Vicryl suture.  The skin was then approximated with a 3-0 Vicryl suture and closed with a Prineo wound closure system.  The patient was placed in a sterile dressing.  The patient was awakened from anesthesia without difficulty and was transferred to the PACU in stable condition.  All sponge, needle and instrument counts were correct at the end of the procedure.     Marea Collet, MD  3/28/2022  10:26 AM

## 2022-03-29 PROBLEM — M17.11 ARTHRITIS OF KNEE, RIGHT: Status: ACTIVE | Noted: 2022-03-29

## 2022-03-29 PROBLEM — Z96.651 S/P TOTAL KNEE ARTHROPLASTY, RIGHT: Status: ACTIVE | Noted: 2022-03-29

## 2022-03-29 LAB
GLUCOSE BLD-MCNC: 183 MG/DL (ref 70–99)
GLUCOSE BLD-MCNC: 188 MG/DL (ref 70–99)
GLUCOSE BLD-MCNC: 197 MG/DL (ref 70–99)
GLUCOSE BLD-MCNC: 212 MG/DL (ref 70–99)
GLUCOSE BLD-MCNC: 284 MG/DL (ref 70–99)
PERFORMED ON: ABNORMAL

## 2022-03-29 PROCEDURE — 97530 THERAPEUTIC ACTIVITIES: CPT

## 2022-03-29 PROCEDURE — 6360000002 HC RX W HCPCS: Performed by: ORTHOPAEDIC SURGERY

## 2022-03-29 PROCEDURE — 1210000000 HC MED SURG R&B

## 2022-03-29 PROCEDURE — 2580000003 HC RX 258: Performed by: ORTHOPAEDIC SURGERY

## 2022-03-29 PROCEDURE — 96376 TX/PRO/DX INJ SAME DRUG ADON: CPT

## 2022-03-29 PROCEDURE — 97535 SELF CARE MNGMENT TRAINING: CPT

## 2022-03-29 PROCEDURE — 97162 PT EVAL MOD COMPLEX 30 MIN: CPT

## 2022-03-29 PROCEDURE — 82947 ASSAY GLUCOSE BLOOD QUANT: CPT

## 2022-03-29 PROCEDURE — 97166 OT EVAL MOD COMPLEX 45 MIN: CPT

## 2022-03-29 PROCEDURE — 97116 GAIT TRAINING THERAPY: CPT

## 2022-03-29 PROCEDURE — 6370000000 HC RX 637 (ALT 250 FOR IP): Performed by: ORTHOPAEDIC SURGERY

## 2022-03-29 RX ADMIN — GABAPENTIN 300 MG: 300 CAPSULE ORAL at 14:52

## 2022-03-29 RX ADMIN — INSULIN LISPRO 1 UNITS: 100 INJECTION, SOLUTION INTRAVENOUS; SUBCUTANEOUS at 22:10

## 2022-03-29 RX ADMIN — INSULIN LISPRO 4 UNITS: 100 INJECTION, SOLUTION INTRAVENOUS; SUBCUTANEOUS at 14:08

## 2022-03-29 RX ADMIN — PRASUGREL 10 MG: 10 TABLET, FILM COATED ORAL at 08:36

## 2022-03-29 RX ADMIN — FLUTICASONE PROPIONATE 1 SPRAY: 50 SPRAY, METERED NASAL at 08:41

## 2022-03-29 RX ADMIN — SODIUM CHLORIDE, PRESERVATIVE FREE 10 ML: 5 INJECTION INTRAVENOUS at 08:38

## 2022-03-29 RX ADMIN — GABAPENTIN 300 MG: 300 CAPSULE ORAL at 08:36

## 2022-03-29 RX ADMIN — MUPIROCIN: 20 OINTMENT TOPICAL at 08:41

## 2022-03-29 RX ADMIN — METOPROLOL TARTRATE 50 MG: 50 TABLET, FILM COATED ORAL at 08:36

## 2022-03-29 RX ADMIN — GABAPENTIN 300 MG: 300 CAPSULE ORAL at 20:44

## 2022-03-29 RX ADMIN — OXYCODONE 10 MG: 5 TABLET ORAL at 15:53

## 2022-03-29 RX ADMIN — PANTOPRAZOLE SODIUM 40 MG: 40 TABLET, DELAYED RELEASE ORAL at 20:44

## 2022-03-29 RX ADMIN — OXYCODONE 10 MG: 5 TABLET ORAL at 03:30

## 2022-03-29 RX ADMIN — OXYCODONE 10 MG: 5 TABLET ORAL at 19:29

## 2022-03-29 RX ADMIN — SODIUM CHLORIDE: 9 INJECTION, SOLUTION INTRAVENOUS at 00:15

## 2022-03-29 RX ADMIN — Medication 2000 MG: at 01:27

## 2022-03-29 RX ADMIN — METOPROLOL TARTRATE 50 MG: 50 TABLET, FILM COATED ORAL at 20:44

## 2022-03-29 RX ADMIN — ASPIRIN 81 MG 81 MG: 81 TABLET ORAL at 08:38

## 2022-03-29 RX ADMIN — PANTOPRAZOLE SODIUM 40 MG: 40 TABLET, DELAYED RELEASE ORAL at 08:36

## 2022-03-29 RX ADMIN — METFORMIN HYDROCHLORIDE 1000 MG: 500 TABLET ORAL at 08:36

## 2022-03-29 RX ADMIN — LOSARTAN POTASSIUM 50 MG: 50 TABLET, FILM COATED ORAL at 08:36

## 2022-03-29 RX ADMIN — HYDROMORPHONE HYDROCHLORIDE 0.5 MG: 1 INJECTION, SOLUTION INTRAMUSCULAR; INTRAVENOUS; SUBCUTANEOUS at 14:08

## 2022-03-29 RX ADMIN — OXYCODONE 10 MG: 5 TABLET ORAL at 08:38

## 2022-03-29 RX ADMIN — SODIUM CHLORIDE, PRESERVATIVE FREE 10 ML: 5 INJECTION INTRAVENOUS at 21:00

## 2022-03-29 RX ADMIN — HYDROMORPHONE HYDROCHLORIDE 0.5 MG: 1 INJECTION, SOLUTION INTRAMUSCULAR; INTRAVENOUS; SUBCUTANEOUS at 05:30

## 2022-03-29 RX ADMIN — INSULIN GLARGINE 30 UNITS: 100 INJECTION, SOLUTION SUBCUTANEOUS at 08:42

## 2022-03-29 RX ADMIN — EZETIMIBE 10 MG: 10 TABLET ORAL at 20:44

## 2022-03-29 ASSESSMENT — PAIN DESCRIPTION - LOCATION
LOCATION: KNEE

## 2022-03-29 ASSESSMENT — PAIN SCALES - GENERAL
PAINLEVEL_OUTOF10: 10
PAINLEVEL_OUTOF10: 2
PAINLEVEL_OUTOF10: 8
PAINLEVEL_OUTOF10: 2
PAINLEVEL_OUTOF10: 8
PAINLEVEL_OUTOF10: 7
PAINLEVEL_OUTOF10: 8
PAINLEVEL_OUTOF10: 7
PAINLEVEL_OUTOF10: 8

## 2022-03-29 ASSESSMENT — PAIN DESCRIPTION - ORIENTATION
ORIENTATION: RIGHT

## 2022-03-29 ASSESSMENT — PAIN - FUNCTIONAL ASSESSMENT
PAIN_FUNCTIONAL_ASSESSMENT: PREVENTS OR INTERFERES WITH MANY ACTIVE NOT PASSIVE ACTIVITIES
PAIN_FUNCTIONAL_ASSESSMENT: PREVENTS OR INTERFERES SOME ACTIVE ACTIVITIES AND ADLS
PAIN_FUNCTIONAL_ASSESSMENT: PREVENTS OR INTERFERES SOME ACTIVE ACTIVITIES AND ADLS

## 2022-03-29 ASSESSMENT — PAIN DESCRIPTION - DESCRIPTORS
DESCRIPTORS: ACHING;SORE

## 2022-03-29 ASSESSMENT — PAIN DESCRIPTION - FREQUENCY: FREQUENCY: CONTINUOUS

## 2022-03-29 ASSESSMENT — PAIN DESCRIPTION - PAIN TYPE
TYPE: SURGICAL PAIN

## 2022-03-29 NOTE — PROGRESS NOTES
Physical Therapy  Name: Rosa López  MRN:  [id number]  Date of service:  3/29/2022       03/29/22 1453   Restrictions/Precautions   Restrictions/Precautions Fall Risk;Weight Bearing   Required Braces or Orthoses? No   Lower Extremity Weight Bearing Restrictions   Right Lower Extremity Weight Bearing Weight Bearing As Tolerated   Subjective   Subjective Pt agreed to therapy. Pain Screening   Patient Currently in Pain Yes   Pain Assessment   Pain Assessment 0-10   Pain Level 8   Pain Type Surgical pain   Pain Location Knee   Pain Orientation Right   Pain Descriptors Aching; Sore   Functional Pain Assessment Prevents or interferes some active activities and ADLs   Non-Pharmaceutical Pain Intervention(s) Ambulation/Increased Activity;Repositioned; Rest   Response to Pain Intervention Patient Satisfied   Pain Frequency Continuous   Bed Mobility   Supine to Sit Stand by assistance   Transfers   Sit to Stand Contact guard assistance   Stand to sit Contact guard assistance   Ambulation   Ambulation? Yes   WB Status FWBAT RLE   Ambulation 1   Surface level tile   Device Rolling Walker   Assistance Contact guard assistance   Quality of Gait slightly unsteady but no LOB   Gait Deviations Slow Mariely;Decreased step length;Decreased step height   Distance 10'   Exercises   Quad Sets x5   Ankle Pumps x5   Patient Goals    Patient goals  go home soon   Short term goals   Time Frame for Short term goals 2 wks   Short term goal 1 supine to sit indep   Short term goal 2 sit to stand indep   Short term goal 3 amb. 100' with RW SBA   Short term goal 4 up/down 3 stairs SBA with rail   Conditions Requiring Skilled Therapeutic Intervention   Body structures, Functions, Activity limitations Decreased functional mobility ; Decreased ROM; Decreased strength;Decreased safe awareness;Decreased cognition;Decreased posture; Increased pain;Decreased balance   Assessment Pt able amb short distance in room.  Pt required less assist for transfers and amb. Pt assisted to chair and instructed in quad sets and ankle pumps. Pt up to chair with all needs in reach. Activity Tolerance   Activity Tolerance Patient Tolerated treatment well   Safety Devices   Type of devices Call light within reach; Chair alarm in place; Left in chair  (OT present working with pt)       Electronically signed by Cierra Bolton PTA on 3/29/2022 at 3:02 PM

## 2022-03-29 NOTE — CONSULTS
[address] [facility]    [id number]    Consulting Physician: No Gonzalez MD  Reason for Consult: Medical Management  Primacy Care Physician: Esther Graves MD      History Obtained From:  \"patient\"    History of present illness: The patient is a 74 y.o. male who presents with right knee replacement on 3/29/2022. He is up in chair. He ambulated for the first time today and did well. His pain is managed with current therapy. Past Medical History:        Diagnosis Date    Aneurysm ([address]     abdominal (pt states NOT)    Arthritis     Bilateral leg edema     CAD (coronary artery disease)     sees [facility]/dr. russo.     Chronic kidney disease     Stage 4; sees dr. David Dawn Diabetes mellitus, type 2 ([facility]     Fatty liver     GERD (gastroesophageal reflux disease)     HTN (hypertension)     Knee pain     Prolonged emergence from general anesthesia     Seasonal allergies     Vitreous hemorrhage ([facility] 1/22/2021       Past Surgical History:        Procedure Laterality Date    CARPAL TUNNEL RELEASE      COLONOSCOPY  11/04/2015    DR Capone: polys, 5yr recall    COLONOSCOPY N/A 10/11/2016    Dr Lopez-diverticulosis, Sessile serrated AP (-) high grade dysplasia x 1, tubular AP (-) dysplasia x 2, HP x 2--3 yr recall    COLONOSCOPY N/A 12/16/2020    Dr Rajesh Day, Mod. Diverticulosis, Internal hemorrhoids-Grade 2 & ext hemorrhoids, 3 year recall    CORONARY ANGIOPLASTY WITH STENT PLACEMENT  06/04/2019    AGA to circ    CORONARY ANGIOPLASTY WITH STENT PLACEMENT  05/2018    AGA to osteal LAD    ESOPHAGEAL DILATATION N/A 12/16/2020    Dr Rajesh Day, empirical [address] fr bougie dilation, (+) GERD    EYE SURGERY      Right eye    PRE-MALIGNANT / Davy Arias Rings Right 3/28/2022    RIGHT KNEE TOTAL ARTHROPLASTY performed by No Gonzalez MD at [address]  09/20/2016    Dr Lopez-w/dilation over wire, [address]-distal esophageal narrowing, Inna (-)    UPPER GASTROINTESTINAL ENDOSCOPY  09/20/2016    Dr Lopez-w/dilation over wire, [address]-distal esophageal narrowing, Inna (-)       Medications Current:    Current Facility-Administered Medications   Medication Dose Route Frequency Provider Last Rate Last Admin    albuterol (PROVENTIL) nebulizer solution 5 mg  5 mg Nebulization Q6H PRN Kaitlynn Leal MD        aspirin chewable tablet 81 mg  81 mg Oral Daily Kaitlynn Leal MD   81 mg at 03/29/22 0838    ciclopirox (PENLAC) 8 % solution   Topical Nightly Kaitlynn Leal MD        ezetimibe (ZETIA) tablet 10 mg  10 mg Oral Nightly Kaitlynn Leal MD   10 mg at 03/28/22 2030    fluticasone (FLONASE) 50 MCG/ACT nasal spray 1 spray  1 spray Nasal Daily Kaitlynn Leal MD   1 spray at 03/29/22 0841    furosemide (LASIX) tablet 20 mg  20 mg Oral Daily PRN Kaitlynn Leal MD        gabapentin (NEURONTIN) capsule 300 mg  300 mg Oral TID Kaitlynn Leal MD   300 mg at 03/29/22 0836    insulin glargine (LANTUS) injection vial 30 Units  30 Units SubCUTAneous Daily Kaitlynn Leal MD   30 Units at 03/29/22 0842    Insulin Lispro-aabc SOPN 25-30 Units  25-30 Units SubCUTAneous TID PRN Kaitlynn Leal MD        [Held by provider] canagliflozin [facility]) tablet 100 mg  100 mg Oral Daily Kaitlynn Leal MD        Finerenone TABS 10 mg  10 mg Oral Daily Kaitlynn Leal MD        [facility] [place] by provider] metFORMIN (GLUCOPHAGE) tablet 1,000 mg  1,000 mg Oral BID  Kaitlynn Leal MD   1,000 mg at 03/29/22 0836    metoprolol tartrate (LOPRESSOR) tablet 50 mg  50 mg Oral BID Kaitlynn Leal MD   50 mg at 03/29/22 0874    mupirocin (BACTROBAN) 2 % ointment   Topical Daily Kaitlynn Leal MD   Given at 03/29/22 0841    nitroGLYCERIN (NITROSTAT) SL tablet 0.4 mg  0.4 mg SubLINGual Q5 Min PRN Kaitlynn Leal MD        pantoprazole (PROTONIX) tablet 40 mg  40 mg Oral BID Kaitlynn Leal MD   40 mg at 03/29/22 0836    prasugrel (EFFIENT) tablet 10 mg  10 mg Oral Daily Kaitlynn Leal, MD   10 mg at 03/29/22 0836    [Held by provider] Semaglutide(0.25 or 0.5MG/DOS) SOPN 0.25 mg  0.25 mg SubCUTAneous Weekly Prudence MD Rayna        sodium chloride flush 0.9 % injection 5-40 mL  5-40 mL IntraVENous 2 times per day Prudence MD Rayna   10 mL at 03/29/22 0838    sodium chloride flush 0.9 % injection 5-40 mL  5-40 mL IntraVENous PRN Prudence MD Rayna        0.9 % sodium chloride infusion  25 mL IntraVENous PRN Prudence MD Rayna        ondansetron (ZOFRAN-ODT) disintegrating tablet 4 mg  4 mg Oral Q8H PRN Prudence MD Rayna        Or    [facility]ansetron [facility] injection 4 mg  4 mg IntraVENous Q6H PRN Prudence MD Rayna        0.9 % sodium chloride infusion   IntraVENous Continuous Prudence MD Rayna 125 mL/hr at 03/29/22 0015 New Bag at 03/29/22 0015    oxyCODONE (ROXICODONE) immediate release tablet 5 mg  5 mg Oral Q4H PRN Prudence MD Rayna   5 mg at 03/28/22 2030    Or    oxyCODONE (ROXICODONE) immediate release tablet 10 mg  10 mg Oral Q4H PRN Prudence MD Rayna   10 mg at 03/29/22 0838    HYDROmorphone HCl PF (DILAUDID) injection 0.5 mg  0.5 mg IntraVENous Q3H PRN Prudence MD Rayna   0.5 mg at 03/29/22 0530    glucagon (rDNA) injection 1 mg  1 mg IntraMUSCular PRN Prudence MD Rayna        dextrose 5 % solution  100 mL/hr IntraVENous PRN Prudence MD Rayna        losartan (COZAAR) tablet 50 mg  50 mg Oral Daily Prudence MD Rayna   50 mg at 03/29/22 0836    glucose chewable tablet 4 each  4 tablet Oral PRN Prudence MD Rayna        dextrose bolus (hypoglycemia) 10% 125 mL  125 mL IntraVENous PRN Prudence MD Rayna        Or    dextrose bolus (hypoglycemia) 10% 250 mL  250 mL IntraVENous PRN Prudence MD Rayna        insulin lispro (HUMALOG) injection vial 0-12 Units  0-12 Units SubCUTAneous TID WC Prudence MD Rayna        insulin lispro (HUMALOG) injection vial 0-6 Units  0-6 Units SubCUTAneous Nightly Prudence MD Rayna   2 Units at 03/28/22 3973       Allergies:  Lipitor [atorvastatin calcium] and Tape Daylene Righter tape]    Social History:   TOBACCO: reports that he has never smoked. He has never used smokeless tobacco.  ETOH:   reports no history of alcohol use. Patient currently lives with family     Family History:       Problem Relation Age of Onset    Colon Cancer Mother     Prostate Cancer Father     Other Father         HX of blood clot    Diabetes Sister     Cancer Sister     Diabetes Brother     Colon Polyps Son     Esophageal Cancer Neg Hx     Liver Cancer Neg Hx     Liver Disease Neg Hx     Stomach Cancer Neg Hx     Rectal Cancer Neg Hx        REVIEW OF SYSTEMS:  Review of Systems  ROS: 14 point review of systems is negative except as specifically addressed above. Constitutional / general:  No fever / chills / sweats  Head:  No headache / neck stiffness / trauma / visual change  Eyes:  No blurry vision / acute visual change or loss / itching / redness  ENT: No sore throat / hoarseness / nasal drainage / ear pain  CV:  No chest pain / palpitations/ orthopnea   Respiratory:  No cough / shortness of breath / sputum / hemoptysis  GI: No nausea / vomiting / abdominal pain / diarrhea / constipation  :  No dysuria / hesitancy / urgency / hematuria   Neuro: No paralysis / syncope / seizure / dysphagia / headache / paresthesias  Musculoskeletal:  No muscle weakness /joint stiffness / c/o pain to right knee  Vascular: No edema / claudication / thrombosis / varicosities  Heme / endocrine: No easy bruising / bleeding / excessive sweating / heat or cold intolerance  Psychiatric:  No depression / anxiety / insomnia / mood changes  Skin:  No new rashes / lesions / skin hair or nail changes      PHYSICAL EXAM:  /74   Pulse 88   Temp 98.2 °F (36.8 °C) (Temporal)   Resp 18   Ht 6' (1.829 m)   Wt 264 lb (119.7 kg)   SpO2 91%   BMI 35.80 kg/m²     Physical Exam    General appearance: alert, appears stated age, cooperative and no distress  Head: Normocephalic, without obvious abnormality, atraumatic  Eyes: conjunctivae/corneas clear. PERRL, EOM's intact. Ears: normal external ears  Neck: no adenopathy, no carotid bruit, no JVD, supple, symmetrical, trachea midline and thyroid not enlarged, symmetric, no tenderness/mass/nodules  Lungs: clear to auscultation bilaterally,no rales or wheezes   Heart: regular rate and rhythm, S1, S2 normal, no murmur,  regular rate and rhythm   Abdomen:soft, non-tender; non-distended normal bowel sounds no masses, no organomegaly  Extremities:  no sign of DVT, right knee with d/i dressing  Skin: Skin color, texture, turgor normal. No rashes or lesions  Lymphatic: No palpable lymph node enlargment  Neurologic: Alert and oriented X 3, normal strength and tone. Normal symmetric reflexes. Mental status: Alert, oriented, thought content appropriate  Cranial nerves:II-XII Grossly intact Sensory: normal Motor:grossly normal  Psychiatric:  Nl mood      DIAGNOSTIC DATA:  CBC:No results for input(s): WBC, HGB, HCT, PLT in the last 72 hours. BMP:No results for input(s): NA, K, CL, CO2, BUN, CREATININE, CALCIUM, PHOS in the last 72 hours. Invalid input(s): Jessenia Jean Baptiste results for input(s): AST, ALT, BILIDIR, BILITOT, ALKPHOS in the last 72 hours. Coag Panel: No results for input(s): INR, PROTIME, APTT in the last 72 hours. Cardiac Enzymes: No results for input(s): Massiel Marissa in the last 72 hours. ABGs:  Lab Results   Component Value Date    PHART 7.358 05/14/2018    PO2ART 106 05/14/2018    EMO0BBV 37 05/14/2018     Urinalysis:  Lab Results   Component Value Date    NITRU Negative 06/06/2019    WBCUA 0 06/03/2019    BACTERIA NONE 06/06/2019    RBCUA 3-5 06/06/2019    BLOODU TRACE-LYSED 06/06/2019    SPECGRAV 1.015 06/06/2019    GLUCOSEU 100 06/06/2019       RAD:   No results found.           ASSESSMENT AND PLAN:  Principal Problem:    Osteoarthritis of knee   Post op day 1   PT/OT consult  Active Problems:    DM type 2, uncontrolled, with neuropathy (HCC)   Medium dose insulin correction   Hypoglycemic protocol   accuchecks ac and Hs   Hold metformin   Hold invokana    Essential hypertension   Continue metoprolol   Continue losartan    Gastroesophageal reflux disease without esophagitis   Continue protonix    Chronic kidney disease, stage III (moderate) (HCC)   Avoid nephrotoxic agents   Daily lab to follow    S/P total knee arthroplasty, right   Pain control   On effient    Arthritis of knee, right   Sp right knee replacememt  Resolved Problems:    * No resolved hospital problems. *        Consults Made:   IP CONSULT TO SOCIAL WORK  IP CONSULT TO SOCIAL WORK  IP CONSULT TO HOSPITALIST    Thanks, I appreciate the opportunity to care for your patient.   We will gladly follow with   you     Electronically signed by   MIMI Harris CNP, MD, MPH   Internal Medicine Hospitalist   3/29/2022 2:17 PM

## 2022-03-29 NOTE — PROGRESS NOTES
Physical Therapy    Facility/Department: [facility] ONCOLOGY UNIT  Initial Assessment    NAME: Rubin Lazar  : 1955  MRN: [id number]    Date of Service: 3/29/2022    Discharge Recommendations:  Continue to assess pending progress,Patient would benefit from continued therapy after discharge,24 hour supervision or assist        Assessment   Body structures, Functions, Activity limitations: Decreased functional mobility ; Decreased ROM; Decreased strength;Decreased safe awareness;Decreased cognition;Decreased posture; Increased pain;Decreased balance  Assessment: Pt. will benefit from cont. PT to decrease impairments. Pt. having difficulty focusing on commands due to pain meds. Pt's eyes appear glazed over and pupils are small. RN aware. Pt. would be a SS transfer back to bed by nursing. Pt. would also benefit from OT services. Pt. not safe to d/c home at this time. Pt. required Mod A with RW to amb. a very short distance and multiple v. cues for sequencing BLEs. Treatment Diagnosis: impaired gait and mobility  Prognosis: Fair  Decision Making: Medium Complexity  PT Education: Goals;PT Role;Plan of Care;Precautions;Gait Training; Adaptive Device Training;Functional Mobility Training;Transfer Training;General Safety;Weight-bearing Education; Family Education  Patient Education: use of call light, staff A for mobility, AP as able, no pillow under knee, ice pack for pain relief and swelling  Barriers to Learning: pt has confusion due to meds at this time, limited by pain, STM poor  REQUIRES PT FOLLOW UP: Yes  Activity Tolerance  Activity Tolerance: Patient limited by fatigue;Patient limited by cognitive status; Patient limited by pain  Activity Tolerance: pt having difficulty following commands well due to pain meds       Patient Diagnosis(es): The encounter diagnosis was Total knee replacement status, right.     has a past medical history of Aneurysm ([facility] [address] Arthritis, Bilateral leg edema, CAD (coronary artery disease), Chronic kidney disease, Diabetes mellitus, type 2 ([facility] [address] Fatty liver, GERD (gastroesophageal reflux disease), HTN (hypertension), Knee pain, Prolonged emergence from general anesthesia, Seasonal allergies, and Vitreous hemorrhage ([facility] [address] has a past surgical history that includes pre-malignant / benign skin lesion excision; Carpal tunnel release; Colonoscopy (11/04/2015); Upper gastrointestinal endoscopy (09/20/2016); Upper gastrointestinal endoscopy (09/20/2016); Colonoscopy (N/A, 10/11/2016); eye surgery; Coronary angioplasty with stent (06/04/2019); Coronary angioplasty with stent (05/2018); Esophagus dilation (N/A, 12/16/2020); Colonoscopy (N/A, 12/16/2020); and Total knee arthroplasty (Right, 3/28/2022). Restrictions  Restrictions/Precautions  Restrictions/Precautions: Fall Risk,Weight Bearing  Required Braces or Orthoses?: No  Lower Extremity Weight Bearing Restrictions  Right Lower Extremity Weight Bearing: Weight Bearing As Tolerated  Vision/Hearing  Vision:  (pt's pupils small and eyes look glazed. (RN aware))  Hearing: Within functional limits     Subjective  General  Chart Reviewed: Yes  Patient assessed for rehabilitation services?: Yes  Response To Previous Treatment: Not applicable  Family / Caregiver Present: Yes (wife)  Referring Practitioner: Patricia Freeman MD  Referral Date : 03/28/22  Diagnosis: primary OA R knee  Follows Commands: Impaired  Other (Comment): needs simple v. cues, repetition  General Comment  Comments: Melissa REED PT. Subjective  Subjective: Pt. willing to get up. States he is a 7-8/10 at rest, 10/10 with movement. Has had pain meds. Pain Screening  Patient Currently in Pain: Yes  Pain Assessment  Pain Assessment: 0-10  Pain Level: 10  Pain Type: Surgical pain  Pain Location: Knee  Pain Orientation: Right  Pain Descriptors: Aching; Sore  Functional Pain Assessment: Prevents or interferes some active activities and ADLs  Non-Pharmaceutical Pain Intervention(s): Repositioned;Cold applied;Elevation; Ambulation/Increased Activity  Response to Pain Intervention: Patient Satisfied  Vital Signs  Patient Currently in Pain: Yes  Pre Treatment Pain Screening  Intervention List: Patient able to continue with treatment    Orientation  Orientation  Overall Orientation Status: Within Functional Limits  Social/Functional History  Social/Functional History  Lives With: Spouse  Type of Home: House  Home Layout: One level  Home Access: Stairs to enter with rails  Bathroom Equipment: 3-in-1 commode  Home Equipment: Rolling walker  Receives Help From: Family  ADL Assistance: Independent  Ambulation Assistance: Independent  Transfer Assistance: Independent  Cognition   Cognition  Overall Cognitive Status: Exceptions  Arousal/Alertness: Delayed responses to stimuli  Following Commands: Follows one step commands with repetition; Follows one step commands with increased time  Attention Span: Difficulty attending to directions  Memory: Decreased recall of precautions;Decreased recall of recent events;Decreased short term memory  Safety Judgement: Decreased awareness of need for assistance;Decreased awareness of need for safety  Problem Solving: Assistance required to generate solutions;Assistance required to implement solutions;Decreased awareness of errors  Insights: Decreased awareness of deficits  Initiation: Requires cues for all  Sequencing: Requires cues for all  Cognition Comment: pt asking several times about PT's name    Objective     Observation/Palpation  Posture: Fair  Observation: RN unhooked IV for PT, R knee wrapped    AROM RLE (degrees)  RLE AROM: Exceptions  RLE General AROM: ankle WFLs, knee limited ROM due to recent surgery. Lacks full extension at knee. ROM grossly 20-80. hip flexes to 90 in sitting.   AROM LLE (degrees)  LLE AROM : WFL  Strength RLE  Strength RLE: Exception  Comment: grossly 3/5 ankle, 2/5 knee and 2+/5 hip  Strength LLE  Strength LLE: WFL  Comment: grossly 4/5        Bed mobility  Supine to Sit: Moderate assistance  Sit to Supine: Unable to assess  Comment: pt given v. cues to scoot to EOB to rest foot on floor. Pt. sat on EOB x 5 mins SBA. Transfers  Sit to Stand: Moderate Assistance  Stand to sit: Moderate Assistance  Bed to Chair: Moderate assistance  Comment: pt scooted to [facility] while at EOB with use of bed rail and Min A  Ambulation  Ambulation?: Yes  WB Status: FWBAT RLE  Ambulation 1  Surface: level tile  Device: [address]: Moderate assistance  Quality of Gait: unsteady, difficulty accepting weight onto RLE due to pain from recent surgery, keeps knee flexed  Gait Deviations: Slow Mariely;Decreased step length;Decreased step height  Distance: 4'  Comments: v. cues for sequencing BLEs and RW, chair follow     Balance  Posture: Good  Sitting - Static: +;Fair  Sitting - Dynamic: -;Fair  Standing - Static: Poor;+  Standing - Dynamic: Poor  Exercises  Ankle Pumps: x20     Plan   Plan  Times per week: 3-7  Times per day: Twice a day  Plan weeks: 2 wks  Current Treatment Recommendations: Strengthening,ROM,Balance Training,Functional Mobility Training,Transfer Training,Gait Training,Stair training,Safety Education & Training,Positioning,Equipment Evaluation, Education, & procurement,Patient/Caregiver Education & Training  Plan Comment: cont. PT per POC.   Safety Devices  Type of devices: Gait belt,Patient at risk for falls,Nurse notified,Call light within reach,Left in chair,Chair alarm in place (reclined with ice pack on R knee)    G-Code       OutComes Score                                                  AM-PAC Score             Goals  Short term goals  Time Frame for Short term goals: 2 wks  Short term goal 1: supine to sit indep  Short term goal 2: sit to stand indep  Short term goal 3: amb. 100' with RW SBA  Short term goal 4: up/down 3 stairs SBA with rail  Patient Goals   Patient goals : go home soon       Therapy Time   Individual Concurrent Group Co-treatment   Time In           Time Out           Minutes                   Steff Minor PT     Electronically signed by Steff Minor PT on 3/29/2022 at 11:30 AM

## 2022-03-29 NOTE — PROGRESS NOTES
rehabilitation services?: Yes  Family / Caregiver Present: Yes (spouse)  Patient Currently in Pain: Yes  Pain Assessment  Pain Assessment: 0-10  Pain Level: 8  Pain Type: Surgical pain  Pain Location: Knee  Pain Orientation: Right  Pain Descriptors: Aching; Sore  Functional Pain Assessment: Prevents or interferes with many active not passive activities  Non-Pharmaceutical Pain Intervention(s): Ambulation/Increased Activity;Cold applied;Elevation;Repositioned  Response to Pain Intervention: Patient Satisfied  Vital Signs  Patient Currently in Pain: Yes    Social/Functional History  Social/Functional History  Lives With: Spouse  Type of Home: House  Home Layout: One level  Home Access: Stairs to enter with rails  Bathroom Equipment: 3-in-1 commode  Home Equipment: Rolling walker  Receives Help From: Family  ADL Assistance: Independent  Ambulation Assistance: Independent  Transfer Assistance: Independent       Objective        Orientation  Overall Orientation Status: Within Normal Limits  Observation/Palpation  Posture: Fair  Observation: RN unhooked IV for PT, R knee wrapped  Toilet Transfers  Toilet - Technique: Ambulating  Toilet Transfer: Minimal assistance  ADL  Feeding: Independent  Grooming: Independent  UE Bathing: Independent;Setup  LE Bathing: Minimal assistance; Moderate assistance  UE Dressing: Independent;Setup  LE Dressing: Minimal assistance; Moderate assistance (Will need AE training)  Toileting: Minimal assistance; Moderate assistance        Bed mobility  Supine to Sit: Contact guard assistance  Transfers  Stand Step Transfers: Minimal assistance     Cognition  WFL in a structured environment                 LUE AROM (degrees)  LUE AROM : WFL  RUE AROM (degrees)  RUE AROM : WFL                    Plan   Plan  Times per week: 3-5    Goals  Short term goals  Time Frame for Short term goals: 1-2 weeks  Short term goal 1: Modified independent for dressing, toileting  Short term goal 2: Modified independent for standing and transfers  Short term goal 3: Modified independent with light ambulatory ADL  Short term goal 4: Patient will be independent with positioning strategies     Tx initiated:   Mobility techniques, pain management techniques, and therapeutic positioning.  (25 mins)          Harriet Jhaveri OT/LORRAINE  Electronically signed by Harriet Jhaveri OT/L on 3/29/2022 at 2:32 PM.

## 2022-03-29 NOTE — CARE COORDINATION
Initial CM Assessment    Initial Assessment Completed at bedside with:    [x]   Patient  []   Family/Caregiver/Guardian   []   Other:      Patient Contact Information:  [address]  [place] [address] [phone] (home)   Above information verified? [x]   Yes  []   No    ADLS:    Working with therapy - independent at baseline pta   Support System:   Family Members  Plan to return to current housing:   []   Yes  [x]   No     D/C Plan if not returning home: SNF     Transportation plan for Discharge:   Spouse     Do you have any unmet social needs that would keep you from returning home safely:  [x]   Yes  []   No              Unmet Social Needs Notes:    PTOT     Had HOME OXYGEN prior to admission:    []   Yes  [x]   No    Currently ACTIVE with Home Health CARE:    []   Yes  [x]   No  []   Interested at discharge  [facility]:      Current PCP:  Hossein Contreras MD  PCP verified? [x]   Yes  []   No    Have you been vaccinated for COVID-19 (SARS-CoV-2)? [x]   Yes  []   No                   If so, when? Which :  []   [employer]  []   [employer]  []   [employer]  []   Other:       Pharmacy:    [address] [place] [address]  Phone: [phone] Fax: [phone]    Prefer to use Meds to Bed? []   Yes  [x]   No  Potential assistance purchasing medications?      []   Yes  [x]   No    Active with HD/PD prior to admission:           []   Yes  [x]   No  HD Center:       Financial:  Payor: [employer] / Plan: MEDICARE PART A AND B / Product Type: *No Product type* /     Pre-Cert required for SNF:   []   Yes  [x]   No    Patient Deficits:  []   Yes   [x]   No    If yes:  []   Confusion/Memory  []   Visual  []   Motor/Sensory         []   Right arm         []   Right leg         []   Left arm         []   Left leg  []   Language/Speech         []   Aphasia         [] Dysarthria         []   Swallow         Trenary Coma Scale  Eye Opening: Spontaneous  Best Verbal Response: Oriented  Best Motor Response: Obeys commands  Reagan Coma Scale Score: 15    Patient Deficit Notes: Additional CM/SW Notes:   Pt agreeable to a referral to [facility]. SW sent.    [facility] (formerly [address] [place] [address])   [phone] P  [phone] F  [phone] Referral fax number for     Bhumika Lee and/or his family were provided with choice of provider:  []   Yes   []   No      [address]  Care Management

## 2022-03-30 ENCOUNTER — APPOINTMENT (OUTPATIENT)
Dept: GENERAL RADIOLOGY | Age: 67
DRG: 470 | End: 2022-03-30
Attending: ORTHOPAEDIC SURGERY
Payer: MEDICARE

## 2022-03-30 ENCOUNTER — APPOINTMENT (OUTPATIENT)
Dept: NUCLEAR MEDICINE | Age: 67
DRG: 470 | End: 2022-03-30
Attending: ORTHOPAEDIC SURGERY
Payer: MEDICARE

## 2022-03-30 PROBLEM — D62 ACUTE BLOOD LOSS AS CAUSE OF POSTOPERATIVE ANEMIA: Status: ACTIVE | Noted: 2022-03-30

## 2022-03-30 LAB
ALBUMIN SERPL-MCNC: 3.4 G/DL (ref 3.5–5.2)
ALP BLD-CCNC: 73 U/L (ref 40–130)
ALT SERPL-CCNC: 7 U/L (ref 5–41)
ANION GAP SERPL CALCULATED.3IONS-SCNC: 16 MMOL/L (ref 7–19)
AST SERPL-CCNC: 21 U/L (ref 5–40)
BASE EXCESS ARTERIAL: 2.8 MMOL/L (ref -2–2)
BASOPHILS ABSOLUTE: 0.1 K/UL (ref 0–0.2)
BASOPHILS RELATIVE PERCENT: 1 % (ref 0–1)
BILIRUB SERPL-MCNC: 0.8 MG/DL (ref 0.2–1.2)
BUN BLDV-MCNC: 21 MG/DL (ref 8–23)
CALCIUM SERPL-MCNC: 9.1 MG/DL (ref 8.8–10.2)
CARBOXYHEMOGLOBIN ARTERIAL: 2.1 % (ref 0–5)
CHLORIDE BLD-SCNC: 98 MMOL/L (ref 98–111)
CO2: 19 MMOL/L (ref 22–29)
CREAT SERPL-MCNC: 1.4 MG/DL (ref 0.5–1.2)
D DIMER: 1.58 UG/ML FEU (ref 0–0.48)
EOSINOPHILS ABSOLUTE: 0.1 K/UL (ref 0–0.6)
EOSINOPHILS RELATIVE PERCENT: 1.4 % (ref 0–5)
GFR AFRICAN AMERICAN: >59
GFR NON-AFRICAN AMERICAN: 50
GLUCOSE BLD-MCNC: 163 MG/DL (ref 74–109)
GLUCOSE BLD-MCNC: 193 MG/DL (ref 70–99)
GLUCOSE BLD-MCNC: 201 MG/DL (ref 70–99)
HCO3 ARTERIAL: 27.2 MMOL/L (ref 22–26)
HCT VFR BLD CALC: 38.2 % (ref 42–52)
HEMOGLOBIN, ART, EXTENDED: 11.7 G/DL (ref 14–18)
HEMOGLOBIN: 11.3 G/DL (ref 14–18)
IMMATURE GRANULOCYTES #: 0 K/UL
LYMPHOCYTES ABSOLUTE: 1.5 K/UL (ref 1.1–4.5)
LYMPHOCYTES RELATIVE PERCENT: 14.6 % (ref 20–40)
MAGNESIUM: 1.6 MG/DL (ref 1.6–2.4)
MCH RBC QN AUTO: 25.5 PG (ref 27–31)
MCHC RBC AUTO-ENTMCNC: 29.6 G/DL (ref 33–37)
MCV RBC AUTO: 86 FL (ref 80–94)
METHEMOGLOBIN ARTERIAL: 0.9 %
MONOCYTES ABSOLUTE: 0.9 K/UL (ref 0–0.9)
MONOCYTES RELATIVE PERCENT: 9.2 % (ref 0–10)
NEUTROPHILS ABSOLUTE: 7.4 K/UL (ref 1.5–7.5)
NEUTROPHILS RELATIVE PERCENT: 73.4 % (ref 50–65)
O2 CONTENT ARTERIAL: 15.4 ML/DL
O2 SAT, ARTERIAL: 93.2 %
O2 THERAPY: ABNORMAL
PCO2 ARTERIAL: 40 MMHG (ref 35–45)
PDW BLD-RTO: 13.6 % (ref 11.5–14.5)
PERFORMED ON: ABNORMAL
PERFORMED ON: ABNORMAL
PH ARTERIAL: 7.44 (ref 7.35–7.45)
PLATELET # BLD: 153 K/UL (ref 130–400)
PMV BLD AUTO: 11.8 FL (ref 9.4–12.4)
PO2 ARTERIAL: 67 MMHG (ref 80–100)
POTASSIUM SERPL-SCNC: 4.3 MMOL/L (ref 3.5–5)
POTASSIUM, WHOLE BLOOD: 3.9
RBC # BLD: 4.44 M/UL (ref 4.7–6.1)
SODIUM BLD-SCNC: 133 MMOL/L (ref 136–145)
TOTAL PROTEIN: 5.2 G/DL (ref 6.6–8.7)
WBC # BLD: 10.1 K/UL (ref 4.8–10.8)

## 2022-03-30 PROCEDURE — 71046 X-RAY EXAM CHEST 2 VIEWS: CPT

## 2022-03-30 PROCEDURE — A9540 TC99M MAA: HCPCS | Performed by: INTERNAL MEDICINE

## 2022-03-30 PROCEDURE — 36600 WITHDRAWAL OF ARTERIAL BLOOD: CPT

## 2022-03-30 PROCEDURE — 82803 BLOOD GASES ANY COMBINATION: CPT

## 2022-03-30 PROCEDURE — 3430000000 HC RX DIAGNOSTIC RADIOPHARMACEUTICAL: Performed by: INTERNAL MEDICINE

## 2022-03-30 PROCEDURE — 80053 COMPREHEN METABOLIC PANEL: CPT

## 2022-03-30 PROCEDURE — 82947 ASSAY GLUCOSE BLOOD QUANT: CPT

## 2022-03-30 PROCEDURE — 83735 ASSAY OF MAGNESIUM: CPT

## 2022-03-30 PROCEDURE — 78582 LUNG VENTILAT&PERFUS IMAGING: CPT

## 2022-03-30 PROCEDURE — 97116 GAIT TRAINING THERAPY: CPT

## 2022-03-30 PROCEDURE — 1210000000 HC MED SURG R&B

## 2022-03-30 PROCEDURE — 2500000003 HC RX 250 WO HCPCS: Performed by: INTERNAL MEDICINE

## 2022-03-30 PROCEDURE — 2580000003 HC RX 258: Performed by: ORTHOPAEDIC SURGERY

## 2022-03-30 PROCEDURE — 2700000000 HC OXYGEN THERAPY PER DAY

## 2022-03-30 PROCEDURE — 6370000000 HC RX 637 (ALT 250 FOR IP): Performed by: ORTHOPAEDIC SURGERY

## 2022-03-30 PROCEDURE — 93970 EXTREMITY STUDY: CPT

## 2022-03-30 PROCEDURE — 85025 COMPLETE CBC W/AUTO DIFF WBC: CPT

## 2022-03-30 PROCEDURE — 78580 LUNG PERFUSION IMAGING: CPT

## 2022-03-30 PROCEDURE — 6360000002 HC RX W HCPCS: Performed by: ORTHOPAEDIC SURGERY

## 2022-03-30 PROCEDURE — 36415 COLL VENOUS BLD VENIPUNCTURE: CPT

## 2022-03-30 PROCEDURE — 85379 FIBRIN DEGRADATION QUANT: CPT

## 2022-03-30 RX ADMIN — METOPROLOL TARTRATE 50 MG: 50 TABLET, FILM COATED ORAL at 08:27

## 2022-03-30 RX ADMIN — GABAPENTIN 300 MG: 300 CAPSULE ORAL at 14:53

## 2022-03-30 RX ADMIN — MUPIROCIN: 20 OINTMENT TOPICAL at 09:42

## 2022-03-30 RX ADMIN — METOPROLOL TARTRATE 50 MG: 50 TABLET, FILM COATED ORAL at 20:30

## 2022-03-30 RX ADMIN — GUAIFENESIN 200 MG: 100 SOLUTION ORAL at 20:29

## 2022-03-30 RX ADMIN — GUAIFENESIN 200 MG: 100 SOLUTION ORAL at 14:56

## 2022-03-30 RX ADMIN — ASPIRIN 81 MG 81 MG: 81 TABLET ORAL at 08:28

## 2022-03-30 RX ADMIN — INSULIN LISPRO 2 UNITS: 100 INJECTION, SOLUTION INTRAVENOUS; SUBCUTANEOUS at 21:32

## 2022-03-30 RX ADMIN — FLUTICASONE PROPIONATE 1 SPRAY: 50 SPRAY, METERED NASAL at 09:41

## 2022-03-30 RX ADMIN — PANTOPRAZOLE SODIUM 40 MG: 40 TABLET, DELAYED RELEASE ORAL at 08:28

## 2022-03-30 RX ADMIN — EZETIMIBE 10 MG: 10 TABLET ORAL at 20:29

## 2022-03-30 RX ADMIN — OXYCODONE 10 MG: 5 TABLET ORAL at 22:53

## 2022-03-30 RX ADMIN — SODIUM CHLORIDE: 9 INJECTION, SOLUTION INTRAVENOUS at 17:50

## 2022-03-30 RX ADMIN — LOSARTAN POTASSIUM 50 MG: 50 TABLET, FILM COATED ORAL at 08:27

## 2022-03-30 RX ADMIN — INSULIN GLARGINE 30 UNITS: 100 INJECTION, SOLUTION SUBCUTANEOUS at 09:42

## 2022-03-30 RX ADMIN — PRASUGREL 10 MG: 10 TABLET, FILM COATED ORAL at 08:27

## 2022-03-30 RX ADMIN — PANTOPRAZOLE SODIUM 40 MG: 40 TABLET, DELAYED RELEASE ORAL at 20:30

## 2022-03-30 RX ADMIN — OXYCODONE 5 MG: 5 TABLET ORAL at 14:53

## 2022-03-30 RX ADMIN — Medication 5 MILLICURIE: at 14:54

## 2022-03-30 RX ADMIN — OXYCODONE 10 MG: 5 TABLET ORAL at 10:19

## 2022-03-30 RX ADMIN — HYDROMORPHONE HYDROCHLORIDE 0.5 MG: 1 INJECTION, SOLUTION INTRAMUSCULAR; INTRAVENOUS; SUBCUTANEOUS at 09:25

## 2022-03-30 RX ADMIN — GABAPENTIN 300 MG: 300 CAPSULE ORAL at 20:30

## 2022-03-30 RX ADMIN — HYDROMORPHONE HYDROCHLORIDE 0.5 MG: 1 INJECTION, SOLUTION INTRAMUSCULAR; INTRAVENOUS; SUBCUTANEOUS at 20:29

## 2022-03-30 RX ADMIN — GABAPENTIN 300 MG: 300 CAPSULE ORAL at 08:28

## 2022-03-30 RX ADMIN — OXYCODONE 5 MG: 5 TABLET ORAL at 18:37

## 2022-03-30 ASSESSMENT — PAIN SCALES - GENERAL
PAINLEVEL_OUTOF10: 9
PAINLEVEL_OUTOF10: 7
PAINLEVEL_OUTOF10: 9
PAINLEVEL_OUTOF10: 10
PAINLEVEL_OUTOF10: 6
PAINLEVEL_OUTOF10: 9
PAINLEVEL_OUTOF10: 7
PAINLEVEL_OUTOF10: 4
PAINLEVEL_OUTOF10: 8

## 2022-03-30 ASSESSMENT — PAIN - FUNCTIONAL ASSESSMENT: PAIN_FUNCTIONAL_ASSESSMENT: PREVENTS OR INTERFERES WITH ALL ACTIVE AND SOME PASSIVE ACTIVITIES

## 2022-03-30 ASSESSMENT — PAIN DESCRIPTION - DESCRIPTORS: DESCRIPTORS: ACHING;SORE

## 2022-03-30 ASSESSMENT — PAIN DESCRIPTION - ORIENTATION: ORIENTATION: RIGHT

## 2022-03-30 ASSESSMENT — PAIN DESCRIPTION - PAIN TYPE: TYPE: ACUTE PAIN;SURGICAL PAIN

## 2022-03-30 ASSESSMENT — PAIN DESCRIPTION - LOCATION: LOCATION: KNEE

## 2022-03-30 ASSESSMENT — PAIN DESCRIPTION - FREQUENCY: FREQUENCY: CONTINUOUS

## 2022-03-30 NOTE — PROGRESS NOTES
Occupational Therapy  Patient at test.  Will check with him later if possible.  Electronically signed by Gary Lott OT on 3/30/2022 at 1:43 PM

## 2022-03-30 NOTE — PROGRESS NOTES
BLOOD GAS, ARTERIAL [[id number]] (Abnormal) Collected: 03/30/22 0014     Specimen: Blood gases Updated: 03/30/22 0014      pH, Arterial 7.440      pCO2, Arterial 40.0 mmHg       pO2, Arterial 67.0 mmHg       HCO3, Arterial 27.2 mmol/L       Base Excess, Arterial 2.8 mmol/L       Hemoglobin, Art, Extended 11.7 g/dL       O2 Sat, Arterial 93.2 %       Carboxyhgb, Arterial 2.1 %       Methemoglobin, Arterial 0.9 %       O2 Content, Arterial 15.4 mL/dL       O2 Therapy Unknown      Potassium, Whole Blood 3.9     Pt on 7 lpm nasal cannula  resp rate 20  Left radial puncture AT+

## 2022-03-30 NOTE — PROGRESS NOTES
Physical Therapy  Name: Danielle Castillo  MRN:  [id number]  Date of service:  3/30/2022     03/30/22 1357   Subjective   Subjective Attempt: Pt out to nuclear medicine.          Electronically signed by Eliceo Perez PTA on 3/30/2022 at 1:57 PM

## 2022-03-30 NOTE — PROGRESS NOTES
Per Tech, pt O2 dest 74%% on room air, on assessment pt lying on left side, no c/o pain or discomfort, no s/s of distress. Pt states he \"feels ok\" no trouble breathing. O2 increased to 7L, saturation 93% via NC. Physician made aware, ordered ABG. Will monitor.

## 2022-03-30 NOTE — PROGRESS NOTES
Orthopedic Surgery Progress Note    Rubin Cady  3/30/2022      Subjective:     Systemic or Specific Complaints: LAST NIGHTS EVENTS NOTED, TESTS ORDERED AND ARE PENDING. APPRECIATE THE HELP MALVIN. STILL WITH DIFFICULTIES STANDING/AMBULATING. NO DRAINAGE FROM HIS INCISION. Objective:     Patient Vitals for the past 24 hrs:   BP Temp Temp src Pulse Resp SpO2   03/30/22 1114 118/60 96.8 °F (36 °C) Temporal 79 18 96 %   03/30/22 0712 -- -- -- -- 20 95 %   03/30/22 0528 (!) 159/76 97.9 °F (36.6 °C) -- 92 18 96 %   03/29/22 2336 (!) 121/58 98.2 °F (36.8 °C) -- 97 18 95 %   03/29/22 2300 -- -- -- -- -- 93 %   03/29/22 2045 (!) 176/83 -- -- 100 20 --   03/29/22 1758 (!) 143/73 -- Temporal 86 18 92 %       right lower  General: alert, appears stated age and cooperative   Wound: clean, dry, intact             Dressing: clean, dry, and intact   Extremity: Distal NVI           DVT Exam: No evidence of DVT seen on physical exam.                   Data Review:  Recent Labs     03/30/22  0313   HGB 11.3*     Recent Labs     03/30/22 0313   *   K 4.3   CREATININE 1.4*       Assessment:     POD# 2 RIGHT TKA  HTN  T2DM  OBESITY  COUGH/O2 DESATURATION--V/Q / DOPPLERS ARE PENDING.     Plan:      1:  DVT prophylaxis, ICE, elevate  2:  Pain control  3:  Physical therapy/Occupational therapy  4:  Anticipate discharge tomorrow TO SNF IF APPROVED  5: Weight bearing as tolerated       Electronically signed by Saleem Sage PA-C on 3/30/2022 at 12:52 PM

## 2022-03-30 NOTE — CONSULTS
[address] [facility]    [id number]    Consulting Physician: Gokul Saunders MD  Reason for Consult: Medical Management  Primacy Care Physician: Becky Lynn MD      History Obtained From:  \"patient\"    History of present illness: The patient is a 74 y.o. male who presents with right knee replacement on 3/29/2022. He is up in chair. He ambulated for the first time today and did well. His pain is managed with current therapy. During the night pt had desaturation event. He required 7 liters of O2 to maintain adequate Spo2. He has right calf pain on his exam today. D dimer was elevated. VQ scan and doppler venous studies ordered. O2 requirement down to 2 liters at present. Past Medical History:        Diagnosis Date    Aneurysm ([address]     abdominal (pt states NOT)    Arthritis     Bilateral leg edema     CAD (coronary artery disease)     sees [facility]/dr. russo.     Chronic kidney disease     Stage 4; sees dr. Salma Hayden Diabetes mellitus, type 2 ([facility]     Fatty liver     GERD (gastroesophageal reflux disease)     HTN (hypertension)     Knee pain     Prolonged emergence from general anesthesia     Seasonal allergies     Vitreous hemorrhage ([address] 1/22/2021       Past Surgical History:        Procedure Laterality Date    CARPAL TUNNEL RELEASE      COLONOSCOPY  11/04/2015    DR Capone: polys, 5yr recall    COLONOSCOPY N/A 10/11/2016    Dr Lopez-diverticulosis, Sessile serrated AP (-) high grade dysplasia x 1, tubular AP (-) dysplasia x 2, HP x 2--3 yr recall    COLONOSCOPY N/A 12/16/2020    Dr Saurav Harry, Mod. Diverticulosis, Internal hemorrhoids-Grade 2 & ext hemorrhoids, 3 year recall    CORONARY ANGIOPLASTY WITH STENT PLACEMENT  06/04/2019    AGA to circ    CORONARY ANGIOPLASTY WITH STENT PLACEMENT  05/2018    AGA to osteal LAD    ESOPHAGEAL DILATATION N/A 12/16/2020    Dr Saurav Harry, empirical [address] fr bougie dilation, (+) GERD    EYE SURGERY Right eye    PRE-MALIGNANT / Kristina Carrizales [address] Right 3/28/2022    RIGHT KNEE TOTAL ARTHROPLASTY performed by Carina Goldsmith MD at [facility] ENDOSCOPY  09/20/2016    Dr Lopez-ramu/dilation over wire, [address]-distal esophageal narrowing, Inna (-)    UPPER GASTROINTESTINAL ENDOSCOPY  09/20/2016    Dr Cyr/dilation over wire, [address]-distal esophageal narrowing, Inna (-)       Medications Current:    Current Facility-Administered Medications   Medication Dose Route Frequency Provider Last Rate Last Admin    guaiFENesin (ROBITUSSIN) 100 MG/5ML liquid 200 mg  200 mg Oral Q4H PRN Russel Fam MD        albuterol (PROVENTIL) nebulizer solution 5 mg  5 mg Nebulization Q6H PRN Carina Goldsmith MD        aspirin chewable tablet 81 mg  81 mg Oral Daily Carina Goldsmith MD   81 mg at 03/30/22 0828    ciclopirox (PENLAC) 8 % solution   Topical Nightly Carina Goldsmith MD        ezetimibe (ZETIA) tablet 10 mg  10 mg Oral Nightly Carina Goldsmith MD   10 mg at 03/29/22 2044    fluticasone (FLONASE) 50 MCG/ACT nasal spray 1 spray  1 spray Nasal Daily Carina Goldsmith MD   1 spray at 03/30/22 0941    furosemide (LASIX) tablet 20 mg  20 mg Oral Daily PRN Carina Goldsmith MD        gabapentin (NEURONTIN) capsule 300 mg  300 mg Oral TID Carina Goldsmith MD   300 mg at 03/30/22 0828    insulin glargine (LANTUS) injection vial 30 Units  30 Units SubCUTAneous Daily Carina Goldsmith MD   30 Units at 03/30/22 0942    Insulin Lispro-aabc SOPN 25-30 Units  25-30 Units SubCUTAneous TID PRN Carina Goldsmith MD        [Held by provider] canagliflozin [facility]) tablet 100 mg  100 mg Oral Daily Carina Goldsmith MD        Finerenone TABS 10 mg  10 mg Oral Daily Carina Goldsmith MD   10 mg at 03/30/22 0941    [Held by provider] metFORMIN (GLUCOPHAGE) tablet 1,000 mg  1,000 mg Oral BID WC Carina Goldsmith MD   1,000 mg at 03/29/22 0836    metoprolol tartrate (LOPRESSOR) tablet 50 mg  50 mg Oral BID Carina Goldsmith MD 50 mg at 03/30/22 0827    mupirocin (BACTROBAN) 2 % ointment   Topical Daily Timoteo Ford MD   Given at 03/30/22 4607    nitroGLYCERIN (NITROSTAT) SL tablet 0.4 mg  0.4 mg SubLINGual Q5 Min PRN Timoteo Ford MD        pantoprazole (PROTONIX) tablet 40 mg  40 mg Oral BID Timoteo Ford MD   40 mg at 03/30/22 9181    prasugrel (EFFIENT) tablet 10 mg  10 mg Oral Daily Timoteo Ford MD   10 mg at 03/30/22 0827    [Held by provider] Semaglutide(0.25 or 0.5MG/DOS) SOPN 0.25 mg  0.25 mg SubCUTAneous Weekly Timoteo Ford MD        sodium chloride flush 0.9 % injection 5-40 mL  5-40 mL IntraVENous 2 times per day Timoteo Ford MD   10 mL at 03/29/22 2100    sodium chloride flush 0.9 % injection 5-40 mL  5-40 mL IntraVENous PRN Timoteo Ford MD        0.9 % sodium chloride infusion  25 mL IntraVENous PRN Timoteo Ford MD        ondansetron (ZOFRAN-ODT) disintegrating tablet 4 mg  4 mg Oral Q8H PRN Timoteo Ford MD        Or    ondansetron [facility]) injection 4 mg  4 mg IntraVENous Q6H PRN Timoteo Ford MD        0.9 % sodium chloride infusion   IntraVENous Continuous Timoteo Ford  mL/hr at 03/29/22 0015 New Bag at 03/29/22 0015    oxyCODONE (ROXICODONE) immediate release tablet 5 mg  5 mg Oral Q4H PRN Timoteo Ford MD   5 mg at 03/28/22 2030    Or    oxyCODONE (ROXICODONE) immediate release tablet 10 mg  10 mg Oral Q4H PRN Timoteo Ford MD   10 mg at 03/30/22 1019    HYDROmorphone HCl PF (DILAUDID) injection 0.5 mg  0.5 mg IntraVENous Q3H PRFERNANDO Ford MD   0.5 mg at 03/30/22 0925    glucagon (rDNA) injection 1 mg  1 mg IntraMUSCular PRN Timoteo Ford MD        dextrose 5 % solution  100 mL/hr IntraVENous PRN Timoteo Ford MD        losartan (COZAAR) tablet 50 mg  50 mg Oral Daily Timoteo Ford MD   50 mg at 03/30/22 0827    glucose chewable tablet 4 each  4 tablet Oral PRN Timoteo Ford MD        dextrose bolus (hypoglycemia) 10% 125 mL  125 mL IntraVENous PRN Timoteo Ford MD        Or    dextrose bolus (hypoglycemia) 10% 250 mL 250 mL IntraVENous PRN Timoteo Ford MD        insulin lispro (HUMALOG) injection vial 0-12 Units  0-12 Units SubCUTAneous TID WC Timoteo Ford MD   4 Units at 03/29/22 1408    insulin lispro (HUMALOG) injection vial 0-6 Units  0-6 Units SubCUTAneous Nightly Timoteo Ford MD   1 Units at 03/29/22 2210       Allergies:  Lipitor [atorvastatin calcium] and Tape Robert Junes tape]    Social History:   TOBACCO:   reports that he has never smoked. He has never used smokeless tobacco.  ETOH:   reports no history of alcohol use. Patient currently lives with family     Family History:       Problem Relation Age of Onset    Colon Cancer Mother     Prostate Cancer Father     Other Father         HX of blood clot    Diabetes Sister     Cancer Sister     Diabetes Brother     Colon Polyps Son     Esophageal Cancer Neg Hx     Liver Cancer Neg Hx     Liver Disease Neg Hx     Stomach Cancer Neg Hx     Rectal Cancer Neg Hx        REVIEW OF SYSTEMS:  Review of Systems  ROS: 14 point review of systems is negative except as specifically addressed above.     Constitutional / general:  No fever / chills / sweats  Head:  No headache / neck stiffness / trauma / visual change  Eyes:  No blurry vision / acute visual change or loss / itching / redness  ENT: No sore throat / hoarseness / nasal drainage / ear pain  CV:  No chest pain / palpitations/ orthopnea   Respiratory:  No cough / shortness of breath / sputum / hemoptysis  GI: No nausea / vomiting / abdominal pain / diarrhea / constipation  :  No dysuria / hesitancy / urgency / hematuria   Neuro: No paralysis / syncope / seizure / dysphagia / headache / paresthesias  Musculoskeletal:  No muscle weakness /joint stiffness / c/o pain to right knee  Vascular: No edema / claudication / thrombosis / varicosities  Heme / endocrine: No easy bruising / bleeding / excessive sweating / heat or cold intolerance  Psychiatric:  No depression / anxiety / insomnia / mood changes  Skin:  No new rashes / lesions / skin hair or nail changes      PHYSICAL EXAM:  /60   Pulse 79   Temp 96.8 °F (36 °C) (Temporal)   Resp 18   Ht 6' (1.829 m)   Wt 264 lb (119.7 kg)   SpO2 96%   BMI 35.80 kg/m²     Physical Exam    General appearance: alert, appears stated age, cooperative and no distress  Head: Normocephalic, without obvious abnormality, atraumatic  Eyes: conjunctivae/corneas clear. PERRL, EOM's intact. Ears: normal external ears  Neck: no adenopathy, no carotid bruit, no JVD, supple, symmetrical, trachea midline and thyroid not enlarged, symmetric, no tenderness/mass/nodules  Lungs: clear to auscultation bilaterally,no rales or wheezes   Heart: regular rate and rhythm, S1, S2 normal, no murmur,  regular rate and rhythm   Abdomen:soft, non-tender; non-distended normal bowel sounds no masses, no organomegaly  Extremities:  Right calf tenderness to palpation, right knee with d/i dressing  Skin: Skin color, texture, turgor normal. No rashes or lesions  Lymphatic: No palpable lymph node enlargment  Neurologic: Alert and oriented X 3, normal strength and tone. Normal symmetric reflexes. Mental status: Alert, oriented, thought content appropriate  Cranial nerves:II-XII Grossly intact Sensory: normal Motor:grossly normal  Psychiatric:  Nl mood      DIAGNOSTIC DATA:  CBC:  Recent Labs     03/30/22 0313   WBC 10.1   HGB 11.3*   HCT 38.2*        BMP:  Recent Labs     03/30/22  0014 03/30/22 0313   NA  --  133*   K 3.9 4.3   CL  --  98   CO2  --  19*   BUN  --  21   CREATININE  --  1.4*   CALCIUM  --  9.1     Recent Labs     03/30/22 0313   AST 21   ALT 7   BILITOT 0.8   ALKPHOS 73     Coag Panel: No results for input(s): INR, PROTIME, APTT in the last 72 hours. Cardiac Enzymes: No results for input(s): Jadene Moh in the last 72 hours.   ABGs:  Lab Results   Component Value Date    PHART 7.440 03/30/2022    PO2ART 67.0 03/30/2022    HBL4KME 40.0 03/30/2022     Urinalysis:  Lab Results Component Value Date    NITRU Negative 06/06/2019    WBCUA 0 06/03/2019    BACTERIA NONE 06/06/2019    RBCUA 3-5 06/06/2019    BLOODU TRACE-LYSED 06/06/2019    SPECGRAV 1.015 06/06/2019    GLUCOSEU 100 06/06/2019       RAD:   No results found. ASSESSMENT AND PLAN:  Principal Problem:    Osteoarthritis of knee   Post op day 2   PT/OT consult  Active Problems:    DM type 2, uncontrolled, with neuropathy (HCC)   Medium dose insulin correction   Hypoglycemic protocol   accuchecks ac and Hs   Hold metformin   Hold invokana    Essential hypertension   Continue metoprolol   Continue losartan    Gastroesophageal reflux disease without esophagitis   Continue protonix    Chronic kidney disease, stage III (moderate) (HCC)   Avoid nephrotoxic agents   Daily lab to follow    S/P total knee arthroplasty, right   Pain control   On effient    Arthritis of knee, right   Sp right knee replacememt  Hypoxia   D dimer   VQ scan    Venous doppler   Keep sp02 above 90- currently on 2 liters. Resolved Problems:    * No resolved hospital problems. *        Consults Made:   IP CONSULT TO SOCIAL WORK  IP CONSULT TO SOCIAL WORK  IP CONSULT TO HOSPITALIST    Thanks, I appreciate the opportunity to care for your patient.   We will gladly follow with   you     Electronically signed by   MIMI Danielson CNP, MD, MPH   Internal Medicine Hospitalist   3/30/2022 11:24 AM

## 2022-03-30 NOTE — PROGRESS NOTES
Physical Therapy  Name: En Selby  MRN:  [id number]  Date of service:  3/30/2022     03/30/22 1056   Restrictions/Precautions   Restrictions/Precautions Fall Risk;Weight Bearing   Required Braces or Orthoses? No   Lower Extremity Weight Bearing Restrictions   Right Lower Extremity Weight Bearing Weight Bearing As Tolerated   General   Chart Reviewed Yes   Subjective   Subjective Pt agrees to work with therapy. Pain Screening   Patient Currently in Pain Yes   Pain Assessment   Pain Assessment 0-10   Pain Level 9  (recently rec'd pain meds)   Pain Type Acute pain;Surgical pain   Pain Location Knee   Pain Orientation Right   Pain Descriptors Aching; Sore   Functional Pain Assessment Prevents or interferes with all active and some passive activities   Non-Pharmaceutical Pain Intervention(s) Ambulation/Increased Activity;Repositioned; Rest   Response to Pain Intervention Patient Satisfied  (once positioned for comfort)   Pain Frequency Continuous   Bed Mobility   Supine to Sit Minimal assistance  (to lower RLE OOB)   Sit to Supine Minimal assistance  (LE's into bed)   Transfers   Sit to Stand Contact guard assistance;Minimal Assistance   Stand to sit Contact guard assistance   Ambulation   Ambulation? Yes   WB Status FWBAT RLE   Ambulation 1   Surface level tile   Device Rolling Walker   Assistance Contact guard assistance   Quality of Gait slow, antalgic, vc's for technique with rwx   Gait Deviations Slow Mariely;Decreased step length;Decreased step height   Distance 10'   Short term goals   Time Frame for Short term goals 2 wks   Short term goal 1 supine to sit indep   Short term goal 2 sit to stand indep   Short term goal 3 amb. 100' with RW SBA   Short term goal 4 up/down 3 stairs SBA with rail   Conditions Requiring Skilled Therapeutic Intervention   Body structures, Functions, Activity limitations Decreased functional mobility ; Decreased ROM; Decreased strength;Decreased safe awareness;Decreased cognition;Decreased posture; Increased pain;Decreased balance   Assessment Pt cont to be limited by pain, able to tolerate very short amb distance in room but quite antalgic and requires vc's for technique with rwx to advance LE's. Pt declined up to the chair, returned to supine with all needs in reach. Activity Tolerance   Activity Tolerance Patient limited by pain; Patient limited by endurance   Safety Devices   Type of devices Bed alarm in place;Call light within reach;Gait belt;Left in bed         Electronically signed by Anabel Hendrix PTA on 3/30/2022 at 11:04 AM

## 2022-03-31 LAB
ALBUMIN SERPL-MCNC: 3.3 G/DL (ref 3.5–5.2)
ALP BLD-CCNC: 72 U/L (ref 40–130)
ALT SERPL-CCNC: 8 U/L (ref 5–41)
ANION GAP SERPL CALCULATED.3IONS-SCNC: 12 MMOL/L (ref 7–19)
AST SERPL-CCNC: 17 U/L (ref 5–40)
BASOPHILS ABSOLUTE: 0.1 K/UL (ref 0–0.2)
BASOPHILS RELATIVE PERCENT: 0.7 % (ref 0–1)
BILIRUB SERPL-MCNC: 0.5 MG/DL (ref 0.2–1.2)
BUN BLDV-MCNC: 21 MG/DL (ref 8–23)
CALCIUM SERPL-MCNC: 8.6 MG/DL (ref 8.8–10.2)
CHLORIDE BLD-SCNC: 101 MMOL/L (ref 98–111)
CO2: 22 MMOL/L (ref 22–29)
CREAT SERPL-MCNC: 1.5 MG/DL (ref 0.5–1.2)
EKG P AXIS: 16 DEGREES
EKG P-R INTERVAL: 188 MS
EKG Q-T INTERVAL: 300 MS
EKG QRS DURATION: 102 MS
EKG QTC CALCULATION (BAZETT): 419 MS
EKG T AXIS: 42 DEGREES
EOSINOPHILS ABSOLUTE: 0.4 K/UL (ref 0–0.6)
EOSINOPHILS RELATIVE PERCENT: 4.5 % (ref 0–5)
GFR AFRICAN AMERICAN: 56
GFR NON-AFRICAN AMERICAN: 46
GLUCOSE BLD-MCNC: 150 MG/DL (ref 70–99)
GLUCOSE BLD-MCNC: 171 MG/DL (ref 74–109)
GLUCOSE BLD-MCNC: 189 MG/DL (ref 70–99)
GLUCOSE BLD-MCNC: 221 MG/DL (ref 70–99)
HCT VFR BLD CALC: 37 % (ref 42–52)
HEMOGLOBIN: 11.1 G/DL (ref 14–18)
IMMATURE GRANULOCYTES #: 0 K/UL
LYMPHOCYTES ABSOLUTE: 1.6 K/UL (ref 1.1–4.5)
LYMPHOCYTES RELATIVE PERCENT: 16 % (ref 20–40)
MAGNESIUM: 1.7 MG/DL (ref 1.6–2.4)
MCH RBC QN AUTO: 25.9 PG (ref 27–31)
MCHC RBC AUTO-ENTMCNC: 30 G/DL (ref 33–37)
MCV RBC AUTO: 86.2 FL (ref 80–94)
MONOCYTES ABSOLUTE: 0.9 K/UL (ref 0–0.9)
MONOCYTES RELATIVE PERCENT: 9.1 % (ref 0–10)
NEUTROPHILS ABSOLUTE: 6.7 K/UL (ref 1.5–7.5)
NEUTROPHILS RELATIVE PERCENT: 69.3 % (ref 50–65)
PDW BLD-RTO: 13.7 % (ref 11.5–14.5)
PERFORMED ON: ABNORMAL
PLATELET # BLD: 174 K/UL (ref 130–400)
PMV BLD AUTO: 11.8 FL (ref 9.4–12.4)
POTASSIUM SERPL-SCNC: 4.2 MMOL/L (ref 3.5–5)
RBC # BLD: 4.29 M/UL (ref 4.7–6.1)
SODIUM BLD-SCNC: 135 MMOL/L (ref 136–145)
TOTAL PROTEIN: 5.1 G/DL (ref 6.6–8.7)
WBC # BLD: 9.7 K/UL (ref 4.8–10.8)

## 2022-03-31 PROCEDURE — 6360000002 HC RX W HCPCS: Performed by: ORTHOPAEDIC SURGERY

## 2022-03-31 PROCEDURE — 97116 GAIT TRAINING THERAPY: CPT

## 2022-03-31 PROCEDURE — 97530 THERAPEUTIC ACTIVITIES: CPT

## 2022-03-31 PROCEDURE — 2700000000 HC OXYGEN THERAPY PER DAY

## 2022-03-31 PROCEDURE — 36415 COLL VENOUS BLD VENIPUNCTURE: CPT

## 2022-03-31 PROCEDURE — 85025 COMPLETE CBC W/AUTO DIFF WBC: CPT

## 2022-03-31 PROCEDURE — 2580000003 HC RX 258: Performed by: ORTHOPAEDIC SURGERY

## 2022-03-31 PROCEDURE — 82947 ASSAY GLUCOSE BLOOD QUANT: CPT

## 2022-03-31 PROCEDURE — 94761 N-INVAS EAR/PLS OXIMETRY MLT: CPT

## 2022-03-31 PROCEDURE — 6370000000 HC RX 637 (ALT 250 FOR IP): Performed by: ORTHOPAEDIC SURGERY

## 2022-03-31 PROCEDURE — 83735 ASSAY OF MAGNESIUM: CPT

## 2022-03-31 PROCEDURE — 93005 ELECTROCARDIOGRAM TRACING: CPT | Performed by: INTERNAL MEDICINE

## 2022-03-31 PROCEDURE — 1210000000 HC MED SURG R&B

## 2022-03-31 PROCEDURE — 2500000003 HC RX 250 WO HCPCS: Performed by: INTERNAL MEDICINE

## 2022-03-31 PROCEDURE — 2580000003 HC RX 258: Performed by: INTERNAL MEDICINE

## 2022-03-31 PROCEDURE — 80053 COMPREHEN METABOLIC PANEL: CPT

## 2022-03-31 RX ORDER — 0.9 % SODIUM CHLORIDE 0.9 %
500 INTRAVENOUS SOLUTION INTRAVENOUS ONCE
Status: COMPLETED | OUTPATIENT
Start: 2022-03-31 | End: 2022-03-31

## 2022-03-31 RX ADMIN — FLUTICASONE PROPIONATE 1 SPRAY: 50 SPRAY, METERED NASAL at 09:25

## 2022-03-31 RX ADMIN — LOSARTAN POTASSIUM 50 MG: 50 TABLET, FILM COATED ORAL at 09:25

## 2022-03-31 RX ADMIN — MUPIROCIN: 20 OINTMENT TOPICAL at 09:25

## 2022-03-31 RX ADMIN — PANTOPRAZOLE SODIUM 40 MG: 40 TABLET, DELAYED RELEASE ORAL at 20:23

## 2022-03-31 RX ADMIN — OXYCODONE 5 MG: 5 TABLET ORAL at 20:28

## 2022-03-31 RX ADMIN — METOPROLOL TARTRATE 50 MG: 50 TABLET, FILM COATED ORAL at 09:25

## 2022-03-31 RX ADMIN — INSULIN LISPRO 2 UNITS: 100 INJECTION, SOLUTION INTRAVENOUS; SUBCUTANEOUS at 22:11

## 2022-03-31 RX ADMIN — HYDROMORPHONE HYDROCHLORIDE 0.5 MG: 1 INJECTION, SOLUTION INTRAMUSCULAR; INTRAVENOUS; SUBCUTANEOUS at 07:23

## 2022-03-31 RX ADMIN — OXYCODONE 5 MG: 5 TABLET ORAL at 05:05

## 2022-03-31 RX ADMIN — HYDROMORPHONE HYDROCHLORIDE 0.5 MG: 1 INJECTION, SOLUTION INTRAMUSCULAR; INTRAVENOUS; SUBCUTANEOUS at 01:56

## 2022-03-31 RX ADMIN — HYDROMORPHONE HYDROCHLORIDE 0.5 MG: 1 INJECTION, SOLUTION INTRAMUSCULAR; INTRAVENOUS; SUBCUTANEOUS at 12:47

## 2022-03-31 RX ADMIN — ASPIRIN 81 MG 81 MG: 81 TABLET ORAL at 09:25

## 2022-03-31 RX ADMIN — GUAIFENESIN 200 MG: 100 SOLUTION ORAL at 01:56

## 2022-03-31 RX ADMIN — GABAPENTIN 300 MG: 300 CAPSULE ORAL at 09:25

## 2022-03-31 RX ADMIN — INSULIN LISPRO 4 UNITS: 100 INJECTION, SOLUTION INTRAVENOUS; SUBCUTANEOUS at 12:25

## 2022-03-31 RX ADMIN — SODIUM CHLORIDE 500 ML: 9 INJECTION, SOLUTION INTRAVENOUS at 01:58

## 2022-03-31 RX ADMIN — OXYCODONE 10 MG: 5 TABLET ORAL at 09:25

## 2022-03-31 RX ADMIN — GABAPENTIN 300 MG: 300 CAPSULE ORAL at 20:23

## 2022-03-31 RX ADMIN — METOPROLOL TARTRATE 50 MG: 50 TABLET, FILM COATED ORAL at 20:23

## 2022-03-31 RX ADMIN — EZETIMIBE 10 MG: 10 TABLET ORAL at 20:23

## 2022-03-31 RX ADMIN — SODIUM CHLORIDE, PRESERVATIVE FREE 10 ML: 5 INJECTION INTRAVENOUS at 20:23

## 2022-03-31 RX ADMIN — GABAPENTIN 300 MG: 300 CAPSULE ORAL at 15:07

## 2022-03-31 RX ADMIN — GUAIFENESIN 200 MG: 100 SOLUTION ORAL at 15:18

## 2022-03-31 RX ADMIN — INSULIN GLARGINE 30 UNITS: 100 INJECTION, SOLUTION SUBCUTANEOUS at 09:27

## 2022-03-31 RX ADMIN — PRASUGREL 10 MG: 10 TABLET, FILM COATED ORAL at 09:25

## 2022-03-31 RX ADMIN — PANTOPRAZOLE SODIUM 40 MG: 40 TABLET, DELAYED RELEASE ORAL at 09:25

## 2022-03-31 ASSESSMENT — PAIN DESCRIPTION - LOCATION
LOCATION: KNEE
LOCATION: KNEE

## 2022-03-31 ASSESSMENT — PAIN SCALES - GENERAL
PAINLEVEL_OUTOF10: 8
PAINLEVEL_OUTOF10: 7
PAINLEVEL_OUTOF10: 7
PAINLEVEL_OUTOF10: 8
PAINLEVEL_OUTOF10: 2
PAINLEVEL_OUTOF10: 6
PAINLEVEL_OUTOF10: 4
PAINLEVEL_OUTOF10: 3
PAINLEVEL_OUTOF10: 8
PAINLEVEL_OUTOF10: 7
PAINLEVEL_OUTOF10: 8

## 2022-03-31 ASSESSMENT — PAIN DESCRIPTION - PAIN TYPE
TYPE: ACUTE PAIN
TYPE: ACUTE PAIN

## 2022-03-31 ASSESSMENT — PAIN DESCRIPTION - DESCRIPTORS
DESCRIPTORS: ACHING;SORE
DESCRIPTORS: ACHING;SORE

## 2022-03-31 ASSESSMENT — PAIN - FUNCTIONAL ASSESSMENT
PAIN_FUNCTIONAL_ASSESSMENT: PREVENTS OR INTERFERES SOME ACTIVE ACTIVITIES AND ADLS
PAIN_FUNCTIONAL_ASSESSMENT: PREVENTS OR INTERFERES SOME ACTIVE ACTIVITIES AND ADLS

## 2022-03-31 ASSESSMENT — PAIN DESCRIPTION - FREQUENCY
FREQUENCY: CONTINUOUS
FREQUENCY: CONTINUOUS

## 2022-03-31 ASSESSMENT — PAIN SCALES - WONG BAKER: WONGBAKER_NUMERICALRESPONSE: 6

## 2022-03-31 ASSESSMENT — PAIN DESCRIPTION - ORIENTATION
ORIENTATION: RIGHT
ORIENTATION: RIGHT

## 2022-03-31 NOTE — PROGRESS NOTES
Physical Therapy  Name: Amanda London  MRN:  [id number]  Date of service:  3/31/2022       03/31/22 1521   Restrictions/Precautions   Restrictions/Precautions Fall Risk;Weight Bearing   Required Braces or Orthoses? No   Lower Extremity Weight Bearing Restrictions   Right Lower Extremity Weight Bearing Weight Bearing As Tolerated   Subjective   Subjective Pt agreed to therapy. Pain Screening   Patient Currently in Pain Yes   Pain Assessment   Pain Assessment Faces   Pain Type Acute pain   Pain Location Knee   Pain Orientation Right   Pain Descriptors Aching; Sore   Functional Pain Assessment Prevents or interferes some active activities and ADLs   Non-Pharmaceutical Pain Intervention(s) Ambulation/Increased Activity;Repositioned; Rest   Response to Pain Intervention Patient Satisfied   Pain Frequency Continuous   Pak-Baker Pain Rating 6   Bed Mobility   Supine to Sit Minimal assistance   Sit to Supine Minimal assistance   Transfers   Sit to Stand Minimal Assistance   Stand to sit Contact guard assistance   Comment pt had some difficulty with STS, cues for safe technique. Ambulation   Ambulation? Yes   WB Status FWBAT RLE   Ambulation 1   Surface level tile   Device Rolling Walker   Assistance Contact guard assistance   Quality of Gait slow, antalgic   Gait Deviations Slow Mariely;Decreased step length;Decreased step height   Distance 30'   Patient Goals    Patient goals  go home soon   Short term goals   Time Frame for Short term goals 2 wks   Short term goal 1 supine to sit indep   Short term goal 2 sit to stand indep   Short term goal 3 amb. 100' with RW SBA   Short term goal 4 up/down 3 stairs SBA with rail   Conditions Requiring Skilled Therapeutic Intervention   Body structures, Functions, Activity limitations Decreased functional mobility ; Decreased ROM; Decreased strength;Decreased safe awareness;Decreased cognition;Decreased posture; Increased pain;Decreased balance   Assessment Pt able to increase amb in room, no LOB but slightly unsteady. Pt had some difficulty with STS but once balanced able to amb. Pt assisted back to bed and positioned with all needs in reach. Activity Tolerance   Activity Tolerance Patient Tolerated treatment well   Safety Devices   Type of devices Bed alarm in place;Call light within reach; Left in bed       Electronically signed by Nita Kam PTA on 3/31/2022 at 3:31 PM

## 2022-03-31 NOTE — PROGRESS NOTES
Orthopedic Surgery Progress Note    Suhas Sosa  3/31/2022      Subjective:     Systemic or Specific Complaints: status quo. Still having a difficult time with pain and gait instability. Objective:     Patient Vitals for the past 24 hrs:   BP Temp Temp src Pulse Resp SpO2   03/31/22 1045 105/70 96.3 °F (35.7 °C) -- 127 -- (!) 88 %   03/31/22 0455 106/84 99.5 °F (37.5 °C) Temporal 127 20 94 %   03/31/22 0242 105/74 97.2 °F (36.2 °C) Temporal 128 18 94 %   03/31/22 0038 109/69 96.3 °F (35.7 °C) Temporal 128 18 94 %   03/30/22 2019 (!) 149/87 97.9 °F (36.6 °C) Temporal 131 20 92 %   03/30/22 1818 (!) 161/76 97.5 °F (36.4 °C) Temporal 87 18 94 %   03/30/22 1505 (!) 144/79 96.8 °F (36 °C) Temporal 80 18 96 %       right lower  General: alert, appears stated age and cooperative   Wound: clean, dry, intact             Dressing: clean, dry, and intact   Extremity: Distal NVI           DVT Exam: No evidence of DVT seen on physical exam.                   Data Review:  Recent Labs     03/30/22  0313 03/31/22  0256   HGB 11.3* 11.1*     Recent Labs     03/31/22  0256   *   K 4.2   CREATININE 1.5*     VL DUP LOWER EXTREMITY VENOUS BILATERAL [[id number]]    Collected: 03/30/22 1400    Updated: 03/30/22 1626    Narrative:     Vascular Lower Extremities DVT Study Procedure      Demographics        Patient Name     Susan Sarmiento Age                    66        Patient Number   [id number]         Gender                 Male        Visit Number     [id number]      Interpreting Physician Radha Doherty        Date of Birth    1955     Referring Physician   Janace Holter        Accession Number [id number]     Sonographer           Sherita West RVT       Procedure   Type of Study:        Veins:Lower Extremities DVT Study, VL LOWER EXTREMITY BILATERAL VENOUS    DUPLEX .       Indications for Study:Pain, right lower extremity and Elevated d-dimer.      Allergies     - Adhesive tape.      Impression        Normal venous duplex study of the bilateral lower extremity(ies). There is    no evidence of deep or superficial venous thrombosis.        Signature        ----------------------------------------------------------------    Electronically signed by Vassie Cowden Victoria(Interpreting    [email or web address]) on 03/30/2022 04:26 PM    ----------------------------------------------------------------       Velocities are measured in cm/s ; Diameters are measured in mm     Right Lower Extremities DVT Study Measurements   Right 2D Measurements   +------------------------------------+----------+---------------+----------+   ! Location                            ! Visualized! Compressibility! Thrombosis! +------------------------------------+----------+---------------+----------+   ! Sapheno Femoral Junction            ! Yes       ! Yes            !None      !   +------------------------------------+----------+---------------+----------+   ! Common Femoral                      ! Yes       ! Yes            !None      !   +------------------------------------+----------+---------------+----------+   ! Prox Femoral                        ! Yes       ! Yes            !None      !   +------------------------------------+----------+---------------+----------+   ! Mid Femoral                         ! Yes       ! Yes            !None      !   +------------------------------------+----------+---------------+----------+   ! Dist Femoral                        ! Yes       ! Yes            !None      !   +------------------------------------+----------+---------------+----------+   ! Deep Femoral                        ! Yes       ! Yes            !None      !   +------------------------------------+----------+---------------+----------+   ! Popliteal                           ! Yes       ! Yes            !None      !   +------------------------------------+----------+---------------+----------+   ! SSV                                 ! Yes       ! Yes            !None      ! +------------------------------------+----------+---------------+----------+   ! Gastroc                             !Partial   ! Yes            !None      !   +------------------------------------+----------+---------------+----------+   ! PTV                                 ! Yes       ! Yes            !None      !   +------------------------------------+----------+---------------+----------+   ! GSV                                 ! Yes       ! Yes            !None      !   +------------------------------------+----------+---------------+----------+   ! ATV                                 ! Yes       ! Yes            !None      !   +------------------------------------+----------+---------------+----------+   ! Peroneal                            !Partial   ! Yes            !None      !   +------------------------------------+----------+---------------+----------+     Left Lower Extremities DVT Study Measurements   Left 2D Measurements   +------------------------------------+----------+---------------+----------+   ! Location                            ! Visualized! Compressibility! Thrombosis! +------------------------------------+----------+---------------+----------+   ! Sapheno Femoral Junction            ! Yes       ! Yes            !None      !   +------------------------------------+----------+---------------+----------+   ! Common Femoral                      ! Yes       ! Yes            !None      !   +------------------------------------+----------+---------------+----------+   ! Prox Femoral                        ! Yes       ! Yes            !None      !   +------------------------------------+----------+---------------+----------+   ! Mid Femoral                         ! Yes       ! Yes            !None      !   +------------------------------------+----------+---------------+----------+   ! Dist Femoral                        ! Yes       ! Yes            !None      ! +------------------------------------+----------+---------------+----------+   ! Deep Femoral                        ! Yes       ! Yes            !None      !   +------------------------------------+----------+---------------+----------+   ! Popliteal                           ! Yes       ! Yes            !None      !   +------------------------------------+----------+---------------+----------+   ! SSV                                 ! Yes       ! Yes            !None      !   +------------------------------------+----------+---------------+----------+   ! Gastroc                             ! Yes       ! Yes            !None      !   +------------------------------------+----------+---------------+----------+   ! PTV                                 ! Yes       ! Yes            !None      !   +------------------------------------+----------+---------------+----------+   ! GSV                                 ! Yes       ! Yes            !None      !   +------------------------------------+----------+---------------+----------+   ! ATV                                 ! Yes       ! Yes            !None      !   +------------------------------------+----------+---------------+----------+   ! Peroneal                            ! Yes       ! Yes            !None      !   +------------------------------------+----------+---------------+----------+   NM LUNG SCAN PERFUSION ONLY [[id number]]    Resulted: 03/30/22 1622    Updated: 03/30/22 1624    Narrative:     EXAMINATION: Nuclear medicine lung scan perfusion only 3/30/2022   HISTORY: Dyspnea and hypoxia. Elevated d-dimer. FINDINGS: Following intravenous injection of 5.10 mCi of technetium   99m MAA intravenously multiple planar images were obtained of the   thorax. This demonstrates a normal accumulation of activity within   both lungs with no lobar, segmental or subsegmental perfusion defects   demonstrated. Impression:     1. . Essentially normal perfusion scan.  Low probability for pulmonary   thromboembolic disease. Signed by Dr Sigrid Renee:     POD# 3 RIGHT TKA  HTN  T2DM  OBESITY  PROBABLE CRUZ    Plan:      1:  DVT prophylaxis, ICE, elevate  2:  Pain control  3:  Physical therapy/Occupational therapy  4:  SPOKE WITH /CASE [facility] FOR [facility] AND Anticipate discharge tomorrow WHEN PLACEMENT OBTAINED.   5: Weight bearing as tolerated   6: APPRECIATE ALL THE HELP.        Electronically signed by Nathanael Helms PA-C on 3/31/2022 at 12:49 PM

## 2022-03-31 NOTE — CARE COORDINATION
[facility] unable to offer a bed due to cost of pt's medication. Pt could provide his own meds from home, however new policy at facility that does not allow outside meds to be brought in. Spoke with pt re: other facility referrals, his spouse just left and he would like to discuss with her before making a decision. SW to revisit after lunch. Electronically signed by Brendon Prasad on 3/31/2022 at 9:56 AM    Per pt, agreeable to referrals to [address] [place] [address]  [phone]  Electronically signed by Brendon Prasad on 3/31/2022 at 12:48 PM    Spoke with pt and spouse at bedside, they would also like a referral to [facility]. [facility]  Electronically signed by Brendon Prasad on 3/31/2022 at 3:34 PM    Bed offer from [facility], awaiting response from [facility].    Electronically signed by Brendon Prasad on 3/31/2022 at 3:42 PM

## 2022-03-31 NOTE — DISCHARGE SUMMARY
Department of Orthopedic Surgery  Total Knee Discharge Summary    Admission Date: 3/28/2022    Discharge Date: 04/01/2022    Date of Surgery: 3/28/2022    Procedures: Right Total Knee Replacement    Consultations: HOSPITALIST MEDICINE    Reason for Admission: Post operative pain control, acute anemia monitoring, and physical therapy. Brief History: The patient, Bonilla Zepeda is a 74 y.o. male underwent right total knee replacement procedure with Post-op pain issues and instability/gait abnormality which led to KORINA ERVIN [facility] consult for admission into a SNF/Rehab unit for further strengthening and conditioning. Boinlla Zepeda was admitted to the floor following his recovery in the PACU. Discharge Diagnosis  Right Total Knee Replacement    Current Inpatient Medications        Discharge Medications  Current Discharge Medication List           Details   oxyCODONE-acetaminophen (PERCOCET)  MG per tablet Take 1 tablet by mouth every 6 hours as needed for Pain for up to 7 days. Qty: 40 tablet, Refills: 0    Comments: Reduce doses taken as pain becomes manageable  Associated Diagnoses:  Total knee replacement status, right      sulfamethoxazole-trimethoprim (BACTRIM) 400-80 MG per tablet Take 1 tablet by mouth 2 times daily for 5 days  Qty: 10 tablet, Refills: 0      ondansetron (ZOFRAN) 4 MG tablet Take 1 tablet by mouth every 8 hours as needed for Nausea or Vomiting  Qty: 20 tablet, Refills: 0              Details   prasugrel (EFFIENT) 10 MG TABS Take 1 tablet by mouth once daily  Qty: 90 tablet, Refills: 0    Associated Diagnoses: Medication refill      metoprolol tartrate (LOPRESSOR) 50 MG tablet Take 1 tablet by mouth twice daily  Qty: 180 tablet, Refills: 0    Associated Diagnoses: Medication refill      gabapentin (NEURONTIN) 300 MG capsule TAKE 1 CAPSULE BY MOUTH THREE TIMES DAILY  Qty: 270 capsule, Refills: 0    Associated Diagnoses: Neuropathy      fluticasone (FLONASE) 50 MCG/ACT nasal spray Use 2 spray(s) in each nostril once daily  Qty: 48 g, Refills: 0    Associated Diagnoses: Seasonal allergies      ezetimibe (ZETIA) 10 MG tablet Take 1 tablet by mouth once daily  Qty: 90 tablet, Refills: 0    Associated Diagnoses: Hyperlipidemia, unspecified hyperlipidemia type      KERENDIA 10 MG TABS Take 10 mg by mouth daily       Insulin Lispro-aabc (LYUMJEV KWIKPEN) 200 UNIT/ML SOPN Inject 25-30 Units into the skin 3 times daily as needed (\"5 cc per 15 carbs\") Sliding scale      furosemide (LASIX) 20 MG tablet Take 1 tablet by mouth daily  Qty: 30 tablet, Refills: 5      aspirin 81 MG chewable tablet CHEW AND SWALLOW 1 TABLET BY MOUTH ONCE DAILY  Qty: 90 tablet, Refills: 3    Associated Diagnoses: Coronary artery disease involving native heart without angina pectoris, unspecified vessel or lesion type      metFORMIN (GLUCOPHAGE) 1000 MG tablet TAKE ONE TABLET BY MOUTH TWICE DAILY WITH MEALS  Qty: 180 tablet, Refills: 2    Associated Diagnoses: Type 2 diabetes mellitus with other circulatory complication, with long-term current use of insulin (HCC)      nystatin (MYCOSTATIN) 143069 UNIT/GM powder Apply 3 times daily. Qty: 60 g, Refills: 1    Associated Diagnoses: Medication refill      albuterol (PROVENTIL) (5 MG/ML) 0.5% nebulizer solution Take 1 mL by nebulization every 6 hours as needed for Wheezing  Qty: 60 mL, Refills: 0      nitroGLYCERIN (NITROSTAT) 0.4 MG SL tablet up to max of 3 total doses. If no relief after 1 dose, call 911. Qty: 25 tablet, Refills: 3      irbesartan (AVAPRO) 150 MG tablet Take 1 tablet by mouth once daily  Qty: 90 tablet, Refills: 0    Associated Diagnoses: Essential hypertension      pantoprazole (PROTONIX) 40 MG tablet Take 1 tablet by mouth every morning (before breakfast)  Qty: 90 tablet, Refills: 3      ciclopirox (PENLAC) 8 % solution Apply topically greater than 8 hours before showering. Clean nails with alcohol weekly.   Qty: 1 Bottle, Refills: 3      Insulin Glargine, 1 Unit Dial, (TOUJEO SOLOSTAR) 300 UNIT/ML SOPN Inject 30 Units into the skin daily       INVOKANA 100 MG TABS tablet Take 100 mg by mouth daily      Semaglutide,0.25 or 0.5MG/DOS, (OZEMPIC, 0.25 OR 0.5 MG/DOSE,) 2 MG/1.5ML SOPN Inject 0.25 mg into the skin once a week Mondays    Associated Diagnoses: DM type 2, uncontrolled, with neuropathy (HCC)      mupirocin (BACTROBAN) 2 % ointment APPLY  OINTMENT TO AFFECTED AREA THREE TIMES DAILY  Qty: 1 Tube, Refills: 1    Comments: Please consider 90 day supplies to promote better adherence  Associated Diagnoses: Dermatitis fungal      Insulin Pen Needle ([employer] PEN NEEDLES) 31G X 6 MM MISC Dx: E11.9  Qty: 150 each, Refills: 3    Associated Diagnoses: IDDM (insulin dependent diabetes mellitus)      blood glucose test strips (ACCU-CHEK DUNIA) strip Patient is to test 8 times daily. Dx: E11.9  Please provide patient with Accu check Dunia Plus test strips per his Insurance request  Qty: 900 each, Refills: 3      Lancets 30G MISC Use to check blood sugar 8 times daily. E11.9  Qty: 300 each, Refills: 5    Associated Diagnoses: IDDM (insulin dependent diabetes mellitus)      Alcohol Swabs PADS 1 box by Does not apply route 8 times daily  Qty: 100 each, Refills: 5      Blood Glucose Monitoring Suppl (ACURA BLOOD GLUCOSE METER) w/Device KIT Please provide patient with Accu check Dunia Meter per his Insurance request. Dx: E11.9  Qty: 1 kit, Refills: 0      albuterol sulfate HFA (VENTOLIN HFA) 108 (90 Base) MCG/ACT inhaler Inhale 2 puffs into the lungs every 6 hours as needed for Wheezing  Qty: 1 Inhaler, Refills: 3              Hospital Course   Post-operatively the patients diet was advanced as tolerated. The incision is dressing in place, clean, dry and intact with no signs of infection. The patient remained neurovascularly intact in the lower extremity and had intact pulses distally. The patient was able to move their knee, ankle, and foot without any problems post-operatively.   Physical therapy and occupational therapy were consulted and began working with the patient post-operatively. The patient progressed with PT/OT as would be expected and continued to improve through their stay. The patients pain was initially controlled with IV medications but we were able to transition to oral pain medications soon after arrival to the floor and their pain remained under good control through their hospital stay. From a medical standpoint the patient remained stable and continued to have the medicine team follow throughout their stay. The patients dressing was changed/incison was checked on day of d/c. The patient will be discharged at this time to Rehab  with their current diet restrictions and will continue to follow the total knee precautions outlined to them by us and PT/OT. Condition on Discharge: Stable    Plan  Return visit in 2 weeks. .  Patient was instructed on the use of pain medications, the signs and symptoms of infection, and was given our number to call should they have any questions or concerns following discharge. Discharge Medical Data Review:    POCT Glucose [[id number]] (Abnormal)    Collected: 03/31/22 1047    Updated: 03/31/22 1054     POC Glucose 189 High  mg/dl    Performed on AccuChek   EKG 12 Lead [[id number]]    Collected: 03/31/22 0021    Updated: 03/31/22 0737     P-R Interval 188 ms    QRS Duration 102 ms    Q-T Interval 300 ms    QTc Calculation (Bazett) 419 ms    P Axis 16 degrees    T Axis 42 degrees   Narrative:     128 BPM   Sinus tachycardia   Left anterior hemiblock   Inferior Q waves and poor R wave progression -cannot exclude prior infarcts   Low QRS voltages in precordial leads   Nonspecific ST-T wave changes lateral leads   Comparison Summary: Significant changes   Summary: Abnormal ECG   Compared with:6/3/2019 12:22 PM; 6/3/2019 10:34 AM; 5/11/2018 5:34 PM   [id number] 3d   POCT Glucose [[id number]] (Abnormal)    Collected: 03/31/22 0704    Updated: 03/31/22 0711     POC Glucose 150 High  mg/dl    Performed on AccuChek   Comprehensive Metabolic Panel [[id number]] (Abnormal)    Collected: 03/31/22 0256    Updated: 03/31/22 0403    Specimen Source: Blood     Sodium 135 Low  mmol/L    Potassium 4.2 mmol/L    Chloride 101 mmol/L    CO2 22 mmol/L    Anion Gap 12 mmol/L    Glucose 171 High  mg/dL    BUN 21 mg/dL    CREATININE 1.5 High  mg/dL    GFR Non- 46 Abnormal     Comment: This calculation may be inaccurate for patients under the age of 25 years. For ages 25 and older, a GFR >60 mL/min/1.73m2 (not corrected for weight) is   valid for stable renal function. GFR  56 Low     Comment: Chronic Kidney Disease: less than 60 ml/min/1.73 sq. m.         Kidney Failure: less than 15 ml/min/1.73 sq.m. Results valid for patients 18 years and older.         Calcium 8.6 Low  mg/dL    Total Protein 5.1 Low  g/dL    Albumin 3.3 Low  g/dL    Total Bilirubin 0.5 mg/dL    Alkaline Phosphatase 72 U/L    ALT 8 U/L    AST 17 U/L   Magnesium [[id number]]    Collected: 03/31/22 0256    Updated: 03/31/22 0403    Specimen Source: Blood     Magnesium 1.7 mg/dL   CBC with Auto Differential [[id number]] (Abnormal)    Collected: 03/31/22 0256    Updated: 03/31/22 0338    Specimen Source: Blood     WBC 9.7 K/uL    RBC 4.29 Low  M/uL    Hemoglobin 11.1 Low  g/dL    Hematocrit 37.0 Low  %    MCV 86.2 fL    MCH 25.9 Low  pg    MCHC 30.0 Low  g/dL    RDW 13.7 %    Platelets 743 K/uL    MPV 11.8 fL    Neutrophils % 69.3 High  %    Lymphocytes % 16.0 Low  %    Monocytes % 9.1 %    Eosinophils % 4.5 %    Basophils % 0.7 %    Neutrophils Absolute 6.7 K/uL    Immature Granulocytes # 0.0 K/uL    Lymphocytes Absolute 1.6 K/uL    Monocytes Absolute 0.90 K/uL    Eosinophils Absolute 0.40 K/uL    Basophils Absolute 0.10 K/uL   POCT Glucose [[id number]] (Abnormal)    Collected: 03/30/22 2015    Updated: 03/30/22 2022     POC Glucose 201 High  mg/dl    Performed on AccuChek    Comment: RN notified      POCT Glucose [[id number]] (Abnormal)    Collected: 03/30/22 1722    Updated: 03/30/22 1729     POC Glucose 193 High  mg/dl    Performed on AccuChek   VL DUP LOWER EXTREMITY VENOUS BILATERAL [[id number]]    Collected: 03/30/22 1400    Updated: 03/30/22 1626    Narrative:     Vascular Lower Extremities DVT Study Procedure      Demographics        Patient Name     Faustino Ferguson Age                    66        Patient Number   [id number]         Gender                 Male        Visit Number     [id number]      Interpreting Physician Suman King        Date of Birth    [birth year]     Referring Physician   Cass Weber        Accession Number [id number]     Sonographer           Tamara Crowell RVT       Procedure   Type of Study:        Veins:Lower Extremities DVT Study, VL LOWER EXTREMITY BILATERAL VENOUS    DUPLEX .       Indications for Study:Pain, right lower extremity and Elevated d-dimer. Allergies     - Adhesive tape.      Impression        Normal venous duplex study of the bilateral lower extremity(ies). There is    no evidence of deep or superficial venous thrombosis.        Signature        ----------------------------------------------------------------    Electronically signed by Ancelmo Albert(Interpreting    [email or web address]) on 03/30/2022 04:26 PM    ----------------------------------------------------------------       Velocities are measured in cm/s ; Diameters are measured in mm     Right Lower Extremities DVT Study Measurements   Right 2D Measurements   +------------------------------------+----------+---------------+----------+   ! Location                            ! Visualized! Compressibility! Thrombosis! +------------------------------------+----------+---------------+----------+   ! Sapheno Femoral Junction            ! Yes       ! Yes            !None      ! +------------------------------------+----------+---------------+----------+   ! Common Femoral                      ! Yes       ! Yes            !None      !   +------------------------------------+----------+---------------+----------+   ! Prox Femoral                        ! Yes       ! Yes            !None      !   +------------------------------------+----------+---------------+----------+   ! Mid Femoral                         ! Yes       ! Yes            !None      !   +------------------------------------+----------+---------------+----------+   ! Dist Femoral                        ! Yes       ! Yes            !None      !   +------------------------------------+----------+---------------+----------+   ! Deep Femoral                        ! Yes       ! Yes            !None      !   +------------------------------------+----------+---------------+----------+   ! Popliteal                           ! Yes       ! Yes            !None      !   +------------------------------------+----------+---------------+----------+   ! SSV                                 ! Yes       ! Yes            !None      !   +------------------------------------+----------+---------------+----------+   ! Gastroc                             !Partial   ! Yes            !None      !   +------------------------------------+----------+---------------+----------+   ! PTV                                 ! Yes       ! Yes            !None      !   +------------------------------------+----------+---------------+----------+   ! GSV                                 ! Yes       ! Yes            !None      !   +------------------------------------+----------+---------------+----------+   ! ATV                                 ! Yes       ! Yes            !None      !   +------------------------------------+----------+---------------+----------+   ! Peroneal                            !Partial   ! Yes            !None      ! +------------------------------------+----------+---------------+----------+     Left Lower Extremities DVT Study Measurements   Left 2D Measurements   +------------------------------------+----------+---------------+----------+   ! Location                            ! Visualized! Compressibility! Thrombosis! +------------------------------------+----------+---------------+----------+   ! Sapheno Femoral Junction            ! Yes       ! Yes            !None      !   +------------------------------------+----------+---------------+----------+   ! Common Femoral                      ! Yes       ! Yes            !None      !   +------------------------------------+----------+---------------+----------+   ! Prox Femoral                        ! Yes       ! Yes            !None      !   +------------------------------------+----------+---------------+----------+   ! Mid Femoral                         ! Yes       ! Yes            !None      !   +------------------------------------+----------+---------------+----------+   ! Dist Femoral                        ! Yes       ! Yes            !None      !   +------------------------------------+----------+---------------+----------+   ! Deep Femoral                        ! Yes       ! Yes            !None      !   +------------------------------------+----------+---------------+----------+   ! Popliteal                           ! Yes       ! Yes            !None      !   +------------------------------------+----------+---------------+----------+   ! SSV                                 ! Yes       ! Yes            !None      !   +------------------------------------+----------+---------------+----------+   ! Gastroc                             ! Yes       ! Yes            !None      !   +------------------------------------+----------+---------------+----------+   ! PTV                                 ! Yes       ! Yes            !None      ! +------------------------------------+----------+---------------+----------+   ! GSV                                 ! Yes       ! Yes            !None      !   +------------------------------------+----------+---------------+----------+   ! ATV                                 ! Yes       ! Yes            !None      !   +------------------------------------+----------+---------------+----------+   ! Peroneal                            ! Yes       ! Yes            !None      !   +------------------------------------+----------+---------------+----------+   NM LUNG SCAN PERFUSION ONLY [[id number]]    Resulted: 03/30/22 1622    Updated: 03/30/22 1624    Narrative:     EXAMINATION: Nuclear medicine lung scan perfusion only 3/30/2022   HISTORY: Dyspnea and hypoxia. Elevated d-dimer. FINDINGS: Following intravenous injection of 5.10 mCi of technetium   99m MAA intravenously multiple planar images were obtained of the   thorax. This demonstrates a normal accumulation of activity within   both lungs with no lobar, segmental or subsegmental perfusion defects   demonstrated. Impression:     1. . Essentially normal perfusion scan. Low probability for pulmonary   thromboembolic disease.    Signed by Dr Lidia Carbone         Electronically signed by Juan Shine PA-C on 3/31/2022 at 12:52 PM

## 2022-03-31 NOTE — PROGRESS NOTES
[place], [place], [address]    DEPARTMENT OF HOSPITALIST MEDICINE      PROGRESS  NOTE:    NOTE: Portions of this note have been copied forward, however, changed to reflect the most current clinical status of this patient. Patient:  Silva Callaway  YOB: 1955  Date of Service: 3/31/2022  MRN: [id number]   Acct: [de-identified]   Primary Care Physician: Rain Cottrell MD  Advance Directive: Full Code  Admit Date: 3/28/2022       Hospital Day: 2      CHIEF COMPLAINT:  No chief complaint on file. SUBJECTIVE:  Patient seen and evaluated at bedside. He is feeling better. Shortness of breath is improved. [address]  COURSE:    03/31/2022:  Patient is continuing to improve. Work-up for DVT/PE has been negative. Patient is clear requiring oxygen via nasal cannula at 2 L/min to maintain O2 sats in 90s. Home O2 evaluation ordered. 03/30/2022: History of present illness: The patient is a 74 y.o. male who presents with right knee replacement on 3/29/2022. He is up in chair. He ambulated for the first time today and did well. His pain is managed with current therapy. During the night pt had desaturation event. He required 7 liters of O2 to maintain adequate Spo2. He has right calf pain on his exam today. D dimer was elevated. VQ scan and doppler venous studies ordered. O2 requirement down to 2 liters at present.         REVIEW  OF  SYSTEMS:    Constitutional:  No fevers, chills, nausea, vomiting, + tiredness and fatigue   Lungs:   No hemoptysis, pleurisy, cough, SOB   Heart:  No chest pressure with exertion, palpitations,    Abdomen:   No new masses, no bright red blood per rectum   Extremities:  + difficulty ambulation due to weakness, no new lesions   Neurologic: No new motor or sensory changes     14 point review of systems addressed with patient which is essentially negative except as specifically addressed above:    PAST MEDICAL HISTORY:  Past Medical History: Diagnosis Date    [facility]     abdominal (pt states NOT)    Arthritis     Bilateral leg edema     CAD (coronary artery disease)     sees [facility]/dr. russo.     Chronic kidney disease     Stage 4; sees dr. Tyrone Murrell Diabetes mellitus, type 2 ([facility]     Fatty liver     GERD (gastroesophageal reflux disease)     HTN (hypertension)     Knee pain     Prolonged emergence from general anesthesia     Seasonal allergies     Vitreous hemorrhage ([facility] 1/22/2021         PAST SURGICAL HISTORY:  Past Surgical History:   Procedure Laterality Date    CARPAL TUNNEL RELEASE      COLONOSCOPY  11/04/2015    DR Capone: polys, 5yr recall    COLONOSCOPY N/A 10/11/2016    Dr Lopez-diverticulosis, Sessile serrated AP (-) high grade dysplasia x 1, tubular AP (-) dysplasia x 2, HP x 2--3 yr recall    COLONOSCOPY N/A 12/16/2020    Dr Abraham Santoyo, Mod. Diverticulosis, Internal hemorrhoids-Grade 2 & ext hemorrhoids, 3 year recall    CORONARY ANGIOPLASTY WITH STENT PLACEMENT  06/04/2019    AGA to circ    CORONARY ANGIOPLASTY WITH STENT PLACEMENT  05/2018    AGA to osteal LAD    ESOPHAGEAL DILATATION N/A 12/16/2020    Dr Abraham Santoyo, empirical [address] fr bougie dilation, (+) GERD    EYE SURGERY      Right eye    PRE-MALIGNANT / [address]      Dr. Costello Reasons Right 3/28/2022    RIGHT KNEE TOTAL ARTHROPLASTY performed by Dustin Cook MD at [address]  09/20/2016    Dr Lopez-w/dilation over wire, [address] [place]-distal esophageal narrowing, Inna (-)    UPPER GASTROINTESTINAL ENDOSCOPY  09/20/2016    Dr Cyr/dilation over wire, [address] [place]-distal esophageal narrowing, Inna (-)        FAMILY HISTORY:  Family History   Problem Relation Age of Onset    Colon Cancer Mother     Prostate Cancer Father     Other Father         HX of blood clot    Diabetes Sister     Cancer Sister     Diabetes Brother     Colon Polyps Son     Esophageal Cancer Neg Hx     Liver Cancer Neg Hx     Liver Disease Neg Hx     Stomach Cancer Neg Hx     Rectal Cancer Neg Hx            OBJECTIVE:  /70   Pulse 127   Temp 96.3 °F (35.7 °C)   Resp 20   Ht 6' (1.829 m)   Wt 264 lb (119.7 kg)   SpO2 (!) 88%   BMI 35.80 kg/m²   No intake/output data recorded.     PHYSICAL  EXAMINATION:    HOLA:  Awake, alert, oriented x 3, patient appears tired and fatigued   Head/Eyes:  Normocephalic, atraumatic, EOMI and PERRLA bilaterally   Respiratory:   Bilateral decreased air entry in both lung fields, mild B/L crackles, symmetric expansion of chest   Cardiovascular:  Regular rate and rhythm, S1+S2+0, no murmurs/rubs   Abdomen:   Soft, non-tender, bowel sounds +ve, no organomegaly   Extremities: Moves all, decreased range of motion, no edema   Neurologic: Awake, alert, oriented x 3, cranial nerves II-XII intact, no focal neurological deficits, sensory system intact   Psychiatric: Normal mood, non-suicidal       CURRENT MEDICATIONS:  Scheduled:   aspirin  81 mg Oral Daily    ciclopirox   Topical Nightly    ezetimibe  10 mg Oral Nightly    fluticasone  1 spray Nasal Daily    gabapentin  300 mg Oral TID    insulin glargine  30 Units SubCUTAneous Daily    [Held by provider] canagliflozin  100 mg Oral Daily    Finerenone  10 mg Oral Daily    [Held by provider] metFORMIN  1,000 mg Oral BID WC    metoprolol tartrate  50 mg Oral BID    mupirocin   Topical Daily    pantoprazole  40 mg Oral BID    prasugrel  10 mg Oral Daily    [Held by provider] Semaglutide(0.25 or 0.5MG/DOS)  0.25 mg SubCUTAneous Weekly    sodium chloride flush  5-40 mL IntraVENous 2 times per day    losartan  50 mg Oral Daily    insulin lispro  0-12 Units SubCUTAneous TID WC    insulin lispro  0-6 Units SubCUTAneous Nightly        PRN:  guaiFENesin, 200 mg, Q4H PRN  albuterol, 5 mg, Q6H PRN  furosemide, 20 mg, Daily PRN  Insulin Lispro-aabc, 25-30 Units, TID PRN  nitroGLYCERIN, 0.4 mg, Q5 Min PRN  sodium chloride flush, 5-40 mL, PRN  sodium chloride, 25 mL, PRN  ondansetron, 4 mg, Q8H PRN   Or  ondansetron, 4 mg, Q6H PRN  oxyCODONE, 5 mg, Q4H PRN   Or  oxyCODONE, 10 mg, Q4H PRN  HYDROmorphone, 0.5 mg, Q3H PRN  glucagon (rDNA), 1 mg, PRN  dextrose, 100 mL/hr, PRN  glucose, 4 tablet, PRN  dextrose bolus (hypoglycemia), 125 mL, PRN   Or  dextrose bolus (hypoglycemia), 250 mL, PRN        Infusions:   sodium chloride      sodium chloride 125 mL/hr at 03/30/22 1750    dextrose         Laboratory Data:  Recent Labs     03/30/22 0313 03/31/22  0256   WBC 10.1 9.7   HGB 11.3* 11.1*    174     Recent Labs     03/30/22  0014 03/30/22 0313 03/31/22  0256   NA  --  133* 135*   K 3.9 4.3 4.2   CL  --  98 101   CO2  --  19* 22   BUN  --  21 21   CREATININE  --  1.4* 1.5*   GLUCOSE  --  163* 171*     Recent Labs     03/30/22 0313 03/31/22  0256   AST 21 17   ALT 7 8   BILITOT 0.8 0.5   ALKPHOS 73 72     Troponin T: No results for input(s): TROPONINI in the last 72 hours. Pro-BNP: No results for input(s): BNP in the last 72 hours. INR: No results for input(s): INR in the last 72 hours. UA:No results for input(s): NITRITE, COLORU, PHUR, LABCAST, WBCUA, RBCUA, MUCUS, TRICHOMONAS, YEAST, BACTERIA, CLARITYU, SPECGRAV, LEUKOCYTESUR, UROBILINOGEN, BILIRUBINUR, BLOODU, GLUCOSEU, AMORPHOUS in the last 72 hours. Invalid input(s): Irven Guess  A1C: No results for input(s): LABA1C in the last 72 hours. ABG:  Recent Labs     03/30/22  0014   PHART 7.440   DZX6MAM 40.0   PO2ART 67.0*   FKM6MLS 27.2*   BEART 2.8*   HGBAE 11.7*   O2HQZLDZ 93.2   CARBOXHGBART 2.1         Imaging:    XR CHEST (2 VW)    Result Date: 3/30/2022  Mild cardiomegaly. Otherwise, no acute findings or significant change. Signed by Dr Liliane Giang    Result Date: 3/30/2022  1. . Essentially normal perfusion scan. Low probability for pulmonary thromboembolic disease.  Signed by Dr Meche Morelos PLAN:    Principal Problem:    Osteoarthritis of knee  Active Problems:    DM type 2, uncontrolled, with neuropathy ([address]    Essential hypertension    Gastroesophageal reflux disease without esophagitis    Chronic kidney disease, stage III (moderate) (HCC)    S/P total knee arthroplasty, right    Arthritis of knee, right    Acute blood loss as cause of postoperative anemia  Resolved Problems:    * No resolved hospital problems. *      Continue with current medications  Continue with the postop care as per orthopedics  Work-up was done with bilateral lower extremity duplex and VQ scan which ruled out DVTs/PE  Continue to wean off oxygen as tolerated  Patient is requiring 2 L oxygen via nasal cannula to keep O2 sat in 90s  Home O2 evaluation was done and patient is requiring 2 L oxygen via nasal cannula upon discharge next  Continue with IV hydration with normal saline   Strict I's and O's  Monitor renal functions closely      Chronic medical issues . .. Continue with home meds. Monitor patient closely while admitted. Advised very close f/u with patient's PCP as an outpatient to address chronic medical issues. Repeat labs in a.m. Electrolyte replacement as per protocol. Patient will be monitored very closely on the floor. Further recommendations as per the hospital course. Discharge Plan: DC patient to skilled nursing facility as per orthopedics team once arrangements are made by case management      Patient  is on DVT prophylaxis  Current medications reviewed  Lab work reviewed  Radiology/Chest x-ray films reviewed  Treatment recommendations from suspecialities reviewed, appreciated and agreed with  Discussed with the nurse and addressed all questions/concerns  Discussed with Patient and/or Family at the bedside in detail . .. they understand and agree with the management plan.       Flakito Chaney MD  3/31/2022 6:38 PM      DISCLAIMER: This note was created with electronic voice recognition which does have occasional errors. If you have any questions regarding the content within the note please do not hesitate to contact me. .. Thanks.

## 2022-03-31 NOTE — PROGRESS NOTES
Physical Therapy  Name: Rubin Lazar  MRN:  [id number]  Date of service:  3/31/2022       03/31/22 7956   Restrictions/Precautions   Restrictions/Precautions Fall Risk;Weight Bearing   Required Braces or Orthoses? No   Lower Extremity Weight Bearing Restrictions   Right Lower Extremity Weight Bearing Weight Bearing As Tolerated   Subjective   Subjective Pt agreed to therapy. Pain Screening   Patient Currently in Pain Yes   Pain Assessment   Pain Assessment 0-10   Pain Level 8   Pain Type Acute pain   Pain Location Knee   Pain Orientation Right   Pain Descriptors Aching; Sore   Functional Pain Assessment Prevents or interferes some active activities and ADLs   Non-Pharmaceutical Pain Intervention(s) Ambulation/Increased Activity;Repositioned; Rest   Response to Pain Intervention Patient Satisfied   Pain Frequency Continuous   Bed Mobility   Supine to Sit Minimal assistance   Sit to Supine Minimal assistance   Transfers   Sit to Stand Contact guard assistance;Minimal Assistance   Stand to sit Contact guard assistance   Ambulation   Ambulation? Yes   WB Status FWBAT RLE   Ambulation 1   Surface level tile   Device Rolling Walker   Assistance Contact guard assistance   Quality of Gait slow, antalgic   Gait Deviations Slow Mariely;Decreased step length;Decreased step height   Distance 10' x2   Comments seated rest break in between   Patient Goals    Patient goals  go home soon   Short term goals   Time Frame for Short term goals 2 wks   Short term goal 1 supine to sit indep   Short term goal 2 sit to stand indep   Short term goal 3 amb. 100' with RW SBA   Short term goal 4 up/down 3 stairs SBA with rail   Conditions Requiring Skilled Therapeutic Intervention   Body structures, Functions, Activity limitations Decreased functional mobility ; Decreased ROM; Decreased strength;Decreased safe awareness;Decreased cognition;Decreased posture; Increased pain;Decreased balance   Assessment Pt able to amb short distances in room but with slow nir and v/c's to move RW forward at times. Pt amb to chair but declined to stay up in chair at this time, rested and then returned to bed. Set up for breakfast with all needs in reach. Activity Tolerance   Activity Tolerance Patient limited by pain; Patient limited by endurance   Safety Devices   Type of devices Bed alarm in place;Call light within reach; Left in bed       Electronically signed by Nilton Medina PTA on 3/31/2022 at 9:55 AM

## 2022-03-31 NOTE — PROGRESS NOTES
1025 P. O. on NC @ 2 LPM @ Rest Sat was 95%  1045 P. O. on R/A @ Rest Sat was 88%  1113 P. O. on NC @ 2 LPM @ Exercise Sat was 95%. about 20 steps n the room with a walker and with 3 people helping.

## 2022-04-01 VITALS
DIASTOLIC BLOOD PRESSURE: 45 MMHG | HEIGHT: 72 IN | WEIGHT: 264 LBS | SYSTOLIC BLOOD PRESSURE: 107 MMHG | OXYGEN SATURATION: 95 % | RESPIRATION RATE: 20 BRPM | BODY MASS INDEX: 35.76 KG/M2 | TEMPERATURE: 97.3 F | HEART RATE: 83 BPM

## 2022-04-01 LAB
ALBUMIN SERPL-MCNC: 3.2 G/DL (ref 3.5–5.2)
ALP BLD-CCNC: 75 U/L (ref 40–130)
ALT SERPL-CCNC: 9 U/L (ref 5–41)
ANION GAP SERPL CALCULATED.3IONS-SCNC: 9 MMOL/L (ref 7–19)
AST SERPL-CCNC: 14 U/L (ref 5–40)
BASOPHILS ABSOLUTE: 0.1 K/UL (ref 0–0.2)
BASOPHILS RELATIVE PERCENT: 0.9 % (ref 0–1)
BILIRUB SERPL-MCNC: 0.5 MG/DL (ref 0.2–1.2)
BUN BLDV-MCNC: 25 MG/DL (ref 8–23)
CALCIUM SERPL-MCNC: 9.2 MG/DL (ref 8.8–10.2)
CHLORIDE BLD-SCNC: 103 MMOL/L (ref 98–111)
CO2: 24 MMOL/L (ref 22–29)
CREAT SERPL-MCNC: 1.7 MG/DL (ref 0.5–1.2)
EOSINOPHILS ABSOLUTE: 0.6 K/UL (ref 0–0.6)
EOSINOPHILS RELATIVE PERCENT: 6.5 % (ref 0–5)
GFR AFRICAN AMERICAN: 49
GFR NON-AFRICAN AMERICAN: 40
GLUCOSE BLD-MCNC: 250 MG/DL (ref 74–109)
HCT VFR BLD CALC: 36.2 % (ref 42–52)
HEMOGLOBIN: 10.7 G/DL (ref 14–18)
IMMATURE GRANULOCYTES #: 0.1 K/UL
LYMPHOCYTES ABSOLUTE: 1.6 K/UL (ref 1.1–4.5)
LYMPHOCYTES RELATIVE PERCENT: 16.5 % (ref 20–40)
MAGNESIUM: 2 MG/DL (ref 1.6–2.4)
MCH RBC QN AUTO: 25.6 PG (ref 27–31)
MCHC RBC AUTO-ENTMCNC: 29.6 G/DL (ref 33–37)
MCV RBC AUTO: 86.6 FL (ref 80–94)
MONOCYTES ABSOLUTE: 1 K/UL (ref 0–0.9)
MONOCYTES RELATIVE PERCENT: 9.7 % (ref 0–10)
NEUTROPHILS ABSOLUTE: 6.4 K/UL (ref 1.5–7.5)
NEUTROPHILS RELATIVE PERCENT: 65.9 % (ref 50–65)
PDW BLD-RTO: 13.9 % (ref 11.5–14.5)
PHOSPHORUS: 2.6 MG/DL (ref 2.5–4.5)
PLATELET # BLD: 214 K/UL (ref 130–400)
PMV BLD AUTO: 11.6 FL (ref 9.4–12.4)
POTASSIUM SERPL-SCNC: 4.6 MMOL/L (ref 3.5–5)
RBC # BLD: 4.18 M/UL (ref 4.7–6.1)
SARS-COV-2, NAAT: NOT DETECTED
SODIUM BLD-SCNC: 136 MMOL/L (ref 136–145)
TOTAL PROTEIN: 5.7 G/DL (ref 6.6–8.7)
VITAMIN D 25-HYDROXY: 19.7 NG/ML
WBC # BLD: 9.8 K/UL (ref 4.8–10.8)

## 2022-04-01 PROCEDURE — 84100 ASSAY OF PHOSPHORUS: CPT

## 2022-04-01 PROCEDURE — 36415 COLL VENOUS BLD VENIPUNCTURE: CPT

## 2022-04-01 PROCEDURE — 2500000003 HC RX 250 WO HCPCS: Performed by: INTERNAL MEDICINE

## 2022-04-01 PROCEDURE — 6370000000 HC RX 637 (ALT 250 FOR IP): Performed by: HOSPITALIST

## 2022-04-01 PROCEDURE — 2700000000 HC OXYGEN THERAPY PER DAY

## 2022-04-01 PROCEDURE — 97116 GAIT TRAINING THERAPY: CPT

## 2022-04-01 PROCEDURE — 85025 COMPLETE CBC W/AUTO DIFF WBC: CPT

## 2022-04-01 PROCEDURE — 83735 ASSAY OF MAGNESIUM: CPT

## 2022-04-01 PROCEDURE — 87635 SARS-COV-2 COVID-19 AMP PRB: CPT

## 2022-04-01 PROCEDURE — 82306 VITAMIN D 25 HYDROXY: CPT

## 2022-04-01 PROCEDURE — 80053 COMPREHEN METABOLIC PANEL: CPT

## 2022-04-01 PROCEDURE — 6370000000 HC RX 637 (ALT 250 FOR IP): Performed by: ORTHOPAEDIC SURGERY

## 2022-04-01 PROCEDURE — 6360000002 HC RX W HCPCS: Performed by: ORTHOPAEDIC SURGERY

## 2022-04-01 PROCEDURE — 2580000003 HC RX 258: Performed by: ORTHOPAEDIC SURGERY

## 2022-04-01 RX ORDER — ERGOCALCIFEROL 1.25 MG/1
50000 CAPSULE ORAL WEEKLY
Status: DISCONTINUED | OUTPATIENT
Start: 2022-04-01 | End: 2022-04-01 | Stop reason: HOSPADM

## 2022-04-01 RX ORDER — OXYCODONE AND ACETAMINOPHEN 10; 325 MG/1; MG/1
1 TABLET ORAL EVERY 6 HOURS PRN
Qty: 15 TABLET | Refills: 0 | Status: SHIPPED | OUTPATIENT
Start: 2022-04-01 | End: 2022-04-01 | Stop reason: SDUPTHER

## 2022-04-01 RX ORDER — GABAPENTIN 300 MG/1
300 CAPSULE ORAL 3 TIMES DAILY
Qty: 15 CAPSULE | Refills: 0 | Status: SHIPPED | OUTPATIENT
Start: 2022-04-01 | End: 2022-08-30 | Stop reason: SDUPTHER

## 2022-04-01 RX ORDER — OXYCODONE AND ACETAMINOPHEN 10; 325 MG/1; MG/1
1 TABLET ORAL EVERY 6 HOURS PRN
Qty: 15 TABLET | Refills: 0 | Status: SHIPPED | OUTPATIENT
Start: 2022-04-01 | End: 2022-04-04

## 2022-04-01 RX ORDER — ERGOCALCIFEROL 1.25 MG/1
50000 CAPSULE ORAL WEEKLY
Qty: 5 CAPSULE | Refills: 0 | Status: SHIPPED | OUTPATIENT
Start: 2022-04-01 | End: 2022-09-30

## 2022-04-01 RX ORDER — GABAPENTIN 300 MG/1
300 CAPSULE ORAL 3 TIMES DAILY
Qty: 15 CAPSULE | Refills: 0 | Status: SHIPPED | OUTPATIENT
Start: 2022-04-01 | End: 2022-04-01 | Stop reason: SDUPTHER

## 2022-04-01 RX ADMIN — OXYCODONE 10 MG: 5 TABLET ORAL at 17:02

## 2022-04-01 RX ADMIN — OXYCODONE 5 MG: 5 TABLET ORAL at 07:52

## 2022-04-01 RX ADMIN — OXYCODONE 5 MG: 5 TABLET ORAL at 00:24

## 2022-04-01 RX ADMIN — FLUTICASONE PROPIONATE 1 SPRAY: 50 SPRAY, METERED NASAL at 08:10

## 2022-04-01 RX ADMIN — GUAIFENESIN 200 MG: 100 SOLUTION ORAL at 00:24

## 2022-04-01 RX ADMIN — HYDROMORPHONE HYDROCHLORIDE 0.5 MG: 1 INJECTION, SOLUTION INTRAMUSCULAR; INTRAVENOUS; SUBCUTANEOUS at 10:26

## 2022-04-01 RX ADMIN — PRASUGREL 10 MG: 10 TABLET, FILM COATED ORAL at 07:52

## 2022-04-01 RX ADMIN — SODIUM CHLORIDE: 9 INJECTION, SOLUTION INTRAVENOUS at 08:09

## 2022-04-01 RX ADMIN — INSULIN LISPRO 4 UNITS: 100 INJECTION, SOLUTION INTRAVENOUS; SUBCUTANEOUS at 12:54

## 2022-04-01 RX ADMIN — INSULIN GLARGINE 30 UNITS: 100 INJECTION, SOLUTION SUBCUTANEOUS at 07:53

## 2022-04-01 RX ADMIN — PANTOPRAZOLE SODIUM 40 MG: 40 TABLET, DELAYED RELEASE ORAL at 07:52

## 2022-04-01 RX ADMIN — OXYCODONE 5 MG: 5 TABLET ORAL at 11:54

## 2022-04-01 RX ADMIN — GABAPENTIN 300 MG: 300 CAPSULE ORAL at 07:52

## 2022-04-01 RX ADMIN — ERGOCALCIFEROL 50000 UNITS: 1.25 CAPSULE ORAL at 11:54

## 2022-04-01 RX ADMIN — ASPIRIN 81 MG 81 MG: 81 TABLET ORAL at 07:52

## 2022-04-01 RX ADMIN — LOSARTAN POTASSIUM 50 MG: 50 TABLET, FILM COATED ORAL at 07:52

## 2022-04-01 RX ADMIN — METOPROLOL TARTRATE 50 MG: 50 TABLET, FILM COATED ORAL at 07:52

## 2022-04-01 ASSESSMENT — PAIN DESCRIPTION - PAIN TYPE
TYPE: ACUTE PAIN
TYPE: ACUTE PAIN

## 2022-04-01 ASSESSMENT — PAIN SCALES - GENERAL
PAINLEVEL_OUTOF10: 9
PAINLEVEL_OUTOF10: 5
PAINLEVEL_OUTOF10: 9
PAINLEVEL_OUTOF10: 7
PAINLEVEL_OUTOF10: 5
PAINLEVEL_OUTOF10: 3
PAINLEVEL_OUTOF10: 6
PAINLEVEL_OUTOF10: 7

## 2022-04-01 ASSESSMENT — PAIN DESCRIPTION - ORIENTATION
ORIENTATION: RIGHT
ORIENTATION: RIGHT

## 2022-04-01 ASSESSMENT — PAIN DESCRIPTION - DESCRIPTORS
DESCRIPTORS: ACHING;SORE
DESCRIPTORS: ACHING;SORE

## 2022-04-01 ASSESSMENT — PAIN DESCRIPTION - FREQUENCY
FREQUENCY: CONTINUOUS
FREQUENCY: CONTINUOUS

## 2022-04-01 ASSESSMENT — PAIN DESCRIPTION - LOCATION
LOCATION: KNEE
LOCATION: KNEE

## 2022-04-01 NOTE — PLAN OF CARE
Problem: Pain:  Goal: Pain level will decrease  Description: Pain level will decrease  3/31/2022 2305 by Crissy Reyna RN  Outcome: Ongoing  3/31/2022 0952 by Magnolia Craig RN  Outcome: Ongoing  Goal: Control of acute pain  Description: Control of acute pain  3/31/2022 2305 by Crissy Reyna RN  Outcome: Ongoing  3/31/2022 0952 by Magnolia Craig RN  Outcome: Ongoing  Goal: Control of chronic pain  Description: Control of chronic pain  3/31/2022 2305 by Crissy Reyna RN  Outcome: Ongoing  3/31/2022 0952 by Magnolia Craig RN  Outcome: Ongoing     Problem: Falls - Risk of:  Goal: Will remain free from falls  Description: Will remain free from falls  3/31/2022 2305 by Crissy Reyna RN  Outcome: Ongoing  3/31/2022 0952 by Magnolia Craig RN  Outcome: Ongoing  Goal: Absence of physical injury  Description: Absence of physical injury  3/31/2022 2305 by Crissy Reyna RN  Outcome: Ongoing  3/31/2022 0952 by Magnolia Craig RN  Outcome: Ongoing     Problem: Skin Integrity:  Goal: Will show no infection signs and symptoms  Description: Will show no infection signs and symptoms  3/31/2022 2305 by Crissy Reyna RN  Outcome: Ongoing  3/31/2022 0952 by Magnolia Craig RN  Outcome: Ongoing  Goal: Absence of new skin breakdown  Description: Absence of new skin breakdown  3/31/2022 2305 by Crissy Reyna RN  Outcome: Ongoing  3/31/2022 0952 by Magnolia Craig RN  Outcome: Ongoing

## 2022-04-01 NOTE — PROGRESS NOTES
Physical Therapy  Name: Jelena Elizabeth  MRN:  [id number]  Date of service:  4/1/2022 04/01/22 1048   Restrictions/Precautions   Restrictions/Precautions Fall Risk;Weight Bearing   Required Braces or Orthoses? No   Lower Extremity Weight Bearing Restrictions   Right Lower Extremity Weight Bearing Weight Bearing As Tolerated   Subjective   Subjective Pt agreed to therapy. Pain Screening   Patient Currently in Pain Yes   Pain Assessment   Pain Assessment Faces   Pain Level 7   Pain Type Acute pain   Pain Location Knee   Pain Orientation Right   Pain Descriptors Aching; Sore   Functional Pain Assessment Prevents or interferes some active activities and ADLs   Non-Pharmaceutical Pain Intervention(s) Ambulation/Increased Activity;Repositioned; Rest   Response to Pain Intervention Patient Satisfied   Pain Frequency Continuous   Bed Mobility   Supine to Sit Minimal assistance   Sit to Supine Minimal assistance   Transfers   Sit to Stand Minimal Assistance   Stand to sit Minimal Assistance   Comment pt does push up from bed with one hand but continues to lean on RW heavily with other arm even with cueing during STS   Ambulation   Ambulation? Yes   WB Status FWBAT RLE   Ambulation 1   Surface level tile   Device Rolling Walker   Assistance Contact guard assistance   Gait Deviations Slow Mariely;Decreased step length;Decreased step height   Distance 28'   Patient Goals    Patient goals  go home soon   Short term goals   Time Frame for Short term goals 2 wks   Short term goal 1 supine to sit indep   Short term goal 2 sit to stand indep   Short term goal 3 amb. 100' with RW SBA   Short term goal 4 up/down 3 stairs SBA with rail   Conditions Requiring Skilled Therapeutic Intervention   Body structures, Functions, Activity limitations Decreased functional mobility ; Decreased ROM; Decreased strength;Decreased safe awareness;Decreased cognition;Decreased posture; Increased pain;Decreased balance   Assessment Pt did well with amb, no LOB. Pt wanted to return to bed, assisted with RLE to get back to bed. Pt positioned for comfort with all needs in reach. Activity Tolerance   Activity Tolerance Patient Tolerated treatment well   Safety Devices   Type of devices Bed alarm in place;Call light within reach; Left in bed         Electronically signed by Malgorzata Ngo PTA on 4/1/2022 at 12:00 PM

## 2022-04-01 NOTE — PROGRESS NOTES
Physical Therapy  Name: Danielle Castillo  MRN:  [id number]  Date of service:  4/1/2022 04/01/22 1446   Restrictions/Precautions   Restrictions/Precautions Fall Risk;Weight Bearing   Required Braces or Orthoses? No   Lower Extremity Weight Bearing Restrictions   Right Lower Extremity Weight Bearing Weight Bearing As Tolerated   Subjective   Subjective Pt agreed to therapy. Pain Screening   Patient Currently in Pain Yes  (notified staff of pt request for pain meds. )   Pain Assessment   Pain Assessment 0-10   Pain Level 9   Pain Type Acute pain   Pain Location Knee   Pain Orientation Right   Pain Descriptors Aching; Sore   Functional Pain Assessment Prevents or interferes some active activities and ADLs   Non-Pharmaceutical Pain Intervention(s) Ambulation/Increased Activity;Repositioned; Rest   Response to Pain Intervention Patient Satisfied   Pain Frequency Continuous   Bed Mobility   Supine to Sit Minimal assistance   Sit to Supine Contact guard assistance   Transfers   Sit to Stand Contact guard assistance   Stand to sit Contact guard assistance   Ambulation   Ambulation? Yes   WB Status FWBAT RLE   Ambulation 1   Surface level tile   Device Rolling Walker   Assistance Contact guard assistance   Quality of Gait slow, antalgic   Gait Deviations Slow Mariely;Decreased step length;Decreased step height   Distance 28'   Patient Goals    Patient goals  go home soon   Short term goals   Time Frame for Short term goals 2 wks   Short term goal 1 supine to sit indep   Short term goal 2 sit to stand indep   Short term goal 3 amb. 100' with RW SBA   Short term goal 4 up/down 3 stairs SBA with rail   Conditions Requiring Skilled Therapeutic Intervention   Body structures, Functions, Activity limitations Decreased functional mobility ; Decreased ROM; Decreased strength;Decreased safe awareness;Decreased cognition;Decreased posture; Increased pain;Decreased balance   Assessment Pt able to transfer STS with less assist and safer technique. Pt amb around room and had no LOB. Pt assisted back to bed with all needs in reach. Activity Tolerance   Activity Tolerance Patient Tolerated treatment well   Safety Devices   Type of devices Bed alarm in place;Call light within reach; Left in bed         Electronically signed by Nilton Medina PTA on 4/1/2022 at 2:54 PM

## 2022-04-01 NOTE — CARE COORDINATION
Pt has been accepted at [facility], accepts bed offer, and can dc pending neg COVID test.   [facility]   [phone]  Electronically signed by Juan Carlos Morales on 4/1/2022 at 11:17 AM

## 2022-04-01 NOTE — PROGRESS NOTES
Wife called for pain meds, on arrival noted pt napping. RN had to wake pt to inquire on pain scale stating 6/10. BP taken 105/70, administered prn pain med per protocol.

## 2022-04-01 NOTE — PROGRESS NOTES
lesions   Neurologic: No new motor or sensory changes     14 point review of systems addressed with patient which is essentially negative except as specifically addressed above:    PAST MEDICAL HISTORY:  Past Medical History:   Diagnosis Date    Aneurysm ([facility]     abdominal (pt states NOT)    Arthritis     Bilateral leg edema     CAD (coronary artery disease)     sees [facility]/dr. russo.     Chronic kidney disease     Stage 4; sees dr. Elie Campoverde Diabetes mellitus, type 2 ([facility] [address] [facility]     Fatty liver     GERD (gastroesophageal reflux disease)     HTN (hypertension)     Knee pain     Prolonged emergence from general anesthesia     Seasonal allergies     Vitreous hemorrhage ([address] 1/22/2021         PAST SURGICAL HISTORY:  Past Surgical History:   Procedure Laterality Date    CARPAL TUNNEL RELEASE      COLONOSCOPY  11/04/2015    DR Capone: polys, 5yr recall    COLONOSCOPY N/A 10/11/2016    Dr Lopez-diverticulosis, Sessile serrated AP (-) high grade dysplasia x 1, tubular AP (-) dysplasia x 2, HP x 2--3 yr recall    COLONOSCOPY N/A 12/16/2020    Dr Ria Cruz, Mod. Diverticulosis, Internal hemorrhoids-Grade 2 & ext hemorrhoids, 3 year recall    CORONARY ANGIOPLASTY WITH STENT PLACEMENT  06/04/2019    AGA to circ    CORONARY ANGIOPLASTY WITH STENT PLACEMENT  05/2018    AGA to osteal LAD    ESOPHAGEAL DILATATION N/A 12/16/2020    Dr Ria Cruz, empirical Malik 47 fr bougie dilation, (+) GERD    EYE SURGERY      Right eye    PRE-MALIGNANT / Bravo Bowling Right 3/28/2022    RIGHT KNEE TOTAL ARTHROPLASTY performed by Bg Wilkinson MD at [address]  09/20/2016    Dr Lopez-w/dilation over wire, 46 Yi-distal esophageal narrowing, Inna (-)    UPPER GASTROINTESTINAL ENDOSCOPY  09/20/2016    Dr Cyr/dilation over wire, 51 Yi-distal esophageal narrowing, Inna (-)        FAMILY HISTORY:  Family History Problem Relation Age of Onset    Colon Cancer Mother     Prostate Cancer Father     Other Father         HX of blood clot    Diabetes Sister     Cancer Sister     Diabetes Brother     Colon Polyps Son     Esophageal Cancer Neg Hx     Liver Cancer Neg Hx     Liver Disease Neg Hx     Stomach Cancer Neg Hx     Rectal Cancer Neg Hx            OBJECTIVE:  BP (!) 107/45   Pulse 83   Temp 97.3 °F (36.3 °C)   Resp 20   Ht 6' (1.829 m)   Wt 264 lb (119.7 kg)   SpO2 95%   BMI 35.80 kg/m²   No intake/output data recorded.     PHYSICAL  EXAMINATION:    HOLA:  Awake, alert, oriented x 3, patient appears tired and fatigued   Head/Eyes:  Normocephalic, atraumatic, EOMI and PERRLA bilaterally   Respiratory:   Bilateral decreased air entry in both lung fields, mild B/L crackles, symmetric expansion of chest   Cardiovascular:  Regular rate and rhythm, S1+S2+0, no murmurs/rubs   Abdomen:   Soft, non-tender, bowel sounds +ve, no organomegaly   Extremities: Moves all, decreased range of motion, no edema   Neurologic: Awake, alert, oriented x 3, cranial nerves II-XII intact, no focal neurological deficits, sensory system intact   Psychiatric: Normal mood, non-suicidal       CURRENT MEDICATIONS:  Scheduled:   vitamin D  50,000 Units Oral Weekly    aspirin  81 mg Oral Daily    ciclopirox   Topical Nightly    ezetimibe  10 mg Oral Nightly    fluticasone  1 spray Nasal Daily    gabapentin  300 mg Oral TID    insulin glargine  30 Units SubCUTAneous Daily    [Held by provider] canagliflozin  100 mg Oral Daily    Finerenone  10 mg Oral Daily    [Held by provider] metFORMIN  1,000 mg Oral BID WC    metoprolol tartrate  50 mg Oral BID    mupirocin   Topical Daily    pantoprazole  40 mg Oral BID    prasugrel  10 mg Oral Daily    [Held by provider] Semaglutide(0.25 or 0.5MG/DOS)  0.25 mg SubCUTAneous Weekly    sodium chloride flush  5-40 mL IntraVENous 2 times per day    losartan  50 mg Oral Daily    insulin lispro  0-12 Units SubCUTAneous TID WC    insulin lispro  0-6 Units SubCUTAneous Nightly        PRN:  guaiFENesin, 200 mg, Q4H PRN  albuterol, 5 mg, Q6H PRN  furosemide, 20 mg, Daily PRN  Insulin Lispro-aabc, 25-30 Units, TID PRN  nitroGLYCERIN, 0.4 mg, Q5 Min PRN  sodium chloride flush, 5-40 mL, PRN  sodium chloride, 25 mL, PRN  ondansetron, 4 mg, Q8H PRN   Or  ondansetron, 4 mg, Q6H PRN  oxyCODONE, 5 mg, Q4H PRN   Or  oxyCODONE, 10 mg, Q4H PRN  HYDROmorphone, 0.5 mg, Q3H PRN  glucagon (rDNA), 1 mg, PRN  dextrose, 100 mL/hr, PRN  glucose, 4 tablet, PRN  dextrose bolus (hypoglycemia), 125 mL, PRN   Or  dextrose bolus (hypoglycemia), 250 mL, PRN        Infusions:   sodium chloride      sodium chloride 125 mL/hr at 04/01/22 0809    dextrose         Laboratory Data:  Recent Labs     03/30/22 0313 03/31/22 0256 04/01/22  0423   WBC 10.1 9.7 9.8   HGB 11.3* 11.1* 10.7*    174 214     Recent Labs     03/30/22 0313 03/31/22 0256 04/01/22  0423   * 135* 136   K 4.3 4.2 4.6   CL 98 101 103   CO2 19* 22 24   BUN 21 21 25*   CREATININE 1.4* 1.5* 1.7*   GLUCOSE 163* 171* 250*     Recent Labs     03/30/22 0313 03/31/22 0256 04/01/22  0423   AST 21 17 14   ALT 7 8 9   BILITOT 0.8 0.5 0.5   ALKPHOS 73 72 75     Troponin T: No results for input(s): TROPONINI in the last 72 hours. Pro-BNP: No results for input(s): BNP in the last 72 hours. INR: No results for input(s): INR in the last 72 hours. UA:No results for input(s): NITRITE, COLORU, PHUR, LABCAST, WBCUA, RBCUA, MUCUS, TRICHOMONAS, YEAST, BACTERIA, CLARITYU, SPECGRAV, LEUKOCYTESUR, UROBILINOGEN, BILIRUBINUR, BLOODU, GLUCOSEU, AMORPHOUS in the last 72 hours. Invalid input(s): [place]  A1C: No results for input(s): LABA1C in the last 72 hours.   ABG:  Recent Labs     03/30/22  0014   PHART 7.440   OCT9XZD 40.0   PO2ART 67.0*   QQW5NLN 27.2*   BEART 2.8*   HGBAE 11.7*   K1XKWUKF 93.2   CARBOXHGBART 2.1         Imaging:    XR CHEST (2 VW)    Result Date: 3/30/2022  Mild cardiomegaly. Otherwise, no acute findings or significant change. Signed by Dr Liang Bob    Result Date: 3/30/2022  1. . Essentially normal perfusion scan. Low probability for pulmonary thromboembolic disease. Signed by Dr Arciniega Hash:    Principal Problem:    Osteoarthritis of knee  Active Problems:    DM type 2, uncontrolled, with neuropathy ([address]    Essential hypertension    Gastroesophageal reflux disease without esophagitis    Chronic kidney disease, stage III (moderate) (HCC)    S/P total knee arthroplasty, right    Arthritis of knee, right    Acute blood loss as cause of postoperative anemia  Resolved Problems:    * No resolved hospital problems. *      Continue with current medications  Continue with the postop care as per orthopedics  Work-up was done with bilateral lower extremity duplex and VQ scan which ruled out DVTs/PE  Continue to wean off oxygen as tolerated  Patient is requiring 2 L oxygen via nasal cannula to keep O2 sat in 90s  Home O2 evaluation was done and patient is requiring 2 L oxygen via nasal cannula upon discharge  Continue with IV hydration with normal saline   Strict I's and O's  Monitor renal functions closely  Patient has severely depleted vitamin D level of 19.2  Patient started on vitamin D 50,000 units oral weekly      Chronic medical issues . .. Continue with home meds. Monitor patient closely while admitted. Advised very close f/u with patient's PCP as an outpatient to address chronic medical issues. Repeat labs in a.m. Electrolyte replacement as per protocol. Patient will be monitored very closely on the floor. Further recommendations as per the hospital course.         Discharge Plan: DC patient to skilled nursing facility as per orthopedics team once arrangements are made by case management      Patient  is on DVT prophylaxis  Current medications reviewed  Lab work reviewed  Radiology/Chest x-ray films reviewed  Treatment recommendations from suspecialities reviewed, appreciated and agreed with  Discussed with the nurse and addressed all questions/concerns  Discussed with Patient and/or Family at the bedside in detail . .. they understand and agree with the management plan. Susan Roth MD  4/1/2022 5:22 PM      DISCLAIMER: This note was created with electronic voice recognition which does have occasional errors. If you have any questions regarding the content within the note please do not hesitate to contact me. .. Thanks.

## 2022-04-06 ENCOUNTER — TELEPHONE (OUTPATIENT)
Dept: PRIMARY CARE CLINIC | Age: 67
End: 2022-04-06

## 2022-04-06 NOTE — TELEPHONE ENCOUNTER
SKILLED NURSING FACILITY:   INITIAL CALL POST-HOSPITAL DISCHARGE    S[facility]: [facility]    PHONE NUMBER: [phone]     / :    THERAPY: Pt/OT    ANTICIPATED LENGTH OF STAY: Care plan coming up tomorrow, hoping for discharge in near future.  will call and set up his TCM appointment.

## 2022-04-11 ENCOUNTER — TELEPHONE (OUTPATIENT)
Dept: INPATIENT UNIT | Age: 67
End: 2022-04-11

## 2022-04-15 ENCOUNTER — CARE COORDINATION (OUTPATIENT)
Dept: CASE MANAGEMENT | Age: 67
End: 2022-04-15

## 2022-04-15 DIAGNOSIS — M17.11 ARTHRITIS OF KNEE, RIGHT: Primary | ICD-10-CM

## 2022-04-15 DIAGNOSIS — Z96.651 S/P TOTAL KNEE ARTHROPLASTY, RIGHT: ICD-10-CM

## 2022-04-15 PROCEDURE — 1111F DSCHRG MED/CURRENT MED MERGE: CPT | Performed by: FAMILY MEDICINE

## 2022-04-15 NOTE — CARE COORDINATION
[facility] Transitions Initial Follow Up Call    Call within 2 business days of discharge: Yes    Patient: Chema Camp Patient : 1955   MRN: [id number]  Reason for Admission: R TKA  Discharge Date: 22 RARS: Readmission Risk Score: 15.6 ( )      Last Discharge [facility]       Complaint Diagnosis Description Type Department Provider    3/28/22  Total knee replacement status, right . .. Admission (Discharged) Steffany Briceno MD        Acute Care Course:   Pt to 3/28 to  with a R TKA with anemia. He then went to [facility] for 13 dys with a d/c to home on . He plans to have outpatient rehab.      - ortho   Danielle  5 week f/u with ortho    Sig Hx:   DMII, HTN, GERD, h/o NSTEMI, CKD3, GERD    DME: w/c, walker    Conversation:   Spoke to Shoaib Gilbert after 2 IDs. He is doing better but it is still tough to walk. Improving but not there. He is changing his w/c to a better sized one. He kept his ortho visit and they want him going to outpatient therapy and gave him some exercises to do at home. He is to f/u in 5 weeks. He is not sleeping well yet. Waking up d/t his knee. He does use an ice pack and elevates leg. He has lasix PRN but that is for his ankle swelling and he will use. He took his last atbx last night. Reviewed s/s infection and to call MD if needed. Encouraged to monitor stools and report constipation. Pt is encouraged to increase fiber and fluids. His wife is disabled but he admires her that she can still cook. It is hard for them to pick a lot of groceries at one time but they make frequent trips. He is going to have 6 weeks of outpatient therapy. Appetite is down but so is his activity and he feels he eats enough. Reviewed meds. He told me that he had high potassium a 6.0 and was reduced to 4.6 prior to d/c. Pt is agreeable to calls but does not want to be called all the time. Follow up plan:   Will hand off to Sarah William    Transitions of Care Initial Call    Was this

## 2022-04-18 ENCOUNTER — TELEPHONE (OUTPATIENT)
Dept: PODIATRY | Facility: CLINIC | Age: 67
End: 2022-04-18

## 2022-04-19 ENCOUNTER — OFFICE VISIT (OUTPATIENT)
Dept: PODIATRY | Facility: CLINIC | Age: 67
End: 2022-04-19

## 2022-04-19 VITALS
BODY MASS INDEX: 34.59 KG/M2 | WEIGHT: 255.4 LBS | OXYGEN SATURATION: 98 % | HEART RATE: 69 BPM | DIASTOLIC BLOOD PRESSURE: 72 MMHG | SYSTOLIC BLOOD PRESSURE: 118 MMHG | HEIGHT: 72 IN

## 2022-04-19 DIAGNOSIS — B35.1 ONYCHOMYCOSIS: Primary | ICD-10-CM

## 2022-04-19 DIAGNOSIS — Z79.4 TYPE 2 DIABETES MELLITUS WITH DIABETIC NEUROPATHY, WITH LONG-TERM CURRENT USE OF INSULIN: ICD-10-CM

## 2022-04-19 DIAGNOSIS — R60.9 PERIPHERAL EDEMA: ICD-10-CM

## 2022-04-19 DIAGNOSIS — E11.40 TYPE 2 DIABETES MELLITUS WITH DIABETIC NEUROPATHY, WITH LONG-TERM CURRENT USE OF INSULIN: ICD-10-CM

## 2022-04-19 PROCEDURE — 11721 DEBRIDE NAIL 6 OR MORE: CPT | Performed by: PODIATRIST

## 2022-04-19 RX ORDER — OXYCODONE AND ACETAMINOPHEN 10; 325 MG/1; MG/1
1 TABLET ORAL EVERY 6 HOURS PRN
COMMUNITY

## 2022-04-21 PROBLEM — Z01.810 PREOPERATIVE CARDIOVASCULAR EXAMINATION: Status: RESOLVED | Noted: 2022-03-22 | Resolved: 2022-04-21

## 2022-04-22 ENCOUNTER — CARE COORDINATION (OUTPATIENT)
Dept: CARE COORDINATION | Age: 67
End: 2022-04-22

## 2022-04-22 NOTE — CARE COORDINATION
Date/Time:  4/22/2022 3:10 PM  Attempted to reach patient by telephone. Left HIPPA compliant message requesting a return call.

## 2022-04-26 ENCOUNTER — OFFICE VISIT (OUTPATIENT)
Dept: PRIMARY CARE CLINIC | Age: 67
End: 2022-04-26
Payer: MEDICARE

## 2022-04-26 VITALS
HEART RATE: 74 BPM | DIASTOLIC BLOOD PRESSURE: 60 MMHG | TEMPERATURE: 96 F | WEIGHT: 260 LBS | SYSTOLIC BLOOD PRESSURE: 114 MMHG | BODY MASS INDEX: 35.21 KG/M2 | OXYGEN SATURATION: 96 % | HEIGHT: 72 IN

## 2022-04-26 DIAGNOSIS — E78.2 MIXED HYPERLIPIDEMIA: ICD-10-CM

## 2022-04-26 DIAGNOSIS — J30.2 SEASONAL ALLERGIES: ICD-10-CM

## 2022-04-26 DIAGNOSIS — Z76.0 MEDICATION REFILL: ICD-10-CM

## 2022-04-26 DIAGNOSIS — Z79.4 TYPE 2 DIABETES MELLITUS WITH OTHER CIRCULATORY COMPLICATION, WITH LONG-TERM CURRENT USE OF INSULIN (HCC): Primary | ICD-10-CM

## 2022-04-26 DIAGNOSIS — E11.59 TYPE 2 DIABETES MELLITUS WITH OTHER CIRCULATORY COMPLICATION, WITH LONG-TERM CURRENT USE OF INSULIN (HCC): Primary | ICD-10-CM

## 2022-04-26 DIAGNOSIS — Z91.81 AT HIGH RISK FOR FALLS: ICD-10-CM

## 2022-04-26 PROCEDURE — 4040F PNEUMOC VAC/ADMIN/RCVD: CPT | Performed by: FAMILY MEDICINE

## 2022-04-26 PROCEDURE — 99214 OFFICE O/P EST MOD 30 MIN: CPT | Performed by: FAMILY MEDICINE

## 2022-04-26 PROCEDURE — 1036F TOBACCO NON-USER: CPT | Performed by: FAMILY MEDICINE

## 2022-04-26 PROCEDURE — 1123F ACP DISCUSS/DSCN MKR DOCD: CPT | Performed by: FAMILY MEDICINE

## 2022-04-26 PROCEDURE — G8427 DOCREV CUR MEDS BY ELIG CLIN: HCPCS | Performed by: FAMILY MEDICINE

## 2022-04-26 PROCEDURE — 1111F DSCHRG MED/CURRENT MED MERGE: CPT | Performed by: FAMILY MEDICINE

## 2022-04-26 PROCEDURE — G8417 CALC BMI ABV UP PARAM F/U: HCPCS | Performed by: FAMILY MEDICINE

## 2022-04-26 PROCEDURE — 3046F HEMOGLOBIN A1C LEVEL >9.0%: CPT | Performed by: FAMILY MEDICINE

## 2022-04-26 PROCEDURE — 2022F DILAT RTA XM EVC RTNOPTHY: CPT | Performed by: FAMILY MEDICINE

## 2022-04-26 PROCEDURE — 3017F COLORECTAL CA SCREEN DOC REV: CPT | Performed by: FAMILY MEDICINE

## 2022-04-26 RX ORDER — OXYCODONE AND ACETAMINOPHEN 10; 325 MG/1; MG/1
1 TABLET ORAL 4 TIMES DAILY PRN
COMMUNITY
Start: 2022-04-15 | End: 2022-09-27 | Stop reason: SDUPTHER

## 2022-04-26 RX ORDER — ALBUTEROL SULFATE 90 UG/1
2 AEROSOL, METERED RESPIRATORY (INHALATION) EVERY 6 HOURS PRN
Qty: 1 EACH | Refills: 2 | Status: SHIPPED | OUTPATIENT
Start: 2022-04-26

## 2022-04-26 RX ORDER — METOPROLOL TARTRATE 50 MG/1
50 TABLET, FILM COATED ORAL 2 TIMES DAILY
Qty: 60 TABLET | Refills: 2 | Status: SHIPPED | OUTPATIENT
Start: 2022-04-26 | End: 2022-08-30 | Stop reason: SDUPTHER

## 2022-04-26 RX ORDER — FLUTICASONE PROPIONATE 50 MCG
1 SPRAY, SUSPENSION (ML) NASAL DAILY
Qty: 1 EACH | Refills: 2 | Status: SHIPPED | OUTPATIENT
Start: 2022-04-26 | End: 2022-08-29

## 2022-04-26 ASSESSMENT — ENCOUNTER SYMPTOMS
ABDOMINAL PAIN: 0
EYES NEGATIVE: 1
DIARRHEA: 1
RESPIRATORY NEGATIVE: 1

## 2022-04-26 NOTE — PROGRESS NOTES
[address] [facility]  [address] [address]  [address] [address]  Dept: [phone]  Dept Fax: [phone]  Loc: [phone]      Subjective:     Chief Complaint   Patient presents with    Follow-up     patient had right TKA on 3/28/22 he is working with PT. He did have a high potassium level while in NH for rehab       HPI:  Silva Callaway is a 74 y.o. male presents today for follow-up of recent R TKA. He started PT already and scheduled to continue that 3 times a week. He seems to be progressing well. He states he can extend his R knee fully but can only flex up to 90 degrees. He was allso exposed to his grandchildren with acute gastroenteritis this past weekend. He has mild diarrhea. No fever, nausea, or vomiting. No abdominal pain      ROS:   Review of Systems   Constitutional: Negative. HENT: Negative. Eyes: Negative. Respiratory: Negative. Cardiovascular: Negative. Gastrointestinal: Positive for diarrhea. Negative for abdominal pain. Genitourinary: Negative. Musculoskeletal: Positive for arthralgias (s/p R knee replacement). Neurological: Negative. Hematological: Negative. Psychiatric/Behavioral: Negative. PMHx:  Past Medical History:   Diagnosis Date    Aneurysm ([address]     abdominal (pt states NOT)    Arthritis     Bilateral leg edema     CAD (coronary artery disease)     sees [facility]/dr. russo.     Chronic kidney disease     Stage 4; sees dr. Aldo Harrington Diabetes mellitus, type 2 ([address]     Fatty liver     GERD (gastroesophageal reflux disease)     HTN (hypertension)     Knee pain     Prolonged emergence from general anesthesia     Seasonal allergies     Vitreous hemorrhage ([address] 1/22/2021     Patient Active Problem List   Diagnosis    DM type 2, uncontrolled, with neuropathy ([address]    Essential hypertension    Gastroesophageal reflux disease without esophagitis    Pharyngoesophageal dysphagia    Family history of prostate cancer in father    Change in bowel habits    History of colon polyps    Double vessel coronary artery disease    History of placement of stent in LAD coronary artery    [facility]    NSTEMI (non-ST elevated myocardial infarction) [facility]    Chronic kidney disease, stage III (moderate) (HCC)    Mixed hyperlipidemia    Morbid obesity ([facility]    Left ventricular dysfunction    Acute renal failure superimposed on stage 4 chronic kidney disease (HCC)    Osteoarthritis of knee    S/P total knee arthroplasty, right    Arthritis of knee, right    Acute blood loss as cause of postoperative anemia       PSHx:  Past Surgical History:   Procedure Laterality Date    CARPAL TUNNEL RELEASE      COLONOSCOPY  11/04/2015    DR Capone: polys, 5yr recall    COLONOSCOPY N/A 10/11/2016    Dr Lopez-diverticulosis, Sessile serrated AP (-) high grade dysplasia x 1, tubular AP (-) dysplasia x 2, HP x 2--3 yr recall    COLONOSCOPY N/A 12/16/2020    Dr Juliano Prado, Mod. Diverticulosis, Internal hemorrhoids-Grade 2 & ext hemorrhoids, 3 year recall    CORONARY ANGIOPLASTY WITH STENT PLACEMENT  06/04/2019    AGA to circ    CORONARY ANGIOPLASTY WITH STENT PLACEMENT  05/2018    AGA to osteal LAD    ESOPHAGEAL DILATATION N/A 12/16/2020    Dr Juliano Prado, empirical [address] 47 fr bougie dilation, (+) GERD    EYE SURGERY      Right eye    PRE-MALIGNANT / Merilynn Factor      Dr. Gabo Deng Right 3/28/2022    RIGHT KNEE TOTAL ARTHROPLASTY performed by Bryan Castellon MD at [address]  09/20/2016    Dr Lopez-armu/dilation over wire, 46 [place]-distal esophageal narrowing, Inna (-)    UPPER GASTROINTESTINAL ENDOSCOPY  09/20/2016    Dr Cyr/dilation over wire, 46 [place]-distal esophageal narrowing, Inna (-)       PFHx:  Family History   Problem Relation Age of Onset    Colon Cancer Mother     Prostate Cancer Father    [facility] Other Father         HX of blood clot    Diabetes Sister     Cancer Sister     Diabetes Brother     Colon Polyps Son     Esophageal Cancer Neg Hx     Liver Cancer Neg Hx     Liver Disease Neg Hx     Stomach Cancer Neg Hx     Rectal Cancer Neg Hx        SocialHx:  Social History     Tobacco Use    Smoking status: Never Smoker    Smokeless tobacco: Never Used   Substance Use Topics    Alcohol use: No       Allergies: Allergies   Allergen Reactions    Lipitor [Atorvastatin Calcium] Diarrhea and Other (See Comments)     Dizzy    Tape Sary Cower Tape] Hives and Rash       Medications:  Current Outpatient Medications   Medication Sig Dispense Refill    metoprolol tartrate (LOPRESSOR) 50 MG tablet Take 1 tablet by mouth 2 times daily TAKE 1 TABLET BY MOUTH TWICE DAILY 60 tablet 2    metFORMIN (GLUCOPHAGE) 1000 MG tablet TAKE ONE TABLET BY MOUTH TWICE DAILY WITH MEALS 180 tablet 2    fluticasone (FLONASE) 50 MCG/ACT nasal spray 1 spray by Nasal route daily Use 2 spray(s) in each nostril once daily- prn 1 each 2    albuterol sulfate HFA (VENTOLIN HFA) 108 (90 Base) MCG/ACT inhaler Inhale 2 puffs into the lungs every 6 hours as needed for Wheezing 1 each 2    oxyCODONE-acetaminophen (PERCOCET)  MG per tablet Take 1 tablet by mouth 4 times daily as needed.  gabapentin (NEURONTIN) 300 MG capsule Take 1 capsule by mouth 3 times daily for 5 days.  TAKE 1 CAPSULE BY MOUTH THREE TIMES DAILY 15 capsule 0    ondansetron (ZOFRAN) 4 MG tablet Take 1 tablet by mouth every 8 hours as needed for Nausea or Vomiting 20 tablet 0    prasugrel (EFFIENT) 10 MG TABS Take 1 tablet by mouth once daily 90 tablet 0    ezetimibe (ZETIA) 10 MG tablet Take 1 tablet by mouth once daily 90 tablet 0    KERENDIA 10 MG TABS Take 10 mg by mouth daily       Insulin Lispro-aabc (LYUMJEV KWIKPEN) 200 UNIT/ML SOPN Inject 25-30 Units into the skin 3 times daily as needed (\"5 cc per 15 carbs\") Sliding scale      furosemide (LASIX) 20 MG tablet Take 1 tablet by mouth daily (Patient taking differently: Take 20 mg by mouth daily as needed (swelling) ) 30 tablet 5    aspirin 81 MG chewable tablet CHEW AND SWALLOW 1 TABLET BY MOUTH ONCE DAILY (Patient taking differently: Take 81 mg by mouth daily CHEW AND SWALLOW 1 TABLET BY MOUTH ONCE DAILY) 90 tablet 3    nystatin (MYCOSTATIN) 982533 UNIT/GM powder Apply 3 times daily. (Patient taking differently: Apply topically 3 times daily as needed ) 60 g 1    nitroGLYCERIN (NITROSTAT) 0.4 MG SL tablet up to max of 3 total doses. If no relief after 1 dose, call 911. 25 tablet 3    irbesartan (AVAPRO) 150 MG tablet Take 1 tablet by mouth once daily (Patient taking differently: Take 150 mg by mouth daily ) 90 tablet 0    pantoprazole (PROTONIX) 40 MG tablet Take 1 tablet by mouth every morning (before breakfast) (Patient taking differently: Take 40 mg by mouth 2 times daily ) 90 tablet 3    Insulin Glargine, 1 Unit Dial, (TOUJEO SOLOSTAR) 300 UNIT/ML SOPN Inject 30 Units into the skin daily       INVOKANA 100 MG TABS tablet Take 100 mg by mouth daily      Semaglutide,0.25 or 0.5MG/DOS, (OZEMPIC, 0.25 OR 0.5 MG/DOSE,) 2 MG/1.5ML SOPN Inject 0.25 mg into the skin once a week Mondays      mupirocin (BACTROBAN) 2 % ointment APPLY  OINTMENT TO AFFECTED AREA THREE TIMES DAILY 1 Tube 1    Insulin Pen Needle ([employer] PEN NEEDLES) 31G X 6 MM MISC Dx: E11.9 150 each 3    blood glucose test strips (ACCU-CHEK DUNIA) strip Patient is to test 8 times daily. Dx: E11.9  Please provide patient with Accu check Dunia Plus test strips per his Insurance request 900 each 3    Lancets 30G MISC Use to check blood sugar 8 times daily.  E11.9 300 each 5    Alcohol Swabs PADS 1 box by Does not apply route 8 times daily 100 each 5    Blood Glucose Monitoring Suppl (ACURA BLOOD GLUCOSE METER) w/Device KIT Please provide patient with Accu check Dunia Meter per his Insurance request. Dx: E11.9 1 kit 0    vitamin D (ERGOCALCIFEROL) 1.25 MG (30728 UT) CAPS capsule Take 1 capsule by mouth once a week (Patient not taking: Reported on 4/15/2022) 5 capsule 0    albuterol (PROVENTIL) (5 MG/ML) 0.5% nebulizer solution Take 1 mL by nebulization every 6 hours as needed for Wheezing 60 mL 0     No current facility-administered medications for this visit. Objective:   PE:  /60   Pulse 74   Temp 96 °F (35.6 °C)   Ht 6' (1.829 m)   Wt 260 lb (117.9 kg)   SpO2 96%   BMI 35.26 kg/m²   Physical Exam  Vitals and nursing note reviewed. Exam conducted with a chaperone present (wife). HENT:      Head: Normocephalic. Nose: Nose normal. No congestion. Mouth/Throat:      Mouth: Mucous membranes are moist.   Eyes:      Conjunctiva/sclera: Conjunctivae normal.      Pupils: Pupils are equal, round, and reactive to light. Cardiovascular:      Rate and Rhythm: Normal rate and regular rhythm. Heart sounds: Normal heart sounds. Pulmonary:      Breath sounds: Normal breath sounds. Abdominal:      General: Abdomen is protuberant. Palpations: Abdomen is soft. Tenderness: There is no abdominal tenderness. Musculoskeletal:         General: No swelling. Tenderness:  s/p R TKR. Skin:     Findings: No lesion. Neurological:      General: No focal deficit present. Mental Status: He is alert and oriented to person, place, and time. Motor: Motor function is intact. Coordination: Coordination is intact. Psychiatric:         Mood and Affect: Mood normal.            Assessment & Plan   Elisa Barrett was seen today for follow-up.     Diagnoses and all orders for this visit:    Type 2 diabetes mellitus with other circulatory complication, with long-term current use of insulin (HCC)  -     metFORMIN (GLUCOPHAGE) 1000 MG tablet; TAKE ONE TABLET BY MOUTH TWICE DAILY WITH MEALS    Mixed hyperlipidemia  -     Lipid, Fasting    Seasonal allergies  -     fluticasone (FLONASE) 50 MCG/ACT nasal spray; 1 spray by Nasal route daily Use 2 spray(s) in each nostril once daily- prn    At high risk for falls    Medication refill  -     metoprolol tartrate (LOPRESSOR) 50 MG tablet; Take 1 tablet by mouth 2 times daily TAKE 1 TABLET BY MOUTH TWICE DAILY  -     albuterol sulfate HFA (VENTOLIN HFA) 108 (90 Base) MCG/ACT inhaler; Inhale 2 puffs into the lungs every 6 hours as needed for Wheezing        Return in about 4 months (around 8/26/2022) for chronic care management, med check, med refills. All questions were answered. Medications, including possible adverse effects, and instructions were reviewed and  understanding was confirmed. Follow-up recommendations, including when to contact or return to office (ie; if symptoms worsen or fail to improve), were discussed and acknowledged. Electronically signed by Conrad Simmons MD on 4/26/22 at 10:37 AM CDT    On the basis of positive falls risk screening, assessment and plan is as follows: home safety tips provided, referral to physical therapy provided for strength and balance training.

## 2022-04-28 ENCOUNTER — CARE COORDINATION (OUTPATIENT)
Dept: CARE COORDINATION | Age: 67
End: 2022-04-28

## 2022-04-28 ENCOUNTER — DOCUMENTATION (OUTPATIENT)
Dept: ENDOCRINOLOGY | Facility: CLINIC | Age: 67
End: 2022-04-28

## 2022-04-28 NOTE — CARE COORDINATION
Date/Time:  4/28/2022 3:09 PM  Attempted to reach patient by telephone. Left HIPAA compliant message requesting a return call.

## 2022-05-06 ENCOUNTER — TELEPHONE (OUTPATIENT)
Dept: ENDOCRINOLOGY | Facility: CLINIC | Age: 67
End: 2022-05-06

## 2022-05-10 ENCOUNTER — TELEPHONE (OUTPATIENT)
Dept: ENDOCRINOLOGY | Facility: CLINIC | Age: 67
End: 2022-05-10

## 2022-05-10 RX ORDER — SEMAGLUTIDE 1.34 MG/ML
1 INJECTION, SOLUTION SUBCUTANEOUS WEEKLY
Qty: 9 ML | Refills: 3 | Status: SHIPPED | OUTPATIENT
Start: 2022-05-10 | End: 2022-08-09

## 2022-05-10 NOTE — TELEPHONE ENCOUNTER
Abdomen , soft, nontender, nondistended , no guarding or rigidity , no masses palpable , normal bowel sounds , Liver and Spleen , no hepatomegaly present , liver nontender , spleen not palpable Pt needs refills on Ozempic 1 MG sent to [facility] [address] in [place].       Thanks     Pt wife picked up samples and said it would not be enough to last until his next appointment. Please call her to jaydee- [phone] Anne      Thanks

## 2022-06-01 RX ORDER — NITROGLYCERIN 0.4 MG/1
TABLET SUBLINGUAL
Qty: 25 TABLET | Refills: 3 | Status: SHIPPED | OUTPATIENT
Start: 2022-06-01

## 2022-06-22 ENCOUNTER — OFFICE VISIT (OUTPATIENT)
Dept: PRIMARY CARE CLINIC | Age: 67
End: 2022-06-22
Payer: MEDICARE

## 2022-06-22 VITALS
HEIGHT: 72 IN | DIASTOLIC BLOOD PRESSURE: 68 MMHG | SYSTOLIC BLOOD PRESSURE: 116 MMHG | OXYGEN SATURATION: 95 % | WEIGHT: 256 LBS | BODY MASS INDEX: 34.67 KG/M2 | TEMPERATURE: 97 F | HEART RATE: 87 BPM

## 2022-06-22 DIAGNOSIS — E11.59 TYPE 2 DIABETES MELLITUS WITH OTHER CIRCULATORY COMPLICATION, WITH LONG-TERM CURRENT USE OF INSULIN (HCC): Primary | ICD-10-CM

## 2022-06-22 DIAGNOSIS — Z79.4 TYPE 2 DIABETES MELLITUS WITH OTHER CIRCULATORY COMPLICATION, WITH LONG-TERM CURRENT USE OF INSULIN (HCC): Primary | ICD-10-CM

## 2022-06-22 PROCEDURE — 1036F TOBACCO NON-USER: CPT | Performed by: FAMILY MEDICINE

## 2022-06-22 PROCEDURE — G8427 DOCREV CUR MEDS BY ELIG CLIN: HCPCS | Performed by: FAMILY MEDICINE

## 2022-06-22 PROCEDURE — 2022F DILAT RTA XM EVC RTNOPTHY: CPT | Performed by: FAMILY MEDICINE

## 2022-06-22 PROCEDURE — 1123F ACP DISCUSS/DSCN MKR DOCD: CPT | Performed by: FAMILY MEDICINE

## 2022-06-22 PROCEDURE — 99213 OFFICE O/P EST LOW 20 MIN: CPT | Performed by: FAMILY MEDICINE

## 2022-06-22 PROCEDURE — G8417 CALC BMI ABV UP PARAM F/U: HCPCS | Performed by: FAMILY MEDICINE

## 2022-06-22 PROCEDURE — 3046F HEMOGLOBIN A1C LEVEL >9.0%: CPT | Performed by: FAMILY MEDICINE

## 2022-06-22 PROCEDURE — 3017F COLORECTAL CA SCREEN DOC REV: CPT | Performed by: FAMILY MEDICINE

## 2022-06-22 ASSESSMENT — PATIENT HEALTH QUESTIONNAIRE - PHQ9
2. FEELING DOWN, DEPRESSED OR HOPELESS: 0
SUM OF ALL RESPONSES TO PHQ QUESTIONS 1-9: 0
SUM OF ALL RESPONSES TO PHQ QUESTIONS 1-9: 0
1. LITTLE INTEREST OR PLEASURE IN DOING THINGS: 0
SUM OF ALL RESPONSES TO PHQ9 QUESTIONS 1 & 2: 0
SUM OF ALL RESPONSES TO PHQ QUESTIONS 1-9: 0
SUM OF ALL RESPONSES TO PHQ QUESTIONS 1-9: 0

## 2022-06-22 NOTE — PROGRESS NOTES
[address] [facility]  [address] [id number] [place] [address]  Dept: [phone]  Dept Fax: [phone]  Loc: [phone]      Subjective:     Chief Complaint   Patient presents with    Other     need diabetic foot exam and new order for diabetic shoes       HPI:  Román Best is a 74 y.o. male presents today for diabetic foot exam. He is requesting a Rx for a new pair of diabetic shoes. He has DPN. His diabetic shoe is worn out    ROS:   Review of Systems   Constitutional: Negative. HENT: Negative. Eyes: Negative. Respiratory: Negative. Cardiovascular: Negative. Gastrointestinal: Positive for diarrhea. Negative for abdominal pain. Genitourinary: Negative. Musculoskeletal: Positive for arthralgias (s/p R knee replacement). Neurological: Negative. Hematological: Negative. Psychiatric/Behavioral: Negative. PMHx:  Past Medical History:   Diagnosis Date    Aneurysm ([address]     abdominal (pt states NOT)    Arthritis     Bilateral leg edema     CAD (coronary artery disease)     sees [facility]/dr. russo.     Chronic kidney disease     Stage 4; sees dr. Izabela Calloway Diabetes mellitus, type 2 ([address]     Fatty liver     GERD (gastroesophageal reflux disease)     HTN (hypertension)     Knee pain     Prolonged emergence from general anesthesia     Seasonal allergies     Vitreous hemorrhage ([address] 1/22/2021     Patient Active Problem List   Diagnosis    DM type 2, uncontrolled, with neuropathy ([address]    Essential hypertension    Gastroesophageal reflux disease without esophagitis    Pharyngoesophageal dysphagia    Family history of prostate cancer in father    Change in bowel habits    History of colon polyps    Double vessel coronary artery disease    History of placement of stent in LAD coronary artery    ACS (acute coronary syndrome) ([address]    NSTEMI (non-ST elevated myocardial infarction) ([address]    Chronic kidney disease, stage III (moderate) [facility]    Mixed hyperlipidemia    Morbid obesity ([facility]    Left ventricular dysfunction    Acute renal failure superimposed on stage 4 chronic kidney disease (HCC)    Osteoarthritis of knee    S/P total knee arthroplasty, right    Arthritis of knee, right    Acute blood loss as cause of postoperative anemia       PSHx:  Past Surgical History:   Procedure Laterality Date    CARPAL TUNNEL RELEASE      COLONOSCOPY  11/04/2015    DR Capone: Azra Causey, 5yr recall    COLONOSCOPY N/A 10/11/2016    Dr Lopez-diverticulosis, Sessile serrated AP (-) high grade dysplasia x 1, tubular AP (-) dysplasia x 2, HP x 2--3 yr recall    COLONOSCOPY N/A 12/16/2020    Dr Gilmar Keller, Mod. Diverticulosis, Internal hemorrhoids-Grade 2 & ext hemorrhoids, 3 year recall    CORONARY ANGIOPLASTY WITH STENT PLACEMENT  06/04/2019    AGA to circ    CORONARY ANGIOPLASTY WITH STENT PLACEMENT  05/2018    AGA to osteal LAD    ESOPHAGEAL DILATATION N/A 12/16/2020    Dr Gilmar Keller, empirical [address] fr bougie dilation, (+) GERD    EYE SURGERY      Right eye    PRE-MALIGNANT / Lockie Comfort      Dr. You Donahue Right 3/28/2022    RIGHT KNEE TOTAL ARTHROPLASTY performed by Madeleine Lemos MD at [address]  09/20/2016    Dr Lopez-ramu/dilation over wire, 46 [place]-distal esophageal narrowing, Inna (-)    UPPER GASTROINTESTINAL ENDOSCOPY  09/20/2016    Dr Cyr/dilation over wire, 46 [place]-distal esophageal narrowing, Inna (-)       PFHx:  Family History   Problem Relation Age of Onset    Colon Cancer Mother     Prostate Cancer Father     Other Father         HX of blood clot    Diabetes Sister     Cancer Sister     Diabetes Brother     Colon Polyps Son     Esophageal Cancer Neg Hx     Liver Cancer Neg Hx     Liver Disease Neg Hx     Stomach Cancer Neg Hx     Rectal Cancer Neg Hx        SocialHx:  Social History     Tobacco Use    Smoking status: Never Smoker    Smokeless tobacco: Never Used   Substance Use Topics    Alcohol use: No       Allergies: Allergies   Allergen Reactions    Lipitor [Atorvastatin Calcium] Diarrhea and Other (See Comments)     Dizzy    Tape [place] Just Tape] Hives and Rash       Medications:  Current Outpatient Medications   Medication Sig Dispense Refill    nitroGLYCERIN (NITROSTAT) 0.4 MG SL tablet up to max of 3 total doses. If no relief after 1 dose, call 911. 25 tablet 3    metoprolol tartrate (LOPRESSOR) 50 MG tablet Take 1 tablet by mouth 2 times daily TAKE 1 TABLET BY MOUTH TWICE DAILY 60 tablet 2    metFORMIN (GLUCOPHAGE) 1000 MG tablet TAKE ONE TABLET BY MOUTH TWICE DAILY WITH MEALS 180 tablet 2    fluticasone (FLONASE) 50 MCG/ACT nasal spray 1 spray by Nasal route daily Use 2 spray(s) in each nostril once daily- prn 1 each 2    albuterol sulfate HFA (VENTOLIN HFA) 108 (90 Base) MCG/ACT inhaler Inhale 2 puffs into the lungs every 6 hours as needed for Wheezing 1 each 2    oxyCODONE-acetaminophen (PERCOCET)  MG per tablet Take 1 tablet by mouth 4 times daily as needed.  vitamin D (ERGOCALCIFEROL) 1.25 MG (82008 UT) CAPS capsule Take 1 capsule by mouth once a week 5 capsule 0    gabapentin (NEURONTIN) 300 MG capsule Take 1 capsule by mouth 3 times daily for 5 days.  TAKE 1 CAPSULE BY MOUTH THREE TIMES DAILY 15 capsule 0    ondansetron (ZOFRAN) 4 MG tablet Take 1 tablet by mouth every 8 hours as needed for Nausea or Vomiting 20 tablet 0    prasugrel (EFFIENT) 10 MG TABS Take 1 tablet by mouth once daily 90 tablet 0    KERENDIA 10 MG TABS Take 10 mg by mouth daily       Insulin Lispro-aabc (LYUMJEV KWIKPEN) 200 UNIT/ML SOPN Inject 25-30 Units into the skin 3 times daily as needed (\"5 cc per 15 carbs\") Sliding scale      furosemide (LASIX) 20 MG tablet Take 1 tablet by mouth daily (Patient taking differently: Take 20 mg by mouth daily as needed (swelling) ) 30 tablet 5    aspirin 81 MG chewable tablet CHEW AND SWALLOW 1 TABLET BY MOUTH ONCE DAILY (Patient taking differently: Take 81 mg by mouth daily CHEW AND SWALLOW 1 TABLET BY MOUTH ONCE DAILY) 90 tablet 3    nystatin (MYCOSTATIN) 591870 UNIT/GM powder Apply 3 times daily. (Patient taking differently: Apply topically 3 times daily as needed ) 60 g 1    albuterol (PROVENTIL) (5 MG/ML) 0.5% nebulizer solution Take 1 mL by nebulization every 6 hours as needed for Wheezing 60 mL 0    irbesartan (AVAPRO) 150 MG tablet Take 1 tablet by mouth once daily (Patient taking differently: Take 150 mg by mouth daily ) 90 tablet 0    pantoprazole (PROTONIX) 40 MG tablet Take 1 tablet by mouth every morning (before breakfast) (Patient taking differently: Take 40 mg by mouth 2 times daily ) 90 tablet 3    Insulin Glargine, 1 Unit Dial, (TOUJEO SOLOSTAR) 300 UNIT/ML SOPN Inject 30 Units into the skin daily       INVOKANA 100 MG TABS tablet Take 100 mg by mouth daily      Semaglutide,0.25 or 0.5MG/DOS, (OZEMPIC, 0.25 OR 0.5 MG/DOSE,) 2 MG/1.5ML SOPN Inject 0.25 mg into the skin once a week Mondays      mupirocin (BACTROBAN) 2 % ointment APPLY  OINTMENT TO AFFECTED AREA THREE TIMES DAILY 1 Tube 1    Insulin Pen Needle ([employer] PEN NEEDLES) 31G X 6 MM MISC Dx: E11.9 150 each 3    blood glucose test strips (ACCU-CHEK DUNIA) strip Patient is to test 8 times daily. Dx: E11.9  Please provide patient with Accu check Dunia Plus test strips per his Insurance request 900 each 3    Lancets 30G MISC Use to check blood sugar 8 times daily. E11.9 300 each 5    Alcohol Swabs PADS 1 box by Does not apply route 8 times daily 100 each 5    Blood Glucose Monitoring Suppl (ACURA BLOOD GLUCOSE METER) w/Device KIT Please provide patient with Accu check Dunia Meter per his Insurance request. Dx: E11.9 1 kit 0    ezetimibe (ZETIA) 10 MG tablet Take 1 tablet by mouth once daily 90 tablet 1     No current facility-administered medications for this visit.        Objective: PE:  /68   Pulse 87   Temp 97 °F (36.1 °C)   Ht 6' (1.829 m)   Wt 256 lb (116.1 kg)   SpO2 95%   BMI 34.72 kg/m²   Physical Exam     Visual inspection:  Deformity/amputation: absent  Skin lesions/pre-ulcerative calluses: absent  Edema: right- trace, left- trace    Sensory exam:  Monofilament sensation: abnormal -  (minimum of 5 random plantar locations tested, avoiding callused areas - > 1 area with absence of sensation is + for neuropathy)    Plus at least one of the following:  Pulses: normal,   Pinprick: Impaired  Proprioception: Impaired  Vibration (128 Hz): Absent  Assessment & Plan   Arden Robles was seen today for other. Diagnoses and all orders for this visit:    Type 2 diabetes mellitus with other circulatory complication, with long-term current use of insulin ([facility]  -     Diabetic Shoe        Return in about 3 months (around 9/22/2022) for chronic care management, follow-up recent visit. All questions were answered. Medications, including possible adverse effects, and instructions were reviewed and  understanding was confirmed. Follow-up recommendations, including when to contact or return to office (ie; if symptoms worsen or fail to improve), were discussed and acknowledged.     Electronically signed by Ayaz Thompson MD on 6/22/22 at 4:40 PM CDT

## 2022-06-23 DIAGNOSIS — E78.5 HYPERLIPIDEMIA, UNSPECIFIED HYPERLIPIDEMIA TYPE: ICD-10-CM

## 2022-06-23 RX ORDER — EZETIMIBE 10 MG/1
TABLET ORAL
Qty: 90 TABLET | Refills: 1 | Status: SHIPPED | OUTPATIENT
Start: 2022-06-23 | End: 2022-08-30 | Stop reason: SDUPTHER

## 2022-06-24 NOTE — TELEPHONE ENCOUNTER
Incoming Refill Request      Medication requested (name and dose): JARDIANCE 25 MG     Pharmacy where request should be sent: [facility] [place]     Additional details provided by patient:     Best call back number: [phone]    Does the patient have less than a 3 day supply:  [x] Yes  [] No    Suni Singleton Rep  06/24/22, 11:33 CDT

## 2022-06-27 ASSESSMENT — ENCOUNTER SYMPTOMS
RESPIRATORY NEGATIVE: 1
ABDOMINAL PAIN: 0
EYES NEGATIVE: 1
DIARRHEA: 1

## 2022-07-18 ENCOUNTER — TELEPHONE (OUTPATIENT)
Dept: PODIATRY | Facility: CLINIC | Age: 67
End: 2022-07-18

## 2022-07-18 NOTE — PROGRESS NOTES
[facility] - PODIATRY    Today's Date: 07/19/22    Patient Name: Mick Sims  MRN: [id number]  CSN: [id number]  PCP: Ginny Collazo APRN   Referring Provider: No ref. provider found    SUBJECTIVE     Chief Complaint   Patient presents with   • Follow-up     Ginny Collazo APRN PCP01/15/2022 3 month fu diabetic foot care - PT STATES doing ok, no complaints - pt denies pain - pt presents routine diabetic nail and foot care, 3 month follow up with long nails, no other visible siting    • Diabetes     265mg/dl after eating      HPI: Mick Sims, a 66 y.o.male, comes to clinic as a(n) established patient presenting for diabetic foot exam and complaining of thick fungal toenails. Patient has h/o arthritis, DM2, HTN, Renal Disorder. Patient is IDDM with last stated BG level of 265mg/dl. Notes mild numbness and tingling in feet.  Denies open wounds or sores. States that his toenails are long, thick and discolored. He has trouble caring for them himself. Denies pain today. Taking Effient daily. Relates previous treatment(s) including foot care by podiatry. Denies any constitutional symptoms. No other pedal complaints at this time.    Past Medical History:   Diagnosis Date   • Arthritis    • Diabetes mellitus (HCC)    • Hypertension    • Renal disorder      Past Surgical History:   Procedure Laterality Date   • CARPAL TUNNEL RELEASE     • TOTAL KNEE ARTHROPLASTY Right      Family History   Problem Relation Age of Onset   • Cancer Mother    • Diabetes Sister    • Diabetes Brother    • No Known Problems Brother      Social History     Socioeconomic History   • Marital status:    Tobacco Use   • Smoking status: Never Smoker   • Smokeless tobacco: Never Used   Vaping Use   • Vaping Use: Never used   Substance and Sexual Activity   • Alcohol use: No   • Drug use: Never   • Sexual activity: Defer     Allergies   Allergen Reactions   • Atorvastatin Calcium Unknown - Low Severity   • Latex Hives      Current Outpatient Medications   Medication Sig Dispense Refill   • aspirin 81 MG chewable tablet Chew 81 mg Daily.     • Continuous Blood Gluc  (FreeStyle Fercho 2 [place]) device 1 each Continuous. Use as indicated for glucose monitoring 1 each 1   • Continuous Blood Gluc Sensor (FreeStyle Fercho 2 Sensor) misc 1 each Every 14 (Fourteen) Days. 2 each 11   • empagliflozin (Jardiance) 25 MG tablet tablet Take 1 tablet by mouth Daily. One tablet daily before breakfast 30 tablet 11   • Finerenone (Kerendia) 10 MG tablet Take 1 tablet by mouth Daily. 30 tablet 11   • fluticasone (FLONASE) 50 MCG/ACT nasal spray 1 spray into each nostril 2 (Two) Times a Day As Needed for rhinitis or allergies.     • furosemide (LASIX) 40 MG tablet Take 20 mg by mouth Every Other Day. prn     • gabapentin (NEURONTIN) 300 MG capsule Take 300 mg by mouth 3 (Three) Times a Day.     • Glucagon, rDNA, (Glucagon Emergency) 1 MG kit Inject 1 mg under the skin into the appropriate area as directed 1 (One) Time As Needed (hypoglycemia) for up to 1 dose. 1 each 11   • Insulin Glargine, 2 Unit Dial, (Toujeo Max SoloStar) 300 UNIT/ML solution pen-injector injection 80 units qhs 8 pen 3   • Insulin Lispro-aabc, 1 U Dial, (Lyumjev KwikPen) 100 UNIT/ML solution pen-injector Inject 70 Units under the skin into the appropriate area as directed. Up to 70u with meals     • Insulin Pen Needle (PEN NEEDLES) 32G X 4 MM misc 1 each 4 (Four) Times a Day. Use 4 times per day to inject insulin DX E11.9 400 each 3   • irbesartan (AVAPRO) 150 MG tablet Take 300 mg by mouth Every Night.     • metFORMIN ER (Glucophage XR) 500 MG 24 hr tablet 2 tabs twice daily with meals , generic ok 120 tablet 11   • metoprolol tartrate (LOPRESSOR) 50 MG tablet Take 1 tablet by mouth 2 (Two) Times a Day. 30 tablet 0   • nitroglycerin (NITROSTAT) 0.4 MG SL tablet Place 0.4 mg under the tongue Every 5 (Five) Minutes As Needed for Chest Pain. Take no more than 3 doses in 15  minutes.     • oxyCODONE-acetaminophen (PERCOCET)  MG per tablet Take 1 tablet by mouth Every 6 (Six) Hours As Needed for Moderate Pain .     • PANTOPRAZOLE SODIUM PO Take 40 mg by mouth Daily. Twice daily     • prasugrel (EFFIENT) 10 MG tablet Take 10 mg by mouth Daily.     • Semaglutide, 1 MG/DOSE, (Ozempic, 1 MG/DOSE,) 4 MG/3ML solution pen-injector Inject 1 mg under the skin into the appropriate area as directed 1 (One) Time Per Week. 9 mL 3   • ezetimibe (Zetia) 10 MG tablet Take 1 tablet by mouth Daily. 30 tablet 11   • sildenafil (Viagra) 100 MG tablet Take 1 tablet by mouth As Needed for Erectile Dysfunction. 30 tablet 11     No current facility-administered medications for this visit.     Review of Systems   Constitutional: Negative for chills and fever.   HENT: Negative for congestion.    Respiratory: Negative for shortness of breath.    Cardiovascular: Positive for leg swelling. Negative for chest pain.   Gastrointestinal: Negative for constipation, diarrhea, nausea and vomiting.   Musculoskeletal:        Foot pain   Skin: Negative for wound.   Neurological: Positive for numbness.       OBJECTIVE     Vitals:    07/19/22 0919   BP: 120/68   Pulse: 67   SpO2: 98%       PHYSICAL EXAM  GEN:   Accompanied by none.     Foot/Ankle Exam:       General:   Appearance: appears stated age and healthy    Orientation: AAOx3    Affect: appropriate    Gait: antalgic    Assistance: walker    Shoe Gear:  Casual shoes    VASCULAR      Right Foot Vascularity   Dorsalis pedis:  2+  Posterior tibial:  2+  Skin Temperature: warm    Edema Grading:  None  CFT:  3  Pedal Hair Growth:  Present  Varicosities: mild varicosities       Left Foot Vascularity   Dorsalis pedis:  2+  Posterior tibial:  2+  Skin Temperature: warm    Edema Grading:  None  CFT:  3  Pedal Hair Growth:  Present  Varicosities: mild varicosities        NEUROLOGIC     Right Foot Neurologic   Light touch sensation:  Diminished  Vibratory sensation:   Diminished  Hot/Cold sensation: diminished    Protective Sensation using Massillon-Azar Monofilament:  4     Left Foot Neurologic   Light touch sensation:  Diminished  Vibratory sensation:  Diminished  Hot/cold sensation: diminished    Protective Sensation using Massillon-Azar Monofilament:  4     MUSCULOSKELETAL      Right Foot Musculoskeletal   Ecchymosis:  None  Tenderness: neuroma    Tenderness comment:  Sub 2nd interspace  Arch:  Normal     Left Foot Musculoskeletal   Ecchymosis:  None  Tenderness: none    Arch:  Normal     MUSCLE STRENGTH     Right Foot Muscle Strength   Foot dorsiflexion:  5  Foot plantar flexion:  5  Foot inversion:  5  Foot eversion:  5     Left Foot Muscle Strength   Foot dorsiflexion:  5  Foot plantar flexion:  5  Foot inversion:  5  Foot eversion:  5     RANGE OF MOTION      Right Foot Range of Motion   Foot and ankle ROM within normal limits       Left Foot Range of Motion   Foot and ankle ROM within normal limits       DERMATOLOGIC     Right Foot Dermatologic   Skin: skin intact    Nails: onychomycosis, abnormally thick, subungual debris and dystrophic nails    Nails comment:  1-5     Left Foot Dermatologic   Skin: skin intact    Nails: onychomycosis, abnormally thick, subungual debris and dystrophic nails    Nails comment:  1-5      RADIOLOGY/NUCLEAR:  No results found.    LABORATORY/CULTURE RESULTS:      PATHOLOGY RESULTS:       ASSESSMENT/PLAN     Diagnoses and all orders for this visit:    1. Onychomycosis (Primary)    2. Type 2 diabetes mellitus with diabetic neuropathy, with long-term current use of insulin (HCC)    3. Peripheral edema    4. Antiplatelet or antithrombotic long-term use      Comprehensive lower extremity examination and evaluation was performed.  Discussed findings and treatment plan including risks, benefits, and treatment options with patient in detail. Patient agreed with treatment plan.  After verbal consent obtained, nail(s) x10 debrided of length and  thickness with nail nipper without incidence  Patient may maintain nails and calluses at home utilizing emery board or pumice stone between visits as needed  Reviewed at home diabetic foot care including daily foot checks   Continue daily use of compression stockings and elevate extremities at rest.  Continue diabetic monitoring and control under direction of PCP.   An After Visit Summary was printed and given to the patient at discharge, including (if requested) any available informative/educational handouts regarding diagnosis, treatment, or medications. All questions were answered to patient/family satisfaction. Should symptoms fail to improve or worsen they agree to call or return to clinic or to go to the Emergency Department. Discussed the importance of following up with any needed screening tests/labs/specialist appointments and any requested follow-up recommended by me today. Importance of maintaining follow-up discussed and patient accepts that missed appointments can delay diagnosis and potentially lead to worsening of conditions.  Return in about 3 months (around 10/19/2022)., or sooner if acute issues arise.        This document has been electronically signed by Mehdi Valencia DPM on July 19, 2022 09:40 CDT

## 2022-07-19 ENCOUNTER — OFFICE VISIT (OUTPATIENT)
Dept: PODIATRY | Facility: CLINIC | Age: 67
End: 2022-07-19

## 2022-07-19 ENCOUNTER — PATIENT ROUNDING (BHMG ONLY) (OUTPATIENT)
Dept: PODIATRY | Facility: CLINIC | Age: 67
End: 2022-07-19

## 2022-07-19 VITALS
HEIGHT: 72 IN | BODY MASS INDEX: 34.54 KG/M2 | OXYGEN SATURATION: 98 % | SYSTOLIC BLOOD PRESSURE: 120 MMHG | WEIGHT: 255 LBS | DIASTOLIC BLOOD PRESSURE: 68 MMHG | HEART RATE: 67 BPM

## 2022-07-19 DIAGNOSIS — E11.40 TYPE 2 DIABETES MELLITUS WITH DIABETIC NEUROPATHY, WITH LONG-TERM CURRENT USE OF INSULIN: ICD-10-CM

## 2022-07-19 DIAGNOSIS — Z79.4 TYPE 2 DIABETES MELLITUS WITH DIABETIC NEUROPATHY, WITH LONG-TERM CURRENT USE OF INSULIN: ICD-10-CM

## 2022-07-19 DIAGNOSIS — R60.9 PERIPHERAL EDEMA: ICD-10-CM

## 2022-07-19 DIAGNOSIS — Z79.02 ANTIPLATELET OR ANTITHROMBOTIC LONG-TERM USE: ICD-10-CM

## 2022-07-19 DIAGNOSIS — B35.1 ONYCHOMYCOSIS: Primary | ICD-10-CM

## 2022-07-19 PROCEDURE — 11721 DEBRIDE NAIL 6 OR MORE: CPT | Performed by: PODIATRIST

## 2022-07-19 NOTE — PROGRESS NOTES
July 19, 2022    Hello, may I speak with Mick Sims?    My name is Imelda      I am  with [facility]  [address], [address]  [place] [address] [phone].    Before we get started may I verify your date of birth? 1955    I am calling to officially welcome you to our practice and ask about your recent visit. Is this a good time to talk? yes    Tell me about your visit with us. What things went well?  Visit went very well!       We're always looking for ways to make our patients' experiences even better. Do you have recommendations on ways we may improve?  no    Overall were you satisfied with your first visit to our practice? Yes, it was good.       I appreciate you taking the time to speak with me today. Is there anything else I can do for you? no      Thank you, and have a great day.

## 2022-08-04 DIAGNOSIS — Z76.0 MEDICATION REFILL: ICD-10-CM

## 2022-08-04 RX ORDER — PRASUGREL 10 MG/1
TABLET, FILM COATED ORAL
Qty: 90 TABLET | Refills: 0 | Status: SHIPPED | OUTPATIENT
Start: 2022-08-04 | End: 2022-08-30 | Stop reason: SDUPTHER

## 2022-08-09 ENCOUNTER — OFFICE VISIT (OUTPATIENT)
Dept: ENDOCRINOLOGY | Facility: CLINIC | Age: 67
End: 2022-08-09

## 2022-08-09 ENCOUNTER — LAB (OUTPATIENT)
Dept: LAB | Facility: HOSPITAL | Age: 67
End: 2022-08-09

## 2022-08-09 VITALS
DIASTOLIC BLOOD PRESSURE: 60 MMHG | HEART RATE: 78 BPM | HEIGHT: 72 IN | OXYGEN SATURATION: 97 % | SYSTOLIC BLOOD PRESSURE: 130 MMHG | BODY MASS INDEX: 35.21 KG/M2 | WEIGHT: 260 LBS

## 2022-08-09 DIAGNOSIS — N18.31 CHRONIC KIDNEY DISEASE (CKD) STAGE G3A/A2, MODERATELY DECREASED GLOMERULAR FILTRATION RATE (GFR) BETWEEN 45-59 ML/MIN/1.73 SQUARE METER AND ALBUMINURIA CREATININE RATIO BETWEEN 30-299 MG/G (CMS*: ICD-10-CM

## 2022-08-09 DIAGNOSIS — Z79.4 TYPE 2 DIABETES MELLITUS WITH HYPERGLYCEMIA, WITH LONG-TERM CURRENT USE OF INSULIN: Primary | ICD-10-CM

## 2022-08-09 DIAGNOSIS — I15.2 HYPERTENSION ASSOCIATED WITH DIABETES: ICD-10-CM

## 2022-08-09 DIAGNOSIS — E11.65 TYPE 2 DIABETES MELLITUS WITH HYPERGLYCEMIA, WITH LONG-TERM CURRENT USE OF INSULIN: ICD-10-CM

## 2022-08-09 DIAGNOSIS — E11.59 HYPERTENSION ASSOCIATED WITH DIABETES: ICD-10-CM

## 2022-08-09 DIAGNOSIS — E11.69 MIXED DIABETIC HYPERLIPIDEMIA ASSOCIATED WITH TYPE 2 DIABETES MELLITUS: ICD-10-CM

## 2022-08-09 DIAGNOSIS — E78.2 MIXED DIABETIC HYPERLIPIDEMIA ASSOCIATED WITH TYPE 2 DIABETES MELLITUS: ICD-10-CM

## 2022-08-09 DIAGNOSIS — E11.65 TYPE 2 DIABETES MELLITUS WITH HYPERGLYCEMIA, WITH LONG-TERM CURRENT USE OF INSULIN: Primary | ICD-10-CM

## 2022-08-09 DIAGNOSIS — E55.9 VITAMIN D DEFICIENCY: ICD-10-CM

## 2022-08-09 DIAGNOSIS — Z79.4 TYPE 2 DIABETES MELLITUS WITH HYPERGLYCEMIA, WITH LONG-TERM CURRENT USE OF INSULIN: ICD-10-CM

## 2022-08-09 LAB
25(OH)D3 SERPL-MCNC: 32.9 NG/ML (ref 30–100)
ALBUMIN SERPL-MCNC: 4.3 G/DL (ref 3.5–5.2)
ALBUMIN UR-MCNC: 4.4 MG/DL
ALBUMIN/GLOB SERPL: 1.6 G/DL
ALP SERPL-CCNC: 93 U/L (ref 39–117)
ALT SERPL W P-5'-P-CCNC: 12 U/L (ref 1–41)
ANION GAP SERPL CALCULATED.3IONS-SCNC: 11 MMOL/L (ref 5–15)
AST SERPL-CCNC: 18 U/L (ref 1–40)
BASOPHILS # BLD AUTO: 0.08 10*3/MM3 (ref 0–0.2)
BASOPHILS NFR BLD AUTO: 1.2 % (ref 0–1.5)
BILIRUB SERPL-MCNC: 0.3 MG/DL (ref 0–1.2)
BUN SERPL-MCNC: 25 MG/DL (ref 8–23)
BUN/CREAT SERPL: 15.6 (ref 7–25)
CALCIUM SPEC-SCNC: 9.9 MG/DL (ref 8.6–10.5)
CHLORIDE SERPL-SCNC: 104 MMOL/L (ref 98–107)
CHOLEST SERPL-MCNC: 171 MG/DL (ref 0–200)
CO2 SERPL-SCNC: 25 MMOL/L (ref 22–29)
CREAT SERPL-MCNC: 1.6 MG/DL (ref 0.76–1.27)
CREAT UR-MCNC: 59.5 MG/DL
DEPRECATED RDW RBC AUTO: 40.9 FL (ref 37–54)
EGFRCR SERPLBLD CKD-EPI 2021: 46.6 ML/MIN/1.73
EOSINOPHIL # BLD AUTO: 0.21 10*3/MM3 (ref 0–0.4)
EOSINOPHIL NFR BLD AUTO: 3.1 % (ref 0.3–6.2)
ERYTHROCYTE [DISTWIDTH] IN BLOOD BY AUTOMATED COUNT: 14 % (ref 12.3–15.4)
GLOBULIN UR ELPH-MCNC: 2.7 GM/DL
GLUCOSE SERPL-MCNC: 166 MG/DL (ref 65–99)
HBA1C MFR BLD: 10.1 % (ref 4.8–5.6)
HCT VFR BLD AUTO: 45.9 % (ref 37.5–51)
HDLC SERPL-MCNC: 27 MG/DL (ref 40–60)
HGB BLD-MCNC: 14.4 G/DL (ref 13–17.7)
IMM GRANULOCYTES # BLD AUTO: 0.03 10*3/MM3 (ref 0–0.05)
IMM GRANULOCYTES NFR BLD AUTO: 0.4 % (ref 0–0.5)
LDLC SERPL CALC-MCNC: 102 MG/DL (ref 0–100)
LDLC/HDLC SERPL: 3.52 {RATIO}
LYMPHOCYTES # BLD AUTO: 2.05 10*3/MM3 (ref 0.7–3.1)
LYMPHOCYTES NFR BLD AUTO: 30.5 % (ref 19.6–45.3)
MCH RBC QN AUTO: 25.4 PG (ref 26.6–33)
MCHC RBC AUTO-ENTMCNC: 31.4 G/DL (ref 31.5–35.7)
MCV RBC AUTO: 81 FL (ref 79–97)
MICROALBUMIN/CREAT UR: 73.9 MG/G
MONOCYTES # BLD AUTO: 0.43 10*3/MM3 (ref 0.1–0.9)
MONOCYTES NFR BLD AUTO: 6.4 % (ref 5–12)
NEUTROPHILS NFR BLD AUTO: 3.92 10*3/MM3 (ref 1.7–7)
NEUTROPHILS NFR BLD AUTO: 58.4 % (ref 42.7–76)
NRBC BLD AUTO-RTO: 0 /100 WBC (ref 0–0.2)
PLATELET # BLD AUTO: 230 10*3/MM3 (ref 140–450)
PMV BLD AUTO: 12.2 FL (ref 6–12)
POTASSIUM SERPL-SCNC: 4.4 MMOL/L (ref 3.5–5.2)
PROT SERPL-MCNC: 7 G/DL (ref 6–8.5)
RBC # BLD AUTO: 5.67 10*6/MM3 (ref 4.14–5.8)
SODIUM SERPL-SCNC: 140 MMOL/L (ref 136–145)
TRIGL SERPL-MCNC: 245 MG/DL (ref 0–150)
TSH SERPL DL<=0.05 MIU/L-ACNC: 2.65 UIU/ML (ref 0.27–4.2)
VIT B12 BLD-MCNC: 437 PG/ML (ref 211–946)
VLDLC SERPL-MCNC: 42 MG/DL (ref 5–40)
WBC NRBC COR # BLD: 6.72 10*3/MM3 (ref 3.4–10.8)

## 2022-08-09 PROCEDURE — 99214 OFFICE O/P EST MOD 30 MIN: CPT | Performed by: INTERNAL MEDICINE

## 2022-08-09 PROCEDURE — 82570 ASSAY OF URINE CREATININE: CPT

## 2022-08-09 PROCEDURE — 95251 CONT GLUC MNTR ANALYSIS I&R: CPT | Performed by: INTERNAL MEDICINE

## 2022-08-09 PROCEDURE — 82043 UR ALBUMIN QUANTITATIVE: CPT

## 2022-08-09 PROCEDURE — 84443 ASSAY THYROID STIM HORMONE: CPT

## 2022-08-09 PROCEDURE — 82306 VITAMIN D 25 HYDROXY: CPT

## 2022-08-09 PROCEDURE — 36415 COLL VENOUS BLD VENIPUNCTURE: CPT

## 2022-08-09 PROCEDURE — 80053 COMPREHEN METABOLIC PANEL: CPT

## 2022-08-09 PROCEDURE — 80061 LIPID PANEL: CPT

## 2022-08-09 PROCEDURE — 83036 HEMOGLOBIN GLYCOSYLATED A1C: CPT

## 2022-08-09 PROCEDURE — 85025 COMPLETE CBC W/AUTO DIFF WBC: CPT

## 2022-08-09 PROCEDURE — 82607 VITAMIN B-12: CPT

## 2022-08-09 RX ORDER — INSULIN GLARGINE 300 U/ML
INJECTION, SOLUTION SUBCUTANEOUS
Qty: 8 PEN | Refills: 3 | Status: SHIPPED | OUTPATIENT
Start: 2022-08-09 | End: 2022-11-17 | Stop reason: SDUPTHER

## 2022-08-09 RX ORDER — SEMAGLUTIDE 2.68 MG/ML
2 INJECTION, SOLUTION SUBCUTANEOUS WEEKLY
Qty: 3 PEN | Refills: 3 | Status: SHIPPED | OUTPATIENT
Start: 2022-08-09 | End: 2022-11-17 | Stop reason: SDUPTHER

## 2022-08-09 RX ORDER — INSULIN LISPRO-AABC 100 [IU]/ML
70 INJECTION, SOLUTION SUBCUTANEOUS
Qty: 21 PEN | Refills: 11 | Status: SHIPPED | OUTPATIENT
Start: 2022-08-09 | End: 2022-11-17 | Stop reason: SDUPTHER

## 2022-08-09 NOTE — PROGRESS NOTES
" Mick Sims is a 66 y.o. male who presents for follow up Type 2 diabetes      HPI    T2DM    Duration 10 years    Complications - nephropathy, retinopathy, peripheral neuropathy and cardiovascular disease    Current symptoms/problems  paresthesia of the feet and visual disturbances , ED     Alleviating Factors: Compliance       Current monitoring regimen: home blood tests - using fercho       PE    /60   Pulse 78   Ht 182.9 cm (72\")   Wt 118 kg (260 lb)   SpO2 97%   BMI 35.26 kg/m²   AOx3  No visible goiter  RRR  CTA  No Edema    Labs    Lab Results   Component Value Date    WBC 6.72 08/09/2022    HGB 14.4 08/09/2022    HCT 45.9 08/09/2022    MCV 81.0 08/09/2022     08/09/2022     Lab Results   Component Value Date    GLUCOSE 166 (H) 08/09/2022    BUN 25 (H) 08/09/2022    CREATININE 1.60 (H) 08/09/2022    EGFRIFNONA 40 (A) 04/01/2022    EGFRIFAFRI 49 (L) 04/01/2022    BCR 15.6 08/09/2022    K 4.4 08/09/2022    CO2 25.0 08/09/2022    CALCIUM 9.9 08/09/2022    ALBUMIN 4.30 08/09/2022    AST 18 08/09/2022    ALT 12 08/09/2022               Assessment & Plan       ICD-10-CM ICD-9-CM   1. Type 2 diabetes mellitus with hyperglycemia, with long-term current use of insulin ([facility])  E11.65 250.00    Z79.4 790.29     V58.67   2. Hypertension associated with diabetes ([facility])  E11.59 250.80    I15.2 401.9   3. Mixed diabetic hyperlipidemia associated with type 2 diabetes mellitus ([facility])  E11.69 250.80    E78.2 272.2   4. Chronic kidney disease (CKD) stage G3a/A2, moderately decreased glomerular filtration rate (GFR) between 45-59 mL/min/1.73 square meter and albuminuria creatinine ratio between  mg/g (CMS* ([facility])  N18.31 585.3       Pt has type 2 diabetes w ckd and cad     Glycemic Management:   Lab Results   Component Value Date    HGBA1C 10.10 (H) 08/09/2022    HGBA1C 9.0 (H) 03/24/2022    HGBA1C 8.82 (H) 12/07/2021       Fercho 2 weeks reviewed  t2dm w hyperglycemia     2 week review    In range 32%- " Writer called patient's social work to discuss booking follow up. Mariana expressed she was attempting to book surgery not follow up appt. Please advise discussing surgery dates with  Mariana.     Please contact Mariana to schedule surgery at [phone]   "47%  No lows   Best control is overnight      toujeo 40 -- now lantus 30 qam - toujeo 40   Now doing 30 -  Increase to 36     ========================================    Ozempic      0.5 mg weekly ---- increase to 1 mg weekly , having trouble affording but willing to pay doing only half   Now increase to 1 mg    can increase to 2 mg   ============================================    Metformin 1000 mg twice a day ===   Change to xr   ============================================      Invokana 100mg  daily  - gave farxiga samples   This time jardiance samples     ============================================    Humalog     5 units per 15 grams , he understands well - INCREASE TO 7 UNITS PER CARB     May need more     Still eating too much , needs more insulin particularly w supper or less food         ======================  HTN  /60   Pulse 78   Ht 182.9 cm (72\")   Wt 118 kg (260 lb)   SpO2 97%   BMI 35.26 kg/m²       bp controlled on irbesartan now 300  hctz stopped due to hypercalcemia now corrected  He was also not taking any calcium supplements and avoiding dietary - he can resume  Obtain PTH  And calcium    ===========  Lipids controlled on lipitor but allergic, change to zetia and this worked  Lab Results   Component Value Date    CHOL 171 08/09/2022    CHLPL 177 12/13/2019    TRIG 245 (H) 08/09/2022    HDL 27 (L) 08/09/2022     (H) 08/09/2022      Microvascular complications     Renal     CKD stage 3a/A2  Add kerendia 10 mg daily ( already on sglt2 and ARB )    Eyes  Yes retinopathy     Neuropathy   Yes on gabapentin       Vit D Def  Vit D low, start 1000 units daily     This document has been electronically signed by Juancho Mobley MD on August 15, 2022 06:49 CDT          A copy of my note was sent to Ginny Collazo APRN    Please see my above opinion and suggestions.             This document has been electronically signed by Juancho Mobley MD on September 24, " 2022 04:57 CDT

## 2022-08-29 DIAGNOSIS — J30.2 SEASONAL ALLERGIES: ICD-10-CM

## 2022-08-29 RX ORDER — FLUTICASONE PROPIONATE 50 MCG
SPRAY, SUSPENSION (ML) NASAL
Qty: 16 G | Refills: 0 | Status: SHIPPED | OUTPATIENT
Start: 2022-08-29 | End: 2022-09-27 | Stop reason: SDUPTHER

## 2022-08-29 NOTE — TELEPHONE ENCOUNTER
Erin Micheal called to request a refill on his medication.       Last office visit : 6/22/2022   Next office visit : 8/30/2022     Requested Prescriptions     Pending Prescriptions Disp Refills    fluticasone (FLONASE) 50 MCG/ACT nasal spray [Pharmacy Med Name: Fluticasone Propionate 50 MCG/ACT Nasal Suspension] 16 g 0     Sig: USE 2 SPRAY(S) IN EACH NOSTRIL ONCE DAILY AS NEEDED            Artist ANGELICA Ferrell

## 2022-08-30 ENCOUNTER — OFFICE VISIT (OUTPATIENT)
Dept: PRIMARY CARE CLINIC | Age: 67
End: 2022-08-30
Payer: MEDICARE

## 2022-08-30 VITALS
TEMPERATURE: 96.9 F | SYSTOLIC BLOOD PRESSURE: 122 MMHG | DIASTOLIC BLOOD PRESSURE: 72 MMHG | HEIGHT: 72 IN | OXYGEN SATURATION: 98 % | BODY MASS INDEX: 35.62 KG/M2 | HEART RATE: 73 BPM | WEIGHT: 263 LBS

## 2022-08-30 DIAGNOSIS — Z76.0 MEDICATION REFILL: ICD-10-CM

## 2022-08-30 DIAGNOSIS — I10 ESSENTIAL HYPERTENSION: ICD-10-CM

## 2022-08-30 DIAGNOSIS — G62.9 NEUROPATHY: ICD-10-CM

## 2022-08-30 DIAGNOSIS — E78.5 HYPERLIPIDEMIA, UNSPECIFIED HYPERLIPIDEMIA TYPE: ICD-10-CM

## 2022-08-30 DIAGNOSIS — E11.59 TYPE 2 DIABETES MELLITUS WITH OTHER CIRCULATORY COMPLICATION, WITH LONG-TERM CURRENT USE OF INSULIN (HCC): ICD-10-CM

## 2022-08-30 DIAGNOSIS — I25.10 CORONARY ARTERY DISEASE INVOLVING NATIVE HEART WITHOUT ANGINA PECTORIS, UNSPECIFIED VESSEL OR LESION TYPE: ICD-10-CM

## 2022-08-30 DIAGNOSIS — Z79.4 TYPE 2 DIABETES MELLITUS WITH OTHER CIRCULATORY COMPLICATION, WITH LONG-TERM CURRENT USE OF INSULIN (HCC): ICD-10-CM

## 2022-08-30 PROCEDURE — 1123F ACP DISCUSS/DSCN MKR DOCD: CPT | Performed by: FAMILY MEDICINE

## 2022-08-30 PROCEDURE — G8417 CALC BMI ABV UP PARAM F/U: HCPCS | Performed by: FAMILY MEDICINE

## 2022-08-30 PROCEDURE — 1036F TOBACCO NON-USER: CPT | Performed by: FAMILY MEDICINE

## 2022-08-30 PROCEDURE — 99214 OFFICE O/P EST MOD 30 MIN: CPT | Performed by: FAMILY MEDICINE

## 2022-08-30 PROCEDURE — G8427 DOCREV CUR MEDS BY ELIG CLIN: HCPCS | Performed by: FAMILY MEDICINE

## 2022-08-30 PROCEDURE — 2022F DILAT RTA XM EVC RTNOPTHY: CPT | Performed by: FAMILY MEDICINE

## 2022-08-30 PROCEDURE — 3017F COLORECTAL CA SCREEN DOC REV: CPT | Performed by: FAMILY MEDICINE

## 2022-08-30 PROCEDURE — 3046F HEMOGLOBIN A1C LEVEL >9.0%: CPT | Performed by: FAMILY MEDICINE

## 2022-08-30 RX ORDER — METOPROLOL TARTRATE 50 MG/1
50 TABLET, FILM COATED ORAL 2 TIMES DAILY
Qty: 180 TABLET | Refills: 1 | Status: SHIPPED | OUTPATIENT
Start: 2022-08-30 | End: 2022-09-27 | Stop reason: SDUPTHER

## 2022-08-30 RX ORDER — EZETIMIBE 10 MG/1
TABLET ORAL
Qty: 90 TABLET | Refills: 1 | Status: SHIPPED | OUTPATIENT
Start: 2022-08-30 | End: 2022-09-27 | Stop reason: SDUPTHER

## 2022-08-30 RX ORDER — IRBESARTAN 150 MG/1
150 TABLET ORAL DAILY
Qty: 90 TABLET | Refills: 1 | Status: SHIPPED | OUTPATIENT
Start: 2022-08-30 | End: 2022-09-27 | Stop reason: SDUPTHER

## 2022-08-30 RX ORDER — PRASUGREL 10 MG/1
TABLET, FILM COATED ORAL
Qty: 90 TABLET | Refills: 1 | Status: SHIPPED | OUTPATIENT
Start: 2022-08-30 | End: 2022-09-30

## 2022-08-30 RX ORDER — ASPIRIN 81 MG/1
TABLET, CHEWABLE ORAL
Qty: 90 TABLET | Refills: 1 | Status: SHIPPED | OUTPATIENT
Start: 2022-08-30 | End: 2022-09-27 | Stop reason: SDUPTHER

## 2022-08-30 RX ORDER — GABAPENTIN 300 MG/1
300 CAPSULE ORAL 3 TIMES DAILY
Qty: 90 CAPSULE | Refills: 1 | Status: SHIPPED | OUTPATIENT
Start: 2022-08-30 | End: 2022-09-27 | Stop reason: SDUPTHER

## 2022-08-30 RX ORDER — NYSTATIN 100000 [USP'U]/G
POWDER TOPICAL 3 TIMES DAILY PRN
Qty: 60 G | Refills: 1 | Status: SHIPPED | OUTPATIENT
Start: 2022-08-30

## 2022-08-30 RX ORDER — PANTOPRAZOLE SODIUM 40 MG/1
40 TABLET, DELAYED RELEASE ORAL
Qty: 90 TABLET | Refills: 2 | Status: SHIPPED | OUTPATIENT
Start: 2022-08-30 | End: 2022-09-27 | Stop reason: SDUPTHER

## 2022-08-30 SDOH — ECONOMIC STABILITY: FOOD INSECURITY: WITHIN THE PAST 12 MONTHS, THE FOOD YOU BOUGHT JUST DIDN'T LAST AND YOU DIDN'T HAVE MONEY TO GET MORE.: NEVER TRUE

## 2022-08-30 SDOH — ECONOMIC STABILITY: FOOD INSECURITY: WITHIN THE PAST 12 MONTHS, YOU WORRIED THAT YOUR FOOD WOULD RUN OUT BEFORE YOU GOT MONEY TO BUY MORE.: NEVER TRUE

## 2022-08-30 ASSESSMENT — ENCOUNTER SYMPTOMS
RESPIRATORY NEGATIVE: 1
GASTROINTESTINAL NEGATIVE: 1
EYES NEGATIVE: 1

## 2022-08-30 ASSESSMENT — SOCIAL DETERMINANTS OF HEALTH (SDOH): HOW HARD IS IT FOR YOU TO PAY FOR THE VERY BASICS LIKE FOOD, HOUSING, MEDICAL CARE, AND HEATING?: NOT HARD AT ALL

## 2022-08-30 NOTE — PROGRESS NOTES
[address] PRIMARY CARE  [address] [facility] [address] [address]  Dept: [phone]  Dept Fax: [phone]  Loc: [phone]      Subjective:     No chief complaint on file. HPI:  Jordan Vernon is a 74 y.o. male presents today for routine follow-up,  He is a diabetic - still poorly controlled as of this month. His last A1c drawn at [facility] was 10.10%  He states that he is still struggling with his R knee where he is s/p TKR 5 months ago. He states he gets sob when he tries to walk too long. No chest pains reported. BP looks good today. Overall, he states he feel well and looking forward to a vacation in the next few months. Pt requesting refills on most of his meds. ROS:   Review of Systems   Constitutional: Negative. HENT: Negative. Eyes: Negative. Respiratory: Negative. Cardiovascular: Negative. Gastrointestinal: Negative. Genitourinary: Negative. Musculoskeletal:  Positive for arthralgias (s/p R knee replacement). Skin: Negative. Neurological: Negative. Hematological: Negative. Psychiatric/Behavioral: Negative. PMHx:  Past Medical History:   Diagnosis Date    Aneurysm ([address]     abdominal (pt states NOT)    Arthritis     Bilateral leg edema     CAD (coronary artery disease)     sees [facility]/dr. russo.     Chronic kidney disease     Stage 4; sees dr. Melton Primus    Diabetes mellitus, type 2 ([address]     Fatty liver     GERD (gastroesophageal reflux disease)     HTN (hypertension)     Knee pain     Prolonged emergence from general anesthesia     Seasonal allergies     Vitreous hemorrhage ([address] 1/22/2021     Patient Active Problem List   Diagnosis    DM type 2, uncontrolled, with neuropathy ([address]    Essential hypertension    Gastroesophageal reflux disease without esophagitis    Pharyngoesophageal dysphagia    Family history of prostate cancer in father    Change in bowel habits    History of colon polyps Esophageal Cancer Neg Hx     Liver Cancer Neg Hx     Liver Disease Neg Hx     Stomach Cancer Neg Hx     Rectal Cancer Neg Hx        SocialHx:  Social History     Tobacco Use    Smoking status: Never    Smokeless tobacco: Never   Substance Use Topics    Alcohol use: No       Allergies: Allergies   Allergen Reactions    Lipitor [Atorvastatin Calcium] Diarrhea and Other (See Comments)     Dizzy    Tape Elo Marcellusger Tape] Hives and Rash       Medications:  Current Outpatient Medications   Medication Sig Dispense Refill    pantoprazole (PROTONIX) 40 MG tablet Take 1 tablet by mouth every morning (before breakfast) 90 tablet 2    gabapentin (NEURONTIN) 300 MG capsule Take 1 capsule by mouth 3 times daily for 60 days. TAKE 1 CAPSULE BY MOUTH THREE TIMES DAILY 90 capsule 1    metoprolol tartrate (LOPRESSOR) 50 MG tablet Take 1 tablet by mouth 2 times daily TAKE 1 TABLET BY MOUTH TWICE DAILY 180 tablet 1    irbesartan (AVAPRO) 150 MG tablet Take 1 tablet by mouth daily Take 1 tablet by mouth once daily 90 tablet 1    aspirin 81 MG chewable tablet CHEW AND SWALLOW 1 TABLET BY MOUTH ONCE DAILY 90 tablet 1    ezetimibe (ZETIA) 10 MG tablet Take 1 tablet by mouth once daily 90 tablet 1    prasugrel (EFFIENT) 10 MG TABS Take 1 tablet by mouth once daily 90 tablet 1    nystatin (MYCOSTATIN) 797819 UNIT/GM powder Apply topically 3 times daily as needed (itchy rash) 60 g 1    metFORMIN (GLUCOPHAGE) 1000 MG tablet TAKE ONE TABLET BY MOUTH TWICE DAILY WITH MEALS 180 tablet 1    fluticasone (FLONASE) 50 MCG/ACT nasal spray USE 2 SPRAY(S) IN EACH NOSTRIL ONCE DAILY AS NEEDED 16 g 0    nitroGLYCERIN (NITROSTAT) 0.4 MG SL tablet up to max of 3 total doses.  If no relief after 1 dose, call 911. 25 tablet 3    albuterol sulfate HFA (VENTOLIN HFA) 108 (90 Base) MCG/ACT inhaler Inhale 2 puffs into the lungs every 6 hours as needed for Wheezing 1 each 2    oxyCODONE-acetaminophen (PERCOCET)  MG per tablet Take 1 tablet by mouth 4 times daily as needed. vitamin D (ERGOCALCIFEROL) 1.25 MG (39731 UT) CAPS capsule Take 1 capsule by mouth once a week 5 capsule 0    ondansetron (ZOFRAN) 4 MG tablet Take 1 tablet by mouth every 8 hours as needed for Nausea or Vomiting 20 tablet 0    KERENDIA 10 MG TABS Take 10 mg by mouth daily       Insulin Lispro-aabc (LYUMJEV KWIKPEN) 200 UNIT/ML SOPN Inject 25-30 Units into the skin 3 times daily as needed (\"5 cc per 15 carbs\") Sliding scale      furosemide (LASIX) 20 MG tablet Take 1 tablet by mouth daily (Patient taking differently: Take 20 mg by mouth daily as needed (swelling)) 30 tablet 5    albuterol (PROVENTIL) (5 MG/ML) 0.5% nebulizer solution Take 1 mL by nebulization every 6 hours as needed for Wheezing 60 mL 0    Insulin Glargine, 1 Unit Dial, (TOUJEO SOLOSTAR) 300 UNIT/ML SOPN Inject 30 Units into the skin daily       INVOKANA 100 MG TABS tablet Take 100 mg by mouth daily      Semaglutide,0.25 or 0.5MG/DOS, 2 MG/1.5ML SOPN Inject 0.25 mg into the skin once a week Mondays      mupirocin (BACTROBAN) 2 % ointment APPLY  OINTMENT TO AFFECTED AREA THREE TIMES DAILY 1 Tube 1    Insulin Pen Needle ([employer] PEN NEEDLES) 31G X 6 MM MISC Dx: E11.9 150 each 3    blood glucose test strips (ACCU-CHEK DUNIA) strip Patient is to test 8 times daily. Dx: E11.9  Please provide patient with Accu check Dunia Plus test strips per his Insurance request 900 each 3    Lancets 30G MISC Use to check blood sugar 8 times daily. E11.9 300 each 5    Alcohol Swabs PADS 1 box by Does not apply route 8 times daily 100 each 5    Blood Glucose Monitoring Suppl (ACURA BLOOD GLUCOSE METER) w/Device KIT Please provide patient with Accu check Dunia Meter per his Insurance request. Dx: E11.9 1 kit 0     No current facility-administered medications for this visit.        Objective:   PE:  /72   Pulse 73   Temp 96.9 °F (36.1 °C) (Temporal)   Ht 6' (1.829 m)   Wt 263 lb (119.3 kg)   SpO2 98%   BMI 35.67 kg/m²   Physical Exam  Vitals and nursing note reviewed. HENT:      Head: Normocephalic. Nose: Nose normal. No congestion. Mouth/Throat:      Mouth: Mucous membranes are moist.   Eyes:      Conjunctiva/sclera: Conjunctivae normal.      Pupils: Pupils are equal, round, and reactive to light. Cardiovascular:      Rate and Rhythm: Normal rate and regular rhythm. Heart sounds: Normal heart sounds. Pulmonary:      Breath sounds: Normal breath sounds. Abdominal:      General: Abdomen is protuberant. Palpations: Abdomen is soft. Tenderness: There is no abdominal tenderness. Musculoskeletal:         General: No swelling. Tenderness:  s/p R TKR. Skin:     Findings: No lesion. Neurological:      General: No focal deficit present. Mental Status: He is alert and oriented to person, place, and time. Cranial Nerves: No cranial nerve deficit. Motor: Motor function is intact. Coordination: Coordination is intact. Gait: Gait is intact. Psychiatric:         Mood and Affect: Mood normal.        Visual inspection:  Deformity/amputation: absent  Skin lesions/pre-ulcerative calluses: absent  Edema: right- negative, left- negative    Sensory exam:  Monofilament sensation: normal  (minimum of 5 random plantar locations tested, avoiding callused areas - > 1 area with absence of sensation is + for neuropathy)    Plus at least one of the following:  Pulses: normal,   Pinprick: Intact  Proprioception: Intact  Vibration (128 Hz): Impaired     Assessment & Plan   Diagnoses and all orders for this visit:    Type 2 diabetes mellitus with other circulatory complication, with long-term current use of insulin (HCC)  -     metFORMIN (GLUCOPHAGE) 1000 MG tablet; TAKE ONE TABLET BY MOUTH TWICE DAILY WITH MEALS  -     Diabetic Foot Exam    Essential hypertension  -     irbesartan (AVAPRO) 150 MG tablet;  Take 1 tablet by mouth daily Take 1 tablet by mouth once daily    Hyperlipidemia, unspecified hyperlipidemia type  - ezetimibe (ZETIA) 10 MG tablet; Take 1 tablet by mouth once daily    Coronary artery disease involving native heart without angina pectoris, unspecified vessel or lesion type  -     aspirin 81 MG chewable tablet; CHEW AND SWALLOW 1 TABLET BY MOUTH ONCE DAILY    Neuropathy  -     gabapentin (NEURONTIN) 300 MG capsule; Take 1 capsule by mouth 3 times daily for 60 days. TAKE 1 CAPSULE BY MOUTH THREE TIMES DAILY    Medication refill  -     pantoprazole (PROTONIX) 40 MG tablet; Take 1 tablet by mouth every morning (before breakfast)  -     gabapentin (NEURONTIN) 300 MG capsule; Take 1 capsule by mouth 3 times daily for 60 days. TAKE 1 CAPSULE BY MOUTH THREE TIMES DAILY  -     metoprolol tartrate (LOPRESSOR) 50 MG tablet; Take 1 tablet by mouth 2 times daily TAKE 1 TABLET BY MOUTH TWICE DAILY  -     irbesartan (AVAPRO) 150 MG tablet; Take 1 tablet by mouth daily Take 1 tablet by mouth once daily  -     aspirin 81 MG chewable tablet; CHEW AND SWALLOW 1 TABLET BY MOUTH ONCE DAILY  -     ezetimibe (ZETIA) 10 MG tablet; Take 1 tablet by mouth once daily  -     prasugrel (EFFIENT) 10 MG TABS; Take 1 tablet by mouth once daily  -     nystatin (MYCOSTATIN) 576296 UNIT/GM powder; Apply topically 3 times daily as needed (itchy rash)  -     metFORMIN (GLUCOPHAGE) 1000 MG tablet; TAKE ONE TABLET BY MOUTH TWICE DAILY WITH MEALS    Continue all maintenance medications as prescribed  Samples of Kerendia given #28  Continue diabetic diet  Monitor BS regularly and keep a record  Continue attempts at weight loss. Engaged in regular exercise as tolerated - at least 30 minutes/day  Low fat/low calorie diet  Daily foot care  Recommend annual diabetic eye exam  Stay well hydrated - 64 oz of water a day  Keep scheduled follow-up visit with me and with other specialist  Call with new concerns     Return in 3 months (on 11/30/2022) for chronic care management, routine preventative visit, med check, med refills.     All questions were answered. Medications, including possible adverse effects, and instructions were reviewed and  understanding was confirmed. Follow-up recommendations, including when to contact or return to office (ie; if symptoms worsen or fail to improve), were discussed and acknowledged.     Electronically signed by Raquel Mcnair MD on 8/30/22 at 8:58 AM CDT

## 2022-09-26 ENCOUNTER — DOCUMENTATION (OUTPATIENT)
Dept: ENDOCRINOLOGY | Facility: CLINIC | Age: 67
End: 2022-09-26

## 2022-09-27 ENCOUNTER — OFFICE VISIT (OUTPATIENT)
Dept: PRIMARY CARE CLINIC | Age: 67
End: 2022-09-27
Payer: MEDICARE

## 2022-09-27 VITALS
HEIGHT: 72 IN | SYSTOLIC BLOOD PRESSURE: 138 MMHG | DIASTOLIC BLOOD PRESSURE: 86 MMHG | WEIGHT: 269 LBS | BODY MASS INDEX: 36.44 KG/M2 | OXYGEN SATURATION: 94 % | HEART RATE: 64 BPM

## 2022-09-27 DIAGNOSIS — R19.02 ABDOMINAL MASS, LUQ (LEFT UPPER QUADRANT): ICD-10-CM

## 2022-09-27 DIAGNOSIS — Z76.0 MEDICATION REFILL: ICD-10-CM

## 2022-09-27 DIAGNOSIS — M25.512 ACUTE PAIN OF LEFT SHOULDER: Primary | ICD-10-CM

## 2022-09-27 PROCEDURE — 1036F TOBACCO NON-USER: CPT | Performed by: FAMILY MEDICINE

## 2022-09-27 PROCEDURE — 3017F COLORECTAL CA SCREEN DOC REV: CPT | Performed by: FAMILY MEDICINE

## 2022-09-27 PROCEDURE — 99214 OFFICE O/P EST MOD 30 MIN: CPT | Performed by: FAMILY MEDICINE

## 2022-09-27 PROCEDURE — G8417 CALC BMI ABV UP PARAM F/U: HCPCS | Performed by: FAMILY MEDICINE

## 2022-09-27 PROCEDURE — G8427 DOCREV CUR MEDS BY ELIG CLIN: HCPCS | Performed by: FAMILY MEDICINE

## 2022-09-27 PROCEDURE — 1123F ACP DISCUSS/DSCN MKR DOCD: CPT | Performed by: FAMILY MEDICINE

## 2022-09-27 RX ORDER — ASPIRIN 81 MG/1
TABLET, CHEWABLE ORAL
Qty: 90 TABLET | Refills: 1 | Status: SHIPPED | OUTPATIENT
Start: 2022-09-27 | End: 2022-10-18 | Stop reason: SDUPTHER

## 2022-09-27 RX ORDER — OXYCODONE AND ACETAMINOPHEN 10; 325 MG/1; MG/1
1 TABLET ORAL 2 TIMES DAILY PRN
Qty: 28 TABLET | Refills: 0 | Status: SHIPPED | OUTPATIENT
Start: 2022-09-27 | End: 2022-10-11

## 2022-09-27 RX ORDER — FLUTICASONE PROPIONATE 50 MCG
SPRAY, SUSPENSION (ML) NASAL
Qty: 16 G | Refills: 1 | Status: SHIPPED | OUTPATIENT
Start: 2022-09-27 | End: 2022-10-18 | Stop reason: SDUPTHER

## 2022-09-27 RX ORDER — PANTOPRAZOLE SODIUM 40 MG/1
40 TABLET, DELAYED RELEASE ORAL
Qty: 90 TABLET | Refills: 2 | Status: SHIPPED | OUTPATIENT
Start: 2022-09-27 | End: 2022-10-18 | Stop reason: SDUPTHER

## 2022-09-27 RX ORDER — METOPROLOL TARTRATE 50 MG/1
50 TABLET, FILM COATED ORAL 2 TIMES DAILY
Qty: 180 TABLET | Refills: 1 | Status: SHIPPED | OUTPATIENT
Start: 2022-09-27 | End: 2022-10-18

## 2022-09-27 RX ORDER — NAPROXEN 500 MG/1
500 TABLET ORAL 2 TIMES DAILY WITH MEALS
Qty: 60 TABLET | Refills: 3 | Status: CANCELLED | OUTPATIENT
Start: 2022-09-27

## 2022-09-27 RX ORDER — IRBESARTAN 150 MG/1
150 TABLET ORAL DAILY
Qty: 90 TABLET | Refills: 1 | Status: SHIPPED | OUTPATIENT
Start: 2022-09-27 | End: 2022-10-18 | Stop reason: SDUPTHER

## 2022-09-27 RX ORDER — EZETIMIBE 10 MG/1
TABLET ORAL
Qty: 90 TABLET | Refills: 1 | Status: SHIPPED | OUTPATIENT
Start: 2022-09-27 | End: 2022-10-18 | Stop reason: SDUPTHER

## 2022-09-27 RX ORDER — NAPROXEN 500 MG/1
500 TABLET ORAL 2 TIMES DAILY WITH MEALS
COMMUNITY
End: 2022-10-18 | Stop reason: SDUPTHER

## 2022-09-27 RX ORDER — CYANOCOBALAMIN 1000 UG/ML
1000 INJECTION INTRAMUSCULAR; SUBCUTANEOUS ONCE
COMMUNITY

## 2022-09-27 RX ORDER — GABAPENTIN 300 MG/1
300 CAPSULE ORAL 3 TIMES DAILY
Qty: 90 CAPSULE | Refills: 1 | Status: SHIPPED | OUTPATIENT
Start: 2022-09-27 | End: 2022-10-18

## 2022-09-27 NOTE — PROGRESS NOTES
[facility] PHYSICIAN SERVICES  [facility] PRIMARY CARE  [address] [address] [place] [address]  Dept: [phone]  Dept Fax: [phone]  Loc: [phone]      Subjective:     Chief Complaint   Patient presents with    Arm Pain     Rib pain left side feels knot and goes around middle of back and states itches-since Friday night        HPI:  Terrence Zepeda is a 74 y.o. male presents today with complaint of several days duration of L shoulder pain that radiates to the back in between his scapula. His initial thought was shingles because the area was also itchy. His wife who accompanied him today also states that he has a \"lump\" in his L upper abdomen. No reported trauma to the abdomen. In the last few days, he c/o that he could not lift his L arm past his shoulder. No known trauma or injury to the L shoulder. He does not have any weakness. His  is still strong. He states that if he needed to see an Orthopedic doctor, he would like to be referred to Dr. Monroe Contreras. Pt also requests refills of all his maintenance medications. ROS:   Review of Systems   Constitutional: Negative. HENT: Negative. Eyes: Negative. Respiratory: Negative. Cardiovascular: Negative. Gastrointestinal: Negative. Genitourinary: Negative. Musculoskeletal:  Positive for arthralgias (s/p R knee replacement , L shoulder). Skin: Negative. Neurological: Negative. Hematological: Negative. Psychiatric/Behavioral: Negative. PMHx:  Past Medical History:   Diagnosis Date    Aneurysm ([address]     abdominal (pt states NOT)    Arthritis     Bilateral leg edema     CAD (coronary artery disease)     sees [facility]/dr. russo.     Chronic kidney disease     Stage 4; sees dr. Herminia Hayden    Diabetes mellitus, type 2 ([address]     Fatty liver     GERD (gastroesophageal reflux disease)     HTN (hypertension)     Knee pain     Prolonged emergence from general anesthesia     Seasonal allergies     Vitre[facility] hemorrhage ([facility] 1/22/2021     Patient Active Problem List   Diagnosis    DM type 2, uncontrolled, with neuropathy ([facility]    Essential hypertension    Gastroesophageal reflux disease without esophagitis    Pharyngoesophageal dysphagia    Family history of prostate cancer in father    Change in bowel habits    History of colon polyps    Double vessel coronary artery disease    History of placement of stent in LAD coronary artery    ACS (acute coronary syndrome) ([facility])    NSTEMI (non-ST elevated myocardial infarction) ([facility]    Chronic kidney disease, stage III (moderate) (HCC)    Mixed hyperlipidemia    Morbid obesity ([facility]    Left ventricular dysfunction    Acute renal failure superimposed on stage 4 chronic kidney disease (HCC)    Osteoarthritis of knee    S/P total knee arthroplasty, right    Arthritis of knee, right    Acute blood loss as cause of postoperative anemia       PSHx:  Past Surgical History:   Procedure Laterality Date    CARPAL TUNNEL RELEASE      COLONOSCOPY  11/04/2015    DR Capone: polys, 5yr recall    COLONOSCOPY N/A 10/11/2016    Dr Lopez-diverticulosis, Sessile serrated AP (-) high grade dysplasia x 1, tubular AP (-) dysplasia x 2, HP x 2--3 yr recall    COLONOSCOPY N/A 12/16/2020    Dr Pineda Hernandes, Mod. Diverticulosis, Internal hemorrhoids-Grade 2 & ext hemorrhoids, 3 year recall    CORONARY ANGIOPLASTY WITH STENT PLACEMENT  06/04/2019    AGA to circ    CORONARY ANGIOPLASTY WITH STENT PLACEMENT  05/2018    AGA to osteal LAD    ESOPHAGEAL DILATATION N/A 12/16/2020    Dr Pineda Hernandes, empirical [address] 52 fr bougie dilation, (+) GERD    EYE SURGERY      Right eye    PRE-MALIGNANT / Ann[facility] Sons      Dr. Leonela David Right 3/28/2022    RIGHT KNEE TOTAL ARTHROPLASTY performed by Dio Coreas MD at [address] [facility] [address] ENDOSCOPY  09/20/2016    Dr Lopez-w/dilation over wire, 46 [place]-distal esophageal narrowing, Inna (-)    UPPER GASTROINTESTINAL ENDOSCOPY  09/20/2016    Dr Lopez-w/dilation over wire, 46 [place]-distal esophageal narrowing, Inna (-)       PFHx:  Family History   Problem Relation Age of Onset    Colon Cancer Mother     Prostate Cancer Father     Other Father         HX of blood clot    Diabetes Sister     Cancer Sister     Diabetes Brother     Colon Polyps Son     Esophageal Cancer Neg Hx     Liver Cancer Neg Hx     Liver Disease Neg Hx     Stomach Cancer Neg Hx     Rectal Cancer Neg Hx        SocialHx:  Social History     Tobacco Use    Smoking status: Never    Smokeless tobacco: Never   Substance Use Topics    Alcohol use: No       Allergies: Allergies   Allergen Reactions    Lipitor [Atorvastatin Calcium] Diarrhea and Other (See Comments)     Dizzy    Tape Hershall Means Tape] Hives and Rash       Medications:  Current Outpatient Medications   Medication Sig Dispense Refill    cyanocobalamin 1000 MCG/ML injection Inject 1,000 mcg into the muscle once 5,000 ml Dropper under tongue in the morning- Dr. Rl Mesa recommended      naproxen (NAPROSYN) 500 MG tablet Take 500 mg by mouth 2 times daily (with meals)      metoprolol tartrate (LOPRESSOR) 50 MG tablet Take 1 tablet by mouth 2 times daily TAKE 1 TABLET BY MOUTH TWICE DAILY 180 tablet 1    pantoprazole (PROTONIX) 40 MG tablet Take 1 tablet by mouth every morning (before breakfast) 90 tablet 2    gabapentin (NEURONTIN) 300 MG capsule Take 1 capsule by mouth 3 times daily for 60 days.  TAKE 1 CAPSULE BY MOUTH THREE TIMES DAILY 90 capsule 1    irbesartan (AVAPRO) 150 MG tablet Take 1 tablet by mouth daily Take 1 tablet by mouth once daily 90 tablet 1    metFORMIN (GLUCOPHAGE) 1000 MG tablet TAKE ONE TABLET BY MOUTH TWICE DAILY WITH MEALS 180 tablet 1    mupirocin (BACTROBAN) 2 % ointment APPLY  OINTMENT TO AFFECTED AREA THREE TIMES DAILY 1 each 1    ezetimibe (ZETIA) 10 MG tablet Take 1 tablet by mouth once daily 90 tablet 1    fluticasone (FLONASE) 50 MCG/ACT nasal spray USE 2 SPRAY(S) IN [facility] NOSTRIL ONCE DAILY AS NEEDED 16 g 1    aspirin 81 MG chewable tablet CHEW AND SWALLOW 1 TABLET BY MOUTH ONCE DAILY 90 tablet 1    oxyCODONE-acetaminophen (PERCOCET)  MG per tablet Take 1 tablet by mouth 2 times daily as needed for Pain for up to 14 days. 28 tablet 0    nystatin (MYCOSTATIN) 804003 UNIT/GM powder Apply topically 3 times daily as needed (itchy rash) 60 g 1    nitroGLYCERIN (NITROSTAT) 0.4 MG SL tablet up to max of 3 total doses. If no relief after 1 dose, call 911. 25 tablet 3    albuterol sulfate HFA (VENTOLIN HFA) 108 (90 Base) MCG/ACT inhaler Inhale 2 puffs into the lungs every 6 hours as needed for Wheezing 1 each 2    ondansetron (ZOFRAN) 4 MG tablet Take 1 tablet by mouth every 8 hours as needed for Nausea or Vomiting 20 tablet 0    KERENDIA 10 MG TABS Take 10 mg by mouth daily       Insulin Lispro-aabc (LYUMSTANFORD FERRER) 200 UNIT/ML SOPN Inject 25-30 Units into the skin 3 times daily as needed (\"5 cc per 15 carbs\") Sliding scale      furosemide (LASIX) 20 MG tablet Take 1 tablet by mouth daily (Patient taking differently: Take 20 mg by mouth daily as needed (swelling)) 30 tablet 5    albuterol (PROVENTIL) (5 MG/ML) 0.5% nebulizer solution Take 1 mL by nebulization every 6 hours as needed for Wheezing 60 mL 0    Insulin Glargine, 1 Unit Dial, (TOUJEO SOLOSTAR) 300 UNIT/ML SOPN Inject 30 Units into the skin daily       INVOKANA 100 MG TABS tablet Take 100 mg by mouth daily      Semaglutide,0.25 or 0.5MG/DOS, 2 MG/1.5ML SOPN Inject 0.25 mg into the skin once a week Mondays      Insulin Pen Needle ([employer] PEN NEEDLES) 31G X 6 MM MISC Dx: E11.9 150 each 3    blood glucose test strips (ACCU-CHEK DUNIA) strip Patient is to test 8 times daily. Dx: E11.9  Please provide patient with Accu check Dunia Plus test strips per his Insurance request 900 each 3    Lancets 30G MISC Use to check blood sugar 8 times daily.  E11.9 300 each 5    Alcohol Swabs PADS 1 box by Does not apply route 8 times daily 100 each 5    Blood Glucose Monitoring Suppl (ACURA BLOOD GLUCOSE METER) w/Device KIT Please provide patient with Accu check Bharati Meter per his Insurance request. Dx: E11.9 1 kit 0     No current facility-administered medications for this visit. Objective:   PE:  /86   Pulse 64   Ht 6' (1.829 m)   Wt 269 lb (122 kg)   SpO2 94%   BMI 36.48 kg/m²   Physical Exam  Vitals and nursing note reviewed. Exam conducted with a chaperone present (wife). Constitutional:       Appearance: He is obese. He is not ill-appearing. Comments: in obvious pain -  L shoulder   HENT:      Head: Normocephalic. Nose: Nose normal. No congestion. Mouth/Throat:      Mouth: Mucous membranes are moist.   Eyes:      Conjunctiva/sclera: Conjunctivae normal.      Pupils: Pupils are equal, round, and reactive to light. Cardiovascular:      Rate and Rhythm: Normal rate and regular rhythm. Heart sounds: Normal heart sounds. Pulmonary:      Breath sounds: Normal breath sounds. Abdominal:      General: Abdomen is protuberant. Palpations: Abdomen is soft. There is mass (soft, fluctuant, nontender lipomatous? mass). Tenderness: There is no abdominal tenderness. Musculoskeletal:         General: No swelling. Tenderness:  s/p R TKR. Skin:     Findings: No lesion. Neurological:      General: No focal deficit present. Mental Status: He is alert and oriented to person, place, and time. Cranial Nerves: No cranial nerve deficit. Motor: Motor function is intact. Coordination: Coordination is intact. Gait: Gait is intact. Psychiatric:         Mood and Affect: Mood normal.          Assessment & Plan   [facility] was seen today for arm pain. Diagnoses and all orders for this visit:    Acute pain of left shoulder  -     oxyCODONE-acetaminophen (PERCOCET)  MG per tablet;  Take 1 tablet by mouth 2 times daily as needed for Pain for up to 14 days.  -     MRI SHOULDER LEFT W CONTRAST; Future  -     Day Perez MD, Orthopaedic Surgery, [place]    Abdominal mass, LUQ (left upper quadrant)  -     US ABDOMEN COMPLETE; Future    Medication refill  -     metoprolol tartrate (LOPRESSOR) 50 MG tablet; Take 1 tablet by mouth 2 times daily TAKE 1 TABLET BY MOUTH TWICE DAILY  -     pantoprazole (PROTONIX) 40 MG tablet; Take 1 tablet by mouth every morning (before breakfast)  -     gabapentin (NEURONTIN) 300 MG capsule; Take 1 capsule by mouth 3 times daily for 60 days. TAKE 1 CAPSULE BY MOUTH THREE TIMES DAILY  -     irbesartan (AVAPRO) 150 MG tablet; Take 1 tablet by mouth daily Take 1 tablet by mouth once daily  -     metFORMIN (GLUCOPHAGE) 1000 MG tablet; TAKE ONE TABLET BY MOUTH TWICE DAILY WITH MEALS  -     mupirocin (BACTROBAN) 2 % ointment; APPLY  OINTMENT TO AFFECTED AREA THREE TIMES DAILY  -     ezetimibe (ZETIA) 10 MG tablet; Take 1 tablet by mouth once daily  -     fluticasone (FLONASE) 50 MCG/ACT nasal spray; USE 2 SPRAY(S) IN EACH NOSTRIL ONCE DAILY AS NEEDED  -     aspirin 81 MG chewable tablet; CHEW AND SWALLOW 1 TABLET BY MOUTH ONCE DAILY      Return in 2 months (on 11/30/2022). All questions were answered. Medications, including possible adverse effects, and instructions were reviewed and  understanding was confirmed. Follow-up recommendations, including when to contact or return to office (ie; if symptoms worsen or fail to improve), were discussed and acknowledged.     Electronically signed by Bernard Mayorga MD on 9/27/22 at 10:14 AM CDT

## 2022-09-30 ASSESSMENT — ENCOUNTER SYMPTOMS
GASTROINTESTINAL NEGATIVE: 1
EYES NEGATIVE: 1
RESPIRATORY NEGATIVE: 1

## 2022-10-03 ENCOUNTER — TELEPHONE (OUTPATIENT)
Dept: PRIMARY CARE CLINIC | Age: 67
End: 2022-10-03

## 2022-10-03 DIAGNOSIS — Z76.0 MEDICATION REFILL: ICD-10-CM

## 2022-10-03 DIAGNOSIS — M25.512 ACUTE PAIN OF LEFT SHOULDER: Primary | ICD-10-CM

## 2022-10-03 RX ORDER — ASPIRIN 81 MG/1
TABLET, CHEWABLE ORAL
Qty: 90 TABLET | Refills: 0 | OUTPATIENT
Start: 2022-10-03

## 2022-10-03 NOTE — TELEPHONE ENCOUNTER
[address] called and stated order for patients left shoulder MRI needed to be changed to without contrast. Order placed.

## 2022-10-07 ENCOUNTER — HOSPITAL ENCOUNTER (OUTPATIENT)
Dept: ULTRASOUND IMAGING | Age: 67
Discharge: HOME | End: 2022-10-07
Payer: MEDICARE

## 2022-10-07 ENCOUNTER — HOSPITAL ENCOUNTER (OUTPATIENT)
Dept: MRI IMAGING | Age: 67
Discharge: HOME | End: 2022-10-07
Payer: MEDICARE

## 2022-10-07 DIAGNOSIS — R19.02 ABDOMINAL MASS, LUQ (LEFT UPPER QUADRANT): ICD-10-CM

## 2022-10-07 DIAGNOSIS — M25.512 ACUTE PAIN OF LEFT SHOULDER: ICD-10-CM

## 2022-10-07 PROCEDURE — G1010 CDSM STANSON: HCPCS | Performed by: RADIOLOGY

## 2022-10-07 PROCEDURE — 73221 MRI JOINT UPR EXTREM W/O DYE: CPT | Performed by: RADIOLOGY

## 2022-10-07 PROCEDURE — 73221 MRI JOINT UPR EXTREM W/O DYE: CPT

## 2022-10-17 ENCOUNTER — NURSE ONLY (OUTPATIENT)
Dept: PRIMARY CARE CLINIC | Age: 67
End: 2022-10-17
Payer: MEDICARE

## 2022-10-17 DIAGNOSIS — M25.512 ACUTE PAIN OF LEFT SHOULDER: ICD-10-CM

## 2022-10-17 DIAGNOSIS — Z76.0 MEDICATION REFILL: ICD-10-CM

## 2022-10-17 DIAGNOSIS — M75.122 COMPLETE TEAR OF LEFT ROTATOR CUFF, UNSPECIFIED WHETHER TRAUMATIC: Primary | ICD-10-CM

## 2022-10-17 DIAGNOSIS — Z23 NEED FOR INFLUENZA VACCINATION: Primary | ICD-10-CM

## 2022-10-17 PROCEDURE — 90694 VACC AIIV4 NO PRSRV 0.5ML IM: CPT | Performed by: FAMILY MEDICINE

## 2022-10-17 PROCEDURE — 99999 PR OFFICE/OUTPT VISIT,PROCEDURE ONLY: CPT | Performed by: FAMILY MEDICINE

## 2022-10-17 PROCEDURE — G0008 ADMIN INFLUENZA VIRUS VAC: HCPCS | Performed by: FAMILY MEDICINE

## 2022-10-17 NOTE — TELEPHONE ENCOUNTER
Galileo Youngblood called to request a refill on his medication.       Last office visit : 9/27/2022   Next office visit : 11/30/2022     Requested Prescriptions     Pending Prescriptions Disp Refills    metoprolol tartrate (LOPRESSOR) 50 MG tablet [Pharmacy Med Name: Metoprolol Tartrate 50 MG Oral Tablet] 180 tablet 0     Sig: Take 1 tablet by mouth twice daily    gabapentin (NEURONTIN) 300 MG capsule [Pharmacy Med Name: Gabapentin 300 MG Oral Capsule] 270 capsule 0     Sig: TAKE 1 CAPSULE BY MOUTH THREE TIMES DAILY    pantoprazole (PROTONIX) 40 MG tablet 180 tablet 2     Sig: Take 1 tablet by mouth 2 times daily    metFORMIN (GLUCOPHAGE) 1000 MG tablet 180 tablet 1     Sig: TAKE ONE TABLET BY MOUTH TWICE DAILY WITH MEALS    irbesartan (AVAPRO) 150 MG tablet 90 tablet 1     Sig: Take 1 tablet by mouth daily Take 1 tablet by mouth once daily    INVOKANA 100 MG TABS tablet 90 tablet 3     Sig: Take 1 tablet by mouth daily    naproxen (NAPROSYN) 500 MG tablet 180 tablet 3     Sig: Take 1 tablet by mouth 2 times daily (with meals)    aspirin 81 MG chewable tablet 90 tablet 1     Sig: CHEW AND SWALLOW 1 TABLET BY MOUTH ONCE DAILY    fluticasone (FLONASE) 50 MCG/ACT nasal spray 16 g 1     Sig: USE 2 SPRAY(S) IN EACH NOSTRIL ONCE DAILY AS NEEDED    ezetimibe (ZETIA) 10 MG tablet 90 tablet 1     Sig: Take 1 tablet by mouth once daily     Patient is needing refills on all medications and stated the pharmacy did not receive the most recent medication that were sent in.      Anirudh Briceno MA

## 2022-10-17 NOTE — PROGRESS NOTES
After obtaining consent, and per orders of Dr. Anant Whitney, injection of Fluad Influenza  given in Right deltoid by Karla Negrete MA. Patient signed consent form scanned into patients chart.

## 2022-10-18 DIAGNOSIS — E11.65 TYPE 2 DIABETES MELLITUS WITH HYPERGLYCEMIA, WITH LONG-TERM CURRENT USE OF INSULIN: ICD-10-CM

## 2022-10-18 DIAGNOSIS — Z76.0 MEDICATION REFILL: ICD-10-CM

## 2022-10-18 DIAGNOSIS — Z79.4 TYPE 2 DIABETES MELLITUS WITH HYPERGLYCEMIA, WITH LONG-TERM CURRENT USE OF INSULIN: ICD-10-CM

## 2022-10-18 RX ORDER — FLUTICASONE PROPIONATE 50 MCG
SPRAY, SUSPENSION (ML) NASAL
Qty: 16 G | Refills: 1 | Status: SHIPPED | OUTPATIENT
Start: 2022-10-18

## 2022-10-18 RX ORDER — PANTOPRAZOLE SODIUM 40 MG/1
40 TABLET, DELAYED RELEASE ORAL 2 TIMES DAILY
Qty: 180 TABLET | Refills: 2 | Status: SHIPPED | OUTPATIENT
Start: 2022-10-18

## 2022-10-18 RX ORDER — IRBESARTAN 150 MG/1
150 TABLET ORAL DAILY
Qty: 90 TABLET | Refills: 1 | Status: SHIPPED | OUTPATIENT
Start: 2022-10-18 | End: 2022-10-25 | Stop reason: SDUPTHER

## 2022-10-18 RX ORDER — PRASUGREL 10 MG/1
TABLET, FILM COATED ORAL
Qty: 90 TABLET | Refills: 0 | OUTPATIENT
Start: 2022-10-18

## 2022-10-18 RX ORDER — METOPROLOL TARTRATE 50 MG/1
TABLET, FILM COATED ORAL
Qty: 180 TABLET | Refills: 0 | Status: SHIPPED | OUTPATIENT
Start: 2022-10-18

## 2022-10-18 RX ORDER — ASPIRIN 81 MG/1
TABLET, CHEWABLE ORAL
Qty: 90 TABLET | Refills: 1 | Status: SHIPPED | OUTPATIENT
Start: 2022-10-18

## 2022-10-18 RX ORDER — CANAGLIFLOZIN 100 MG/1
100 TABLET, FILM COATED ORAL DAILY
Qty: 90 TABLET | Refills: 3 | Status: SHIPPED | OUTPATIENT
Start: 2022-10-18

## 2022-10-18 RX ORDER — NAPROXEN 500 MG/1
500 TABLET ORAL 2 TIMES DAILY WITH MEALS
Qty: 180 TABLET | Refills: 3 | Status: SHIPPED | OUTPATIENT
Start: 2022-10-18

## 2022-10-18 RX ORDER — CANAGLIFLOZIN 100 MG/1
TABLET, FILM COATED ORAL
Qty: 30 TABLET | Refills: 0 | Status: SHIPPED | OUTPATIENT
Start: 2022-10-18 | End: 2022-11-17

## 2022-10-18 RX ORDER — EZETIMIBE 10 MG/1
TABLET ORAL
Qty: 90 TABLET | Refills: 1 | Status: SHIPPED | OUTPATIENT
Start: 2022-10-18 | End: 2022-10-25 | Stop reason: SDUPTHER

## 2022-10-18 RX ORDER — GABAPENTIN 300 MG/1
CAPSULE ORAL
Qty: 270 CAPSULE | Refills: 0 | Status: SHIPPED | OUTPATIENT
Start: 2022-10-18 | End: 2022-10-25 | Stop reason: SDUPTHER

## 2022-10-20 DIAGNOSIS — Z76.0 MEDICATION REFILL: ICD-10-CM

## 2022-10-21 RX ORDER — PRASUGREL 10 MG/1
TABLET, FILM COATED ORAL
Qty: 90 TABLET | Refills: 0 | OUTPATIENT
Start: 2022-10-21

## 2022-10-24 ENCOUNTER — TELEPHONE (OUTPATIENT)
Dept: PODIATRY | Facility: CLINIC | Age: 67
End: 2022-10-24

## 2022-10-25 DIAGNOSIS — Z76.0 MEDICATION REFILL: ICD-10-CM

## 2022-10-25 RX ORDER — IRBESARTAN 150 MG/1
150 TABLET ORAL DAILY
Qty: 90 TABLET | Refills: 1 | Status: SHIPPED | OUTPATIENT
Start: 2022-10-25 | End: 2022-10-25 | Stop reason: CLARIF

## 2022-10-25 RX ORDER — SODIUM BICARBONATE 325 MG/1
325 TABLET ORAL 2 TIMES DAILY
Qty: 60 TABLET | Refills: 11 | Status: CANCELLED | OUTPATIENT
Start: 2022-10-25 | End: 2023-10-25

## 2022-10-25 RX ORDER — PRASUGREL 10 MG/1
TABLET, FILM COATED ORAL
Qty: 90 TABLET | Refills: 1 | Status: SHIPPED | OUTPATIENT
Start: 2022-10-25 | End: 2022-11-30 | Stop reason: SDUPTHER

## 2022-10-25 RX ORDER — PRASUGREL 10 MG/1
TABLET, FILM COATED ORAL
Qty: 90 TABLET | Refills: 1 | Status: SHIPPED | OUTPATIENT
Start: 2022-10-25 | End: 2022-10-25 | Stop reason: CLARIF

## 2022-10-25 RX ORDER — GABAPENTIN 300 MG/1
CAPSULE ORAL
Qty: 270 CAPSULE | Refills: 0 | Status: SHIPPED | OUTPATIENT
Start: 2022-10-25 | End: 2022-11-30 | Stop reason: SDUPTHER

## 2022-10-25 RX ORDER — EZETIMIBE 10 MG/1
TABLET ORAL
Qty: 90 TABLET | Refills: 1 | Status: SHIPPED | OUTPATIENT
Start: 2022-10-25 | End: 2022-11-30 | Stop reason: SDUPTHER

## 2022-10-25 RX ORDER — IRBESARTAN 150 MG/1
150 TABLET ORAL DAILY
Qty: 90 TABLET | Refills: 1 | Status: SHIPPED | OUTPATIENT
Start: 2022-10-25 | End: 2022-11-30 | Stop reason: SDUPTHER

## 2022-10-25 RX ORDER — GABAPENTIN 300 MG/1
CAPSULE ORAL
Qty: 270 CAPSULE | Refills: 0 | Status: SHIPPED | OUTPATIENT
Start: 2022-10-25 | End: 2022-10-25 | Stop reason: CLARIF

## 2022-10-25 RX ORDER — EZETIMIBE 10 MG/1
TABLET ORAL
Qty: 90 TABLET | Refills: 1 | Status: SHIPPED | OUTPATIENT
Start: 2022-10-25 | End: 2022-10-25 | Stop reason: CLARIF

## 2022-10-25 NOTE — TELEPHONE ENCOUNTER
Patient is needing pended medication refilled, due to [facility].

## 2022-10-25 NOTE — TELEPHONE ENCOUNTER
Patient had a diabetic foot exam, and [facility] sent correspondence back stating he was not eligible. Patient is asking us to review office note and addend to get diabetic shoes covered. He stated he would like to get them before the end of year.

## 2022-11-10 NOTE — TELEPHONE ENCOUNTER
Did we make corrections to his foot exam. We had received notification from [employer] that his he was not going to be covered for diabetic shoes. Please advise?

## 2022-11-17 ENCOUNTER — OFFICE VISIT (OUTPATIENT)
Dept: ENDOCRINOLOGY | Facility: CLINIC | Age: 67
End: 2022-11-17

## 2022-11-17 ENCOUNTER — LAB (OUTPATIENT)
Dept: LAB | Facility: HOSPITAL | Age: 67
End: 2022-11-17

## 2022-11-17 VITALS
SYSTOLIC BLOOD PRESSURE: 130 MMHG | HEART RATE: 77 BPM | DIASTOLIC BLOOD PRESSURE: 80 MMHG | OXYGEN SATURATION: 97 % | WEIGHT: 268 LBS | HEIGHT: 72 IN | BODY MASS INDEX: 36.3 KG/M2

## 2022-11-17 DIAGNOSIS — Z79.4 TYPE 2 DIABETES MELLITUS WITH HYPERGLYCEMIA, WITH LONG-TERM CURRENT USE OF INSULIN: Primary | ICD-10-CM

## 2022-11-17 DIAGNOSIS — E55.9 VITAMIN D DEFICIENCY: ICD-10-CM

## 2022-11-17 DIAGNOSIS — N18.31 CHRONIC KIDNEY DISEASE (CKD) STAGE G3A/A2, MODERATELY DECREASED GLOMERULAR FILTRATION RATE (GFR) BETWEEN 45-59 ML/MIN/1.73 SQUARE METER AND ALBUMINURIA CREATININE RATIO BETWEEN 30-299 MG/G (CMS*: ICD-10-CM

## 2022-11-17 DIAGNOSIS — E11.69 MIXED DIABETIC HYPERLIPIDEMIA ASSOCIATED WITH TYPE 2 DIABETES MELLITUS: ICD-10-CM

## 2022-11-17 DIAGNOSIS — E11.65 TYPE 2 DIABETES MELLITUS WITH HYPERGLYCEMIA, WITH LONG-TERM CURRENT USE OF INSULIN: Primary | ICD-10-CM

## 2022-11-17 DIAGNOSIS — I15.2 HYPERTENSION ASSOCIATED WITH DIABETES: ICD-10-CM

## 2022-11-17 DIAGNOSIS — E78.2 MIXED DIABETIC HYPERLIPIDEMIA ASSOCIATED WITH TYPE 2 DIABETES MELLITUS: ICD-10-CM

## 2022-11-17 DIAGNOSIS — E11.59 HYPERTENSION ASSOCIATED WITH DIABETES: ICD-10-CM

## 2022-11-17 LAB
ALBUMIN SERPL-MCNC: 4.1 G/DL (ref 3.5–5)
ALBUMIN/GLOB SERPL: 1.1 G/DL (ref 1.1–2.5)
ALP SERPL-CCNC: 84 U/L (ref 24–120)
ALT SERPL W P-5'-P-CCNC: 14 U/L (ref 0–50)
ANION GAP SERPL CALCULATED.3IONS-SCNC: 9 MMOL/L (ref 4–13)
AST SERPL-CCNC: 24 U/L (ref 7–45)
AUTO MIXED CELLS #: 0.7 10*3/MM3 (ref 0.1–2.6)
AUTO MIXED CELLS %: 7.4 % (ref 0.1–24)
BILIRUB SERPL-MCNC: 0.8 MG/DL (ref 0.1–1)
BUN SERPL-MCNC: 25 MG/DL (ref 5–21)
BUN/CREAT SERPL: 14.2
CALCIUM SPEC-SCNC: 10.3 MG/DL (ref 8.4–10.4)
CHLORIDE SERPL-SCNC: 112 MMOL/L (ref 98–110)
CHOLEST SERPL-MCNC: 140 MG/DL (ref 130–200)
CO2 SERPL-SCNC: 28 MMOL/L (ref 24–31)
CREAT SERPL-MCNC: 1.76 MG/DL (ref 0.5–1.4)
EGFRCR SERPLBLD CKD-EPI 2021: 41.6 ML/MIN/1.73
ERYTHROCYTE [DISTWIDTH] IN BLOOD BY AUTOMATED COUNT: 14.6 % (ref 12.3–15.4)
GLOBULIN UR ELPH-MCNC: 3.6 GM/DL
GLUCOSE SERPL-MCNC: 120 MG/DL (ref 70–100)
HBA1C MFR BLD: 8.7 % (ref 4.8–5.9)
HCT VFR BLD AUTO: 43.9 % (ref 37.5–51)
HDLC SERPL-MCNC: 28 MG/DL
HGB BLD-MCNC: 13.5 G/DL (ref 13–17.7)
LDLC SERPL CALC-MCNC: 78 MG/DL (ref 0–99)
LDLC/HDLC SERPL: 2.57 {RATIO}
LYMPHOCYTES # BLD AUTO: 2.6 10*3/MM3 (ref 0.7–3.1)
LYMPHOCYTES NFR BLD AUTO: 29.9 % (ref 19.6–45.3)
MCH RBC QN AUTO: 25 PG (ref 26.6–33)
MCHC RBC AUTO-ENTMCNC: 30.8 G/DL (ref 31.5–35.7)
MCV RBC AUTO: 81.1 FL (ref 79–97)
NEUTROPHILS NFR BLD AUTO: 5.5 10*3/MM3 (ref 1.7–7)
NEUTROPHILS NFR BLD AUTO: 62.7 % (ref 42.7–76)
PLATELET # BLD AUTO: 226 10*3/MM3 (ref 140–450)
PMV BLD AUTO: 10.4 FL (ref 6–12)
POTASSIUM SERPL-SCNC: 5.4 MMOL/L (ref 3.5–5.3)
PROT SERPL-MCNC: 7.7 G/DL (ref 6.3–8.7)
RBC # BLD AUTO: 5.41 10*6/MM3 (ref 4.14–5.8)
SODIUM SERPL-SCNC: 149 MMOL/L (ref 135–145)
TRIGL SERPL-MCNC: 200 MG/DL (ref 0–149)
VLDLC SERPL-MCNC: 34 MG/DL (ref 5–40)
WBC NRBC COR # BLD: 8.8 10*3/MM3 (ref 3.4–10.8)

## 2022-11-17 PROCEDURE — 80053 COMPREHEN METABOLIC PANEL: CPT | Performed by: INTERNAL MEDICINE

## 2022-11-17 PROCEDURE — 95251 CONT GLUC MNTR ANALYSIS I&R: CPT | Performed by: INTERNAL MEDICINE

## 2022-11-17 PROCEDURE — 85025 COMPLETE CBC W/AUTO DIFF WBC: CPT | Performed by: INTERNAL MEDICINE

## 2022-11-17 PROCEDURE — 80061 LIPID PANEL: CPT | Performed by: INTERNAL MEDICINE

## 2022-11-17 PROCEDURE — 83036 HEMOGLOBIN GLYCOSYLATED A1C: CPT | Performed by: INTERNAL MEDICINE

## 2022-11-17 PROCEDURE — 82043 UR ALBUMIN QUANTITATIVE: CPT | Performed by: INTERNAL MEDICINE

## 2022-11-17 PROCEDURE — 99214 OFFICE O/P EST MOD 30 MIN: CPT | Performed by: INTERNAL MEDICINE

## 2022-11-17 PROCEDURE — 82570 ASSAY OF URINE CREATININE: CPT | Performed by: INTERNAL MEDICINE

## 2022-11-17 PROCEDURE — 36415 COLL VENOUS BLD VENIPUNCTURE: CPT | Performed by: INTERNAL MEDICINE

## 2022-11-17 RX ORDER — SEMAGLUTIDE 2.68 MG/ML
2 INJECTION, SOLUTION SUBCUTANEOUS WEEKLY
Qty: 9 ML | Refills: 3 | Status: SHIPPED | OUTPATIENT
Start: 2022-11-17 | End: 2023-03-06

## 2022-11-17 RX ORDER — INSULIN LISPRO-AABC 100 [IU]/ML
80 INJECTION, SOLUTION SUBCUTANEOUS
Qty: 72 ML | Refills: 3 | Status: SHIPPED | OUTPATIENT
Start: 2022-11-17

## 2022-11-17 RX ORDER — INSULIN GLARGINE 300 U/ML
INJECTION, SOLUTION SUBCUTANEOUS
Qty: 24 ML | Refills: 3 | Status: SHIPPED | OUTPATIENT
Start: 2022-11-17 | End: 2023-03-06

## 2022-11-17 RX ORDER — SILDENAFIL 100 MG/1
100 TABLET, FILM COATED ORAL AS NEEDED
Qty: 10 TABLET | Refills: 11 | Status: SHIPPED | OUTPATIENT
Start: 2022-11-17 | End: 2023-11-17

## 2022-11-17 RX ORDER — METFORMIN HYDROCHLORIDE 500 MG/1
TABLET, EXTENDED RELEASE ORAL
Qty: 360 TABLET | Refills: 3 | Status: SHIPPED | OUTPATIENT
Start: 2022-11-17

## 2022-11-17 RX ORDER — EZETIMIBE 10 MG/1
10 TABLET ORAL DAILY
Qty: 90 TABLET | Refills: 3 | Status: SHIPPED | OUTPATIENT
Start: 2022-11-17 | End: 2023-11-17

## 2022-11-17 RX ORDER — LEVOFLOXACIN 500 MG/1
500 TABLET, FILM COATED ORAL DAILY
Qty: 10 TABLET | Refills: 0 | Status: SHIPPED | OUTPATIENT
Start: 2022-11-17 | End: 2022-11-27

## 2022-11-17 NOTE — PROGRESS NOTES
"Mick Sims is a 66 y.o. male who presents for follow up Type 2 diabetes      HPI    T2DM    Duration 10 years    Complications - nephropathy, retinopathy, peripheral neuropathy and cardiovascular disease    Current symptoms/problems  paresthesia of the feet and visual disturbances , ED     Alleviating Factors: Compliance       Current monitoring regimen: home blood tests - using brenna       PE    /80   Pulse 77   Ht 182.9 cm (72\")   Wt 122 kg (268 lb)   SpO2 97%   BMI 36.35 kg/m²   AOx3  No visible goiter  RRR  CTA  No Edema  Foot Exam - normal sensation to feet    Labs    Lab Results   Component Value Date    WBC 8.80 11/17/2022    HGB 13.5 11/17/2022    HCT 43.9 11/17/2022    MCV 81.1 11/17/2022     11/17/2022     Lab Results   Component Value Date    GLUCOSE 120 (H) 11/17/2022    BUN 25 (H) 11/17/2022    CREATININE 1.76 (H) 11/17/2022    EGFRIFNONA 40 (A) 04/01/2022    EGFRIFAFRI 49 (L) 04/01/2022    BCR 14.2 11/17/2022    K 5.4 (H) 11/17/2022    CO2 28.0 11/17/2022    CALCIUM 10.3 11/17/2022    ALBUMIN 4.10 11/17/2022    AST 24 11/17/2022    ALT 14 11/17/2022     Lab Results   Component Value Date    EGFR 41.6 (L) 11/17/2022    EGFR 46.6 (L) 08/09/2022     Lab Results   Component Value Date    MALBCRERATIO 73.9 08/09/2022    MALBCRERATIO 68.8 12/07/2021           Assessment & Plan       ICD-10-CM ICD-9-CM   1. Type 2 diabetes mellitus with hyperglycemia, with long-term current use of insulin ([facility])  E11.65 250.00    Z79.4 790.29     V58.67   2. Hypertension associated with diabetes ([facility])  E11.59 250.80    I15.2 401.9   3. Mixed diabetic hyperlipidemia associated with type 2 diabetes mellitus ([facility])  E11.69 250.80    E78.2 272.2   4. Chronic kidney disease (CKD) stage G3a/A2, moderately decreased glomerular filtration rate (GFR) between 45-59 mL/min/1.73 square meter and albuminuria creatinine ratio between  mg/g (CMS* ([facility])  N18.31 585.3   5. Vitamin D deficiency  E55.9 268.9   6. Male " "hypogonadism  E29.1 257.2       Pt has type 2 diabetes w ckd and cad     Glycemic Management:   Lab Results   Component Value Date    HGBA1C 8.7 (H) 11/17/2022    HGBA1C 10.10 (H) 08/09/2022    HGBA1C 9.0 (H) 03/24/2022       Fercho 2 weeks reviewed  t2dm w hyperglycemia     2 week review    In range 40%  No lows   Best control is overnight      toujeo 40 -- now lantus 30 qam - toujeo 40   Now doing 30 -  Increase to 36 , he didn't do last time, do this time     ========================================    Ozempic      0.5 mg weekly ---- increase to 1 mg weekly , having trouble affording but willing to pay doing only half   Now increase to 1 mg    can increase to 2 mg   ============================================    Metformin 1000 mg twice a day ===   Change to xr   ============================================      Invokana 100mg  daily  - gave farxiga samples   This time jardiance samples     ============================================    Humalog     5 units per 15 grams , he understands well - INCREASE TO 7 UNITS PER CARB     May need more     Still eating too much , needs more insulin particularly w supper or less food         ======================  HTN  /80   Pulse 77   Ht 182.9 cm (72\")   Wt 122 kg (268 lb)   SpO2 97%   BMI 36.35 kg/m²       bp controlled on irbesartan now 300  hctz stopped due to hypercalcemia now corrected but only using PRN   Calcium now nl   ===========  Lipids controlled on lipitor but allergic, change to zetia and this worked  Lab Results   Component Value Date    CHOL 140 11/17/2022    CHLPL 177 12/13/2019    TRIG 200 (H) 11/17/2022    HDL 28 (L) 11/17/2022    LDL 78 11/17/2022      Microvascular complications     Renal     CKD stage 3a/A2  On kerendia 10 mg daily ( already on sglt2 and ARB )    Eyes  Yes retinopathy     Neuropathy   Yes on gabapentin       Vit D Def  Vit D low, start 1000 units daily       Vit b12  Taking sublingual   This document has been electronically " signed by Juancho Mobley MD on November 17, 2022 15:05 CST      URI , bronchitis    levaquin 500 mg daily for 5 days     A copy of my note was sent to Ginny Collazo APRN    Please see my above opinion and suggestions.             This document has been electronically signed by Juancho Mobley MD on November 17, 2022 15:05 CST

## 2022-11-18 LAB
ALBUMIN UR-MCNC: 6.2 MG/DL
CREAT UR-MCNC: 91.7 MG/DL
MICROALBUMIN/CREAT UR: 67.6 MG/G

## 2022-11-30 ENCOUNTER — OFFICE VISIT (OUTPATIENT)
Dept: PRIMARY CARE CLINIC | Age: 67
End: 2022-11-30

## 2022-11-30 VITALS
SYSTOLIC BLOOD PRESSURE: 136 MMHG | HEIGHT: 72 IN | BODY MASS INDEX: 36.82 KG/M2 | OXYGEN SATURATION: 97 % | TEMPERATURE: 97.6 F | WEIGHT: 271.8 LBS | HEART RATE: 80 BPM | DIASTOLIC BLOOD PRESSURE: 82 MMHG

## 2022-11-30 DIAGNOSIS — J20.9 ACUTE BRONCHITIS, UNSPECIFIED ORGANISM: Primary | ICD-10-CM

## 2022-11-30 DIAGNOSIS — I25.10 CORONARY ARTERY DISEASE INVOLVING NATIVE HEART WITHOUT ANGINA PECTORIS, UNSPECIFIED VESSEL OR LESION TYPE: ICD-10-CM

## 2022-11-30 DIAGNOSIS — M15.9 PRIMARY OSTEOARTHRITIS INVOLVING MULTIPLE JOINTS: ICD-10-CM

## 2022-11-30 DIAGNOSIS — Z76.0 MEDICATION REFILL: ICD-10-CM

## 2022-11-30 DIAGNOSIS — E11.65 TYPE 2 DIABETES MELLITUS WITH HYPERGLYCEMIA, WITH LONG-TERM CURRENT USE OF INSULIN (HCC): ICD-10-CM

## 2022-11-30 DIAGNOSIS — Z79.4 TYPE 2 DIABETES MELLITUS WITH HYPERGLYCEMIA, WITH LONG-TERM CURRENT USE OF INSULIN (HCC): ICD-10-CM

## 2022-11-30 DIAGNOSIS — N18.31 STAGE 3A CHRONIC KIDNEY DISEASE (HCC): ICD-10-CM

## 2022-11-30 PROBLEM — M17.11 ARTHRITIS OF KNEE, RIGHT: Status: RESOLVED | Noted: 2022-03-29 | Resolved: 2022-11-30

## 2022-11-30 PROBLEM — N17.9 ACUTE RENAL FAILURE SUPERIMPOSED ON STAGE 4 CHRONIC KIDNEY DISEASE (HCC): Status: RESOLVED | Noted: 2020-09-04 | Resolved: 2022-11-30

## 2022-11-30 PROBLEM — I24.9 ACS (ACUTE CORONARY SYNDROME) (HCC): Status: RESOLVED | Noted: 2019-06-03 | Resolved: 2022-11-30

## 2022-11-30 PROBLEM — I21.4 NSTEMI (NON-ST ELEVATED MYOCARDIAL INFARCTION) (HCC): Status: RESOLVED | Noted: 2019-06-05 | Resolved: 2022-11-30

## 2022-11-30 PROBLEM — I20.9 ANGINA PECTORIS, UNSPECIFIED (HCC): Status: ACTIVE | Noted: 2022-11-30

## 2022-11-30 PROBLEM — N18.4 ACUTE RENAL FAILURE SUPERIMPOSED ON STAGE 4 CHRONIC KIDNEY DISEASE (HCC): Status: RESOLVED | Noted: 2020-09-04 | Resolved: 2022-11-30

## 2022-11-30 PROBLEM — I20.9 ANGINA PECTORIS, UNSPECIFIED (HCC): Status: RESOLVED | Noted: 2022-11-30 | Resolved: 2022-11-30

## 2022-11-30 PROBLEM — D62 ACUTE BLOOD LOSS AS CAUSE OF POSTOPERATIVE ANEMIA: Status: RESOLVED | Noted: 2022-03-30 | Resolved: 2022-11-30

## 2022-11-30 PROBLEM — M15.0 PRIMARY OSTEOARTHRITIS INVOLVING MULTIPLE JOINTS: Status: ACTIVE | Noted: 2022-11-30

## 2022-11-30 PROBLEM — Z96.651 S/P TOTAL KNEE ARTHROPLASTY, RIGHT: Status: RESOLVED | Noted: 2022-03-29 | Resolved: 2022-11-30

## 2022-11-30 PROBLEM — M17.9 OSTEOARTHRITIS OF KNEE: Status: RESOLVED | Noted: 2022-03-28 | Resolved: 2022-11-30

## 2022-11-30 RX ORDER — CANAGLIFLOZIN 100 MG/1
100 TABLET, FILM COATED ORAL DAILY
Qty: 90 TABLET | Refills: 1 | Status: SHIPPED | OUTPATIENT
Start: 2022-11-30

## 2022-11-30 RX ORDER — ASPIRIN 81 MG/1
TABLET, CHEWABLE ORAL
Qty: 90 TABLET | Refills: 1 | Status: SHIPPED | OUTPATIENT
Start: 2022-11-30

## 2022-11-30 RX ORDER — IRBESARTAN 150 MG/1
150 TABLET ORAL DAILY
Qty: 90 TABLET | Refills: 1 | Status: SHIPPED | OUTPATIENT
Start: 2022-11-30

## 2022-11-30 RX ORDER — ALBUTEROL SULFATE 90 UG/1
2 AEROSOL, METERED RESPIRATORY (INHALATION) EVERY 6 HOURS PRN
Qty: 1 EACH | Refills: 2 | Status: SHIPPED | OUTPATIENT
Start: 2022-11-30

## 2022-11-30 RX ORDER — PRASUGREL 10 MG/1
TABLET, FILM COATED ORAL
Qty: 90 TABLET | Refills: 1 | Status: SHIPPED | OUTPATIENT
Start: 2022-11-30

## 2022-11-30 RX ORDER — HYDROCODONE POLISTIREX AND CHLORPHENIRAMINE POLISTIREX 10; 8 MG/5ML; MG/5ML
5 SUSPENSION, EXTENDED RELEASE ORAL EVERY 12 HOURS PRN
Qty: 100 ML | Refills: 0 | Status: SHIPPED | OUTPATIENT
Start: 2022-11-30 | End: 2022-12-10

## 2022-11-30 RX ORDER — SODIUM BICARBONATE 325 MG/1
325 TABLET ORAL 4 TIMES DAILY
COMMUNITY

## 2022-11-30 RX ORDER — GABAPENTIN 300 MG/1
CAPSULE ORAL
Qty: 270 CAPSULE | Refills: 0 | Status: SHIPPED | OUTPATIENT
Start: 2022-11-30 | End: 2023-01-05

## 2022-11-30 RX ORDER — EZETIMIBE 10 MG/1
TABLET ORAL
Qty: 90 TABLET | Refills: 1 | Status: SHIPPED | OUTPATIENT
Start: 2022-11-30

## 2022-11-30 RX ORDER — FLUTICASONE PROPIONATE 50 MCG
SPRAY, SUSPENSION (ML) NASAL
Qty: 16 G | Refills: 1 | Status: SHIPPED | OUTPATIENT
Start: 2022-11-30

## 2022-11-30 RX ORDER — PANTOPRAZOLE SODIUM 40 MG/1
40 TABLET, DELAYED RELEASE ORAL DAILY
Qty: 90 TABLET | Refills: 1 | Status: SHIPPED | OUTPATIENT
Start: 2022-11-30

## 2022-11-30 RX ORDER — FUROSEMIDE 20 MG/1
20 TABLET ORAL DAILY
Qty: 30 TABLET | Refills: 1 | Status: SHIPPED | OUTPATIENT
Start: 2022-11-30

## 2022-11-30 RX ORDER — NITROGLYCERIN 0.4 MG/1
TABLET SUBLINGUAL
Qty: 25 TABLET | Refills: 3 | Status: SHIPPED | OUTPATIENT
Start: 2022-11-30

## 2022-11-30 RX ORDER — NAPROXEN 500 MG/1
500 TABLET ORAL 2 TIMES DAILY WITH MEALS
Qty: 180 TABLET | Refills: 3 | Status: CANCELLED | OUTPATIENT
Start: 2022-11-30

## 2022-11-30 RX ORDER — NYSTATIN 100000 [USP'U]/G
POWDER TOPICAL 3 TIMES DAILY PRN
Qty: 60 G | Refills: 1 | Status: SHIPPED | OUTPATIENT
Start: 2022-11-30

## 2022-11-30 RX ORDER — BLOOD PRESSURE TEST KIT
1 KIT MISCELLANEOUS
Qty: 100 EACH | Refills: 5 | Status: SHIPPED | OUTPATIENT
Start: 2022-11-30

## 2022-11-30 RX ORDER — METOPROLOL TARTRATE 50 MG/1
TABLET, FILM COATED ORAL
Qty: 180 TABLET | Refills: 1 | Status: SHIPPED | OUTPATIENT
Start: 2022-11-30

## 2022-11-30 RX ORDER — ALBUTEROL SULFATE 2.5 MG/3ML
2.5 SOLUTION RESPIRATORY (INHALATION) ONCE
Status: COMPLETED | OUTPATIENT
Start: 2022-11-30 | End: 2022-11-30

## 2022-11-30 RX ORDER — ONDANSETRON 4 MG/1
4 TABLET, FILM COATED ORAL EVERY 8 HOURS PRN
Qty: 20 TABLET | Refills: 0 | Status: CANCELLED | OUTPATIENT
Start: 2022-11-30

## 2022-11-30 RX ADMIN — ALBUTEROL SULFATE 2.5 MG: 2.5 SOLUTION RESPIRATORY (INHALATION) at 10:27

## 2022-11-30 ASSESSMENT — ENCOUNTER SYMPTOMS
CHEST TIGHTNESS: 1
COUGH: 1
SORE THROAT: 1
GASTROINTESTINAL NEGATIVE: 1
RHINORRHEA: 1

## 2022-11-30 NOTE — PROGRESS NOTES
[address] [facility]  [address] [address] [place] [address]  [address] [address]  Dept: [phone]  Dept Fax: [phone]  Loc: [phone]      Subjective:     Chief Complaint   Patient presents with    3 Month Follow-Up    Diabetes    Cough       HPI:  King Tona is a 69 y.o. male presents today for his routine follow-up. DM is better controlled. He is s/p influenza 2 weeks ago. He still has a lingering cough. He states that he is bringing up greenish to brownish phlegm. No fever. ROS:   Review of Systems   Constitutional:  Positive for fatigue. Negative for activity change, appetite change and fever. HENT:  Positive for congestion, postnasal drip, rhinorrhea and sore throat. Negative for ear pain. Respiratory:  Positive for cough and chest tightness. Cardiovascular:  Negative for chest pain, palpitations and leg swelling. Gastrointestinal: Negative. Genitourinary: Negative. Neurological:  Negative for weakness and headaches. PMHx:  Past Medical History:   Diagnosis Date    ACS (acute coronary syndrome) ([address] 6/3/2019    Acute renal failure superimposed on stage 4 chronic kidney disease ([address] 9/4/2020    Aneurysm ([address]     abdominal (pt states NOT)    Arthritis     Bilateral leg edema     CAD (coronary artery disease)     sees [facility]/dr. russo.     Chronic kidney disease     Stage 4; sees dr. Alma Freire    Diabetes mellitus, type 2 ([address]     Fatty liver     GERD (gastroesophageal reflux disease)     HTN (hypertension)     Knee pain     NSTEMI (non-ST elevated myocardial infarction) ([address] 6/5/2019    Prolonged emergence from general anesthesia     S/P total knee arthroplasty, right 3/29/2022    Seasonal allergies     Vitreous hemorrhage ([address] 1/22/2021     Patient Active Problem List   Diagnosis    DM type 2, uncontrolled, with neuropathy    Essential hypertension    Gastroesophageal reflux disease without esophagitis    Family history of prostate cancer in father    History of colon polyps    Double vessel coronary artery disease    History of placement of stent in LAD coronary artery    Chronic kidney disease, stage III (moderate) (HCC)    Mixed hyperlipidemia    Morbid obesity (HCC)    Left ventricular dysfunction    Primary osteoarthritis involving multiple joints       PSHx:  Past Surgical History:   Procedure Laterality Date    CARPAL TUNNEL RELEASE      COLONOSCOPY  11/04/2015    DR Capone: polys, 5yr recall    COLONOSCOPY N/A 10/11/2016    Dr Lopez-diverticulosis, Sessile serrated AP (-) high grade dysplasia x 1, tubular AP (-) dysplasia x 2, HP x 2--3 yr recall    COLONOSCOPY N/A 12/16/2020    Dr Chayito Sotelo, Mod. Diverticulosis, Internal hemorrhoids-Grade 2 & ext hemorrhoids, 3 year recall    CORONARY ANGIOPLASTY WITH STENT PLACEMENT  06/04/2019    AGA to circ    CORONARY ANGIOPLASTY WITH STENT PLACEMENT  05/2018    AGA to osteal LAD    ESOPHAGEAL DILATATION N/A 12/16/2020    Dr Chayito Sotelo, empirical [address] fr bougie dilation, (+) GERD    EYE SURGERY      Right eye    PRE-MALIGNANT / Marlea Pacini      Dr. Dino Jackson Right 3/28/2022    RIGHT KNEE TOTAL ARTHROPLASTY performed by Torres Garcia MD at [address] [place] [place] [address]  09/20/2016    Dr Lopez-ramu/dilation over wire, 46 [place]-distal esophageal narrowing, Inna (-)    UPPER GASTROINTESTINAL ENDOSCOPY  09/20/2016    Dr Cyr/dilation over wire, 46 [place]-distal esophageal narrowing, Inna (-)       PFHx:  Family History   Problem Relation Age of Onset    Colon Cancer Mother     Prostate Cancer Father     Other Father         HX of blood clot    Diabetes Sister     Cancer Sister     Diabetes Brother     Colon Polyps Son     Esophageal Cancer Neg Hx     Liver Cancer Neg Hx     Liver Disease Neg Hx     Stomach Cancer Neg Hx     Rectal Cancer Neg Hx        SocialHx:  Social History     Tobacco Use    Smoking status: Never    Smokeless tobacco: Never   Substance Use Topics    Alcohol use: No       Allergies: Allergies   Allergen Reactions    Lipitor [Atorvastatin Calcium] Diarrhea and Other (See Comments)     Dizzy    Tape [facility] Tape] Hives and Rash       Medications:  Current Outpatient Medications   Medication Sig Dispense Refill    sodium bicarbonate 325 MG tablet Take 325 mg by mouth 4 times daily      albuterol (PROVENTIL) (5 MG/ML) 0.5% nebulizer solution Take 1 mL by nebulization every 6 hours as needed for Wheezing 60 mL 0    albuterol sulfate HFA (VENTOLIN HFA) 108 (90 Base) MCG/ACT inhaler Inhale 2 puffs into the lungs every 6 hours as needed for Wheezing 1 each 2    Alcohol Swabs PADS 1 box by Does not apply route 8 times daily 100 each 5    aspirin 81 MG chewable tablet CHEW AND SWALLOW 1 TABLET BY MOUTH ONCE DAILY 90 tablet 1    blood glucose test strips (ACCU-CHEK DUNIA) strip Patient is to test 8 times daily. Dx: E11.9  Please provide patient with Accu check Dunia Plus test strips per his Insurance request 50 each 1    ezetimibe (ZETIA) 10 MG tablet Take 1 tablet by mouth once daily 90 tablet 1    fluticasone (FLONASE) 50 MCG/ACT nasal spray USE 2 SPRAY(S) IN EACH NOSTRIL ONCE DAILY AS NEEDED 16 g 1    furosemide (LASIX) 20 MG tablet Take 1 tablet by mouth daily 30 tablet 1    gabapentin (NEURONTIN) 300 MG capsule TAKE 1 CAPSULE BY MOUTH THREE TIMES DAILY 270 capsule 0    INVOKANA 100 MG TABS tablet Take 1 tablet by mouth daily 90 tablet 1    irbesartan (AVAPRO) 150 MG tablet Take 1 tablet by mouth daily Take 1 tablet by mouth once daily 90 tablet 1    metFORMIN (GLUCOPHAGE) 1000 MG tablet TAKE ONE TABLET BY MOUTH TWICE DAILY WITH MEALS 180 tablet 1    metoprolol tartrate (LOPRESSOR) 50 MG tablet Take 1 tablet by mouth twice daily 180 tablet 1    nitroGLYCERIN (NITROSTAT) 0.4 MG SL tablet up to max of 3 total doses.  If no relief after 1 dose, call 911. 25 tablet 3    nystatin (MYCOSTATIN) 946042 UNIT/GM powder Apply topically 3 times daily as needed (itchy rash) 60 g 1    pantoprazole (PROTONIX) 40 MG tablet Take 1 tablet by mouth daily 90 tablet 1    prasugrel (EFFIENT) 10 MG TABS Take 1 tablet by mouth once daily 90 tablet 1    HYDROcodone-chlorpheniramine (TUSSIONEX PENNKINETIC ER) 10-8 MG/5ML SUER Take 5 mLs by mouth every 12 hours as needed (5) for up to 10 days. 100 mL 0    cyanocobalamin 1000 MCG/ML injection Inject 1,000 mcg into the muscle once 5,000 ml Dropper under tongue in the morning- Dr. Derrick Louis recommended      ondansetron (ZOFRAN) 4 MG tablet Take 1 tablet by mouth every 8 hours as needed for Nausea or Vomiting 20 tablet 0    KERENDIA 10 MG TABS Take 10 mg by mouth daily       Insulin Lispro-aabc (LYUMJEV KWIKPEN) 200 UNIT/ML SOPN Inject 25-30 Units into the skin 3 times daily as needed (\"5 cc per 15 carbs\") Sliding scale      Insulin Glargine, 1 Unit Dial, (TOUJEO SOLOSTAR) 300 UNIT/ML SOPN Inject 30 Units into the skin daily       Semaglutide,0.25 or 0.5MG/DOS, 2 MG/1.5ML SOPN Inject 0.25 mg into the skin once a week Mondays      Insulin Pen Needle ([employer] PEN NEEDLES) 31G X 6 MM MISC Dx: E11.9 150 each 3    Lancets 30G MISC Use to check blood sugar 8 times daily. E11.9 300 each 5     No current facility-administered medications for this visit. Objective:   PE:  /82   Pulse 80   Temp 97.6 °F (36.4 °C) (Temporal)   Ht 6' (1.829 m)   Wt 271 lb 12.8 oz (123.3 kg)   SpO2 97%   BMI 36.86 kg/m²   Physical Exam  Vitals and nursing note reviewed. Chaperone present: wife. Constitutional:       Appearance: He is ill-appearing. HENT:      Head: Normocephalic. Right Ear: Tympanic membrane, ear canal and external ear normal.      Left Ear: Tympanic membrane, ear canal and external ear normal.      Nose: Congestion and rhinorrhea present. Mouth/Throat:      Comments: PND  Eyes:      Conjunctiva/sclera: Conjunctivae normal.   Cardiovascular:      Rate and Rhythm: Normal rate and regular rhythm.       Heart sounds: Normal heart sounds. No murmur heard. Pulmonary:      Effort: No accessory muscle usage or respiratory distress. Breath sounds: Decreased air movement present. Decreased breath sounds present. Abdominal:      Palpations: Abdomen is soft. Tenderness: There is no abdominal tenderness. Musculoskeletal:      Cervical back: Neck supple. Lymphadenopathy:      Cervical: No cervical adenopathy. Skin:     Findings: No rash. Neurological:      Mental Status: He is alert and oriented to person, place, and time. Psychiatric:         Behavior: Behavior normal.       Post Neb:  Improved air entry. O2 sat 97-98%, no rales, rhonchi or wheezing    Assessment & Plan   Kotabette Aguilar was seen today for 3 month follow-up, diabetes and cough. Diagnoses and all orders for this visit:    Acute bronchitis, unspecified organism  -     albuterol (PROVENTIL) (5 MG/ML) 0.5% nebulizer solution; Take 1 mL by nebulization every 6 hours as needed for Wheezing  -     albuterol (PROVENTIL) nebulizer solution 2.5 mg  -     HYDROcodone-chlorpheniramine (TUSSIONEX PENNKINETIC ER) 10-8 MG/5ML SUER; Take 5 mLs by mouth every 12 hours as needed (5) for up to 10 days. Type 2 diabetes mellitus with hyperglycemia, with long-term current use of insulin ([facility])  -     INVOKANA 100 MG TABS tablet; Take 1 tablet by mouth daily  -     DME Order for [facility]) as OP    Stage 3a chronic kidney disease ([facility]    Primary osteoarthritis involving multiple joints    Coronary artery disease involving native heart without angina pectoris, unspecified vessel or lesion type  -     nitroGLYCERIN (NITROSTAT) 0.4 MG SL tablet; up to max of 3 total doses. If no relief after 1 dose, call 911. Medication refill  -     albuterol sulfate HFA (VENTOLIN HFA) 108 (90 Base) MCG/ACT inhaler;  Inhale 2 puffs into the lungs every 6 hours as needed for Wheezing  -     Alcohol Swabs PADS; 1 box by Does not apply route 8 times daily  -     aspirin 81 MG chewable tablet; CHEW AND SWALLOW 1 TABLET BY MOUTH ONCE DAILY  -     blood glucose test strips (ACCU-CHEK DUNIA) strip; Patient is to test 8 times daily. Dx: E11.9  Please provide patient with Accu check Dunia Plus test strips per his Insurance request  -     ezetimibe (ZETIA) 10 MG tablet; Take 1 tablet by mouth once daily  -     fluticasone (FLONASE) 50 MCG/ACT nasal spray; USE 2 SPRAY(S) IN EACH NOSTRIL ONCE DAILY AS NEEDED  -     furosemide (LASIX) 20 MG tablet; Take 1 tablet by mouth daily  -     gabapentin (NEURONTIN) 300 MG capsule; TAKE 1 CAPSULE BY MOUTH THREE TIMES DAILY  -     INVOKANA 100 MG TABS tablet; Take 1 tablet by mouth daily  -     irbesartan (AVAPRO) 150 MG tablet; Take 1 tablet by mouth daily Take 1 tablet by mouth once daily  -     metFORMIN (GLUCOPHAGE) 1000 MG tablet; TAKE ONE TABLET BY MOUTH TWICE DAILY WITH MEALS  -     metoprolol tartrate (LOPRESSOR) 50 MG tablet; Take 1 tablet by mouth twice daily  -     nitroGLYCERIN (NITROSTAT) 0.4 MG SL tablet; up to max of 3 total doses. If no relief after 1 dose, call 911.  -     nystatin (MYCOSTATIN) 227781 UNIT/GM powder; Apply topically 3 times daily as needed (itchy rash)  -     pantoprazole (PROTONIX) 40 MG tablet; Take 1 tablet by mouth daily  -     prasugrel (EFFIENT) 10 MG TABS; Take 1 tablet by mouth once daily      Return in 3 months (on 2/28/2023) for chronic care management. All questions were answered. Medications, including possible adverse effects, and instructions were reviewed and  understanding was confirmed. Follow-up recommendations, including when to contact or return to office (ie; if symptoms worsen or fail to improve), were discussed and acknowledged.     Electronically signed by Filomena Alberto MD on 11/30/22 at 8:42 AM CST

## 2022-11-30 NOTE — PROGRESS NOTES
After obtaining consent, and per orders of Dr. Aron Carlisle, Albuterol nebulizer treatment given by Melita Osler. Patient signed consent scanned into patients chart.

## 2022-12-01 ENCOUNTER — TELEPHONE (OUTPATIENT)
Dept: ENDOCRINOLOGY | Facility: CLINIC | Age: 67
End: 2022-12-01

## 2022-12-01 NOTE — TELEPHONE ENCOUNTER
Tamara from [facility] wanted to let us know about a possible drug interaction with Sildenafil, she stated he is prescribed Nitroglycerin from another provider. Do you want patient to continue Sildenafil?

## 2022-12-02 ENCOUNTER — OFFICE VISIT (OUTPATIENT)
Dept: CARDIOLOGY | Facility: CLINIC | Age: 67
End: 2022-12-02

## 2022-12-02 VITALS
BODY MASS INDEX: 36.3 KG/M2 | WEIGHT: 268 LBS | OXYGEN SATURATION: 98 % | SYSTOLIC BLOOD PRESSURE: 110 MMHG | HEART RATE: 77 BPM | DIASTOLIC BLOOD PRESSURE: 62 MMHG | HEIGHT: 72 IN

## 2022-12-02 DIAGNOSIS — E11.59 HYPERTENSION ASSOCIATED WITH DIABETES: ICD-10-CM

## 2022-12-02 DIAGNOSIS — E11.42 DIABETIC POLYNEUROPATHY ASSOCIATED WITH TYPE 2 DIABETES MELLITUS: ICD-10-CM

## 2022-12-02 DIAGNOSIS — E78.2 MIXED DIABETIC HYPERLIPIDEMIA ASSOCIATED WITH TYPE 2 DIABETES MELLITUS: ICD-10-CM

## 2022-12-02 DIAGNOSIS — I15.2 HYPERTENSION ASSOCIATED WITH DIABETES: ICD-10-CM

## 2022-12-02 DIAGNOSIS — Z76.0 MEDICATION REFILL: ICD-10-CM

## 2022-12-02 DIAGNOSIS — R06.09 DYSPNEA ON EXERTION: ICD-10-CM

## 2022-12-02 DIAGNOSIS — E11.69 MIXED DIABETIC HYPERLIPIDEMIA ASSOCIATED WITH TYPE 2 DIABETES MELLITUS: ICD-10-CM

## 2022-12-02 DIAGNOSIS — R07.89 CHEST PAIN, ATYPICAL: Primary | ICD-10-CM

## 2022-12-02 PROCEDURE — 99204 OFFICE O/P NEW MOD 45 MIN: CPT | Performed by: EMERGENCY MEDICINE

## 2022-12-02 RX ORDER — PRAVASTATIN SODIUM 40 MG
40 TABLET ORAL DAILY
Qty: 90 TABLET | Refills: 3 | Status: SHIPPED | OUTPATIENT
Start: 2022-12-02

## 2022-12-02 RX ORDER — BENZONATATE 100 MG/1
100 CAPSULE ORAL 3 TIMES DAILY PRN
Qty: 30 CAPSULE | Refills: 5 | Status: SHIPPED | OUTPATIENT
Start: 2022-12-02

## 2022-12-02 NOTE — PROGRESS NOTES
[facility] - CARDIOLOGY  New Patient Initial Outpatient Evaulation    Primary Care Physician: Ginny Collazo APRN    Subjective     Chief Complaint   Patient presents with   • Establish Care     NEW PT         History of Present Illness     The patient is a 67-year-old male who presents today for an initial visit. He is accompanied by his wife.    The patient reports that several years ago he began having difficulty breathing. He maintains he was seen by a hospitalist in the Emergency department at [facility] and was advised to return home because nothing was wrong with him. He returned home and had a feeling he was going to die and returned to the Emergency Department at [facility]. He recalls Dr. Solis observing he was in distress and attended to him immediately which led to the patient having a stent placed.     He maintains that since the incident he has attempted to establish care with cardiology but has been unsuccessful due to providers being rotated out. [facility] and was referred to the office for today's visit.     His wife reports the patient is severely fatigued after taking hot showers and requires rest afterwards. for several minutes. She maintains that the patient will not go to the emergency department and has noticed that his feet begin to swell when he is having cardiac symptoms. He has difficulty walking when his feet swell up. The patient reports no recent issues with lower extremity edema over the las 9 months.    When discussing the patient's medical history with him, he maintains that he was misdiagnosed with having an aortic aneurysm. He confirms visiting with Dr. Richmond in 03/2022 for clearance to have knee surgery which has been completed.    When asked the patient denies any chest pain but reports shortness of breath and fatigue and associates both with living a sedimentary lifestyle. he was a  and a bhakta previously and maintains he has not been able to continue  with that life.    When asked the patient and his wife report he has adverse effects with Lipitor. He reports having issues with coughing and has had issues getting his medication not being stocked. His wife reports he has a violent cough at nights.    Review of Systems   Constitutional: Positive for malaise/fatigue. Negative for diaphoresis and fever.   HENT: Negative for congestion.    Eyes: Negative for vision loss in left eye and vision loss in right eye.   Cardiovascular: Positive for leg swelling. Negative for chest pain, claudication, dyspnea on exertion, irregular heartbeat, orthopnea, palpitations and syncope.   Respiratory: Positive for shortness of breath. Negative for cough and wheezing.    Hematologic/Lymphatic: Negative for adenopathy.   Skin: Negative for rash.   Musculoskeletal: Negative for joint pain and joint swelling.   Gastrointestinal: Negative for abdominal pain, diarrhea, nausea and vomiting.   Neurological: Negative for excessive daytime sleepiness, dizziness, focal weakness, light-headedness, numbness and weakness.   Psychiatric/Behavioral: Negative for depression. The patient does not have insomnia.         Otherwise complete ROS reviewed and negative except as mentioned in the HPI.      Past Medical History:   Past Medical History:   Diagnosis Date   • Arthritis    • Diabetes mellitus (HCC)    • Hypertension    • Renal disorder        Past Surgical History:  Past Surgical History:   Procedure Laterality Date   • CARDIAC CATHETERIZATION     • CARPAL TUNNEL RELEASE     • CORONARY STENT PLACEMENT  2018    X 2   • TOTAL KNEE ARTHROPLASTY Right        Family History: family history includes Cancer in his mother; Diabetes in his brother and sister; No Known Problems in his brother.    Social History:  reports that he has never smoked. He has never used smokeless tobacco. He reports that he does not drink alcohol and does not use drugs.    Medications:  Prior to Admission medications     Medication Sig Start Date End Date Taking? Authorizing Provider   aspirin 81 MG chewable tablet Chew 81 mg Daily.   Yes Anand Foster MD   Continuous Blood Gluc  (FreeStyle Fercho 2 [place]) device 1 each Continuous. Use as indicated for glucose monitoring 3/2/21  Yes Juancho Pierson MD   Continuous Blood Gluc Sensor (FreeStyle Fercho 2 Sensor) misc 1 each Every 14 (Fourteen) Days. 3/2/21  Yes Juancho Pierson MD   empagliflozin (Jardiance) 25 MG tablet tablet Take 1 tablet by mouth Daily. One tablet daily before breakfast 11/17/22  Yes Juancho Pierson MD   ezetimibe (Zetia) 10 MG tablet Take 1 tablet by mouth Daily. 11/17/22 11/17/23 Yes Juancho Pierson MD   Finerenone (Kerendia) 10 MG tablet Take 1 tablet by mouth Daily. 9/2/21  Yes Juancho Pierson MD   fluticasone (FLONASE) 50 MCG/ACT nasal spray 1 spray into each nostril 2 (Two) Times a Day As Needed for rhinitis or allergies.   Yes Anand Foster MD   furosemide (LASIX) 40 MG tablet Take 20 mg by mouth As Needed. prn   Yes Anand Foster MD   gabapentin (NEURONTIN) 300 MG capsule Take 300 mg by mouth 3 (Three) Times a Day.   Yes Anand Foster MD   Glucagon, rDNA, (Glucagon Emergency) 1 MG kit Inject 1 mg under the skin into the appropriate area as directed 1 (One) Time As Needed (hypoglycemia) for up to 1 dose. 2/3/21  Yes Juancho Pierson MD   Insulin Glargine, 2 Unit Dial, (Toujeo Max SoloStar) 300 UNIT/ML solution pen-injector injection 80 units qhs 11/17/22  Yes Juancho Pierson MD   Insulin Lispro-aabc, 1 U Dial, (Lyumjev KwikPen) 100 UNIT/ML solution pen-injector Inject 80 Units under the skin into the appropriate area as directed 3 (Three) Times a Day With Meals. Up to 70u with meals 11/17/22  Yes Juancho Pierson MD   Insulin Pen Needle (PEN NEEDLES) 32G X 4 MM misc 1 each 4 (Four) Times a Day. Use 4 times per day to inject insulin DX E11.9  "3/10/20  Yes Juancho Pierson MD   irbesartan (AVAPRO) 150 MG tablet Take 300 mg by mouth Every Night.   Yes Anand Foster MD   metFORMIN ER (Glucophage XR) 500 MG 24 hr tablet 2 tabs twice daily with meals , generic ok 11/17/22  Yes Juancho Pierson MD   metoprolol tartrate (LOPRESSOR) 50 MG tablet Take 1 tablet by mouth 2 (Two) Times a Day. 2/20/17  Yes Roopa Franklin APRN   nitroglycerin (NITROSTAT) 0.4 MG SL tablet Place 0.4 mg under the tongue Every 5 (Five) Minutes As Needed for Chest Pain. Take no more than 3 doses in 15 minutes.   Yes Anand Foster MD   oxyCODONE-acetaminophen (PERCOCET)  MG per tablet Take 1 tablet by mouth Every 6 (Six) Hours As Needed for Moderate Pain .   Yes Anand Foster MD   PANTOPRAZOLE SODIUM PO Take 40 mg by mouth Daily. Twice daily   Yes Anand Foster MD   Semaglutide, 2 MG/DOSE, (Ozempic, 2 MG/DOSE,) 8 MG/3ML solution pen-injector Inject 2 mg under the skin into the appropriate area as directed 1 (One) Time Per Week. 11/17/22  Yes Juancho Pierson MD   sildenafil (Viagra) 100 MG tablet Take 1 tablet by mouth As Needed for Erectile Dysfunction. 11/17/22 11/17/23 Yes Juancho Pierson MD   prasugrel (EFFIENT) 10 MG tablet Take 10 mg by mouth Daily.  12/2/22 Yes ProviderAnand MD   benzonatate (Tessalon Perles) 100 MG capsule Take 1 capsule by mouth 3 (Three) Times a Day As Needed for Cough. 12/2/22   Leonel Oliva DO   pravastatin (Pravachol) 40 MG tablet Take 1 tablet by mouth Daily. 12/2/22   Leonel Oliva DO     Allergies:  Allergies   Allergen Reactions   • Atorvastatin Calcium Unknown - Low Severity   • Latex Hives       Objective     Vital Signs: /62   Pulse 77   Ht 182.9 cm (72.01\")   Wt 122 kg (268 lb)   SpO2 98%   BMI 36.34 kg/m²     Vitals and nursing note reviewed.   Constitutional:       Appearance: Normal and healthy appearance. Well-developed and " not in distress.   Eyes:      Extraocular Movements: Extraocular movements intact.      Pupils: Pupils are equal, round, and reactive to light.   HENT:      Head: Normocephalic and atraumatic.    Mouth/Throat:      Pharynx: Oropharynx is clear.   Neck:      Vascular: JVD normal.      Trachea: Trachea normal.   Pulmonary:      Effort: Pulmonary effort is normal.      Breath sounds: Normal breath sounds. No wheezing. No rhonchi. No rales.   Cardiovascular:      PMI at left midclavicular line. Normal rate. Regular rhythm. Normal S1. Normal S2.      Murmurs: There is no murmur.      No gallop. No click. No rub.   Edema:     Peripheral edema absent.   Abdominal:      General: Bowel sounds are normal.      Palpations: Abdomen is soft.      Tenderness: There is no abdominal tenderness.   Musculoskeletal: Normal range of motion.      Cervical back: Normal range of motion and neck supple. Skin:     General: Skin is warm and dry.      Capillary Refill: Capillary refill takes less than 2 seconds.   Feet:      Right foot:      Skin integrity: Skin integrity normal.      Left foot:      Skin integrity: Skin integrity normal.   Neurological:      Mental Status: Alert and oriented to person, place and time.      Cranial Nerves: Cranial nerves are intact.      Sensory: Sensation is intact.      Motor: Motor function is intact.      Coordination: Coordination is intact.   Psychiatric:         Speech: Speech normal.         Behavior: Behavior is cooperative.         Results Reviewed:      ECG 12 Lead    Date/Time: 12/8/2022 1:13 PM  Performed by: Leonel Oliva DO  Authorized by: Leonel Oliva DO   Comparison: compared with previous ECG   Similar to previous ECG  Rhythm: sinus rhythm  Rate: normal  Conduction: incomplete right bundle branch block  ST Segments: ST segments normal  T Waves: T waves normal  QRS axis: left  Other: no other findings    Clinical impression: abnormal EKG              Lab Results    Component Value Date    CHOL 140 11/17/2022    TRIG 200 (H) 11/17/2022    HDL 28 (L) 11/17/2022    VLDL 34 11/17/2022    LDLHDL 2.57 11/17/2022     Lab Results   Component Value Date    HGBA1C 8.7 (H) 11/17/2022       Assessment / Plan        Problem List Items Addressed This Visit        Cardiac and Vasculature    Hypertension associated with diabetes (HCC)    Mixed diabetic hyperlipidemia associated with type 2 diabetes mellitus (HCC)    Relevant Medications    pravastatin (Pravachol) 40 MG tablet    Dyspnea on exertion    Relevant Orders    Adult Transthoracic Echo Complete W/ Cont if Necessary Per Protocol       Neuro    Diabetic polyneuropathy associated with type 2 diabetes mellitus (HCC)       Symptoms and Signs    Chest pain, atypical - Primary    Relevant Orders    Adult Stress Echo W/ Cont or Stress Agent if Necessary Per Protocol     Cardiac health maintenance  - The patient presents today to establish care. His last visit to cardiology was with Dr. Richmond last year. Nuclear stress test from 05/2018 was reported as normal. He had a heart catheterization performed and stent was placed 2 days after the stress test in 05/2018 by Dr. Solis. He had a 70 percent blockage when the stent was placed in his LAD and had an additional stent placed in his circumflex in 2019.    The patient's ejection fraction was 40 percent at that time and he had issues with edema. His BMP was normal and additional testing was done with nuclear stress test and an ECHO. His stress test was normal with no evidence of ischemia. Normal LV wall motion and systolic function. The ECHO demonstrated normal LV systolic function and severe left ventricular hypertrophy, otherwise normal. He had some exertional shortness of breath but denied any palpitations or syncope at that time. Reported fatigue after taking showers.     Advised the patient on the results of the nuclear stress test and echocardiogram and the relation to his heart anatomy  and function. The patient has hypertension, diabetes mellitus, coronary disease, kidney disease and abdominal aneurysm. The patient maintains he was misdiagnosed with having an aortic aneurysm.    The patient's EKG is normal. Ordered an ultrasound and stress test of the heart. He currently prescribed baby aspirin daily, Jardiance, Zetia, Lasix as needed, irbesartan, metoprolol 50 mg twice a day and Effient. Discontinued the patient's prescription for Effient. The patient's LDL is 76, ordered a prescription for Pravachol. The patient was advised to contact the office if he has any ad verse effects with the Pravachol. Ordered a prescription for Tessalon   Perles.    The patient will follow-up in 2 months.      Transcribed from ambient dictation for Leonel Oliva DO by Twin Yee.  12/02/22   13:00 CST    Patient or patient representative verbalized consent to the visit recording.  I have personally performed the services described in this document as transcribed by the above individual, and it is both accurate and complete.  Leonel Oliva DO  12/8/2022  13:14 CST

## 2022-12-02 NOTE — TELEPHONE ENCOUNTER
Patient come into the office for a office visit and patient requested his diabetic shoe order to go to [place] [id number] in [facility]. Fax documents and they are scanned into patients chart with fax confirmation.

## 2022-12-02 NOTE — TELEPHONE ENCOUNTER
[employer] sent in a request for clarification. Patient does not test 8 times daily, he wears a free style evangelina and he only test when he needs to calibrate. [facility] requested new prescription for test strips with new sig and diagnosis.

## 2022-12-08 ENCOUNTER — TELEPHONE (OUTPATIENT)
Dept: PODIATRY | Facility: CLINIC | Age: 67
End: 2022-12-08

## 2022-12-08 PROBLEM — R07.89 CHEST PAIN, ATYPICAL: Status: ACTIVE | Noted: 2022-12-08

## 2022-12-08 PROBLEM — R06.09 DYSPNEA ON EXERTION: Status: ACTIVE | Noted: 2022-12-08

## 2022-12-08 PROCEDURE — 93000 ELECTROCARDIOGRAM COMPLETE: CPT | Performed by: EMERGENCY MEDICINE

## 2022-12-09 ENCOUNTER — OFFICE VISIT (OUTPATIENT)
Dept: PODIATRY | Facility: CLINIC | Age: 67
End: 2022-12-09

## 2022-12-09 VITALS
WEIGHT: 268 LBS | HEART RATE: 81 BPM | OXYGEN SATURATION: 99 % | DIASTOLIC BLOOD PRESSURE: 62 MMHG | SYSTOLIC BLOOD PRESSURE: 110 MMHG | BODY MASS INDEX: 36.3 KG/M2 | HEIGHT: 72 IN

## 2022-12-09 DIAGNOSIS — R60.9 PERIPHERAL EDEMA: ICD-10-CM

## 2022-12-09 DIAGNOSIS — Z79.4 TYPE 2 DIABETES MELLITUS WITH DIABETIC NEUROPATHY, WITH LONG-TERM CURRENT USE OF INSULIN: ICD-10-CM

## 2022-12-09 DIAGNOSIS — E11.40 TYPE 2 DIABETES MELLITUS WITH DIABETIC NEUROPATHY, WITH LONG-TERM CURRENT USE OF INSULIN: ICD-10-CM

## 2022-12-09 DIAGNOSIS — Z79.02 ANTIPLATELET OR ANTITHROMBOTIC LONG-TERM USE: ICD-10-CM

## 2022-12-09 DIAGNOSIS — B35.1 ONYCHOMYCOSIS: Primary | ICD-10-CM

## 2022-12-09 PROCEDURE — 11721 DEBRIDE NAIL 6 OR MORE: CPT | Performed by: PODIATRIST

## 2022-12-09 RX ORDER — PRASUGREL 10 MG/1
TABLET, FILM COATED ORAL EVERY 24 HOURS
COMMUNITY

## 2022-12-12 DIAGNOSIS — J20.9 ACUTE BRONCHITIS, UNSPECIFIED ORGANISM: Primary | ICD-10-CM

## 2022-12-12 RX ORDER — ALBUTEROL SULFATE 2.5 MG/3ML
2.5 SOLUTION RESPIRATORY (INHALATION) EVERY 6 HOURS PRN
Qty: 120 EACH | Refills: 3 | Status: SHIPPED | OUTPATIENT
Start: 2022-12-12

## 2022-12-12 NOTE — TELEPHONE ENCOUNTER
hui Morales called requesting a refill of the below medication which has been pended for you:   The original RX was not sent as pre mixed.    Requested Prescriptions     Pending Prescriptions Disp Refills    albuterol (PROVENTIL) (2.5 MG/3ML) 0.083% nebulizer solution 120 each 3     Sig: Take 3 mLs by nebulization every 6 hours as needed for Wheezing       Last Appointment Date: 11/30/2022  Next Appointment Date: 2/28/2023    Allergies   Allergen Reactions    Lipitor [Atorvastatin Calcium] Diarrhea and Other (See Comments)     Dizzy    Tape [place] Tape] Hives and Rash

## 2022-12-22 ENCOUNTER — TELEPHONE (OUTPATIENT)
Dept: PRIMARY CARE CLINIC | Age: 67
End: 2022-12-22

## 2022-12-22 NOTE — TELEPHONE ENCOUNTER
Patient stated he was unable to get his cough medication that you had called in last because of it being on back order. Patient stated pharmacy recommend we call in Guaifenesin with Codeine. Patient is asking if we could send some in to [facility] for him.

## 2022-12-28 ENCOUNTER — HOSPITAL ENCOUNTER (OUTPATIENT)
Dept: CARDIOLOGY | Facility: HOSPITAL | Age: 67
Discharge: HOME | End: 2022-12-28

## 2022-12-28 VITALS — DIASTOLIC BLOOD PRESSURE: 87 MMHG | SYSTOLIC BLOOD PRESSURE: 173 MMHG | HEART RATE: 72 BPM

## 2022-12-28 VITALS
HEIGHT: 72 IN | WEIGHT: 268 LBS | BODY MASS INDEX: 36.3 KG/M2 | DIASTOLIC BLOOD PRESSURE: 62 MMHG | SYSTOLIC BLOOD PRESSURE: 110 MMHG

## 2022-12-28 DIAGNOSIS — R06.09 DYSPNEA ON EXERTION: ICD-10-CM

## 2022-12-28 DIAGNOSIS — R07.89 CHEST PAIN, ATYPICAL: ICD-10-CM

## 2022-12-28 PROCEDURE — 93306 TTE W/DOPPLER COMPLETE: CPT

## 2022-12-28 PROCEDURE — 93350 STRESS TTE ONLY: CPT

## 2022-12-28 PROCEDURE — 0 DOBUTAMINE PER 250 MG: Performed by: EMERGENCY MEDICINE

## 2022-12-28 PROCEDURE — 93018 CV STRESS TEST I&R ONLY: CPT | Performed by: EMERGENCY MEDICINE

## 2022-12-28 PROCEDURE — 25010000002 PERFLUTREN 6.52 MG/ML SUSPENSION: Performed by: EMERGENCY MEDICINE

## 2022-12-28 PROCEDURE — 93017 CV STRESS TEST TRACING ONLY: CPT

## 2022-12-28 PROCEDURE — 25010000002 ATROPINE SULFATE: Performed by: EMERGENCY MEDICINE

## 2022-12-28 PROCEDURE — 93350 STRESS TTE ONLY: CPT | Performed by: EMERGENCY MEDICINE

## 2022-12-28 PROCEDURE — 93352 ADMIN ECG CONTRAST AGENT: CPT | Performed by: EMERGENCY MEDICINE

## 2022-12-28 RX ORDER — DOBUTAMINE HYDROCHLORIDE 100 MG/100ML
10-50 INJECTION INTRAVENOUS CONTINUOUS
Status: DISCONTINUED | OUTPATIENT
Start: 2022-12-28 | End: 2022-12-29 | Stop reason: HOSPADM

## 2022-12-28 RX ADMIN — DOBUTAMINE HYDROCHLORIDE 10 MCG/KG/MIN: 100 INJECTION INTRAVENOUS at 08:40

## 2022-12-28 RX ADMIN — ATROPINE SULFATE 1 MG: 0.1 INJECTION INTRAVENOUS at 08:58

## 2022-12-28 RX ADMIN — PERFLUTREN 8.48 MG: 6.52 INJECTION, SUSPENSION INTRAVENOUS at 08:40

## 2022-12-28 NOTE — TELEPHONE ENCOUNTER
Pt was seen by Linda Jacobo and wife was requesting the same medication from her which she refused and I agree with her decision. Attempts to call pt at home unsuccessful. If he is still having problems with his cough, he may need to come in to be re-evaluated.

## 2023-01-01 LAB
BH CV STRESS BP STAGE 1: NORMAL
BH CV STRESS BP STAGE 2: NORMAL
BH CV STRESS BP STAGE 3: NORMAL
BH CV STRESS DOB - ATROPINE STAGE 3: 1
BH CV STRESS DOSE DOBUTAMINE STAGE 1: 10
BH CV STRESS DOSE DOBUTAMINE STAGE 2: 20
BH CV STRESS DOSE DOBUTAMINE STAGE 3: 30
BH CV STRESS DURATION MIN STAGE 1: 3
BH CV STRESS DURATION MIN STAGE 2: 3
BH CV STRESS DURATION MIN STAGE 3: 4
BH CV STRESS DURATION SEC STAGE 1: 0
BH CV STRESS DURATION SEC STAGE 2: 0
BH CV STRESS DURATION SEC STAGE 3: 14
BH CV STRESS HR STAGE 1: 73
BH CV STRESS HR STAGE 2: 96
BH CV STRESS HR STAGE 3: 136
BH CV STRESS PROTOCOL 1: NORMAL
BH CV STRESS RECOVERY BP: NORMAL MMHG
BH CV STRESS RECOVERY HR: 100 BPM
BH CV STRESS STAGE 1: 1
BH CV STRESS STAGE 2: 2
BH CV STRESS STAGE 3: 3
MAXIMAL PREDICTED HEART RATE: 151 BPM
PERCENT MAX PREDICTED HR: 90.07 %
STRESS BASELINE BP: NORMAL MMHG
STRESS BASELINE HR: 71 BPM
STRESS PERCENT HR: 106 %
STRESS POST EXERCISE DUR MIN: 10 MIN
STRESS POST EXERCISE DUR SEC: 14 SEC
STRESS POST PEAK BP: NORMAL MMHG
STRESS POST PEAK HR: 136 BPM
STRESS TARGET HR: 128 BPM

## 2023-01-02 LAB
BH CV ECHO MEAS - AO MAX PG: 4.8 MMHG
BH CV ECHO MEAS - AO MEAN PG: 3 MMHG
BH CV ECHO MEAS - AO ROOT DIAM: 3.6 CM
BH CV ECHO MEAS - AO V2 MAX: 110 CM/SEC
BH CV ECHO MEAS - AO V2 VTI: 23 CM
BH CV ECHO MEAS - AVA(I,D): 3.1 CM2
BH CV ECHO MEAS - EDV(CUBED): 85.8 ML
BH CV ECHO MEAS - EDV(MOD-SP4): 140 ML
BH CV ECHO MEAS - EF(MOD-SP4): 66.1 %
BH CV ECHO MEAS - ESV(CUBED): 20.3 ML
BH CV ECHO MEAS - ESV(MOD-SP4): 47.5 ML
BH CV ECHO MEAS - FS: 38.1 %
BH CV ECHO MEAS - IVS/LVPW: 1.47 CM
BH CV ECHO MEAS - IVSD: 1.72 CM
BH CV ECHO MEAS - LA DIMENSION: 4.5 CM
BH CV ECHO MEAS - LAT PEAK E' VEL: 7.4 CM/SEC
BH CV ECHO MEAS - LV DIASTOLIC VOL/BSA (35-75): 58 CM2
BH CV ECHO MEAS - LV MASS(C)D: 252.9 GRAMS
BH CV ECHO MEAS - LV MAX PG: 2.5 MMHG
BH CV ECHO MEAS - LV MEAN PG: 1 MMHG
BH CV ECHO MEAS - LV SYSTOLIC VOL/BSA (12-30): 19.7 CM2
BH CV ECHO MEAS - LV V1 MAX: 79.5 CM/SEC
BH CV ECHO MEAS - LV V1 VTI: 19 CM
BH CV ECHO MEAS - LVIDD: 4.4 CM
BH CV ECHO MEAS - LVIDS: 2.7 CM
BH CV ECHO MEAS - LVOT AREA: 3.8 CM2
BH CV ECHO MEAS - LVOT DIAM: 2.2 CM
BH CV ECHO MEAS - LVPWD: 1.17 CM
BH CV ECHO MEAS - MED PEAK E' VEL: 4.8 CM/SEC
BH CV ECHO MEAS - MV A MAX VEL: 96 CM/SEC
BH CV ECHO MEAS - MV DEC TIME: 0.23 MSEC
BH CV ECHO MEAS - MV E MAX VEL: 69.8 CM/SEC
BH CV ECHO MEAS - MV E/A: 0.73
BH CV ECHO MEAS - RAP SYSTOLE: 5 MMHG
BH CV ECHO MEAS - RVSP: 20.2 MMHG
BH CV ECHO MEAS - SI(MOD-SP4): 38.3 ML/M2
BH CV ECHO MEAS - SV(LVOT): 72.2 ML
BH CV ECHO MEAS - SV(MOD-SP4): 92.5 ML
BH CV ECHO MEAS - TR MAX PG: 15.2 MMHG
BH CV ECHO MEAS - TR MAX VEL: 195 CM/SEC
BH CV ECHO MEASUREMENTS AVERAGE E/E' RATIO: 11.44
MAXIMAL PREDICTED HEART RATE: 151 BPM
STRESS TARGET HR: 128 BPM

## 2023-01-04 ENCOUNTER — DOCUMENTATION (OUTPATIENT)
Dept: ENDOCRINOLOGY | Facility: CLINIC | Age: 68
End: 2023-01-04
Payer: MEDICARE

## 2023-02-07 RX ORDER — FINERENONE 10 MG/1
TABLET, FILM COATED ORAL
Qty: 30 TABLET | Refills: 0 | Status: SHIPPED | OUTPATIENT
Start: 2023-02-07 | End: 2023-03-06 | Stop reason: SDUPTHER

## 2023-02-23 ENCOUNTER — OFFICE VISIT (OUTPATIENT)
Dept: PRIMARY CARE CLINIC | Age: 68
End: 2023-02-23

## 2023-02-23 VITALS
DIASTOLIC BLOOD PRESSURE: 78 MMHG | SYSTOLIC BLOOD PRESSURE: 122 MMHG | TEMPERATURE: 97.8 F | BODY MASS INDEX: 36.62 KG/M2 | WEIGHT: 270.4 LBS | OXYGEN SATURATION: 98 % | HEART RATE: 73 BPM | HEIGHT: 72 IN

## 2023-02-23 DIAGNOSIS — N18.31 STAGE 3A CHRONIC KIDNEY DISEASE (HCC): ICD-10-CM

## 2023-02-23 DIAGNOSIS — Z79.4 TYPE 2 DIABETES MELLITUS WITH HYPERGLYCEMIA, WITH LONG-TERM CURRENT USE OF INSULIN (HCC): Primary | ICD-10-CM

## 2023-02-23 DIAGNOSIS — I25.10 CORONARY ARTERY DISEASE INVOLVING NATIVE HEART WITHOUT ANGINA PECTORIS, UNSPECIFIED VESSEL OR LESION TYPE: ICD-10-CM

## 2023-02-23 DIAGNOSIS — E66.01 SEVERE OBESITY (BMI 35.0-39.9) WITH COMORBIDITY (HCC): ICD-10-CM

## 2023-02-23 DIAGNOSIS — I25.119 ATHEROSCLEROSIS OF NATIVE CORONARY ARTERY OF NATIVE HEART WITH ANGINA PECTORIS (HCC): ICD-10-CM

## 2023-02-23 DIAGNOSIS — Z76.0 MEDICATION REFILL: ICD-10-CM

## 2023-02-23 DIAGNOSIS — E11.65 TYPE 2 DIABETES MELLITUS WITH HYPERGLYCEMIA, WITH LONG-TERM CURRENT USE OF INSULIN (HCC): Primary | ICD-10-CM

## 2023-02-23 RX ORDER — NITROGLYCERIN 0.4 MG/1
TABLET SUBLINGUAL
Qty: 25 TABLET | Refills: 3 | Status: SHIPPED | OUTPATIENT
Start: 2023-02-23

## 2023-02-23 RX ORDER — CYANOCOBALAMIN/FOLIC ACID 1MG-400MCG
TABLET, SUBLINGUAL SUBLINGUAL
COMMUNITY

## 2023-02-23 SDOH — ECONOMIC STABILITY: INCOME INSECURITY: HOW HARD IS IT FOR YOU TO PAY FOR THE VERY BASICS LIKE FOOD, HOUSING, MEDICAL CARE, AND HEATING?: NOT HARD AT ALL

## 2023-02-23 SDOH — ECONOMIC STABILITY: FOOD INSECURITY: WITHIN THE PAST 12 MONTHS, THE FOOD YOU BOUGHT JUST DIDN'T LAST AND YOU DIDN'T HAVE MONEY TO GET MORE.: NEVER TRUE

## 2023-02-23 SDOH — ECONOMIC STABILITY: FOOD INSECURITY: WITHIN THE PAST 12 MONTHS, YOU WORRIED THAT YOUR FOOD WOULD RUN OUT BEFORE YOU GOT MONEY TO BUY MORE.: NEVER TRUE

## 2023-02-23 SDOH — ECONOMIC STABILITY: HOUSING INSECURITY
IN THE LAST 12 MONTHS, WAS THERE A TIME WHEN YOU DID NOT HAVE A STEADY PLACE TO SLEEP OR SLEPT IN A SHELTER (INCLUDING NOW)?: NO

## 2023-02-23 ASSESSMENT — PATIENT HEALTH QUESTIONNAIRE - PHQ9
SUM OF ALL RESPONSES TO PHQ9 QUESTIONS 1 & 2: 0
SUM OF ALL RESPONSES TO PHQ QUESTIONS 1-9: 0
SUM OF ALL RESPONSES TO PHQ QUESTIONS 1-9: 0
2. FEELING DOWN, DEPRESSED OR HOPELESS: 0
SUM OF ALL RESPONSES TO PHQ QUESTIONS 1-9: 0
1. LITTLE INTEREST OR PLEASURE IN DOING THINGS: 0
SUM OF ALL RESPONSES TO PHQ QUESTIONS 1-9: 0

## 2023-02-23 ASSESSMENT — ENCOUNTER SYMPTOMS
GASTROINTESTINAL NEGATIVE: 1
EYES NEGATIVE: 1
RESPIRATORY NEGATIVE: 1

## 2023-02-23 NOTE — PROGRESS NOTES
[address] [facility]  [address] [address] [place] [address]  Dept: [phone]  Dept Fax: [phone]  Loc: [phone]      Subjective:     Chief Complaint   Patient presents with    3 Month Follow-Up       HPI:  Seymour Anne is a 69 y.o. male presents today for his routine follow-up  PMHx reviewed. No interval change since last visit. He states his BS is averaging 153 mg/dl  Chronic meds reviewed  updated and reconciled  Pt c/o fever and increase shortness of breath> mild congestion  Pt goes to the endocrinologist for diabetes care ( Dr Toney Watkins) and he has an appt to see him in 2 weeks. Pt's A1c today  is 8.6 %  He has an appt to see new cardiologist next week at [facility]:   Review of Systems   Constitutional: Negative. HENT:  Positive for congestion. Eyes: Negative. Respiratory: Negative. Cardiovascular: Negative. Gastrointestinal: Negative. Genitourinary: Negative. Musculoskeletal:  Positive for arthralgias (s/p R knee replacement). Skin: Negative. Neurological: Negative. Hematological: Negative. Psychiatric/Behavioral: Negative. PMHx:  Past Medical History:   Diagnosis Date    ACS (acute coronary syndrome) ([facility] 6/3/2019    Acute renal failure superimposed on stage 4 chronic kidney disease ([facility] [address] 9/4/2020    Aneurysm ([facility] [address]     abdominal (pt states NOT)    Arthritis     Bilateral leg edema     CAD (coronary artery disease)     sees [facility]/dr. russo.     Chronic kidney disease     Stage 4; sees dr. Omar Harper    Diabetes mellitus, type 2 ([facility] [address]     Fatty liver     GERD (gastroesophageal reflux disease)     HTN (hypertension)     Knee pain     NSTEMI (non-ST elevated myocardial infarction) ([address] 6/5/2019    Prolonged emergence from general anesthesia     S/P total knee arthroplasty, right 3/29/2022    Seasonal allergies     Vitreous hemorrhage ([address] 1/22/2021     Patient Active Problem List   Diagnosis DM type 2, uncontrolled, with neuropathy    Essential hypertension    Gastroesophageal reflux disease without esophagitis    Family history of prostate cancer in father    History of colon polyps    Double vessel coronary artery disease    History of placement of stent in LAD coronary artery    Chronic kidney disease, stage III (moderate) (HCC)    Mixed hyperlipidemia    Morbid obesity (HCC)    Left ventricular dysfunction    Primary osteoarthritis involving multiple joints       PSHx:  Past Surgical History:   Procedure Laterality Date    CARPAL TUNNEL RELEASE      COLONOSCOPY  11/04/2015    DR Capone: polys, 5yr recall    COLONOSCOPY N/A 10/11/2016    Dr Lopez-diverticulosis, Sessile serrated AP (-) high grade dysplasia x 1, tubular AP (-) dysplasia x 2, HP x 2--3 yr recall    COLONOSCOPY N/A 12/16/2020    Dr Clarence Garcia, Mod. Diverticulosis, Internal hemorrhoids-Grade 2 & ext hemorrhoids, 3 year recall    CORONARY ANGIOPLASTY WITH STENT PLACEMENT  06/04/2019    AGA to circ    CORONARY ANGIOPLASTY WITH STENT PLACEMENT  05/2018    AGA to osteal LAD    ESOPHAGEAL DILATATION N/A 12/16/2020    Dr Clarence Garcia, empirical Malik 47 fr bougie dilation, (+) GERD    EYE SURGERY      Right eye    PRE-MALIGNANT / Zeyad Matson Right 3/28/2022    RIGHT KNEE TOTAL ARTHROPLASTY performed by Mary Chirinos MD at [address] ENDOSCOPY  09/20/2016    Dr Cyr/dilation over wire, 46 [place]-distal esophageal narrowing, Inna (-)    UPPER GASTROINTESTINAL ENDOSCOPY  09/20/2016    Dr Cyr/dilation over wire, 46 [place]ese-distal esophageal narrowing, Inna (-)       PFHx:  Family History   Problem Relation Age of Onset    Colon Cancer Mother     Prostate Cancer Father     Other Father         HX of blood clot    Diabetes Sister     Cancer Sister     Diabetes Brother     Colon Polyps Son     Esophageal Cancer Neg Hx     Liver Cancer Neg Hx     Liver Disease Neg Hx     Stomach Cancer Neg Hx     Rectal Cancer Neg Hx        SocialHx:  Social History     Tobacco Use    Smoking status: Never    Smokeless tobacco: Never   Substance Use Topics    Alcohol use: No       Allergies: Allergies   Allergen Reactions    Lipitor [Atorvastatin Calcium] Diarrhea and Other (See Comments)     Dizzy    Tape [place] Tape] Hives and Rash       Medications:  Current Outpatient Medications   Medication Sig Dispense Refill    Cobalamin Combinations (B-12) 1000-400 MCG SUBL Place under the tongue      nitroGLYCERIN (NITROSTAT) 0.4 MG SL tablet up to max of 3 total doses.  If no relief after 1 dose, call 911. 25 tablet 3    albuterol (PROVENTIL) (2.5 MG/3ML) 0.083% nebulizer solution Take 3 mLs by nebulization every 6 hours as needed for Wheezing 120 each 3    sodium bicarbonate 325 MG tablet Take 325 mg by mouth 4 times daily      albuterol sulfate HFA (VENTOLIN HFA) 108 (90 Base) MCG/ACT inhaler Inhale 2 puffs into the lungs every 6 hours as needed for Wheezing 1 each 2    Alcohol Swabs PADS 1 box by Does not apply route 8 times daily 100 each 5    aspirin 81 MG chewable tablet CHEW AND SWALLOW 1 TABLET BY MOUTH ONCE DAILY 90 tablet 1    ezetimibe (ZETIA) 10 MG tablet Take 1 tablet by mouth once daily 90 tablet 1    fluticasone (FLONASE) 50 MCG/ACT nasal spray USE 2 SPRAY(S) IN EACH NOSTRIL ONCE DAILY AS NEEDED 16 g 1    furosemide (LASIX) 20 MG tablet Take 1 tablet by mouth daily 30 tablet 1    gabapentin (NEURONTIN) 300 MG capsule TAKE 1 CAPSULE BY MOUTH THREE TIMES DAILY 270 capsule 0    INVOKANA 100 MG TABS tablet Take 1 tablet by mouth daily 90 tablet 1    irbesartan (AVAPRO) 150 MG tablet Take 1 tablet by mouth daily Take 1 tablet by mouth once daily 90 tablet 1    metFORMIN (GLUCOPHAGE) 1000 MG tablet TAKE ONE TABLET BY MOUTH TWICE DAILY WITH MEALS 180 tablet 1    metoprolol tartrate (LOPRESSOR) 50 MG tablet Take 1 tablet by mouth twice daily 180 tablet 1    nystatin (MYCOSTATIN) 148950 UNIT/GM powder Apply topically 3 times daily as needed (itchy rash) 60 g 1    pantoprazole (PROTONIX) 40 MG tablet Take 1 tablet by mouth daily 90 tablet 1    KERENDIA 10 MG TABS Take 10 mg by mouth daily       Insulin Lispro-aabc (LYUMJEV KWIKPEN) 200 UNIT/ML SOPN Inject 25-30 Units into the skin 3 times daily as needed (\"5 cc per 15 carbs\") Sliding scale      Insulin Glargine, 1 Unit Dial, (TOUJEO SOLOSTAR) 300 UNIT/ML SOPN Inject 30 Units into the skin daily       Semaglutide,0.25 or 0.5MG/DOS, 2 MG/1.5ML SOPN Inject 0.25 mg into the skin once a week Mondays      Insulin Pen Needle ([employer] PEN NEEDLES) 31G X 6 MM MISC Dx: E11.9 150 each 3    Lancets 30G MISC Use to check blood sugar 8 times daily. E11.9 300 each 5     No current facility-administered medications for this visit. Objective:   PE:  /78   Pulse 73   Temp 97.8 °F (36.6 °C) (Temporal)   Ht 6' (1.829 m)   Wt 270 lb 6.4 oz (122.7 kg)   SpO2 98%   BMI 36.67 kg/m²   Physical Exam  Vitals and nursing note reviewed. Constitutional:       General: He is not in acute distress. Appearance: He is obese. He is not ill-appearing. HENT:      Head: Normocephalic and atraumatic. Right Ear: External ear normal. A middle ear effusion is present. Tympanic membrane is scarred. Left Ear: External ear normal. A middle ear effusion is present. Tympanic membrane is scarred. Nose: Nose normal. No congestion. Mouth/Throat:      Mouth: Mucous membranes are moist.      Pharynx: Oropharynx is clear. Eyes:      Conjunctiva/sclera: Conjunctivae normal.      Pupils: Pupils are equal, round, and reactive to light. Cardiovascular:      Rate and Rhythm: Normal rate and regular rhythm. Pulses: Normal pulses. Heart sounds: Normal heart sounds. No murmur heard. Pulmonary:      Effort: Pulmonary effort is normal.      Breath sounds: Normal breath sounds. Abdominal:      General: Abdomen is protuberant.       Palpations: Abdomen is soft. Tenderness: There is no abdominal tenderness. Musculoskeletal:         General: No swelling. Tenderness:  s/p R TKR. Skin:     Findings: No lesion. Neurological:      General: No focal deficit present. Mental Status: He is alert and oriented to person, place, and time. Cranial Nerves: No cranial nerve deficit. Motor: Motor function is intact. Coordination: Coordination is intact. Gait: Gait is intact. Psychiatric:         Mood and Affect: Mood normal.        Visual inspection:  Deformity/amputation: absent  Skin lesions/pre-ulcerative calluses: present -  Edema: right- trace, left- trace    Sensory exam:  Monofilament sensation: abnormal -  (minimum of 5 random plantar locations tested, avoiding callused areas - > 1 area with absence of sensation is + for neuropathy)    Plus at least one of the following:  Pulses: normal,   Pinprick: Impaired  Proprioception: Impaired  Vibration (128 Hz): Impaired   Assessment & Plan   Derek Morales was seen today for 3 month follow-up. Diagnoses and all orders for this visit:    Type 2 diabetes mellitus with hyperglycemia, with long-term current use of insulin ([facility])  -     POCT glycosylated hemoglobin (Hb A1C)  -     Diabetic Foot Exam  -     Diabetic Shoe    Coronary artery disease involving native heart without angina pectoris, unspecified vessel or lesion type  -     nitroGLYCERIN (NITROSTAT) 0.4 MG SL tablet; up to max of 3 total doses. If no relief after 1 dose, call 911. Stage 3a chronic kidney disease ([facility])    Severe obesity (BMI 35.0-39. 9) with comorbidity ([facility])    Medication refill  -     nitroGLYCERIN (NITROSTAT) 0.4 MG SL tablet; up to max of 3 total doses. If no relief after 1 dose, call 911. Atherosclerosis of native coronary artery of native heart with angina pectoris ([facility] [address]    Continue all maintenance medications as prescribed  Continue diabetic diet  Continue attempts at weight loss.   Engaged in regular exercise as tolerated - at least 30 minutes/day  Low fat/low calorie diet  Daily foot care  Recommend annual diabetic eye exam  Stay well hydrated - 64 oz of water a day  Keep scheduled follow-up visit with me and with other specialist  Call with new concerns   Return in 7 weeks (on 4/13/2023) for Medicare Wellness Visit. All questions were answered. Medications, including possible adverse effects, and instructions were reviewed and  understanding was confirmed. Follow-up recommendations, including when to contact or return to office (ie; if symptoms worsen or fail to improve), were discussed and acknowledged.     Electronically signed by Alexa Neil MD on 2/23/23 at 11:42 AM CST

## 2023-03-01 NOTE — PROGRESS NOTES
[facility] - PODIATRY    Today's Date: 03/14/23    Patient Name: Mick Sims  MRN: [id number]  CSN: [id number]  PCP: Provider, No Known   Referring Provider: No ref. provider found    SUBJECTIVE     Chief Complaint   Patient presents with   • Follow-up     Ginny Collzao APRN-01/ 10/2023- 3 month diabetic nail care-pt states he is here today for diabetic nail care-pt reports pain with neuropathy    • Diabetes     160 mg/dl BG     HPI: Mick Sims, a 67 y.o.male, comes to clinic as a(n) established patient presenting for diabetic foot exam and complaining of thick fungal toenails. Patient has h/o arthritis, DM2, HTN, Renal Disorder. Patient is IDDM with last stated BG level of 160mg/dl. Notes mild numbness and tingling in feet.  Denies open wounds or sores. States that his toenails are long, thick and discolored. He has trouble caring for them himself. Denies pain today. Continues Effient daily. Relates previous treatment(s) including foot care by podiatry. Denies any constitutional symptoms. No other pedal complaints at this time.    Past Medical History:   Diagnosis Date   • Arthritis    • Diabetes mellitus (HCC)    • Hypertension    • Renal disorder      Past Surgical History:   Procedure Laterality Date   • CARDIAC CATHETERIZATION     • CARPAL TUNNEL RELEASE     • CORONARY STENT PLACEMENT  2018    X 2   • TOTAL KNEE ARTHROPLASTY Right      Family History   Problem Relation Age of Onset   • Cancer Mother    • Diabetes Sister    • Diabetes Brother    • No Known Problems Brother      Social History     Socioeconomic History   • Marital status:    Tobacco Use   • Smoking status: Never     Passive exposure: Never   • Smokeless tobacco: Never   Vaping Use   • Vaping Use: Never used   Substance and Sexual Activity   • Alcohol use: No   • Drug use: Never   • Sexual activity: Defer     Allergies   Allergen Reactions   • Atorvastatin Calcium Unknown - Low Severity   • Latex Hives     Current  Outpatient Medications   Medication Sig Dispense Refill   • aspirin 81 MG chewable tablet Chew 1 tablet Daily.     • benzonatate (Tessalon Perles) 100 MG capsule Take 1 capsule by mouth 3 (Three) Times a Day As Needed for Cough. 30 capsule 5   • Continuous Blood Gluc  (FreeStyle Fercho 2 [place]) device 1 each Continuous. Use as indicated for glucose monitoring 1 each 1   • Continuous Blood Gluc Sensor (FreeStyle Fercho 2 Sensor) misc 1 each Every 14 (Fourteen) Days. 2 each 11   • empagliflozin (Jardiance) 25 MG tablet tablet Take 1 tablet by mouth Daily. One tablet daily before breakfast 30 tablet 11   • ezetimibe (Zetia) 10 MG tablet Take 1 tablet by mouth Daily. 90 tablet 3   • Finerenone (Kerendia) 10 MG tablet Take 1 tablet by mouth Daily. 30 tablet 11   • fluticasone (FLONASE) 50 MCG/ACT nasal spray 1 spray into the nostril(s) as directed by provider 2 (Two) Times a Day As Needed for Rhinitis or Allergies.     • furosemide (LASIX) 40 MG tablet Take 20 mg by mouth As Needed. prn     • gabapentin (NEURONTIN) 300 MG capsule Take 1 capsule by mouth 3 (Three) Times a Day.     • Glucagon, rDNA, (Glucagon Emergency) 1 MG kit Inject 1 mg under the skin into the appropriate area as directed 1 (One) Time As Needed (hypoglycemia) for up to 1 dose. 1 each 11   • Insulin Degludec-Liraglutide (Xultophy) 100-3.6 UNIT-MG/ML solution pen-injector subcutaneous pen Inject 50 Units under the skin into the appropriate area as directed Daily. 15 mL 11   • Insulin Lispro-aabc, 1 U Dial, (Andrae LouPen) 100 UNIT/ML solution pen-injector Inject 80 Units under the skin into the appropriate area as directed 3 (Three) Times a Day With Meals. Up to 70u with meals 72 mL 3   • Insulin Pen Needle (PEN NEEDLES) 32G X 4 MM misc 1 each 4 (Four) Times a Day. Use 4 times per day to inject insulin DX E11.9 400 each 3   • irbesartan (AVAPRO) 150 MG tablet Take 2 tablets by mouth Every Night.     • metFORMIN ER (Glucophage XR) 500 MG 24 hr  tablet 2 tabs twice daily with meals , generic ok 360 tablet 3   • metoprolol tartrate (LOPRESSOR) 50 MG tablet Take 1 tablet by mouth 2 (Two) Times a Day. 30 tablet 0   • nitroglycerin (NITROSTAT) 0.4 MG SL tablet Place 1 tablet under the tongue Every 5 (Five) Minutes As Needed for Chest Pain. Take no more than 3 doses in 15 minutes.     • oxyCODONE-acetaminophen (PERCOCET)  MG per tablet Take 1 tablet by mouth Every 6 (Six) Hours As Needed for Moderate Pain.     • PANTOPRAZOLE SODIUM PO Take 40 mg by mouth Daily. Twice daily     • prasugrel (EFFIENT) 10 MG tablet Daily.     • pravastatin (Pravachol) 40 MG tablet Take 1 tablet by mouth Daily. 90 tablet 3   • sildenafil (Viagra) 100 MG tablet Take 1 tablet by mouth As Needed for Erectile Dysfunction. 10 tablet 11     No current facility-administered medications for this visit.     Review of Systems   Constitutional: Negative for chills and fever.   HENT: Negative for congestion.    Respiratory: Negative for shortness of breath.    Cardiovascular: Positive for leg swelling. Negative for chest pain.   Gastrointestinal: Negative for constipation, diarrhea, nausea and vomiting.   Musculoskeletal:        Foot pain   Skin: Negative for wound.   Neurological: Positive for numbness.       OBJECTIVE     Vitals:    03/14/23 0926   BP: 138/76   Pulse: 76   SpO2: 98%       PHYSICAL EXAM  GEN:   Accompanied by none.     Foot/Ankle Exam:       General:   Diabetic Foot Exam Performed    Appearance: appears stated age and healthy    Orientation: AAOx3    Affect: appropriate    Gait: antalgic    Assistance: independent    Shoe Gear:  Casual shoes    VASCULAR      Right Foot Vascularity   Dorsalis pedis:  2+  Posterior tibial:  2+  Skin Temperature: warm    Edema Grading:  None  CFT:  3  Pedal Hair Growth:  Present  Varicosities: mild varicosities       Left Foot Vascularity   Dorsalis pedis:  2+  Posterior tibial:  2+  Skin Temperature: warm    Edema Grading:  None  CFT:   3  Pedal Hair Growth:  Present  Varicosities: mild varicosities        NEUROLOGIC     Right Foot Neurologic   Light touch sensation:  Diminished  Vibratory sensation:  Diminished  Hot/Cold sensation: diminished    Protective Sensation using [place] Monofilament:  4     Left Foot Neurologic   Light touch sensation:  Diminished  Vibratory sensation:  Diminished  Hot/cold sensation: diminished    Protective Sensation using [place] Monofilament:  4     MUSCULOSKELETAL      Right Foot Musculoskeletal   Ecchymosis:  None  Tenderness: none    Arch:  Normal     Left Foot Musculoskeletal   Ecchymosis:  None  Tenderness: none    Arch:  Normal     MUSCLE STRENGTH     Right Foot Muscle Strength   Foot dorsiflexion:  5  Foot plantar flexion:  5  Foot inversion:  5  Foot eversion:  5     Left Foot Muscle Strength   Foot dorsiflexion:  5  Foot plantar flexion:  5  Foot inversion:  5  Foot eversion:  5     RANGE OF MOTION      Right Foot Range of Motion   Foot and ankle ROM within normal limits       Left Foot Range of Motion   Foot and ankle ROM within normal limits       DERMATOLOGIC     Right Foot Dermatologic   Skin: tinea (interdigital)    Nails: onychomycosis, abnormally thick, subungual debris and dystrophic nails    Nails comment:  1-5     Left Foot Dermatologic   Skin: tinea (interdigital)    Nails: onychomycosis, abnormally thick, subungual debris and dystrophic nails    Nails comment:  1-5      RADIOLOGY/NUCLEAR:  No results found.    LABORATORY/CULTURE RESULTS:      PATHOLOGY RESULTS:       ASSESSMENT/PLAN     Diagnoses and all orders for this visit:    1. Onychomycosis (Primary)    2. Type 2 diabetes mellitus with diabetic neuropathy, with long-term current use of insulin (HCC)    3. Peripheral edema    4. Antiplatelet or antithrombotic long-term use    5. Encounter for diabetic foot exam ([facility])    6. Tinea pedis of both feet      Comprehensive lower extremity examination and evaluation was  performed.  Discussed findings and treatment plan including risks, benefits, and treatment options with patient in detail. Patient agreed with treatment plan.  Diabetic foot exam performed.  After verbal consent obtained, nail(s) x10 debrided of length and thickness with nail nipper without incidence  Patient may maintain nails and calluses at home utilizing emery board or pumice stone between visits as needed  Reviewed at home diabetic foot care including daily foot checks   Continue daily use of compression stockings and elevate extremities at rest.  Continue diabetic monitoring and control under direction of PCP.   Advised patient to get OTC antifungal agent for feet and utilize BID x 3 weeks to treat tinea.  An After Visit Summary was printed and given to the patient at discharge, including (if requested) any available informative/educational handouts regarding diagnosis, treatment, or medications. All questions were answered to patient/family satisfaction. Should symptoms fail to improve or worsen they agree to call or return to clinic or to go to the Emergency Department. Discussed the importance of following up with any needed screening tests/labs/specialist appointments and any requested follow-up recommended by me today. Importance of maintaining follow-up discussed and patient accepts that missed appointments can delay diagnosis and potentially lead to worsening of conditions.  Return in about 3 months (around 6/14/2023)., or sooner if acute issues arise.        This document has been electronically signed by Mehdi Valencia DPM on March 14, 2023 09:40 CDT

## 2023-03-03 ENCOUNTER — TELEPHONE (OUTPATIENT)
Dept: ENDOCRINOLOGY | Facility: CLINIC | Age: 68
End: 2023-03-03
Payer: MEDICARE

## 2023-03-03 ENCOUNTER — LAB (OUTPATIENT)
Dept: LAB | Facility: HOSPITAL | Age: 68
End: 2023-03-03
Payer: MEDICARE

## 2023-03-03 DIAGNOSIS — E78.2 MIXED DIABETIC HYPERLIPIDEMIA ASSOCIATED WITH TYPE 2 DIABETES MELLITUS: ICD-10-CM

## 2023-03-03 DIAGNOSIS — E11.69 MIXED DIABETIC HYPERLIPIDEMIA ASSOCIATED WITH TYPE 2 DIABETES MELLITUS: ICD-10-CM

## 2023-03-03 DIAGNOSIS — E55.9 VITAMIN D DEFICIENCY: Primary | ICD-10-CM

## 2023-03-03 DIAGNOSIS — E11.65 TYPE 2 DIABETES MELLITUS WITH HYPERGLYCEMIA, WITH LONG-TERM CURRENT USE OF INSULIN: ICD-10-CM

## 2023-03-03 DIAGNOSIS — Z79.4 TYPE 2 DIABETES MELLITUS WITH HYPERGLYCEMIA, WITH LONG-TERM CURRENT USE OF INSULIN: ICD-10-CM

## 2023-03-03 LAB
25(OH)D3 SERPL-MCNC: 47.6 NG/ML (ref 30–100)
ALBUMIN SERPL-MCNC: 4.3 G/DL (ref 3.5–5.2)
ALBUMIN UR-MCNC: 6.1 MG/DL
ALBUMIN/GLOB SERPL: 1.6 G/DL
ALP SERPL-CCNC: 71 U/L (ref 39–117)
ALT SERPL W P-5'-P-CCNC: 14 U/L (ref 1–41)
ANION GAP SERPL CALCULATED.3IONS-SCNC: 8.1 MMOL/L (ref 5–15)
AST SERPL-CCNC: 16 U/L (ref 1–40)
BASOPHILS # BLD AUTO: 0.09 10*3/MM3 (ref 0–0.2)
BASOPHILS NFR BLD AUTO: 1.1 % (ref 0–1.5)
BILIRUB SERPL-MCNC: 0.5 MG/DL (ref 0–1.2)
BUN SERPL-MCNC: 21 MG/DL (ref 8–23)
BUN/CREAT SERPL: 16.2 (ref 7–25)
CALCIUM SPEC-SCNC: 10.2 MG/DL (ref 8.6–10.5)
CHLORIDE SERPL-SCNC: 103 MMOL/L (ref 98–107)
CHOLEST SERPL-MCNC: 103 MG/DL (ref 0–200)
CO2 SERPL-SCNC: 25.9 MMOL/L (ref 22–29)
CREAT SERPL-MCNC: 1.3 MG/DL (ref 0.76–1.27)
CREAT UR-MCNC: 104.4 MG/DL
DEPRECATED RDW RBC AUTO: 41.4 FL (ref 37–54)
EGFRCR SERPLBLD CKD-EPI 2021: 59.5 ML/MIN/1.73
EOSINOPHIL # BLD AUTO: 0.18 10*3/MM3 (ref 0–0.4)
EOSINOPHIL NFR BLD AUTO: 2.2 % (ref 0.3–6.2)
ERYTHROCYTE [DISTWIDTH] IN BLOOD BY AUTOMATED COUNT: 14.5 % (ref 12.3–15.4)
GLOBULIN UR ELPH-MCNC: 2.7 GM/DL
GLUCOSE SERPL-MCNC: 132 MG/DL (ref 65–99)
HBA1C MFR BLD: 8.5 % (ref 4.8–5.6)
HCT VFR BLD AUTO: 45.2 % (ref 37.5–51)
HDLC SERPL-MCNC: 32 MG/DL (ref 40–60)
HGB BLD-MCNC: 14.6 G/DL (ref 13–17.7)
IMM GRANULOCYTES # BLD AUTO: 0.04 10*3/MM3 (ref 0–0.05)
IMM GRANULOCYTES NFR BLD AUTO: 0.5 % (ref 0–0.5)
LDLC SERPL CALC-MCNC: 46 MG/DL (ref 0–100)
LDLC/HDLC SERPL: 1.33 {RATIO}
LYMPHOCYTES # BLD AUTO: 2.63 10*3/MM3 (ref 0.7–3.1)
LYMPHOCYTES NFR BLD AUTO: 32.5 % (ref 19.6–45.3)
MCH RBC QN AUTO: 25.5 PG (ref 26.6–33)
MCHC RBC AUTO-ENTMCNC: 32.3 G/DL (ref 31.5–35.7)
MCV RBC AUTO: 78.9 FL (ref 79–97)
MICROALBUMIN/CREAT UR: 58.4 MG/G
MONOCYTES # BLD AUTO: 0.58 10*3/MM3 (ref 0.1–0.9)
MONOCYTES NFR BLD AUTO: 7.2 % (ref 5–12)
NEUTROPHILS NFR BLD AUTO: 4.56 10*3/MM3 (ref 1.7–7)
NEUTROPHILS NFR BLD AUTO: 56.5 % (ref 42.7–76)
NRBC BLD AUTO-RTO: 0 /100 WBC (ref 0–0.2)
PLATELET # BLD AUTO: 221 10*3/MM3 (ref 140–450)
PMV BLD AUTO: 12.8 FL (ref 6–12)
POTASSIUM SERPL-SCNC: 4.5 MMOL/L (ref 3.5–5.2)
PROT SERPL-MCNC: 7 G/DL (ref 6–8.5)
RBC # BLD AUTO: 5.73 10*6/MM3 (ref 4.14–5.8)
SODIUM SERPL-SCNC: 137 MMOL/L (ref 136–145)
TRIGL SERPL-MCNC: 143 MG/DL (ref 0–150)
TSH SERPL DL<=0.05 MIU/L-ACNC: 2.14 UIU/ML (ref 0.27–4.2)
VLDLC SERPL-MCNC: 25 MG/DL (ref 5–40)
WBC NRBC COR # BLD: 8.08 10*3/MM3 (ref 3.4–10.8)

## 2023-03-03 PROCEDURE — 82306 VITAMIN D 25 HYDROXY: CPT | Performed by: INTERNAL MEDICINE

## 2023-03-03 PROCEDURE — 83036 HEMOGLOBIN GLYCOSYLATED A1C: CPT | Performed by: INTERNAL MEDICINE

## 2023-03-03 PROCEDURE — 84443 ASSAY THYROID STIM HORMONE: CPT | Performed by: INTERNAL MEDICINE

## 2023-03-03 PROCEDURE — 80053 COMPREHEN METABOLIC PANEL: CPT | Performed by: INTERNAL MEDICINE

## 2023-03-03 PROCEDURE — 36415 COLL VENOUS BLD VENIPUNCTURE: CPT | Performed by: INTERNAL MEDICINE

## 2023-03-03 PROCEDURE — 82570 ASSAY OF URINE CREATININE: CPT | Performed by: INTERNAL MEDICINE

## 2023-03-03 PROCEDURE — 85025 COMPLETE CBC W/AUTO DIFF WBC: CPT | Performed by: INTERNAL MEDICINE

## 2023-03-03 PROCEDURE — 82043 UR ALBUMIN QUANTITATIVE: CPT | Performed by: INTERNAL MEDICINE

## 2023-03-03 PROCEDURE — 80061 LIPID PANEL: CPT | Performed by: INTERNAL MEDICINE

## 2023-03-03 NOTE — TELEPHONE ENCOUNTER
[facility] in [place] called stating they received the lab orders for the pt, but they need for Dr Cheng to sign them.    Please advise.    Thanks

## 2023-03-06 ENCOUNTER — OFFICE VISIT (OUTPATIENT)
Dept: ENDOCRINOLOGY | Facility: CLINIC | Age: 68
End: 2023-03-06
Payer: MEDICARE

## 2023-03-06 VITALS
DIASTOLIC BLOOD PRESSURE: 60 MMHG | HEART RATE: 69 BPM | SYSTOLIC BLOOD PRESSURE: 120 MMHG | WEIGHT: 268 LBS | BODY MASS INDEX: 36.3 KG/M2 | OXYGEN SATURATION: 97 % | HEIGHT: 72 IN

## 2023-03-06 DIAGNOSIS — E55.9 VITAMIN D DEFICIENCY: ICD-10-CM

## 2023-03-06 DIAGNOSIS — E11.69 MIXED DIABETIC HYPERLIPIDEMIA ASSOCIATED WITH TYPE 2 DIABETES MELLITUS: ICD-10-CM

## 2023-03-06 DIAGNOSIS — E11.59 HYPERTENSION ASSOCIATED WITH DIABETES: ICD-10-CM

## 2023-03-06 DIAGNOSIS — E11.21 DIABETIC NEPHROPATHY ASSOCIATED WITH TYPE 2 DIABETES MELLITUS: ICD-10-CM

## 2023-03-06 DIAGNOSIS — E78.2 MIXED DIABETIC HYPERLIPIDEMIA ASSOCIATED WITH TYPE 2 DIABETES MELLITUS: ICD-10-CM

## 2023-03-06 DIAGNOSIS — Z79.4 TYPE 2 DIABETES MELLITUS WITH HYPERGLYCEMIA, WITH LONG-TERM CURRENT USE OF INSULIN: Primary | ICD-10-CM

## 2023-03-06 DIAGNOSIS — I15.2 HYPERTENSION ASSOCIATED WITH DIABETES: ICD-10-CM

## 2023-03-06 DIAGNOSIS — E11.65 TYPE 2 DIABETES MELLITUS WITH HYPERGLYCEMIA, WITH LONG-TERM CURRENT USE OF INSULIN: Primary | ICD-10-CM

## 2023-03-06 DIAGNOSIS — E29.1 MALE HYPOGONADISM: ICD-10-CM

## 2023-03-06 PROCEDURE — 95251 CONT GLUC MNTR ANALYSIS I&R: CPT | Performed by: INTERNAL MEDICINE

## 2023-03-06 PROCEDURE — 99214 OFFICE O/P EST MOD 30 MIN: CPT | Performed by: INTERNAL MEDICINE

## 2023-03-06 RX ORDER — (INSULIN DEGLUDEC AND LIRAGLUTIDE) 100; 3.6 [IU]/ML; MG/ML
50 INJECTION, SOLUTION SUBCUTANEOUS DAILY
Qty: 15 ML | Refills: 11 | Status: SHIPPED | OUTPATIENT
Start: 2023-03-06

## 2023-03-06 RX ORDER — FINERENONE 10 MG/1
1 TABLET, FILM COATED ORAL DAILY
Qty: 30 TABLET | Refills: 11 | Status: SHIPPED | OUTPATIENT
Start: 2023-03-06

## 2023-03-06 NOTE — PROGRESS NOTES
"Mick Sims is a 67 y.o. male who presents for follow up Type 2 diabetes      HPI    T2DM    Duration 10 years    Complications - nephropathy, retinopathy, peripheral neuropathy and cardiovascular disease    Current symptoms/problems  paresthesia of the feet and visual disturbances , ED     Alleviating Factors: Compliance       Current monitoring regimen: home blood tests - using brenna       PE    /60   Pulse 69   Ht 182.9 cm (72\")   Wt 122 kg (268 lb)   SpO2 97%   BMI 36.35 kg/m²   AOx3  No visible goiter  RRR  CTA  No Edema  Foot Exam - normal sensation to feet    Labs    Lab Results   Component Value Date    WBC 8.08 03/03/2023    HGB 14.6 03/03/2023    HCT 45.2 03/03/2023    MCV 78.9 (L) 03/03/2023     03/03/2023     Lab Results   Component Value Date    GLUCOSE 132 (H) 03/03/2023    BUN 21 03/03/2023    CREATININE 1.30 (H) 03/03/2023    EGFRIFNONA 40 (A) 04/01/2022    EGFRIFAFRI 49 (L) 04/01/2022    BCR 16.2 03/03/2023    K 4.5 03/03/2023    CO2 25.9 03/03/2023    CALCIUM 10.2 03/03/2023    ALBUMIN 4.3 03/03/2023    AST 16 03/03/2023    ALT 14 03/03/2023     Lab Results   Component Value Date    EGFR 59.5 (L) 03/03/2023    EGFR 41.6 (L) 11/17/2022    EGFR 46.6 (L) 08/09/2022     Lab Results   Component Value Date    MALBCRERATIO 58.4 03/03/2023    MALBCRERATIO 67.6 11/17/2022           Assessment & Plan       ICD-10-CM ICD-9-CM   1. Type 2 diabetes mellitus with hyperglycemia, with long-term current use of insulin ([facility])  E11.65 250.00    Z79.4 790.29     V58.67   2. Mixed diabetic hyperlipidemia associated with type 2 diabetes mellitus ([facility])  E11.69 250.80    E78.2 272.2   3. Hypertension associated with diabetes ([facility])  E11.59 250.80    I15.2 401.9   4. Vitamin D deficiency  E55.9 268.9   5. Diabetic nephropathy associated with type 2 diabetes mellitus (HCC)  E11.21 250.40     583.81   6. Male hypogonadism  E29.1 257.2       Pt has type 2 diabetes w ckd and cad     Glycemic Management: " "  Lab Results   Component Value Date    HGBA1C 8.50 (H) 03/03/2023    HGBA1C 8.7 (H) 11/17/2022    HGBA1C 10.10 (H) 08/09/2022       Ambulatory Glucose Profile Report    Days Analyzed : 2 week period ending on 03/06/23      Continuous Glucose Monitory Device:  FreeStyle Fercho 2     - 9% very high target range  - 25% high target range  - 6% in target range  - 0% below target range  - GMI 7.4 %  - Average glucose 170 mg/dl    Interpretation : Diabetes Type 2 w hyperglycemia         toujeo 40 -- now lantus 30 qam - toujeo 40   Now doing 30 -  Since last appt 36  ========================================  Ozempic 1 mg , rx as 2 mg pen    This appt , combine as xultophy 50 units qhs rx , but taking 35   ============================================    Metformin 1000 mg twice a day ===   Change to xr   Decrease to 1000 total since diarrhea   ============================================      Invokana 100mg  daily  - gave farxiga samples   This time jardiance samples     ============================================    Humalog     20 w meals approx, may need more since changing to xultophy         ======================  HTN  /60   Pulse 69   Ht 182.9 cm (72\")   Wt 122 kg (268 lb)   SpO2 97%   BMI 36.35 kg/m²       bp controlled on irbesartan now 300  hctz stopped due to hypercalcemia now corrected but only using PRN   Calcium now nl   ===========  Lipids controlled on lipitor but allergic, change to zetia and this worked  Lab Results   Component Value Date    CHOL 103 03/03/2023    CHLPL 177 12/13/2019    TRIG 143 03/03/2023    HDL 32 (L) 03/03/2023    LDL 46 03/03/2023      Microvascular complications     Renal     CKD stage 3a/A2  Lab Results   Component Value Date    EGFR 59.5 (L) 03/03/2023    EGFR 41.6 (L) 11/17/2022     Lab Results   Component Value Date    MALBCRERATIO 58.4 03/03/2023    MALBCRERATIO 67.6 11/17/2022       On kerendia 10 mg daily ( already on sglt2 and ARB )  He will try to pay     Eyes  Yes " retinopathy     Neuropathy   Yes on gabapentin       Vit D Def  Vit D low, start 1000 units daily       Vit b12  Taking sublingual   This document has been electronically signed by Juancho Mobley MD on March 6, 2023 11:33 CST              This document has been electronically signed by Juancho Mobley MD on March 6, 2023 11:33 CST

## 2023-03-13 ENCOUNTER — TELEPHONE (OUTPATIENT)
Dept: PODIATRY | Facility: CLINIC | Age: 68
End: 2023-03-13
Payer: MEDICARE

## 2023-03-14 ENCOUNTER — OFFICE VISIT (OUTPATIENT)
Dept: PODIATRY | Facility: CLINIC | Age: 68
End: 2023-03-14
Payer: MEDICARE

## 2023-03-14 ENCOUNTER — OFFICE VISIT (OUTPATIENT)
Dept: CARDIOLOGY | Facility: CLINIC | Age: 68
End: 2023-03-14
Payer: MEDICARE

## 2023-03-14 VITALS
WEIGHT: 268 LBS | OXYGEN SATURATION: 98 % | DIASTOLIC BLOOD PRESSURE: 76 MMHG | SYSTOLIC BLOOD PRESSURE: 140 MMHG | BODY MASS INDEX: 36.3 KG/M2 | HEIGHT: 72 IN | HEART RATE: 60 BPM

## 2023-03-14 VITALS
DIASTOLIC BLOOD PRESSURE: 76 MMHG | BODY MASS INDEX: 34.95 KG/M2 | HEIGHT: 72 IN | WEIGHT: 258 LBS | OXYGEN SATURATION: 98 % | SYSTOLIC BLOOD PRESSURE: 138 MMHG | HEART RATE: 76 BPM

## 2023-03-14 DIAGNOSIS — Z79.02 ANTIPLATELET OR ANTITHROMBOTIC LONG-TERM USE: ICD-10-CM

## 2023-03-14 DIAGNOSIS — E11.40 TYPE 2 DIABETES MELLITUS WITH DIABETIC NEUROPATHY, WITH LONG-TERM CURRENT USE OF INSULIN: ICD-10-CM

## 2023-03-14 DIAGNOSIS — R60.9 PERIPHERAL EDEMA: ICD-10-CM

## 2023-03-14 DIAGNOSIS — I15.2 HYPERTENSION ASSOCIATED WITH DIABETES: ICD-10-CM

## 2023-03-14 DIAGNOSIS — E78.2 MIXED DIABETIC HYPERLIPIDEMIA ASSOCIATED WITH TYPE 2 DIABETES MELLITUS: ICD-10-CM

## 2023-03-14 DIAGNOSIS — Z79.4 TYPE 2 DIABETES MELLITUS WITH DIABETIC NEUROPATHY, WITH LONG-TERM CURRENT USE OF INSULIN: ICD-10-CM

## 2023-03-14 DIAGNOSIS — R06.09 DYSPNEA ON EXERTION: Primary | ICD-10-CM

## 2023-03-14 DIAGNOSIS — B35.1 ONYCHOMYCOSIS: Primary | ICD-10-CM

## 2023-03-14 DIAGNOSIS — E11.9 ENCOUNTER FOR DIABETIC FOOT EXAM: ICD-10-CM

## 2023-03-14 DIAGNOSIS — E11.69 MIXED DIABETIC HYPERLIPIDEMIA ASSOCIATED WITH TYPE 2 DIABETES MELLITUS: ICD-10-CM

## 2023-03-14 DIAGNOSIS — B35.3 TINEA PEDIS OF BOTH FEET: ICD-10-CM

## 2023-03-14 DIAGNOSIS — E11.59 HYPERTENSION ASSOCIATED WITH DIABETES: ICD-10-CM

## 2023-03-14 DIAGNOSIS — R07.89 CHEST PAIN, ATYPICAL: ICD-10-CM

## 2023-03-14 PROCEDURE — 3078F DIAST BP <80 MM HG: CPT | Performed by: EMERGENCY MEDICINE

## 2023-03-14 PROCEDURE — 99213 OFFICE O/P EST LOW 20 MIN: CPT | Performed by: PODIATRIST

## 2023-03-14 PROCEDURE — 99213 OFFICE O/P EST LOW 20 MIN: CPT | Performed by: EMERGENCY MEDICINE

## 2023-03-14 PROCEDURE — 3077F SYST BP >= 140 MM HG: CPT | Performed by: EMERGENCY MEDICINE

## 2023-03-14 PROCEDURE — 11721 DEBRIDE NAIL 6 OR MORE: CPT | Performed by: PODIATRIST

## 2023-03-14 NOTE — PROGRESS NOTES
"     Subjective:     Encounter Date:03/14/2023      Patient ID: Mick Sims is a 67 y.o. male.    Chief Complaint:  History of Present Illness     Mick Sims is a 67-year-old gentleman who presents today for a 3-month follow-up on chest pain. He is accompanied by an adult female.    The patient states he has been feeling pretty good. He reports he has been lazy and experiencing diarrhea. The adult female states the patient has diarrhea no matter what. She reports bilateral feet have been swelling and it has worried her. She notes she has to give him Lasix and that is not something she wants to do. The patient states he thought it was his metformin medication he was taking so he discontinued it. He reports it is a lot better since he stopped taking it. The adult female states it slowed down some but then it came back. The patient reports now he is going to the bathroom a lot. He notes he has been taking Kerendia for one year and a half. He states he goes to see an endocrinologist about 3 times per year. He reports that he advised him the Kerendia would not be a medication that causes diarrhea. He states he has gotten blood work done at his last appointment with the endocrinologist. He denies any bad chest pains. He reports he has some pain in his ribs but he thinks he damaged them earlier when he had a few bad coughing spells. He notes he was also sneezing but none of it is unbearable. He states he will usually get up and exercise a little in the morning and it goes away. The patient reports the swelling has never gotten really big and \"puffy\". He states it does swell enough to where the adult female would notice it. He notes that he can feel when his feet are swelling. the adult female reports when they do swell, you cannot see the veins on the top of his feet.    Stress test results show good blood flow and good oxygen at rest, but not good blood flow or good oxygen during stress. Could be a blockage or a " stent causing one of the problems. Echocardiogram results showed systolic function was 66-70 percent. Diastolic function was normal. Trace mitral regurgitation.    Current medications include aspirin 82 mg, Effient, Pravachol, Avapro, Lasix, Zetia, Jardiance, and Kerendia.      Review of Systems   Constitutional: Negative for diaphoresis, fever and malaise/fatigue.   HENT: Negative for congestion.    Eyes: Negative for vision loss in left eye and vision loss in right eye.   Cardiovascular: Negative for chest pain, claudication, dyspnea on exertion, irregular heartbeat, leg swelling, orthopnea, palpitations and syncope.   Respiratory: Negative for cough, shortness of breath and wheezing.    Hematologic/Lymphatic: Negative for adenopathy.   Skin: Negative for rash.   Musculoskeletal: Negative for joint pain and joint swelling.   Gastrointestinal: Negative for abdominal pain, diarrhea, nausea and vomiting.   Neurological: Negative for excessive daytime sleepiness, dizziness, focal weakness, light-headedness, numbness and weakness.   Psychiatric/Behavioral: Negative for depression. The patient does not have insomnia.        Otherwise complete ROS reviewed and negative except as mentioned in the HPI.    Current Outpatient Medications:   •  aspirin 81 MG chewable tablet, Chew 1 tablet Daily., Disp: , Rfl:   •  benzonatate (Tessalon Perles) 100 MG capsule, Take 1 capsule by mouth 3 (Three) Times a Day As Needed for Cough., Disp: 30 capsule, Rfl: 5  •  Continuous Blood Gluc  (FreeStyle Fercho 2 [place]) device, 1 each Continuous. Use as indicated for glucose monitoring, Disp: 1 each, Rfl: 1  •  Continuous Blood Gluc Sensor (FreeStyle Fercho 2 Sensor) misc, 1 each Every 14 (Fourteen) Days., Disp: 2 each, Rfl: 11  •  empagliflozin (Jardiance) 25 MG tablet tablet, Take 1 tablet by mouth Daily. One tablet daily before breakfast, Disp: 30 tablet, Rfl: 11  •  ezetimibe (Zetia) 10 MG tablet, Take 1 tablet by mouth Daily.,  Disp: 90 tablet, Rfl: 3  •  Finerenone (Kerendia) 10 MG tablet, Take 1 tablet by mouth Daily., Disp: 30 tablet, Rfl: 11  •  fluticasone (FLONASE) 50 MCG/ACT nasal spray, 1 spray into the nostril(s) as directed by provider 2 (Two) Times a Day As Needed for Rhinitis or Allergies., Disp: , Rfl:   •  furosemide (LASIX) 40 MG tablet, Take 20 mg by mouth As Needed. prn, Disp: , Rfl:   •  gabapentin (NEURONTIN) 300 MG capsule, Take 1 capsule by mouth 3 (Three) Times a Day., Disp: , Rfl:   •  Glucagon, rDNA, (Glucagon Emergency) 1 MG kit, Inject 1 mg under the skin into the appropriate area as directed 1 (One) Time As Needed (hypoglycemia) for up to 1 dose., Disp: 1 each, Rfl: 11  •  Insulin Degludec-Liraglutide (Xultophy) 100-3.6 UNIT-MG/ML solution pen-injector subcutaneous pen, Inject 50 Units under the skin into the appropriate area as directed Daily., Disp: 15 mL, Rfl: 11  •  Insulin Lispro-aabc, 1 U Dial, (Melaumjelucila LouPen) 100 UNIT/ML solution pen-injector, Inject 80 Units under the skin into the appropriate area as directed 3 (Three) Times a Day With Meals. Up to 70u with meals, Disp: 72 mL, Rfl: 3  •  Insulin Pen Needle (PEN NEEDLES) 32G X 4 MM misc, 1 each 4 (Four) Times a Day. Use 4 times per day to inject insulin DX E11.9, Disp: 400 each, Rfl: 3  •  irbesartan (AVAPRO) 150 MG tablet, Take 2 tablets by mouth Every Night., Disp: , Rfl:   •  metFORMIN ER (Glucophage XR) 500 MG 24 hr tablet, 2 tabs twice daily with meals , generic ok, Disp: 360 tablet, Rfl: 3  •  metoprolol tartrate (LOPRESSOR) 50 MG tablet, Take 1 tablet by mouth 2 (Two) Times a Day., Disp: 30 tablet, Rfl: 0  •  nitroglycerin (NITROSTAT) 0.4 MG SL tablet, Place 1 tablet under the tongue Every 5 (Five) Minutes As Needed for Chest Pain. Take no more than 3 doses in 15 minutes., Disp: , Rfl:   •  oxyCODONE-acetaminophen (PERCOCET)  MG per tablet, Take 1 tablet by mouth Every 6 (Six) Hours As Needed for Moderate Pain., Disp: , Rfl:   •   PANTOPRAZOLE SODIUM PO, Take 40 mg by mouth Daily. Twice daily, Disp: , Rfl:   •  prasugrel (EFFIENT) 10 MG tablet, Daily., Disp: , Rfl:   •  pravastatin (Pravachol) 40 MG tablet, Take 1 tablet by mouth Daily., Disp: 90 tablet, Rfl: 3  •  sildenafil (Viagra) 100 MG tablet, Take 1 tablet by mouth As Needed for Erectile Dysfunction., Disp: 10 tablet, Rfl: 11       Objective:      Vitals:    03/14/23 0802   BP: 140/76   Pulse: 60   SpO2: 98%     Constitutional:       Appearance: Healthy appearance. Not in distress.   Neck:      Vascular: No JVR. JVD normal.   Pulmonary:      Effort: Pulmonary effort is normal.      Breath sounds: Normal breath sounds. No wheezing. No rhonchi. No rales.   Chest:      Chest wall: Not tender to palpatation.   Cardiovascular:      PMI at left midclavicular line. Normal rate. Regular rhythm. Normal S1. Normal S2.      Murmurs: There is no murmur.      No gallop. No click. No rub.   Pulses:     Intact distal pulses.   Edema:     Peripheral edema absent.   Abdominal:      General: Bowel sounds are normal.      Palpations: Abdomen is soft.      Tenderness: There is no abdominal tenderness.   Musculoskeletal: Normal range of motion.         General: No tenderness. Skin:     General: Skin is warm and dry.   Neurological:      General: No focal deficit present.      Mental Status: Alert and oriented to person, place and time.         Lab Review:       Procedures      Assessment/Plan:     Problem List Items Addressed This Visit        Cardiac and Vasculature    Hypertension associated with diabetes (HCC)    Mixed diabetic hyperlipidemia associated with type 2 diabetes mellitus (HCC)    Dyspnea on exertion - Primary       Symptoms and Signs    Chest pain, atypical     ASSESSMENT  1. Swelling in bilateral feet  2. Diarrhea    PLAN  The patient complains of ongoing diarrhea. The adult female notes swelling in bilateral feet. She is giving him Lasix 40 mg at home to help with the swelling. They are  advised to continue with current medications. His stress test and echocardiogram showed normal results. He has not had any chest pain.    Recommendations/plans: The patient will follow up in 1 year.    Transcribed from ambient dictation for Leonel Oliva DO by Caitie Hinds.  03/14/23   08:52 CDT    Patient or patient representative verbalized consent to the visit recording.  I have personally performed the services described in this document as transcribed by the above individual, and it is both accurate and complete.  Leonel Oliva DO  3/25/2023  19:29 CDT

## 2023-05-03 DIAGNOSIS — Z76.0 MEDICATION REFILL: ICD-10-CM

## 2023-05-03 RX ORDER — GABAPENTIN 300 MG/1
CAPSULE ORAL
Qty: 270 CAPSULE | Refills: 0 | Status: SHIPPED | OUTPATIENT
Start: 2023-05-03 | End: 2023-08-02

## 2023-05-23 NOTE — PROGRESS NOTES
"    [facility] - PODIATRY    Today's Date: 06/06/23    Patient Name: Mick Sims  MRN: [id number]  CSN: [id number]  PCP: Provider, No Known   Referring Provider: No ref. provider found    SUBJECTIVE     Chief Complaint   Patient presents with    Follow-up     3 MO FU REQUESTED ARAVIND. Pt states is having neuropathy \"pretty bad\" but worse at night, pain was rated at 7.5.    Diabetes     Mg/dL      HPI: Mick Sims, a 67 y.o.male, comes to clinic as a(n) established patient presenting for diabetic foot exam and complaining of thick fungal toenails . Patient has h/o arthritis, DM2, HTN, Renal Disorder . Patient is IDDM with last stated BG level of 218mg/dl. Notes mild numbness and tingling in feet.  Denies open wounds or sores. States that his toenails are long, thick and discolored. He has trouble caring for them himself. Denies pain today. Continues Effient daily. Relates previous treatment(s) including foot care by podiatry . Denies any constitutional symptoms. No other pedal complaints at this time.    Past Medical History:   Diagnosis Date    Arthritis     Diabetes mellitus     Hypertension     Renal disorder      Past Surgical History:   Procedure Laterality Date    CARDIAC CATHETERIZATION      CARPAL TUNNEL RELEASE      CORONARY STENT PLACEMENT  2018    X 2    TOTAL KNEE ARTHROPLASTY Right      Family History   Problem Relation Age of Onset    Cancer Mother     Diabetes Sister     Diabetes Brother     No Known Problems Brother      Social History     Socioeconomic History    Marital status:    Tobacco Use    Smoking status: Never     Passive exposure: Never    Smokeless tobacco: Never   Vaping Use    Vaping Use: Never used   Substance and Sexual Activity    Alcohol use: No    Drug use: Never    Sexual activity: Defer     Allergies   Allergen Reactions    Latex Hives     Current Outpatient Medications   Medication Sig Dispense Refill    aspirin 81 MG chewable tablet Chew 1 tablet " Daily.      Continuous Blood Gluc  (FreeStyle Fercho 2 [place]) device 1 each Continuous. Use as indicated for glucose monitoring 1 each 1    Continuous Blood Gluc Sensor (FreeStyle Fercho 2 Sensor) misc 1 each Every 14 (Fourteen) Days. 2 each 11    ezetimibe (Zetia) 10 MG tablet Take 1 tablet by mouth Daily. 90 tablet 3    furosemide (LASIX) 40 MG tablet Take 20 mg by mouth As Needed. prn      gabapentin (NEURONTIN) 300 MG capsule Take 1 capsule by mouth 3 (Three) Times a Day.      Glucagon, rDNA, (Glucagon Emergency) 1 MG kit Inject 1 mg under the skin into the appropriate area as directed 1 (One) Time As Needed (hypoglycemia) for up to 1 dose. 1 each 11    Insulin Degludec-Liraglutide (Xultophy) 100-3.6 UNIT-MG/ML solution pen-injector subcutaneous pen Inject 50 Units under the skin into the appropriate area as directed Daily. 15 mL 11    Insulin Lispro-aabc, 1 U Dial, (Lyumjev KwikPen) 100 UNIT/ML solution pen-injector Inject 80 Units under the skin into the appropriate area as directed 3 (Three) Times a Day With Meals. Up to 70u with meals 72 mL 3    Insulin Pen Needle (PEN NEEDLES) 32G X 4 MM misc 1 each 4 (Four) Times a Day. Use 4 times per day to inject insulin DX E11.9 400 each 3    metoprolol tartrate (LOPRESSOR) 50 MG tablet Take 1 tablet by mouth 2 (Two) Times a Day. 30 tablet 0    PANTOPRAZOLE SODIUM PO Take 40 mg by mouth Daily. Twice daily      pravastatin (Pravachol) 40 MG tablet Take 1 tablet by mouth Daily. 90 tablet 3    vitamin D (ERGOCALCIFEROL) 1.25 MG (68137 UT) capsule capsule Take 1 capsule by mouth 1 (One) Time Per Week.      benzonatate (Tessalon Perles) 100 MG capsule Take 1 capsule by mouth 3 (Three) Times a Day As Needed for Cough. (Patient not taking: Reported on 6/6/2023) 30 capsule 5    empagliflozin (Jardiance) 25 MG tablet tablet Take 1 tablet by mouth Daily. One tablet daily before breakfast 30 tablet 11    Finerenone (Kerendia) 10 MG tablet Take 1 tablet by mouth Daily. 30  tablet 11    fluticasone (FLONASE) 50 MCG/ACT nasal spray 1 spray into the nostril(s) as directed by provider 2 (Two) Times a Day As Needed for Rhinitis or Allergies.      irbesartan (AVAPRO) 150 MG tablet Take 2 tablets by mouth Every Night.      metFORMIN ER (Glucophage XR) 500 MG 24 hr tablet 2 tabs twice daily with meals , generic ok (Patient not taking: Reported on 6/6/2023) 360 tablet 3    nitroglycerin (NITROSTAT) 0.4 MG SL tablet Place 1 tablet under the tongue Every 5 (Five) Minutes As Needed for Chest Pain. Take no more than 3 doses in 15 minutes. (Patient not taking: Reported on 6/6/2023)      oxyCODONE-acetaminophen (PERCOCET)  MG per tablet Take 1 tablet by mouth Every 6 (Six) Hours As Needed for Moderate Pain. (Patient not taking: Reported on 6/6/2023)      prasugrel (EFFIENT) 10 MG tablet Daily.      sildenafil (Viagra) 100 MG tablet Take 1 tablet by mouth As Needed for Erectile Dysfunction. (Patient not taking: Reported on 6/6/2023) 10 tablet 11     No current facility-administered medications for this visit.     Review of Systems   Constitutional:  Negative for chills and fever.   HENT:  Negative for congestion.    Respiratory:  Negative for shortness of breath.    Cardiovascular:  Positive for leg swelling. Negative for chest pain.   Gastrointestinal:  Negative for constipation, diarrhea, nausea and vomiting.   Musculoskeletal:         Foot pain   Skin:  Negative for wound.   Neurological:  Positive for numbness.     OBJECTIVE     Vitals:    06/06/23 1323   BP: 118/68       PHYSICAL EXAM  GEN:   Accompanied by none.     Foot/Ankle Exam    GENERAL  Diabetic foot exam performed    Appearance:  appears stated age  Orientation:  AAOx3  Affect:  appropriate  Gait:  unimpaired  Assistance:  independent  Right shoe gear: casual shoe  Left shoe gear: casual shoe    VASCULAR     Right Foot Vascularity   Dorsalis pedis:  2+  Posterior tibial:  2+  Skin temperature:  warm  Edema grading:  Trace  CFT:   3  Pedal hair growth:  Present  Varicosities:  mild varicosities     Left Foot Vascularity   Dorsalis pedis:  2+  Posterior tibial:  2+  Skin temperature:  warm  Edema grading:  Trace  CFT:  3  Pedal hair growth:  Present  Varicosities:  mild varicosities     NEUROLOGIC     Right Foot Neurologic   Light touch sensation: diminished  Vibratory sensation: diminished  Hot/Cold sensation: diminished  Protective Sensation using [place] Monofilament:   Sites intact: 4  Sites tested: 10     Left Foot Neurologic   Light touch sensation: diminished  Vibratory sensation: diminished  Hot/Cold sensation:  diminished  Protective Sensation using [place] Monofilament:   Sites intact: 4  Sites tested: 10    MUSCULOSKELETAL     Right Foot Musculoskeletal   Ecchymosis:  none  Tenderness:  none    Arch:  Normal     Left Foot Musculoskeletal   Ecchymosis:  none  Tenderness:  none  Arch:  Normal    MUSCLE STRENGTH     Right Foot Muscle Strength   Foot dorsiflexion:  5  Foot plantar flexion:  5  Foot inversion:  5  Foot eversion:  5     Left Foot Muscle Strength   Foot dorsiflexion:  5  Foot plantar flexion:  5  Foot inversion:  5  Foot eversion:  5    RANGE OF MOTION     Right Foot Range of Motion   Foot and ankle ROM within normal limits       Left Foot Range of Motion   Foot and ankle ROM within normal limits      DERMATOLOGIC      Right Foot Dermatologic   Skin  Positive for tinea (interdigital).   Nails  1.  Positive for elongated, onychomycosis, abnormal thickness and subungual debris.  2.  Positive for elongated, onychomycosis, abnormal thickness and subungual debris.  3.  Positive for elongated, onychomycosis, abnormal thickness and subungual debris.  4.  Positive for elongated, onychomycosis, abnormal thickness and subungual debris.  5.  Positive for elongated, onychomycosis, abnormal thickness and subungual debris.  Nails comment:  1-5     Left Foot Dermatologic   Skin  Positive for tinea (interdigital).    Nails comment:  1-5  Nails  1.  Positive for elongated, onychomycosis, abnormal thickness and subungual debris.  2.  Positive for elongated, onychomycosis, abnormal thickness and subungual debris.  3.  Positive for elongated, onychomycosis, abnormal thickness and subungual debris.  4.  Positive for elongated, onychomycosis, abnormally thick and subungual debris.  5.  Positive for elongated, onychomycosis, abnormally thick and subungual debris.    RADIOLOGY/NUCLEAR:  No results found.    LABORATORY/CULTURE RESULTS:      PATHOLOGY RESULTS:       ASSESSMENT/PLAN     Diagnoses and all orders for this visit:    1. Onychomycosis (Primary)    2. Type 2 diabetes mellitus with diabetic neuropathy, with long-term current use of insulin    3. Peripheral edema    4. Antiplatelet or antithrombotic long-term use    5. Tinea pedis of both feet      Comprehensive lower extremity examination and evaluation was performed.  Discussed findings and treatment plan including risks, benefits, and treatment options with patient in detail. Patient agreed with treatment plan.  After verbal consent obtained, nail(s) x10 debrided of length and thickness with nail nipper without incidence  Patient may maintain nails and calluses at home utilizing emery board or pumice stone between visits as needed  Reviewed at home diabetic foot care including daily foot checks   Continue daily use of compression stockings and elevate extremities at rest.  Continue diabetic monitoring and control under direction of PCP.   Advised patient to get OTC antifungal agent for feet and utilize BID x 3 weeks to treat tinea.  An After Visit Summary was printed and given to the patient at discharge, including (if requested) any available informative/educational handouts regarding diagnosis, treatment, or medications. All questions were answered to patient/family satisfaction. Should symptoms fail to improve or worsen they agree to call or return to clinic or to go to  the Emergency Department. Discussed the importance of following up with any needed screening tests/labs/specialist appointments and any requested follow-up recommended by me today. Importance of maintaining follow-up discussed and patient accepts that missed appointments can delay diagnosis and potentially lead to worsening of conditions.  Return in about 3 months (around 9/6/2023) for Follow-up with Podiatry Provider., or sooner if acute issues arise.        This document has been electronically signed by Mehdi Valencia DPM on June 6, 2023 13:54 CDT

## 2023-06-05 ENCOUNTER — TELEPHONE (OUTPATIENT)
Dept: PODIATRY | Facility: CLINIC | Age: 68
End: 2023-06-05
Payer: MEDICARE

## 2023-06-06 ENCOUNTER — OFFICE VISIT (OUTPATIENT)
Dept: PODIATRY | Facility: CLINIC | Age: 68
End: 2023-06-06
Payer: MEDICARE

## 2023-06-06 VITALS
SYSTOLIC BLOOD PRESSURE: 118 MMHG | BODY MASS INDEX: 37.82 KG/M2 | WEIGHT: 279.2 LBS | DIASTOLIC BLOOD PRESSURE: 68 MMHG | HEIGHT: 72 IN

## 2023-06-06 DIAGNOSIS — E11.40 TYPE 2 DIABETES MELLITUS WITH DIABETIC NEUROPATHY, WITH LONG-TERM CURRENT USE OF INSULIN: ICD-10-CM

## 2023-06-06 DIAGNOSIS — Z79.02 ANTIPLATELET OR ANTITHROMBOTIC LONG-TERM USE: ICD-10-CM

## 2023-06-06 DIAGNOSIS — R60.9 PERIPHERAL EDEMA: ICD-10-CM

## 2023-06-06 DIAGNOSIS — B35.1 ONYCHOMYCOSIS: Primary | ICD-10-CM

## 2023-06-06 DIAGNOSIS — B35.3 TINEA PEDIS OF BOTH FEET: ICD-10-CM

## 2023-06-06 DIAGNOSIS — Z79.4 TYPE 2 DIABETES MELLITUS WITH DIABETIC NEUROPATHY, WITH LONG-TERM CURRENT USE OF INSULIN: ICD-10-CM

## 2023-06-06 DIAGNOSIS — Z76.0 MEDICATION REFILL: ICD-10-CM

## 2023-06-06 DIAGNOSIS — K21.9 GASTROESOPHAGEAL REFLUX DISEASE WITHOUT ESOPHAGITIS: Primary | ICD-10-CM

## 2023-06-06 RX ORDER — NYSTATIN 100000 [USP'U]/G
POWDER TOPICAL
Qty: 60 G | Refills: 0 | Status: SHIPPED | OUTPATIENT
Start: 2023-06-06 | End: 2023-07-02

## 2023-06-06 RX ORDER — IRBESARTAN 150 MG/1
150 TABLET ORAL DAILY
Qty: 30 TABLET | Refills: 0 | Status: SHIPPED | OUTPATIENT
Start: 2023-06-06

## 2023-06-06 RX ORDER — LANSOPRAZOLE 30 MG/1
30 CAPSULE, DELAYED RELEASE ORAL DAILY
Qty: 90 CAPSULE | Refills: 1 | Status: SHIPPED | OUTPATIENT
Start: 2023-06-06

## 2023-06-06 RX ORDER — ERGOCALCIFEROL 1.25 MG/1
1 CAPSULE ORAL WEEKLY
COMMUNITY
Start: 2023-03-28

## 2023-06-06 NOTE — TELEPHONE ENCOUNTER
----- Message from Nathan Sancho sent at 6/6/2023 12:18 PM CDT -----  Subject: Refill Request    QUESTIONS  Name of Medication? Other - LANSOPRAZOLE 30mg   Patient-reported dosage and instructions? One tablet a day   How many days do you have left? 0  Preferred Pharmacy? [id number] phone number (if available)? [phone]  ---------------------------------------------------------------------------  --------------  CALL BACK INFO  What is the best way for the office to contact you? OK to leave message on   voicemail  Preferred Call Back Phone Number? [phone]  ---------------------------------------------------------------------------  --------------  SCRIPT ANSWERS  Relationship to Patient?  Self

## 2023-06-06 NOTE — TELEPHONE ENCOUNTER
Radha Bermeo called to request a refill on his medication.       Last office visit : 2/23/2023   Next office visit : Visit date not found     Requested Prescriptions     Pending Prescriptions Disp Refills    nystatin (MYCOSTATIN) 612568 UNIT/GM powder [Pharmacy Med Name: Nystatin 359734 UNIT/GM External Powder] 60 g 0     Sig: APPLY  POWDER TOPICALLY TO AFFECTED AREA THREE TIMES DAILY AS NEEDED FOR  ITCHY  RASH    irbesartan (AVAPRO) 150 MG tablet 90 tablet 1     Sig: Take 1 tablet by mouth daily Take 1 tablet by mouth once daily    lansoprazole (PREVACID) 30 MG delayed release capsule 90 capsule 1     Sig: Take 1 capsule by mouth daily            Samantha Sanders MA'

## 2023-06-30 DIAGNOSIS — Z76.0 MEDICATION REFILL: ICD-10-CM

## 2023-07-02 RX ORDER — NYSTATIN 100000 [USP'U]/G
POWDER TOPICAL
Qty: 60 G | Refills: 0 | Status: SHIPPED | OUTPATIENT
Start: 2023-07-02

## 2023-08-01 DIAGNOSIS — Z76.0 MEDICATION REFILL: ICD-10-CM

## 2023-08-01 RX ORDER — NYSTATIN 100000 [USP'U]/G
POWDER TOPICAL
Qty: 60 G | Refills: 0 | Status: SHIPPED | OUTPATIENT
Start: 2023-08-01

## 2023-08-01 NOTE — TELEPHONE ENCOUNTER
Demarcus Prieto called to request a refill on his medication.       Last office visit : 2/23/2023   Next office visit : Visit date not found     Requested Prescriptions     Pending Prescriptions Disp Refills    nystatin (MYCOSTATIN) 654304 UNIT/GM powder [Pharmacy Med Name: Nystatin [id number] UNIT/GM External Powder] 60 g 0     Sig: APPLY POWDER TOPICALLY TO AFFECTED AREA 3 TIMES A DAY AS NEEDED FOR ITCHY RASH            Petey Aggarwal LPN

## 2023-08-24 NOTE — PROGRESS NOTES
TIMES A DAY 1 Tube 0    insulin glargine (LANTUS SOLOSTAR) 100 UNIT/ML injection pen INJECT 55 UNITS SUB-Q TWICE DAILY 12 pen 5    Lancets 30G MISC 1 each by Does not apply route daily 100 each 3    metoprolol tartrate (LOPRESSOR) 50 MG tablet TAKE 1 TABLET BY MOUTH TWICE DAILY 180 tablet 3    insulin lispro (HUMALOG KWIKPEN) 100 UNIT/ML pen Inject 10 Units into the skin 3 times daily (before meals) 150-199=2 units,200-249=4 units,250-299=6 units,300-349=8 units,350-400=10 units 5 pen 3    metFORMIN (GLUCOPHAGE) 1000 MG tablet TAKE ONE TABLET BY MOUTH TWICE DAILY WITH MEALS 60 tablet 3    loratadine (CLARITIN) 10 MG tablet Take 1 tablet by mouth daily 30 tablet 5    triamcinolone (KENALOG) 0.025 % cream Apply topically 2 times daily.  1 Tube 0    lansoprazole (PREVACID) 30 MG delayed release capsule TAKE ONE CAPSULE BY MOUTH ONCE DAILY 30 capsule 3    prasugrel (EFFIENT) 10 MG TABS TAKE 1 TABLET BY MOUTH ONCE DAILY 90 tablet 3    EQ ASPIRIN LOW DOSE 81 MG chewable tablet CHEW AND SWALLOW 1 TABLET BY MOUTH ONCE DAILY 90 tablet 3    guaiFENesin 400 MG tablet Take 1 tablet by mouth 4 times daily as needed for Cough 56 tablet 0    Alcohol Swabs PADS 1 box by Does not apply route 8 times daily 100 each 5    Blood Glucose Monitoring Suppl (ACURA BLOOD GLUCOSE METER) w/Device KIT Please provide patient with Accu check Bharati Meter per his Insurance request. Dx: E11.9 1 kit 0    Insulin Pen Needle ([employer] PEN NEEDLES) 31G X 6 MM MISC Dx: E11.9 100 each 3    Multiple Vitamin (MULTIVITAMIN) capsule Take 1 capsule by mouth daily      ipratropium-albuterol (DUONEB) 0.5-2.5 (3) MG/3ML SOLN nebulizer solution Inhale 3 mLs into the lungs every 6 hours 360 mL 1    albuterol sulfate HFA (VENTOLIN HFA) 108 (90 Base) MCG/ACT inhaler Inhale 2 puffs into the lungs every 6 hours as needed for Wheezing 1 Inhaler 3    Cholecalciferol (VITAMIN D3) 92681 units CAPS Take 50,000 Units by mouth every 30 days      fluticasone (FLONASE) 50 MCG/ACT nasal spray USE 2 SPRAY(S) IN EACH NOSTRIL ONCE DAILY 1 Bottle 3     No current facility-administered medications for this visit.       Allergies: Tape Yale Summerh tape]  Past Medical History:   Diagnosis Date    Aneurysm ([address]     abdominal    Bilateral leg edema     CAD (coronary artery disease)     Chronic kidney disease     Stage 4    Diabetes mellitus, type 2 (HCC)     Fatty liver     GERD (gastroesophageal reflux disease)     HTN (hypertension)     Seasonal allergies      Past Surgical History:   Procedure Laterality Date    CARPAL TUNNEL RELEASE      COLONOSCOPY  2015    COLONOSCOPY N/A 10/11/2016    Dr Lopez-diverticulosis, Sessile serrated AP (-) high grade dysplasia x 1, tubular AP (-) dysplasia x 2, HP x 2--3 yr recall    EYE SURGERY      Right eye    PRE-MALIGNANT / [address]      Dr. Boo Stiles UPPER GASTROINTESTINAL ENDOSCOPY  9/20/2016    Dr Lopez-w/dilation over wire, 44 [place]-distal esophageal narrowing, Inna (-)    UPPER GASTROINTESTINAL ENDOSCOPY  9/20/2016    Dr Lopez-w/dilation over wire, 44 [place]-distal esophageal narrowing, Inna (-)     Family History   Problem Relation Age of Onset    Colon Cancer Mother     Prostate Cancer Father     Other Father         HX of blood clot    Diabetes Sister     Cancer Sister     Diabetes Brother     Esophageal Cancer Neg Hx     Liver Cancer Neg Hx     Liver Disease Neg Hx     Stomach Cancer Neg Hx     Rectal Cancer Neg Hx      Social History   Substance Use Topics    Smoking status: Never Smoker    Smokeless tobacco: Never Used    Alcohol use No          Review of Systems:    General:      Complaint / Symptom Yes / No / Description if Yes       Fatigue No   Weight gain N/A   Insomnia N/A       Respiratory:        Complaint / Symptom Yes / No / Description if Yes       Cough No   Horseness N/A       Cardiovascular:    Complaint / Symptom Yes / No / Description if Yes       Chest Pain No Shortness of Air / Orthopnea No   Presyncope / Syncope No   Palpitations No         Objective:    /80   Pulse 74   Ht 6' (1.829 m)   Wt 285 lb (129.3 kg)   BMI 38.65 kg/m²     GENERAL - well developed and well nourished, conversant  HEENT -  PERRLA, Hearing appears normal  NECK - no thyromegaly, no JVD, trachea is in the midline  CARDIOVASCULAR - PMI is in the mid line clavicular position, Normal S1 and S2 with a grade 1/6 systolic murmur. No S3 or S4    PULMONARY - no respiratory distress. No wheezes or rales. Lungs are clear to ausculation   ABDOMEN  - soft, non tender, no rebound  MUSCULOSKELETAL  - range of motion of the upper and lower extermites appears normal and equal and is without pain   EXTREMITIES - no significant edema   NEUROLOGIC - gait and station are normal  SKIN - turgor is normal  PSYCHIATRIC - normal mood and affect, alert and orientated x 3,      ASSESSMENT:    ALL THE CARDIOLOGY PROBLEMS ARE LISTED ABOVE; HOWEVER, THE FOLLOWING SPECIFIC CARDIAC PROBLEMS / CONDITIONS WERE ADDRESSED AND TREATED DURING THE OFFICE VISIT TODAY:                                                                                            MEDICAL DECISION MAKING             Cardiac Specific Problem / Diagnosis  Discussion and Data Reviewed Diagnostic Procedures Ordered Management Options Selected           1. CAD  show no change   Review and summation of old records:    Patient is needing cardiac clearance but he should not stop his effient for anything until May.     5/12/2018  Echo  Normal LVFX  5/12/2018  lexiscan negative for myocardial ischemia, EF 44  %   5/14/2018  Hospital visit AUC indication 19, AUC score 7  5/14/2018  Cath  70% osteal LAD, 3.0 x 12 AGA, apical hypokinesis   No Continue current medications:     Yes           2.  HTN  show no change   Patient has a history of these risk factors, which are managed medically, and are on current oral therapy  I personally addressed, counselled and 24-Aug-2023 02:30

## 2023-09-11 NOTE — PROGRESS NOTES
[facility] - PODIATRY    Today's Date: 09/19/23    Patient Name: Mick Sims  MRN: [id number]  CSN: [id number]  PCP: Provider, No Known   Referring Provider: No ref. provider found    SUBJECTIVE     Chief Complaint   Patient presents with    Follow-up     Provider, No Known 3 MTH FU DIABETIC- pt states feet doing ok- pt denies pain     Diabetes     171mg/dl BG at present after breakfast     HPI: Mick Sims, a 67 y.o.male, comes to clinic as a(n) established patient presenting for diabetic foot exam and complaining of thick fungal toenails . Patient has h/o arthritis, DM2, HTN, Renal Disorder . Patient is IDDM with last stated BG level of 171mg/dl. Relates that his A1c was about 8%.  Notes mild numbness and tingling in feet.  Denies open wounds or sores. States that his toenails are long, thick and discolored. He has trouble caring for them himself. Denies pain today. Continues Effient daily. Relates previous treatment(s) including foot care by podiatry . Denies any constitutional symptoms. No other pedal complaints at this time.    Past Medical History:   Diagnosis Date    Arthritis     Diabetes mellitus     Hypertension     Renal disorder      Past Surgical History:   Procedure Laterality Date    CARDIAC CATHETERIZATION      CARPAL TUNNEL RELEASE      CORONARY STENT PLACEMENT  2018    X 2    TOTAL KNEE ARTHROPLASTY Right      Family History   Problem Relation Age of Onset    Cancer Mother     Diabetes Sister     Diabetes Brother     No Known Problems Brother      Social History     Socioeconomic History    Marital status:    Tobacco Use    Smoking status: Never     Passive exposure: Never    Smokeless tobacco: Never   Vaping Use    Vaping Use: Never used   Substance and Sexual Activity    Alcohol use: No    Drug use: Never    Sexual activity: Defer     Allergies   Allergen Reactions    Latex Hives     Current Outpatient Medications   Medication Sig Dispense Refill    aspirin 81 MG  chewable tablet Chew 1 tablet Daily.      Continuous Blood Gluc  (FreeStyle Fer[place] 2 [place]) device 1 each Continuous. Use as indicated for glucose monitoring 1 each 1    Continuous Blood Gluc Sensor (FreeStyle Fercho 2 Sensor) misc 1 each Every 14 (Fourteen) Days. 2 each 11    Cyanocobalamin (Vitamin B-12) 1000 MCG/15ML liquid Take  by mouth.      empagliflozin (Jardiance) 25 MG tablet tablet Take 1 tablet by mouth Daily. One tablet daily before breakfast 30 tablet 11    ezetimibe (Zetia) 10 MG tablet Take 1 tablet by mouth Daily. 90 tablet 3    Finerenone 20 MG tablet Take 1 tablet by mouth Daily. 30 tablet 11    fluticasone (FLONASE) 50 MCG/ACT nasal spray 1 spray into the nostril(s) as directed by provider 2 (Two) Times a Day As Needed for Rhinitis or Allergies.      furosemide (LASIX) 40 MG tablet Take 0.5 tablets by mouth As Needed. prn      gabapentin (NEURONTIN) 300 MG capsule Take 1 capsule by mouth 3 (Three) Times a Day.      Glucagon, rDNA, (Glucagon Emergency) 1 MG kit Inject 1 mg under the skin into the appropriate area as directed 1 (One) Time As Needed (hypoglycemia) for up to 1 dose. 1 each 11    Insulin Degludec-Liraglutide (Xultophy) 100-3.6 UNIT-MG/ML solution pen-injector subcutaneous pen Inject 50 Units under the skin into the appropriate area as directed Daily. 15 mL 11    Insulin Lispro-aabc, 1 U Dial, (Lyumjev KwikPen) 100 UNIT/ML solution pen-injector Inject 80 Units under the skin into the appropriate area as directed 3 (Three) Times a Day With Meals. Up to 70u with meals 72 mL 3    Insulin Pen Needle (PEN NEEDLES) 32G X 4 MM misc 1 each 4 (Four) Times a Day. Use 4 times per day to inject insulin DX E11.9 400 each 3    irbesartan (AVAPRO) 150 MG tablet Take 2 tablets by mouth Every Night.      metoprolol tartrate (LOPRESSOR) 50 MG tablet Take 1 tablet by mouth 2 (Two) Times a Day. 30 tablet 0    nitroglycerin (NITROSTAT) 0.4 MG SL tablet Place 1 tablet under the tongue Every 5 (Five)  Minutes As Needed for Chest Pain. Take no more than 3 doses in 15 minutes.      oxyCODONE-acetaminophen (PERCOCET) 5-325 MG per tablet Take 1 tablet by mouth Every 6 (Six) Hours As Needed.      PANTOPRAZOLE SODIUM PO Take 40 mg by mouth Daily. Twice daily      prasugrel (EFFIENT) 10 MG tablet Daily.      sildenafil (Viagra) 100 MG tablet Take 1 tablet by mouth As Needed for Erectile Dysfunction. 10 tablet 11    vitamin D (ERGOCALCIFEROL) 1.25 MG (30615 UT) capsule capsule Take 1 capsule by mouth 1 (One) Time Per Week.       No current facility-administered medications for this visit.     Review of Systems   Constitutional:  Negative for chills and fever.   HENT:  Negative for congestion.    Respiratory:  Negative for shortness of breath.    Cardiovascular:  Positive for leg swelling. Negative for chest pain.   Gastrointestinal:  Negative for constipation, diarrhea, nausea and vomiting.   Musculoskeletal:         Foot pain   Skin:  Negative for wound.   Neurological:  Positive for numbness.     OBJECTIVE     Vitals:    09/19/23 0855   BP: 142/78   Pulse: 80   SpO2: 96%       PHYSICAL EXAM  GEN:   Accompanied by none.     Foot/Ankle Exam    GENERAL  Diabetic foot exam performed    Appearance:  appears stated age  Orientation:  AAOx3  Affect:  appropriate  Gait:  unimpaired  Assistance:  independent  Right shoe gear: casual shoe  Left shoe gear: casual shoe    VASCULAR     Right Foot Vascularity   Dorsalis pedis:  2+  Posterior tibial:  2+  Skin temperature:  warm  Edema grading:  Trace  CFT:  3  Pedal hair growth:  Present  Varicosities:  mild varicosities     Left Foot Vascularity   Dorsalis pedis:  2+  Posterior tibial:  2+  Skin temperature:  warm  Edema grading:  Trace  CFT:  3  Pedal hair growth:  Present  Varicosities:  mild varicosities     NEUROLOGIC     Right Foot Neurologic   Light touch sensation: diminished  Vibratory sensation: diminished  Hot/Cold sensation: diminished  Protective Sensation using  Felda-Azar Monofilament:   Sites intact: 4  Sites tested: 10     Left Foot Neurologic   Light touch sensation: diminished  Vibratory sensation: diminished  Hot/Cold sensation:  diminished  Protective Sensation using Felda-Azar Monofilament:   Sites intact: 4  Sites tested: 10    MUSCULOSKELETAL     Right Foot Musculoskeletal   Ecchymosis:  none  Tenderness:  none    Arch:  Normal     Left Foot Musculoskeletal   Ecchymosis:  none  Tenderness:  none  Arch:  Normal    MUSCLE STRENGTH     Right Foot Muscle Strength   Foot dorsiflexion:  5  Foot plantar flexion:  5  Foot inversion:  5  Foot eversion:  5     Left Foot Muscle Strength   Foot dorsiflexion:  5  Foot plantar flexion:  5  Foot inversion:  5  Foot eversion:  5    RANGE OF MOTION     Right Foot Range of Motion   Foot and ankle ROM within normal limits       Left Foot Range of Motion   Foot and ankle ROM within normal limits      DERMATOLOGIC      Right Foot Dermatologic   Skin  Positive for tinea (interdigital).   Nails  1.  Positive for elongated, onychomycosis, abnormal thickness and subungual debris.  2.  Positive for elongated, onychomycosis, abnormal thickness and subungual debris.  3.  Positive for elongated, onychomycosis, abnormal thickness and subungual debris.  4.  Positive for elongated, onychomycosis, abnormal thickness and subungual debris.  5.  Positive for elongated, onychomycosis, abnormal thickness and subungual debris.  Nails comment:  1-5     Left Foot Dermatologic   Skin  Positive for tinea (interdigital).   Nails comment:  1-5  Nails  1.  Positive for elongated, onychomycosis, abnormal thickness and subungual debris.  2.  Positive for elongated, onychomycosis, abnormal thickness and subungual debris.  3.  Positive for elongated, onychomycosis, abnormal thickness and subungual debris.  4.  Positive for elongated, onychomycosis, abnormally thick and subungual debris.  5.  Positive for elongated, onychomycosis, abnormally thick and  subungual debris.    RADIOLOGY/NUCLEAR:  No results found.    LABORATORY/CULTURE RESULTS:      PATHOLOGY RESULTS:       ASSESSMENT/PLAN     Diagnoses and all orders for this visit:    1. Onychomycosis (Primary)    2. Type 2 diabetes mellitus with diabetic neuropathy, with long-term current use of insulin    3. Peripheral edema    4. Antiplatelet or antithrombotic long-term use    5. Stage 3b chronic kidney disease      Comprehensive lower extremity examination and evaluation was performed.  Discussed findings and treatment plan including risks, benefits, and treatment options with patient in detail. Patient agreed with treatment plan.  After verbal consent obtained, nail(s) x10 debrided of length and thickness with nail nipper without incidence  Patient may maintain nails and calluses at home utilizing emery board or pumice stone between visits as needed  Reviewed at home diabetic foot care including daily foot checks   Continue daily use of compression stockings and elevate extremities at rest.  Continue diabetic monitoring and control under direction of PCP.   Advised patient to get OTC antifungal agent for feet and utilize BID x 3 weeks to treat tinea.  An After Visit Summary was printed and given to the patient at discharge, including (if requested) any available informative/educational handouts regarding diagnosis, treatment, or medications. All questions were answered to patient/family satisfaction. Should symptoms fail to improve or worsen they agree to call or return to clinic or to go to the Emergency Department. Discussed the importance of following up with any needed screening tests/labs/specialist appointments and any requested follow-up recommended by me today. Importance of maintaining follow-up discussed and patient accepts that missed appointments can delay diagnosis and potentially lead to worsening of conditions.  Return in about 3 months (around 12/19/2023) for Schedule Foot Care Clinic, Follow-up  with Podiatry Provider., or sooner if acute issues arise.        This document has been electronically signed by Mehdi Valencia DPM on September 19, 2023 09:27 CDT

## 2023-09-12 DIAGNOSIS — E11.59 TYPE 2 DIABETES MELLITUS WITH OTHER CIRCULATORY COMPLICATION, WITH LONG-TERM CURRENT USE OF INSULIN (HCC): Primary | ICD-10-CM

## 2023-09-12 DIAGNOSIS — Z76.0 MEDICATION REFILL: ICD-10-CM

## 2023-09-12 DIAGNOSIS — Z79.4 TYPE 2 DIABETES MELLITUS WITH OTHER CIRCULATORY COMPLICATION, WITH LONG-TERM CURRENT USE OF INSULIN (HCC): Primary | ICD-10-CM

## 2023-09-13 RX ORDER — GABAPENTIN 300 MG/1
CAPSULE ORAL
Qty: 90 CAPSULE | Refills: 0 | Status: SHIPPED | OUTPATIENT
Start: 2023-09-13 | End: 2023-09-20 | Stop reason: SDUPTHER

## 2023-09-13 NOTE — TELEPHONE ENCOUNTER
Sanchez Wagner called to request a refill on his medication. Last office visit : 2/23/2023   Next office visit : 9/20/2023     Last UDS:   Amphetamine Screen, Urine   Date Value Ref Range Status   09/07/2021 -  Final     Barbiturate Screen, Urine   Date Value Ref Range Status   09/07/2021 -  Final     Benzodiazepine Screen, Urine   Date Value Ref Range Status   09/07/2021 -  Final     Buprenorphine Urine   Date Value Ref Range Status   09/07/2021 -  Final     Cocaine Metabolite Screen, Urine   Date Value Ref Range Status   09/07/2021 -  Final     Gabapentin Screen, Urine   Date Value Ref Range Status   09/07/2021 +  Final     MDMA, Urine   Date Value Ref Range Status   09/07/2021 -  Final     Methamphetamine, Urine   Date Value Ref Range Status   09/07/2021 -  Final     Opiate Scrn, Ur   Date Value Ref Range Status   09/07/2021 -  Final     Oxycodone Screen, Ur   Date Value Ref Range Status   09/07/2021 -  Final     PCP Screen, Urine   Date Value Ref Range Status   09/07/2021 -  Final     Propoxyphene Screen, Urine   Date Value Ref Range Status   09/07/2021 -  Final     THC Screen, Urine   Date Value Ref Range Status   09/07/2021 -  Final     Tricyclic Antidepressants, Urine   Date Value Ref Range Status   09/07/2021 -  Final       Last Isa Gills: 9/13/23  Medication Contract: 9/7/21   Last Fill: 5/3/23    Requested Prescriptions     Pending Prescriptions Disp Refills    gabapentin (NEURONTIN) 300 MG capsule [Pharmacy Med Name: Gabapentin 300 MG Oral Capsule] 270 capsule 0     Sig: TAKE 1 CAPSULE BY MOUTH THREE TIMES DAILY                           Please approve or refuse this medication.    Darci Bronson LPN

## 2023-09-14 ENCOUNTER — TELEPHONE (OUTPATIENT)
Dept: PRIMARY CARE CLINIC | Age: 68
End: 2023-09-14

## 2023-09-14 NOTE — TELEPHONE ENCOUNTER
Pt came into office for notes from 02.23.23 visit showing diabetic foot exam. Office notes were left for patient at front, but do not have diabetic foot exam results included. Pt stated that order and notes were supposed to have been sent to [facility] after visit. No fax in media. Adv that provider and MA are both out but will be back next week. Patient stated that both he and his wife have appointments next week and would like to have diabetic foot exam completed/noted so that they are able to get their shoes ordered.

## 2023-09-18 ENCOUNTER — TELEPHONE (OUTPATIENT)
Dept: PODIATRY | Facility: CLINIC | Age: 68
End: 2023-09-18
Payer: MEDICARE

## 2023-09-19 ENCOUNTER — OFFICE VISIT (OUTPATIENT)
Dept: PODIATRY | Facility: CLINIC | Age: 68
End: 2023-09-19
Payer: MEDICARE

## 2023-09-19 VITALS
SYSTOLIC BLOOD PRESSURE: 142 MMHG | DIASTOLIC BLOOD PRESSURE: 78 MMHG | BODY MASS INDEX: 38.19 KG/M2 | WEIGHT: 282 LBS | OXYGEN SATURATION: 96 % | HEART RATE: 80 BPM | HEIGHT: 72 IN

## 2023-09-19 DIAGNOSIS — E11.40 TYPE 2 DIABETES MELLITUS WITH DIABETIC NEUROPATHY, WITH LONG-TERM CURRENT USE OF INSULIN: ICD-10-CM

## 2023-09-19 DIAGNOSIS — E78.2 MIXED HYPERLIPIDEMIA: ICD-10-CM

## 2023-09-19 DIAGNOSIS — E11.65 TYPE 2 DIABETES MELLITUS WITH HYPERGLYCEMIA, WITH LONG-TERM CURRENT USE OF INSULIN (HCC): ICD-10-CM

## 2023-09-19 DIAGNOSIS — N18.32 STAGE 3B CHRONIC KIDNEY DISEASE: ICD-10-CM

## 2023-09-19 DIAGNOSIS — Z79.4 TYPE 2 DIABETES MELLITUS WITH DIABETIC NEUROPATHY, WITH LONG-TERM CURRENT USE OF INSULIN: ICD-10-CM

## 2023-09-19 DIAGNOSIS — Z01.89 ROUTINE LAB DRAW: ICD-10-CM

## 2023-09-19 DIAGNOSIS — Z12.5 SCREENING FOR PROSTATE CANCER: ICD-10-CM

## 2023-09-19 DIAGNOSIS — Z01.89 ROUTINE LAB DRAW: Primary | ICD-10-CM

## 2023-09-19 DIAGNOSIS — R60.9 PERIPHERAL EDEMA: ICD-10-CM

## 2023-09-19 DIAGNOSIS — Z51.81 MEDICATION MONITORING ENCOUNTER: ICD-10-CM

## 2023-09-19 DIAGNOSIS — Z79.4 TYPE 2 DIABETES MELLITUS WITH HYPERGLYCEMIA, WITH LONG-TERM CURRENT USE OF INSULIN (HCC): ICD-10-CM

## 2023-09-19 DIAGNOSIS — I10 PRIMARY HYPERTENSION: ICD-10-CM

## 2023-09-19 DIAGNOSIS — Z79.02 ANTIPLATELET OR ANTITHROMBOTIC LONG-TERM USE: ICD-10-CM

## 2023-09-19 DIAGNOSIS — B35.1 ONYCHOMYCOSIS: Primary | ICD-10-CM

## 2023-09-19 PROBLEM — M15.9 PRIMARY OSTEOARTHRITIS INVOLVING MULTIPLE JOINTS: Status: ACTIVE | Noted: 2022-11-30

## 2023-09-19 PROBLEM — N17.9 ACUTE RENAL FAILURE SUPERIMPOSED ON STAGE 4 CHRONIC KIDNEY DISEASE: Status: ACTIVE | Noted: 2020-09-04

## 2023-09-19 PROBLEM — M17.9 OSTEOARTHRITIS OF KNEE: Status: ACTIVE | Noted: 2022-03-28

## 2023-09-19 PROBLEM — Z95.5 HISTORY OF PLACEMENT OF STENT IN LAD CORONARY ARTERY: Status: ACTIVE | Noted: 2018-05-15

## 2023-09-19 PROBLEM — Z96.651 S/P TOTAL KNEE ARTHROPLASTY, RIGHT: Status: ACTIVE | Noted: 2022-03-29

## 2023-09-19 PROBLEM — E66.01 MORBID OBESITY: Status: ACTIVE | Noted: 2019-06-27

## 2023-09-19 PROBLEM — I24.9 ACS (ACUTE CORONARY SYNDROME): Status: ACTIVE | Noted: 2019-06-03

## 2023-09-19 PROBLEM — I25.10 DOUBLE VESSEL CORONARY ARTERY DISEASE: Status: ACTIVE | Noted: 2018-05-15

## 2023-09-19 PROBLEM — D62 ACUTE BLOOD LOSS AS CAUSE OF POSTOPERATIVE ANEMIA: Status: ACTIVE | Noted: 2022-03-30

## 2023-09-19 PROBLEM — M17.11 ARTHRITIS OF KNEE, RIGHT: Status: ACTIVE | Noted: 2022-03-29

## 2023-09-19 PROBLEM — R60.0 EDEMA OF LOWER EXTREMITY: Status: ACTIVE | Noted: 2023-09-19

## 2023-09-19 PROBLEM — I21.4 NSTEMI (NON-ST ELEVATED MYOCARDIAL INFARCTION): Status: ACTIVE | Noted: 2019-06-05

## 2023-09-19 PROBLEM — I51.9 LEFT VENTRICULAR DYSFUNCTION: Status: ACTIVE | Noted: 2019-06-27

## 2023-09-19 PROBLEM — N18.4 ACUTE RENAL FAILURE SUPERIMPOSED ON STAGE 4 CHRONIC KIDNEY DISEASE: Status: ACTIVE | Noted: 2020-09-04

## 2023-09-19 PROBLEM — M15.0 PRIMARY OSTEOARTHRITIS INVOLVING MULTIPLE JOINTS: Status: ACTIVE | Noted: 2022-11-30

## 2023-09-19 LAB
ALBUMIN SERPL-MCNC: 4.3 G/DL (ref 3.5–5.2)
ALP SERPL-CCNC: 85 U/L (ref 40–130)
ALT SERPL-CCNC: 11 U/L (ref 5–41)
ANION GAP SERPL CALCULATED.3IONS-SCNC: 10 MMOL/L (ref 7–19)
AST SERPL-CCNC: 13 U/L (ref 5–40)
BASOPHILS # BLD: 0.1 K/UL (ref 0–0.2)
BASOPHILS NFR BLD: 1.1 % (ref 0–1)
BILIRUB SERPL-MCNC: 0.4 MG/DL (ref 0.2–1.2)
BUN SERPL-MCNC: 23 MG/DL (ref 8–23)
CALCIUM SERPL-MCNC: 10.6 MG/DL (ref 8.8–10.2)
CHLORIDE SERPL-SCNC: 107 MMOL/L (ref 98–111)
CHOLEST SERPL-MCNC: 157 MG/DL (ref 160–199)
CO2 SERPL-SCNC: 27 MMOL/L (ref 22–29)
CREAT SERPL-MCNC: 1.5 MG/DL (ref 0.5–1.2)
CREAT UR-MCNC: 117.2 MG/DL (ref 39–259)
EOSINOPHIL # BLD: 0.2 K/UL (ref 0–0.6)
EOSINOPHIL NFR BLD: 1.9 % (ref 0–5)
ERYTHROCYTE [DISTWIDTH] IN BLOOD BY AUTOMATED COUNT: 14.1 % (ref 11.5–14.5)
GLUCOSE SERPL-MCNC: 74 MG/DL (ref 74–109)
HBA1C MFR BLD: 8.1 % (ref 4–6)
HCT VFR BLD AUTO: 49.8 % (ref 42–52)
HDLC SERPL-MCNC: 28 MG/DL (ref 55–121)
HGB BLD-MCNC: 15.1 G/DL (ref 14–18)
IMM GRANULOCYTES # BLD: 0 K/UL
LDLC SERPL CALC-MCNC: 82 MG/DL
LYMPHOCYTES # BLD: 2.4 K/UL (ref 1.1–4.5)
LYMPHOCYTES NFR BLD: 28.4 % (ref 20–40)
MCH RBC QN AUTO: 25.1 PG (ref 27–31)
MCHC RBC AUTO-ENTMCNC: 30.3 G/DL (ref 33–37)
MCV RBC AUTO: 82.9 FL (ref 80–94)
MICROALBUMIN UR-MCNC: 7 MG/DL (ref 0–19)
MICROALBUMIN/CREAT UR-RTO: 59.7 MG/G
MONOCYTES # BLD: 0.8 K/UL (ref 0–0.9)
MONOCYTES NFR BLD: 9.6 % (ref 0–10)
NEUTROPHILS # BLD: 4.9 K/UL (ref 1.5–7.5)
NEUTS SEG NFR BLD: 58.6 % (ref 50–65)
PLATELET # BLD AUTO: 228 K/UL (ref 130–400)
PMV BLD AUTO: 12.3 FL (ref 9.4–12.4)
POTASSIUM SERPL-SCNC: 5.2 MMOL/L (ref 3.5–5)
PROT SERPL-MCNC: 7.1 G/DL (ref 6.6–8.7)
PSA SERPL-MCNC: 1.51 NG/ML (ref 0–4)
RBC # BLD AUTO: 6.01 M/UL (ref 4.7–6.1)
SODIUM SERPL-SCNC: 144 MMOL/L (ref 136–145)
TRIGL SERPL-MCNC: 233 MG/DL (ref 0–149)
TSH SERPL DL<=0.005 MIU/L-ACNC: 2.19 UIU/ML (ref 0.35–5.5)
WBC # BLD AUTO: 8.3 K/UL (ref 4.8–10.8)

## 2023-09-19 PROCEDURE — 3077F SYST BP >= 140 MM HG: CPT | Performed by: PODIATRIST

## 2023-09-19 PROCEDURE — 11721 DEBRIDE NAIL 6 OR MORE: CPT | Performed by: PODIATRIST

## 2023-09-19 PROCEDURE — 1159F MED LIST DOCD IN RCRD: CPT | Performed by: PODIATRIST

## 2023-09-19 PROCEDURE — 3078F DIAST BP <80 MM HG: CPT | Performed by: PODIATRIST

## 2023-09-19 PROCEDURE — 1160F RVW MEDS BY RX/DR IN RCRD: CPT | Performed by: PODIATRIST

## 2023-09-20 ENCOUNTER — OFFICE VISIT (OUTPATIENT)
Dept: PRIMARY CARE CLINIC | Age: 68
End: 2023-09-20

## 2023-09-20 VITALS
BODY MASS INDEX: 36.3 KG/M2 | TEMPERATURE: 96.7 F | HEIGHT: 72 IN | OXYGEN SATURATION: 97 % | WEIGHT: 268 LBS | DIASTOLIC BLOOD PRESSURE: 64 MMHG | SYSTOLIC BLOOD PRESSURE: 126 MMHG | HEART RATE: 84 BPM

## 2023-09-20 DIAGNOSIS — N18.31 STAGE 3A CHRONIC KIDNEY DISEASE (HCC): ICD-10-CM

## 2023-09-20 DIAGNOSIS — M15.9 PRIMARY OSTEOARTHRITIS INVOLVING MULTIPLE JOINTS: ICD-10-CM

## 2023-09-20 DIAGNOSIS — E11.42 DIABETIC PERIPHERAL NEUROPATHY (HCC): ICD-10-CM

## 2023-09-20 DIAGNOSIS — E11.65 TYPE 2 DIABETES MELLITUS WITH HYPERGLYCEMIA, WITH LONG-TERM CURRENT USE OF INSULIN (HCC): ICD-10-CM

## 2023-09-20 DIAGNOSIS — K21.9 GASTROESOPHAGEAL REFLUX DISEASE WITHOUT ESOPHAGITIS: ICD-10-CM

## 2023-09-20 DIAGNOSIS — Z79.4 TYPE 2 DIABETES MELLITUS WITH HYPERGLYCEMIA, WITH LONG-TERM CURRENT USE OF INSULIN (HCC): ICD-10-CM

## 2023-09-20 DIAGNOSIS — Z76.0 MEDICATION REFILL: ICD-10-CM

## 2023-09-20 DIAGNOSIS — Z00.00 MEDICARE ANNUAL WELLNESS VISIT, SUBSEQUENT: Primary | ICD-10-CM

## 2023-09-20 RX ORDER — LANSOPRAZOLE 30 MG/1
30 CAPSULE, DELAYED RELEASE ORAL DAILY
Qty: 90 CAPSULE | Refills: 1 | Status: SHIPPED | OUTPATIENT
Start: 2023-09-20

## 2023-09-20 RX ORDER — (INSULIN DEGLUDEC AND LIRAGLUTIDE) 100; 3.6 [IU]/ML; MG/ML
INJECTION, SOLUTION SUBCUTANEOUS
COMMUNITY

## 2023-09-20 RX ORDER — IRBESARTAN 150 MG/1
150 TABLET ORAL DAILY
Qty: 90 TABLET | Refills: 1 | Status: SHIPPED | OUTPATIENT
Start: 2023-09-20

## 2023-09-20 RX ORDER — METOPROLOL TARTRATE 50 MG/1
TABLET, FILM COATED ORAL
Qty: 180 TABLET | Refills: 1 | Status: SHIPPED | OUTPATIENT
Start: 2023-09-20

## 2023-09-20 RX ORDER — CANAGLIFLOZIN 100 MG/1
100 TABLET, FILM COATED ORAL DAILY
Qty: 90 TABLET | Refills: 1 | Status: SHIPPED | OUTPATIENT
Start: 2023-09-20

## 2023-09-20 RX ORDER — FLUTICASONE PROPIONATE 50 MCG
SPRAY, SUSPENSION (ML) NASAL
Qty: 16 G | Refills: 1 | Status: SHIPPED | OUTPATIENT
Start: 2023-09-20

## 2023-09-20 RX ORDER — GABAPENTIN 300 MG/1
300 CAPSULE ORAL 3 TIMES DAILY
Qty: 90 CAPSULE | Refills: 0 | Status: SHIPPED | OUTPATIENT
Start: 2023-09-20 | End: 2023-10-20

## 2023-09-20 RX ORDER — NYSTATIN 100000 [USP'U]/G
POWDER TOPICAL
Qty: 60 G | Refills: 0 | Status: SHIPPED | OUTPATIENT
Start: 2023-09-20

## 2023-09-20 RX ORDER — ASPIRIN 81 MG/1
TABLET, CHEWABLE ORAL
Qty: 90 TABLET | Refills: 1 | Status: SHIPPED | OUTPATIENT
Start: 2023-09-20

## 2023-09-20 ASSESSMENT — LIFESTYLE VARIABLES
HOW MANY STANDARD DRINKS CONTAINING ALCOHOL DO YOU HAVE ON A TYPICAL DAY: PATIENT DOES NOT DRINK
HOW OFTEN DO YOU HAVE A DRINK CONTAINING ALCOHOL: NEVER

## 2023-09-20 ASSESSMENT — PATIENT HEALTH QUESTIONNAIRE - PHQ9
1. LITTLE INTEREST OR PLEASURE IN DOING THINGS: 1
SUM OF ALL RESPONSES TO PHQ QUESTIONS 1-9: 2
SUM OF ALL RESPONSES TO PHQ9 QUESTIONS 1 & 2: 2
SUM OF ALL RESPONSES TO PHQ QUESTIONS 1-9: 2
2. FEELING DOWN, DEPRESSED OR HOPELESS: 1

## 2023-09-20 NOTE — PATIENT INSTRUCTIONS
disclaims any warranty or liability for your use of this information. Personalized Preventive Plan for Ed Garcia - 9/20/2023  Medicare offers a range of preventive health benefits. Some of the tests and screenings are paid in full while other may be subject to a deductible, co-insurance, and/or copay. Some of these benefits include a comprehensive review of your medical history including lifestyle, illnesses that may run in your family, and various assessments and screenings as appropriate. After reviewing your medical record and screening and assessments performed today your provider may have ordered immunizations, labs, imaging, and/or referrals for you. A list of these orders (if applicable) as well as your Preventive Care list are included within your After Visit Summary for your review. Other Preventive Recommendations:    A preventive eye exam performed by an eye specialist is recommended every 1-2 years to screen for glaucoma; cataracts, macular degeneration, and other eye disorders. A preventive dental visit is recommended every 6 months. Try to get at least 150 minutes of exercise per week or 10,000 steps per day on a pedometer . Order or download the FREE \"Exercise & Physical Activity: Your Everyday Guide\" from [employer]. Call 1-919.932.6955 or search [employer] online. You need 0833-4173 mg of calcium and 5347-4818 IU of vitamin D per day. It is possible to meet your calcium requirement with diet alone, but a vitamin D supplement is usually necessary to meet this goal.  When exposed to the sun, use a sunscreen that protects against both UVA and UVB radiation with an SPF of 30 or greater. Reapply every 2 to 3 hours or after sweating, drying off with a towel, or swimming. Always wear a seat belt when traveling in a car. Always wear a helmet when riding a bicycle or motorcycle.

## 2023-09-20 NOTE — PROGRESS NOTES
Medicare Annual Wellness Visit    Hallie Busch is here for Medicare AWV (Needs foot exam)    Assessment & Plan   Medicare annual wellness visit, subsequent  -     [facility] Referral to [facility] Clinical Specialist  Type 2 diabetes mellitus with hyperglycemia, with long-term current use of insulin (HCC)  -     INVOKANA 100 MG TABS tablet; Take 1 tablet by mouth daily, Disp-90 tablet, R-1, DAWNormal  Diabetic peripheral neuropathy (HCC)  Gastroesophageal reflux disease without esophagitis  -     lansoprazole (PREVACID) 30 MG delayed release capsule; Take 1 capsule by mouth daily, Disp-90 capsule, R-1Normal  Primary osteoarthritis involving multiple joints  BMI 36.0-36.9,adult  Medication refill  -     fluticasone (FLONASE) 50 MCG/ACT nasal spray; USE 2 SPRAY(S) IN EACH NOSTRIL ONCE DAILY AS NEEDED, Disp-16 g, R-1Normal  -     INVOKANA 100 MG TABS tablet; Take 1 tablet by mouth daily, Disp-90 tablet, R-1, DAWNormal  -     irbesartan (AVAPRO) 150 MG tablet; Take 1 tablet by mouth daily Take 1 tablet by mouth once daily, Disp-90 tablet, R-1Normal  -     nystatin (MYCOSTATIN) 284856 UNIT/GM powder; APPLY POWDER TOPICALLY TO AFFECTED AREA 3 TIMES A DAY AS NEEDED FOR ITCHY RASH, Disp-60 g, R-0, Normal  -     metoprolol tartrate (LOPRESSOR) 50 MG tablet; Take 1 tablet by mouth twice daily, Disp-180 tablet, R-1Normal  -     aspirin 81 MG chewable tablet; CHEW AND SWALLOW 1 TABLET BY MOUTH ONCE DAILY, Disp-90 tablet, R-1Normal  Stage 3a chronic kidney disease ([address])    Recommendations for Preventive Services Due: see orders and patient instructions/AVS.  Recommended screening schedule for the next 5-10 years is provided to the patient in written form: see Patient Instructions/AVS.     Return in 3 months (on 12/20/2023).      Subjective     DIABETES MELLITUS, TYPE 2 -  poorly controlled, neuropathy getting worse    CHRONIC KIDNEY DISEASE - Creat 1.5, GFR 50        Patient's complete Health Risk Assessment and screening values have been

## 2023-09-22 ENCOUNTER — CLINICAL DOCUMENTATION (OUTPATIENT)
Dept: SPIRITUAL SERVICES | Age: 68
End: 2023-09-22

## 2023-09-22 NOTE — ACP (ADVANCE CARE PLANNING)
Advance Care Planning   Ambulatory ACP Specialist Patient Outreach    Date:  9/22/2023    ACP Specialist:  Morenita Ashley Dr    Outreach call to patient in follow-up to ACP Specialist referral from:Zeny Dunn MD    [x] PCP  [] Provider   [] Ambulatory Care Management [] Other     For:                         [x] Early ACP Decision-Making       Date Referral Received:9/20    Next Step:    [x] Outreach again in one week          [x] At this time, Healthcare Decision Maker Is:        [] Primary agent named in scanned advance directive. [x] Legal Next of Kin. [] Unable to determine legal decision maker at this time. Outreaches:       [] 1st -  Date:  9/22/23               Intervention:  [x] Spoke with Patient   [] Left Voice mail [] Email / Mail    [] Frolikhart  [] Other [id number]) : Outcomes:Spoke initially with patient. Later, his wife took over saying \"He cannot talk this time. \"  She affirmed she is the wife. So asked if I could talk with her. She answered \"No.\"  And hang up the phone.             [] 2nd -  Date:                 Intervention:  [] Spoke with Patient  [] Left Voice mail [] Email / Mail    [] Frolikhart  [] Other [id number]) : Outcomes:                [] 3rd -  Date:                Intervention:  [] Spoke with Patient   [] Left Voice mail [] Email / Mail    [] Frolikhart  [] Other [id number]) : Outcomes:           []  Additional Outreach -  Date:     (Specify Dates & special circumstances): Outcomes:          Thank you for this referral.  Electronically signed by Morenita Ashley Dr, [address] on 9/22/2023 at 11:59 AM

## 2023-09-25 ENCOUNTER — CLINICAL DOCUMENTATION (OUTPATIENT)
Dept: SPIRITUAL SERVICES | Age: 68
End: 2023-09-25

## 2023-09-25 NOTE — ACP (ADVANCE CARE PLANNING)
Advance Care Planning   Advance Care Planning Note  Ambulatory Spiritual Care Services    Date:  9/25/2023    Received request from [facility] Provider. Consultation conversation participants:   Patient who stated - does not need any conversation re. ACP. Goals of ACP Conversation: To establish next of kin. Health Care Decision Makers:      Primary Decision Maker: Gold Choudhury - [phone]   Summary:  Documented Next of Kin, per patient report    Advance Care Planning Documents (Patient Wishes)  Currently on file:   None  The scanned document is NOT related to ACP document. Assessment:   Based on first and second outreach (today), patient sounded not really hostile to the call yet disagreeable to talking about ACP; has expressed \"You do not need to call me,\" sounded ambivalent to the suggestion that he talk with his PCP re. ACP matters by saying \"Maybe some other times. \"  Moreover, was able to sense that he has a next of kin in his wife with whom I had a brief conversation in the first outreach. Interventions:  Patient DECLINED ACP conversation  Encouraged conversation with PCP    Care Preferences Communicated:   No    Outcomes:  Routed ACP note to attending provider or other IDT member. Patient / Healthcare Decision Maker Instructions:  Patient statement is clear:  no follow up outreach.       Electronically signed by Timoteo Villela, [address] [place] [address] on 9/25/2023 at 10:05 AM.

## 2023-09-27 NOTE — TELEPHONE ENCOUNTER
Patient notified through [employer] that office note was fixed to include diabetic foot exam and asked if he would like for it to mailed out of swing by the office to .

## 2024-02-05 DIAGNOSIS — Z76.0 MEDICATION REFILL: ICD-10-CM

## 2024-02-05 RX ORDER — GABAPENTIN 300 MG/1
300 CAPSULE ORAL 3 TIMES DAILY
Qty: 90 CAPSULE | Refills: 0 | Status: SHIPPED | OUTPATIENT
Start: 2024-02-05 | End: 2024-03-06

## 2024-02-05 NOTE — TELEPHONE ENCOUNTER
Ryan Solitario called to request a refill on his medication.      Last office visit : 12/20/2023   Next office visit : 2/19/2024     Last UDS:   Amphetamine Screen, Urine   Date Value Ref Range Status   09/07/2021 -  Final     Barbiturate Screen, Urine   Date Value Ref Range Status   09/07/2021 -  Final     Benzodiazepine Screen, Urine   Date Value Ref Range Status   09/07/2021 -  Final     Buprenorphine Urine   Date Value Ref Range Status   09/07/2021 -  Final     Cocaine Metabolite Screen, Urine   Date Value Ref Range Status   09/07/2021 -  Final     Gabapentin Screen, Urine   Date Value Ref Range Status   09/07/2021 +  Final     MDMA, Urine   Date Value Ref Range Status   09/07/2021 -  Final     Methamphetamine, Urine   Date Value Ref Range Status   09/07/2021 -  Final     Opiate Scrn, Ur   Date Value Ref Range Status   09/07/2021 -  Final     Oxycodone Screen, Ur   Date Value Ref Range Status   09/07/2021 -  Final     PCP Screen, Urine   Date Value Ref Range Status   09/07/2021 -  Final     Propoxyphene Screen, Urine   Date Value Ref Range Status   09/07/2021 -  Final     THC Screen, Urine   Date Value Ref Range Status   09/07/2021 -  Final     Tricyclic Antidepressants, Urine   Date Value Ref Range Status   09/07/2021 -  Final       Last Gary: 2/5/24  Medication Contract: 12/20/23   Last Fill: 12/20/23    Requested Prescriptions     Pending Prescriptions Disp Refills    gabapentin (NEURONTIN) 300 MG capsule [Pharmacy Med Name: Gabapentin 300 MG Oral Capsule] 90 capsule 0     Sig: Take 1 capsule by mouth 3 times daily.                           Please approve or refuse this medication.   Debi Lima LPN

## 2024-02-20 NOTE — PROGRESS NOTES
[facility] - PODIATRY    Today's Date: 02/27/24    Patient Name: Mick Sims  MRN: [id number]  CSN: [id number]  PCP: Provider, No Known   Referring Provider: No ref. provider found    SUBJECTIVE     Chief Complaint   Patient presents with    Follow-up     Provider, No Known 3 [facility]AB[facility]IC- pt states feet doing ok other than neuropathy- pt denies pain     Diabetes     160mg/dl BG this am      HPI: Mick Sims, a 68 y.o.male, comes to clinic as a(n) established patient presenting for diabetic foot exam and complaining of thick fungal toenails . Patient has h/o arthritis, DM2, HTN, Renal Disorder . Patient is IDDM with last stated BG level of 160mg/dl. Relates that his A1c was about 9.1%. Sees Dr. Ziegler for Endocrinology. Notes moderate numbness and tingling in feet.  Denies open wounds or sores. States that his toenails are long, thick and discolored. He has trouble caring for them himself. Denies pain today. Relates previous treatment(s) including foot care by podiatry . Denies any constitutional symptoms. No other pedal complaints at this time.    Past Medical History:   Diagnosis Date    Arthritis     Diabetes mellitus     Hypertension     Renal disorder      Past Surgical History:   Procedure Laterality Date    CARDIAC CATHETERIZATION      CARPAL TUNNEL RELEASE      CORONARY STENT PLACEMENT  2018    X 2    TOTAL KNEE ARTHROPLASTY Right      Family History   Problem Relation Age of Onset    Cancer Mother     Diabetes Sister     Diabetes Brother     No Known Problems Brother      Social History     Socioeconomic History    Marital status:    Tobacco Use    Smoking status: Never     Passive exposure: Never    Smokeless tobacco: Never   Vaping Use    Vaping Use: Never used   Substance and Sexual Activity    Alcohol use: No    Drug use: Never    Sexual activity: Defer     Allergies   Allergen Reactions    Latex Hives     Current Outpatient Medications   Medication Sig Dispense  Refill    aspirin 81 MG chewable tablet Chew 1 tablet Daily.      Continuous Blood Gluc  (FreeStyle Fercho 2 [place]) device 1 each Continuous. Use as indicated for glucose monitoring 1 each 1    Continuous Blood Gluc Sensor (FreeStyle Fercho 2 Sensor) misc 1 each Every 14 (Fourteen) Days. 2 each 11    Cyanocobalamin (Vitamin B-12) 1000 MCG/15ML liquid Take  by mouth.      empagliflozin (Jardiance) 25 MG tablet tablet Take 1 tablet by mouth Daily. One tablet daily before breakfast 30 tablet 11    ezetimibe (Zetia) 10 MG tablet Take 1 tablet by mouth Daily. 90 tablet 3    Finerenone 20 MG tablet Take 1 tablet by mouth Daily. 30 tablet 11    fluticasone (FLONASE) 50 MCG/ACT nasal spray 1 spray into the nostril(s) as directed by provider 2 (Two) Times a Day As Needed for Rhinitis or Allergies.      furosemide (LASIX) 40 MG tablet Take 0.5 tablets by mouth As Needed. prn      gabapentin (NEURONTIN) 300 MG capsule Take 1 capsule by mouth 3 (Three) Times a Day.      Glucagon, rDNA, (Glucagon Emergency) 1 MG kit Inject 1 mg under the skin into the appropriate area as directed 1 (One) Time As Needed (hypoglycemia) for up to 1 dose. 1 each 11    Insulin Degludec-Liraglutide (Xultophy) 100-3.6 UNIT-MG/ML solution pen-injector subcutaneous pen Inject 50 Units under the skin into the appropriate area as directed Daily. 15 mL 11    Insulin Lispro-aabc, 1 U Dial, (Lyumjev KwikPen) 100 UNIT/ML solution pen-injector Inject 80 Units under the skin into the appropriate area as directed 3 (Three) Times a Day With Meals. Up to 70u with meals 72 mL 3    Insulin Pen Needle (PEN NEEDLES) 32G X 4 MM misc 1 each 4 (Four) Times a Day. Use 4 times per day to inject insulin DX E11.9 400 each 3    irbesartan (AVAPRO) 150 MG tablet Take 2 tablets by mouth Every Night.      metoprolol tartrate (LOPRESSOR) 50 MG tablet Take 1 tablet by mouth 2 (Two) Times a Day. 30 tablet 0    nitroglycerin (NITROSTAT) 0.4 MG SL tablet Place 1 tablet under  the tongue Every 5 (Five) Minutes As Needed for Chest Pain. Take no more than 3 doses in 15 minutes.      oxyCODONE-acetaminophen (PERCOCET) 5-325 MG per tablet Take 1 tablet by mouth Every 6 (Six) Hours As Needed.      PANTOPRAZOLE SODIUM PO Take 40 mg by mouth Daily. Twice daily      vitamin D (ERGOCALCIFEROL) 1.25 MG (84727 UT) capsule capsule Take 1 capsule by mouth 1 (One) Time Per Week.      sildenafil (Viagra) 100 MG tablet Take 1 tablet by mouth As Needed for Erectile Dysfunction. 10 tablet 11     No current facility-administered medications for this visit.     Review of Systems   Constitutional:  Negative for chills and fever.   HENT:  Negative for congestion.    Respiratory:  Negative for shortness of breath.    Cardiovascular:  Positive for leg swelling. Negative for chest pain.   Gastrointestinal:  Negative for constipation, diarrhea, nausea and vomiting.   Musculoskeletal:         Foot pain   Skin:  Negative for wound.   Neurological:  Positive for numbness.       OBJECTIVE     Vitals:    02/27/24 0923   BP: 142/66   Pulse: 78   SpO2: 95%         PHYSICAL EXAM  GEN:   Accompanied by none.     Foot/Ankle Exam    GENERAL  Diabetic foot exam performed    Appearance:  appears stated age  Orientation:  AAOx3  Affect:  appropriate  Gait:  unimpaired  Assistance:  independent  Right shoe gear: casual shoe  Left shoe gear: casual shoe    VASCULAR     Right Foot Vascularity   Dorsalis pedis:  2+  Posterior tibial:  2+  Skin temperature:  warm  Edema grading:  Trace  CFT:  3  Pedal hair growth:  Present  Varicosities:  mild varicosities     Left Foot Vascularity   Dorsalis pedis:  2+  Posterior tibial:  2+  Skin temperature:  warm  Edema grading:  Trace  CFT:  3  Pedal hair growth:  Present  Varicosities:  mild varicosities     NEUROLOGIC     Right Foot Neurologic   Light touch sensation: diminished  Vibratory sensation: diminished  Hot/Cold sensation: diminished  Protective Sensation using [place]-Azar  Monofilament:   Sites intact: 4  Sites tested: 10     Left Foot Neurologic   Light touch sensation: diminished  Vibratory sensation: diminished  Hot/Cold sensation:  diminished  Protective Sensation using [place]-Azar Monofilament:   Sites intact: 4  Sites tested: 10    MUSCULOSKELETAL     Right Foot Musculoskeletal   Ecchymosis:  none  Tenderness:  none    Arch:  Normal     Left Foot Musculoskeletal   Ecchymosis:  none  Tenderness:  none  Arch:  Normal    MUSCLE STRENGTH     Right Foot Muscle Strength   Foot dorsiflexion:  5  Foot plantar flexion:  5  Foot inversion:  5  Foot eversion:  5     Left Foot Muscle Strength   Foot dorsiflexion:  5  Foot plantar flexion:  5  Foot inversion:  5  Foot eversion:  5    RANGE OF MOTION     Right Foot Range of Motion   Foot and ankle ROM within normal limits       Left Foot Range of Motion   Foot and ankle ROM within normal limits      DERMATOLOGIC      Right Foot Dermatologic   Skin  Positive for tinea (interdigital).   Nails  1.  Positive for elongated, onychomycosis, abnormal thickness and subungual debris.  2.  Positive for elongated, onychomycosis, abnormal thickness and subungual debris.  3.  Positive for elongated, onychomycosis, abnormal thickness and subungual debris.  4.  Positive for elongated, onychomycosis, abnormal thickness and subungual debris.  5.  Positive for elongated, onychomycosis, abnormal thickness and subungual debris.  Nails comment:  1-5     Left Foot Dermatologic   Skin  Positive for tinea (interdigital).   Nails comment:  1-5  Nails  1.  Positive for elongated, onychomycosis, abnormal thickness and subungual debris.  2.  Positive for elongated, onychomycosis, abnormal thickness and subungual debris.  3.  Positive for elongated, onychomycosis, abnormal thickness and subungual debris.  4.  Positive for elongated, onychomycosis, abnormally thick and subungual debris.  5.  Positive for elongated, onychomycosis, abnormally thick and subungual  debris.      RADIOLOGY/NUCLEAR:  No results found.    LABORATORY/CULTURE RESULTS:      PATHOLOGY RESULTS:       ASSESSMENT/PLAN     Diagnoses and all orders for this visit:    1. Thickened nails (Primary)    2. Type 2 diabetes mellitus with diabetic neuropathy, with long-term current use of insulin    3. Antiplatelet or antithrombotic long-term use    4. Stage 3b chronic kidney disease    5. Encounter for diabetic foot exam        Comprehensive lower extremity examination and evaluation was performed.  Discussed findings and treatment plan including risks, benefits, and treatment options with patient in detail. Patient agreed with treatment plan.  DFE performed  After verbal consent obtained, nail(s) x10 debrided of length and thickness with nail nipper without incidence  Patient may maintain nails and calluses at home utilizing emery board or pumice stone between visits as needed  Reviewed at home diabetic foot care including daily foot checks   Continue daily use of compression stockings and elevate extremities at rest.  Continue diabetic monitoring and control under direction of Endocrinology.  An After Visit Summary was printed and given to the patient at discharge, including (if requested) any available informative/educational handouts regarding diagnosis, treatment, or medications. All questions were answered to patient/family satisfaction. Should symptoms fail to improve or worsen they agree to call or return to clinic or to go to the Emergency Department. Discussed the importance of following up with any needed screening tests/labs/specialist appointments and any requested follow-up recommended by me today. Importance of maintaining follow-up discussed and patient accepts that missed appointments can delay diagnosis and potentially lead to worsening of conditions.  Return in about 3 months (around 5/27/2024) for Follow-up with Podiatry APRN, Schedule Foot Care Clinic., or sooner if acute issues  arise.        This document has been electronically signed by Mehdi Valencia DPM on February 27, 2024 09:45 CST

## 2024-02-21 ENCOUNTER — OFFICE VISIT (OUTPATIENT)
Dept: PRIMARY CARE CLINIC | Age: 69
End: 2024-02-21
Payer: MEDICARE

## 2024-02-21 VITALS
TEMPERATURE: 97.8 F | WEIGHT: 285.4 LBS | HEIGHT: 72 IN | DIASTOLIC BLOOD PRESSURE: 68 MMHG | BODY MASS INDEX: 38.66 KG/M2 | HEART RATE: 70 BPM | OXYGEN SATURATION: 93 % | SYSTOLIC BLOOD PRESSURE: 114 MMHG

## 2024-02-21 DIAGNOSIS — E66.01 SEVERE OBESITY (BMI 35.0-39.9) WITH COMORBIDITY (HCC): ICD-10-CM

## 2024-02-21 DIAGNOSIS — E11.65 TYPE 2 DIABETES MELLITUS WITH HYPERGLYCEMIA, WITH LONG-TERM CURRENT USE OF INSULIN (HCC): ICD-10-CM

## 2024-02-21 DIAGNOSIS — E11.42 DIABETIC PERIPHERAL NEUROPATHY (HCC): ICD-10-CM

## 2024-02-21 DIAGNOSIS — N18.31 STAGE 3A CHRONIC KIDNEY DISEASE (HCC): ICD-10-CM

## 2024-02-21 DIAGNOSIS — Z12.11 COLON CANCER SCREENING: ICD-10-CM

## 2024-02-21 DIAGNOSIS — I25.119 ATHEROSCLEROSIS OF NATIVE CORONARY ARTERY OF NATIVE HEART WITH ANGINA PECTORIS (HCC): ICD-10-CM

## 2024-02-21 DIAGNOSIS — Z79.899 MEDICATION MANAGEMENT: Primary | ICD-10-CM

## 2024-02-21 DIAGNOSIS — Z79.4 TYPE 2 DIABETES MELLITUS WITH HYPERGLYCEMIA, WITH LONG-TERM CURRENT USE OF INSULIN (HCC): ICD-10-CM

## 2024-02-21 PROBLEM — E11.9 TYPE 2 DIABETES MELLITUS (HCC): Status: ACTIVE | Noted: 2024-02-21

## 2024-02-21 PROBLEM — I20.9 ANGINA PECTORIS, UNSPECIFIED (HCC): Status: ACTIVE | Noted: 2024-02-21

## 2024-02-21 PROCEDURE — 3017F COLORECTAL CA SCREEN DOC REV: CPT | Performed by: FAMILY MEDICINE

## 2024-02-21 PROCEDURE — 99213 OFFICE O/P EST LOW 20 MIN: CPT | Performed by: FAMILY MEDICINE

## 2024-02-21 PROCEDURE — G8417 CALC BMI ABV UP PARAM F/U: HCPCS | Performed by: FAMILY MEDICINE

## 2024-02-21 PROCEDURE — 2022F DILAT RTA XM EVC RTNOPTHY: CPT | Performed by: FAMILY MEDICINE

## 2024-02-21 PROCEDURE — 1036F TOBACCO NON-USER: CPT | Performed by: FAMILY MEDICINE

## 2024-02-21 PROCEDURE — G8427 DOCREV CUR MEDS BY ELIG CLIN: HCPCS | Performed by: FAMILY MEDICINE

## 2024-02-21 PROCEDURE — 1123F ACP DISCUSS/DSCN MKR DOCD: CPT | Performed by: FAMILY MEDICINE

## 2024-02-21 PROCEDURE — 3078F DIAST BP <80 MM HG: CPT | Performed by: FAMILY MEDICINE

## 2024-02-21 PROCEDURE — 3046F HEMOGLOBIN A1C LEVEL >9.0%: CPT | Performed by: FAMILY MEDICINE

## 2024-02-21 PROCEDURE — 3074F SYST BP LT 130 MM HG: CPT | Performed by: FAMILY MEDICINE

## 2024-02-21 PROCEDURE — G8484 FLU IMMUNIZE NO ADMIN: HCPCS | Performed by: FAMILY MEDICINE

## 2024-02-21 RX ORDER — CALCITRIOL 0.25 UG/1
1 CAPSULE, LIQUID FILLED ORAL
COMMUNITY
Start: 2024-01-31 | End: 2024-02-22

## 2024-02-21 RX ORDER — MUPIROCIN CALCIUM 20 MG/G
CREAM TOPICAL
COMMUNITY
Start: 2020-11-13

## 2024-02-21 RX ORDER — TRIAMCINOLONE ACETONIDE 0.25 MG/G
CREAM TOPICAL EVERY 4 HOURS PRN
COMMUNITY

## 2024-02-21 RX ORDER — ERGOCALCIFEROL 1.25 MG/1
1 CAPSULE ORAL
COMMUNITY
Start: 2022-12-06

## 2024-02-21 ASSESSMENT — PATIENT HEALTH QUESTIONNAIRE - PHQ9
1. LITTLE INTEREST OR PLEASURE IN DOING THINGS: 0
SUM OF ALL RESPONSES TO PHQ QUESTIONS 1-9: 0
SUM OF ALL RESPONSES TO PHQ QUESTIONS 1-9: 0
SUM OF ALL RESPONSES TO PHQ9 QUESTIONS 1 & 2: 0
2. FEELING DOWN, DEPRESSED OR HOPELESS: 0
SUM OF ALL RESPONSES TO PHQ QUESTIONS 1-9: 0
SUM OF ALL RESPONSES TO PHQ QUESTIONS 1-9: 0

## 2024-02-21 NOTE — PROGRESS NOTES
[facility]  [address]  [address]  [place] [address]  Dept: [phone]  Dept Fax: [phone]  Loc: [phone]      Subjective:     Chief Complaint   Patient presents with    Follow-up       HPI:  Ryan Solitario is a 68 y.o. male presents today for his routine follow-up visit to go over all his medications for reconciliation. He brought all of his medications for review and reconciliation. His meds at home match with the list in his chart  Today, he also states that he has been having some discomfort underneath his rib cage on both sides.  He states it does not feel like CP. He states he takes his reflux medication faithfully. He does have a very large abdominal girth and he states that he often feel the discomfort hen he is bending forward    ROS:   Review of Systems   Constitutional: Negative.    HENT:  Negative for congestion.    Eyes: Negative.    Respiratory: Negative.  Negative for shortness of breath.    Cardiovascular: Negative.  Negative for chest pain.   Gastrointestinal:  Negative for abdominal distention, abdominal pain, constipation and diarrhea.   Genitourinary: Negative.    Musculoskeletal:  Positive for arthralgias (at baseline).   Skin: Negative.    Neurological: Negative.    Hematological: Negative.    Psychiatric/Behavioral: Negative.         PMHx:  Past Medical History:   Diagnosis Date    ACS (acute coronary syndrome) ([facility]) 6/3/2019    Acute renal failure superimposed on stage 4 chronic kidney disease ([facility]) 9/4/2020    Aneurysm (HCC)     abdominal (pt states NOT)    Arthritis     Bilateral leg edema     CAD (coronary artery disease)     [facility].    Chronic kidney disease     Stage 4; sees dr. ta    Diabetes mellitus, type 2 ([facility])     Fatty liver     GERD (gastroesophageal reflux disease)     HTN (hypertension)     Knee pain     NSTEMI (non-ST elevated myocardial infarction) ([facility]) 6/5/2019    Prolonged

## 2024-02-22 ASSESSMENT — ENCOUNTER SYMPTOMS
SHORTNESS OF BREATH: 0
CONSTIPATION: 0
EYES NEGATIVE: 1
DIARRHEA: 0
ABDOMINAL PAIN: 0
ABDOMINAL DISTENTION: 0
RESPIRATORY NEGATIVE: 1

## 2024-02-26 ENCOUNTER — TELEPHONE (OUTPATIENT)
Dept: PODIATRY | Facility: CLINIC | Age: 69
End: 2024-02-26
Payer: MEDICARE

## 2024-02-26 NOTE — TELEPHONE ENCOUNTER
Called patient regarding appt on 02/27/2024. Left message for patient to return call if any questions or concerns arise.

## 2024-02-27 ENCOUNTER — OFFICE VISIT (OUTPATIENT)
Dept: PODIATRY | Facility: CLINIC | Age: 69
End: 2024-02-27
Payer: MEDICARE

## 2024-02-27 VITALS
WEIGHT: 282 LBS | HEIGHT: 72 IN | SYSTOLIC BLOOD PRESSURE: 142 MMHG | OXYGEN SATURATION: 95 % | BODY MASS INDEX: 38.19 KG/M2 | HEART RATE: 78 BPM | DIASTOLIC BLOOD PRESSURE: 66 MMHG

## 2024-02-27 DIAGNOSIS — N18.32 STAGE 3B CHRONIC KIDNEY DISEASE: ICD-10-CM

## 2024-02-27 DIAGNOSIS — Z79.4 TYPE 2 DIABETES MELLITUS WITH DIABETIC NEUROPATHY, WITH LONG-TERM CURRENT USE OF INSULIN: ICD-10-CM

## 2024-02-27 DIAGNOSIS — L60.2 THICKENED NAILS: Primary | ICD-10-CM

## 2024-02-27 DIAGNOSIS — E11.40 TYPE 2 DIABETES MELLITUS WITH DIABETIC NEUROPATHY, WITH LONG-TERM CURRENT USE OF INSULIN: ICD-10-CM

## 2024-02-27 DIAGNOSIS — E11.9 ENCOUNTER FOR DIABETIC FOOT EXAM: ICD-10-CM

## 2024-02-27 DIAGNOSIS — Z79.02 ANTIPLATELET OR ANTITHROMBOTIC LONG-TERM USE: ICD-10-CM

## 2024-02-27 PROBLEM — E11.42 DIABETIC PERIPHERAL NEUROPATHY: Status: ACTIVE | Noted: 2024-02-21

## 2024-02-27 PROCEDURE — 99213 OFFICE O/P EST LOW 20 MIN: CPT | Performed by: PODIATRIST

## 2024-02-27 PROCEDURE — 1159F MED LIST DOCD IN RCRD: CPT | Performed by: PODIATRIST

## 2024-02-27 PROCEDURE — 1160F RVW MEDS BY RX/DR IN RCRD: CPT | Performed by: PODIATRIST

## 2024-02-27 PROCEDURE — 11721 DEBRIDE NAIL 6 OR MORE: CPT | Performed by: PODIATRIST

## 2024-02-27 PROCEDURE — 3078F DIAST BP <80 MM HG: CPT | Performed by: PODIATRIST

## 2024-02-27 PROCEDURE — 3077F SYST BP >= 140 MM HG: CPT | Performed by: PODIATRIST

## 2024-03-05 DIAGNOSIS — Z76.0 MEDICATION REFILL: ICD-10-CM

## 2024-03-05 RX ORDER — GABAPENTIN 300 MG/1
300 CAPSULE ORAL 3 TIMES DAILY
Qty: 90 CAPSULE | Refills: 0 | Status: SHIPPED | OUTPATIENT
Start: 2024-03-06 | End: 2024-04-05

## 2024-03-05 NOTE — TELEPHONE ENCOUNTER
Ryan Solitario called to request a refill on his medication.      Last office visit : 2/21/2024   Next office visit : 5/21/2024     Last UDS:   Amphetamine Screen, Urine   Date Value Ref Range Status   09/07/2021 -  Final     Barbiturate Screen, Urine   Date Value Ref Range Status   09/07/2021 -  Final     Benzodiazepine Screen, Urine   Date Value Ref Range Status   09/07/2021 -  Final     Buprenorphine Urine   Date Value Ref Range Status   09/07/2021 -  Final     Cocaine Metabolite Screen, Urine   Date Value Ref Range Status   09/07/2021 -  Final     Gabapentin Screen, Urine   Date Value Ref Range Status   09/07/2021 +  Final     MDMA, Urine   Date Value Ref Range Status   09/07/2021 -  Final     Methamphetamine, Urine   Date Value Ref Range Status   09/07/2021 -  Final     Opiate Scrn, Ur   Date Value Ref Range Status   09/07/2021 -  Final     Oxycodone Screen, Ur   Date Value Ref Range Status   09/07/2021 -  Final     PCP Screen, Urine   Date Value Ref Range Status   09/07/2021 -  Final     Propoxyphene Screen, Urine   Date Value Ref Range Status   09/07/2021 -  Final     THC Screen, Urine   Date Value Ref Range Status   09/07/2021 -  Final     Tricyclic Antidepressants, Urine   Date Value Ref Range Status   09/07/2021 -  Final       Last Gary: 2/5/24  Medication Contract: 12/20/23   Last Fill: 2/5/24    Requested Prescriptions     Pending Prescriptions Disp Refills    gabapentin (NEURONTIN) 300 MG capsule [Pharmacy Med Name: Gabapentin 300 MG Oral Capsule] 90 capsule 0     Sig: Take 1 capsule by mouth 3 times daily for 30 days. Max Daily Amount: 900 mg                           Please approve or refuse this medication.   Debi Lima LPN

## 2024-03-18 ENCOUNTER — TELEPHONE (OUTPATIENT)
Dept: PRIMARY CARE CLINIC | Age: 69
End: 2024-03-18

## 2024-03-18 NOTE — TELEPHONE ENCOUNTER
Dr. Montague in [place]'s office called and asked if we can order the injection Leqvio here and get injection in our infusion center? He has been getting these there monthly and wants to be able to get it closer to home. Please advise?

## 2024-03-20 DIAGNOSIS — Z76.0 MEDICATION REFILL: ICD-10-CM

## 2024-03-20 RX ORDER — GABAPENTIN 300 MG/1
300 CAPSULE ORAL 3 TIMES DAILY
Qty: 90 CAPSULE | Refills: 0 | OUTPATIENT
Start: 2024-03-20

## 2024-03-20 NOTE — TELEPHONE ENCOUNTER
Refill request was for Gabapentin, last time medication was filled for patient was 03-. Denied medication with reason medication refill requested too soon.

## 2024-03-21 RX ORDER — ACETAMINOPHEN 325 MG/1
650 TABLET ORAL
OUTPATIENT
Start: 2024-06-14

## 2024-03-21 RX ORDER — ONDANSETRON 2 MG/ML
8 INJECTION INTRAMUSCULAR; INTRAVENOUS
OUTPATIENT
Start: 2024-06-14

## 2024-03-21 RX ORDER — ALBUTEROL SULFATE 90 UG/1
4 AEROSOL, METERED RESPIRATORY (INHALATION) PRN
OUTPATIENT
Start: 2024-06-14

## 2024-03-21 RX ORDER — DIPHENHYDRAMINE HYDROCHLORIDE 50 MG/ML
50 INJECTION INTRAMUSCULAR; INTRAVENOUS
OUTPATIENT
Start: 2024-06-14

## 2024-03-21 RX ORDER — SODIUM CHLORIDE 9 MG/ML
INJECTION, SOLUTION INTRAVENOUS CONTINUOUS
OUTPATIENT
Start: 2024-06-14

## 2024-03-21 RX ORDER — EPINEPHRINE 1 MG/ML
0.3 INJECTION, SOLUTION, CONCENTRATE INTRAVENOUS PRN
OUTPATIENT
Start: 2024-06-14

## 2024-03-21 NOTE — PROGRESS NOTES
Therapy plan created for Leqvio, per Dr. Santos.  Patient began Leqvio injections 03/14/2024 at [facility].  [place] is 75 miles and over a one hour drive. Patient resides in [place], [place] and would like to begin receiving injections at  [facility].  Next Leqvio injection is due 06/14/2024.  Electronically signed by Ligia Street RN on 3/21/2024 at 12:10 PM

## 2024-03-21 NOTE — TELEPHONE ENCOUNTER
Ligia Street states they can get the injection here. She will put the information in and send to you for approval that you are wanting patient to receive injection here. She will be using diagnosis of mixed hyperlipidemia. He is scheduled with their office June 1st for his next injection. I will call patient to let him know he can get that injection here at the infusion center, at that scheduled date.

## 2024-03-22 ENCOUNTER — CLINICAL DOCUMENTATION (OUTPATIENT)
Facility: HOSPITAL | Age: 69
End: 2024-03-22

## 2024-03-22 NOTE — PROGRESS NOTES
Patient Assistance                   Additional notes: Reviewed chart and no assistance is needed.  Patient has Medicare and [place] supplement.

## 2024-03-27 NOTE — PROGRESS NOTES
Called patient to confirm colonoscopy scheduled for 3/28/24 with arrival time of 0815. No answer, left message.   Electronically signed by Charlotte Mckee RN on 3/27/2024 at 9:48 AM

## 2024-03-28 ENCOUNTER — ANESTHESIA EVENT (OUTPATIENT)
Dept: ENDOSCOPY | Age: 69
End: 2024-03-28
Payer: MEDICARE

## 2024-03-28 ENCOUNTER — ANESTHESIA (OUTPATIENT)
Dept: ENDOSCOPY | Age: 69
End: 2024-03-28
Payer: MEDICARE

## 2024-03-28 ENCOUNTER — HOSPITAL ENCOUNTER (OUTPATIENT)
Age: 69
Setting detail: OUTPATIENT SURGERY
Discharge: HOME | End: 2024-03-28
Attending: INTERNAL MEDICINE | Admitting: INTERNAL MEDICINE
Payer: MEDICARE

## 2024-03-28 VITALS
HEIGHT: 72 IN | HEART RATE: 71 BPM | TEMPERATURE: 97 F | DIASTOLIC BLOOD PRESSURE: 74 MMHG | RESPIRATION RATE: 16 BRPM | OXYGEN SATURATION: 98 % | BODY MASS INDEX: 38.6 KG/M2 | SYSTOLIC BLOOD PRESSURE: 138 MMHG | WEIGHT: 285 LBS

## 2024-03-28 DIAGNOSIS — Z86.010 HX OF COLONIC POLYPS: ICD-10-CM

## 2024-03-28 LAB
GLUCOSE BLD-MCNC: 191 MG/DL (ref 70–99)
PERFORMED ON: ABNORMAL

## 2024-03-28 PROCEDURE — 3700000000 HC ANESTHESIA ATTENDED CARE: Performed by: INTERNAL MEDICINE

## 2024-03-28 PROCEDURE — 2580000003 HC RX 258: Performed by: NURSE ANESTHETIST, CERTIFIED REGISTERED

## 2024-03-28 PROCEDURE — 82962 GLUCOSE BLOOD TEST: CPT

## 2024-03-28 PROCEDURE — 45388 COLONOSCOPY W/ABLATION: CPT | Performed by: INTERNAL MEDICINE

## 2024-03-28 PROCEDURE — 7100000011 HC PHASE II RECOVERY - ADDTL 15 MIN: Performed by: INTERNAL MEDICINE

## 2024-03-28 PROCEDURE — 2500000003 HC RX 250 WO HCPCS: Performed by: NURSE ANESTHETIST, CERTIFIED REGISTERED

## 2024-03-28 PROCEDURE — 3609027000 HC COLONOSCOPY: Performed by: INTERNAL MEDICINE

## 2024-03-28 PROCEDURE — 2709999900 HC NON-CHARGEABLE SUPPLY: Performed by: INTERNAL MEDICINE

## 2024-03-28 PROCEDURE — 45385 COLONOSCOPY W/LESION REMOVAL: CPT | Performed by: INTERNAL MEDICINE

## 2024-03-28 PROCEDURE — 2580000003 HC RX 258: Performed by: INTERNAL MEDICINE

## 2024-03-28 PROCEDURE — 3700000001 HC ADD 15 MINUTES (ANESTHESIA): Performed by: INTERNAL MEDICINE

## 2024-03-28 PROCEDURE — 88305 TISSUE EXAM BY PATHOLOGIST: CPT

## 2024-03-28 PROCEDURE — 7100000010 HC PHASE II RECOVERY - FIRST 15 MIN: Performed by: INTERNAL MEDICINE

## 2024-03-28 PROCEDURE — 6360000002 HC RX W HCPCS: Performed by: NURSE ANESTHETIST, CERTIFIED REGISTERED

## 2024-03-28 RX ORDER — PROPOFOL 10 MG/ML
INJECTION, EMULSION INTRAVENOUS PRN
Status: DISCONTINUED | OUTPATIENT
Start: 2024-03-28 | End: 2024-03-28 | Stop reason: SDUPTHER

## 2024-03-28 RX ORDER — LIDOCAINE HYDROCHLORIDE 10 MG/ML
INJECTION, SOLUTION EPIDURAL; INFILTRATION; INTRACAUDAL; PERINEURAL PRN
Status: DISCONTINUED | OUTPATIENT
Start: 2024-03-28 | End: 2024-03-28 | Stop reason: SDUPTHER

## 2024-03-28 RX ORDER — SODIUM CHLORIDE, SODIUM LACTATE, POTASSIUM CHLORIDE, CALCIUM CHLORIDE 600; 310; 30; 20 MG/100ML; MG/100ML; MG/100ML; MG/100ML
INJECTION, SOLUTION INTRAVENOUS CONTINUOUS PRN
Status: DISCONTINUED | OUTPATIENT
Start: 2024-03-28 | End: 2024-03-28 | Stop reason: SDUPTHER

## 2024-03-28 RX ORDER — SODIUM CHLORIDE, SODIUM LACTATE, POTASSIUM CHLORIDE, CALCIUM CHLORIDE 600; 310; 30; 20 MG/100ML; MG/100ML; MG/100ML; MG/100ML
INJECTION, SOLUTION INTRAVENOUS CONTINUOUS
Status: DISCONTINUED | OUTPATIENT
Start: 2024-03-28 | End: 2024-03-28 | Stop reason: HOSPADM

## 2024-03-28 RX ADMIN — PROPOFOL 450 MG: 10 INJECTION, EMULSION INTRAVENOUS at 09:25

## 2024-03-28 RX ADMIN — SODIUM CHLORIDE, SODIUM LACTATE, POTASSIUM CHLORIDE, AND CALCIUM CHLORIDE: 600; 310; 30; 20 INJECTION, SOLUTION INTRAVENOUS at 09:20

## 2024-03-28 RX ADMIN — SODIUM CHLORIDE, POTASSIUM CHLORIDE, SODIUM LACTATE AND CALCIUM CHLORIDE: 600; 310; 30; 20 INJECTION, SOLUTION INTRAVENOUS at 08:48

## 2024-03-28 RX ADMIN — LIDOCAINE HYDROCHLORIDE 50 MG: 10 INJECTION, SOLUTION EPIDURAL; INFILTRATION; INTRACAUDAL; PERINEURAL at 09:20

## 2024-03-28 ASSESSMENT — PAIN - FUNCTIONAL ASSESSMENT
PAIN_FUNCTIONAL_ASSESSMENT: 0-10
PAIN_FUNCTIONAL_ASSESSMENT: 0-10

## 2024-03-28 NOTE — DISCHARGE INSTRUCTIONS
1. Repeat colonoscopy: pending pathology -based on colonoscopy findings today and his prep quality as well as his past history, in 5 years; sooner if he were to develop lower GI symptoms such as bleeding, abdominal pain, change in bowel habits or stool caliber or if the patient has anemia or unexplained weight loss in the future.   2. Await biopsy results-you will receive a letter with your results within 7-10 days    - Resume previous meds and diet  - GI clinic f/u PRN   - Keep scheduled f/u appts with other MDs     - NO ASA/NSAIDs x 2 weeks     NSAIDS (Non-steroidal Anti-Inflammatory)    You have been directed by your physician to avoid any NSAID's; the following medications are a list of those to avoid. If you think that you are taking any NSAID's notify your physician.     Over The Counter    Advil                      Motrin  Nuprin                   Ibuprofen  Midol                     Aleve  Naproxen              Orudis  Aspirin                   Livia-Lansing    Prescriptions and Generics    Cataflam              Relafen  Voltaren               Clinoril  Indocin                 Naproxen  Arthrotec              Lodine  Daypro                 Nalfon  Toradol                Ansaid  Feldene               Meclofenamate  Fenoprofen          Ponstel  Mobic                   Celebrex  Vioxx    Colonoscopy: What to Expect at Home  Your Recovery    After the test, you may be bloated or have gas pains. You may need to pass gas. If a biopsy was done or a polyp was removed, you may have streaks of blood in your stool (feces) for a few days. Problems such as heavy rectal bleeding may not occur until several weeks after the test. This isn't common. But it can happen after polyps are removed.  This care sheet gives you a general idea about how long it will take for you to recover. But each person recovers at a different pace. Follow the steps below to get better as quickly as possible.    How can you care for yourself at  home?  Activity    Rest when you feel tired.     You can do your normal activities when it feels okay to do so.   Diet    You may resume your regular diet.     Drink plenty of fluids. This helps to replace the fluids that were lost during the colon prep.     Do not drink alcohol.   Medicines    Your doctor will tell you if and when you can restart your medicines. You will also be given instructions about taking any new medicines.     If you stopped taking aspirin or some other blood thinner, your doctor will tell you when to start taking it again.     If polyps were removed or a biopsy was done during the test, your doctor may tell you not to take aspirin or other anti-inflammatory medicines for a few days. These include ibuprofen (Advil, Motrin) and naproxen (Aleve).   Other instructions    For your safety, do not drive or operate machinery for 24 hours.     Do not sign legal documents or make major decisions until the medicine wears off and you can think clearly. The anesthesia can make it hard for you to fully understand what you are agreeing to, and cause impaired judgement.     When should you call for help?   Call 911 anytime you think you may need emergency care. For example, call if:    You passed out (lost consciousness).     You pass maroon or bloody stools.     You have trouble breathing.   Call your doctor now or seek immediate medical care if:    You have pain that does not get better after you take pain medicine.     You are sick to your stomach or cannot drink fluids.     You have new or worse belly pain.     You have blood in your stools.     You have a fever.     You cannot pass stools or gas.

## 2024-03-28 NOTE — H&P
Patient Name: Ryan Solitario  : 1955  MRN: [id number]  DATE: 24    Allergies:   Allergies   Allergen Reactions    Lipitor [Atorvastatin Calcium] Diarrhea and Other (See Comments)     Dizzy    Tape [Adhesive Tape] Hives and Rash        ENDOSCOPY  History and Physical    Procedure:    [] Diagnostic Colonoscopy       [x] Screening Colonoscopy  [] EGD      [] ERCP      [] EUS       [] Other    [x] Previous office notes/History and Physical reviewed from the patients chart. Please see EMR for further details of HPI. I have examined the patient's status immediately prior to the procedure and:      Indications/HPI:    []Abdominal Pain   []Cancer- GI/Lung     []Fhx of colon CA/polyps  []History of Polyps  []Barretts            []Melena  []Abnormal Imaging              []Dysphagia              []Persistent Pneumonia   []Anemia                            []Food Impaction        [x]History of Polyps  [] GI Bleed             []Pulmonary nodule/Mass   []Change in bowel habits []Heartburn/Reflux  []Rectal Bleed (BRBPR)  []Chest Pain - Non Cardiac []Heme (+) Stool []Ulcers  []Constipation  []Hemoptysis  []Varices  []Diarrhea  []Hypoxemia    []Nausea/Vomiting   []Screening   []Crohns/Colitis  []Other:     Anesthesia:   [x] MAC [] Moderate Sedation   [] General   [] None     ROS: 12 pt Review of Symptoms was negative unless mentioned above    Medications:   Prior to Admission medications    Medication Sig Start Date End Date Taking? Authorizing Provider   gabapentin (NEURONTIN) 300 MG capsule Take 1 capsule by mouth 3 times daily for 30 days. Max Daily Amount: 900 mg 3/6/24 4/5/24  Zeny Santos MD   triamcinolone (KENALOG) 0.025 % cream Apply topically every 4 hours as needed Apply Topically    ProviderNya MD   mupirocin (BACTROBAN) 2 % cream by NOT APPLICABLE route 20   Nya Montgomery MD   ergocalciferol (ERGOCALCIFEROL) 1.25 MG (69554 UT) capsule 1 capsule 22   Ulises

## 2024-03-28 NOTE — OP NOTE
Patient: Ryan Solitario : 1955  Med Rec#: [id number] Acc#: [id number]   Primary Care Provider Zeny Santos MD    Date of Procedure:  3/28/2024    Endoscopist: Isabelle Almeida MD, MD    Referring Provider: Zeny Santos MD,     Operation Performed: Colonoscopy up to the cecum with    Hot snare resection of polyps and  Hot cautery ablation of a diminutive rectal polyp    Indications: Personal history of colon polyps; needs colon cancer surveillance    Anesthesia:  Sedation was administered by anesthesia who monitored the patient during the procedure.    I met with Ryan Solitario prior to procedure. We discussed the procedure itself, and I have discussed the risks of endoscopy (including-- but not limited to-- pain, discomfort, bleeding potentially requiring second endoscopic procedure and/or blood transfusion, organ perforation requiring operative repair, damage to organs near the colon, infection, aspiration, cardiopulmonary/allergic reaction), benefits, indications to endoscopy. Additionally, we discussed options other than colonoscopy. The patient expressed understanding. All questions answered. The patient decided to proceed with the procedure.  Signed informed consent was placed on the chart.    Blood Loss: minimal    Withdrawal time: More than 9 minutes  Bowel Prep: Fair  with small amounts of thick solid and semisolid stool especially in the vicinity of diverticula and a moderate amount of thick, opaque liquid scattered in patchy segments throughout the colon obscuring the underlying mucosa.Lesions including polyps may have been missed.     Complications: no immediate complications    DESCRIPTION OF PROCEDURE:     A time out was performed. After written informed consent was obtained, the patient was placed in the left lateral position.     The perianal area was inspected, and a digital rectal exam was performed. A rectal exam was performed: normal tone, no palpable lesions. At

## 2024-03-28 NOTE — ANESTHESIA PRE PROCEDURE
Department of Anesthesiology  Preprocedure Note       Name:  Ryan Solitario   Age:  68 y.o.  :  1955                                          MRN:  [id number]         Date:  3/28/2024      Surgeon: Surgeon(s):  Isabelle Almeida MD    Procedure: Procedure(s):  COLORECTAL CANCER SCREENING, NOT HIGH RISK    Medications prior to admission:   Prior to Admission medications    Medication Sig Start Date End Date Taking? Authorizing Provider   gabapentin (NEURONTIN) 300 MG capsule Take 1 capsule by mouth 3 times daily for 30 days. Max Daily Amount: 900 mg 3/6/24 4/5/24  Zeny Santos MD   triamcinolone (KENALOG) 0.025 % cream Apply topically every 4 hours as needed Apply Topically    Nya Montgomery MD   mupirocin (BACTROBAN) 2 % cream by NOT APPLICABLE route 20   Nya Montgomery MD   ergocalciferol (ERGOCALCIFEROL) 1.25 MG (52304 UT) capsule 1 capsule 22   Nya Montgomery MD   bevacizumab (AVASTIN) 2.5 MG/0.1ML SOSY syringe Inject 0.1 mLs into the eye once    Nya Montgomery MD   albuterol (PROVENTIL) (2.5 MG/3ML) 0.083% nebulizer solution Take 3 mLs by nebulization every 6 hours as needed for Wheezing 23   Zeny Santos MD   albuterol sulfate HFA (VENTOLIN HFA) 108 (90 Base) MCG/ACT inhaler Inhale 2 puffs into the lungs every 6 hours as needed for Wheezing 23   Zeny Santos MD   Alcohol Swabs PADS 1 box  by Does not apply route 8 times daily 23   Zeny Santos MD   aspirin 81 MG chewable tablet CHEW AND SWALLOW 1 TABLET BY MOUTH ONCE DAILY 23   Zeny Santos MD   fluticasone (FLONASE) 50 MCG/ACT nasal spray USE 2 SPRAY(S) IN EACH NOSTRIL ONCE DAILY AS NEEDED 23   Zeny Santos MD   furosemide (LASIX) 20 MG tablet Take 1 tablet by mouth daily 23   Zeny Santos MD   INVOKANA 100 MG TABS tablet Take 1 tablet by mouth daily 23   Zeny Santos MD   irbesartan (AVAPRO) 150 MG tablet Take 1 tablet

## 2024-03-28 NOTE — ANESTHESIA POSTPROCEDURE EVALUATION
Department of Anesthesiology  Postprocedure Note    Patient: Ryan Solitario  MRN: [id number]  YOB: 1955  Date of evaluation: 3/28/2024    Procedure Summary       Date: 03/28/24 Room / Location: [place] [address] / [facility]    Anesthesia Start: 0920 Anesthesia Stop: 0955    Procedure: COLORECTAL CANCER SCREENING, NOT HIGH RISK Diagnosis:       Hx of colonic polyps      (Hx of colonic polyps [Z86.010])    Surgeons: Isabelle Almeida MD Responsible Provider: Renard Marion APRN - CRNA    Anesthesia Type: general ASA Status: 3            Anesthesia Type: No value filed.    Ben Phase I: Ben Score: 10    Ben Phase II:      Anesthesia Post Evaluation    Patient location during evaluation: PACU  Patient participation: complete - patient participated  Level of consciousness: awake and alert  Pain score: 0  Airway patency: patent  Nausea & Vomiting: no nausea and no vomiting  Cardiovascular status: hemodynamically stable  Respiratory status: spontaneous ventilation, nonlabored ventilation and room air  Hydration status: stable  Multimodal analgesia pain management approach    No notable events documented.

## 2024-04-09 ENCOUNTER — OFFICE VISIT (OUTPATIENT)
Dept: CARDIOLOGY | Facility: CLINIC | Age: 69
End: 2024-04-09
Payer: MEDICARE

## 2024-04-09 VITALS
HEART RATE: 67 BPM | DIASTOLIC BLOOD PRESSURE: 62 MMHG | WEIGHT: 284 LBS | OXYGEN SATURATION: 98 % | BODY MASS INDEX: 38.47 KG/M2 | SYSTOLIC BLOOD PRESSURE: 110 MMHG | HEIGHT: 72 IN

## 2024-04-09 DIAGNOSIS — E78.2 MIXED HYPERLIPIDEMIA: ICD-10-CM

## 2024-04-09 DIAGNOSIS — E11.65 TYPE 2 DIABETES MELLITUS WITH HYPERGLYCEMIA, WITH LONG-TERM CURRENT USE OF INSULIN: ICD-10-CM

## 2024-04-09 DIAGNOSIS — Z79.4 TYPE 2 DIABETES MELLITUS WITH HYPERGLYCEMIA, WITH LONG-TERM CURRENT USE OF INSULIN: ICD-10-CM

## 2024-04-09 DIAGNOSIS — I25.700 CORONARY ARTERY DISEASE INVOLVING CORONARY BYPASS GRAFT OF NATIVE HEART WITH UNSTABLE ANGINA PECTORIS: ICD-10-CM

## 2024-04-09 DIAGNOSIS — R06.09 DYSPNEA ON EXERTION: ICD-10-CM

## 2024-04-09 DIAGNOSIS — Z95.5 HISTORY OF PLACEMENT OF STENT IN LAD CORONARY ARTERY: ICD-10-CM

## 2024-04-09 DIAGNOSIS — N18.32 STAGE 3B CHRONIC KIDNEY DISEASE: ICD-10-CM

## 2024-04-09 DIAGNOSIS — I25.110 CORONARY ARTERY DISEASE INVOLVING NATIVE CORONARY ARTERY OF NATIVE HEART WITH UNSTABLE ANGINA PECTORIS: ICD-10-CM

## 2024-04-09 DIAGNOSIS — E11.59 HYPERTENSION ASSOCIATED WITH DIABETES: ICD-10-CM

## 2024-04-09 DIAGNOSIS — E66.01 MORBID OBESITY: ICD-10-CM

## 2024-04-09 DIAGNOSIS — E11.69 MIXED DIABETIC HYPERLIPIDEMIA ASSOCIATED WITH TYPE 2 DIABETES MELLITUS: ICD-10-CM

## 2024-04-09 DIAGNOSIS — I20.0 UNSTABLE ANGINA: ICD-10-CM

## 2024-04-09 DIAGNOSIS — I50.9 ACUTE CONGESTIVE HEART FAILURE, UNSPECIFIED HEART FAILURE TYPE: ICD-10-CM

## 2024-04-09 DIAGNOSIS — E78.2 MIXED DIABETIC HYPERLIPIDEMIA ASSOCIATED WITH TYPE 2 DIABETES MELLITUS: ICD-10-CM

## 2024-04-09 DIAGNOSIS — R05.1 ACUTE COUGH: Primary | ICD-10-CM

## 2024-04-09 DIAGNOSIS — I15.2 HYPERTENSION ASSOCIATED WITH DIABETES: ICD-10-CM

## 2024-04-09 RX ORDER — BENZONATATE 100 MG/1
100 CAPSULE ORAL 3 TIMES DAILY PRN
Qty: 90 CAPSULE | Refills: 1 | Status: SHIPPED | OUTPATIENT
Start: 2024-04-09
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
## 2024-04-12 ENCOUNTER — HOSPITAL ENCOUNTER (OUTPATIENT)
Dept: CARDIOLOGY | Facility: HOSPITAL | Age: 69
Discharge: HOME | End: 2024-04-12
Payer: MEDICARE

## 2024-04-12 VITALS
SYSTOLIC BLOOD PRESSURE: 110 MMHG | WEIGHT: 284 LBS | BODY MASS INDEX: 38.47 KG/M2 | HEIGHT: 72 IN | DIASTOLIC BLOOD PRESSURE: 62 MMHG

## 2024-04-12 DIAGNOSIS — R06.09 DYSPNEA ON EXERTION: ICD-10-CM

## 2024-04-12 PROCEDURE — 25510000001 PERFLUTREN 6.52 MG/ML SUSPENSION: Performed by: EMERGENCY MEDICINE

## 2024-04-12 PROCEDURE — 93306 TTE W/DOPPLER COMPLETE: CPT

## 2024-04-12 RX ADMIN — PERFLUTREN 13.04 MG: 6.52 INJECTION, SUSPENSION INTRAVENOUS at 14:34

## 2024-04-14 PROBLEM — I20.0 UNSTABLE ANGINA: Status: ACTIVE | Noted: 2024-04-14

## 2024-04-14 PROBLEM — I25.700 CORONARY ARTERY DISEASE INVOLVING CORONARY BYPASS GRAFT OF NATIVE HEART WITH UNSTABLE ANGINA PECTORIS: Status: ACTIVE | Noted: 2024-04-09

## 2024-04-14 PROBLEM — I50.9 ACUTE CONGESTIVE HEART FAILURE: Status: ACTIVE | Noted: 2024-04-14

## 2024-04-14 PROBLEM — R05.1 ACUTE COUGH: Status: ACTIVE | Noted: 2024-04-14

## 2024-04-14 LAB
BH CV ECHO MEAS - AO MAX PG: 5.1 MMHG
BH CV ECHO MEAS - AO MEAN PG: 3 MMHG
BH CV ECHO MEAS - AO ROOT DIAM: 4.4 CM
BH CV ECHO MEAS - AO V2 MAX: 113 CM/SEC
BH CV ECHO MEAS - AO V2 VTI: 22 CM
BH CV ECHO MEAS - AVA(I,D): 3.4 CM2
BH CV ECHO MEAS - EDV(CUBED): 72 ML
BH CV ECHO MEAS - EDV(MOD-SP2): 125 ML
BH CV ECHO MEAS - EDV(MOD-SP4): 146 ML
BH CV ECHO MEAS - EF(MOD-BP): 59.9 %
BH CV ECHO MEAS - EF(MOD-SP2): 59 %
BH CV ECHO MEAS - EF(MOD-SP4): 60.9 %
BH CV ECHO MEAS - ESV(CUBED): 8.7 ML
BH CV ECHO MEAS - ESV(MOD-SP2): 51.3 ML
BH CV ECHO MEAS - ESV(MOD-SP4): 57.1 ML
BH CV ECHO MEAS - FS: 50.5 %
BH CV ECHO MEAS - IVS/LVPW: 1.62 CM
BH CV ECHO MEAS - IVSD: 1.75 CM
BH CV ECHO MEAS - LA DIMENSION: 4.6 CM
BH CV ECHO MEAS - LAT PEAK E' VEL: 4.7 CM/SEC
BH CV ECHO MEAS - LV DIASTOLIC VOL/BSA (35-75): 59 CM2
BH CV ECHO MEAS - LV MASS(C)D: 224.9 GRAMS
BH CV ECHO MEAS - LV MAX PG: 2.6 MMHG
BH CV ECHO MEAS - LV MEAN PG: 1 MMHG
BH CV ECHO MEAS - LV SYSTOLIC VOL/BSA (12-30): 23.1 CM2
BH CV ECHO MEAS - LV V1 MAX: 80.9 CM/SEC
BH CV ECHO MEAS - LV V1 VTI: 17.8 CM
BH CV ECHO MEAS - LVIDD: 4.2 CM
BH CV ECHO MEAS - LVIDS: 2.06 CM
BH CV ECHO MEAS - LVOT AREA: 4.2 CM2
BH CV ECHO MEAS - LVOT DIAM: 2.3 CM
BH CV ECHO MEAS - LVPWD: 1.08 CM
BH CV ECHO MEAS - MED PEAK E' VEL: 3.3 CM/SEC
BH CV ECHO MEAS - MV A MAX VEL: 81.8 CM/SEC
BH CV ECHO MEAS - MV DEC SLOPE: 248 CM/SEC2
BH CV ECHO MEAS - MV DEC TIME: 0.29 SEC
BH CV ECHO MEAS - MV E MAX VEL: 71.6 CM/SEC
BH CV ECHO MEAS - MV E/A: 0.88
BH CV ECHO MEAS - MV MAX PG: 5.8 MMHG
BH CV ECHO MEAS - MV MEAN PG: 1 MMHG
BH CV ECHO MEAS - MV V2 VTI: 29.9 CM
BH CV ECHO MEAS - MVA(VTI): 2.47 CM2
BH CV ECHO MEAS - PA V2 MAX: 92.5 CM/SEC
BH CV ECHO MEAS - RAP SYSTOLE: 5 MMHG
BH CV ECHO MEAS - RV MAX PG: 1.93 MMHG
BH CV ECHO MEAS - RV V1 MAX: 69.5 CM/SEC
BH CV ECHO MEAS - RV V1 VTI: 12.9 CM
BH CV ECHO MEAS - RVDD: 3.9 CM
BH CV ECHO MEAS - RVSP: 30.8 MMHG
BH CV ECHO MEAS - SI(MOD-SP2): 29.8 ML/M2
BH CV ECHO MEAS - SI(MOD-SP4): 36 ML/M2
BH CV ECHO MEAS - SV(LVOT): 74 ML
BH CV ECHO MEAS - SV(MOD-SP2): 73.7 ML
BH CV ECHO MEAS - SV(MOD-SP4): 88.9 ML
BH CV ECHO MEAS - TAPSE (>1.6): 2.09 CM
BH CV ECHO MEAS - TR MAX PG: 25.8 MMHG
BH CV ECHO MEAS - TR MAX VEL: 254 CM/SEC
BH CV ECHO MEASUREMENTS AVERAGE E/E' RATIO: 17.9
BH CV XLRA - RV BASE: 3.5 CM
BH CV XLRA - RV LENGTH: 7.2 CM
BH CV XLRA - RV MID: 3.6 CM
BH CV XLRA - TDI S': 12.7 CM/SEC
LEFT ATRIUM VOLUME INDEX: 27.6 ML/M2
LEFT ATRIUM VOLUME: 69.9 ML

## 2024-04-16 DIAGNOSIS — I71.21 ANEURYSM OF ASCENDING AORTA WITHOUT RUPTURE: Primary | ICD-10-CM

## 2024-04-17 ENCOUNTER — HOSPITAL ENCOUNTER (OUTPATIENT)
Facility: HOSPITAL | Age: 69
Discharge: HOME | End: 2024-04-18
Attending: EMERGENCY MEDICINE | Admitting: EMERGENCY MEDICINE
Payer: MEDICARE

## 2024-04-17 ENCOUNTER — HOSPITAL ENCOUNTER (OUTPATIENT)
Dept: GENERAL RADIOLOGY | Facility: HOSPITAL | Age: 69
Discharge: HOME | End: 2024-04-17
Payer: MEDICARE

## 2024-04-17 DIAGNOSIS — I25.110 CORONARY ARTERY DISEASE INVOLVING NATIVE CORONARY ARTERY OF NATIVE HEART WITH UNSTABLE ANGINA PECTORIS: ICD-10-CM

## 2024-04-17 DIAGNOSIS — R05.1 ACUTE COUGH: ICD-10-CM

## 2024-04-17 LAB
ANION GAP SERPL CALCULATED.3IONS-SCNC: 7 MMOL/L (ref 5–15)
BUN SERPL-MCNC: 31 MG/DL (ref 8–23)
BUN/CREAT SERPL: 18.7 (ref 7–25)
CALCIUM SPEC-SCNC: 10.5 MG/DL (ref 8.6–10.5)
CHLORIDE SERPL-SCNC: 106 MMOL/L (ref 98–107)
CO2 SERPL-SCNC: 25 MMOL/L (ref 22–29)
CREAT SERPL-MCNC: 1.66 MG/DL (ref 0.76–1.27)
DEPRECATED RDW RBC AUTO: 45 FL (ref 37–54)
EGFRCR SERPLBLD CKD-EPI 2021: 44.1 ML/MIN/1.73
ERYTHROCYTE [DISTWIDTH] IN BLOOD BY AUTOMATED COUNT: 14.9 % (ref 12.3–15.4)
GLUCOSE BLDC GLUCOMTR-MCNC: 239 MG/DL (ref 70–130)
GLUCOSE SERPL-MCNC: 272 MG/DL (ref 65–99)
HCT VFR BLD AUTO: 48.6 % (ref 37.5–51)
HGB BLD-MCNC: 15.3 G/DL (ref 13–17.7)
INR PPP: 0.91 (ref 0.91–1.09)
MCH RBC QN AUTO: 26.1 PG (ref 26.6–33)
MCHC RBC AUTO-ENTMCNC: 31.5 G/DL (ref 31.5–35.7)
MCV RBC AUTO: 82.8 FL (ref 79–97)
PLATELET # BLD AUTO: 201 10*3/MM3 (ref 140–450)
PMV BLD AUTO: 12 FL (ref 6–12)
POTASSIUM SERPL-SCNC: 4.7 MMOL/L (ref 3.5–5.2)
PROTHROMBIN TIME: 12.6 SECONDS (ref 11.8–14.8)
RBC # BLD AUTO: 5.87 10*6/MM3 (ref 4.14–5.8)
SODIUM SERPL-SCNC: 138 MMOL/L (ref 136–145)
WBC NRBC COR # BLD AUTO: 6.39 10*3/MM3 (ref 3.4–10.8)

## 2024-04-17 PROCEDURE — C1887 CATHETER, GUIDING: HCPCS | Performed by: EMERGENCY MEDICINE

## 2024-04-17 PROCEDURE — 82948 REAGENT STRIP/BLOOD GLUCOSE: CPT

## 2024-04-17 PROCEDURE — 93458 L HRT ARTERY/VENTRICLE ANGIO: CPT | Performed by: EMERGENCY MEDICINE

## 2024-04-17 PROCEDURE — 25010000002 FENTANYL CITRATE (PF) 50 MCG/ML SOLUTION: Performed by: EMERGENCY MEDICINE

## 2024-04-17 PROCEDURE — C9600 PERC DRUG-EL COR STENT SING: HCPCS | Performed by: EMERGENCY MEDICINE

## 2024-04-17 PROCEDURE — G0378 HOSPITAL OBSERVATION PER HR: HCPCS

## 2024-04-17 PROCEDURE — C1769 GUIDE WIRE: HCPCS | Performed by: EMERGENCY MEDICINE

## 2024-04-17 PROCEDURE — 85610 PROTHROMBIN TIME: CPT | Performed by: EMERGENCY MEDICINE

## 2024-04-17 PROCEDURE — 25810000003 SODIUM CHLORIDE 0.9 % SOLUTION: Performed by: EMERGENCY MEDICINE

## 2024-04-17 PROCEDURE — 71046 X-RAY EXAM CHEST 2 VIEWS: CPT

## 2024-04-17 PROCEDURE — 85027 COMPLETE CBC AUTOMATED: CPT | Performed by: EMERGENCY MEDICINE

## 2024-04-17 PROCEDURE — 25010000002 MIDAZOLAM PER 1 MG: Performed by: EMERGENCY MEDICINE

## 2024-04-17 PROCEDURE — 25510000001 IOPAMIDOL 61 % SOLUTION: Performed by: EMERGENCY MEDICINE

## 2024-04-17 PROCEDURE — 80048 BASIC METABOLIC PNL TOTAL CA: CPT | Performed by: EMERGENCY MEDICINE

## 2024-04-17 PROCEDURE — C1725 CATH, TRANSLUMIN NON-LASER: HCPCS | Performed by: EMERGENCY MEDICINE

## 2024-04-17 PROCEDURE — 25010000002 HEPARIN (PORCINE) PER 1000 UNITS: Performed by: EMERGENCY MEDICINE

## 2024-04-17 PROCEDURE — 99152 MOD SED SAME PHYS/QHP 5/>YRS: CPT | Performed by: EMERGENCY MEDICINE

## 2024-04-17 PROCEDURE — 99153 MOD SED SAME PHYS/QHP EA: CPT | Performed by: EMERGENCY MEDICINE

## 2024-04-17 PROCEDURE — C1894 INTRO/SHEATH, NON-LASER: HCPCS | Performed by: EMERGENCY MEDICINE

## 2024-04-17 PROCEDURE — C1874 STENT, COATED/COV W/DEL SYS: HCPCS | Performed by: EMERGENCY MEDICINE

## 2024-04-17 PROCEDURE — S0260 H&P FOR SURGERY: HCPCS | Performed by: EMERGENCY MEDICINE

## 2024-04-17 PROCEDURE — 92928 PRQ TCAT PLMT NTRAC ST 1 LES: CPT | Performed by: EMERGENCY MEDICINE

## 2024-04-17 DEVICE — STNT CORNRY RESOLUTE ONYX RX 2X12MM: Type: IMPLANTABLE DEVICE | Status: FUNCTIONAL

## 2024-04-17 DEVICE — XIENCE SKYPOINT™ EVEROLIMUS ELUTING CORONARY STENT SYSTEM 4.00 MM X 18 MM / RAPID-EXCHANGE
Type: IMPLANTABLE DEVICE | Status: FUNCTIONAL
Brand: XIENCE SKYPOINT™

## 2024-04-17 RX ORDER — ASPIRIN 81 MG/1
81 TABLET, CHEWABLE ORAL DAILY
Status: DISCONTINUED | OUTPATIENT
Start: 2024-04-17 | End: 2024-04-18 | Stop reason: HOSPADM

## 2024-04-17 RX ORDER — INCLISIRAN 284 MG/1.5ML
284 INJECTION, SOLUTION SUBCUTANEOUS
COMMUNITY

## 2024-04-17 RX ORDER — SODIUM CHLORIDE 9 MG/ML
1 INJECTION, SOLUTION INTRAVENOUS CONTINUOUS
Status: DISPENSED | OUTPATIENT
Start: 2024-04-17 | End: 2024-04-17

## 2024-04-17 RX ORDER — BENZONATATE 100 MG/1
100 CAPSULE ORAL 3 TIMES DAILY PRN
Status: DISCONTINUED | OUTPATIENT
Start: 2024-04-17 | End: 2024-04-18 | Stop reason: HOSPADM

## 2024-04-17 RX ORDER — LIDOCAINE HYDROCHLORIDE 20 MG/ML
INJECTION, SOLUTION INFILTRATION; PERINEURAL
Status: DISCONTINUED | OUTPATIENT
Start: 2024-04-17 | End: 2024-04-17 | Stop reason: HOSPADM

## 2024-04-17 RX ORDER — ONDANSETRON 4 MG/1
4 TABLET, ORALLY DISINTEGRATING ORAL EVERY 6 HOURS PRN
Status: DISCONTINUED | OUTPATIENT
Start: 2024-04-17 | End: 2024-04-18 | Stop reason: HOSPADM

## 2024-04-17 RX ORDER — NITROGLYCERIN 0.4 MG/1
0.4 TABLET SUBLINGUAL
Status: DISCONTINUED | OUTPATIENT
Start: 2024-04-17 | End: 2024-04-18 | Stop reason: HOSPADM

## 2024-04-17 RX ORDER — FENTANYL CITRATE 50 UG/ML
INJECTION, SOLUTION INTRAMUSCULAR; INTRAVENOUS
Status: DISCONTINUED | OUTPATIENT
Start: 2024-04-17 | End: 2024-04-17 | Stop reason: HOSPADM

## 2024-04-17 RX ORDER — VERAPAMIL HYDROCHLORIDE 2.5 MG/ML
INJECTION, SOLUTION INTRAVENOUS
Status: DISCONTINUED | OUTPATIENT
Start: 2024-04-17 | End: 2024-04-17 | Stop reason: HOSPADM

## 2024-04-17 RX ORDER — MIDAZOLAM HYDROCHLORIDE 1 MG/ML
INJECTION INTRAMUSCULAR; INTRAVENOUS
Status: DISCONTINUED | OUTPATIENT
Start: 2024-04-17 | End: 2024-04-17 | Stop reason: HOSPADM

## 2024-04-17 RX ORDER — OXYCODONE HYDROCHLORIDE AND ACETAMINOPHEN 5; 325 MG/1; MG/1
1 TABLET ORAL EVERY 6 HOURS PRN
Status: DISCONTINUED | OUTPATIENT
Start: 2024-04-17 | End: 2024-04-18 | Stop reason: HOSPADM

## 2024-04-17 RX ORDER — SODIUM CHLORIDE 9 MG/ML
100 INJECTION, SOLUTION INTRAVENOUS CONTINUOUS
Status: DISCONTINUED | OUTPATIENT
Start: 2024-04-18 | End: 2024-04-18 | Stop reason: HOSPADM

## 2024-04-17 RX ORDER — ACETAMINOPHEN 325 MG/1
650 TABLET ORAL EVERY 4 HOURS PRN
Status: DISCONTINUED | OUTPATIENT
Start: 2024-04-17 | End: 2024-04-18 | Stop reason: HOSPADM

## 2024-04-17 RX ORDER — LOSARTAN POTASSIUM 50 MG/1
100 TABLET ORAL NIGHTLY
Status: DISCONTINUED | OUTPATIENT
Start: 2024-04-17 | End: 2024-04-18 | Stop reason: HOSPADM

## 2024-04-17 RX ORDER — ONDANSETRON 2 MG/ML
4 INJECTION INTRAMUSCULAR; INTRAVENOUS EVERY 6 HOURS PRN
Status: DISCONTINUED | OUTPATIENT
Start: 2024-04-17 | End: 2024-04-18 | Stop reason: HOSPADM

## 2024-04-17 RX ORDER — HEPARIN SODIUM 1000 [USP'U]/ML
INJECTION, SOLUTION INTRAVENOUS; SUBCUTANEOUS
Status: DISCONTINUED | OUTPATIENT
Start: 2024-04-17 | End: 2024-04-17 | Stop reason: HOSPADM

## 2024-04-17 RX ORDER — PANTOPRAZOLE SODIUM 40 MG/1
40 TABLET, DELAYED RELEASE ORAL DAILY
Status: DISCONTINUED | OUTPATIENT
Start: 2024-04-17 | End: 2024-04-18 | Stop reason: HOSPADM

## 2024-04-17 RX ORDER — METOPROLOL TARTRATE 50 MG/1
50 TABLET, FILM COATED ORAL EVERY 12 HOURS SCHEDULED
Status: DISCONTINUED | OUTPATIENT
Start: 2024-04-17 | End: 2024-04-18 | Stop reason: HOSPADM

## 2024-04-17 RX ORDER — GABAPENTIN 300 MG/1
300 CAPSULE ORAL 3 TIMES DAILY
Status: DISCONTINUED | OUTPATIENT
Start: 2024-04-17 | End: 2024-04-18 | Stop reason: HOSPADM

## 2024-04-17 RX ORDER — FLUTICASONE PROPIONATE 50 MCG
1 SPRAY, SUSPENSION (ML) NASAL 2 TIMES DAILY PRN
Status: DISCONTINUED | OUTPATIENT
Start: 2024-04-17 | End: 2024-04-18 | Stop reason: HOSPADM

## 2024-04-17 RX ADMIN — ASPIRIN 81 MG: 81 TABLET, CHEWABLE ORAL at 17:20

## 2024-04-17 RX ADMIN — SODIUM CHLORIDE 1 ML/KG/HR: 9 INJECTION, SOLUTION INTRAVENOUS at 17:21

## 2024-04-17 RX ADMIN — PANTOPRAZOLE SODIUM 40 MG: 40 TABLET, DELAYED RELEASE ORAL at 17:20

## 2024-04-17 RX ADMIN — METOPROLOL TARTRATE 50 MG: 50 TABLET, FILM COATED ORAL at 17:20

## 2024-04-17 RX ADMIN — GABAPENTIN 300 MG: 300 CAPSULE ORAL at 17:20

## 2024-04-17 NOTE — Clinical Note
First balloon inflation max pressure = 8 nick. First balloon inflation duration = 30 seconds. Second inflation of balloon - Max pressure = 10 nick. 2nd Inflation of balloon - Duration = 20 seconds.

## 2024-04-17 NOTE — Clinical Note
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  We discussed the risks/benefits of biopsy and not proceeding with biopsy. He remains adamant that he is \"at peace\" with whatever happens and denies further evaluation for this concern at this time as he is ready to pass and goal to be with family.  We discussed that it may be helpful to identify what these masses are to see if they are life-threatening as suspected for multiple reasons-- including access to care/hospice services as patient disclosed interest in, in addition to possible medical needs for himself and its affect on his family members/son.  He is currently feeling very physically well which is good to hear, however we discussed it could be helpful to have all the information we can to make more informed decisions about his health and care to continue helping him feel well for as long as able.  Patient and wife will continue to consider options, encouraged to call for any questions and to let us know if they change their mind    First balloon inflation max pressure = 14 nick. First balloon inflation duration = 45 seconds.

## 2024-04-17 NOTE — OP NOTE
"  [facility]    Full note to follow  Stent placed to the left circumflex as well as diagonal branch  Dual antiplatelet therapy for a minimum of 1 year  High intensity antilipid  Follow-up with me in 1 month        Date of procedure: 4/17/2024     Procedures performed:     1.  Attempted Access to the right radial artery via modified Seldinger technique, aborted due to small size  2.  Access to the right ulnar artery via modified Seldinger technique and ultrasound guidance  3.  Left heart catheterization with retrograde crossing rebound to the left ventricle pressure recorded  4.  LV ventriculography  5.  Selective bilateral coronary angiography  6.  Conscious sedation with continuous hemodynamic monitoring for 1 hour and 10 minutes  7.  Patent hemostasis achieved to the right ulnar artery access site using a TR band  8.  Successful PTCA to the left circumflex with a trek Rx 3 x 12 mm balloon  9.  Successful PCI to the left circumflex with a Xience Skypoint 4 x 18 mm drug-eluting stent  10.  Successful NC PTCA to the left circumflex recently deployed stent with a an emerge MR 4 x 15 mm balloon times multiple inflations  11.  Successful direct PCI to the diagonal branch was a resolute Travis 2 x 12 mm drug-eluting stent          Risk, Benefits, and Alternatives discussed with the patient and/or family.  Plan is for moderate sedation, and the patient agrees to proceed with the procedure.  An immediate assessment was done prior to the administration of moderate sedation        Indication: Unstable angina, progressively worsening symptoms despite optimal antianginal therapy, known coronary artery disease status post PCI in the remote past  Premedication: Versed, Fentanyl  Contrast: Isovue 260 cc  Radiation: Flouro time= 13 minutes. Air Kerma= 2570 mGy  Catheters: 5F TIG, pigtail  Guiding catheter: XB 3.5  PCI Hardware: .014\" BMW wire, balloons and stents as outlined above      Procedural " details:    The patient was brought to the cath lab and prepped and draped in the usual fashion.  Attempted to access the right radial artery, aborted due to small size of the vessel and inability to pass equipment.  Access was obtained in the right ulnar artery via modified Seldinger technique and ultrasound guidance.  A 6 Khmer sheath was placed into the artery and flushed.  Next, TIG catheter was inserted and used to engage both the left and right coronary arteries and selective bilateral coronary angiography was performed in multiple views.  Next, pigtail catheter was inserted and used to cross aortic valve to the left ventricle pressure recorded LV ventriculography was performed.  This catheter was then pulled back across aortic valve and again pressures were recorded.  Next, an XB 3.5 catheter was inserted and used to engage the left main.  A BMW wire was inserted and advanced into the distal left circumflex.  We then inserted a trek Rx 3 x 12 mm balloon into the left circumflex where it was inflated across the lesion multiple times.  We then inserted a Xience Skypoint 4 x 18 mm drug-eluting stent into the left circumflex in an overlap fashion with the old stent where was deployed at 12 nick for 45 seconds.  Postintervention angiography was performed in multiple views.  Next, an emerge MR 4 x 15 mm balloon was inserted into the recently deployed stent where was inflated multiple times.  Further angiography was performed in multiple views.  Next, everything was withdrawn through the sheath.  The wire was retracted back and advanced into the diagonal branch.  A resolute Travis 2 x 12 mm drug-eluting stent was inserted into the diagonal branch across the lesion where it was deployed at 14 nick for 45 seconds.  Postintervention angiography was performed in multiple views.  Everything was then withdrawn through the sheath and the sheath was flushed.  Patent hemostasis was achieved in the right radial artery access  site using a TR band.  Patient tolerated the procedure well without any complications.  He left the Cath Lab in stable condition.      I supervised the administration of conscious sedation by nursing staff throughout the case.  First dose was given at 1311 and the end of my face-to-face encounter was at 1422, accounting for a total of 70 minutes of supervision.  During the case, continuous pulse oximetry, heart rate, blood pressure, and patient status were monitored.     Findings:    Hemodynamics:    Aortic: 112/70 mmHg  LV: 132/1 mmHg  LVEDP: 9 mmHg    Left ventriculography:  1. The contractility of the left ventricle is normal.  Estimated ejection fraction 55%.  2.  No significant gradient across the aortic valve on pullback    Selective coronary angiography:     Left Main: Large-caliber vessel that bifurcates into the LAD and left circumflex coronary arteries, no angiographic evidence of stenosis, TITA-3 flow    LAD: Large-caliber vessel with a patent stent present in the proximal segment, mild disease in the mid vessel noted, TITA-3 flow    Diagonals: First diagonal is moderate caliber with no significant disease, second diagonal is also moderate caliber with 90% stenosis in the midsegment prior to splitting into a superior and inferior subsegmental branches.  Status post successful direct PCI we had continuation TITA-3 flow and no residual stenosis remaining    Left circumflex: Large-caliber, dominant vessel with 80 to 90% stenosis just prior to an old stent that has mild in-stent stenosis, remainder the vessel has no angiographic evidence of stenosis, TITA-3 flow.  Status post successful PTCA and stent placement in an overlap fashion with the old stent we had continuation of TITA-3 flow and no residual stenosis remaining.  Both stents were also NC PTCA times multiple inflations.    Obtuse marginals: First OM is small caliber, second OM is small caliber, the third obtuse marginal is moderate caliber with no  significant disease    RCA: Large-caliber vessel with mild disease noted in the midsegment, mild disease in the distal segment, TITA-3 flow    PDA/LAKEISHA: Small to moderate caliber vessels with mild disease      Estimated Blood Loss: 20 cc    Specimens: None    Complications: None     Impression:  1.  Coronary artery disease as described above including hemodynamically significant lesions present in the large left circumflex as well as a moderate second diagonal branch that both underwent successful PTCA/PCI with continuation TITA-3 flow and no residual stenosis remaining      Plan:   1.  TR band off in 2 hours, we will admit to the floor monitor overnight with plans for discharge home in the morning  2.  No antiplatelet therapy with aspirin and Brilinta for a minimum of 1 year ideally, 6 months minimum  3.  Patient is already on PCSK9 inhibitor which she will continue  4.  Follow-up with me in the office in 1 month continue to optimize his medical regimen      Leonel Oliva,   Interventional cardiology  [facility]  April 17, 2024

## 2024-04-17 NOTE — PLAN OF CARE
Goal Outcome Evaluation:  Plan of Care Reviewed With: patient        Progress: no change  Outcome Evaluation: Patient admitted to  432 from the [facility]. Heart cath today with stents to the circ and diagnol. TR band removed, bandaid in place to right radial. No c/o pain.
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
## 2024-04-17 NOTE — Clinical Note
A 6 fr sheath was  inserted with ultrasound guidance into the right radial artery. Sheath insertion not delayed. Placed in the Ulna

## 2024-04-17 NOTE — Clinical Note
First balloon inflation max pressure = 12 nick. First balloon inflation duration = 20 seconds. Second inflation of balloon - Max pressure = 14 nick. 2nd Inflation of balloon - Duration = 20 seconds. Third inflation of balloon - Max pressure = 14 nick. 3rd Inflation of balloon - Duration = 20 seconds. Fourth inflation of balloon - Max pressure = 16 nick. 4th Inflation of balloon - Duration = 20 seconds.

## 2024-04-17 NOTE — Clinical Note
The left coronary artery was selectively engaged, injected and visualized. Guide wire pulled back into guide catheter for coronary injection.

## 2024-04-17 NOTE — Clinical Note
Hemostasis started on the right radial artery. R-Band was used in achieving hemostasis. Radial compression device applied to vessel. Hemostasis achieved successfully. Closure device additional comment: Right ulna

## 2024-04-18 VITALS
OXYGEN SATURATION: 95 % | DIASTOLIC BLOOD PRESSURE: 80 MMHG | HEIGHT: 71 IN | BODY MASS INDEX: 39.21 KG/M2 | TEMPERATURE: 97.6 F | SYSTOLIC BLOOD PRESSURE: 157 MMHG | RESPIRATION RATE: 18 BRPM | HEART RATE: 63 BPM | WEIGHT: 280.1 LBS

## 2024-04-18 DIAGNOSIS — Z95.5 S/P DRUG ELUTING CORONARY STENT PLACEMENT: Primary | ICD-10-CM

## 2024-04-18 LAB
ANION GAP SERPL CALCULATED.3IONS-SCNC: 9 MMOL/L (ref 5–15)
BUN SERPL-MCNC: 28 MG/DL (ref 8–23)
BUN/CREAT SERPL: 17.3 (ref 7–25)
CALCIUM SPEC-SCNC: 9.7 MG/DL (ref 8.6–10.5)
CHLORIDE SERPL-SCNC: 105 MMOL/L (ref 98–107)
CO2 SERPL-SCNC: 23 MMOL/L (ref 22–29)
CREAT SERPL-MCNC: 1.62 MG/DL (ref 0.76–1.27)
DEPRECATED RDW RBC AUTO: 45.4 FL (ref 37–54)
EGFRCR SERPLBLD CKD-EPI 2021: 45.4 ML/MIN/1.73
ERYTHROCYTE [DISTWIDTH] IN BLOOD BY AUTOMATED COUNT: 15.1 % (ref 12.3–15.4)
GLUCOSE BLDC GLUCOMTR-MCNC: 170 MG/DL (ref 70–130)
GLUCOSE SERPL-MCNC: 225 MG/DL (ref 65–99)
HCT VFR BLD AUTO: 44.9 % (ref 37.5–51)
HGB BLD-MCNC: 14 G/DL (ref 13–17.7)
MCH RBC QN AUTO: 25.6 PG (ref 26.6–33)
MCHC RBC AUTO-ENTMCNC: 31.2 G/DL (ref 31.5–35.7)
MCV RBC AUTO: 82.1 FL (ref 79–97)
PLATELET # BLD AUTO: 183 10*3/MM3 (ref 140–450)
PMV BLD AUTO: 11.7 FL (ref 6–12)
POTASSIUM SERPL-SCNC: 4.3 MMOL/L (ref 3.5–5.2)
RBC # BLD AUTO: 5.47 10*6/MM3 (ref 4.14–5.8)
SODIUM SERPL-SCNC: 137 MMOL/L (ref 136–145)
WBC NRBC COR # BLD AUTO: 7.05 10*3/MM3 (ref 3.4–10.8)

## 2024-04-18 PROCEDURE — 93005 ELECTROCARDIOGRAM TRACING: CPT | Performed by: EMERGENCY MEDICINE

## 2024-04-18 PROCEDURE — 93010 ELECTROCARDIOGRAM REPORT: CPT | Performed by: EMERGENCY MEDICINE

## 2024-04-18 PROCEDURE — G0378 HOSPITAL OBSERVATION PER HR: HCPCS

## 2024-04-18 PROCEDURE — 99214 OFFICE O/P EST MOD 30 MIN: CPT | Performed by: NURSE PRACTITIONER

## 2024-04-18 PROCEDURE — 82948 REAGENT STRIP/BLOOD GLUCOSE: CPT

## 2024-04-18 PROCEDURE — 80048 BASIC METABOLIC PNL TOTAL CA: CPT | Performed by: EMERGENCY MEDICINE

## 2024-04-18 PROCEDURE — 25810000003 SODIUM CHLORIDE 0.9 % SOLUTION: Performed by: EMERGENCY MEDICINE

## 2024-04-18 PROCEDURE — 85027 COMPLETE CBC AUTOMATED: CPT | Performed by: EMERGENCY MEDICINE

## 2024-04-18 RX ORDER — SODIUM BICARBONATE 650 MG/1
650 TABLET ORAL 2 TIMES DAILY
COMMUNITY

## 2024-04-18 RX ORDER — LANSOPRAZOLE 30 MG/1
30 CAPSULE, DELAYED RELEASE ORAL DAILY
COMMUNITY

## 2024-04-18 RX ORDER — CANAGLIFLOZIN 100 MG/1
100 TABLET, FILM COATED ORAL DAILY
COMMUNITY

## 2024-04-18 RX ORDER — NYSTATIN 100000 [USP'U]/G
1 POWDER TOPICAL 2 TIMES DAILY
Status: ON HOLD | COMMUNITY
End: 2024-04-18

## 2024-04-18 RX ADMIN — PANTOPRAZOLE SODIUM 40 MG: 40 TABLET, DELAYED RELEASE ORAL at 08:24

## 2024-04-18 RX ADMIN — GABAPENTIN 300 MG: 300 CAPSULE ORAL at 00:10

## 2024-04-18 RX ADMIN — ASPIRIN 81 MG: 81 TABLET, CHEWABLE ORAL at 08:25

## 2024-04-18 RX ADMIN — EMPAGLIFLOZIN 25 MG: 25 TABLET, FILM COATED ORAL at 08:25

## 2024-04-18 RX ADMIN — METOPROLOL TARTRATE 50 MG: 50 TABLET, FILM COATED ORAL at 08:25

## 2024-04-18 RX ADMIN — TICAGRELOR 90 MG: 90 TABLET ORAL at 08:24

## 2024-04-18 RX ADMIN — GABAPENTIN 300 MG: 300 CAPSULE ORAL at 08:25

## 2024-04-18 RX ADMIN — SODIUM CHLORIDE 100 ML/HR: 9 INJECTION, SOLUTION INTRAVENOUS at 00:10

## 2024-04-18 NOTE — DISCHARGE SUMMARY
Date of Discharge:  4/18/2024    Discharge Diagnosis:   Coronary artery disease involving coronary bypass graft of native heart with unstable angina pectoris    Unstable angina      Presenting Problem/History of Present Illness  Coronary artery disease involving native coronary artery of native heart with unstable angina pectoris [I25.110]  Unstable angina [I20.0]  Coronary artery disease involving coronary bypass graft of native heart with unstable angina pectoris [I25.700]      Hospital Course  Patient is a 68 y.o. male of Dr. Oliva's who presented yesterday for an outpatient [facility]. He was seen in the office by Dr. Oliva on 4/9 with complaints of fatigue and dyspnea. Due to his known history of CAD and CHF, he was scheduled for an outpatient [facility]. He received a 4x18mm Xience FREDERIC to the LCX and a 2x12mm [place] to the LAD via radial approach. He tolerated the procedure well. He notes his wrist is sore and his hand is swollen. Labs are stable with creatinine 1.6 today. He is felt safe for discharge.     Procedures Performed  Procedure(s):  Left Heart Cath       Review of Systems   Constitutional:  Negative for chills, diaphoresis, fatigue and unexpected weight change.   HENT:  Negative for nosebleeds.    Respiratory:  Negative for cough, chest tightness, shortness of breath and wheezing.    Cardiovascular:  Negative for chest pain, palpitations and leg swelling.   Gastrointestinal:  Negative for anal bleeding, blood in stool, diarrhea and nausea.   Neurological:  Negative for dizziness, syncope and light-headedness.   All other systems reviewed and are negative.      Consults:   Consults       No orders found for last 30 day(s).            Pertinent Test Results:  Lab Results (last 72 hours)       Procedure Component Value Units Date/Time    POC Glucose Once [[id number]]  (Abnormal) Collected: 04/18/24 0743    Specimen: Blood Updated: 04/18/24 0808     Glucose 170 mg/dL      Comment: : [id number] Jeremy  EuraisaMeter ID: [id number]       Basic Metabolic Panel [[id number]]  (Abnormal) Collected: 04/18/24 0206    Specimen: Blood Updated: 04/18/24 0253     Glucose 225 mg/dL      BUN 28 mg/dL      Creatinine 1.62 mg/dL      Sodium 137 mmol/L      Potassium 4.3 mmol/L      Chloride 105 mmol/L      CO2 23.0 mmol/L      Calcium 9.7 mg/dL      BUN/Creatinine Ratio 17.3     Anion Gap 9.0 mmol/L      eGFR 45.4 mL/min/1.73     Narrative:      GFR Normal >60  Chronic Kidney Disease <60  Kidney Failure <15      CBC (No Diff) [[id number]]  (Abnormal) Collected: 04/18/24 0206    Specimen: Blood Updated: 04/18/24 0230     WBC 7.05 10*3/mm3      RBC 5.47 10*6/mm3      Hemoglobin 14.0 g/dL      Hematocrit 44.9 %      MCV 82.1 fL      MCH 25.6 pg      MCHC 31.2 g/dL      RDW 15.1 %      RDW-SD 45.4 fl      MPV 11.7 fL      Platelets 183 10*3/mm3     POC Glucose Once [[id number]]  (Abnormal) Collected: 04/17/24 1614    Specimen: Blood Updated: 04/17/24 1630     Glucose 239 mg/dL      Comment: : [id number] Jeremy EuraisaMeter ID: [id number]       Basic Metabolic Panel [[id number]]  (Abnormal) Collected: 04/17/24 1105    Specimen: Blood Updated: 04/17/24 1136     Glucose 272 mg/dL      BUN 31 mg/dL      Creatinine 1.66 mg/dL      Sodium 138 mmol/L      Potassium 4.7 mmol/L      Chloride 106 mmol/L      CO2 25.0 mmol/L      Calcium 10.5 mg/dL      BUN/Creatinine Ratio 18.7     Anion Gap 7.0 mmol/L      eGFR 44.1 mL/min/1.73     Narrative:      GFR Normal >60  Chronic Kidney Disease <60  Kidney Failure <15      Protime-INR [[id number]]  (Normal) Collected: 04/17/24 1105    Specimen: Blood Updated: 04/17/24 1122     Protime 12.6 Seconds      INR 0.91    CBC (No Diff) [[id number]]  (Abnormal) Collected: 04/17/24 1105    Specimen: Blood Updated: 04/17/24 1113     WBC 6.39 10*3/mm3      RBC 5.87 10*6/mm3      Hemoglobin 15.3 g/dL      Hematocrit 48.6 %      MCV 82.8 fL      MCH 26.1 pg      MCHC 31.5 g/dL      RDW 14.9 %      RDW-SD 45.0 fl       "MPV 12.0 fL      Platelets 201 10*3/mm3             Ejection Fraction  No results found for: \"EF\"    Echo EF Estimated  No results found for: \"ECHOEFEST\"    Nuclear Stress Ejection Fraction  No components found for: \"NUCEF\"    Cath Ejection Fraction Quantitative  No results found for: \"CATHEF\"    Condition on Discharge:  stable    Vital Signs  Temp:  [97.4 °F (36.3 °C)-98.6 °F (37 °C)] 97.6 °F (36.4 °C)  Heart Rate:  [63-70] 63  Resp:  [15-19] 18  BP: (116-157)/() 157/80    Physical Exam:  Vitals reviewed.   Constitutional:       General: Not in acute distress.     Appearance: Healthy appearance. Well-developed. Not diaphoretic.   Eyes:      General: No scleral icterus.     Conjunctiva/sclera: Conjunctivae normal.      Pupils: Pupils are equal, round, and reactive to light.   HENT:      Head: Normocephalic.    Mouth/Throat:      Pharynx: No oropharyngeal exudate.   Neck:      Vascular: No JVR.   Pulmonary:      Effort: Pulmonary effort is normal. No respiratory distress.      Breath sounds: Normal breath sounds. No wheezing. No rhonchi. No rales.   Chest:      Chest wall: Not tender to palpatation.   Cardiovascular:      Normal rate. Regular rhythm.   Pulses:     Intact distal pulses.   Edema:     Peripheral edema absent.   Abdominal:      General: Bowel sounds are normal. There is no distension.      Palpations: Abdomen is soft.      Tenderness: There is no abdominal tenderness.   Musculoskeletal: Normal range of motion.      Cervical back: Normal range of motion and neck supple. Skin:     General: Skin is warm and dry.      Coloration: Skin is not pale.      Findings: No erythema or rash.        Neurological:      Mental Status: Alert, oriented to person, place, and time and oriented to person, place and time.      Deep Tendon Reflexes: Reflexes are normal and symmetric.   Psychiatric:         Behavior: Behavior normal.         Discharge Disposition  Home or Self Care    Discharge Medications     Discharge " Medications        New Medications        Instructions Start Date   ticagrelor 90 MG tablet tablet  Commonly known as: BRILINTA   90 mg, Oral, 2 Times Daily             Continue These Medications        Instructions Start Date   aspirin 81 MG chewable tablet   81 mg, Oral, Daily      benzonatate 100 MG capsule  Commonly known as: Tessalon Perles   100 mg, Oral, 3 Times Daily PRN      empagliflozin 25 MG tablet tablet  Commonly known as: Jardiance   25 mg, Oral, Daily, One tablet daily before breakfast       gabapentin 300 MG capsule  Commonly known as: NEURONTIN   300 mg, Oral, 3 Times Daily      glucagon 1 MG injection  Commonly known as: GLUCAGEN   1 mg, Subcutaneous, Once As Needed      Invokana 100 MG tablet tablet  Generic drug: Canagliflozin   100 mg, Oral, Daily      irbesartan 150 MG tablet  Commonly known as: AVAPRO   150 mg, Oral, Nightly      lansoprazole 30 MG capsule  Commonly known as: PREVACID   30 mg, Oral, Daily      Leqvio 284 MG/1.5ML solution prefilled syringe  Generic drug: Inclisiran Sodium   284 mg, Subcutaneous, Every 6 Months      Lyumjev KwikPen 100 UNIT/ML solution pen-injector  Generic drug: Insulin Lispro-aabc (1 U Dial)   80 Units, Subcutaneous, 3 Times Daily With Meals, Up to 70u with meals       metoprolol tartrate 50 MG tablet  Commonly known as: LOPRESSOR   50 mg, Oral, 2 Times Daily      nitroglycerin 0.4 MG SL tablet  Commonly known as: NITROSTAT   0.4 mg, Sublingual, Every 5 Minutes PRN, Take no more than 3 doses in 15 minutes.       sodium bicarbonate 650 MG tablet   650 mg, Oral, 2 Times Daily      Vitamin B-12 1000 MCG/15ML liquid   15 mL, Oral, Daily      vitamin D 1.25 MG (23808 UT) capsule capsule  Commonly known as: ERGOCALCIFEROL   1 capsule, Oral, Weekly      Xultophy 100-3.6 UNIT-MG/ML solution pen-injector subcutaneous pen  Generic drug: Insulin Degludec-Liraglutide   50 Units, Subcutaneous, Daily             Stop These Medications      FreeStyle Fercho 2 [place]  device     FreeStyle Fercho 2 Sensor misc     Pen Needles 32G X 4 MM misc              Discharge Diet: cardiac, diabetic    Activity at Discharge:   Shower 24-48 hrs after procedure. Do not bathe or swim for 5 days. Do not apply powder or lotion to affected area. Do not flex or bed the affected arm for 24 hours. Do not push or pull heavy objects with affected arm for 24 hours. Do not lift over 10lbs for 1 week. You may resume driving in 48 hours.       Follow-up Appointments  Future Appointments   Date Time Provider Department Center   4/19/2024  3:00 PM PAD CT 2 BH P[facility] CAT PAD   5/21/2024  2:00 PM Blayne Mueller APRN [facility] CD [facility]AD   5/28/2024  9:30 AM Mehdi Valencia DPM [facility] POD [facility]   10/9/2024  9:00 AM Leonel Oliva DO [facility]         Plan:   -d/c home today  -new home medication to include brilinta 90mg BID. Will require DAPT for a minimum of 1 yr with no missed doses. Educated on importance of medication compliance.   -continue other home medications which include ARB, BB and Leqvio.   -educated on use of SL Nitro and Viagra.   -wrist care discussed  -cardiac rehab  -follow up with Dr. Oliva in 4 weeks         Electronically signed by AME Albert, 04/18/24, 8:56 AM CDT.     Time spent on discharge: 25 minutes

## 2024-04-18 NOTE — PLAN OF CARE
Goal Outcome Evaluation:  Plan of Care Reviewed With: patient        Progress: improving  Outcome Evaluation: No c/o pain. VSS. Cath site C/D/I, radial pulse palpable. IVF infusing, BUN 28 CREAT 1.62 this AM. Plans to d/c later this AM. NSR 59-77 on tele.

## 2024-04-19 ENCOUNTER — HOSPITAL ENCOUNTER (OUTPATIENT)
Dept: CT IMAGING | Facility: HOSPITAL | Age: 69
Discharge: HOME | End: 2024-04-19
Payer: MEDICARE

## 2024-04-19 LAB
QT INTERVAL: 418 MS
QTC INTERVAL: 424 MS

## 2024-04-19 PROCEDURE — 71275 CT ANGIOGRAPHY CHEST: CPT

## 2024-04-19 PROCEDURE — 25510000001 IOPAMIDOL PER 1 ML: Performed by: EMERGENCY MEDICINE

## 2024-04-19 RX ADMIN — IOPAMIDOL 100 ML: 755 INJECTION, SOLUTION INTRAVENOUS at 15:07

## 2024-04-24 ENCOUNTER — OFFICE VISIT (OUTPATIENT)
Dept: PRIMARY CARE CLINIC | Age: 69
End: 2024-04-24

## 2024-04-24 VITALS
WEIGHT: 288 LBS | HEART RATE: 61 BPM | TEMPERATURE: 97.5 F | BODY MASS INDEX: 39.01 KG/M2 | DIASTOLIC BLOOD PRESSURE: 64 MMHG | SYSTOLIC BLOOD PRESSURE: 106 MMHG | HEIGHT: 72 IN | OXYGEN SATURATION: 96 %

## 2024-04-24 DIAGNOSIS — I27.20 PULMONARY HYPERTENSION (HCC): ICD-10-CM

## 2024-04-24 DIAGNOSIS — I71.21 ANEURYSM OF ASCENDING AORTA WITHOUT RUPTURE (HCC): ICD-10-CM

## 2024-04-24 DIAGNOSIS — E11.65 TYPE 2 DIABETES MELLITUS WITH HYPERGLYCEMIA, WITH LONG-TERM CURRENT USE OF INSULIN (HCC): ICD-10-CM

## 2024-04-24 DIAGNOSIS — I25.110 CORONARY ARTERY DISEASE INVOLVING NATIVE HEART WITH UNSTABLE ANGINA PECTORIS, UNSPECIFIED VESSEL OR LESION TYPE (HCC): ICD-10-CM

## 2024-04-24 DIAGNOSIS — I10 ESSENTIAL HYPERTENSION: ICD-10-CM

## 2024-04-24 DIAGNOSIS — Z09 HOSPITAL DISCHARGE FOLLOW-UP: Primary | ICD-10-CM

## 2024-04-24 DIAGNOSIS — Z79.4 TYPE 2 DIABETES MELLITUS WITH HYPERGLYCEMIA, WITH LONG-TERM CURRENT USE OF INSULIN (HCC): ICD-10-CM

## 2024-04-24 SDOH — ECONOMIC STABILITY: FOOD INSECURITY: WITHIN THE PAST 12 MONTHS, YOU WORRIED THAT YOUR FOOD WOULD RUN OUT BEFORE YOU GOT MONEY TO BUY MORE.: NEVER TRUE

## 2024-04-24 SDOH — ECONOMIC STABILITY: FOOD INSECURITY: WITHIN THE PAST 12 MONTHS, THE FOOD YOU BOUGHT JUST DIDN'T LAST AND YOU DIDN'T HAVE MONEY TO GET MORE.: NEVER TRUE

## 2024-04-24 SDOH — ECONOMIC STABILITY: INCOME INSECURITY: HOW HARD IS IT FOR YOU TO PAY FOR THE VERY BASICS LIKE FOOD, HOUSING, MEDICAL CARE, AND HEATING?: NOT HARD AT ALL

## 2024-04-24 NOTE — PATIENT INSTRUCTIONS
We are committed to providing you with the best care possible.   In order to help us achieve these goals please remember to bring all medications, herbal products, and over the counter supplements with you to each visit.     If your provider has ordered testing for you, please be sure to follow up with our office if you have not received results within 7 days after the testing took place.     *If you receive a survey after visiting one of our offices, please take time to share your experience concerning your physician office visit. These surveys are confidential and no health information about you is shared.  We are eager to improve for you and we are counting on your feedback to help make that happen.    
FAMILY HISTORY:  Father  Still living? Unknown  DM (diabetes mellitus), Age at diagnosis: Age Unknown  Family history of heart disease, Age at diagnosis: Age Unknown

## 2024-04-24 NOTE — PROGRESS NOTES
Post-Discharge Transitional Care  Follow Up      Ryan Solitario   YOB: 1955    Date of Office Visit:  4/24/2024  Date of Hospital Admission: 4/17/24  Date of Hospital Discharge: 4/18/24  Risk of hospital readmission (high >=14%. Medium >=10%) :No data recorded    Care management risk score Rising risk (score 2-5) and Complex Care (Scores >=6): No Risk Score On File     Non face to face  following discharge, date last encounter closed (first attempt may have been earlier): *No documented post hospital discharge outreach found in the last 14 days    Call initiated 2 business days of discharge: *No response recorded in the last 14 days    ASSESSMENT/PLAN:   Hospital discharge follow-up  -     FL DISCHARGE MEDS RECONCILED W/ CURRENT OUTPATIENT MED LIST  Coronary artery disease involving native heart with unstable angina pectoris, unspecified vessel or lesion type (HCC)  Aneurysm of ascending aorta without rupture (HCC)  Essential hypertension  Pulmonary hypertension (HCC)  Type 2 diabetes mellitus with hyperglycemia, with long-term current use of insulin (HCC)    Continue present care and management  Continue all maintenance medications  Encouraged to continue healthy lifestyle change  Engage in regular exercise daily -30 minutes a day as tolerated  Stay well  hydrated - drink at least 64 oz of fluid a day  Eat 6 servings of fruit and vegetables daily  Keep scheduled follow-up appt with me   Call with new concerns     Medical Decision Making: moderate complexity  Return in 3 months (on 7/24/2024).    On this date 4/24/2024 I have spent 45 minutes reviewing previous notes, test results and face to face with the patient discussing the diagnosis and importance of compliance with the treatment plan as well as documenting on the day of the visit.       Subjective:   HPI:  Follow up of Hospital problems/diagnosis(es):   CAD with unstable angina  Aneurysm of the ascending thoracic aneurysm  Type 2 DM,

## 2024-05-01 DIAGNOSIS — Z76.0 MEDICATION REFILL: ICD-10-CM

## 2024-05-01 NOTE — TELEPHONE ENCOUNTER
Ryan Solitario called to request a refill on his medication.      Last office visit : 4/24/2024   Next office visit : 6/5/2024     Requested Prescriptions     Pending Prescriptions Disp Refills    irbesartan (AVAPRO) 150 MG tablet 90 tablet 1     Sig: Take 1 tablet by mouth daily Take 1 tablet by mouth once daily            Ave Bui MA

## 2024-05-02 RX ORDER — IRBESARTAN 150 MG/1
150 TABLET ORAL DAILY
Qty: 90 TABLET | Refills: 1 | Status: SHIPPED | OUTPATIENT
Start: 2024-05-02

## 2024-05-15 ENCOUNTER — TELEPHONE (OUTPATIENT)
Dept: CARDIOLOGY | Facility: CLINIC | Age: 69
End: 2024-05-15
Payer: MEDICARE

## 2024-05-15 DIAGNOSIS — Z76.0 MEDICATION REFILL: ICD-10-CM

## 2024-05-15 RX ORDER — GABAPENTIN 300 MG/1
300 CAPSULE ORAL 3 TIMES DAILY
Qty: 90 CAPSULE | Refills: 0 | Status: SHIPPED | OUTPATIENT
Start: 2024-05-15 | End: 2024-06-14

## 2024-05-15 RX ORDER — CLOPIDOGREL BISULFATE 75 MG/1
75 TABLET ORAL DAILY
Qty: 90 TABLET | Refills: 3 | Status: SHIPPED | OUTPATIENT
Start: 2024-05-15

## 2024-05-15 NOTE — TELEPHONE ENCOUNTER
Ryan SHANTI Moodye called to request a refill on his medication.      Last office visit : 4/24/2024   Next office visit : 6/5/2024     Last UDS:   Benzodiazepine Screen, Urine   Date Value Ref Range Status   09/07/2021 -  Final     Buprenorphine Urine   Date Value Ref Range Status   09/07/2021 -  Final     Cocaine Metabolite Screen, Urine   Date Value Ref Range Status   09/07/2021 -  Final     Gabapentin Screen, Urine   Date Value Ref Range Status   09/07/2021 +  Final     MDMA, Urine   Date Value Ref Range Status   09/07/2021 -  Final     Oxycodone Screen, Ur   Date Value Ref Range Status   09/07/2021 -  Final     Propoxyphene Screen, Urine   Date Value Ref Range Status   09/07/2021 -  Final     THC Screen, Urine   Date Value Ref Range Status   09/07/2021 -  Final     Tricyclic Antidepressants, Urine   Date Value Ref Range Status   09/07/2021 -  Final       Last Gary: 5/15/24  Medication Contract: 12/20/23   Last Fill: 3/6/24    Requested Prescriptions     Pending Prescriptions Disp Refills    gabapentin (NEURONTIN) 300 MG capsule [Pharmacy Med Name: Gabapentin 300 MG Oral Capsule] 90 capsule 0     Sig: Take 1 capsule by mouth 3 times daily.                   Please approve or refuse this medication.   Debi Lima LPN

## 2024-05-15 NOTE — TELEPHONE ENCOUNTER
PATIENT WAS NOTIFIED THAT HE IS TO CONTINUE TO FINISH HIS BRILINTA AND THEN SWITCH TO PLAVIX.  COST OF BRILINTA IS EXCESSIVE ($309 FOR 90DAYS).  PATIENT NOTIFIED AND VOICED UNDERSTANDING AND AGREEMENT.  Peggy Ray MA

## 2024-05-21 NOTE — PROGRESS NOTES
[facility] - PODIATRY    Today's Date: 05/28/24    Patient Name: Mick Sims  MRN: [id number]  CSN: [id number]  PCP: Josephine Nicolas MD   Referring Provider: No ref. provider found    SUBJECTIVE     Chief Complaint   Patient presents with    Follow-up     Josephine Nicolas, 02/13/2024  MD REFUSES TO SEE ANYONE BUT DR NAZARIO/3 MTH FU DIABETIC- pt states feet doing good as far as can tell with the neuropathy- pt denies pain     Diabetes     130mg/dl BG at this time     HPI: Mick Sims, a 68 y.o.male, comes to clinic as a(n) established patient presenting for diabetic foot exam and complaining of thickened toenails . Patient has h/o arthritis, DM2, HTN, Renal Disorder . Patient is IDDM with last stated BG level of 130mg/dl. Relates that his A1c was about 8.6%. Sees Dr. Ziegler for Endocrinology. Recently had 2 heart stents placed. Notes moderate numbness and tingling in feet.  Denies open wounds or sores. States that his toenails are long, thick and discolored. He has trouble caring for them himself. Denies pain today. Relates previous treatment(s) including foot care by podiatry . Denies any constitutional symptoms. No other pedal complaints at this time.    Past Medical History:   Diagnosis Date    Arthritis     Diabetes mellitus     Hypertension     Renal disorder      Past Surgical History:   Procedure Laterality Date    CARDIAC CATHETERIZATION      CARDIAC CATHETERIZATION N/A 4/17/2024    Procedure: Left Heart Cath;  Surgeon: Leonel Olvia DO;  Location:  [facility] CATH INVASIVE LOC[facility];  Service: Cardiovascular;  Laterality: N/A;    CARPAL TUNNEL RELEASE      CORONARY STENT PLACEMENT  2018    X 2    TOTAL KNEE ARTHROPLASTY Right      Family History   Problem Relation Age of Onset    Cancer Mother     Diabetes Sister     Diabetes Brother     No Known Problems Brother      Social History     Socioeconomic History    Marital status:    Tobacco Use    Smoking  status: Never     Passive exposure: Never    Smokeless tobacco: Never   Vaping Use    Vaping status: Never Used   Substance and Sexual Activity    Alcohol use: No    Drug use: Never    Sexual activity: Defer     Allergies   Allergen Reactions    Latex Hives     Current Outpatient Medications   Medication Sig Dispense Refill    aspirin 81 MG chewable tablet Chew 1 tablet Daily.      benzonatate (Tessalon Perles) 100 MG capsule Take 1 capsule by mouth 3 (Three) Times a Day As Needed for Cough. 90 capsule 1    Canagliflozin (Invokana) 100 MG tablet tablet Take 1 tablet by mouth Daily.      clopidogrel (PLAVIX) 75 MG tablet Take 1 tablet by mouth Daily. 90 tablet 3    Cyanocobalamin (Vitamin B-12) 1000 MCG/15ML liquid Take 15 mL by mouth Daily.      empagliflozin (Jardiance) 25 MG tablet tablet Take 1 tablet by mouth Daily. One tablet daily before breakfast 30 tablet 11    gabapentin (NEURONTIN) 300 MG capsule Take 1 capsule by mouth 3 (Three) Times a Day.      Glucagon, rDNA, (Glucagon Emergency) 1 MG kit Inject 1 mg under the skin into the appropriate area as directed 1 (One) Time As Needed (hypoglycemia) for up to 1 dose. 1 each 11    Inclisiran Sodium (Leqvio) 284 MG/1.5ML solution prefilled syringe Inject 1.5 mL under the skin into the appropriate area as directed Every 6 (Six) Months.      Insulin Degludec-Liraglutide (Xultophy) 100-3.6 UNIT-MG/ML solution pen-injector subcutaneous pen Inject 50 Units under the skin into the appropriate area as directed Daily. 15 mL 11    Insulin Lispro-aabc, 1 U Dial, (Lyumjev KwikPen) 100 UNIT/ML solution pen-injector Inject 80 Units under the skin into the appropriate area as directed 3 (Three) Times a Day With Meals. Up to 70u with meals 72 mL 3    irbesartan (AVAPRO) 150 MG tablet Take 1 tablet by mouth Every Night.      lansoprazole (PREVACID) 30 MG capsule Take 1 capsule by mouth Daily.      metoprolol tartrate (LOPRESSOR) 50 MG tablet Take 1 tablet by mouth 2 (Two) Times a  Day. 30 tablet 0    nitroglycerin (NITROSTAT) 0.4 MG SL tablet Place 1 tablet under the tongue Every 5 (Five) Minutes As Needed for Chest Pain. Take no more than 3 doses in 15 minutes.      nystatin (MYCOSTATIN) 224568 UNIT/GM powder Apply  topically to the appropriate area as directed 4 (Four) Times a Day.      sodium bicarbonate 650 MG tablet Take 1 tablet by mouth 2 (Two) Times a Day.      vitamin D (ERGOCALCIFEROL) 1.25 MG (23345 UT) capsule capsule Take 1 capsule by mouth 1 (One) Time Per Week.       No current facility-administered medications for this visit.     Review of Systems   Constitutional:  Negative for chills and fever.   HENT:  Negative for congestion.    Respiratory:  Negative for shortness of breath.    Cardiovascular:  Positive for leg swelling. Negative for chest pain.   Gastrointestinal:  Negative for constipation, diarrhea, nausea and vomiting.   Musculoskeletal:         Foot pain   Skin:  Negative for wound.   Neurological:  Positive for numbness.       OBJECTIVE     Vitals:    05/28/24 0930   BP: 124/68         PHYSICAL EXAM  GEN:   Accompanied by none.     Foot/Ankle Exam    GENERAL  Diabetic foot exam performed    Appearance:  appears stated age and obese  Orientation:  AAOx3  Affect:  appropriate  Gait:  unimpaired  Assistance:  independent  Right shoe gear: diabetic shoe  Left shoe gear: diabetic shoe    VASCULAR     Right Foot Vascularity   Dorsalis pedis:  2+  Posterior tibial:  2+  Skin temperature:  warm  Edema grading:  Trace  CFT:  3  Pedal hair growth:  Present  Varicosities:  mild varicosities     Left Foot Vascularity   Dorsalis pedis:  2+  Posterior tibial:  2+  Skin temperature:  warm  Edema grading:  Trace  CFT:  3  Pedal hair growth:  Present  Varicosities:  mild varicosities     NEUROLOGIC     Right Foot Neurologic   Light touch sensation: diminished  Vibratory sensation: diminished  Hot/Cold sensation: diminished  Protective Sensation using [place]-Azar Monofilament:    Sites intact: 4  Sites tested: 10     Left Foot Neurologic   Light touch sensation: diminished  Vibratory sensation: diminished  Hot/Cold sensation:  diminished  Protective Sensation using [place]-Azar Monofilament:   Sites intact: 4  Sites tested: 10    MUSCULOSKELETAL     Right Foot Musculoskeletal   Ecchymosis:  none  Tenderness:  none    Arch:  Normal     Left Foot Musculoskeletal   Ecchymosis:  none  Tenderness:  none  Arch:  Normal    MUSCLE STRENGTH     Right Foot Muscle Strength   Foot dorsiflexion:  5  Foot plantar flexion:  5  Foot inversion:  5  Foot eversion:  5     Left Foot Muscle Strength   Foot dorsiflexion:  5  Foot plantar flexion:  5  Foot inversion:  5  Foot eversion:  5    RANGE OF MOTION     Right Foot Range of Motion   Foot and ankle ROM within normal limits       Left Foot Range of Motion   Foot and ankle ROM within normal limits      DERMATOLOGIC      Right Foot Dermatologic   Skin  Positive for tinea (interdigital).   Nails  1.  Positive for elongated, onychomycosis, abnormal thickness and subungual debris.  2.  Positive for elongated, onychomycosis, abnormal thickness and subungual debris.  3.  Positive for elongated, onychomycosis, abnormal thickness and subungual debris.  4.  Positive for elongated, onychomycosis, abnormal thickness and subungual debris.  5.  Positive for elongated, onychomycosis, abnormal thickness and subungual debris.  Nails comment:  1-5     Left Foot Dermatologic   Skin  Positive for tinea (interdigital).   Nails comment:  1-5  Nails  1.  Positive for elongated, onychomycosis, abnormal thickness and subungual debris.  2.  Positive for elongated, onychomycosis, abnormal thickness and subungual debris.  3.  Positive for elongated, onychomycosis, abnormal thickness and subungual debris.  4.  Positive for elongated, onychomycosis, abnormally thick and subungual debris.  5.  Positive for elongated, onychomycosis, abnormally thick and subungual  debris.      RADIOLOGY/NUCLEAR:  No results found.    LABORATORY/CULTURE RESULTS:      PATHOLOGY RESULTS:       ASSESSMENT/PLAN     Diagnoses and all orders for this visit:    1. Thickened nails (Primary)    2. Type 2 diabetes mellitus with diabetic neuropathy, with long-term current use of insulin    3. Antiplatelet or antithrombotic long-term use    4. Stage 3b chronic kidney disease          Comprehensive lower extremity examination and evaluation was performed.  Discussed findings and treatment plan including risks, benefits, and treatment options with patient in detail. Patient agreed with treatment plan.  After verbal consent obtained, nail(s) x10 debrided of length and thickness with nail nipper without incidence  Patient may maintain nails and calluses at home utilizing emery board or pumice stone between visits as needed  Reviewed at home diabetic foot care including daily foot checks   Continue daily use of compression stockings and elevate extremities at rest.  Continue diabetic monitoring and control under direction of Endocrinology.  An After Visit Summary was printed and given to the patient at discharge, including (if requested) any available informative/educational handouts regarding diagnosis, treatment, or medications. All questions were answered to patient/family satisfaction. Should symptoms fail to improve or worsen they agree to call or return to clinic or to go to the Emergency Department. Discussed the importance of following up with any needed screening tests/labs/specialist appointments and any requested follow-up recommended by me today. Importance of maintaining follow-up discussed and patient accepts that missed appointments can delay diagnosis and potentially lead to worsening of conditions.  Return in about 3 months (around 8/28/2024) for Follow-up with Dr. Valencia, Schedule [facility]., or sooner if acute issues arise.        This document has been electronically signed by  Mehdi Valencia DPM on May 28, 2024 10:29 CDT

## 2024-05-23 ENCOUNTER — OFFICE VISIT (OUTPATIENT)
Dept: CARDIOLOGY | Facility: CLINIC | Age: 69
End: 2024-05-23
Payer: MEDICARE

## 2024-05-23 VITALS
OXYGEN SATURATION: 96 % | WEIGHT: 278 LBS | HEIGHT: 71 IN | DIASTOLIC BLOOD PRESSURE: 68 MMHG | SYSTOLIC BLOOD PRESSURE: 122 MMHG | RESPIRATION RATE: 18 BRPM | BODY MASS INDEX: 38.92 KG/M2 | HEART RATE: 75 BPM

## 2024-05-23 DIAGNOSIS — E11.59 HYPERTENSION ASSOCIATED WITH DIABETES: ICD-10-CM

## 2024-05-23 DIAGNOSIS — I15.2 HYPERTENSION ASSOCIATED WITH DIABETES: ICD-10-CM

## 2024-05-23 DIAGNOSIS — Z79.4 TYPE 2 DIABETES MELLITUS WITH HYPERGLYCEMIA, WITH LONG-TERM CURRENT USE OF INSULIN: ICD-10-CM

## 2024-05-23 DIAGNOSIS — E11.69 MIXED DIABETIC HYPERLIPIDEMIA ASSOCIATED WITH TYPE 2 DIABETES MELLITUS: ICD-10-CM

## 2024-05-23 DIAGNOSIS — R06.09 DYSPNEA ON EXERTION: ICD-10-CM

## 2024-05-23 DIAGNOSIS — E11.65 TYPE 2 DIABETES MELLITUS WITH HYPERGLYCEMIA, WITH LONG-TERM CURRENT USE OF INSULIN: ICD-10-CM

## 2024-05-23 DIAGNOSIS — I77.810 AORTIC ROOT DILATION: ICD-10-CM

## 2024-05-23 DIAGNOSIS — I25.110 CORONARY ARTERY DISEASE INVOLVING NATIVE CORONARY ARTERY OF NATIVE HEART WITH UNSTABLE ANGINA PECTORIS: Primary | ICD-10-CM

## 2024-05-23 DIAGNOSIS — N18.32 STAGE 3B CHRONIC KIDNEY DISEASE: ICD-10-CM

## 2024-05-23 DIAGNOSIS — E78.2 MIXED DIABETIC HYPERLIPIDEMIA ASSOCIATED WITH TYPE 2 DIABETES MELLITUS: ICD-10-CM

## 2024-05-23 RX ORDER — NYSTATIN 100000 [USP'U]/G
POWDER TOPICAL 4 TIMES DAILY
COMMUNITY

## 2024-05-23 NOTE — PROGRESS NOTES
Reason For Visit:  Acute cough (1 month follow up)     Subjective        Mick Sims is a 68 y.o. male with the below pertinent PMH who presents for follow-up of coronary artery disease.    Patient presented for outpatient left heart catheterization on 4/17/2024.  See full results below, but patient did receive drug-eluting stent x 2 for hemodynamically significant lesions.  Patient was discharged the next day on aspirin and Brilinta.  The patient called and stated that he was having issues with cost coverage for his Brilinta.  He was then transitioned over to Plavix.    Since his heart cath he has been doing reasonably well.  Denies any overt chest pain.  Still having exertional shortness of breath.  The wife states that moderate exertion at home.  He is tolerating his regimen well without any dizziness or lightheadedness.  shortness of breath with she does not think that he has bounced back as quickly from his heart cath.  Still having moderate dyspnea on exertion.  Patient reports never being a smoker, however was exposed to his wife secondhand smoke for decades.      ROS: Pertinent findings as noted above    Pertinent PMH  -Coronary artery disease status post drug-eluting stent x 2 in April 2024  - Hypertension  - Mixed hyperlipidemia  - Morbid obesity  - Stage IIIb chronic kidney disease    Pertinent past medical, surgical, family, and social history were reviewed.      Current Outpatient Medications:     aspirin 81 MG chewable tablet, Chew 1 tablet Daily., Disp: , Rfl:     benzonatate (Tessalon Perles) 100 MG capsule, Take 1 capsule by mouth 3 (Three) Times a Day As Needed for Cough., Disp: 90 capsule, Rfl: 1    Canagliflozin (Invokana) 100 MG tablet tablet, Take 1 tablet by mouth Daily., Disp: , Rfl:     clopidogrel (PLAVIX) 75 MG tablet, Take 1 tablet by mouth Daily., Disp: 90 tablet, Rfl: 3    Cyanocobalamin (Vitamin B-12) 1000 MCG/15ML liquid, Take 15 mL by mouth Daily., Disp: , Rfl:     empagliflozin  (Jardiance) 25 MG tablet tablet, Take 1 tablet by mouth Daily. One tablet daily before breakfast, Disp: 30 tablet, Rfl: 11    gabapentin (NEURONTIN) 300 MG capsule, Take 1 capsule by mouth 3 (Three) Times a Day., Disp: , Rfl:     Inclisiran Sodium (Leqvio) 284 MG/1.5ML solution prefilled syringe, Inject 1.5 mL under the skin into the appropriate area as directed Every 6 (Six) Months., Disp: , Rfl:     Insulin Degludec-Liraglutide (Xultophy) 100-3.6 UNIT-MG/ML solution pen-injector subcutaneous pen, Inject 50 Units under the skin into the appropriate area as directed Daily., Disp: 15 mL, Rfl: 11    Insulin Lispro-aabc, 1 U Dial, (Melaumjelucila BurgosikPen) 100 UNIT/ML solution pen-injector, Inject 80 Units under the skin into the appropriate area as directed 3 (Three) Times a Day With Meals. Up to 70u with meals, Disp: 72 mL, Rfl: 3    irbesartan (AVAPRO) 150 MG tablet, Take 1 tablet by mouth Every Night., Disp: , Rfl:     lansoprazole (PREVACID) 30 MG capsule, Take 1 capsule by mouth Daily., Disp: , Rfl:     metoprolol tartrate (LOPRESSOR) 50 MG tablet, Take 1 tablet by mouth 2 (Two) Times a Day., Disp: 30 tablet, Rfl: 0    nitroglycerin (NITROSTAT) 0.4 MG SL tablet, Place 1 tablet under the tongue Every 5 (Five) Minutes As Needed for Chest Pain. Take no more than 3 doses in 15 minutes., Disp: , Rfl:     nystatin (MYCOSTATIN) 529565 UNIT/GM powder, Apply  topically to the appropriate area as directed 4 (Four) Times a Day., Disp: , Rfl:     sodium bicarbonate 650 MG tablet, Take 1 tablet by mouth 2 (Two) Times a Day., Disp: , Rfl:     vitamin D (ERGOCALCIFEROL) 1.25 MG (78579 UT) capsule capsule, Take 1 capsule by mouth 1 (One) Time Per Week., Disp: , Rfl:     Glucagon, rDNA, (Glucagon Emergency) 1 MG kit, Inject 1 mg under the skin into the appropriate area as directed 1 (One) Time As Needed (hypoglycemia) for up to 1 dose. (Patient not taking: Reported on 5/23/2024), Disp: 1 each, Rfl: 11     Objective   Vital Signs:  BP  "122/68   Pulse 75   Resp 18   Ht 180.3 cm (71\")   Wt 126 kg (278 lb)   SpO2 96%   BMI 38.77 kg/m²   Estimated body mass index is 38.77 kg/m² as calculated from the following:    Height as of this encounter: 180.3 cm (71\").    Weight as of this encounter: 126 kg (278 lb).      Constitutional:       Appearance: Healthy appearance. Not in distress.   Neck:      Vascular: JVD normal.   Pulmonary:      Effort: Pulmonary effort is normal.      Breath sounds: Normal breath sounds. No wheezing. No rhonchi. No rales.   Cardiovascular:      PMI at left midclavicular line. Normal rate. Regular rhythm. Normal S1. Normal S2.       Murmurs: There is no murmur.      No gallop.  No click. No rub.   Edema:     Peripheral edema absent.   Musculoskeletal: Normal range of motion.      Cervical back: Normal range of motion. Skin:     General: Skin is warm and dry.   Neurological:      Mental Status: Alert and oriented to person, place and time.        Result Review :  The following data was reviewed by: AME Sánchez on 05/23/2024:  CMP   CMP          7/11/2023    09:50 4/17/2024    11:05 4/18/2024    02:06   CMP   Glucose 169  272  225    BUN 37  31  28    Creatinine 1.64  1.66  1.62    EGFR 45.0  44.1  45.4    Sodium 137  138  137    Potassium 4.3  4.7  4.3    Chloride 102  106  105    Calcium 10.1  10.5  9.7    Total Protein 7.5      Albumin 4.2      Globulin 3.3      Total Bilirubin 0.3      Alkaline Phosphatase 84      AST (SGOT) 13      ALT (SGPT) 13      Albumin/Globulin Ratio 1.3      BUN/Creatinine Ratio 22.6  18.7  17.3    Anion Gap 10.0  7.0  9.0      Lipid Panel   Lipid Panel          9/19/2023    11:31 10/5/2023    14:20 1/17/2024    13:16   Lipid Panel   Total Cholesterol  180     161       Triglycerides 233     355     330       HDL Cholesterol 28     26     28       VLDL Cholesterol  71     66       LDL Cholesterol  82     83     67       LDL/HDL Ratio  3.19     2.39          Details          This result is " from an external source.             BMP   BMP          7/11/2023    09:50 4/17/2024    11:05 4/18/2024    02:06   BMP   BUN 37  31  28    Creatinine 1.64  1.66  1.62    Sodium 137  138  137    Potassium 4.3  4.7  4.3    Chloride 102  106  105    CO2 25.0  25.0  23.0    Calcium 10.1  10.5  9.7      Data reviewed : Cardiology studies echo        Results for orders placed during the hospital encounter of 04/12/24    Adult Transthoracic Echo Complete W/ Cont if Necessary Per Protocol    Interpretation Summary    Left ventricular systolic function is normal. Left ventricular ejection fraction appears to be 56 - 60%.    Left ventricular wall thickness is consistent with moderate septal asymmetric hypertrophy.    Left ventricular diastolic function is consistent with (grade I) impaired relaxation.    The right ventricular cavity is mildly dilated. Normal right ventricular systolic function noted.    Mild aortic valve regurgitation is present.    No evidence of pulmonary hypertension is present.    Moderate dilation of the aortic root is present. Moderate dilation of the ascending aorta is present.         Assessment and Plan   Diagnoses and all orders for this visit:    1. Coronary artery disease involving native coronary artery of native heart with unstable angina pectoris (Primary)  2. Hypertension associated with diabetes  3. Mixed diabetic hyperlipidemia associated with type 2 diabetes mellitus  -Patient still having dyspnea on exertion, unsure whether this is cardiac in nature or not.  Given his new stents and without any clear signs of angina I favor there potentially being a underlying lung process.  - Ordering PFTs and we will follow-up on these  - Continue aspirin and Plavix for at least 1 year  - Normotensive in the clinic today, continue irbesartan 150 mg nightly as well as Lopressor 50 mg twice daily  -Unable to tolerate statins, on Leqvio    4. Aortic root dilation  -Patient apprised of her CT scan scan  results.  4.4 cm dilation.  Plan to repeat CT in April 2025    5. Type 2 diabetes mellitus with hyperglycemia, with long-term current use of insulin  -Follows with endocrinology, SGL 2 inhibitor on board    6. Stage 3b chronic kidney disease  -On SGL 2 inhibitor per endocrinology             Follow Up   No follow-ups on file.  Patient was given instructions and counseling regarding his condition or for health maintenance advice. Please see specific information pulled into the AVS if appropriate.       Part of this note may be an electronic transcription/translation of spoken language to printed text using the Dragon Dictation System.

## 2024-05-24 ENCOUNTER — TELEPHONE (OUTPATIENT)
Dept: PODIATRY | Facility: CLINIC | Age: 69
End: 2024-05-24
Payer: MEDICARE

## 2024-05-28 ENCOUNTER — OFFICE VISIT (OUTPATIENT)
Dept: PODIATRY | Facility: CLINIC | Age: 69
End: 2024-05-28
Payer: MEDICARE

## 2024-05-28 VITALS
WEIGHT: 274 LBS | BODY MASS INDEX: 38.36 KG/M2 | DIASTOLIC BLOOD PRESSURE: 68 MMHG | SYSTOLIC BLOOD PRESSURE: 124 MMHG | HEIGHT: 71 IN

## 2024-05-28 DIAGNOSIS — Z79.4 TYPE 2 DIABETES MELLITUS WITH DIABETIC NEUROPATHY, WITH LONG-TERM CURRENT USE OF INSULIN: ICD-10-CM

## 2024-05-28 DIAGNOSIS — E11.40 TYPE 2 DIABETES MELLITUS WITH DIABETIC NEUROPATHY, WITH LONG-TERM CURRENT USE OF INSULIN: ICD-10-CM

## 2024-05-28 DIAGNOSIS — L60.2 THICKENED NAILS: Primary | ICD-10-CM

## 2024-05-28 DIAGNOSIS — N18.32 STAGE 3B CHRONIC KIDNEY DISEASE: ICD-10-CM

## 2024-05-28 DIAGNOSIS — Z79.02 ANTIPLATELET OR ANTITHROMBOTIC LONG-TERM USE: ICD-10-CM

## 2024-06-05 ENCOUNTER — OFFICE VISIT (OUTPATIENT)
Dept: PRIMARY CARE CLINIC | Age: 69
End: 2024-06-05

## 2024-06-05 VITALS
HEIGHT: 72 IN | OXYGEN SATURATION: 98 % | SYSTOLIC BLOOD PRESSURE: 114 MMHG | DIASTOLIC BLOOD PRESSURE: 68 MMHG | HEART RATE: 107 BPM | BODY MASS INDEX: 37.46 KG/M2 | WEIGHT: 276.6 LBS | TEMPERATURE: 97.8 F

## 2024-06-05 DIAGNOSIS — E11.42 DIABETIC PERIPHERAL NEUROPATHY (HCC): ICD-10-CM

## 2024-06-05 DIAGNOSIS — E66.01 MORBID OBESITY (HCC): ICD-10-CM

## 2024-06-05 DIAGNOSIS — N30.00 ACUTE CYSTITIS WITHOUT HEMATURIA: ICD-10-CM

## 2024-06-05 DIAGNOSIS — E78.2 MIXED HYPERLIPIDEMIA: ICD-10-CM

## 2024-06-05 DIAGNOSIS — Z79.4 TYPE 2 DIABETES MELLITUS WITH HYPERGLYCEMIA, WITH LONG-TERM CURRENT USE OF INSULIN (HCC): ICD-10-CM

## 2024-06-05 DIAGNOSIS — E11.65 TYPE 2 DIABETES MELLITUS WITH HYPERGLYCEMIA, WITH LONG-TERM CURRENT USE OF INSULIN (HCC): ICD-10-CM

## 2024-06-05 DIAGNOSIS — I10 ESSENTIAL HYPERTENSION: ICD-10-CM

## 2024-06-05 DIAGNOSIS — N18.32 STAGE 3B CHRONIC KIDNEY DISEASE (HCC): ICD-10-CM

## 2024-06-05 DIAGNOSIS — Z76.0 MEDICATION REFILL: ICD-10-CM

## 2024-06-05 DIAGNOSIS — N30.00 ACUTE CYSTITIS WITHOUT HEMATURIA: Primary | ICD-10-CM

## 2024-06-05 PROBLEM — E11.9 TYPE 2 DIABETES MELLITUS (HCC): Status: RESOLVED | Noted: 2024-02-21 | Resolved: 2024-06-05

## 2024-06-05 LAB
APPEARANCE FLUID: ABNORMAL
BILIRUBIN, POC: NEGATIVE
BLOOD URINE, POC: NEGATIVE
CLARITY, POC: ABNORMAL
COLOR, POC: YELLOW
GLUCOSE URINE, POC: 500
HBA1C MFR BLD: 8.7 %
KETONES, POC: NEGATIVE
LEUKOCYTE EST, POC: NEGATIVE
NITRITE, POC: ABNORMAL
PH, POC: 5.5
PROTEIN, POC: NEGATIVE
SPECIFIC GRAVITY, POC: 1.02
UROBILINOGEN, POC: 0.2

## 2024-06-05 RX ORDER — CLOPIDOGREL BISULFATE 75 MG/1
75 TABLET ORAL DAILY
COMMUNITY
Start: 2024-05-15

## 2024-06-05 RX ORDER — GABAPENTIN 300 MG/1
300 CAPSULE ORAL 3 TIMES DAILY
Qty: 90 CAPSULE | Refills: 0 | Status: SHIPPED | OUTPATIENT
Start: 2024-06-05 | End: 2024-07-05

## 2024-06-05 RX ORDER — SULFAMETHOXAZOLE AND TRIMETHOPRIM 400; 80 MG/1; MG/1
1 TABLET ORAL DAILY
Qty: 7 TABLET | Refills: 0 | Status: SHIPPED | OUTPATIENT
Start: 2024-06-05 | End: 2024-06-12

## 2024-06-05 RX ORDER — PHENAZOPYRIDINE HYDROCHLORIDE 200 MG/1
200 TABLET, FILM COATED ORAL 2 TIMES DAILY
Qty: 6 TABLET | Refills: 0 | Status: SHIPPED | OUTPATIENT
Start: 2024-06-05 | End: 2024-06-08

## 2024-06-05 ASSESSMENT — ENCOUNTER SYMPTOMS
SHORTNESS OF BREATH: 1
ABDOMINAL PAIN: 0
ABDOMINAL DISTENTION: 0
EYES NEGATIVE: 1
DIARRHEA: 0
CONSTIPATION: 0

## 2024-06-05 NOTE — ASSESSMENT & PLAN NOTE
Uncontrolled, continue current medications, continue current treatment plan, medication adherence emphasized, lifestyle modifications recommended, and discussed eating habits. Recommend to cut down on eating out and try to prepare more home cooked meals - low in carbs and fat

## 2024-06-07 LAB
BACTERIA UR CULT: ABNORMAL
BACTERIA UR CULT: ABNORMAL
ORGANISM: ABNORMAL

## 2024-06-11 RX ORDER — NITROFURANTOIN 25; 75 MG/1; MG/1
100 CAPSULE ORAL 2 TIMES DAILY
Qty: 20 CAPSULE | Refills: 0 | Status: SHIPPED | OUTPATIENT
Start: 2024-06-11 | End: 2024-06-21

## 2024-06-12 ENCOUNTER — TELEPHONE (OUTPATIENT)
Dept: PRIMARY CARE CLINIC | Age: 69
End: 2024-06-12

## 2024-06-12 NOTE — TELEPHONE ENCOUNTER
----- Message from Zeny Santos MD sent at 6/11/2024 12:56 PM CDT -----  Urine culture did grow E coli but organism is resistant to the abx I gave him (baxctrim). New Rx sent to his pharmacy. RTO in 2 weeks after completion of abx for repeat UA

## 2024-06-14 ENCOUNTER — HOSPITAL ENCOUNTER (OUTPATIENT)
Dept: INFUSION THERAPY | Age: 69
Setting detail: INFUSION SERIES
Discharge: HOME | End: 2024-06-14
Payer: MEDICARE

## 2024-06-14 VITALS
SYSTOLIC BLOOD PRESSURE: 126 MMHG | TEMPERATURE: 97.8 F | OXYGEN SATURATION: 98 % | DIASTOLIC BLOOD PRESSURE: 73 MMHG | RESPIRATION RATE: 18 BRPM | HEART RATE: 71 BPM

## 2024-06-14 DIAGNOSIS — E78.2 MIXED HYPERLIPIDEMIA: Primary | ICD-10-CM

## 2024-06-14 PROCEDURE — 6360000002 HC RX W HCPCS: Performed by: FAMILY MEDICINE

## 2024-06-14 PROCEDURE — 96372 THER/PROPH/DIAG INJ SC/IM: CPT

## 2024-06-14 RX ORDER — ONDANSETRON 2 MG/ML
8 INJECTION INTRAMUSCULAR; INTRAVENOUS
OUTPATIENT
Start: 2024-12-13

## 2024-06-14 RX ORDER — DIPHENHYDRAMINE HYDROCHLORIDE 50 MG/ML
50 INJECTION INTRAMUSCULAR; INTRAVENOUS
OUTPATIENT
Start: 2024-12-13

## 2024-06-14 RX ORDER — ALBUTEROL SULFATE 90 UG/1
4 AEROSOL, METERED RESPIRATORY (INHALATION) PRN
OUTPATIENT
Start: 2024-12-13

## 2024-06-14 RX ORDER — ACETAMINOPHEN 325 MG/1
650 TABLET ORAL
OUTPATIENT
Start: 2024-12-13

## 2024-06-14 RX ORDER — SODIUM CHLORIDE 9 MG/ML
INJECTION, SOLUTION INTRAVENOUS CONTINUOUS
OUTPATIENT
Start: 2024-12-13

## 2024-06-14 RX ORDER — EPINEPHRINE 1 MG/ML
0.3 INJECTION, SOLUTION, CONCENTRATE INTRAVENOUS PRN
OUTPATIENT
Start: 2024-12-13

## 2024-06-14 RX ADMIN — INCLISIRAN 284 MG: 284 INJECTION, SOLUTION SUBCUTANEOUS at 13:44

## 2024-06-14 NOTE — PROGRESS NOTES
[facility] 284mg Leqvio. Tolerated well.    Electronically signed by Carola Jackson RN on 6/14/2024 at 1:48 PM

## 2024-07-03 ENCOUNTER — OFFICE VISIT (OUTPATIENT)
Dept: PRIMARY CARE CLINIC | Age: 69
End: 2024-07-03
Payer: MEDICARE

## 2024-07-03 VITALS
SYSTOLIC BLOOD PRESSURE: 114 MMHG | DIASTOLIC BLOOD PRESSURE: 68 MMHG | OXYGEN SATURATION: 93 % | HEART RATE: 73 BPM | BODY MASS INDEX: 37.38 KG/M2 | WEIGHT: 276 LBS | HEIGHT: 72 IN | TEMPERATURE: 97.4 F

## 2024-07-03 DIAGNOSIS — N39.0 URINARY TRACT INFECTION WITH HEMATURIA, SITE UNSPECIFIED: Primary | ICD-10-CM

## 2024-07-03 DIAGNOSIS — R31.9 URINARY TRACT INFECTION WITH HEMATURIA, SITE UNSPECIFIED: Primary | ICD-10-CM

## 2024-07-03 LAB
APPEARANCE FLUID: ABNORMAL
BILIRUBIN, POC: NEGATIVE
BLOOD URINE, POC: ABNORMAL
CLARITY, POC: ABNORMAL
COLOR, POC: ABNORMAL
GLUCOSE URINE, POC: 500
KETONES, POC: NEGATIVE
LEUKOCYTE EST, POC: ABNORMAL
NITRITE, POC: ABNORMAL
PH, POC: 6
PROTEIN, POC: ABNORMAL
SPECIFIC GRAVITY, POC: 1.02
UROBILINOGEN, POC: 0.2

## 2024-07-03 PROCEDURE — 3074F SYST BP LT 130 MM HG: CPT | Performed by: FAMILY MEDICINE

## 2024-07-03 PROCEDURE — 81002 URINALYSIS NONAUTO W/O SCOPE: CPT | Performed by: FAMILY MEDICINE

## 2024-07-03 PROCEDURE — G8428 CUR MEDS NOT DOCUMENT: HCPCS | Performed by: FAMILY MEDICINE

## 2024-07-03 PROCEDURE — 1036F TOBACCO NON-USER: CPT | Performed by: FAMILY MEDICINE

## 2024-07-03 PROCEDURE — 3017F COLORECTAL CA SCREEN DOC REV: CPT | Performed by: FAMILY MEDICINE

## 2024-07-03 PROCEDURE — 3078F DIAST BP <80 MM HG: CPT | Performed by: FAMILY MEDICINE

## 2024-07-03 PROCEDURE — 1123F ACP DISCUSS/DSCN MKR DOCD: CPT | Performed by: FAMILY MEDICINE

## 2024-07-03 PROCEDURE — G8417 CALC BMI ABV UP PARAM F/U: HCPCS | Performed by: FAMILY MEDICINE

## 2024-07-03 PROCEDURE — 99213 OFFICE O/P EST LOW 20 MIN: CPT | Performed by: FAMILY MEDICINE

## 2024-07-03 RX ORDER — CIPROFLOXACIN 500 MG/1
500 TABLET, FILM COATED ORAL 2 TIMES DAILY
Qty: 14 TABLET | Refills: 0 | Status: SHIPPED | OUTPATIENT
Start: 2024-07-03 | End: 2024-07-10

## 2024-07-03 NOTE — PROGRESS NOTES
[facility]  [address]  [address]  [facility] [address]  Dept: [phone]  Dept Fax: [phone]  Loc: [phone]      Subjective:     Chief Complaint   Patient presents with    Urinary Tract Infection       HPI:  Ryan Solitario is a 68 y.o. male presents today for follow-up of UTI. His repeat UA still shows trace leucocytes and + nitrates. He is also spilling glucose in his UA.      ROS:   Review of Systems    PMHx:  Past Medical History:   Diagnosis Date    ACS (acute coronary syndrome) ([facility]) 06/03/2019    Acute renal failure superimposed on stage 4 chronic kidney disease ([facility]) 09/04/2020    Aneurysm ([facility])     abdominal (pt states NOT)    Arthritis     Bilateral leg edema     CAD (coronary artery disease)     sees [facility] cardiology/dr. russo.    Chronic kidney disease     Stage 4; sees dr. ta    Diabetes mellitus, type 2 ([facility])     Fatty liver     GERD (gastroesophageal reflux disease)     HTN (hypertension)     Hyperlipidemia     Knee pain     NSTEMI (non-ST elevated myocardial infarction) ([facility]) 06/05/2019    Prolonged emergence from general anesthesia     S/P total knee arthroplasty, right 03/29/2022    Seasonal allergies     Vitreous hemorrhage ([facility]) 01/22/2021     Patient Active Problem List   Diagnosis    Type 2 diabetes mellitus with hyperglycemia, with long-term current use of insulin ([facility])    Essential hypertension    Gastroesophageal reflux disease without esophagitis    Family history of prostate cancer in father    History of colon polyps    Double vessel coronary artery disease    History of placement of stent in LAD coronary artery    Chronic kidney disease, stage III (moderate) ([facility])    Mixed hyperlipidemia    Morbid obesity ([facility])    Left ventricular dysfunction    Primary osteoarthritis involving multiple joints    Diabetic peripheral neuropathy ([facility])    Atherosclerosis of native coronary artery of native heart with angina pectoris ([facility])

## 2024-07-18 ENCOUNTER — HOSPITAL ENCOUNTER (OUTPATIENT)
Dept: PULMONOLOGY | Facility: HOSPITAL | Age: 69
Discharge: HOME | End: 2024-07-18
Payer: MEDICARE

## 2024-07-18 DIAGNOSIS — R06.09 DYSPNEA ON EXERTION: ICD-10-CM

## 2024-07-18 PROCEDURE — 94726 PLETHYSMOGRAPHY LUNG VOLUMES: CPT

## 2024-07-18 PROCEDURE — 94060 EVALUATION OF WHEEZING: CPT

## 2024-07-18 PROCEDURE — 94729 DIFFUSING CAPACITY: CPT

## 2024-07-18 RX ORDER — ALBUTEROL SULFATE 2.5 MG/3ML
2.5 SOLUTION RESPIRATORY (INHALATION) ONCE
Status: COMPLETED | OUTPATIENT
Start: 2024-07-18 | End: 2024-07-18

## 2024-07-18 RX ADMIN — ALBUTEROL SULFATE 2.5 MG: 2.5 SOLUTION RESPIRATORY (INHALATION) at 08:32

## 2024-07-24 DIAGNOSIS — E11.42 DIABETIC PERIPHERAL NEUROPATHY (HCC): ICD-10-CM

## 2024-07-24 DIAGNOSIS — Z76.0 MEDICATION REFILL: ICD-10-CM

## 2024-07-25 RX ORDER — GABAPENTIN 300 MG/1
300 CAPSULE ORAL 3 TIMES DAILY
Qty: 90 CAPSULE | Refills: 0 | Status: SHIPPED | OUTPATIENT
Start: 2024-07-25 | End: 2024-08-24

## 2024-07-25 NOTE — TELEPHONE ENCOUNTER
Ryan Moodye called to request a refill on his medication.      Last office visit : 7/3/2024   Next office visit : 9/18/2024     Last UDS:   Benzodiazepine Screen, Urine   Date Value Ref Range Status   09/07/2021 -  Final     Buprenorphine Urine   Date Value Ref Range Status   09/07/2021 -  Final     Cocaine Metabolite Screen, Urine   Date Value Ref Range Status   09/07/2021 -  Final     Gabapentin Screen, Urine   Date Value Ref Range Status   09/07/2021 +  Final     MDMA, Urine   Date Value Ref Range Status   09/07/2021 -  Final     Oxycodone Screen, Ur   Date Value Ref Range Status   09/07/2021 -  Final     Propoxyphene Screen, Urine   Date Value Ref Range Status   09/07/2021 -  Final     THC Screen, Urine   Date Value Ref Range Status   09/07/2021 -  Final     Tricyclic Antidepressants, Urine   Date Value Ref Range Status   09/07/2021 -  Final       Last Gary: 05-  Medication Contract: 12-  Last Fill: 06/05/2024  Requested Prescriptions     Pending Prescriptions Disp Refills    gabapentin (NEURONTIN) 300 MG capsule [Pharmacy Med Name: Gabapentin 300 MG Oral Capsule] 90 capsule 0     Sig: Take 1 capsule by mouth 3 times daily for 30 days. Max Daily Amount: 900 mg         Please approve or refuse this medication.   Ave Bui MA

## 2024-07-30 DIAGNOSIS — R94.2 ABNORMAL PFTS (PULMONARY FUNCTION TESTS): Primary | ICD-10-CM

## 2024-08-05 ENCOUNTER — TELEPHONE (OUTPATIENT)
Dept: PRIMARY CARE CLINIC | Age: 69
End: 2024-08-05

## 2024-08-05 DIAGNOSIS — Z79.4 TYPE 2 DIABETES MELLITUS WITH HYPERGLYCEMIA, WITH LONG-TERM CURRENT USE OF INSULIN (HCC): Primary | ICD-10-CM

## 2024-08-05 DIAGNOSIS — E11.65 TYPE 2 DIABETES MELLITUS WITH HYPERGLYCEMIA, WITH LONG-TERM CURRENT USE OF INSULIN (HCC): Primary | ICD-10-CM

## 2024-08-05 NOTE — TELEPHONE ENCOUNTER
Patient called me on Thursday 08- and requested both for him and his spouse a order for their diabetic shoes.     Order placed and printed and will be [facility] in [place].

## 2024-08-23 ENCOUNTER — OFFICE VISIT (OUTPATIENT)
Dept: PULMONOLOGY | Facility: CLINIC | Age: 69
End: 2024-08-23
Payer: MEDICARE

## 2024-08-23 VITALS
OXYGEN SATURATION: 96 % | HEIGHT: 71 IN | HEART RATE: 84 BPM | WEIGHT: 285 LBS | DIASTOLIC BLOOD PRESSURE: 82 MMHG | SYSTOLIC BLOOD PRESSURE: 124 MMHG | BODY MASS INDEX: 39.9 KG/M2

## 2024-08-23 DIAGNOSIS — J98.4 RESTRICTIVE LUNG DISEASE: Primary | Chronic | ICD-10-CM

## 2024-08-23 DIAGNOSIS — E66.9 OBESITY (BMI 30-39.9): Chronic | ICD-10-CM

## 2024-08-23 DIAGNOSIS — I51.9 LEFT VENTRICULAR DYSFUNCTION: Chronic | ICD-10-CM

## 2024-08-23 PROCEDURE — 1159F MED LIST DOCD IN RCRD: CPT | Performed by: NURSE PRACTITIONER

## 2024-08-23 PROCEDURE — 1160F RVW MEDS BY RX/DR IN RCRD: CPT | Performed by: NURSE PRACTITIONER

## 2024-08-23 PROCEDURE — 99214 OFFICE O/P EST MOD 30 MIN: CPT | Performed by: NURSE PRACTITIONER

## 2024-08-23 PROCEDURE — 3074F SYST BP LT 130 MM HG: CPT | Performed by: NURSE PRACTITIONER

## 2024-08-23 PROCEDURE — 3079F DIAST BP 80-89 MM HG: CPT | Performed by: NURSE PRACTITIONER

## 2024-08-23 NOTE — PROGRESS NOTES
"Chief Complaint  abnormal PFT    Subjective    History of Present Illness      Mick Sims presents to [facility] for:    History of Present Illness   New patient referral from [facility] cardiology.  With a chief complaint of dyspnea on exertion for greater than a year.  The patient reports that the dyspnea will occur with even minimal activity.  He is a never smoker.  He had pulmonary function testing done 7/24/2024 showing mild restriction that did not improve much by bronchodilator.  He had normal diffusion capacity.  He has a history of coronary artery disease with stents, aortic root dilatation, diastolic dysfunction and obesity.  I reviewed his CTA with he and his wife that was done in April showing that the pulmonary arteries were mildly dilated suggestive of pulmonary hypertension however there was no evidence of pulmonary hypertension on the patient's recent echocardiogram.  I did offer an overnight pulse ox study but the patient did not feel that was necessary.  My recommendation would be weight loss.  I do not think that inhalers would be helpful nor does he require any further pulmonary workup.  He stays up-to-date on vaccines.  Objective   Vital Signs:   /82   Pulse 84   Ht 180.3 cm (71\")   Wt 129 kg (285 lb)   SpO2 96% Comment: RA  BMI 39.75 kg/m²     Physical Exam  Vitals reviewed.   Constitutional:       Appearance: He is well-developed. He is obese.   HENT:      Head: Normocephalic and atraumatic.   Eyes:      General: No scleral icterus.  Cardiovascular:      Rate and Rhythm: Normal rate and regular rhythm.   Pulmonary:      Effort: Pulmonary effort is normal.      Breath sounds: Normal breath sounds.   Abdominal:      Comments: Obese   Musculoskeletal:         General: Normal range of motion.      Cervical back: Normal range of motion and neck supple.   Skin:     General: Skin is warm and dry.   Neurological:      " Mental Status: He is alert and oriented to person, place, and time.   Psychiatric:         Mood and Affect: Mood normal.         Behavior: Behavior normal.        Result Review :   The following data was reviewed by: AME Mane on 08/23/2024:  CT Angiogram Chest (04/19/2024 15:07)   Results for orders placed during the hospital encounter of 07/18/24    Complete PFT - Pre & Post Bronchodilator    Narrative  [facility] - Pulmonary Function Test    [facility]  [place]  [address]  [phone]    Patient : Mick Sims  MRN : [id number]  CSN : [id number]  Pulmonologist : Maged Stevens MD  Date : 7/24/2024    ______________________________________________________________________    Interpretation :  1.  Spirometry is consistent with a mild restrictive ventilatory defect with a coexisting decrease in peak expiratory flow.  2.  There is actually a decline in both midflows and peak expiratory flow postbronchodilator.  Otherwise there is no significant change in spirometry postbronchodilator.  3.  Lung volumes reveal a low normal total lung capacity with a decrease in vital capacity consistent with a borderline restrictive ventilatory defect.  There is also a decrease in inspiratory capacity.  4.  Diffusion capacity is within normal limits and when corrected for alveolar volume is actually supranormal.      Maged Stevens MD               Assessment and Plan      Diagnoses and all orders for this visit:    1. Restrictive lung disease secondary to obesity (Primary)  Comments:  Weight loss is recommended.  I do not think he would benefit from inhaler therapy.    2. Obesity (BMI 30-39.9)  Comments:  Recommend weight loss and better control of A1c.  Patient is seeing an endocrinologist.    3. Left ventricular dysfunction  Comments:  I believe this is contributing to the patient's exertional dyspnea.  Continue to optimize cardiac health.        Ceferino Espinosa  APRN  8/23/2024  14:54 CDT    Follow Up   Return if symptoms worsen or fail to improve.    Patient was given instructions and counseling regarding his condition or for health maintenance advice. Please see specific information pulled into the AVS if appropriate.      Include Location In Plan?: No Detail Level: Zone Detail Level: Simple

## 2024-08-23 NOTE — PROGRESS NOTES
[facility] - PODIATRY    Today's Date: 08/27/24    Patient Name: Mick Sims  MRN: [id number]  CSN: [id number]  PCP: Josephine Nicolas MD   Referring Provider: No ref. provider found    SUBJECTIVE     Chief Complaint   Patient presents with    Follow-up     Josephine Nicolas, 02/13/2024 3 MO FU DIABETIC FOOT CARE WITH DR NAZRAIO- pt states feet doing ok other than neuropathy which seems to be getting better- pt denies pain other than neuropathy pains which can reach a 7/10 at night     Diabetes     184mg/dl BG this am      HPI: Mick Sims, a 68 y.o.male, comes to clinic as a(n) established patient presenting for diabetic foot exam and complaining of thickened toenails . Patient has h/o arthritis, DM2, HTN, Renal Disorder . Patient is IDDM with last stated BG level of 184mg/dl. Relates that his A1c was about 8.1%. Sees Dr. Ziegler for Endocrinology.  Notes moderate numbness and tingling in feet but relates he feels it getting better.  Denies open wounds or sores. States that his toenails are long, thick and discolored. He has trouble caring for them himself. Admits pain at 7/10 level and described as numbness and tingling. Relates previous treatment(s) including foot care by podiatry . Denies any constitutional symptoms. No other pedal complaints at this time.    Past Medical History:   Diagnosis Date    Arthritis     Diabetes mellitus     Hypertension     Renal disorder      Past Surgical History:   Procedure Laterality Date    CARDIAC CATHETERIZATION      CARDIAC CATHETERIZATION N/A 4/17/2024    Procedure: Left Heart Cath;  Surgeon: Leonel Oliva DO;  Location:  [facility];  Service: Cardiovascular;  Laterality: N/A;    CARPAL TUNNEL RELEASE      CORONARY STENT PLACEMENT  2018    X 2    TOTAL KNEE ARTHROPLASTY Right      Family History   Problem Relation Age of Onset    Cancer Mother     Diabetes Sister     Diabetes Brother     No Known Problems  Brother      Social History     Socioeconomic History    Marital status:    Tobacco Use    Smoking status: Never     Passive exposure: Never    Smokeless tobacco: Never   Vaping Use    Vaping status: Never Used   Substance and Sexual Activity    Alcohol use: No    Drug use: Never    Sexual activity: Defer     Allergies   Allergen Reactions    Latex Hives     Current Outpatient Medications   Medication Sig Dispense Refill    aspirin 81 MG chewable tablet Chew 1 tablet Daily.      benzonatate (Tessalon Perles) 100 MG capsule Take 1 capsule by mouth 3 (Three) Times a Day As Needed for Cough. 90 capsule 1    Canagliflozin (Invokana) 100 MG tablet tablet Take 1 tablet by mouth Daily.      clopidogrel (PLAVIX) 75 MG tablet Take 1 tablet by mouth Daily. 90 tablet 3    Cyanocobalamin (Vitamin B-12) 1000 MCG/15ML liquid Take 15 mL by mouth Daily.      empagliflozin (Jardiance) 25 MG tablet tablet Take 1 tablet by mouth Daily. One tablet daily before breakfast 30 tablet 11    Ergocalciferol (VITAMIN D2 PO) Take  by mouth. 3 times a week      gabapentin (NEURONTIN) 300 MG capsule Take 1 capsule by mouth 3 (Three) Times a Day.      Glucagon, rDNA, (Glucagon Emergency) 1 MG kit Inject 1 mg under the skin into the appropriate area as directed 1 (One) Time As Needed (hypoglycemia) for up to 1 dose. 1 each 11    Inclisiran Sodium (Leqvio) 284 MG/1.5ML solution prefilled syringe Inject 1.5 mL under the skin into the appropriate area as directed Every 6 (Six) Months.      Insulin Degludec-Liraglutide (Xultophy) 100-3.6 UNIT-MG/ML solution pen-injector subcutaneous pen Inject 50 Units under the skin into the appropriate area as directed Daily. 15 mL 11    Insulin Lispro-aabc, 1 U Dial, (Wilmerjelucila LouPen) 100 UNIT/ML solution pen-injector Inject 80 Units under the skin into the appropriate area as directed 3 (Three) Times a Day With Meals. Up to 70u with meals 72 mL 3    irbesartan (AVAPRO) 150 MG tablet Take 1 tablet by mouth  Every Night.      lansoprazole (PREVACID) 30 MG capsule Take 1 capsule by mouth Daily.      metoprolol tartrate (LOPRESSOR) 50 MG tablet Take 1 tablet by mouth 2 (Two) Times a Day. 30 tablet 0    nitroglycerin (NITROSTAT) 0.4 MG SL tablet Place 1 tablet under the tongue Every 5 (Five) Minutes As Needed for Chest Pain. Take no more than 3 doses in 15 minutes.      NON FORMULARY Avastin eye injections      Every 4-5 weeks      nystatin (MYCOSTATIN) 019242 UNIT/GM powder Apply  topically to the appropriate area as directed 4 (Four) Times a Day.      sodium bicarbonate 650 MG tablet Take 1 tablet by mouth 2 (Two) Times a Day.      vitamin D (ERGOCALCIFEROL) 1.25 MG (22039 UT) capsule capsule Take 1 capsule by mouth 1 (One) Time Per Week.       No current facility-administered medications for this visit.     Review of Systems   Constitutional:  Negative for chills and fever.   HENT:  Negative for congestion.    Respiratory:  Negative for shortness of breath.    Cardiovascular:  Positive for leg swelling. Negative for chest pain.   Gastrointestinal:  Negative for constipation, diarrhea, nausea and vomiting.   Musculoskeletal:         Foot pain   Skin:  Negative for wound.   Neurological:  Positive for numbness.       OBJECTIVE     Vitals:    08/27/24 1003   BP: 122/68           PHYSICAL EXAM  GEN:   Accompanied by none.     Foot/Ankle Exam    GENERAL  Diabetic foot exam performed    Appearance:  appears stated age and obese  Orientation:  AAOx3  Affect:  appropriate  Gait:  unimpaired  Assistance:  independent  Right shoe gear: diabetic shoe  Left shoe gear: diabetic shoe    VASCULAR     Right Foot Vascularity   Dorsalis pedis:  2+  Posterior tibial:  2+  Skin temperature:  warm  Edema grading:  Trace  CFT:  3  Pedal hair growth:  Present  Varicosities:  mild varicosities     Left Foot Vascularity   Dorsalis pedis:  2+  Posterior tibial:  2+  Skin temperature:  warm  Edema grading:  Trace  CFT:  3  Pedal hair growth:   Present  Varicosities:  mild varicosities     NEUROLOGIC     Right Foot Neurologic   Light touch sensation: diminished  Vibratory sensation: diminished  Hot/Cold sensation: diminished  Protective Sensation using [place] Monofilament:   Sites intact: 4  Sites tested: 10     Left Foot Neurologic   Light touch sensation: diminished  Vibratory sensation: diminished  Hot/Cold sensation:  diminished  Protective Sensation using [place] Monofilament:   Sites intact: 4  Sites tested: 10    MUSCULOSKELETAL     Right Foot Musculoskeletal   Ecchymosis:  none  Tenderness:  none    Arch:  Normal     Left Foot Musculoskeletal   Ecchymosis:  none  Tenderness:  none  Arch:  Normal    MUSCLE STRENGTH     Right Foot Muscle Strength   Foot dorsiflexion:  5  Foot plantar flexion:  5  Foot inversion:  5  Foot eversion:  5     Left Foot Muscle Strength   Foot dorsiflexion:  5  Foot plantar flexion:  5  Foot inversion:  5  Foot eversion:  5    RANGE OF MOTION     Right Foot Range of Motion   Foot and ankle ROM within normal limits       Left Foot Range of Motion   Foot and ankle ROM within normal limits      DERMATOLOGIC      Right Foot Dermatologic   Skin  Right foot skin is intact.   Nails  1.  Positive for elongated, onychomycosis, abnormal thickness and subungual debris.  2.  Positive for elongated, onychomycosis, abnormal thickness and subungual debris.  3.  Positive for elongated, onychomycosis, abnormal thickness and subungual debris.  4.  Positive for elongated, onychomycosis, abnormal thickness and subungual debris.  5.  Positive for elongated, onychomycosis, abnormal thickness and subungual debris.  Nails comment:  1-5     Left Foot Dermatologic   Skin  Left foot skin is intact.   Nails comment:  1-5  Nails  1.  Positive for elongated, onychomycosis, abnormal thickness and subungual debris.  2.  Positive for elongated, onychomycosis, abnormal thickness and subungual debris.  3.  Positive for elongated,  onychomycosis, abnormal thickness and subungual debris.  4.  Positive for elongated, onychomycosis, abnormally thick and subungual debris.  5.  Positive for elongated, onychomycosis, abnormally thick and subungual debris.      RADIOLOGY/NUCLEAR:  No results found.    LABORATORY/CULTURE RESULTS:      PATHOLOGY RESULTS:       ASSESSMENT/PLAN     Diagnoses and all orders for this visit:    1. Thickened nails (Primary)    2. Type 2 diabetes mellitus with diabetic neuropathy, with long-term current use of insulin    3. Antiplatelet or antithrombotic long-term use    4. Stage 3b chronic kidney disease            Comprehensive lower extremity examination and evaluation was performed.  Discussed findings and treatment plan including risks, benefits, and treatment options with patient in detail. Patient agreed with treatment plan.  After verbal consent obtained, nail(s) x10 debrided of length and thickness with nail nipper without incidence  Patient may maintain nails and calluses at home utilizing emery board or pumice stone between visits as needed  Reviewed at home diabetic foot care including daily foot checks   Continue daily use of compression stockings and elevate extremities at rest.  Continue diabetic monitoring and control under direction of Endocrinology.  Discussed potential for Qutenza treatments - has some interested.  An After Visit Summary was printed and given to the patient at discharge, including (if requested) any available informative/educational handouts regarding diagnosis, treatment, or medications. All questions were answered to patient/family satisfaction. Should symptoms fail to improve or worsen they agree to call or return to clinic or to go to the Emergency Department. Discussed the importance of following up with any needed screening tests/labs/specialist appointments and any requested follow-up recommended by me today. Importance of maintaining follow-up discussed and patient accepts that  missed appointments can delay diagnosis and potentially lead to worsening of conditions.  Return in about 3 months (around 11/27/2024) for Schedule Foot [facility]., or sooner if acute issues arise.        This document has been electronically signed by Mehdi Valencia DPM on August 27, 2024 10:30 CDT

## 2024-08-26 ENCOUNTER — TELEPHONE (OUTPATIENT)
Age: 69
End: 2024-08-26
Payer: MEDICARE

## 2024-08-27 ENCOUNTER — OFFICE VISIT (OUTPATIENT)
Age: 69
End: 2024-08-27
Payer: MEDICARE

## 2024-08-27 VITALS
BODY MASS INDEX: 39.48 KG/M2 | WEIGHT: 282 LBS | SYSTOLIC BLOOD PRESSURE: 122 MMHG | HEIGHT: 71 IN | DIASTOLIC BLOOD PRESSURE: 68 MMHG

## 2024-08-27 DIAGNOSIS — Z79.02 ANTIPLATELET OR ANTITHROMBOTIC LONG-TERM USE: ICD-10-CM

## 2024-08-27 DIAGNOSIS — Z79.4 TYPE 2 DIABETES MELLITUS WITH DIABETIC NEUROPATHY, WITH LONG-TERM CURRENT USE OF INSULIN: ICD-10-CM

## 2024-08-27 DIAGNOSIS — E11.40 TYPE 2 DIABETES MELLITUS WITH DIABETIC NEUROPATHY, WITH LONG-TERM CURRENT USE OF INSULIN: ICD-10-CM

## 2024-08-27 DIAGNOSIS — L60.2 THICKENED NAILS: Primary | ICD-10-CM

## 2024-08-27 DIAGNOSIS — N18.32 STAGE 3B CHRONIC KIDNEY DISEASE: ICD-10-CM

## 2024-08-27 PROCEDURE — 3078F DIAST BP <80 MM HG: CPT | Performed by: PODIATRIST

## 2024-08-27 PROCEDURE — 1160F RVW MEDS BY RX/DR IN RCRD: CPT | Performed by: PODIATRIST

## 2024-08-27 PROCEDURE — 1159F MED LIST DOCD IN RCRD: CPT | Performed by: PODIATRIST

## 2024-08-27 PROCEDURE — 3074F SYST BP LT 130 MM HG: CPT | Performed by: PODIATRIST

## 2024-08-27 PROCEDURE — 11721 DEBRIDE NAIL 6 OR MORE: CPT | Performed by: PODIATRIST

## 2024-08-29 ENCOUNTER — PATIENT ROUNDING (BHMG ONLY) (OUTPATIENT)
Dept: PULMONOLOGY | Facility: CLINIC | Age: 69
End: 2024-08-29
Payer: MEDICARE

## 2024-08-29 NOTE — PROGRESS NOTES
August 29, 2024    Hello, may I speak with Mick Sims?    My name is Kitty Ron    I am  with [facility]  [address]  [facility] [address] [phone].    Before we get started may I verify your date of birth? 1955    I am calling to officially welcome you to our practice and ask about your recent visit. Is this a good time to talk? yes    Tell me about your visit with us. What things went well?  Visit was good.       We're always looking for ways to make our patients' experiences even better. Do you have recommendations on ways we may improve?  yes, provide snacks in lobby which we do but patient did not see.    Overall were you satisfied with your first visit to our practice? yes       I appreciate you taking the time to speak with me today. Is there anything else I can do for you? no      Thank you, and have a great day.

## 2024-09-18 ENCOUNTER — OFFICE VISIT (OUTPATIENT)
Dept: PRIMARY CARE CLINIC | Age: 69
End: 2024-09-18

## 2024-09-18 VITALS
DIASTOLIC BLOOD PRESSURE: 74 MMHG | OXYGEN SATURATION: 95 % | WEIGHT: 285 LBS | SYSTOLIC BLOOD PRESSURE: 122 MMHG | BODY MASS INDEX: 38.6 KG/M2 | HEART RATE: 73 BPM | TEMPERATURE: 97.3 F | HEIGHT: 72 IN

## 2024-09-18 DIAGNOSIS — E11.65 POORLY CONTROLLED TYPE 2 DIABETES MELLITUS (HCC): Primary | ICD-10-CM

## 2024-09-18 DIAGNOSIS — E11.42 DIABETIC PERIPHERAL NEUROPATHY (HCC): ICD-10-CM

## 2024-09-18 DIAGNOSIS — I25.10 CORONARY ARTERY DISEASE INVOLVING NATIVE HEART WITHOUT ANGINA PECTORIS, UNSPECIFIED VESSEL OR LESION TYPE: ICD-10-CM

## 2024-09-18 DIAGNOSIS — E88.819 INSULIN RESISTANCE: ICD-10-CM

## 2024-09-18 DIAGNOSIS — Z76.0 MEDICATION REFILL: ICD-10-CM

## 2024-09-18 DIAGNOSIS — K21.9 GASTROESOPHAGEAL REFLUX DISEASE WITHOUT ESOPHAGITIS: ICD-10-CM

## 2024-09-18 DIAGNOSIS — Z23 NEED FOR INFLUENZA VACCINATION: ICD-10-CM

## 2024-09-18 RX ORDER — GABAPENTIN 300 MG/1
300 CAPSULE ORAL 2 TIMES DAILY
Qty: 60 CAPSULE | Refills: 0 | Status: SHIPPED | OUTPATIENT
Start: 2024-09-18 | End: 2024-10-18

## 2024-09-18 RX ORDER — IRBESARTAN 150 MG/1
150 TABLET ORAL DAILY
Qty: 90 TABLET | Refills: 1 | Status: SHIPPED | OUTPATIENT
Start: 2024-09-18

## 2024-09-18 RX ORDER — ASPIRIN 81 MG/1
TABLET, CHEWABLE ORAL
Qty: 90 TABLET | Refills: 1 | Status: SHIPPED | OUTPATIENT
Start: 2024-09-18

## 2024-09-18 RX ORDER — LANSOPRAZOLE 30 MG/1
30 CAPSULE, DELAYED RELEASE ORAL DAILY
Qty: 90 CAPSULE | Refills: 1 | Status: SHIPPED | OUTPATIENT
Start: 2024-09-18

## 2024-09-18 RX ORDER — FLUTICASONE PROPIONATE 50 MCG
SPRAY, SUSPENSION (ML) NASAL
Qty: 16 G | Refills: 1 | Status: SHIPPED | OUTPATIENT
Start: 2024-09-18

## 2024-09-18 RX ORDER — NITROGLYCERIN 0.4 MG/1
TABLET SUBLINGUAL
Qty: 25 TABLET | Refills: 1 | Status: SHIPPED | OUTPATIENT
Start: 2024-09-18

## 2024-09-18 RX ORDER — FUROSEMIDE 20 MG
20 TABLET ORAL DAILY
Qty: 30 TABLET | Refills: 2 | Status: SHIPPED | OUTPATIENT
Start: 2024-09-18

## 2024-09-18 RX ORDER — METOPROLOL TARTRATE 50 MG
TABLET ORAL
Qty: 180 TABLET | Refills: 1 | Status: SHIPPED | OUTPATIENT
Start: 2024-09-18

## 2024-09-18 ASSESSMENT — ENCOUNTER SYMPTOMS
RESPIRATORY NEGATIVE: 1
ABDOMINAL PAIN: 0
DIARRHEA: 0
ABDOMINAL DISTENTION: 0
CONSTIPATION: 0
SHORTNESS OF BREATH: 0

## 2024-10-09 ENCOUNTER — OFFICE VISIT (OUTPATIENT)
Dept: CARDIOLOGY | Facility: CLINIC | Age: 69
End: 2024-10-09
Payer: MEDICARE

## 2024-10-09 VITALS
SYSTOLIC BLOOD PRESSURE: 138 MMHG | OXYGEN SATURATION: 98 % | WEIGHT: 287 LBS | BODY MASS INDEX: 40.18 KG/M2 | HEIGHT: 71 IN | DIASTOLIC BLOOD PRESSURE: 51 MMHG | HEART RATE: 65 BPM

## 2024-10-09 DIAGNOSIS — I15.2 HYPERTENSION ASSOCIATED WITH DIABETES: ICD-10-CM

## 2024-10-09 DIAGNOSIS — E11.65 TYPE 2 DIABETES MELLITUS WITH HYPERGLYCEMIA, WITH LONG-TERM CURRENT USE OF INSULIN: ICD-10-CM

## 2024-10-09 DIAGNOSIS — N18.32 STAGE 3B CHRONIC KIDNEY DISEASE: ICD-10-CM

## 2024-10-09 DIAGNOSIS — Z95.5 HISTORY OF PLACEMENT OF STENT IN LAD CORONARY ARTERY: Primary | ICD-10-CM

## 2024-10-09 DIAGNOSIS — E11.59 HYPERTENSION ASSOCIATED WITH DIABETES: ICD-10-CM

## 2024-10-09 DIAGNOSIS — E78.2 MIXED HYPERLIPIDEMIA: ICD-10-CM

## 2024-10-09 DIAGNOSIS — Z79.4 TYPE 2 DIABETES MELLITUS WITH HYPERGLYCEMIA, WITH LONG-TERM CURRENT USE OF INSULIN: ICD-10-CM

## 2024-10-09 DIAGNOSIS — R06.09 DYSPNEA ON EXERTION: ICD-10-CM

## 2024-10-09 DIAGNOSIS — I51.9 LEFT VENTRICULAR DYSFUNCTION: ICD-10-CM

## 2024-10-09 DIAGNOSIS — E66.01 MORBID OBESITY: ICD-10-CM

## 2024-10-09 RX ORDER — CLOPIDOGREL BISULFATE 75 MG/1
75 TABLET ORAL DAILY
Qty: 90 TABLET | Refills: 3 | Status: SHIPPED | OUTPATIENT
Start: 2024-10-09

## 2024-10-09 RX ORDER — FUROSEMIDE 40 MG
40 TABLET ORAL DAILY
Qty: 90 TABLET | Refills: 3 | Status: SHIPPED | OUTPATIENT
Start: 2024-10-09

## 2024-10-09 RX ORDER — ASPIRIN 81 MG/1
81 TABLET, CHEWABLE ORAL DAILY
Qty: 90 TABLET | Refills: 3 | Status: SHIPPED | OUTPATIENT
Start: 2024-10-09

## 2024-10-09 RX ORDER — METOPROLOL TARTRATE 50 MG
50 TABLET ORAL 2 TIMES DAILY
Qty: 180 TABLET | Refills: 3 | Status: SHIPPED | OUTPATIENT
Start: 2024-10-09

## 2024-10-09 RX ORDER — IRBESARTAN 150 MG/1
150 TABLET ORAL NIGHTLY
Qty: 90 TABLET | Refills: 3 | Status: SHIPPED | OUTPATIENT
Start: 2024-10-09

## 2024-10-09 NOTE — PROGRESS NOTES
Subjective:     Encounter Date:10/09/2024      Patient ID: Mick Sims is a 68 y.o. male.    Chief Complaint:  History of Present Illness  History of Present Illness  The patient presents for evaluation of shortness of breath. He is accompanied by an adult female.    He reports experiencing occasional shortness of breath, particularly during physical exertion, which necessitates frequent rest. He also notes sporadic swelling in his feet.     His current medication regimen includes baby aspirin, Plavix, Jardiance, Lexvio, Irbesartan, and metoprolol. He has Lasix 20 mg available at home, which he uses as needed when the swelling becomes severe or when his veins are not visible. He was previously on Brilinta, but this was replaced with Plavix due to the onset of shortness of breath.    He generally feels well, although he tends to feel lethargic.        Review of Systems   Constitutional: Positive for malaise/fatigue. Negative for diaphoresis and fever.   HENT:  Negative for congestion.    Eyes:  Negative for vision loss in left eye and vision loss in right eye.   Cardiovascular:  Positive for leg swelling. Negative for chest pain, claudication, dyspnea on exertion, irregular heartbeat, orthopnea, palpitations and syncope.   Respiratory:  Negative for cough, shortness of breath and wheezing.    Hematologic/Lymphatic: Negative for adenopathy.   Skin:  Negative for rash.   Musculoskeletal:  Negative for joint pain and joint swelling.   Gastrointestinal:  Negative for abdominal pain, diarrhea, nausea and vomiting.   Neurological:  Negative for excessive daytime sleepiness, dizziness, focal weakness, light-headedness, numbness and weakness.   Psychiatric/Behavioral:  Negative for depression. The patient does not have insomnia.            Current Outpatient Medications:     aspirin 81 MG chewable tablet, Chew 1 tablet Daily., Disp: 90 tablet, Rfl: 3    benzonatate (Tessalon Perles) 100 MG capsule, Take 1 capsule by  mouth 3 (Three) Times a Day As Needed for Cough., Disp: 90 capsule, Rfl: 1    Canagliflozin (Invokana) 100 MG tablet tablet, Take 1 tablet by mouth Daily., Disp: , Rfl:     clopidogrel (PLAVIX) 75 MG tablet, Take 1 tablet by mouth Daily., Disp: 90 tablet, Rfl: 3    Cyanocobalamin (Vitamin B-12) 1000 MCG/15ML liquid, Take 15 mL by mouth Daily., Disp: , Rfl:     empagliflozin (Jardiance) 25 MG tablet tablet, Take 1 tablet by mouth Daily. One tablet daily before breakfast, Disp: 30 tablet, Rfl: 11    Ergocalciferol (VITAMIN D2 PO), Take  by mouth. 3 times a week, Disp: , Rfl:     gabapentin (NEURONTIN) 300 MG capsule, Take 1 capsule by mouth 3 (Three) Times a Day., Disp: , Rfl:     Glucagon, rDNA, (Glucagon Emergency) 1 MG kit, Inject 1 mg under the skin into the appropriate area as directed 1 (One) Time As Needed (hypoglycemia) for up to 1 dose., Disp: 1 each, Rfl: 11    Inclisiran Sodium (Leqvio) 284 MG/1.5ML solution prefilled syringe, Inject 1.5 mL under the skin into the appropriate area as directed Every 6 (Six) Months., Disp: , Rfl:     Insulin Degludec-Liraglutide (Xultophy) 100-3.6 UNIT-MG/ML solution pen-injector subcutaneous pen, Inject 50 Units under the skin into the appropriate area as directed Daily., Disp: 15 mL, Rfl: 11    Insulin Lispro-aabc, 1 U Dial, (Lyumjev KwikPen) 100 UNIT/ML solution pen-injector, Inject 80 Units under the skin into the appropriate area as directed 3 (Three) Times a Day With Meals. Up to 70u with meals, Disp: 72 mL, Rfl: 3    irbesartan (AVAPRO) 150 MG tablet, Take 1 tablet by mouth Every Night., Disp: 90 tablet, Rfl: 3    lansoprazole (PREVACID) 30 MG capsule, Take 1 capsule by mouth Daily., Disp: , Rfl:     metoprolol tartrate (LOPRESSOR) 50 MG tablet, Take 1 tablet by mouth 2 (Two) Times a Day., Disp: 180 tablet, Rfl: 3    nitroglycerin (NITROSTAT) 0.4 MG SL tablet, Place 1 tablet under the tongue Every 5 (Five) Minutes As Needed for Chest Pain. Take no more than 3 doses in  15 minutes., Disp: , Rfl:     NON FORMULARY, Avastin eye injections   Every 4-5 weeks, Disp: , Rfl:     nystatin (MYCOSTATIN) 792218 UNIT/GM powder, Apply  topically to the appropriate area as directed 4 (Four) Times a Day., Disp: , Rfl:     sodium bicarbonate 650 MG tablet, Take 1 tablet by mouth 2 (Two) Times a Day., Disp: , Rfl:     vitamin D (ERGOCALCIFEROL) 1.25 MG (39765 UT) capsule capsule, Take 1 capsule by mouth 1 (One) Time Per Week., Disp: , Rfl:     furosemide (LASIX) 40 MG tablet, Take 1 tablet by mouth Daily., Disp: 90 tablet, Rfl: 3       Objective:      Vitals:    10/09/24 0900   BP: 138/51   Pulse: 65   SpO2: 98%     Vitals and nursing note reviewed.   Constitutional:       Appearance: Normal and healthy appearance. Well-developed and not in distress.   Eyes:      Extraocular Movements: Extraocular movements intact.      Pupils: Pupils are equal, round, and reactive to light.   HENT:      Head: Normocephalic and atraumatic.    Mouth/Throat:      Pharynx: Oropharynx is clear.   Neck:      Vascular: JVD normal.      Trachea: Trachea normal.   Pulmonary:      Effort: Pulmonary effort is normal.      Breath sounds: Normal breath sounds. No wheezing. No rhonchi. No rales.   Cardiovascular:      PMI at left midclavicular line. Normal rate. Regular rhythm. Normal S1. Normal S2.       Murmurs: There is a grade 2/6 systolic murmur.      No gallop.  No click. No rub.   Pulses:     Dorsalis pedis: 2+ bilaterally.     Posterior tibial: 2+ bilaterally.  Abdominal:      General: Bowel sounds are normal.      Palpations: Abdomen is soft.      Tenderness: There is no abdominal tenderness.   Musculoskeletal: Normal range of motion.      Cervical back: Normal range of motion and neck supple. Skin:     General: Skin is warm and dry.      Capillary Refill: Capillary refill takes less than 2 seconds.   Feet:      Right foot:      Skin integrity: Skin integrity normal.      Left foot:      Skin integrity: Skin integrity  normal.   Neurological:      Mental Status: Alert and oriented to person, place and time.      Sensory: Sensation is intact.      Motor: Motor function is intact.      Coordination: Coordination is intact.   Psychiatric:         Speech: Speech normal.         Behavior: Behavior is cooperative.       Physical Exam      Lab Review:         ECG 12 Lead    Date/Time: 10/9/2024 4:56 PM  Performed by: Leonel Oliva DO    Authorized by: Leonel Oliva DO  Comparison: compared with previous ECG   Similar to previous ECG  Rhythm: sinus rhythm  Conduction: incomplete right bundle branch block  QRS axis: left    Clinical impression: abnormal EKG        Results  Imaging  Ultrasound showed normal systolic, grade 1 diastolic, and moderate dilatation of the aortic root. CT angio chest showed ascending thoracic aorta is dilated, measuring up to 4.4.    Testing  EKG results were good, same as last time.    Assessment/Plan:     Problem List Items Addressed This Visit       Type 2 diabetes mellitus with hyperglycemia, with long-term current use of insulin    Hypertension associated with diabetes    Relevant Medications    furosemide (LASIX) 40 MG tablet    irbesartan (AVAPRO) 150 MG tablet    metoprolol tartrate (LOPRESSOR) 50 MG tablet    Dyspnea on exertion    History of placement of stent in LAD coronary artery - Primary    Left ventricular dysfunction    Relevant Medications    clopidogrel (PLAVIX) 75 MG tablet    metoprolol tartrate (LOPRESSOR) 50 MG tablet    Mixed hyperlipidemia    Morbid obesity    Stage 3b chronic kidney disease    Relevant Medications    furosemide (LASIX) 40 MG tablet     Assessment & Plan  1. Shortness of breath.  An ultrasound conducted six months ago revealed normal systolic function and grade 1 diastolic dysfunction, along with moderate dilation of the aortic root. A heart catheterization performed on 04/17/2024 resulted in the placement of two stents, one in the circumflex  artery and another in the diagonal artery. The EKG remains unchanged from the previous one. A prescription for Lasix 40 mg will be provided, to be taken on days when shortness of breath or swelling is experienced.    2. Occasional swelling in the feet.  The patient reports occasional swelling in the feet. He has an old prescription for Lasix, which he takes when the swelling is severe. A new prescription for Lasix 40 mg will be called in. He is advised to take it on days when he experiences significant swelling or shortness of breath.    3. Medication Management.  Refills for irbesartan and beta-blocker will be provided. He is also on baby aspirin, Plavix, Jardiance, Lexvio, and metoprolol. He reports needing a refill for Plavix. Jardiance is managed by Dr. Cheng.    Follow-up  Return in 6 months for follow-up.      Recommendations/plans:    Transcribed from ambient dictation for Leonel Oliva DO by Leonel Oliva DO.  10/09/24   16:56 CDT    Patient or patient representative verbalized consent for the use of Ambient Listening during the visit with  Leonel Oliva DO for chart documentation. 10/9/2024  16:57 CDT

## 2024-10-10 DIAGNOSIS — Z76.0 MEDICATION REFILL: ICD-10-CM

## 2024-10-10 DIAGNOSIS — E11.42 DIABETIC PERIPHERAL NEUROPATHY (HCC): ICD-10-CM

## 2024-10-11 DIAGNOSIS — Z76.0 MEDICATION REFILL: ICD-10-CM

## 2024-10-11 DIAGNOSIS — E11.42 DIABETIC PERIPHERAL NEUROPATHY (HCC): ICD-10-CM

## 2024-10-11 RX ORDER — GABAPENTIN 300 MG/1
CAPSULE ORAL
Qty: 60 CAPSULE | Refills: 0 | OUTPATIENT
Start: 2024-10-11

## 2024-10-11 RX ORDER — NYSTATIN 100000 [USP'U]/G
POWDER TOPICAL
Qty: 60 G | Refills: 0 | Status: SHIPPED | OUTPATIENT
Start: 2024-10-11

## 2024-10-11 RX ORDER — FLUTICASONE PROPIONATE 50 MCG
SPRAY, SUSPENSION (ML) NASAL
Qty: 16 G | Refills: 0 | Status: SHIPPED | OUTPATIENT
Start: 2024-10-11

## 2024-10-11 RX ORDER — NYSTATIN 100000 [USP'U]/G
POWDER TOPICAL
Qty: 60 G | Refills: 0 | OUTPATIENT
Start: 2024-10-11

## 2024-10-11 NOTE — TELEPHONE ENCOUNTER
Ryan Moodye called to request a refill on his medication.      Last office visit : 9/18/2024   Next office visit : 12/17/2024     Last UDS:   Benzodiazepine Screen, Urine   Date Value Ref Range Status   09/07/2021 -  Final     Buprenorphine Urine   Date Value Ref Range Status   09/07/2021 -  Final     Cocaine Metabolite Screen, Urine   Date Value Ref Range Status   09/07/2021 -  Final     Gabapentin Screen, Urine   Date Value Ref Range Status   09/07/2021 +  Final     Oxycodone Screen, Ur   Date Value Ref Range Status   09/07/2021 -  Final     Propoxyphene Screen, Urine   Date Value Ref Range Status   09/07/2021 -  Final     THC Screen, Urine   Date Value Ref Range Status   09/07/2021 -  Final     Tricyclic Antidepressants, Urine   Date Value Ref Range Status   09/07/2021 -  Final       Last Gary:   Medication Contract:    Last Fill:     Requested Prescriptions     Pending Prescriptions Disp Refills    gabapentin (NEURONTIN) 300 MG capsule [Pharmacy Med Name: Gabapentin 300 MG Oral Capsule] 60 capsule 0     Sig: TAKE 1 CAPSULE BY MOUTH IN THE MORNING AND 1 IN THE EVENING         Please approve or refuse this medication.   Debi Lima LPN

## 2024-10-11 NOTE — TELEPHONE ENCOUNTER
Ryan Solitario called to request a refill on his medication.      Last office visit : 9/18/2024   Next office visit : 12/17/2024     Last UDS:   Benzodiazepine Screen, Urine   Date Value Ref Range Status   09/07/2021 -  Final     Buprenorphine Urine   Date Value Ref Range Status   09/07/2021 -  Final     Cocaine Metabolite Screen, Urine   Date Value Ref Range Status   09/07/2021 -  Final     Gabapentin Screen, Urine   Date Value Ref Range Status   09/07/2021 +  Final     Oxycodone Screen, Ur   Date Value Ref Range Status   09/07/2021 -  Final     Propoxyphene Screen, Urine   Date Value Ref Range Status   09/07/2021 -  Final     THC Screen, Urine   Date Value Ref Range Status   09/07/2021 -  Final     Tricyclic Antidepressants, Urine   Date Value Ref Range Status   09/07/2021 -  Final       Last Gary: 10/11/24  Medication Contract: 12/20/23   Last Fill: 9/18/24    Requested Prescriptions     Pending Prescriptions Disp Refills    fluticasone (FLONASE) 50 MCG/ACT nasal spray 16 g 1     Sig: USE 2 SPRAY(S) IN EACH NOSTRIL ONCE DAILY AS NEEDED    gabapentin (NEURONTIN) 300 MG capsule 60 capsule 0     Sig: Take 1 capsule by mouth in the morning and 1 capsule in the evening. Do all this for 30 days. Max Daily Amount: 600 mg.     Refused Prescriptions Disp Refills    nystatin (MYCOSTATIN) 251246 UNIT/GM powder 60 g 0     Sig: APPLY POWDER TOPICALLY TO AFFECTED AREA 3 TIMES A DAY AS NEEDED FOR ITCHY RASH     Refused By: DEBI LIMA     Reason for Refusal: Duplicate Request                   Please approve or refuse this medication.   Debi Lima LPN

## 2024-10-11 NOTE — TELEPHONE ENCOUNTER
Ryan Solitario called to request a refill on his medication.      Last office visit : 9/18/2024   Next office visit : 10/10/2024     Requested Prescriptions     Pending Prescriptions Disp Refills    nystatin (MYCOSTATIN) 865617 UNIT/GM powder [Pharmacy Med Name: Nystatin 051124 UNIT/GM External Powder] 60 g 0     Sig: APPLY  POWDER TOPICALLY TO AFFECTED AREA THREE TIMES DAILY AS NEEDED FOR  ITCHY  RASH            Debi Lima LPN

## 2024-10-14 RX ORDER — GABAPENTIN 300 MG/1
300 CAPSULE ORAL 2 TIMES DAILY
Qty: 60 CAPSULE | Refills: 0 | Status: SHIPPED | OUTPATIENT
Start: 2024-10-18 | End: 2024-11-17

## 2024-11-26 NOTE — PROGRESS NOTES
[facility] - PODIATRY    Today's Date: 12/03/24    Patient Name: Mick Sims  MRN: [id number]  CSN: [id number]  PCP: Josephine Nicolas MD   Referring Provider: No ref. provider found    SUBJECTIVE     Chief Complaint   Patient presents with    Follow-up     Josephine Nicolas, 02/13/2024 3 MO FU [facility]- pt states feet seem to be doing pretty good- pt denies pain     Diabetes     259mg/dl BG at present after breakfast and going down     HPI: Mick Sims, a 69 y.o.male, comes to clinic as a(n) established patient presenting for diabetic foot exam and complaining of thickened toenails . Patient has h/o arthritis, DM2, HTN, Renal Disorder . Patient is IDDM with last stated BG level of 259mg/dl. Relates that his A1c was about 8.7%. Sees Dr. Ziegler for Endocrinology. Relates mild issues with controlling BG. Notes moderate numbness and tingling in feet but relates he feels it getting better.  Denies open wounds or sores. States that his toenails are long, thick and discolored. He has trouble caring for them himself. Denies pain currently. Relates previous treatment(s) including foot care by podiatry . Denies any constitutional symptoms. No other pedal complaints at this time.    Past Medical History:   Diagnosis Date    Arthritis     Diabetes mellitus     Hypertension     Renal disorder      Past Surgical History:   Procedure Laterality Date    CARDIAC CATHETERIZATION      CARDIAC CATHETERIZATION N/A 4/17/2024    Procedure: Left Heart Cath;  Surgeon: Leonel Oliva DO;  Location:  [facility];  Service: Cardiovascular;  Laterality: N/A;    CARPAL TUNNEL RELEASE      CORONARY STENT PLACEMENT  2018    X 2    TOTAL KNEE ARTHROPLASTY Right      Family History   Problem Relation Age of Onset    Cancer Mother     Diabetes Sister     Diabetes Brother     No Known Problems Brother      Social History     Socioeconomic History    Marital status:     Tobacco Use    Smoking status: Never     Passive exposure: Never    Smokeless tobacco: Never   Vaping Use    Vaping status: Never Used   Substance and Sexual Activity    Alcohol use: No    Drug use: Never    Sexual activity: Defer     Allergies   Allergen Reactions    Latex Hives     Current Outpatient Medications   Medication Sig Dispense Refill    aspirin 81 MG chewable tablet Chew 1 tablet Daily. 90 tablet 3    benzonatate (Tessalon Perles) 100 MG capsule Take 1 capsule by mouth 3 (Three) Times a Day As Needed for Cough. 90 capsule 1    Canagliflozin (Invokana) 100 MG tablet tablet Take 1 tablet by mouth Daily.      clopidogrel (PLAVIX) 75 MG tablet Take 1 tablet by mouth Daily. 90 tablet 3    Cyanocobalamin (Vitamin B-12) 1000 MCG/15ML liquid Take 15 mL by mouth Daily.      empagliflozin (Jardiance) 25 MG tablet tablet Take 1 tablet by mouth Daily. One tablet daily before breakfast 30 tablet 11    Ergocalciferol (VITAMIN D2 PO) Take  by mouth. 3 times a week      furosemide (LASIX) 40 MG tablet Take 1 tablet by mouth Daily. 90 tablet 3    gabapentin (NEURONTIN) 300 MG capsule Take 1 capsule by mouth 3 (Three) Times a Day.      Glucagon, rDNA, (Glucagon Emergency) 1 MG kit Inject 1 mg under the skin into the appropriate area as directed 1 (One) Time As Needed (hypoglycemia) for up to 1 dose. 1 each 11    Inclisiran Sodium (Leqvio) 284 MG/1.5ML solution prefilled syringe Inject 1.5 mL under the skin into the appropriate area as directed Every 6 (Six) Months.      Insulin Degludec-Liraglutide (Xultophy) 100-3.6 UNIT-MG/ML solution pen-injector subcutaneous pen Inject 50 Units under the skin into the appropriate area as directed Daily. 15 mL 11    Insulin Lispro-aabc, 1 U Dial, (Lyumjev KwikPen) 100 UNIT/ML solution pen-injector Inject 80 Units under the skin into the appropriate area as directed 3 (Three) Times a Day With Meals. Up to 70u with meals 72 mL 3    irbesartan (AVAPRO) 150 MG tablet Take 1 tablet by  mouth Every Night. 90 tablet 3    lansoprazole (PREVACID) 30 MG capsule Take 1 capsule by mouth Daily.      metoprolol tartrate (LOPRESSOR) 50 MG tablet Take 1 tablet by mouth 2 (Two) Times a Day. 180 tablet 3    nitroglycerin (NITROSTAT) 0.4 MG SL tablet Place 1 tablet under the tongue Every 5 (Five) Minutes As Needed for Chest Pain. Take no more than 3 doses in 15 minutes.      NON FORMULARY Avastin eye injections      Every 4-5 weeks      nystatin (MYCOSTATIN) 030537 UNIT/GM powder Apply  topically to the appropriate area as directed 4 (Four) Times a Day.      sodium bicarbonate 650 MG tablet Take 1 tablet by mouth 2 (Two) Times a Day.      vitamin D (ERGOCALCIFEROL) 1.25 MG (17917 UT) capsule capsule Take 1 capsule by mouth 1 (One) Time Per Week.       No current facility-administered medications for this visit.     Review of Systems   Constitutional:  Negative for chills and fever.   HENT:  Negative for congestion.    Respiratory:  Negative for shortness of breath.    Cardiovascular:  Positive for leg swelling. Negative for chest pain.   Gastrointestinal:  Negative for constipation, diarrhea, nausea and vomiting.   Musculoskeletal:         Foot pain   Skin:  Negative for wound.   Neurological:  Positive for numbness.       OBJECTIVE     Vitals:    12/03/24 0926   BP: 138/72   Pulse: 69   SpO2: 99%             PHYSICAL EXAM  GEN:   Accompanied by none.     Foot/Ankle Exam    GENERAL  Diabetic foot exam performed    Appearance:  appears stated age and obese  Orientation:  AAOx3  Affect:  appropriate  Gait:  unimpaired  Assistance:  independent  Right shoe gear: diabetic shoe  Left shoe gear: diabetic shoe    VASCULAR     Right Foot Vascularity   Dorsalis pedis:  2+  Posterior tibial:  2+  Skin temperature:  warm  Edema grading:  Trace  CFT:  3  Pedal hair growth:  Present  Varicosities:  mild varicosities     Left Foot Vascularity   Dorsalis pedis:  2+  Posterior tibial:  2+  Skin temperature:  warm  Edema  grading:  Trace  CFT:  3  Pedal hair growth:  Present  Varicosities:  mild varicosities     NEUROLOGIC     Right Foot Neurologic   Light touch sensation: diminished  Vibratory sensation: diminished  Hot/Cold sensation: diminished  Protective Sensation using [place] Monofilament:   Sites intact: 4  Sites tested: 10     Left Foot Neurologic   Light touch sensation: diminished  Vibratory sensation: diminished  Hot/Cold sensation:  diminished  Protective Sensation using [place] Monofilament:   Sites intact: 4  Sites tested: 10    MUSCULOSKELETAL     Right Foot Musculoskeletal   Ecchymosis:  none  Tenderness:  none    Arch:  Normal     Left Foot Musculoskeletal   Ecchymosis:  none  Tenderness:  none  Arch:  Normal    MUSCLE STRENGTH     Right Foot Muscle Strength   Foot dorsiflexion:  5  Foot plantar flexion:  5  Foot inversion:  5  Foot eversion:  5     Left Foot Muscle Strength   Foot dorsiflexion:  5  Foot plantar flexion:  5  Foot inversion:  5  Foot eversion:  5    RANGE OF MOTION     Right Foot Range of Motion   Foot and ankle ROM within normal limits       Left Foot Range of Motion   Foot and ankle ROM within normal limits      DERMATOLOGIC      Right Foot Dermatologic   Skin  Right foot skin is intact.   Nails  1.  Positive for elongated, onychomycosis, abnormal thickness and subungual debris.  2.  Positive for elongated, onychomycosis, abnormal thickness and subungual debris.  3.  Positive for elongated, onychomycosis, abnormal thickness and subungual debris.  4.  Positive for elongated, onychomycosis, abnormal thickness and subungual debris.  5.  Positive for elongated, onychomycosis, abnormal thickness and subungual debris.  Nails comment:  1-5     Left Foot Dermatologic   Skin  Left foot skin is intact.   Nails comment:  1-5  Nails  1.  Positive for elongated, onychomycosis, abnormal thickness and subungual debris.  2.  Positive for elongated, onychomycosis, abnormal thickness and subungual  debris.  3.  Positive for elongated, onychomycosis, abnormal thickness and subungual debris.  4.  Positive for elongated, onychomycosis, abnormally thick and subungual debris.  5.  Positive for elongated, onychomycosis, abnormally thick and subungual debris.      RADIOLOGY/NUCLEAR:  No results found.    LABORATORY/CULTURE RESULTS:      PATHOLOGY RESULTS:       ASSESSMENT/PLAN     Diagnoses and all orders for this visit:    1. Thickened nails (Primary)    2. Type 2 diabetes mellitus with diabetic neuropathy, with long-term current use of insulin    3. Antiplatelet or antithrombotic long-term use    4. Stage 3b chronic kidney disease    5. Peripheral edema              Comprehensive lower extremity examination and evaluation was performed.  Discussed findings and treatment plan including risks, benefits, and treatment options with patient in detail. Patient agreed with treatment plan.  After verbal consent obtained, nail(s) x10 debrided of length and thickness with nail nipper without incidence  Patient may maintain nails and calluses at home utilizing emery board or pumice stone between visits as needed  Reviewed at home diabetic foot care including daily foot checks   Continue daily use of compression stockings and elevate extremities at rest.  Continue diabetic monitoring and control under direction of Endocrinology.  Discussed potential for Qutenza treatments - has some interested.  An After Visit Summary was printed and given to the patient at discharge, including (if requested) any available informative/educational handouts regarding diagnosis, treatment, or medications. All questions were answered to patient/family satisfaction. Should symptoms fail to improve or worsen they agree to call or return to clinic or to go to the Emergency Department. Discussed the importance of following up with any needed screening tests/labs/specialist appointments and any requested follow-up recommended by me today. Importance of  maintaining follow-up discussed and patient accepts that missed appointments can delay diagnosis and potentially lead to worsening of conditions.  Return in about 3 months (around 3/3/2025) for Follow-up with Podiatry APRN., or sooner if acute issues arise.        This document has been electronically signed by Mehdi Valencia DPM on December 3, 2024 09:44 CST

## 2024-12-02 ENCOUNTER — TELEPHONE (OUTPATIENT)
Age: 69
End: 2024-12-02
Payer: MEDICARE

## 2024-12-02 NOTE — TELEPHONE ENCOUNTER
Attempted to remind patient of appt with Dr. Valencia 12/03/24.  No answer, lm with information on VM

## 2024-12-03 ENCOUNTER — OFFICE VISIT (OUTPATIENT)
Age: 69
End: 2024-12-03
Payer: MEDICARE

## 2024-12-03 VITALS
OXYGEN SATURATION: 99 % | WEIGHT: 287 LBS | DIASTOLIC BLOOD PRESSURE: 72 MMHG | BODY MASS INDEX: 40.18 KG/M2 | SYSTOLIC BLOOD PRESSURE: 138 MMHG | HEIGHT: 71 IN | HEART RATE: 69 BPM

## 2024-12-03 DIAGNOSIS — N18.32 STAGE 3B CHRONIC KIDNEY DISEASE: ICD-10-CM

## 2024-12-03 DIAGNOSIS — L60.2 THICKENED NAILS: Primary | ICD-10-CM

## 2024-12-03 DIAGNOSIS — R60.0 PERIPHERAL EDEMA: ICD-10-CM

## 2024-12-03 DIAGNOSIS — E11.40 TYPE 2 DIABETES MELLITUS WITH DIABETIC NEUROPATHY, WITH LONG-TERM CURRENT USE OF INSULIN: ICD-10-CM

## 2024-12-03 DIAGNOSIS — Z79.02 ANTIPLATELET OR ANTITHROMBOTIC LONG-TERM USE: ICD-10-CM

## 2024-12-03 DIAGNOSIS — Z79.4 TYPE 2 DIABETES MELLITUS WITH DIABETIC NEUROPATHY, WITH LONG-TERM CURRENT USE OF INSULIN: ICD-10-CM

## 2024-12-03 PROCEDURE — 1159F MED LIST DOCD IN RCRD: CPT | Performed by: PODIATRIST

## 2024-12-03 PROCEDURE — 3075F SYST BP GE 130 - 139MM HG: CPT | Performed by: PODIATRIST

## 2024-12-03 PROCEDURE — 11721 DEBRIDE NAIL 6 OR MORE: CPT | Performed by: PODIATRIST

## 2024-12-03 PROCEDURE — 1160F RVW MEDS BY RX/DR IN RCRD: CPT | Performed by: PODIATRIST

## 2024-12-03 PROCEDURE — 3078F DIAST BP <80 MM HG: CPT | Performed by: PODIATRIST

## 2024-12-17 ENCOUNTER — HOSPITAL ENCOUNTER (OUTPATIENT)
Dept: INFUSION THERAPY | Age: 69
Setting detail: INFUSION SERIES
Discharge: HOME | End: 2024-12-17
Payer: MEDICARE

## 2024-12-17 ENCOUNTER — OFFICE VISIT (OUTPATIENT)
Dept: PRIMARY CARE CLINIC | Age: 69
End: 2024-12-17

## 2024-12-17 VITALS
HEART RATE: 68 BPM | TEMPERATURE: 97.8 F | RESPIRATION RATE: 18 BRPM | SYSTOLIC BLOOD PRESSURE: 120 MMHG | DIASTOLIC BLOOD PRESSURE: 70 MMHG | OXYGEN SATURATION: 97 %

## 2024-12-17 VITALS
SYSTOLIC BLOOD PRESSURE: 124 MMHG | OXYGEN SATURATION: 97 % | BODY MASS INDEX: 39.01 KG/M2 | HEART RATE: 67 BPM | HEIGHT: 72 IN | TEMPERATURE: 98 F | WEIGHT: 288 LBS | DIASTOLIC BLOOD PRESSURE: 74 MMHG

## 2024-12-17 DIAGNOSIS — E78.2 MIXED HYPERLIPIDEMIA: Primary | ICD-10-CM

## 2024-12-17 DIAGNOSIS — E78.1 HYPERTRIGLYCERIDEMIA: Chronic | ICD-10-CM

## 2024-12-17 DIAGNOSIS — I10 ESSENTIAL HYPERTENSION: Chronic | ICD-10-CM

## 2024-12-17 DIAGNOSIS — E11.65 POORLY CONTROLLED TYPE 2 DIABETES MELLITUS (HCC): Primary | Chronic | ICD-10-CM

## 2024-12-17 DIAGNOSIS — K21.9 GASTROESOPHAGEAL REFLUX DISEASE WITHOUT ESOPHAGITIS: Chronic | ICD-10-CM

## 2024-12-17 DIAGNOSIS — I25.119 ATHEROSCLEROSIS OF NATIVE CORONARY ARTERY OF NATIVE HEART WITH ANGINA PECTORIS (HCC): Chronic | ICD-10-CM

## 2024-12-17 DIAGNOSIS — E11.42 DIABETIC PERIPHERAL NEUROPATHY (HCC): Chronic | ICD-10-CM

## 2024-12-17 DIAGNOSIS — Z76.0 MEDICATION REFILL: ICD-10-CM

## 2024-12-17 PROCEDURE — 96372 THER/PROPH/DIAG INJ SC/IM: CPT

## 2024-12-17 PROCEDURE — 6360000002 HC RX W HCPCS: Performed by: FAMILY MEDICINE

## 2024-12-17 RX ORDER — LANSOPRAZOLE 30 MG/1
30 CAPSULE, DELAYED RELEASE ORAL DAILY
Qty: 90 CAPSULE | Refills: 1 | Status: SHIPPED | OUTPATIENT
Start: 2024-12-17

## 2024-12-17 RX ORDER — GABAPENTIN 300 MG/1
300 CAPSULE ORAL 2 TIMES DAILY
Qty: 60 CAPSULE | Refills: 0 | Status: SHIPPED | OUTPATIENT
Start: 2024-12-17 | End: 2025-01-16

## 2024-12-17 RX ORDER — DIPHENHYDRAMINE HYDROCHLORIDE 50 MG/ML
50 INJECTION INTRAMUSCULAR; INTRAVENOUS
OUTPATIENT
Start: 2024-12-17

## 2024-12-17 RX ORDER — ACETAMINOPHEN 325 MG/1
650 TABLET ORAL
OUTPATIENT
Start: 2024-12-17

## 2024-12-17 RX ORDER — ALBUTEROL SULFATE 90 UG/1
4 INHALANT RESPIRATORY (INHALATION) PRN
OUTPATIENT
Start: 2024-12-17

## 2024-12-17 RX ORDER — HYDROCORTISONE SODIUM SUCCINATE 100 MG/2ML
100 INJECTION INTRAMUSCULAR; INTRAVENOUS
OUTPATIENT
Start: 2024-12-17

## 2024-12-17 RX ORDER — SODIUM CHLORIDE 9 MG/ML
INJECTION, SOLUTION INTRAVENOUS CONTINUOUS
OUTPATIENT
Start: 2024-12-17

## 2024-12-17 RX ORDER — NAPROXEN 500 MG/1
500 TABLET ORAL 2 TIMES DAILY WITH MEALS
Qty: 90 TABLET | Refills: 0 | Status: SHIPPED | OUTPATIENT
Start: 2024-12-17

## 2024-12-17 RX ORDER — ONDANSETRON 2 MG/ML
8 INJECTION INTRAMUSCULAR; INTRAVENOUS
OUTPATIENT
Start: 2024-12-17

## 2024-12-17 RX ORDER — EPINEPHRINE 1 MG/ML
0.3 INJECTION, SOLUTION, CONCENTRATE INTRAVENOUS PRN
OUTPATIENT
Start: 2024-12-17

## 2024-12-17 RX ADMIN — INCLISIRAN 284 MG: 284 INJECTION, SOLUTION SUBCUTANEOUS at 08:38

## 2024-12-17 ASSESSMENT — ENCOUNTER SYMPTOMS
RESPIRATORY NEGATIVE: 1
SHORTNESS OF BREATH: 0
CONSTIPATION: 0
ABDOMINAL DISTENTION: 0
DIARRHEA: 0
ABDOMINAL PAIN: 0

## 2024-12-17 NOTE — FLOWSHEET NOTE
12/17/24 0846   AVS Reviewed   AVS & discharge instructions reviewed with patient and/or representative? Yes   Reviewed instructions with Patient   Level of Understanding Questions answered;Verbalized understanding     LEQVIO SC PER ORDERS. VSS NEXT APPOINTMENT SCHEDULED FOR 6 MONTHS. NO ADVERSE REACTION NOTED.

## 2024-12-17 NOTE — PROGRESS NOTES
[facility]  [address]  [address]  [place] [address]  Dept: [phone]  Dept Fax: [phone]  Loc: [phone]      Subjective:     Chief Complaint   Patient presents with    3 Month Follow-Up       HPI:  Ryan Solitario is a 69 y.o. male presents today  is routine follow-up  BS at home is still fluctuating between 100s to 300s. He has complications from Which includes neuropathy. He is requesting refill of his Gabapentin.  GERD is stable on current dose of PPI  BP is well controlled. His cholesterol is not at goal    ROS:   Review of Systems   Constitutional: Negative.    HENT:  Negative for congestion.    Eyes:  Positive for visual disturbance (managed by Dr Vargas).   Respiratory: Negative.  Negative for shortness of breath.    Cardiovascular:  Positive for leg swelling (mostly feet - takes lasix prn). Negative for chest pain.   Gastrointestinal:  Negative for abdominal distention, abdominal pain, constipation and diarrhea.   Endocrine:        DM managed by Dr Montague   Genitourinary: Negative.    Musculoskeletal:  Positive for arthralgias (at baseline).   Skin: Negative.    Neurological: Negative.    Hematological: Negative.    Psychiatric/Behavioral: Negative.         PMHx:  Past Medical History:   Diagnosis Date    ACS (acute coronary syndrome) ([facility]) 06/03/2019    Acute renal failure superimposed on stage 4 chronic kidney disease ([facility]) 09/04/2020    Aneurysm (HCC)     abdominal (pt states NOT)    Arthritis     Bilateral leg edema     CAD (coronary artery disease)     sees [facility] cardiology/dr. russo.    Chronic kidney disease     Stage 4; sees dr. ta    Diabetes mellitus, type 2 (HCC)     Fatty liver     GERD (gastroesophageal reflux disease)     HTN (hypertension)     Hyperlipidemia     Knee pain     NSTEMI (non-ST elevated myocardial infarction) ([facility]) 06/05/2019    Prolonged emergence from general anesthesia     S/P total knee arthroplasty, right

## 2024-12-17 NOTE — DISCHARGE INSTRUCTIONS
inclisiran  Pronunciation:  IN kli SIR an  Brand:  Leqvio  What is the most important information I should know about inclisiran?  Use only as directed. Tell your doctor if you use other medicines or have other medical conditions or allergies.  What is inclisiran?  Inclisiran works by helping the liver reduce levels of \"bad\" cholesterol (low-density lipoprotein, or LDL) circulating in your blood.  Inclisiran is used together with a low-fat diet and other cholesterol-lowering medications in adults with heterozygous familial hypercholesterolemia (inherited types of high cholesterol). These conditions can cause high blood levels of LDL cholesterol, and can also cause plaque to build up inside your arteries.  Inclisiran is also used in adults with atherosclerotic cardiovascular disease (ASCVD) needing additional lowering of LDL cholesterol.  Inclisiran may also be used for purposes not listed in this medication guide.  What should I discuss with my healthcare provider before receiving inclisiran?  Tell your doctor if you have:  end-stage kidney disease or severe liver disease.  May harm an unborn baby. Do not use if you are pregnant.   It may not be safe to breastfeed while using this medicine. Ask your doctor about any risk.  Ask a doctor if it is safe to breastfeed while using this medicine.  Not approved for use by anyone younger than 18 years old.  How is this drug given?  Inclisiran is injected under the skin. A healthcare provider will give you this injection.  You should not stop using inclisiran without your doctor's advice, or your LDL cholesterol levels may increase.  Inclisiran is only part of a complete treatment program that also includes diet, statin medication, and regular blood testing. Follow your doctor's instructions very closely.  What happens if I miss a dose?  Call your doctor for instructions if you miss an appointment for your inclisiran injection.  What happens if I overdose?  In a medical

## 2024-12-19 DIAGNOSIS — Z76.0 MEDICATION REFILL: ICD-10-CM

## 2024-12-19 RX ORDER — NYSTATIN 100000 [USP'U]/G
POWDER TOPICAL
Qty: 60 G | Refills: 0 | Status: SHIPPED | OUTPATIENT
Start: 2024-12-19

## 2024-12-19 NOTE — TELEPHONE ENCOUNTER
Ryan Solitario called to request a refill on his medication.      Last office visit : 12/17/2024   Next office visit : 5/21/2025     Requested Prescriptions     Pending Prescriptions Disp Refills    nystatin (MYCOSTATIN) 731908 UNIT/GM powder [Pharmacy Med Name: Nystatin [id number] UNIT/GM External Powder] 60 g 0     Sig: APPLY POWDER TOPICALLY TO AFFECTED AREA 3 TIMES DAILY AS NEEDED FOR ITCHY RASH            Debi Lima LPN

## 2025-02-24 ENCOUNTER — OFFICE VISIT (OUTPATIENT)
Dept: PRIMARY CARE CLINIC | Age: 70
End: 2025-02-24
Payer: MEDICARE

## 2025-02-24 VITALS
BODY MASS INDEX: 38.33 KG/M2 | HEIGHT: 72 IN | WEIGHT: 283 LBS | TEMPERATURE: 71 F | HEART RATE: 97 BPM | OXYGEN SATURATION: 95 % | DIASTOLIC BLOOD PRESSURE: 64 MMHG | SYSTOLIC BLOOD PRESSURE: 118 MMHG

## 2025-02-24 DIAGNOSIS — J20.9 ACUTE BRONCHITIS, UNSPECIFIED ORGANISM: Primary | ICD-10-CM

## 2025-02-24 LAB
INFLUENZA A ANTIGEN, POC: NEGATIVE
INFLUENZA B ANTIGEN, POC: NEGATIVE
LOT EXPIRE DATE: NORMAL
LOT KIT NUMBER: NORMAL
SARS-COV-2, POC: NORMAL
VALID INTERNAL CONTROL: NORMAL
VENDOR AND KIT NAME POC: NORMAL

## 2025-02-24 PROCEDURE — 87428 SARSCOV & INF VIR A&B AG IA: CPT | Performed by: FAMILY MEDICINE

## 2025-02-24 PROCEDURE — 1123F ACP DISCUSS/DSCN MKR DOCD: CPT | Performed by: FAMILY MEDICINE

## 2025-02-24 PROCEDURE — 3074F SYST BP LT 130 MM HG: CPT | Performed by: FAMILY MEDICINE

## 2025-02-24 PROCEDURE — 1036F TOBACCO NON-USER: CPT | Performed by: FAMILY MEDICINE

## 2025-02-24 PROCEDURE — 99213 OFFICE O/P EST LOW 20 MIN: CPT | Performed by: FAMILY MEDICINE

## 2025-02-24 PROCEDURE — 96372 THER/PROPH/DIAG INJ SC/IM: CPT | Performed by: FAMILY MEDICINE

## 2025-02-24 PROCEDURE — G8427 DOCREV CUR MEDS BY ELIG CLIN: HCPCS | Performed by: FAMILY MEDICINE

## 2025-02-24 PROCEDURE — 3017F COLORECTAL CA SCREEN DOC REV: CPT | Performed by: FAMILY MEDICINE

## 2025-02-24 PROCEDURE — 3078F DIAST BP <80 MM HG: CPT | Performed by: FAMILY MEDICINE

## 2025-02-24 PROCEDURE — G8417 CALC BMI ABV UP PARAM F/U: HCPCS | Performed by: FAMILY MEDICINE

## 2025-02-24 PROCEDURE — 1159F MED LIST DOCD IN RCRD: CPT | Performed by: FAMILY MEDICINE

## 2025-02-24 RX ORDER — LEVALBUTEROL INHALATION SOLUTION 1.25 MG/3ML
1.25 SOLUTION RESPIRATORY (INHALATION) ONCE
Status: COMPLETED | OUTPATIENT
Start: 2025-02-24 | End: 2025-02-24

## 2025-02-24 RX ORDER — AZITHROMYCIN 250 MG/1
TABLET, FILM COATED ORAL
Qty: 6 TABLET | Refills: 0 | Status: SHIPPED | OUTPATIENT
Start: 2025-02-24 | End: 2025-03-06

## 2025-02-24 RX ORDER — DEXTROMETHORPHAN HYDROBROMIDE AND PROMETHAZINE HYDROCHLORIDE 15; 6.25 MG/5ML; MG/5ML
5 SYRUP ORAL 4 TIMES DAILY PRN
Qty: 100 ML | Refills: 0 | Status: SHIPPED | OUTPATIENT
Start: 2025-02-24 | End: 2025-03-03

## 2025-02-24 RX ADMIN — LEVALBUTEROL INHALATION SOLUTION 1.25 MG: 1.25 SOLUTION RESPIRATORY (INHALATION) at 16:08

## 2025-02-24 ASSESSMENT — ENCOUNTER SYMPTOMS
CHEST TIGHTNESS: 1
COUGH: 1
EYES NEGATIVE: 1
GASTROINTESTINAL NEGATIVE: 1

## 2025-02-24 NOTE — PROGRESS NOTES
[facility]  [address]  [address]  [facility] [address]  Dept: [phone]  Dept Fax: [phone]  Loc: [phone]      Subjective:     Chief Complaint   Patient presents with    Fever    Chills    Cough    Congestion       HPI:  Ryan Solitario is a 69 y.o. male presents today with above symptoms x 1 week. No appetite. Cough is worse. productive of thick greenish phlegm. He c/o HA      ROS:   Review of Systems    PMHx:  Past Medical History:   Diagnosis Date    ACS (acute coronary syndrome) ([facility]) 06/03/2019    Acute renal failure superimposed on stage 4 chronic kidney disease ([facility]) 09/04/2020    Aneurysm     abdominal (pt states NOT)    Arthritis     Bilateral leg edema     CAD (coronary artery disease)     sees [facility] cardiology/dr. russo.    Chronic kidney disease     Stage 4; sees dr. ta    Diabetes mellitus, type 2 ([facility])     Fatty liver     GERD (gastroesophageal reflux disease)     HTN (hypertension)     Hyperlipidemia     Knee pain     NSTEMI (non-ST elevated myocardial infarction) ([facility]) 06/05/2019    Prolonged emergence from general anesthesia     S/P total knee arthroplasty, right 03/29/2022    Seasonal allergies     Vitreous hemorrhage ([facility]) 01/22/2021     Patient Active Problem List   Diagnosis    Type 2 diabetes mellitus with hyperglycemia, with long-term current use of insulin ([facility])    Essential hypertension    Gastroesophageal reflux disease without esophagitis    Family history of prostate cancer in father    History of colon polyps    Double vessel coronary artery disease    History of placement of stent in LAD coronary artery    Chronic kidney disease, stage III (moderate) ([facility])    Mixed hyperlipidemia    Morbid obesity    Left ventricular dysfunction    Primary osteoarthritis involving multiple joints    Diabetic peripheral neuropathy ([facility])    Atherosclerosis of native coronary artery of native heart with angina pectoris       PSHx:  Past 
[id number]  Lot #: [id number]  Exp Date: 07-

## 2025-02-26 ENCOUNTER — HOSPITAL ENCOUNTER (EMERGENCY)
Facility: HOSPITAL | Age: 70
Discharge: HOME | End: 2025-02-26
Admitting: FAMILY MEDICINE
Payer: MEDICARE

## 2025-02-26 ENCOUNTER — APPOINTMENT (OUTPATIENT)
Dept: CT IMAGING | Facility: HOSPITAL | Age: 70
End: 2025-02-26
Payer: MEDICARE

## 2025-02-26 ENCOUNTER — APPOINTMENT (OUTPATIENT)
Dept: GENERAL RADIOLOGY | Facility: HOSPITAL | Age: 70
End: 2025-02-26
Payer: MEDICARE

## 2025-02-26 VITALS
HEART RATE: 70 BPM | DIASTOLIC BLOOD PRESSURE: 89 MMHG | HEIGHT: 71 IN | OXYGEN SATURATION: 95 % | RESPIRATION RATE: 16 BRPM | SYSTOLIC BLOOD PRESSURE: 150 MMHG | TEMPERATURE: 98.2 F | BODY MASS INDEX: 40.12 KG/M2 | WEIGHT: 286.6 LBS

## 2025-02-26 DIAGNOSIS — B33.8 RSV INFECTION: Primary | ICD-10-CM

## 2025-02-26 DIAGNOSIS — J18.0 BRONCHOPNEUMONIA: ICD-10-CM

## 2025-02-26 DIAGNOSIS — R05.2 SUBACUTE COUGH: ICD-10-CM

## 2025-02-26 LAB
ALBUMIN SERPL-MCNC: 3.7 G/DL (ref 3.5–5.2)
ALBUMIN/GLOB SERPL: 1.1 G/DL
ALP SERPL-CCNC: 96 U/L (ref 39–117)
ALT SERPL W P-5'-P-CCNC: 13 U/L (ref 1–41)
ANION GAP SERPL CALCULATED.3IONS-SCNC: 9 MMOL/L (ref 5–15)
AST SERPL-CCNC: 13 U/L (ref 1–40)
B PARAPERT DNA SPEC QL NAA+PROBE: NOT DETECTED
B PERT DNA SPEC QL NAA+PROBE: NOT DETECTED
BASOPHILS # BLD AUTO: 0.12 10*3/MM3 (ref 0–0.2)
BASOPHILS NFR BLD AUTO: 1.4 % (ref 0–1.5)
BILIRUB SERPL-MCNC: 0.4 MG/DL (ref 0–1.2)
BUN SERPL-MCNC: 29 MG/DL (ref 8–23)
BUN/CREAT SERPL: 15.8 (ref 7–25)
C PNEUM DNA NPH QL NAA+NON-PROBE: NOT DETECTED
CALCIUM SPEC-SCNC: 10.5 MG/DL (ref 8.6–10.5)
CHLORIDE SERPL-SCNC: 104 MMOL/L (ref 98–107)
CO2 SERPL-SCNC: 24 MMOL/L (ref 22–29)
CREAT SERPL-MCNC: 1.83 MG/DL (ref 0.76–1.27)
D DIMER PPP FEU-MCNC: 1.13 MCGFEU/ML (ref 0–0.71)
DEPRECATED RDW RBC AUTO: 43.8 FL (ref 37–54)
EGFRCR SERPLBLD CKD-EPI 2021: 39 ML/MIN/1.73
EOSINOPHIL # BLD AUTO: 0.26 10*3/MM3 (ref 0–0.4)
EOSINOPHIL NFR BLD AUTO: 3 % (ref 0.3–6.2)
ERYTHROCYTE [DISTWIDTH] IN BLOOD BY AUTOMATED COUNT: 14.5 % (ref 12.3–15.4)
FLUAV SUBTYP SPEC NAA+PROBE: NOT DETECTED
FLUBV RNA ISLT QL NAA+PROBE: NOT DETECTED
GEN 5 1HR TROPONIN T REFLEX: 39 NG/L
GLOBULIN UR ELPH-MCNC: 3.3 GM/DL
GLUCOSE SERPL-MCNC: 84 MG/DL (ref 65–99)
HADV DNA SPEC NAA+PROBE: NOT DETECTED
HCOV 229E RNA SPEC QL NAA+PROBE: NOT DETECTED
HCOV HKU1 RNA SPEC QL NAA+PROBE: NOT DETECTED
HCOV NL63 RNA SPEC QL NAA+PROBE: NOT DETECTED
HCOV OC43 RNA SPEC QL NAA+PROBE: NOT DETECTED
HCT VFR BLD AUTO: 47.7 % (ref 37.5–51)
HGB BLD-MCNC: 14.7 G/DL (ref 13–17.7)
HMPV RNA NPH QL NAA+NON-PROBE: NOT DETECTED
HOLD SPECIMEN: NORMAL
HOLD SPECIMEN: NORMAL
HPIV1 RNA ISLT QL NAA+PROBE: NOT DETECTED
HPIV2 RNA SPEC QL NAA+PROBE: NOT DETECTED
HPIV3 RNA NPH QL NAA+PROBE: NOT DETECTED
HPIV4 P GENE NPH QL NAA+PROBE: NOT DETECTED
IMM GRANULOCYTES # BLD AUTO: 0.09 10*3/MM3 (ref 0–0.05)
IMM GRANULOCYTES NFR BLD AUTO: 1 % (ref 0–0.5)
LYMPHOCYTES # BLD AUTO: 1.88 10*3/MM3 (ref 0.7–3.1)
LYMPHOCYTES NFR BLD AUTO: 21.8 % (ref 19.6–45.3)
M PNEUMO IGG SER IA-ACNC: NOT DETECTED
MAGNESIUM SERPL-MCNC: 2.3 MG/DL (ref 1.6–2.4)
MCH RBC QN AUTO: 25.9 PG (ref 26.6–33)
MCHC RBC AUTO-ENTMCNC: 30.8 G/DL (ref 31.5–35.7)
MCV RBC AUTO: 84 FL (ref 79–97)
MONOCYTES # BLD AUTO: 0.87 10*3/MM3 (ref 0.1–0.9)
MONOCYTES NFR BLD AUTO: 10.1 % (ref 5–12)
NEUTROPHILS NFR BLD AUTO: 5.4 10*3/MM3 (ref 1.7–7)
NEUTROPHILS NFR BLD AUTO: 62.7 % (ref 42.7–76)
NRBC BLD AUTO-RTO: 0 /100 WBC (ref 0–0.2)
NT-PROBNP SERPL-MCNC: 82 PG/ML (ref 0–900)
PLATELET # BLD AUTO: 268 10*3/MM3 (ref 140–450)
PMV BLD AUTO: 11.5 FL (ref 6–12)
POTASSIUM SERPL-SCNC: 4.6 MMOL/L (ref 3.5–5.2)
PROT SERPL-MCNC: 7 G/DL (ref 6–8.5)
RBC # BLD AUTO: 5.68 10*6/MM3 (ref 4.14–5.8)
RHINOVIRUS RNA SPEC NAA+PROBE: NOT DETECTED
RSV RNA NPH QL NAA+NON-PROBE: DETECTED
SARS-COV-2 RNA RESP QL NAA+PROBE: NOT DETECTED
SODIUM SERPL-SCNC: 137 MMOL/L (ref 136–145)
TROPONIN T % DELTA: -3
TROPONIN T NUMERIC DELTA: -1 NG/L
TROPONIN T SERPL HS-MCNC: 40 NG/L
WBC NRBC COR # BLD AUTO: 8.62 10*3/MM3 (ref 3.4–10.8)
WHOLE BLOOD HOLD COAG: NORMAL
WHOLE BLOOD HOLD SPECIMEN: NORMAL

## 2025-02-26 PROCEDURE — 36415 COLL VENOUS BLD VENIPUNCTURE: CPT

## 2025-02-26 PROCEDURE — 93010 ELECTROCARDIOGRAM REPORT: CPT | Performed by: INTERNAL MEDICINE

## 2025-02-26 PROCEDURE — 83880 ASSAY OF NATRIURETIC PEPTIDE: CPT

## 2025-02-26 PROCEDURE — 84484 ASSAY OF TROPONIN QUANT: CPT

## 2025-02-26 PROCEDURE — 85379 FIBRIN DEGRADATION QUANT: CPT

## 2025-02-26 PROCEDURE — 80053 COMPREHEN METABOLIC PANEL: CPT

## 2025-02-26 PROCEDURE — 25510000001 IOPAMIDOL PER 1 ML

## 2025-02-26 PROCEDURE — 85025 COMPLETE CBC W/AUTO DIFF WBC: CPT

## 2025-02-26 PROCEDURE — 93005 ELECTROCARDIOGRAM TRACING: CPT

## 2025-02-26 PROCEDURE — 0202U NFCT DS 22 TRGT SARS-COV-2: CPT

## 2025-02-26 PROCEDURE — 71046 X-RAY EXAM CHEST 2 VIEWS: CPT

## 2025-02-26 PROCEDURE — 25010000002 DEXAMETHASONE PER 1 MG

## 2025-02-26 PROCEDURE — 99285 EMERGENCY DEPT VISIT HI MDM: CPT

## 2025-02-26 PROCEDURE — 96374 THER/PROPH/DIAG INJ IV PUSH: CPT

## 2025-02-26 PROCEDURE — 83735 ASSAY OF MAGNESIUM: CPT

## 2025-02-26 PROCEDURE — 71275 CT ANGIOGRAPHY CHEST: CPT

## 2025-02-26 RX ORDER — IOPAMIDOL 755 MG/ML
100 INJECTION, SOLUTION INTRAVASCULAR
Status: COMPLETED | OUTPATIENT
Start: 2025-02-26 | End: 2025-02-26

## 2025-02-26 RX ORDER — GUAIFENESIN 200 MG/10ML
200 LIQUID ORAL EVERY 4 HOURS PRN
Qty: 473 ML | Refills: 0 | Status: SHIPPED | OUTPATIENT
Start: 2025-02-26

## 2025-02-26 RX ORDER — SODIUM CHLORIDE 0.9 % (FLUSH) 0.9 %
10 SYRINGE (ML) INJECTION AS NEEDED
Status: DISCONTINUED | OUTPATIENT
Start: 2025-02-26 | End: 2025-02-26 | Stop reason: HOSPADM

## 2025-02-26 RX ORDER — METHYLPREDNISOLONE 4 MG/1
TABLET ORAL
Qty: 21 TABLET | Refills: 0 | Status: SHIPPED | OUTPATIENT
Start: 2025-02-26

## 2025-02-26 RX ORDER — IOPAMIDOL 612 MG/ML
100 INJECTION, SOLUTION INTRAVASCULAR
Status: DISCONTINUED | OUTPATIENT
Start: 2025-02-26 | End: 2025-02-26

## 2025-02-26 RX ORDER — AZITHROMYCIN 250 MG/1
TABLET, FILM COATED ORAL
Qty: 6 TABLET | Refills: 0 | Status: SHIPPED | OUTPATIENT
Start: 2025-02-26

## 2025-02-26 RX ORDER — BENZONATATE 100 MG/1
200 CAPSULE ORAL ONCE
Status: COMPLETED | OUTPATIENT
Start: 2025-02-26 | End: 2025-02-26

## 2025-02-26 RX ORDER — DEXAMETHASONE SODIUM PHOSPHATE 10 MG/ML
10 INJECTION, SOLUTION INTRA-ARTICULAR; INTRALESIONAL; INTRAMUSCULAR; INTRAVENOUS; SOFT TISSUE ONCE
Status: COMPLETED | OUTPATIENT
Start: 2025-02-26 | End: 2025-02-26

## 2025-02-26 RX ADMIN — BENZONATATE 200 MG: 100 CAPSULE ORAL at 12:55

## 2025-02-26 RX ADMIN — IOPAMIDOL 100 ML: 755 INJECTION, SOLUTION INTRAVENOUS at 13:47

## 2025-02-26 RX ADMIN — DEXAMETHASONE SODIUM PHOSPHATE 10 MG: 10 INJECTION INTRAMUSCULAR; INTRAVENOUS at 12:56

## 2025-02-26 NOTE — DISCHARGE INSTRUCTIONS
Please follow with PCP and pulmonology as soon as possible to reassess symptoms.  Return to the ER for any new or worsening symptoms.  Cough medicine, Z-Steve, Medrol Dosepak has been prescribed.

## 2025-02-27 LAB
QT INTERVAL: 378 MS
QTC INTERVAL: 399 MS

## 2025-03-05 NOTE — PROGRESS NOTES
[facility] - PODIATRY    Today's Date: 03/11/25    Patient Name: Mick Sims  MRN: [id number]  CSN: [id number]  PCP: Josephine Nicolas MD   Referring Provider: No ref. provider found    SUBJECTIVE     Chief Complaint   Patient presents with    Follow-up     Josephine Nicolas MD 03/03/25   3 MO FU FOOT CARE CLINIC   Pt states he is here today for diabetic foot/nail care. Pt has neuropathy. Pt denies pain outside of neuropathy.     Diabetes     158 mg/dLbg      HPI: Mick Sims, a 69 y.o.male, comes to clinic as a(n) established patient presenting for diabetic foot exam and complaining of thickened toenails . Patient has h/o arthritis, DM2, HTN, Renal Disorder . Patient is IDDM with last stated BG level of 158mg/dl. Relates that his A1c was about 8.7%.Notes moderate numbness and tingling.  Denies open wounds or sores today. Relays toenails are in need of care today as they are long, thick and discolored. He has trouble caring for them himself. Denies pain secondary to neuropathy.  Continues to take gabapentin.  Takes Plavix daily.  Relates previous treatment(s) including foot care by podiatry . Denies any constitutional symptoms. No other pedal complaints at this time.    Past Medical History:   Diagnosis Date    Arthritis     Diabetes mellitus     Hypertension     Renal disorder      Past Surgical History:   Procedure Laterality Date    CARDIAC CATHETERIZATION      CARDIAC CATHETERIZATION N/A 4/17/2024    Procedure: Left Heart Cath;  Surgeon: Leonel Oliva DO;  Location:  [facility] C[facility];  Service: Cardiovascular;  Laterality: N/A;    CARPAL TUNNEL RELEASE      CORONARY STENT PLACEMENT  2018    X 2    TOTAL KNEE ARTHROPLASTY Right      Family History   Problem Relation Age of Onset    Cancer Mother     Diabetes Sister     Diabetes Brother     No Known Problems Brother      Social History     Socioeconomic History    Marital status:    Tobacco Use     Smoking status: Never     Passive exposure: Never    Smokeless tobacco: Never   Vaping Use    Vaping status: Never Used   Substance and Sexual Activity    Alcohol use: No    Drug use: Never    Sexual activity: Defer     Allergies   Allergen Reactions    Latex Hives     Current Outpatient Medications   Medication Sig Dispense Refill    aspirin 81 MG chewable tablet Chew 1 tablet Daily. 90 tablet 3    azithromycin (Zithromax Z-Steve) 250 MG tablet Take 2 tablets by mouth on day 1, then 1 tablet daily on days 2-5 6 tablet 0    benzonatate (Tessalon Perles) 100 MG capsule Take 1 capsule by mouth 3 (Three) Times a Day As Needed for Cough. 90 capsule 1    Canagliflozin (Invokana) 100 MG tablet tablet Take 1 tablet by mouth Daily.      clopidogrel (PLAVIX) 75 MG tablet Take 1 tablet by mouth Daily. 90 tablet 3    Cyanocobalamin (Vitamin B-12) 1000 MCG/15ML liquid Take 15 mL by mouth Daily.      empagliflozin (Jardiance) 25 MG tablet tablet Take 1 tablet by mouth Daily. One tablet daily before breakfast 30 tablet 11    Ergocalciferol (VITAMIN D2 PO) Take  by mouth. 3 times a week      furosemide (LASIX) 40 MG tablet Take 1 tablet by mouth Daily. 90 tablet 3    gabapentin (NEURONTIN) 300 MG capsule Take 1 capsule by mouth 3 (Three) Times a Day.      Glucagon, rDNA, (Glucagon Emergency) 1 MG kit Inject 1 mg under the skin into the appropriate area as directed 1 (One) Time As Needed (hypoglycemia) for up to 1 dose. 1 each 11    guaifenesin (ROBITUSSIN) 100 MG/5ML liquid Take 10 mL by mouth Every 4 (Four) Hours As Needed for Cough. 473 mL 0    Inclisiran Sodium (Leqvio) 284 MG/1.5ML solution prefilled syringe Inject 1.5 mL under the skin into the appropriate area as directed Every 6 (Six) Months.      Insulin Degludec-Liraglutide (Xultophy) 100-3.6 UNIT-MG/ML solution pen-injector subcutaneous pen Inject 50 Units under the skin into the appropriate area as directed Daily. 15 mL 11    Insulin Lispro-aabc, 1 U Dial, (Lyumjev  KwikPen) 100 UNIT/ML solution pen-injector Inject 80 Units under the skin into the appropriate area as directed 3 (Three) Times a Day With Meals. Up to 70u with meals 72 mL 3    irbesartan (AVAPRO) 150 MG tablet Take 1 tablet by mouth Every Night. 90 tablet 3    lansoprazole (PREVACID) 30 MG capsule Take 1 capsule by mouth Daily.      methylPREDNISolone (MEDROL) 4 MG dose pack Take as directed on package instructions. 21 tablet 0    metoprolol tartrate (LOPRESSOR) 50 MG tablet Take 1 tablet by mouth 2 (Two) Times a Day. 180 tablet 3    nitroglycerin (NITROSTAT) 0.4 MG SL tablet Place 1 tablet under the tongue Every 5 (Five) Minutes As Needed for Chest Pain. Take no more than 3 doses in 15 minutes.      NON FORMULARY Avastin eye injections      Every 4-5 weeks      nystatin (MYCOSTATIN) 068635 UNIT/GM powder Apply  topically to the appropriate area as directed 4 (Four) Times a Day.      sodium bicarbonate 650 MG tablet Take 1 tablet by mouth 2 (Two) Times a Day.      vitamin D (ERGOCALCIFEROL) 1.25 MG (98041 UT) capsule capsule Take 1 capsule by mouth 1 (One) Time Per Week.       No current facility-administered medications for this visit.     Review of Systems   Constitutional:  Negative for chills and fever.   HENT:  Negative for congestion.    Respiratory:  Negative for shortness of breath.    Cardiovascular:  Positive for leg swelling. Negative for chest pain.   Gastrointestinal:  Negative for abdominal pain.   Skin:  Negative for wound.   Neurological:  Positive for numbness.   Hematological:  Bruises/bleeds easily.   Psychiatric/Behavioral:  Negative for agitation.        OBJECTIVE     Vitals:    03/11/25 0906   BP: 131/82   Pulse: 75   SpO2: 98%               PHYSICAL EXAM  GEN:   Accompanied by none.     Foot/Ankle Exam    GENERAL  Diabetic foot exam performed    Appearance:  appears stated age and obese  Orientation:  AAOx3  Affect:  appropriate  Gait:  unimpaired  Assistance:  independent  Right shoe gear:  diabetic shoe  Left shoe gear: diabetic shoe    VASCULAR     Right Foot Vascularity   Dorsalis pedis:  2+  Posterior tibial:  2+  Skin temperature:  warm  Edema grading:  Trace  CFT:  3  Pedal hair growth:  Present  Varicosities:  mild varicosities     Left Foot Vascularity   Dorsalis pedis:  2+  Posterior tibial:  2+  Skin temperature:  warm  Edema grading:  Trace  CFT:  3  Pedal hair growth:  Present  Varicosities:  mild varicosities     NEUROLOGIC     Right Foot Neurologic   Light touch sensation: diminished  Vibratory sensation: diminished  Hot/Cold sensation: diminished  Protective Sensation using [place] Monofilament:   Sites intact: 4  Sites tested: 10     Left Foot Neurologic   Light touch sensation: diminished  Vibratory sensation: diminished  Hot/Cold sensation:  diminished  Protective Sensation using [place] Monofilament:   Sites intact: 4  Sites tested: 10    MUSCULOSKELETAL     Right Foot Musculoskeletal   Ecchymosis:  none  Tenderness:  none    Arch:  Normal     Left Foot Musculoskeletal   Ecchymosis:  none  Tenderness:  none  Arch:  Normal    MUSCLE STRENGTH     Right Foot Muscle Strength   Foot dorsiflexion:  5  Foot plantar flexion:  5  Foot inversion:  5  Foot eversion:  5     Left Foot Muscle Strength   Foot dorsiflexion:  5  Foot plantar flexion:  5  Foot inversion:  5  Foot eversion:  5    RANGE OF MOTION     Right Foot Range of Motion   Foot and ankle ROM within normal limits       Left Foot Range of Motion   Foot and ankle ROM within normal limits      DERMATOLOGIC      Right Foot Dermatologic   Skin  Right foot skin is intact.   Nails  1.  Positive for elongated, onychomycosis, abnormal thickness and subungual debris.  2.  Positive for elongated, onychomycosis, abnormal thickness and subungual debris.  3.  Positive for elongated, onychomycosis, abnormal thickness and subungual debris.  4.  Positive for elongated, onychomycosis, abnormal thickness and subungual debris.  5.   Positive for elongated, onychomycosis, abnormal thickness and subungual debris.  Nails comment:  1-5     Left Foot Dermatologic   Skin  Left foot skin is intact.   Nails comment:  1-5  Nails  1.  Positive for elongated, onychomycosis, abnormal thickness and subungual debris.  2.  Positive for elongated, onychomycosis, abnormal thickness and subungual debris.  3.  Positive for elongated, onychomycosis, abnormal thickness and subungual debris.  4.  Positive for elongated, onychomycosis, abnormally thick and subungual debris.  5.  Positive for elongated, onychomycosis, abnormally thick and subungual debris.      RADIOLOGY/NUCLEAR:  CT Angiogram Chest  Result Date: 2/26/2025  Narrative: EXAM/TECHNIQUE: CT angiography with 3D MIP images chest with IV contrast  INDICATION: Shortness of breath, elevated D-dimer  COMPARISON: 4/19/2024  DLP: 1370.48 mGy.cm. Automated exposure control was utilized to decrease patient radiation dose.  FINDINGS:  Suboptimal contrast bolus timing. No central or lobar pulmonary embolus. Chronic mild dilatation of the main pulmonary artery (3.3 cm), suggesting pulmonary hypertension. No evidence of aortic dissection or intramural hematoma. Ascending aorta is dilated measuring 4.4 cm diameter (unchanged). Aortic arch branch origins are widely patent. Aortic arch and descending thoracic aorta are nondilated and contain scattered atherosclerotic plaque/calcification. Moderate coronary artery calcification. No pericardial effusion.  Small amount of secretions/debris layers in the posterior trachea. Faint scattered areas of groundglass opacity and tree-in-bud nodularity in the right upper lobe, middle lobe, and lower lobe (image 54 series 12 for example). No isolated suspicious solid pulmonary nodule. No advanced emphysematous or fibrotic change. Redemonstrated multiple calcified mediastinal and bilateral hilar lymph nodes, like related to an old granulomatous disease.  1.8 x 1.1 cm nodule posterior to  the left thyroid, with differential including parathyroid adenoma versus exophytic thyroid nodule. No acute chest soft tissue abnormality. Mild bilateral gynecomastia. Hepatic steatosis. Cholelithiasis without evidence of cholecystitis. No significant change in 2.7 cm right adrenal gland nodule which is likely an adenoma. Multiple old healed right-sided rib fractures. No acute osseous finding.      Impression: 1.  Suboptimal contrast bolus timing for PE evaluation. No central or lobar pulmonary embolus. 2.  Chronic mild dilatation of the main pulmonary artery, suggesting pulmonary hypertension. 3.  Ascending aorta is dilated measuring 4.4 cm diameter, unchanged from prior study. No evidence of aortic dissection or intramural hematoma. 4.  Scattered groundglass opacity and tree-in-bud nodularity throughout the right lung. This likely presents and infectious bronchopneumonia. Recommend follow-up to ensure resolution. 5.  Redemonstrated calcified mediastinal and bilateral hilar lymph nodes, like related to an old granulomatous process. 6.  1.8 x 1.1 cm nodule in the soft tissues along the posterior aspect of the left thyroid. Differential diagnosis includes parathyroid adenoma versus exophytic thyroid nodule.. 7.  2.7 cm right adrenal gland adenoma.   This report was signed and finalized on 2/26/2025 2:12 PM by Dr. Dean Gomez MD.      XR Chest 2 View  Result Date: 2/26/2025  Narrative: EXAMINATION: XR CHEST 2 VW-  2/26/2025 11:07 AM  HISTORY: SOA. Shortness of breath triage Protocol  2 views of the chest are compared with the previous study dated 4/17/2024 and several previous studies dating back to 2/20/2017.  Lungs are moderately well-expanded.  Ill-defined opacity in the left lower lung laterally represent an area of discoid atelectasis.  There is no pleural effusion, pulmonary congestion or pneumothorax.  The heart size is in the normal range.  There is moderate fullness of the mauricio bilaterally which appears  stable since several previous studies dating back to 2018. This probably represent pulmonary arterial hypertension.  No acute bony abnormality. Multiple right rib deformities are similar to the previous study representing previous healed fractures.      Impression: 1. No active cardiopulmonary disease.   This report was signed and finalized on 2/26/2025 12:32 PM by Dr. Gadiel Layton MD.        LABORATORY/CULTURE RESULTS:      PATHOLOGY RESULTS:       ASSESSMENT/PLAN     Diagnoses and all orders for this visit:    1. Thickened nails (Primary)    2. Type 2 diabetes mellitus with diabetic neuropathy, with long-term current use of insulin    3. Encounter for diabetic foot exam    4. Stage 3b chronic kidney disease    5. Peripheral edema    6. Long term current use of anticoagulant                Comprehensive lower extremity examination and evaluation was performed.  Discussed findings and treatment plan including risks, benefits, and treatment options with patient in detail. Patient agreed with treatment plan.  After verbal consent obtained, nail(s) x10 debrided of length and thickness with nail nipper without incidence  Patient may maintain nails and calluses at home utilizing emery board or pumice stone between visits as needed  Reviewed at home diabetic foot care including daily foot checks   DFE performed today.  Last A1c for review 8.7%.  Continue daily use of compression stockings and elevate extremities at rest.  Continue diabetic monitoring and control under direction of Endocrinology.  Otherwise patient prefers to return every 6 months.  Encouraged to call sooner with any question or concerns.    An After Visit Summary was printed and given to the patient at discharge, including (if requested) any available informative/educational handouts regarding diagnosis, treatment, or medications. All questions were answered to patient/family satisfaction. Should symptoms fail to improve or worsen they agree to call  or return to clinic or to go to the Emergency Department. Discussed the importance of following up with any needed screening tests/labs/specialist appointments and any requested follow-up recommended by me today. Importance of maintaining follow-up discussed and patient accepts that missed appointments can delay diagnosis and potentially lead to worsening of conditions.  Return in about 6 months (around 9/11/2025) for Follow-up with APRN, Follow-up in [facility] Care Clinic., or sooner if acute issues arise.      I spent 10 minutes caring for Mick on this date of service. This time includes time spent by me in the following activities: preparing for the visit, reviewing tests, performing a medically appropriate examination and/or evaluation, counseling and educating the patient/family/caregiver, and documenting information in the medical record  I spent 5 minutes on the separately reported service of toe nail debridement. This time is not included in the time used to support the E/M service also reported today.      This document has been electronically signed by AME Momin on March 11, 2025 09:34 CDT

## 2025-03-10 ENCOUNTER — TELEPHONE (OUTPATIENT)
Age: 70
End: 2025-03-10
Payer: MEDICARE

## 2025-03-10 ENCOUNTER — OFFICE VISIT (OUTPATIENT)
Dept: PRIMARY CARE CLINIC | Age: 70
End: 2025-03-10
Payer: MEDICARE

## 2025-03-10 VITALS
OXYGEN SATURATION: 98 % | WEIGHT: 285 LBS | TEMPERATURE: 97.2 F | SYSTOLIC BLOOD PRESSURE: 124 MMHG | HEIGHT: 72 IN | BODY MASS INDEX: 38.6 KG/M2 | DIASTOLIC BLOOD PRESSURE: 72 MMHG | HEART RATE: 74 BPM

## 2025-03-10 DIAGNOSIS — I27.20 PULMONARY HTN (HCC): ICD-10-CM

## 2025-03-10 DIAGNOSIS — Z76.0 MEDICATION REFILL: ICD-10-CM

## 2025-03-10 DIAGNOSIS — R05.3 CHRONIC COUGH: Primary | ICD-10-CM

## 2025-03-10 DIAGNOSIS — J30.2 SEASONAL ALLERGIES: ICD-10-CM

## 2025-03-10 PROCEDURE — 3078F DIAST BP <80 MM HG: CPT | Performed by: FAMILY MEDICINE

## 2025-03-10 PROCEDURE — 1036F TOBACCO NON-USER: CPT | Performed by: FAMILY MEDICINE

## 2025-03-10 PROCEDURE — 1159F MED LIST DOCD IN RCRD: CPT | Performed by: FAMILY MEDICINE

## 2025-03-10 PROCEDURE — 3074F SYST BP LT 130 MM HG: CPT | Performed by: FAMILY MEDICINE

## 2025-03-10 PROCEDURE — G8427 DOCREV CUR MEDS BY ELIG CLIN: HCPCS | Performed by: FAMILY MEDICINE

## 2025-03-10 PROCEDURE — 1123F ACP DISCUSS/DSCN MKR DOCD: CPT | Performed by: FAMILY MEDICINE

## 2025-03-10 PROCEDURE — G8417 CALC BMI ABV UP PARAM F/U: HCPCS | Performed by: FAMILY MEDICINE

## 2025-03-10 PROCEDURE — 99214 OFFICE O/P EST MOD 30 MIN: CPT | Performed by: FAMILY MEDICINE

## 2025-03-10 PROCEDURE — 3017F COLORECTAL CA SCREEN DOC REV: CPT | Performed by: FAMILY MEDICINE

## 2025-03-10 RX ORDER — FLUTICASONE PROPIONATE 50 MCG
SPRAY, SUSPENSION (ML) NASAL
Qty: 16 G | Refills: 0 | Status: SHIPPED | OUTPATIENT
Start: 2025-03-10

## 2025-03-10 RX ORDER — ALBUTEROL SULFATE 0.83 MG/ML
2.5 SOLUTION RESPIRATORY (INHALATION) EVERY 6 HOURS PRN
Qty: 120 EACH | Refills: 3 | Status: SHIPPED | OUTPATIENT
Start: 2025-03-10

## 2025-03-10 RX ORDER — DEXTROMETHORPHAN HYDROBROMIDE AND PROMETHAZINE HYDROCHLORIDE 15; 6.25 MG/5ML; MG/5ML
5 SYRUP ORAL 4 TIMES DAILY PRN
Qty: 100 ML | Refills: 0 | Status: SHIPPED | OUTPATIENT
Start: 2025-03-10 | End: 2025-03-17

## 2025-03-10 SDOH — ECONOMIC STABILITY: FOOD INSECURITY: WITHIN THE PAST 12 MONTHS, YOU WORRIED THAT YOUR FOOD WOULD RUN OUT BEFORE YOU GOT MONEY TO BUY MORE.: PATIENT DECLINED

## 2025-03-10 SDOH — ECONOMIC STABILITY: FOOD INSECURITY: WITHIN THE PAST 12 MONTHS, THE FOOD YOU BOUGHT JUST DIDN'T LAST AND YOU DIDN'T HAVE MONEY TO GET MORE.: PATIENT DECLINED

## 2025-03-10 ASSESSMENT — PATIENT HEALTH QUESTIONNAIRE - PHQ9
1. LITTLE INTEREST OR PLEASURE IN DOING THINGS: NOT AT ALL
SUM OF ALL RESPONSES TO PHQ QUESTIONS 1-9: 0
2. FEELING DOWN, DEPRESSED OR HOPELESS: NOT AT ALL
SUM OF ALL RESPONSES TO PHQ QUESTIONS 1-9: 0

## 2025-03-10 ASSESSMENT — ENCOUNTER SYMPTOMS
SHORTNESS OF BREATH: 1
CHEST TIGHTNESS: 0
COUGH: 1

## 2025-03-10 NOTE — TELEPHONE ENCOUNTER
Attempted to contact pt to confirm appt for Tuesday, March 11th at 9a.m. Left Voicemail with info.

## 2025-03-10 NOTE — PROGRESS NOTES
[facility]  [address]  [address]  [place] [address]  Dept: [phone]  Dept Fax: [phone]  Loc: [phone]      Subjective:     Chief Complaint   Patient presents with    2 Week Follow-Up       HPI:  Ryan Solitario is a 69 y.o. male presents today for follow-up. He continues to complain of chronic cough. He was in the ER at [facility] 2 weeks with with c/o productive cough and shortness of breath.  His work-up was essentially negative except for + RSV and possible bronchopneumonia. He did not have any fever or other constitutional symptoms. He states that he never smoked but he did chew for sometime when he was younger.  He has a hx of CAD, s/p 4 stents. Recent cxray findings consistent with pulmonary HTN. Recent BNP normal excluding hear failure as cause of chronic cough and sob. He goes to pulmonology clinic at [facility] and sees one of the NP there. He states that he was told he will always have cough. Pt has a bronchodilator and nebulizer. He states that he was using his nebulizer in the morning. However his symptoms are mostly worse at night. His wife states that he has tried several different OTC cough medicine but none has helped. There is no hx of PFT being done      ROS:   Review of Systems   Constitutional: Negative.    HENT: Negative.     Respiratory:  Positive for cough and shortness of breath. Negative for chest tightness.    Allergic/Immunologic: Positive for environmental allergies (seasonal).   All other systems reviewed and are negative.      PMHx:  Past Medical History:   Diagnosis Date    ACS (acute coronary syndrome) ([facility]) 06/03/2019    Acute renal failure superimposed on stage 4 chronic kidney disease ([facility]) 09/04/2020    Aneurysm     abdominal (pt states NOT)    Arthritis     Bilateral leg edema     CAD (coronary artery disease)     sees [facility] cardiology/dr. russo.    Chronic kidney disease     Stage 4; sees dr. ta

## 2025-03-11 ENCOUNTER — OFFICE VISIT (OUTPATIENT)
Age: 70
End: 2025-03-11
Payer: MEDICARE

## 2025-03-11 VITALS
HEART RATE: 75 BPM | WEIGHT: 286 LBS | HEIGHT: 71 IN | SYSTOLIC BLOOD PRESSURE: 131 MMHG | BODY MASS INDEX: 40.04 KG/M2 | DIASTOLIC BLOOD PRESSURE: 82 MMHG | OXYGEN SATURATION: 98 %

## 2025-03-11 DIAGNOSIS — E11.40 TYPE 2 DIABETES MELLITUS WITH DIABETIC NEUROPATHY, WITH LONG-TERM CURRENT USE OF INSULIN: ICD-10-CM

## 2025-03-11 DIAGNOSIS — Z79.01 LONG TERM CURRENT USE OF ANTICOAGULANT: ICD-10-CM

## 2025-03-11 DIAGNOSIS — Z79.4 TYPE 2 DIABETES MELLITUS WITH DIABETIC NEUROPATHY, WITH LONG-TERM CURRENT USE OF INSULIN: ICD-10-CM

## 2025-03-11 DIAGNOSIS — L60.2 THICKENED NAILS: Primary | ICD-10-CM

## 2025-03-11 DIAGNOSIS — E11.9 ENCOUNTER FOR DIABETIC FOOT EXAM: ICD-10-CM

## 2025-03-11 DIAGNOSIS — R60.0 PERIPHERAL EDEMA: ICD-10-CM

## 2025-03-11 DIAGNOSIS — N18.32 STAGE 3B CHRONIC KIDNEY DISEASE: ICD-10-CM

## 2025-03-17 DIAGNOSIS — Z76.0 MEDICATION REFILL: ICD-10-CM

## 2025-03-17 RX ORDER — METOPROLOL TARTRATE 50 MG
50 TABLET ORAL 2 TIMES DAILY
Qty: 180 TABLET | Refills: 0 | Status: SHIPPED | OUTPATIENT
Start: 2025-03-17

## 2025-04-16 ENCOUNTER — OFFICE VISIT (OUTPATIENT)
Dept: CARDIOLOGY | Facility: CLINIC | Age: 70
End: 2025-04-16
Payer: MEDICARE

## 2025-04-16 VITALS
BODY MASS INDEX: 40.04 KG/M2 | HEART RATE: 67 BPM | OXYGEN SATURATION: 99 % | SYSTOLIC BLOOD PRESSURE: 124 MMHG | HEIGHT: 71 IN | DIASTOLIC BLOOD PRESSURE: 76 MMHG | WEIGHT: 286 LBS

## 2025-04-16 DIAGNOSIS — Z95.5 HISTORY OF PLACEMENT OF STENT IN LAD CORONARY ARTERY: Primary | ICD-10-CM

## 2025-04-16 DIAGNOSIS — E11.59 HYPERTENSION ASSOCIATED WITH DIABETES: ICD-10-CM

## 2025-04-16 DIAGNOSIS — I15.2 HYPERTENSION ASSOCIATED WITH DIABETES: ICD-10-CM

## 2025-04-16 DIAGNOSIS — I51.9 LEFT VENTRICULAR DYSFUNCTION: ICD-10-CM

## 2025-04-16 DIAGNOSIS — E78.2 MIXED HYPERLIPIDEMIA: ICD-10-CM

## 2025-04-16 DIAGNOSIS — I25.10 CORONARY ARTERY DISEASE INVOLVING NATIVE CORONARY ARTERY OF NATIVE HEART WITHOUT ANGINA PECTORIS: ICD-10-CM

## 2025-04-16 RX ORDER — CLOPIDOGREL BISULFATE 75 MG/1
75 TABLET ORAL DAILY
Qty: 90 TABLET | Refills: 3 | Status: SHIPPED | OUTPATIENT
Start: 2025-04-16

## 2025-04-16 NOTE — PROGRESS NOTES
Subjective:     Encounter Date:04/16/2025      Patient ID: Mick Sims is a 69 y.o. male.    Chief Complaint:  History of Present Illness  History of Present Illness  The patient presents for evaluation of coronary artery disease.    The chief complaint is a general sense of well-being, with no recurrence of the chest pressure or tightness experienced in 2024. No significant shortness of breath is reported, except during periods of illness such as the recent episode in 02/2025. Physical activity is limited to walking approximately a quarter of a mile, attributed to back and leg issues rather than cardiac problems. No bleeding or bruising tendencies are noted. The current medication regimen includes daily baby aspirin and Plavix, the latter of which has been advised to continue for a year. Lasix is taken as needed, based on nightly assessments of foot swelling by his wife. Leqvio injections are received biannually for cholesterol management, reported as effective. Additional medications include irbesartan and metoprolol.    In 02/2025, an RSV infection occurred, lasting for 4 weeks. Hospitalization was required after 2 weeks due to worsening symptoms, leading to a diagnosis of RSV and bronchiolitis.    PAST SURGICAL HISTORY:  Stent placement in 2024.    SOCIAL HISTORY  Marital Status:   Exercise: Gardening for about 2-1/2 hours      FAMILY HISTORY  - Brother: Trouble with cholesterol    The following portions of the patient's history were reviewed and updated as appropriate: allergies, current medications, past family history, past medical history, past social history, past surgical history, and problem list.    Review of Systems   Constitutional: Negative for diaphoresis, fever and malaise/fatigue.   HENT:  Negative for congestion.    Eyes:  Negative for vision loss in left eye and vision loss in right eye.   Cardiovascular:  Negative for chest pain, claudication, dyspnea on exertion, irregular  heartbeat, leg swelling, orthopnea, palpitations and syncope.   Respiratory:  Negative for cough, shortness of breath and wheezing.    Hematologic/Lymphatic: Negative for adenopathy.   Skin:  Negative for rash.   Musculoskeletal:  Negative for joint pain and joint swelling.   Gastrointestinal:  Negative for abdominal pain, diarrhea, nausea and vomiting.   Neurological:  Negative for excessive daytime sleepiness, dizziness, focal weakness, light-headedness, numbness and weakness.   Psychiatric/Behavioral:  Negative for depression. The patient does not have insomnia.            Current Outpatient Medications:     aspirin 81 MG chewable tablet, Chew 1 tablet Daily., Disp: 90 tablet, Rfl: 3    azithromycin (Zithromax Z-Steve) 250 MG tablet, Take 2 tablets by mouth on day 1, then 1 tablet daily on days 2-5, Disp: 6 tablet, Rfl: 0    benzonatate (Tessalon Perles) 100 MG capsule, Take 1 capsule by mouth 3 (Three) Times a Day As Needed for Cough., Disp: 90 capsule, Rfl: 1    Canagliflozin (Invokana) 100 MG tablet tablet, Take 1 tablet by mouth Daily., Disp: , Rfl:     clopidogrel (PLAVIX) 75 MG tablet, Take 1 tablet by mouth Daily., Disp: 90 tablet, Rfl: 3    Cyanocobalamin (Vitamin B-12) 1000 MCG/15ML liquid, Take 15 mL by mouth Daily., Disp: , Rfl:     empagliflozin (Jardiance) 25 MG tablet tablet, Take 1 tablet by mouth Daily. One tablet daily before breakfast, Disp: 30 tablet, Rfl: 11    Ergocalciferol (VITAMIN D2 PO), Take  by mouth. 3 times a week, Disp: , Rfl:     furosemide (LASIX) 40 MG tablet, Take 1 tablet by mouth Daily., Disp: 90 tablet, Rfl: 3    gabapentin (NEURONTIN) 300 MG capsule, Take 1 capsule by mouth 3 (Three) Times a Day., Disp: , Rfl:     Glucagon, rDNA, (Glucagon Emergency) 1 MG kit, Inject 1 mg under the skin into the appropriate area as directed 1 (One) Time As Needed (hypoglycemia) for up to 1 dose., Disp: 1 each, Rfl: 11    guaifenesin (ROBITUSSIN) 100 MG/5ML liquid, Take 10 mL by mouth Every 4  (Four) Hours As Needed for Cough., Disp: 473 mL, Rfl: 0    Inclisiran Sodium (Leqvio) 284 MG/1.5ML solution prefilled syringe, Inject 1.5 mL under the skin into the appropriate area as directed Every 6 (Six) Months., Disp: , Rfl:     Insulin Degludec-Liraglutide (Xultophy) 100-3.6 UNIT-MG/ML solution pen-injector subcutaneous pen, Inject 50 Units under the skin into the appropriate area as directed Daily., Disp: 15 mL, Rfl: 11    Insulin Lispro-aabc, 1 U Dial, (Lyumjev KwikPen) 100 UNIT/ML solution pen-injector, Inject 80 Units under the skin into the appropriate area as directed 3 (Three) Times a Day With Meals. Up to 70u with meals, Disp: 72 mL, Rfl: 3    irbesartan (AVAPRO) 150 MG tablet, Take 1 tablet by mouth Every Night., Disp: 90 tablet, Rfl: 3    lansoprazole (PREVACID) 30 MG capsule, Take 1 capsule by mouth Daily., Disp: , Rfl:     methylPREDNISolone (MEDROL) 4 MG dose pack, Take as directed on package instructions., Disp: 21 tablet, Rfl: 0    metoprolol tartrate (LOPRESSOR) 50 MG tablet, Take 1 tablet by mouth 2 (Two) Times a Day., Disp: 180 tablet, Rfl: 3    nitroglycerin (NITROSTAT) 0.4 MG SL tablet, Place 1 tablet under the tongue Every 5 (Five) Minutes As Needed for Chest Pain. Take no more than 3 doses in 15 minutes., Disp: , Rfl:     NON FORMULARY, Avastin eye injections   Every 4-5 weeks, Disp: , Rfl:     nystatin (MYCOSTATIN) 752942 UNIT/GM powder, Apply  topically to the appropriate area as directed 4 (Four) Times a Day., Disp: , Rfl:     sodium bicarbonate 650 MG tablet, Take 1 tablet by mouth 2 (Two) Times a Day., Disp: , Rfl:     vitamin D (ERGOCALCIFEROL) 1.25 MG (00306 UT) capsule capsule, Take 1 capsule by mouth 1 (One) Time Per Week., Disp: , Rfl:        Objective:      Vitals:    04/16/25 0912   BP: 124/76   Pulse: 67   SpO2: 99%     Vitals and nursing note reviewed.   Constitutional:       Appearance: Normal and healthy appearance. Well-developed and not in distress.   Eyes:       Extraocular Movements: Extraocular movements intact.      Pupils: Pupils are equal, round, and reactive to light.   HENT:      Head: Normocephalic and atraumatic.    Mouth/Throat:      Pharynx: Oropharynx is clear.   Neck:      Vascular: JVD normal.      Trachea: Trachea normal.   Pulmonary:      Effort: Pulmonary effort is normal.      Breath sounds: Normal breath sounds. No wheezing. No rhonchi. No rales.   Cardiovascular:      PMI at left midclavicular line. Normal rate. Regular rhythm. Normal S1. Normal S2.       Murmurs: There is a grade 2/6 systolic murmur.      No gallop.  No click. No rub.   Pulses:     Dorsalis pedis: 2+ bilaterally.     Posterior tibial: 2+ bilaterally.  Abdominal:      General: Bowel sounds are normal.      Palpations: Abdomen is soft.      Tenderness: There is no abdominal tenderness.   Musculoskeletal: Normal range of motion.      Cervical back: Normal range of motion and neck supple. Skin:     General: Skin is warm and dry.      Capillary Refill: Capillary refill takes less than 2 seconds.   Feet:      Right foot:      Skin integrity: Skin integrity normal.      Left foot:      Skin integrity: Skin integrity normal.   Neurological:      Mental Status: Alert and oriented to person, place and time.      Sensory: Sensation is intact.      Motor: Motor function is intact.      Coordination: Coordination is intact.   Psychiatric:         Speech: Speech normal.         Behavior: Behavior is cooperative.       Physical Exam  General: Patient appears in good physical condition without signs of distress.  Heart: No chest pain or pressure. No murmurs or abnormal sounds noted.  Lungs: No major shortness of breath. Breath sounds are clear.  Extremities: No swelling noted in feet.    Lab Review:       Procedures  Results  Labs   - LDL cholesterol: 27    Assessment/Plan:     Problem List Items Addressed This Visit       Hypertension associated with diabetes    Coronary artery disease involving  native coronary artery of native heart without angina pectoris    Overview   Formatting of this note might be different from the original. 5/12/2018  Echo  Normal LVFX 5/12/2018  lexiscan negative for myocardial ischemia, EF 44  % 5/14/2018  Hospital visit AUC indication 19, AUC score 7 5/14/2018  Cath  70% osteal LAD, 3.0 x 12 FREDERIC, apical hypokinesis   6/3/19  ACS TITA RISK Score 4 (angina, + troponin, CAD>50, diabetes, hypertension, ASA), AUC indication 3, AUC score 8 6/4/19  Cath  Patent stent in the osteal LAD, 99% mid LCX, 3.5 x 18 FREDERIC, 40% mid RCA, anterior lateral hypokinesis EF 45% Formatting of this note might be different from the original. 5/12/2018  Echo  Normal LVFX 5/12/2018  lexiscan negative for myocardial ischemia, EF 44  % 5/14/2018  Hospital visit AUC indication 19, AUC score 7 5/14/2018  Cath  70% osteal LAD, 3.0 x 12 FREDERIC, apical hypokinesis   6/3/19  ACS TITA RISK Score 4 (angina, + troponin, CAD>50, diabetes, hypertension, ASA), AUC indication 3, AUC score 8 6/4/19  Cath  Patent stent in the osteal LAD, 99% mid LCX, 3.5 x 18 FREDERIC, 40% mid RCA, anterior lateral hypokinesis EF 45%         Relevant Medications    clopidogrel (PLAVIX) 75 MG tablet    History of placement of stent in LAD coronary artery - Primary    Left ventricular dysfunction    Relevant Medications    clopidogrel (PLAVIX) 75 MG tablet    Mixed hyperlipidemia     Assessment & Plan  1. Coronary artery disease:  - Continue current medications: baby aspirin, Plavix, Jardiance, Lasix (as needed), Leqvio injections every 6 months, irbesartan, and metoprolol  - Prescription refill for Plavix provided to [facility] in [facility]  - Monitor for recurrence of symptoms similar to those experienced in 2024 and contact the office immediately if they arise    Follow-up  - Follow up in 1 year      Recommendations/plans:    Transcribed from ambient dictation for Leonel Oliva DO by Leonel Oliva DO.  04/16/25   09:56  CDT    Patient or patient representative verbalized consent for the use of Ambient Listening during the visit with  Leonel Oliva DO for chart documentation. 4/16/2025  09:56 CDT

## 2025-05-13 ENCOUNTER — OFFICE VISIT (OUTPATIENT)
Dept: PRIMARY CARE CLINIC | Age: 70
End: 2025-05-13
Payer: MEDICARE

## 2025-05-13 VITALS
TEMPERATURE: 96.9 F | SYSTOLIC BLOOD PRESSURE: 118 MMHG | OXYGEN SATURATION: 94 % | DIASTOLIC BLOOD PRESSURE: 76 MMHG | HEIGHT: 72 IN | WEIGHT: 285 LBS | HEART RATE: 72 BPM | BODY MASS INDEX: 38.6 KG/M2

## 2025-05-13 DIAGNOSIS — E11.65 TYPE 2 DIABETES MELLITUS WITH HYPERGLYCEMIA, WITH LONG-TERM CURRENT USE OF INSULIN (HCC): Primary | ICD-10-CM

## 2025-05-13 DIAGNOSIS — Z76.0 MEDICATION REFILL: ICD-10-CM

## 2025-05-13 DIAGNOSIS — E66.01 MORBID (SEVERE) OBESITY DUE TO EXCESS CALORIES (HCC): ICD-10-CM

## 2025-05-13 DIAGNOSIS — Z79.4 TYPE 2 DIABETES MELLITUS WITH HYPERGLYCEMIA, WITH LONG-TERM CURRENT USE OF INSULIN (HCC): Primary | ICD-10-CM

## 2025-05-13 DIAGNOSIS — K21.9 GASTROESOPHAGEAL REFLUX DISEASE WITHOUT ESOPHAGITIS: Chronic | ICD-10-CM

## 2025-05-13 DIAGNOSIS — E11.42 DIABETIC PERIPHERAL NEUROPATHY (HCC): Chronic | ICD-10-CM

## 2025-05-13 PROCEDURE — 2022F DILAT RTA XM EVC RTNOPTHY: CPT | Performed by: FAMILY MEDICINE

## 2025-05-13 PROCEDURE — 1036F TOBACCO NON-USER: CPT | Performed by: FAMILY MEDICINE

## 2025-05-13 PROCEDURE — G8417 CALC BMI ABV UP PARAM F/U: HCPCS | Performed by: FAMILY MEDICINE

## 2025-05-13 PROCEDURE — 99214 OFFICE O/P EST MOD 30 MIN: CPT | Performed by: FAMILY MEDICINE

## 2025-05-13 PROCEDURE — 3017F COLORECTAL CA SCREEN DOC REV: CPT | Performed by: FAMILY MEDICINE

## 2025-05-13 PROCEDURE — G8427 DOCREV CUR MEDS BY ELIG CLIN: HCPCS | Performed by: FAMILY MEDICINE

## 2025-05-13 PROCEDURE — 1159F MED LIST DOCD IN RCRD: CPT | Performed by: FAMILY MEDICINE

## 2025-05-13 PROCEDURE — 1123F ACP DISCUSS/DSCN MKR DOCD: CPT | Performed by: FAMILY MEDICINE

## 2025-05-13 PROCEDURE — 3074F SYST BP LT 130 MM HG: CPT | Performed by: FAMILY MEDICINE

## 2025-05-13 PROCEDURE — 3046F HEMOGLOBIN A1C LEVEL >9.0%: CPT | Performed by: FAMILY MEDICINE

## 2025-05-13 PROCEDURE — 3078F DIAST BP <80 MM HG: CPT | Performed by: FAMILY MEDICINE

## 2025-05-13 RX ORDER — METOPROLOL TARTRATE 50 MG
50 TABLET ORAL 2 TIMES DAILY
Qty: 180 TABLET | Refills: 0 | Status: SHIPPED | OUTPATIENT
Start: 2025-05-13

## 2025-05-13 RX ORDER — NAPROXEN 500 MG/1
500 TABLET ORAL 2 TIMES DAILY WITH MEALS
Qty: 90 TABLET | Refills: 0 | Status: CANCELLED | OUTPATIENT
Start: 2025-05-13

## 2025-05-13 RX ORDER — FUROSEMIDE 20 MG/1
20 TABLET ORAL DAILY
Qty: 90 TABLET | Refills: 0 | Status: SHIPPED | OUTPATIENT
Start: 2025-05-13

## 2025-05-13 RX ORDER — LANSOPRAZOLE 30 MG/1
30 CAPSULE, DELAYED RELEASE ORAL DAILY
Qty: 90 CAPSULE | Refills: 0 | Status: SHIPPED | OUTPATIENT
Start: 2025-05-13

## 2025-05-13 RX ORDER — NITROGLYCERIN 0.4 MG/1
TABLET SUBLINGUAL
Qty: 25 TABLET | Refills: 1 | Status: SHIPPED | OUTPATIENT
Start: 2025-05-13

## 2025-05-13 RX ORDER — ALBUTEROL SULFATE 90 UG/1
2 INHALANT RESPIRATORY (INHALATION) EVERY 6 HOURS PRN
Qty: 3 EACH | Refills: 0 | Status: SHIPPED | OUTPATIENT
Start: 2025-05-13

## 2025-05-13 RX ORDER — FLUTICASONE PROPIONATE 50 MCG
SPRAY, SUSPENSION (ML) NASAL
Qty: 3 EACH | Refills: 0 | Status: SHIPPED | OUTPATIENT
Start: 2025-05-13

## 2025-05-13 RX ORDER — ALBUTEROL SULFATE 0.83 MG/ML
2.5 SOLUTION RESPIRATORY (INHALATION) EVERY 6 HOURS PRN
Qty: 120 EACH | Refills: 0 | Status: SHIPPED | OUTPATIENT
Start: 2025-05-13

## 2025-05-13 RX ORDER — NYSTATIN 100000 [USP'U]/G
POWDER TOPICAL
Qty: 60 G | Refills: 0 | Status: SHIPPED | OUTPATIENT
Start: 2025-05-13

## 2025-05-13 RX ORDER — CANAGLIFLOZIN 100 MG/1
100 TABLET, FILM COATED ORAL DAILY
Qty: 90 TABLET | Refills: 1 | Status: CANCELLED | OUTPATIENT
Start: 2025-05-13

## 2025-05-13 RX ORDER — IRBESARTAN 150 MG/1
150 TABLET ORAL DAILY
Qty: 90 TABLET | Refills: 0 | Status: SHIPPED | OUTPATIENT
Start: 2025-05-13

## 2025-05-13 RX ORDER — ASPIRIN 81 MG/1
TABLET, CHEWABLE ORAL
Qty: 90 TABLET | Refills: 1 | Status: SHIPPED | OUTPATIENT
Start: 2025-05-13

## 2025-05-13 RX ORDER — GABAPENTIN 300 MG/1
CAPSULE ORAL
Qty: 90 CAPSULE | Refills: 0 | Status: SHIPPED | OUTPATIENT
Start: 2025-05-13 | End: 2025-06-13

## 2025-05-13 ASSESSMENT — ENCOUNTER SYMPTOMS
EYES NEGATIVE: 1
CONSTIPATION: 0
ABDOMINAL PAIN: 0
RESPIRATORY NEGATIVE: 1
DIARRHEA: 0
ABDOMINAL DISTENTION: 0
SHORTNESS OF BREATH: 0

## 2025-05-13 NOTE — PROGRESS NOTES
[facility]  [address]  [address]  [place] [address]  Dept: [phone]  Dept Fax: [phone]  Loc: [phone]      Subjective:     Chief Complaint   Patient presents with    Follow-up     Pt presents for fu and diabetic foot exam. Wantsto Increase gabapentin and lansoprozole. Right heel has been hurting when pressure is added. Also thinks his feet are retaining water. Night sweats waking him up. Provider options-does not want NP        HPI:  Ryan Solitario is a 69 y.o. male presenting for routine follow-up  PMHx and problem list reviewed and updated  He has an extensive list of chronic medical problems. Today he has the following issues:  Worsening GERD. He has been taking a PPI  for several years. His last EGD was 5 years ago His wife states that his burping and belching increased too, Pt states that if he does not take his Prevacid his stomach hurts and he admits that he cannot give up spicy Mexican foods, Chinese foods and Italian foods  He complains of increase tingling and pain in his feet - worse at night. He describes pain as \"feels like being shock by electricity\". He is taking Gabapentin 300 mg bid but it is not enough  He is due for  new Rx for diabetic shoes  He also complains of pain in his R foot/heel in the morning when he gets up and take a few steps.  Medication refills requested  Chronic meds reviewed and reconciled    ROS:   Review of Systems   Constitutional: Negative.    HENT:  Negative for congestion.    Eyes: Negative.    Respiratory: Negative.  Negative for shortness of breath.    Cardiovascular:  Positive for leg swelling (takes LAsix prn). Negative for chest pain.   Gastrointestinal:  Negative for abdominal distention, abdominal pain, constipation and diarrhea.        Increase burping and belching, worsening GERD   Endocrine:        DM managed by Dr Montague   Genitourinary: Negative.    Musculoskeletal:  Positive for arthralgias (at

## 2025-05-27 NOTE — PROGRESS NOTES
Sushma Peña,   [facility]   Family Medicine  [address]  [place] [address]  Phone: [phone]  Fax: [phone]         Chief Complaint:  Chief Complaint   Patient presents with    Establish Care        History:  Mick Sims is a 69 y.o. male who presents today as a new patient to the clinic. Here with his wife.     DM  Follows with endocrinology-Dr. Mobley.  Last A1c 2 months ago was 8.7.  This has been stable for him for the past few years.  He thinks long and short acting insulins, along with Jardiance/Invokana (switches between the 2 depending on sample availability).  He does take gabapentin for neuropathy, generally just 600mg at night, but has 300mg to take in the morning if needed.     Patient has significant CAD, requiring 4 stents.  He follows with cardiology .  He continues to take baby aspirin, Plavix, metoprolol, irbesartan, along with Leqvio injections every 6 months for cholesterol.    Patient was in the emergency room back in February for breathing difficulties.  Found to have RSV.  Had a CTA at that time negative for PE, but found to have chronic mild dilatation of the pulmonary artery, suggesting pulmonary hypertension.  There was scattered groundglass opacities throughout likely secondary to infectious pneumonia.  There was noted to be a left thyroid nodule-requiring further evaluation.  He has continued to struggle with ongoing shortness of breath, mainly difficulty taking a full breath.  He does have intermittent lower leg swelling.  Takes Lasix as needed, thinks this happens about once a month.    He does see ophthalmology Dr. Davis in [place] regularly.  He is currently receiving injections.     He describes an occasional deep itching sensation to his right forearm.  No skin changes occur with this, it happens a couple times a month.  Has a history of shingles, and thought this might be it.  But this has been going on for over a  year.    He has an upcoming appointment with GI for likely EGD and colonoscopy.  HPI        Past Medical History:   Past Medical History:   Diagnosis Date    Arthritis     Diabetes mellitus     Hypertension     Renal disorder        Past Surgical History:   Past Surgical History:   Procedure Laterality Date    CARDIAC CATHETERIZATION      CARDIAC CATHETERIZATION N/A 04/17/2024    Procedure: Left Heart Cath;  Surgeon: Leonel Oliva DO;  Location:  [facility] CATH INVASIVE LOCATION;  Service: Cardiovascular;  Laterality: N/A;    CARPAL TUNNEL RELEASE      CORONARY STENT PLACEMENT  2018    X 2    TOTAL KNEE ARTHROPLASTY Right        Family History:   Family History   Problem Relation Age of Onset    Cancer Mother     Diabetes Sister     Diabetes Brother     No Known Problems Brother        Social History:    reports that he has never smoked. He has never been exposed to tobacco smoke. He has never used smokeless tobacco. He reports that he does not drink alcohol and does not use drugs.    Current Medications:   Current Outpatient Medications   Medication Instructions    aspirin 81 mg, Oral, Daily    clopidogrel (PLAVIX) 75 mg, Oral, Daily    empagliflozin (JARDIANCE) 25 mg, Oral, Daily, One tablet daily before breakfast     furosemide (LASIX) 40 mg, Oral, Daily    gabapentin (NEURONTIN) 300 mg, 3 Times Daily    glucagon (GLUCAGEN) 1 mg, Subcutaneous, Once As Needed    Insulin Degludec-Liraglutide (Xultophy) 100-3.6 UNIT-MG/ML solution pen-injector subcutaneous pen 50 Units, Subcutaneous, Daily    Invokana 100 mg, Daily    irbesartan (AVAPRO) 150 mg, Oral, Nightly    lansoprazole (PREVACID) 30 mg, Daily    Leqvio 284 mg, Every 6 Months    Lyumjev KwikPen 80 Units, Subcutaneous, 3 Times Daily With Meals, Up to 70u with meals     metoprolol tartrate (LOPRESSOR) 50 mg, Oral, 2 Times Daily    naproxen (NAPROSYN) 500 mg, 2 Times Daily PRN    nitroglycerin (NITROSTAT) 0.4 mg, Every 5 Minutes PRN    NON FORMULARY  "Avastin eye injections      Every 4-5 weeks    nystatin (MYCOSTATIN) 377232 UNIT/GM powder 4 Times Daily    sodium bicarbonate 650 mg, 2 Times Daily    vitamin D (ERGOCALCIFEROL) 1.25 MG (32354 UT) capsule capsule 1 capsule, Weekly       Allergies:   Allergies   Allergen Reactions    Latex Hives       OBJECTIVE:  /60   Pulse 77   Temp 97.7 °F (36.5 °C)   Ht 180.3 cm (70.98\")   Wt 130 kg (286 lb)   SpO2 97%   BMI 39.91 kg/m²      Gen: Well developed, well nourished male in no acute distress.  HEENT: Normocephalic. TM grey and pearly bilaterally. Normal external ear, nose. Oral mucosa is moist and pink. Posterior pharynx without erythema or exudate. PERRLA. Conjunctiva non injected.   Neck: Full range of motion. No lymphadenopathy. No masses or lesions noted.   Resp: Lungs are clear to auscultation bilaterally, no wheeze.  Normal respiratory effort.  CV: Regular rate and rhythm, 2+ systolic murmur.  Abd: Soft, nontender.  Bowel sounds present.  No masses.  Neuro: Alert and oriented.  Ambulates without difficulty.  No focal neurologic deficits noted. Responds appropriately to all questions.   Skin: Warm, dry with no rashes or erythema. No lesions concerning of malignancy noted.   Ext: Normal strength, tone, and muscle mass without deformities.  No lower extremity edema.    Procedures    Assessment/Plan:     Diagnoses and all orders for this visit:    1. Shortness of breath (Primary)  Ongoing shortness of air since pneumonia diagnoses back in February.  Describes difficulty taking a full breath, getting winded more easily.  Will repeat CT chest to assure resolution of pneumonia.  He does have an appointment with pulmonology in 2 months.  -     CT Chest Without Contrast; Future    2. Thyroid nodule  CT chest back in February showed 1.8 x 1.1 cm nodule in the soft tissues along the posterior aspect of the left thyroid, concerning for parathyroid adenoma versus exophytic thyroid nodule.  Will get ultrasound of " the thyroid for further evaluation.  Wife recently had parathyroid surgery with Dr. Colon, and request follow-up with him if indicated.  -     US Thyroid    3. Allergic rhinitis, unspecified seasonality, unspecified trigger  Ongoing runny nose, concern for geriatric rhinitis.  Encouraged use of Flonase daily.    4. Type 2 diabetes mellitus with hyperglycemia, with long-term current use of insulin  Established with endocrinology, sees them every 3 months.  Most recent A1c uncontrolled at 8.7.  Discussed would like this under 8.  Encourage lifestyle changes, diet, trying to increase exercise as able.  He is compliant with his medications.    5. Mixed hyperlipidemia  Significant improvement of cholesterol with Leqvio injections every 6 months.  These are ordered per endocrinology.  Has appointment scheduled at [facility] next month, but he would like to transition that to [facility].  -Leqvio order placed    6. Coronary artery disease involving native coronary artery of native heart without angina pectoris  Following with cardiology, approximately 1-1/2 years out from most recent stent.  He continues to have occasional irregular chest discomfort, not requiring nitro.    Has upcoming appointment with GI for endoscopy/colonoscopy.    An After Visit Summary was printed and given to the patient at discharge.  Return in about 3 months (around 8/28/2025).         Sushma Peña, DO  5/29/2025   Electronically signed.

## 2025-05-28 ENCOUNTER — OFFICE VISIT (OUTPATIENT)
Dept: FAMILY MEDICINE CLINIC | Facility: CLINIC | Age: 70
End: 2025-05-28
Payer: MEDICARE

## 2025-05-28 VITALS
WEIGHT: 286 LBS | OXYGEN SATURATION: 97 % | BODY MASS INDEX: 40.04 KG/M2 | TEMPERATURE: 97.7 F | HEIGHT: 71 IN | SYSTOLIC BLOOD PRESSURE: 118 MMHG | HEART RATE: 77 BPM | DIASTOLIC BLOOD PRESSURE: 60 MMHG

## 2025-05-28 DIAGNOSIS — J30.9 ALLERGIC RHINITIS, UNSPECIFIED SEASONALITY, UNSPECIFIED TRIGGER: ICD-10-CM

## 2025-05-28 DIAGNOSIS — E04.1 THYROID NODULE: ICD-10-CM

## 2025-05-28 DIAGNOSIS — I25.10 CORONARY ARTERY DISEASE INVOLVING NATIVE CORONARY ARTERY OF NATIVE HEART WITHOUT ANGINA PECTORIS: ICD-10-CM

## 2025-05-28 DIAGNOSIS — E11.65 TYPE 2 DIABETES MELLITUS WITH HYPERGLYCEMIA, WITH LONG-TERM CURRENT USE OF INSULIN: ICD-10-CM

## 2025-05-28 DIAGNOSIS — Z79.4 TYPE 2 DIABETES MELLITUS WITH HYPERGLYCEMIA, WITH LONG-TERM CURRENT USE OF INSULIN: ICD-10-CM

## 2025-05-28 DIAGNOSIS — E78.2 MIXED HYPERLIPIDEMIA: ICD-10-CM

## 2025-05-28 DIAGNOSIS — R06.02 SHORTNESS OF BREATH: Primary | ICD-10-CM

## 2025-05-28 RX ORDER — NAPROXEN 500 MG/1
500 TABLET ORAL 2 TIMES DAILY PRN
COMMUNITY

## 2025-05-30 RX ORDER — INCLISIRAN 284 MG/1.5ML
284 INJECTION, SOLUTION SUBCUTANEOUS
Start: 2025-05-30

## 2025-06-03 ENCOUNTER — OFFICE VISIT (OUTPATIENT)
Dept: GASTROENTEROLOGY | Age: 70
End: 2025-06-03
Payer: MEDICARE

## 2025-06-03 VITALS
DIASTOLIC BLOOD PRESSURE: 72 MMHG | WEIGHT: 284 LBS | OXYGEN SATURATION: 96 % | SYSTOLIC BLOOD PRESSURE: 122 MMHG | HEART RATE: 77 BPM | HEIGHT: 71 IN | BODY MASS INDEX: 39.76 KG/M2

## 2025-06-03 DIAGNOSIS — K59.00 CONSTIPATION, UNSPECIFIED CONSTIPATION TYPE: ICD-10-CM

## 2025-06-03 DIAGNOSIS — K21.9 CHRONIC GERD: Primary | ICD-10-CM

## 2025-06-03 DIAGNOSIS — R13.19 ESOPHAGEAL DYSPHAGIA: ICD-10-CM

## 2025-06-03 PROCEDURE — 1159F MED LIST DOCD IN RCRD: CPT | Performed by: NURSE PRACTITIONER

## 2025-06-03 PROCEDURE — 99204 OFFICE O/P NEW MOD 45 MIN: CPT | Performed by: NURSE PRACTITIONER

## 2025-06-03 PROCEDURE — 1036F TOBACCO NON-USER: CPT | Performed by: NURSE PRACTITIONER

## 2025-06-03 PROCEDURE — 3017F COLORECTAL CA SCREEN DOC REV: CPT | Performed by: NURSE PRACTITIONER

## 2025-06-03 PROCEDURE — G8417 CALC BMI ABV UP PARAM F/U: HCPCS | Performed by: NURSE PRACTITIONER

## 2025-06-03 PROCEDURE — 1123F ACP DISCUSS/DSCN MKR DOCD: CPT | Performed by: NURSE PRACTITIONER

## 2025-06-03 PROCEDURE — 3074F SYST BP LT 130 MM HG: CPT | Performed by: NURSE PRACTITIONER

## 2025-06-03 PROCEDURE — 3078F DIAST BP <80 MM HG: CPT | Performed by: NURSE PRACTITIONER

## 2025-06-03 PROCEDURE — G8427 DOCREV CUR MEDS BY ELIG CLIN: HCPCS | Performed by: NURSE PRACTITIONER

## 2025-06-03 RX ORDER — FAMOTIDINE 20 MG/1
20 TABLET, FILM COATED ORAL 2 TIMES DAILY PRN
Qty: 60 TABLET | Refills: 3 | Status: SHIPPED | OUTPATIENT
Start: 2025-06-03

## 2025-06-03 RX ORDER — PANTOPRAZOLE SODIUM 20 MG/1
20 TABLET, DELAYED RELEASE ORAL
Qty: 90 TABLET | Refills: 3 | Status: SHIPPED | OUTPATIENT
Start: 2025-06-03

## 2025-06-03 RX ORDER — DOCUSATE SODIUM 100 MG/1
100 CAPSULE, LIQUID FILLED ORAL 2 TIMES DAILY
Qty: 60 CAPSULE | Refills: 3 | Status: SHIPPED | OUTPATIENT
Start: 2025-06-03 | End: 2025-07-03

## 2025-06-03 ASSESSMENT — ENCOUNTER SYMPTOMS
RECTAL PAIN: 0
CONSTIPATION: 1
DIARRHEA: 0
ABDOMINAL PAIN: 0
TROUBLE SWALLOWING: 1
NAUSEA: 0
COUGH: 0
VOMITING: 0
ABDOMINAL DISTENTION: 0
ANAL BLEEDING: 0
CHOKING: 0
SHORTNESS OF BREATH: 0
BLOOD IN STOOL: 0

## 2025-06-03 NOTE — PROGRESS NOTES
Subjective:     Patient ID: Ryan Solitario is a 69 y.o. male  PCP: Nahomy Lala DO  Referring Provider: Zeny Santos MD    HPI  Patient presents to the office today with the following complaints: Gastroesophageal Reflux      History of Present Illness  The patient presents for evaluation of acid reflux, constipation, and kidney issues.    He has been experiencing significant discomfort due to an upset stomach, which he attributes to acid reflux. He was previously prescribed lansoprazole 30 mg by Dr. Santos, but his nephrologist advised against its use. However, a new physician recommended an increased dosage. He reports that daily intake of lansoprazole 30 mg alleviates his symptoms, but it also results in loose bowel movements. In the absence of lansoprazole, he experiences acid reflux at night, characterized by a burning sensation in his throat. He has had two episodes of vomiting in his lifetime and occasionally experiences difficulty swallowing food, which causes pain. He has not tried any other medications such as Prilosec, omeprazole, or Nexium. His nephrologist, Dr. Weir, prescribed sodium bicarbonate, which is the only other medication he has tried. He recalls a period when he attempted to minimize his use of lansoprazole, taking it only twice or three times a week, but this approach was not effective.    He also reports occasional hard stools, which can be difficult to pass, and soft stools over the past two to three days. He has observed blood in his stool when it is hard, but not in his regular stool. He has not used any medications for his bowel movements.    He has been diagnosed with diabetes for approximately 10 years and has developed kidney issues.    He is currently on Plavix, which was prescribed by his cardiologist. He had a stent placed on 02/17/2024 and has been doing well since then.     PAST SURGICAL HISTORY:  Stent placement on 02/17/2024.      Last EGD 9/20/2016 - w/dilation over

## 2025-06-04 ENCOUNTER — TRANSCRIBE ORDERS (OUTPATIENT)
Dept: ADMINISTRATIVE | Facility: HOSPITAL | Age: 70
End: 2025-06-04
Payer: MEDICARE

## 2025-06-04 ENCOUNTER — TELEPHONE (OUTPATIENT)
Dept: CARDIOLOGY | Facility: CLINIC | Age: 70
End: 2025-06-04
Payer: MEDICARE

## 2025-06-04 DIAGNOSIS — E21.0 PRIMARY HYPERPARATHYROIDISM: Primary | ICD-10-CM

## 2025-06-04 NOTE — TELEPHONE ENCOUNTER
[facility] IS REQUESTING CLEARANCE FOR  ENDOSCOPY     WITH DR BRENNER     THIS IS SCHEDULED FOR  06/23/25  ?   PT HAS  CAD  ?   PT IS ON  ASA 81 MG, PLAVIX 75 MG     LOV WITH YOU WAS  04/16/25  ?   PLEASE ADVISE

## 2025-06-06 ENCOUNTER — TRANSCRIBE ORDERS (OUTPATIENT)
Dept: ADMINISTRATIVE | Facility: HOSPITAL | Age: 70
End: 2025-06-06
Payer: MEDICARE

## 2025-06-06 DIAGNOSIS — E21.5 PARATHYROID ABNORMALITY: ICD-10-CM

## 2025-06-06 DIAGNOSIS — E21.0 PRIMARY HYPERPARATHYROIDISM: ICD-10-CM

## 2025-06-06 DIAGNOSIS — E27.9 ADRENAL NODULE: Primary | ICD-10-CM

## 2025-06-12 ENCOUNTER — TRANSCRIBE ORDERS (OUTPATIENT)
Dept: ADMINISTRATIVE | Facility: HOSPITAL | Age: 70
End: 2025-06-12
Payer: MEDICARE

## 2025-06-12 ENCOUNTER — LAB (OUTPATIENT)
Dept: LAB | Facility: HOSPITAL | Age: 70
End: 2025-06-12
Payer: MEDICARE

## 2025-06-12 DIAGNOSIS — E27.9 ADRENAL HYPERTENSION: Primary | ICD-10-CM

## 2025-06-12 DIAGNOSIS — I15.2 ADRENAL HYPERTENSION: Primary | ICD-10-CM

## 2025-06-12 DIAGNOSIS — E27.9 ADRENAL NODULE: ICD-10-CM

## 2025-06-12 LAB — CORTIS SERPL-MCNC: 1.31 MCG/DL

## 2025-06-12 PROCEDURE — 36415 COLL VENOUS BLD VENIPUNCTURE: CPT

## 2025-06-12 PROCEDURE — 82533 TOTAL CORTISOL: CPT

## 2025-06-12 PROCEDURE — 80299 QUANTITATIVE ASSAY DRUG: CPT

## 2025-06-12 PROCEDURE — 82024 ASSAY OF ACTH: CPT

## 2025-06-13 LAB — ACTH PLAS-MCNC: 6.8 PG/ML (ref 7.2–63.3)

## 2025-06-16 ENCOUNTER — TELEPHONE (OUTPATIENT)
Dept: GASTROENTEROLOGY | Age: 70
End: 2025-06-16

## 2025-06-16 NOTE — TELEPHONE ENCOUNTER
Patient: Ryan Solitario    YOB: 1955      Clearance was received on June 16, 2025.    for Endoscopy / Colonoscopy scheduled for: EGD    Patient may discontinue the use of Plavix  for 7  days prior to the procedure.    IS Lovenox required:  no     PATIENT NOTIFIED ON:  6.16.25      Clearance scanned into chart

## 2025-06-17 ENCOUNTER — LAB (OUTPATIENT)
Dept: LAB | Facility: HOSPITAL | Age: 70
End: 2025-06-17
Payer: MEDICARE

## 2025-06-17 ENCOUNTER — INFUSION (OUTPATIENT)
Dept: ONCOLOGY | Facility: HOSPITAL | Age: 70
End: 2025-06-17
Payer: MEDICARE

## 2025-06-17 VITALS
DIASTOLIC BLOOD PRESSURE: 70 MMHG | SYSTOLIC BLOOD PRESSURE: 140 MMHG | HEIGHT: 71 IN | HEART RATE: 72 BPM | TEMPERATURE: 96.6 F | RESPIRATION RATE: 20 BRPM | BODY MASS INDEX: 39.98 KG/M2 | WEIGHT: 285.6 LBS | OXYGEN SATURATION: 94 %

## 2025-06-17 DIAGNOSIS — E78.2 MIXED HYPERLIPIDEMIA: ICD-10-CM

## 2025-06-17 DIAGNOSIS — E78.2 MIXED HYPERLIPIDEMIA: Primary | ICD-10-CM

## 2025-06-17 LAB
CHOLEST SERPL-MCNC: 102 MG/DL (ref 0–200)
HDLC SERPL-MCNC: 22 MG/DL (ref 40–60)
LDLC SERPL CALC-MCNC: 43 MG/DL (ref 0–100)
LDLC/HDLC SERPL: 1.52 {RATIO}
TRIGL SERPL-MCNC: 233 MG/DL (ref 0–150)
VLDLC SERPL-MCNC: 37 MG/DL (ref 5–40)

## 2025-06-17 PROCEDURE — 36415 COLL VENOUS BLD VENIPUNCTURE: CPT

## 2025-06-17 PROCEDURE — 25010000002 INCLISIRAN SODIUM 284 MG/1.5ML SOLUTION PREFILLED SYRINGE

## 2025-06-17 PROCEDURE — 96372 THER/PROPH/DIAG INJ SC/IM: CPT

## 2025-06-17 PROCEDURE — 80061 LIPID PANEL: CPT

## 2025-06-17 RX ADMIN — INCLISIRAN 284 MG: 284 INJECTION, SOLUTION SUBCUTANEOUS at 08:20

## 2025-06-18 ENCOUNTER — TELEPHONE (OUTPATIENT)
Dept: GASTROENTEROLOGY | Age: 70
End: 2025-06-18

## 2025-06-18 ENCOUNTER — HOSPITAL ENCOUNTER (OUTPATIENT)
Dept: BONE DENSITY | Facility: HOSPITAL | Age: 70
Discharge: HOME | End: 2025-06-18
Admitting: INTERNAL MEDICINE
Payer: MEDICARE

## 2025-06-18 DIAGNOSIS — E21.0 PRIMARY HYPERPARATHYROIDISM: ICD-10-CM

## 2025-06-18 PROCEDURE — 77080 DXA BONE DENSITY AXIAL: CPT

## 2025-06-18 NOTE — TELEPHONE ENCOUNTER
6.18.25 Called patient to confirm procedure scheduled for  6.23.25 @10 With  at [facility]        Patient confirmed

## 2025-06-19 LAB — DEXAMETHASONE SERPL-MCNC: 223 NG/DL

## 2025-06-23 ENCOUNTER — ANCILLARY PROCEDURE (OUTPATIENT)
Dept: ENDOSCOPY | Age: 70
End: 2025-06-23
Attending: INTERNAL MEDICINE
Payer: MEDICARE

## 2025-06-23 ENCOUNTER — ANESTHESIA EVENT (OUTPATIENT)
Dept: ENDOSCOPY | Age: 70
End: 2025-06-23
Payer: MEDICARE

## 2025-06-23 ENCOUNTER — HOSPITAL ENCOUNTER (OUTPATIENT)
Age: 70
Setting detail: OUTPATIENT SURGERY
Discharge: HOME | End: 2025-06-23
Attending: INTERNAL MEDICINE | Admitting: INTERNAL MEDICINE
Payer: MEDICARE

## 2025-06-23 ENCOUNTER — ANESTHESIA (OUTPATIENT)
Dept: ENDOSCOPY | Age: 70
End: 2025-06-23
Payer: MEDICARE

## 2025-06-23 VITALS
DIASTOLIC BLOOD PRESSURE: 80 MMHG | RESPIRATION RATE: 16 BRPM | BODY MASS INDEX: 39.76 KG/M2 | WEIGHT: 284 LBS | HEIGHT: 71 IN | SYSTOLIC BLOOD PRESSURE: 146 MMHG | TEMPERATURE: 97.5 F | OXYGEN SATURATION: 96 % | HEART RATE: 68 BPM

## 2025-06-23 DIAGNOSIS — R13.10 DYSPHAGIA, UNSPECIFIED TYPE: ICD-10-CM

## 2025-06-23 DIAGNOSIS — K21.9 CHRONIC GERD: ICD-10-CM

## 2025-06-23 LAB
GLUCOSE BLD-MCNC: 240 MG/DL (ref 70–99)
PERFORMED ON: ABNORMAL

## 2025-06-23 PROCEDURE — 7100000010 HC PHASE II RECOVERY - FIRST 15 MIN: Performed by: INTERNAL MEDICINE

## 2025-06-23 PROCEDURE — 3609015300 HC ESOPHAGEAL DILATION MALONEY: Performed by: INTERNAL MEDICINE

## 2025-06-23 PROCEDURE — 82962 GLUCOSE BLOOD TEST: CPT

## 2025-06-23 PROCEDURE — 43239 EGD BIOPSY SINGLE/MULTIPLE: CPT | Performed by: INTERNAL MEDICINE

## 2025-06-23 PROCEDURE — 88305 TISSUE EXAM BY PATHOLOGIST: CPT

## 2025-06-23 PROCEDURE — 6360000002 HC RX W HCPCS: Performed by: NURSE ANESTHETIST, CERTIFIED REGISTERED

## 2025-06-23 PROCEDURE — 3700000001 HC ADD 15 MINUTES (ANESTHESIA): Performed by: INTERNAL MEDICINE

## 2025-06-23 PROCEDURE — 3700000000 HC ANESTHESIA ATTENDED CARE: Performed by: INTERNAL MEDICINE

## 2025-06-23 PROCEDURE — 2580000003 HC RX 258: Performed by: ANESTHESIOLOGY

## 2025-06-23 PROCEDURE — 7100000011 HC PHASE II RECOVERY - ADDTL 15 MIN: Performed by: INTERNAL MEDICINE

## 2025-06-23 PROCEDURE — 88305 TISSUE EXAM BY PATHOLOGIST: CPT | Performed by: PATHOLOGY

## 2025-06-23 PROCEDURE — 3609012400 HC EGD TRANSORAL BIOPSY SINGLE/MULTIPLE: Performed by: INTERNAL MEDICINE

## 2025-06-23 PROCEDURE — 43450 DILATE ESOPHAGUS 1/MULT PASS: CPT | Performed by: INTERNAL MEDICINE

## 2025-06-23 PROCEDURE — 2709999900 HC NON-CHARGEABLE SUPPLY: Performed by: INTERNAL MEDICINE

## 2025-06-23 RX ORDER — LIDOCAINE HYDROCHLORIDE 10 MG/ML
INJECTION, SOLUTION EPIDURAL; INFILTRATION; INTRACAUDAL; PERINEURAL
Status: DISCONTINUED | OUTPATIENT
Start: 2025-06-23 | End: 2025-06-23 | Stop reason: SDUPTHER

## 2025-06-23 RX ORDER — FENTANYL CITRATE 50 UG/ML
INJECTION, SOLUTION INTRAMUSCULAR; INTRAVENOUS
Status: DISCONTINUED | OUTPATIENT
Start: 2025-06-23 | End: 2025-06-23 | Stop reason: SDUPTHER

## 2025-06-23 RX ORDER — PROPOFOL 10 MG/ML
INJECTION, EMULSION INTRAVENOUS
Status: DISCONTINUED | OUTPATIENT
Start: 2025-06-23 | End: 2025-06-23 | Stop reason: SDUPTHER

## 2025-06-23 RX ORDER — SODIUM CHLORIDE, SODIUM LACTATE, POTASSIUM CHLORIDE, CALCIUM CHLORIDE 600; 310; 30; 20 MG/100ML; MG/100ML; MG/100ML; MG/100ML
INJECTION, SOLUTION INTRAVENOUS CONTINUOUS
Status: DISCONTINUED | OUTPATIENT
Start: 2025-06-23 | End: 2025-06-23 | Stop reason: HOSPADM

## 2025-06-23 RX ADMIN — LIDOCAINE HYDROCHLORIDE 50 MG: 10 INJECTION, SOLUTION EPIDURAL; INFILTRATION; INTRACAUDAL; PERINEURAL at 11:28

## 2025-06-23 RX ADMIN — PROPOFOL 200 MG: 10 INJECTION, EMULSION INTRAVENOUS at 11:28

## 2025-06-23 RX ADMIN — FENTANYL CITRATE 50 MCG: 50 INJECTION, SOLUTION INTRAMUSCULAR; INTRAVENOUS at 11:28

## 2025-06-23 RX ADMIN — SODIUM CHLORIDE, SODIUM LACTATE, POTASSIUM CHLORIDE, AND CALCIUM CHLORIDE: 600; 310; 30; 20 INJECTION, SOLUTION INTRAVENOUS at 11:23

## 2025-06-23 ASSESSMENT — PAIN - FUNCTIONAL ASSESSMENT
PAIN_FUNCTIONAL_ASSESSMENT: NONE - DENIES PAIN
PAIN_FUNCTIONAL_ASSESSMENT: 0-10
PAIN_FUNCTIONAL_ASSESSMENT: NONE - DENIES PAIN

## 2025-06-23 NOTE — H&P
Patient Name: Ryan Solitario  : 1955  MRN: [id number]  DATE: 25    Allergies:   Allergies   Allergen Reactions    Lipitor [Atorvastatin Calcium] Diarrhea and Other (See Comments)     Dizzy    Tape [Adhesive Tape] Hives and Rash        ENDOSCOPY  History and Physical    Procedure:    [] Diagnostic Colonoscopy       [] Screening Colonoscopy  [x] EGD      [] ERCP      [] EUS       [] Other    [x] Previous office notes/History and Physical reviewed from the patients chart. Please see EMR for further details of HPI. I have examined the patient's status immediately prior to the procedure and:      Indications/HPI:       1. Chronic GERD    2. Esophageal dysphagia    3. Constipation, unspecified constipation type    4. Family hx colon cancer - Mother    Last EGD 2016 - w/dilation over wire, 49 [place]-distal esophageal narrowing, Inna (-)     Last Colonoscopy 3/28/2024 - TA (-)Dyplasia, HP, Mild diffuse melanosis coli likely use of OTC laxatives, severe diverticulosis left colon, int hem Gr 1, 5 year recall          []Abdominal Pain   []Cancer- GI/Lung     []Fhx of colon CA/polyps  []History of Polyps  []Barretts            []Melena  []Abnormal Imaging              []Dysphagia              []Persistent Pneumonia   []Anemia                            []Food Impaction        []History of Polyps  [] GI Bleed             []Pulmonary nodule/Mass   []Change in bowel habits []Heartburn/Reflux  []Rectal Bleed (BRBPR)  []Chest Pain - Non Cardiac []Heme (+) Stool []Ulcers  []Constipation  []Hemoptysis  []Varices  []Diarrhea  []Hypoxemia    []Nausea/Vomiting   []Screening   []Crohns/Colitis  []Other:     Anesthesia:   [x] MAC [] Moderate Sedation   [] General   [] None     ROS: 12 pt Review of Symptoms was negative unless mentioned above    Medications:   Prior to Admission medications    Medication Sig Start Date End Date Taking? Authorizing Provider   pantoprazole (PROTONIX) 20 MG tablet Take 1 tablet by mouth

## 2025-06-23 NOTE — DISCHARGE INSTRUCTIONS
1.  Await path results, the patient will be contacted in 7-10 days with biopsy results.   2.  Magic mouthwash 5 ml PO Swish and swallow q3h PRN ONLY IF patient has post-procedural sorethroat or chest pain.  3. Full liquids to soft diet today upon discharge from the surgicenter; may advance diet starting in AM tomorrow.  4. May resume other meds except any NSAIDs; may use cough drops or lozenges PRN; also continue meds for GERD with anti-GERD measures.  5. NO NSAIDs x 2 weeks  6. OP f/u in 6-8 weeks with Ms. Hercules; will need an Esophageal manometry later if the patient's dysphagia persists.      Upper GI Endoscopy: What to Expect at Home  Your Recovery  You had an upper GI endoscopy. Your doctor used a thin, lighted tube that bends to look at the inside of your esophagus, your stomach, and the first part of the small intestine, called the duodenum.    How can you care for yourself at home?  Activity   Rest as much as you need to after you go home.  You should be able to go back to your usual activities the day after the test.  Due to anesthesia, no driving or operating equipment for 24 hours.  Diet   Follow your doctor's directions for eating after the test.  Drink plenty of fluids (unless your doctor has told you not to).  Medications   If you have a sore throat the day after the test, use an over-the-counter spray to numb your throat.  When should you call for help?   Call 911 anytime you think you may need emergency care. For example, call if:    You passed out (lost consciousness).     You have trouble breathing.     You pass maroon or bloody stools.   Call your doctor now or seek immediate medical care if:    You have pain that does not get better after your take pain medicine.     You have new or worse belly pain.     You have blood in your stools.     You are sick to your stomach and cannot keep fluids down.     You have a fever.     You cannot pass stools or gas.   Watch closely for changes in your

## 2025-06-23 NOTE — ANESTHESIA POSTPROCEDURE EVALUATION
Department of Anesthesiology  Postprocedure Note    Patient: Ryan Solitario  MRN: [id number]  YOB: 1955  Date of evaluation: 6/23/2025    Procedure Summary       Date: 06/23/25 Room / Location: [place] [address] / [facility]    Anesthesia Start: 1123 Anesthesia Stop: 1136    Procedures:       ESOPHAGOGASTRODUODENOSCOPY BIOPSY (Abdomen)      ESOPHAGEAL DILATION YULIET Diagnosis:       Chronic GERD      Dysphagia, unspecified type      (Chronic GERD [K21.9])      (Dysphagia, unspecified type [R13.10])    Surgeons: Isabelle Almeida MD Responsible Provider: Yessica Persaud APRN - CRNA    Anesthesia Type: General, TIVA ASA Status: 3            Anesthesia Type: General, TIVA    Ben Phase I: Ben Score: 10    Ben Phase II:      Anesthesia Post Evaluation    Patient location during evaluation: PACU  Patient participation: complete - patient participated  Level of consciousness: awake and alert  Pain score: 0  Airway patency: patent  Nausea & Vomiting: no nausea and no vomiting  Cardiovascular status: blood pressure returned to baseline  Respiratory status: acceptable, nonlabored ventilation, room air and spontaneous ventilation  Hydration status: euvolemic  Comments: /75   Pulse 77   Temp (!) 96.7 °F (35.9 °C) (Temporal)   Resp 16   Ht 1.803 m (5' 11\")   Wt 128.8 kg (284 lb)   SpO2 95%   BMI 39.61 kg/m²     Pain management: adequate    No notable events documented.

## 2025-06-23 NOTE — OP NOTE
Endoscopic Procedure Note    Patient: Ryan Solitario : 1955  Med Rec#: [id number] Acc#: [id number]     Primary Care Provider Nahomy Lala DO    Endoscopist: Isabelle Almeida MD, MD    Date of Procedure:  2025    Procedure:   EGD with    A Malik bougie dilation of esophagus and  Cold biopsies    Indications:     1. Chronic GERD    2. Esophageal dysphagia      Last EGD 2016 - w/dilation over wire, 51 Swiss-distal esophageal narrowing, Inna (-)       Anesthesia:  Sedation was administered by anesthesia who monitored the patient during the procedure.    Estimated Blood Loss: minimal    Procedure:   After reviewing the patient's chart and obtaining informed consent, the patient was placed in the left lateral decubitus position.  A forward-viewing Olympus endoscope was lubricated and inserted through the mouth into the oropharynx. Under direct visualization, the upper esophagus was intubated. The scope was advanced to the level of the third portion of duodenum. Scope was slowly withdrawn with careful inspection of the mucosal surfaces. The scope was retroflexed for inspection of the gastric fundus and incisura. Findings and maneuvers are listed in impression below.     Next, a lubricated Malik weighted Bougie dilator-54 Fr was gently introduced into the patient's mouth and passed into the Esophagus and into the proximal stomach without much resistance and then withdrawn. Repeat EGD was performed to verify dilation and scope tip was passed into the stomach. NO evidence of perforation or excessive bleeding was noted subsequent to the dilation.        The patient tolerated the procedure well. The scope was removed. There were no immediate complications.    Findings/IMPRESSION:  Esophagus: abnormal: Normal upper two thirds; the distal third of the esophagus near the EG junction at 40 cm, a partially obstructive circumferential Schatzki ring was noted and was successfully subjected to

## 2025-06-23 NOTE — ANESTHESIA PRE PROCEDURE
Department of Anesthesiology  Preprocedure Note       Name:  Ryan Solitario   Age:  69 y.o.  :  1955                                          MRN:  [id number]         Date:  2025      Surgeon: Surgeon(s):  Isabelle Almeida MD    Procedure: Procedure(s):  ESOPHAGOGASTRODUODENOSCOPY BIOPSY    Medications prior to admission:   Prior to Admission medications    Medication Sig Start Date End Date Taking? Authorizing Provider   pantoprazole (PROTONIX) 20 MG tablet Take 1 tablet by mouth every morning (before breakfast) 6/3/25  Yes Elsa Hercules APRFERNANDO - NP   albuterol (PROVENTIL) (2.5 MG/3ML) 0.083% nebulizer solution Take 3 mLs by nebulization every 6 hours as needed for Wheezing 25  Yes Zeny Santos MD   albuterol sulfate HFA (VENTOLIN HFA) 108 (90 Base) MCG/ACT inhaler Inhale 2 puffs into the lungs every 6 hours as needed for Wheezing 25  Yes Zeny Santos MD   aspirin 81 MG chewable tablet CHEW AND SWALLOW 1 TABLET BY MOUTH ONCE DAILY 25  Yes Zeny Santos MD   gabapentin (NEURONTIN) 300 MG capsule Take 1 po three times a day of 1 tablet in am and 2 tablets in pm 25 Yes Zeny Santos MD   irbesartan (AVAPRO) 150 MG tablet Take 1 tablet by mouth daily Take 1 tablet by mouth once daily 25  Yes Zeny Santos MD   metoprolol tartrate (LOPRESSOR) 50 MG tablet Take 1 tablet by mouth 2 times daily 25  Yes Zeny Santos MD   naproxen (NAPROSYN) 500 MG tablet Take 1 tablet by mouth 2 times daily (with meals) 24  Yes Zeny Santos MD   Cobalamin Combinations (B-12) 1000-400 MCG SUBL Place under the tongue   Yes Nya Montgomery MD   sodium bicarbonate 325 MG tablet Take 1 tablet by mouth 2 times daily   Yes Nya Montgomery MD   Insulin Lispro-aabc (LYUMJEV KWIKPEN) 200 UNIT/ML SOPN Inject 25-30 Units into the skin 3 times daily as needed (\"5 cc per 15 carbs\") Sliding scale   Yes Provider, Historical, MD   famotidine

## 2025-06-24 ENCOUNTER — RESULTS FOLLOW-UP (OUTPATIENT)
Dept: GASTROENTEROLOGY | Age: 70
End: 2025-06-24

## 2025-06-25 ENCOUNTER — HOSPITAL ENCOUNTER (OUTPATIENT)
Dept: CT IMAGING | Facility: HOSPITAL | Age: 70
Discharge: HOME | End: 2025-06-25
Payer: MEDICARE

## 2025-06-25 ENCOUNTER — HOSPITAL ENCOUNTER (OUTPATIENT)
Dept: ULTRASOUND IMAGING | Facility: HOSPITAL | Age: 70
Discharge: HOME | End: 2025-06-25
Payer: MEDICARE

## 2025-06-25 DIAGNOSIS — R06.02 SHORTNESS OF BREATH: ICD-10-CM

## 2025-06-25 PROCEDURE — 71250 CT THORAX DX C-: CPT

## 2025-06-25 PROCEDURE — 76536 US EXAM OF HEAD AND NECK: CPT

## 2025-07-01 ENCOUNTER — LAB (OUTPATIENT)
Dept: LAB | Facility: HOSPITAL | Age: 70
End: 2025-07-01
Payer: MEDICARE

## 2025-07-01 DIAGNOSIS — E27.9 ADRENAL NODULE: ICD-10-CM

## 2025-07-01 DIAGNOSIS — E21.5 PARATHYROID ABNORMALITY: ICD-10-CM

## 2025-07-01 PROCEDURE — 81050 URINALYSIS VOLUME MEASURE: CPT

## 2025-07-01 PROCEDURE — 82088 ASSAY OF ALDOSTERONE: CPT

## 2025-07-04 LAB
ALDOST 24H UR-MRATE: <7.13 UG/24 HR (ref 0–19)
ALDOST UR-MCNC: <2.5 UG/L

## 2025-07-10 ENCOUNTER — TRANSCRIBE ORDERS (OUTPATIENT)
Dept: ADMINISTRATIVE | Facility: HOSPITAL | Age: 70
End: 2025-07-10
Payer: MEDICARE

## 2025-07-10 ENCOUNTER — LAB (OUTPATIENT)
Dept: LAB | Facility: HOSPITAL | Age: 70
End: 2025-07-10
Payer: MEDICARE

## 2025-07-10 DIAGNOSIS — N20.0 NEPHROLITHIASIS: ICD-10-CM

## 2025-07-10 DIAGNOSIS — E21.0 PRIMARY HYPERPARATHYROIDISM: Primary | ICD-10-CM

## 2025-07-10 DIAGNOSIS — E21.0 PRIMARY HYPERPARATHYROIDISM: ICD-10-CM

## 2025-07-10 PROCEDURE — 84560 ASSAY OF URINE/URIC ACID: CPT

## 2025-07-10 PROCEDURE — 84105 ASSAY OF URINE PHOSPHORUS: CPT

## 2025-07-10 PROCEDURE — 81050 URINALYSIS VOLUME MEASURE: CPT

## 2025-07-10 PROCEDURE — 82570 ASSAY OF URINE CREATININE: CPT

## 2025-07-10 PROCEDURE — 82340 ASSAY OF CALCIUM IN URINE: CPT

## 2025-07-10 PROCEDURE — 82507 ASSAY OF CITRATE: CPT

## 2025-07-10 PROCEDURE — 82530 CORTISOL FREE: CPT

## 2025-07-10 PROCEDURE — 84300 ASSAY OF URINE SODIUM: CPT

## 2025-07-11 LAB
CALCIUM 24H UR-MCNC: 5.7 MG/DL
CALCIUM 24H UR-MRATE: 159.6 MG/24 HR (ref 100–300)
COLLECT DURATION TIME UR: 24 HRS
CREAT UR-MCNC: 62.7 MG/DL
CREATINE 24H UR-MRATE: 1.76 G/24 HR (ref 1–2.4)
PHOSPHATE 24H UR-MRATE: 876 MG/24 HR (ref 390–1425)
PHOSPHATE UR-MCNC: 31.3 MG/DL
SODIUM 24H UR-SRATE: 202 MMOL/24HRS (ref 40–220)
SODIUM UR-SCNC: 72 MMOL/L
SPECIMEN VOL 24H UR: 2800 ML
URATE 24H UR-MRATE: 526.4 MG/24 HR (ref 136.1–771.1)
URATE UR-MCNC: 18.8 MG/DL

## 2025-07-14 LAB
CORTIS F 24H UR-MCNC: 13 UG/L
CORTIS F 24H UR-MRATE: 36 UG/24 HR (ref 5–64)

## 2025-07-15 LAB
CITRATE 24H UR-MCNC: 121 MG/L
CITRATE 24H UR-MRATE: 339 MG/24 HR (ref 320–1240)

## 2025-07-17 ENCOUNTER — TELEPHONE (OUTPATIENT)
Dept: PULMONOLOGY | Facility: CLINIC | Age: 70
End: 2025-07-17
Payer: MEDICARE

## 2025-07-22 DIAGNOSIS — E11.40 TYPE 2 DIABETES MELLITUS WITH DIABETIC NEUROPATHY, WITHOUT LONG-TERM CURRENT USE OF INSULIN: Primary | ICD-10-CM

## 2025-07-22 NOTE — TELEPHONE ENCOUNTER
"  Caller: Mick Sims \"Oscar\"    Relationship: Self    Best call back number: [phone]    Requested Prescriptions:   Requested Prescriptions     Pending Prescriptions Disp Refills    gabapentin (NEURONTIN) 300 MG capsule       Sig: Take 1 capsule by mouth 3 (Three) Times a Day.        Pharmacy where request should be sent: [facility] [address] [place] [address] [phone] PH - [phone]      Last office visit with prescribing clinician: 5/28/2025   Last telemedicine visit with prescribing clinician: Visit date not found   Next office visit with prescribing clinician: 8/27/2025     Does the patient have less than a 3 day supply:  [x] Yes  [] No      Suni Beck   07/22/25 15:08 CDT            "

## 2025-07-23 ENCOUNTER — OFFICE VISIT (OUTPATIENT)
Dept: PULMONOLOGY | Facility: CLINIC | Age: 70
End: 2025-07-23
Payer: MEDICARE

## 2025-07-23 VITALS
BODY MASS INDEX: 39.9 KG/M2 | HEIGHT: 71 IN | DIASTOLIC BLOOD PRESSURE: 74 MMHG | HEART RATE: 81 BPM | WEIGHT: 285 LBS | OXYGEN SATURATION: 95 % | SYSTOLIC BLOOD PRESSURE: 126 MMHG

## 2025-07-23 DIAGNOSIS — D72.19 OTHER EOSINOPHILIA: ICD-10-CM

## 2025-07-23 DIAGNOSIS — R06.09 DYSPNEA ON EXERTION: Primary | ICD-10-CM

## 2025-07-23 DIAGNOSIS — J45.909 ASTHMA, UNSPECIFIED ASTHMA SEVERITY, UNSPECIFIED WHETHER COMPLICATED, UNSPECIFIED WHETHER PERSISTENT: ICD-10-CM

## 2025-07-23 NOTE — PROGRESS NOTES
Background:  Pt w dyspnea, restrictive lung disease, cad   Chief Complaint  Dyspnea on exertion and Cough    Subjective    History of Present Illness     Mick Sims is here for follow up with [facility].  History of Present Illness  He saw Ceferino for dyspnea last year.  Since that time he had RSV infection with imaging showing some tree in bud infiltrate that was cleared on follow up imaging.  He has cough most mornings, productive of some scant mucus now, which was worse during the rsv infection.  No unusual resp exposures.  He has borrowed an inhaler with some benefit.  He is breathless after getting out of the shower.  He is short of breath after walking 20 feet.  He has had elevated eosinophils.  He has had 800 eosinophils on labwork in June.      Tobacco Use: Low Risk  (7/23/2025)    Patient History     Smoking Tobacco Use: Never     Smokeless Tobacco Use: Never     Passive Exposure: Never   Recent Concern: Tobacco Use - Medium Risk (7/8/2025)    Received from [facility]    Patient History     Smoking Tobacco Use: Former     Smokeless Tobacco Use: Former     Passive Exposure: Not on file      Current Outpatient Medications   Medication Instructions    aspirin 81 mg, Oral, Daily    clopidogrel (PLAVIX) 75 mg, Oral, Daily    empagliflozin (JARDIANCE) 25 mg, Oral, Daily, One tablet daily before breakfast     furosemide (LASIX) 40 mg, Oral, Daily    gabapentin (NEURONTIN) 300 mg, 3 Times Daily    glucagon (GLUCAGEN) 1 mg, Subcutaneous, Once As Needed    Insulin Degludec-Liraglutide (Xultophy) 100-3.6 UNIT-MG/ML solution pen-injector subcutaneous pen 50 Units, Subcutaneous, Daily    Invokana 100 mg, Daily    irbesartan (AVAPRO) 150 mg, Oral, Nightly    lansoprazole (PREVACID) 30 mg, Daily    Leqvio 284 mg, Subcutaneous, Every 6 Months    Lyumjev KwikPen 80 Units, Subcutaneous, 3 Times Daily With Meals, Up to 70u with meals     metoprolol  "tartrate (LOPRESSOR) 50 mg, Oral, 2 Times Daily    naproxen (NAPROSYN) 500 mg, 2 Times Daily PRN    nitroglycerin (NITROSTAT) 0.4 mg, Every 5 Minutes PRN    NON FORMULARY Avastin eye injections      Every 4-5 weeks    nystatin (MYCOSTATIN) 227605 UNIT/GM powder 4 Times Daily    sodium bicarbonate 650 mg, 2 Times Daily    vitamin D (ERGOCALCIFEROL) 1.25 MG (56990 UT) capsule capsule 1 capsule, Weekly      Objective     Vital Signs:   /74   Pulse 81   Ht 180.3 cm (71\")   Wt 129 kg (285 lb)   SpO2 95% Comment: RA  BMI 39.75 kg/m²   Physical Exam  Constitutional:       General: He is not in acute distress.     Appearance: He is ill-appearing.   Pulmonary:      Breath sounds: No wheezing or rhonchi.        Result Review  Data Reviewed:    CT Chest Without Contrast Diagnostic  Result Date: 6/27/2025  Impression: 1. There is evidence of remote granulomatous disease. No suspicious nodularity or consolidative pneumonia. No effusion. 2. There is mild diffuse bronchial wall thickening as well as some retained secretions within the trachea likely representing changes of chronic bronchitis. 3. Extensive coronary calcifications are present as well as several coronary stents. No cardiac enlargement. 4. Suspected right adrenal adenoma stable. Cholelithiasis is present with mild gallbladder distention.    This report was signed and finalized on 6/27/2025 9:22 AM by Dr. Leonides Bates MD.      US Thyroid  Result Date: 6/25/2025  Impression: 1. Hypoechoic nodule which appears to be exophytic from the lower pole of the left lobe of the thyroid versus an adjacent nodule such as a parathyroid adenoma. If this does in fact represent a exophytic thyroid nodule it would represent a TR 4 nodule which is of sufficient size for FNA. FNA could be considered. Correlation is recommended with the patient's calcium levels.     This report was signed and finalized on 6/25/2025 2:29 PM by Dr. Leonides Bates MD.        PFT Values  "         7/23/2025    09:00   Pre Drug PFT Results   FVC 67   FEV1 69   FEF 25-75% 78   FEV1/FVC 77   Other Tests PFT Results   TLC 81      DLCO 105   D/VAsb 128   Complete PFT - Pre & Post Bronchodilator (07/18/2024 08:09)   1.  Spirometry is consistent with a mild restrictive ventilatory defect with a coexisting decrease in peak expiratory flow.  2.  There is actually a decline in both midflows and peak expiratory flow postbronchodilator.  Otherwise there is no significant change in spirometry postbronchodilator.  3.  Lung volumes reveal a low normal total lung capacity with a decrease in vital capacity consistent with a borderline restrictive ventilatory defect.  There is also a decrease in inspiratory capacity.  4.  Diffusion capacity is within normal limits and when corrected for alveolar volume is actually supranormal.             Assessment and Plan    Diagnoses and all orders for this visit:    1. Dyspnea on exertion (Primary)  -     Spirometry with Diffusion Capacity & Lung Volumes  -     Alpha - 1 - Antitrypsin; Future    2. Other eosinophilia  -     ANCA Panel  -     SUBHASH With / DsDNA, RNP, Sjogrens A / B, Smith  -     C-reactive Protein    3. Asthma, unspecified asthma severity, unspecified whether complicated, unspecified whether persistent  -     IgE + Allergens (35)    Considerations include bronchospasm, bronchiectasis which appears present on ct, PFT shows nonspecific findings, restriction with relatively elevated lung volumes, possibly element of pseudorestriction.  Work up for vasculitis, eosinophilia.  Trial of trelegy 200 mcg daily. We gave sample and demonstrated proper technique for use    Follow Up   Return in about 3 months (around 10/23/2025).  Patient was given instructions and counseling regarding his condition or for health maintenance advice. Please see specific information pulled into the AVS if appropriate.    Electronically signed by Oscar Rodriguez MD, 7/23/2025, 22:05 CDT

## 2025-07-23 NOTE — PROCEDURES
Spirometry with Diffusion Capacity & Lung Volumes    Performed by: Smiley Roldan, RRT  Authorized by: Oscar Rodriguez MD     Pre Drug % Predicted    FVC: 67%   FEV1: 69%   FEF 25-75%: 78%   FEV1/FVC: 77%   T%   RV: 103%   Lung Volume Method: Plethysmography   DLCO: 105%   D/VAsb: 128%    Interpretation   Spirometry   Spirometry shows moderate restriction.   Review of FVL curve   Patient's effort is normal.   Lung Volume Measurements  Measurements show normal results.   Diffusion Capacity  The patient's diffusion capacity is normal.  Diffusion capacity is normal when corrected for alveolar volume.   Overall comments: Spirometry shows slightly lower result compared with prior study   Electronically signed by Oscar Rodriguez MD, 2025, 12:33 CDT

## 2025-07-24 ENCOUNTER — HOSPITAL ENCOUNTER (OUTPATIENT)
Dept: NUCLEAR MEDICINE | Facility: HOSPITAL | Age: 70
Discharge: HOME | End: 2025-07-24
Payer: MEDICARE

## 2025-07-24 DIAGNOSIS — E21.0 PRIMARY HYPERPARATHYROIDISM: ICD-10-CM

## 2025-07-24 PROCEDURE — A9500 TC99M SESTAMIBI: HCPCS | Performed by: INTERNAL MEDICINE

## 2025-07-24 PROCEDURE — 78071 PARATHYRD PLANAR W/WO SUBTRJ: CPT

## 2025-07-24 PROCEDURE — 34310000005 TECHNETIUM SESTAMIBI: Performed by: INTERNAL MEDICINE

## 2025-07-24 RX ORDER — GABAPENTIN 300 MG/1
300 CAPSULE ORAL 3 TIMES DAILY
Qty: 90 CAPSULE | Refills: 0 | Status: SHIPPED | OUTPATIENT
Start: 2025-07-24

## 2025-07-24 RX ADMIN — TECHNETIUM TC 99M SESTAMIBI 1 DOSE: 1 INJECTION INTRAVENOUS at 07:00

## 2025-07-24 NOTE — TELEPHONE ENCOUNTER
Refilled controlled substance.  I have reviewed the patient's prescription history via prescription drug monitoring program.  This information is consistent with my knowledge of the patient's controlled medication use history.    Sushma Peña, DO

## 2025-07-30 LAB
A ALTERNATA IGE QN: 0.15 KU/L
A FUMIGATUS IGE QN: 11.9 KU/L
A PULLULANS IGE QN: <0.1 KU/L
AMER ROACH IGE QN: <0.1 KU/L
ANA SER QL: POSITIVE
BERMUDA GRASS IGE QN: <0.1 KU/L
BOXELDER IGE QN: <0.1 KU/L
C HERBARUM IGE QN: 0.96 KU/L
C-ANCA TITR SER IF: NORMAL TITER
CAT DANDER IGE QN: <0.1 KU/L
CENTROMERE B AB SER-ACNC: <0.2 AI (ref 0–0.9)
CHICKEN FEATHER IGE QN: <0.1 KU/L
CHROMATIN AB SERPL-ACNC: <0.2 AI (ref 0–0.9)
CMN PIGWEED IGE QN: <0.1 KU/L
COMMON RAGWEED IGE QN: <0.1 KU/L
COTTONWOOD IGE QN: <0.1 KU/L
CRP SERPL-MCNC: 2 MG/L (ref 0–10)
D FARINAE IGE QN: <0.1 KU/L
D PTERONYSS IGE QN: <0.1 KU/L
DOG DANDER IGE QN: <0.1 KU/L
DSDNA AB SER-ACNC: 16 IU/ML (ref 0–9)
DUCK FEATHER IGE QN: <0.1 KU/L
ENA JO1 AB SER-ACNC: <0.2 AI (ref 0–0.9)
ENA RNP AB SER-ACNC: <0.2 AI (ref 0–0.9)
ENA SCL70 AB SER-ACNC: <0.2 AI (ref 0–0.9)
ENA SM AB SER-ACNC: <0.2 AI (ref 0–0.9)
ENA SS-A AB SER-ACNC: 0.6 AI (ref 0–0.9)
ENA SS-B AB SER-ACNC: <0.2 AI (ref 0–0.9)
ENGL PLANTAIN IGE QN: <0.1 KU/L
F MONILIFORME IGE QN: 4.37 KU/L
GOOSE FEATHER IGE QN: <0.1 KU/L
GOOSEFOOT IGE QN: <0.1 KU/L
IGE SERPL-ACNC: 1181 IU/ML (ref 6–495)
JOHNSON GRASS IGE QN: 0.11 KU/L
KENT BLUE GRASS IGE QN: <0.1 KU/L
LONDON PLANE IGE QN: <0.1 KU/L
Lab: ABNORMAL
M RACEMOSUS IGE QN: <0.1 KU/L
MARSH ELDER IGE QN: 0.12 KU/L
MT JUNIPER IGE QN: <0.1 KU/L
MYELOPEROXIDASE AB SER IA-ACNC: <0.2 UNITS (ref 0–0.9)
P NOTATUM IGE QN: 4.94 KU/L
P-ANCA ATYPICAL TITR SER IF: NORMAL TITER
P-ANCA TITR SER IF: NORMAL TITER
PROTEINASE3 AB SER IA-ACNC: <0.2 UNITS (ref 0–0.9)
R NIGRICANS IGE QN: <0.1 KU/L
S ROSTRATA IGE QN: <0.1 KU/L
SERVICE CMNT-IMP: ABNORMAL
SHEEP SORREL IGE QN: <0.1 KU/L
WHITE ASH IGE QN: <0.1 KU/L
WHITE ELM IGE QN: <0.1 KU/L
WHITE HICKORY IGE QN: <0.1 KU/L
WHITE OAK IGE QN: <0.1 KU/L
WORMWOOD IGE QN: <0.1 KU/L

## 2025-07-31 ENCOUNTER — RESULTS FOLLOW-UP (OUTPATIENT)
Dept: PULMONOLOGY | Facility: CLINIC | Age: 70
End: 2025-07-31
Payer: MEDICARE

## 2025-07-31 NOTE — PROGRESS NOTES
Please call the patient regarding his abnormal result.  This showed a lot of reaction to allergens and also some autoantibodies which is unusual.  See if we can get him in on an opening soon.  Need to consider increasing meds, possibly biologic, and possibly rheumatology consultation.

## 2025-08-01 NOTE — TELEPHONE ENCOUNTER
I called patient and got patient an appointment on Monday 08/04/2025 with Dr Rodriguez. Patient voiced understanding.

## 2025-08-04 ENCOUNTER — OFFICE VISIT (OUTPATIENT)
Dept: PULMONOLOGY | Facility: CLINIC | Age: 70
End: 2025-08-04
Payer: MEDICARE

## 2025-08-04 VITALS
WEIGHT: 285 LBS | SYSTOLIC BLOOD PRESSURE: 134 MMHG | HEIGHT: 71 IN | DIASTOLIC BLOOD PRESSURE: 78 MMHG | OXYGEN SATURATION: 99 % | HEART RATE: 77 BPM | BODY MASS INDEX: 39.9 KG/M2

## 2025-08-04 DIAGNOSIS — R06.09 DYSPNEA ON EXERTION: ICD-10-CM

## 2025-08-04 DIAGNOSIS — I51.9 LEFT VENTRICULAR DYSFUNCTION: ICD-10-CM

## 2025-08-04 DIAGNOSIS — D72.19 OTHER EOSINOPHILIA: ICD-10-CM

## 2025-08-04 DIAGNOSIS — R76.8 ANA POSITIVE: ICD-10-CM

## 2025-08-04 DIAGNOSIS — R06.09 DYSPNEA ON EXERTION: Primary | ICD-10-CM

## 2025-08-04 DIAGNOSIS — J45.909 ASTHMA, UNSPECIFIED ASTHMA SEVERITY, UNSPECIFIED WHETHER COMPLICATED, UNSPECIFIED WHETHER PERSISTENT: ICD-10-CM

## 2025-08-04 PROCEDURE — 99214 OFFICE O/P EST MOD 30 MIN: CPT | Performed by: INTERNAL MEDICINE

## 2025-08-04 PROCEDURE — G2211 COMPLEX E/M VISIT ADD ON: HCPCS | Performed by: INTERNAL MEDICINE

## 2025-08-04 PROCEDURE — 3075F SYST BP GE 130 - 139MM HG: CPT | Performed by: INTERNAL MEDICINE

## 2025-08-04 PROCEDURE — 3078F DIAST BP <80 MM HG: CPT | Performed by: INTERNAL MEDICINE

## 2025-08-04 RX ORDER — ALBUTEROL SULFATE 90 UG/1
2 INHALANT RESPIRATORY (INHALATION) EVERY 4 HOURS PRN
Qty: 18 G | Refills: 11 | Status: SHIPPED | OUTPATIENT
Start: 2025-08-04

## 2025-08-05 ENCOUNTER — TELEPHONE (OUTPATIENT)
Dept: PULMONOLOGY | Facility: CLINIC | Age: 70
End: 2025-08-05
Payer: MEDICARE

## 2025-08-05 DIAGNOSIS — R06.09 DYSPNEA ON EXERTION: Primary | ICD-10-CM

## 2025-08-05 DIAGNOSIS — J45.909 ASTHMA, UNSPECIFIED ASTHMA SEVERITY, UNSPECIFIED WHETHER COMPLICATED, UNSPECIFIED WHETHER PERSISTENT: ICD-10-CM

## 2025-08-06 ENCOUNTER — OFFICE VISIT (OUTPATIENT)
Dept: GASTROENTEROLOGY | Age: 70
End: 2025-08-06
Payer: MEDICARE

## 2025-08-06 VITALS
SYSTOLIC BLOOD PRESSURE: 134 MMHG | WEIGHT: 285 LBS | BODY MASS INDEX: 39.9 KG/M2 | HEIGHT: 71 IN | DIASTOLIC BLOOD PRESSURE: 80 MMHG | OXYGEN SATURATION: 97 % | HEART RATE: 73 BPM

## 2025-08-06 DIAGNOSIS — K59.00 CONSTIPATION, UNSPECIFIED CONSTIPATION TYPE: ICD-10-CM

## 2025-08-06 DIAGNOSIS — K21.9 CHRONIC GERD: Primary | ICD-10-CM

## 2025-08-06 PROCEDURE — 1123F ACP DISCUSS/DSCN MKR DOCD: CPT | Performed by: NURSE PRACTITIONER

## 2025-08-06 PROCEDURE — 1159F MED LIST DOCD IN RCRD: CPT | Performed by: NURSE PRACTITIONER

## 2025-08-06 PROCEDURE — G8417 CALC BMI ABV UP PARAM F/U: HCPCS | Performed by: NURSE PRACTITIONER

## 2025-08-06 PROCEDURE — 3075F SYST BP GE 130 - 139MM HG: CPT | Performed by: NURSE PRACTITIONER

## 2025-08-06 PROCEDURE — G8427 DOCREV CUR MEDS BY ELIG CLIN: HCPCS | Performed by: NURSE PRACTITIONER

## 2025-08-06 PROCEDURE — 99214 OFFICE O/P EST MOD 30 MIN: CPT | Performed by: NURSE PRACTITIONER

## 2025-08-06 PROCEDURE — 3079F DIAST BP 80-89 MM HG: CPT | Performed by: NURSE PRACTITIONER

## 2025-08-06 PROCEDURE — 3017F COLORECTAL CA SCREEN DOC REV: CPT | Performed by: NURSE PRACTITIONER

## 2025-08-06 PROCEDURE — 1036F TOBACCO NON-USER: CPT | Performed by: NURSE PRACTITIONER

## 2025-08-06 RX ORDER — DOCUSATE SODIUM 100 MG/1
CAPSULE, LIQUID FILLED ORAL
Qty: 120 CAPSULE | Refills: 3 | Status: SHIPPED | OUTPATIENT
Start: 2025-08-06

## 2025-08-06 ASSESSMENT — ENCOUNTER SYMPTOMS
RECTAL PAIN: 0
CONSTIPATION: 1
ABDOMINAL PAIN: 0
VOMITING: 0
ANAL BLEEDING: 0
ABDOMINAL DISTENTION: 0
BLOOD IN STOOL: 0
DIARRHEA: 0
COUGH: 0
NAUSEA: 0
TROUBLE SWALLOWING: 0
CHOKING: 0
SHORTNESS OF BREATH: 0

## 2025-08-13 ENCOUNTER — OFFICE VISIT (OUTPATIENT)
Dept: OTOLARYNGOLOGY | Facility: CLINIC | Age: 70
End: 2025-08-13
Payer: MEDICARE

## 2025-08-13 VITALS
HEIGHT: 71 IN | HEART RATE: 71 BPM | WEIGHT: 284.4 LBS | TEMPERATURE: 97.8 F | DIASTOLIC BLOOD PRESSURE: 69 MMHG | BODY MASS INDEX: 39.81 KG/M2 | SYSTOLIC BLOOD PRESSURE: 124 MMHG

## 2025-08-13 DIAGNOSIS — Z79.01 ANTICOAGULATED: ICD-10-CM

## 2025-08-13 DIAGNOSIS — E83.52 HYPERCALCEMIA: Primary | ICD-10-CM

## 2025-08-13 DIAGNOSIS — E21.3 HYPERPARATHYROIDISM: ICD-10-CM

## 2025-08-13 DIAGNOSIS — I25.10 CORONARY ARTERY DISEASE INVOLVING NATIVE CORONARY ARTERY OF NATIVE HEART WITHOUT ANGINA PECTORIS: ICD-10-CM

## 2025-08-13 RX ORDER — PSEUDOEPHEDRINE HCL 30 MG
100 TABLET ORAL AS NEEDED
COMMUNITY
Start: 2025-08-06

## 2025-08-18 ENCOUNTER — TELEPHONE (OUTPATIENT)
Dept: PULMONOLOGY | Facility: CLINIC | Age: 70
End: 2025-08-18
Payer: MEDICARE

## 2025-08-18 ENCOUNTER — TELEPHONE (OUTPATIENT)
Dept: OTOLARYNGOLOGY | Facility: CLINIC | Age: 70
End: 2025-08-18
Payer: MEDICARE

## 2025-08-27 ENCOUNTER — OFFICE VISIT (OUTPATIENT)
Dept: FAMILY MEDICINE CLINIC | Facility: CLINIC | Age: 70
End: 2025-08-27
Payer: MEDICARE

## 2025-08-27 VITALS
DIASTOLIC BLOOD PRESSURE: 70 MMHG | OXYGEN SATURATION: 95 % | BODY MASS INDEX: 40.46 KG/M2 | WEIGHT: 289 LBS | TEMPERATURE: 96.9 F | SYSTOLIC BLOOD PRESSURE: 120 MMHG | HEIGHT: 71 IN | HEART RATE: 80 BPM

## 2025-08-27 DIAGNOSIS — E11.59 HYPERTENSION ASSOCIATED WITH DIABETES: ICD-10-CM

## 2025-08-27 DIAGNOSIS — Z00.00 MEDICARE ANNUAL WELLNESS VISIT, SUBSEQUENT: Primary | ICD-10-CM

## 2025-08-27 DIAGNOSIS — I15.2 HYPERTENSION ASSOCIATED WITH DIABETES: ICD-10-CM

## 2025-08-27 DIAGNOSIS — E11.40 TYPE 2 DIABETES MELLITUS WITH DIABETIC NEUROPATHY, WITHOUT LONG-TERM CURRENT USE OF INSULIN: ICD-10-CM

## 2025-08-27 RX ORDER — FLUTICASONE PROPIONATE 50 MCG
2 SPRAY, SUSPENSION (ML) NASAL DAILY
Qty: 9.9 G | Refills: 2 | Status: SHIPPED | OUTPATIENT
Start: 2025-08-27

## 2025-08-27 RX ORDER — METOPROLOL TARTRATE 50 MG
50 TABLET ORAL 2 TIMES DAILY
Qty: 180 TABLET | Refills: 3 | Status: SHIPPED | OUTPATIENT
Start: 2025-08-27

## 2025-08-27 RX ORDER — IRBESARTAN 150 MG/1
150 TABLET ORAL NIGHTLY
Qty: 90 TABLET | Refills: 3 | Status: SHIPPED | OUTPATIENT
Start: 2025-08-27

## 2025-08-27 RX ORDER — NITROGLYCERIN 0.4 MG/1
0.4 TABLET SUBLINGUAL
Qty: 30 TABLET | Refills: 0 | Status: SHIPPED | OUTPATIENT
Start: 2025-08-27

## 2025-08-27 RX ORDER — FUROSEMIDE 40 MG/1
40 TABLET ORAL DAILY
Qty: 90 TABLET | Refills: 3 | Status: SHIPPED | OUTPATIENT
Start: 2025-08-27

## 2025-08-27 RX ORDER — GABAPENTIN 300 MG/1
300 CAPSULE ORAL 3 TIMES DAILY
Qty: 90 CAPSULE | Refills: 0 | Status: SHIPPED | OUTPATIENT
Start: 2025-08-27

## (undated) DEVICE — 3M™ STERI-DRAPE™ INSTRUMENT POUCH 1018: Brand: STERI-DRAPE™

## (undated) DEVICE — CATH F5 INF PIG145 110CM 6SH: Brand: INFINITI

## (undated) DEVICE — BANDAGE COMPR W3INXL15FT BGE E SGL LAYERED CLP CLSR

## (undated) DEVICE — TR BAND RADIAL ARTERY COMPRESSION DEVICE: Brand: TR BAND

## (undated) DEVICE — MODEL BT2000 P/N 700287-012KIT CONTENTS: MANIFOLD WITH SALINE AND CONTRAST PORTS, SALINE TUBING WITH SPIKE AND HAND SYRINGE, TRANSDUCER: Brand: BT2000 AUTOMATED MANIFOLD KIT

## (undated) DEVICE — KT NDL GUIDE STRL 18GA

## (undated) DEVICE — Device: Brand: POWER-FLO®

## (undated) DEVICE — TOOL INSRT GW MTL OR PLSTC

## (undated) DEVICE — PAD, DEFIB, ADULT, RADIOTRANS, PHYSIO: Brand: MEDLINE

## (undated) DEVICE — DRSNG SURESITE WNDW 4X4.5

## (undated) DEVICE — FORCEPS BX 240CM 2.4MM L NDL RAD JAW 4 M00513334

## (undated) DEVICE — UNDERGLOVE SURG SZ 8 FNGR THK0.21MIL GRN LTX BEAD CUF

## (undated) DEVICE — NEEDLE SPNL L3.5IN PNK HUB S STL REG WALL FIT STYL W/ QNCKE

## (undated) DEVICE — GLIDESHEATH SLENDER STAINLESS STEEL KIT: Brand: GLIDESHEATH SLENDER

## (undated) DEVICE — CANN NASL ETCO2 LO/FLO A/

## (undated) DEVICE — ENDO KIT,LOURDES HOSPITAL: Brand: MEDLINE INDUSTRIES, INC.

## (undated) DEVICE — GLOVE SURG SZ 8 CRM LTX FREE POLYISOPRENE POLYMER BEAD ANTI

## (undated) DEVICE — LARYNGOSCOPE BLDE MAC HNDL M SZ 35 ST CURAPLEX CURAVIEW LED

## (undated) DEVICE — SOLUTION IV IRRIG POUR BRL 0.9% SODIUM CHL 2F7124

## (undated) DEVICE — A2000 MULTI-USE SYRINGE KIT, P/N 701277-003KIT CONTENTS: 100ML CONTRAST RESERVOIR AND TUBING WITH CONTRAST SPIKE AND CLAMP: Brand: A2000 MULTI-USE SYRINGE KIT

## (undated) DEVICE — SUTURE VCRL SZ 1 L27IN ABSRB UD L36MM CP-1 1/2 CIR REV CUT J268H

## (undated) DEVICE — MODEL AT P65, P/N 701554-001KIT CONTENTS: HAND CONTROLLER, 3-WAY HIGH-PRESSURE STOPCOCK WITH ROTATING END AND PREMIUM HIGH-PRESSURE TUBING: Brand: ANGIOTOUCH® KIT

## (undated) DEVICE — PK CATH CARD 30 CA/4

## (undated) DEVICE — TUBE ET 7.5MM NSL ORAL BASIC CUF INTMED MURPHY EYE RADPQ

## (undated) DEVICE — BLADE RMR L51MM PAT PILOT H

## (undated) DEVICE — DRESSING FOAM W4XL12IN SIL RECT ADH WTRPRF FLM BK W/ BORD

## (undated) DEVICE — DUAL CUT SAGITTAL BLADE

## (undated) DEVICE — SUP ARMBRD ART/LINE BLU

## (undated) DEVICE — SHEET,DRAPE,53X77,STERILE: Brand: MEDLINE

## (undated) DEVICE — COPILOT BLEEDBACK CONTROL VALVE: Brand: COPILOT

## (undated) DEVICE — 6F .070 XB 3.5 100CM: Brand: VISTA BRITE TIP

## (undated) DEVICE — DRAPE,ANGIO,BRACH,STERILE,38X44: Brand: MEDLINE

## (undated) DEVICE — INFLATION DEVICE: Brand: ENCORE™ 26

## (undated) DEVICE — RADIFOCUS OPTITORQUE ANGIOGRAPHIC CATHETER: Brand: OPTITORQUE

## (undated) DEVICE — GLOVE SURG SZ 7 L12IN FNGR THK79MIL GRN LTX FREE

## (undated) DEVICE — SOLIDIFIER LIQUI LOC PLUS 2000CC

## (undated) DEVICE — CHLORAPREP 26ML ORANGE

## (undated) DEVICE — SOLUTION IRRIG 3000ML 0.9% SOD CHL USP UROMATIC PLAS CONT

## (undated) DEVICE — DEV TORQ GW HOT/PINK

## (undated) DEVICE — GW STARTER FXD CORE J .035 3X260CM 3MM

## (undated) DEVICE — SYSTEM SKIN CLSR 22CM DERMBND PRINEO

## (undated) DEVICE — NEEDLE SPNL 18GA L3.5IN W/ QNCKE SHARPER BVL DURA CLICK

## (undated) DEVICE — TREK CORONARY DILATATION CATHETER 3.0 MM X 12 MM / RAPID-EXCHANGE: Brand: TREK

## (undated) DEVICE — SOL IRR NACL 0.9PCT BT 1000ML

## (undated) DEVICE — SURGICAL PROCEDURE PACK KNEE TOT DBD CDS LOURDES HOSP LF

## (undated) DEVICE — GLOVE SURG SZ 65 CRM LTX FREE POLYISOPRENE POLYMER BEAD ANTI

## (undated) DEVICE — HI-TORQUE BALANCE MIDDLEWEIGHT UNIVERSAL II GUIDE WIRE STRAIGHT TIP PAK  190 CM: Brand: HI-TORQUE BALANCE MIDDLEWEIGHT UNIVERSAL II

## (undated) DEVICE — FORCEPS BX L240CM JAW DIA2.4MM ORNG L CAP W/ NDL DISP RAD

## (undated) DEVICE — SUTURE VCRL SZ 3-0 L27IN ABSRB UD L26MM SH 1/2 CIR J416H

## (undated) DEVICE — PTCA DILATATION CATHETER: Brand: NC EMERGE®

## (undated) DEVICE — SNARE ENDOSCP L240CM LOOP W13MM SHTH DIA2.4MM SM OVL FLX

## (undated) DEVICE — STERILE PATIENT PROTECTIVE PAD FOR IMP® KNEE POSITIONERS & COHESIVE WRAP (10 / CASE): Brand: DE MAYO KNEE POSITIONER®

## (undated) DEVICE — GW ZIPWIRE STD ANGL .035IN 150CM